# Patient Record
Sex: MALE | Race: WHITE | Employment: OTHER | URBAN - METROPOLITAN AREA
[De-identification: names, ages, dates, MRNs, and addresses within clinical notes are randomized per-mention and may not be internally consistent; named-entity substitution may affect disease eponyms.]

---

## 2019-01-01 ENCOUNTER — APPOINTMENT (OUTPATIENT)
Dept: GENERAL RADIOLOGY | Age: 84
DRG: 266 | End: 2019-01-01
Attending: THORACIC SURGERY (CARDIOTHORACIC VASCULAR SURGERY)
Payer: MEDICARE

## 2019-01-01 ENCOUNTER — APPOINTMENT (OUTPATIENT)
Dept: GENERAL RADIOLOGY | Age: 84
DRG: 291 | End: 2019-01-01
Attending: THORACIC SURGERY (CARDIOTHORACIC VASCULAR SURGERY)
Payer: MEDICARE

## 2019-01-01 ENCOUNTER — APPOINTMENT (OUTPATIENT)
Dept: INTERVENTIONAL RADIOLOGY/VASCULAR | Age: 84
DRG: 266 | End: 2019-01-01
Attending: THORACIC SURGERY (CARDIOTHORACIC VASCULAR SURGERY)
Payer: MEDICARE

## 2019-01-01 ENCOUNTER — APPOINTMENT (OUTPATIENT)
Dept: CT IMAGING | Age: 84
DRG: 291 | End: 2019-01-01
Attending: THORACIC SURGERY (CARDIOTHORACIC VASCULAR SURGERY)
Payer: MEDICARE

## 2019-01-01 ENCOUNTER — APPOINTMENT (OUTPATIENT)
Dept: DIALYSIS | Age: 84
DRG: 266 | End: 2019-01-01
Attending: THORACIC SURGERY (CARDIOTHORACIC VASCULAR SURGERY)
Payer: MEDICARE

## 2019-01-01 ENCOUNTER — HOSPITAL ENCOUNTER (OUTPATIENT)
Age: 84
Discharge: HOME | End: 2019-05-31
Payer: MEDICARE

## 2019-01-01 ENCOUNTER — HOSPITAL ENCOUNTER (OUTPATIENT)
Age: 84
LOS: 1 days | Discharge: NURSING, REHAB OR LONG-TERM CARE | End: 2019-06-20
Attending: THORACIC SURGERY (CARDIOTHORACIC VASCULAR SURGERY) | Admitting: THORACIC SURGERY (CARDIOTHORACIC VASCULAR SURGERY)
Payer: MEDICARE

## 2019-01-01 ENCOUNTER — HOSPITAL ENCOUNTER (OUTPATIENT)
Dept: GENERAL RADIOLOGY | Age: 84
Discharge: HOME | End: 2019-07-14
Payer: COMMERCIAL

## 2019-01-01 ENCOUNTER — OFFICE VISIT (OUTPATIENT)
Dept: PODIATRY | Age: 84
End: 2019-01-01
Payer: MEDICARE

## 2019-01-01 ENCOUNTER — APPOINTMENT (OUTPATIENT)
Dept: GENERAL RADIOLOGY | Age: 84
End: 2019-01-01
Attending: THORACIC SURGERY (CARDIOTHORACIC VASCULAR SURGERY)
Payer: MEDICARE

## 2019-01-01 ENCOUNTER — APPOINTMENT (OUTPATIENT)
Dept: INTERVENTIONAL RADIOLOGY/VASCULAR | Age: 84
DRG: 291 | End: 2019-01-01
Attending: THORACIC SURGERY (CARDIOTHORACIC VASCULAR SURGERY)
Payer: MEDICARE

## 2019-01-01 ENCOUNTER — APPOINTMENT (OUTPATIENT)
Dept: ULTRASOUND IMAGING | Age: 84
DRG: 266 | End: 2019-01-01
Attending: THORACIC SURGERY (CARDIOTHORACIC VASCULAR SURGERY)
Payer: MEDICARE

## 2019-01-01 ENCOUNTER — APPOINTMENT (OUTPATIENT)
Dept: DIALYSIS | Age: 84
DRG: 291 | End: 2019-01-01
Attending: THORACIC SURGERY (CARDIOTHORACIC VASCULAR SURGERY)
Payer: MEDICARE

## 2019-01-01 ENCOUNTER — ANESTHESIA EVENT (OUTPATIENT)
Dept: OPERATING ROOM | Age: 84
End: 2019-01-01
Payer: MEDICARE

## 2019-01-01 ENCOUNTER — APPOINTMENT (OUTPATIENT)
Dept: CARDIAC CATH/INVASIVE PROCEDURES | Age: 84
DRG: 266 | End: 2019-01-01
Attending: THORACIC SURGERY (CARDIOTHORACIC VASCULAR SURGERY)
Payer: MEDICARE

## 2019-01-01 ENCOUNTER — ANESTHESIA (OUTPATIENT)
Dept: CARDIAC CATH/INVASIVE PROCEDURES | Age: 84
DRG: 266 | End: 2019-01-01
Payer: MEDICARE

## 2019-01-01 ENCOUNTER — OFFICE VISIT (OUTPATIENT)
Dept: VASCULAR SURGERY | Age: 84
End: 2019-01-01
Payer: MEDICARE

## 2019-01-01 ENCOUNTER — HOSPITAL ENCOUNTER (OUTPATIENT)
Dept: GENERAL RADIOLOGY | Age: 84
Discharge: HOME | End: 2019-05-31
Payer: COMMERCIAL

## 2019-01-01 ENCOUNTER — HOSPITAL ENCOUNTER (OUTPATIENT)
Dept: VASCULAR LAB | Age: 84
Discharge: HOME | End: 2019-07-09
Payer: MEDICARE

## 2019-01-01 ENCOUNTER — APPOINTMENT (OUTPATIENT)
Dept: INTERVENTIONAL RADIOLOGY/VASCULAR | Age: 84
DRG: 266 | End: 2019-01-01
Payer: MEDICARE

## 2019-01-01 ENCOUNTER — OFFICE VISIT (OUTPATIENT)
Dept: CARDIOTHORACIC SURGERY | Age: 84
End: 2019-01-01
Payer: MEDICARE

## 2019-01-01 ENCOUNTER — HOSPITAL ENCOUNTER (OUTPATIENT)
Age: 84
Discharge: HOME | End: 2019-07-14
Payer: COMMERCIAL

## 2019-01-01 ENCOUNTER — ANESTHESIA EVENT (OUTPATIENT)
Dept: CARDIAC CATH/INVASIVE PROCEDURES | Age: 84
DRG: 266 | End: 2019-01-01
Payer: MEDICARE

## 2019-01-01 ENCOUNTER — HOSPITAL ENCOUNTER (OUTPATIENT)
Age: 84
Discharge: HOME | End: 2019-05-29
Payer: COMMERCIAL

## 2019-01-01 ENCOUNTER — HOSPITAL ENCOUNTER (INPATIENT)
Age: 84
LOS: 48 days | Discharge: NURSING, REHAB OR LONG-TERM CARE | DRG: 266 | End: 2019-04-23
Attending: THORACIC SURGERY (CARDIOTHORACIC VASCULAR SURGERY) | Admitting: THORACIC SURGERY (CARDIOTHORACIC VASCULAR SURGERY)
Payer: MEDICARE

## 2019-01-01 ENCOUNTER — ANESTHESIA (OUTPATIENT)
Dept: OPERATING ROOM | Age: 84
End: 2019-01-01
Payer: MEDICARE

## 2019-01-01 ENCOUNTER — HOSPITAL ENCOUNTER (OUTPATIENT)
Age: 84
Discharge: HOME | End: 2019-07-09
Payer: MEDICARE

## 2019-01-01 ENCOUNTER — HOSPITAL ENCOUNTER (INPATIENT)
Age: 84
LOS: 13 days | Discharge: NURSING, REHAB OR LONG-TERM CARE | DRG: 291 | End: 2019-05-16
Attending: THORACIC SURGERY (CARDIOTHORACIC VASCULAR SURGERY) | Admitting: THORACIC SURGERY (CARDIOTHORACIC VASCULAR SURGERY)
Payer: MEDICARE

## 2019-01-01 VITALS
DIASTOLIC BLOOD PRESSURE: 72 MMHG | TEMPERATURE: 98 F | HEART RATE: 68 BPM | OXYGEN SATURATION: 98 % | SYSTOLIC BLOOD PRESSURE: 132 MMHG

## 2019-01-01 VITALS — BODY MASS INDEX: 23.32 KG/M2 | WEIGHT: 140 LBS | HEIGHT: 65 IN | RESPIRATION RATE: 18 BRPM

## 2019-01-01 VITALS
BODY MASS INDEX: 21.01 KG/M2 | DIASTOLIC BLOOD PRESSURE: 87 MMHG | RESPIRATION RATE: 15 BRPM | TEMPERATURE: 98.9 F | SYSTOLIC BLOOD PRESSURE: 159 MMHG | HEIGHT: 66 IN | OXYGEN SATURATION: 95 % | HEART RATE: 64 BPM | WEIGHT: 130.73 LBS

## 2019-01-01 VITALS
BODY MASS INDEX: 20.97 KG/M2 | RESPIRATION RATE: 23 BRPM | TEMPERATURE: 97.7 F | WEIGHT: 125.88 LBS | HEART RATE: 68 BPM | SYSTOLIC BLOOD PRESSURE: 140 MMHG | HEIGHT: 65 IN | OXYGEN SATURATION: 98 % | DIASTOLIC BLOOD PRESSURE: 61 MMHG

## 2019-01-01 VITALS — DIASTOLIC BLOOD PRESSURE: 61 MMHG | SYSTOLIC BLOOD PRESSURE: 141 MMHG | OXYGEN SATURATION: 100 %

## 2019-01-01 VITALS
DIASTOLIC BLOOD PRESSURE: 40 MMHG | HEIGHT: 66 IN | HEART RATE: 93 BPM | WEIGHT: 131.9 LBS | TEMPERATURE: 98.2 F | BODY MASS INDEX: 21.2 KG/M2 | SYSTOLIC BLOOD PRESSURE: 139 MMHG | RESPIRATION RATE: 18 BRPM | OXYGEN SATURATION: 96 %

## 2019-01-01 VITALS
DIASTOLIC BLOOD PRESSURE: 60 MMHG | BODY MASS INDEX: 21.66 KG/M2 | TEMPERATURE: 97.9 F | HEART RATE: 50 BPM | HEIGHT: 65 IN | WEIGHT: 130 LBS | SYSTOLIC BLOOD PRESSURE: 119 MMHG | OXYGEN SATURATION: 94 %

## 2019-01-01 VITALS — TEMPERATURE: 96.2 F | OXYGEN SATURATION: 99 % | SYSTOLIC BLOOD PRESSURE: 120 MMHG | DIASTOLIC BLOOD PRESSURE: 61 MMHG

## 2019-01-01 DIAGNOSIS — T81.718A PSEUDOANEURYSM OF FEMORAL ARTERY FOLLOWING PROCEDURE (HCC): ICD-10-CM

## 2019-01-01 DIAGNOSIS — R79.82 CRP ELEVATED: ICD-10-CM

## 2019-01-01 DIAGNOSIS — M86.9 OSTEOMYELITIS OF OTHER SITE, UNSPECIFIED TYPE (HCC): Primary | ICD-10-CM

## 2019-01-01 DIAGNOSIS — J90 PLEURAL EFFUSION: Primary | ICD-10-CM

## 2019-01-01 DIAGNOSIS — Z95.2 S/P TAVR (TRANSCATHETER AORTIC VALVE REPLACEMENT): Primary | ICD-10-CM

## 2019-01-01 DIAGNOSIS — I73.9 PVD (PERIPHERAL VASCULAR DISEASE) (HCC): ICD-10-CM

## 2019-01-01 DIAGNOSIS — M79.672 BILATERAL FOOT PAIN: ICD-10-CM

## 2019-01-01 DIAGNOSIS — N18.4 STAGE 4 CHRONIC KIDNEY DISEASE (HCC): ICD-10-CM

## 2019-01-01 DIAGNOSIS — R26.2 TROUBLE WALKING: ICD-10-CM

## 2019-01-01 DIAGNOSIS — I27.20 PULMONARY HYPERTENSION (HCC): Primary | ICD-10-CM

## 2019-01-01 DIAGNOSIS — R60.0 BILATERAL LOWER EXTREMITY EDEMA: Primary | ICD-10-CM

## 2019-01-01 DIAGNOSIS — I73.89 OTHER SPECIFIED PERIPHERAL VASCULAR DISEASES (HCC): ICD-10-CM

## 2019-01-01 DIAGNOSIS — J90 PLEURAL EFFUSION: ICD-10-CM

## 2019-01-01 DIAGNOSIS — M25.511 RIGHT SHOULDER PAIN, UNSPECIFIED CHRONICITY: Primary | ICD-10-CM

## 2019-01-01 DIAGNOSIS — B35.1 DERMATOPHYTOSIS OF NAIL: Primary | ICD-10-CM

## 2019-01-01 DIAGNOSIS — Z95.2 S/P TAVR (TRANSCATHETER AORTIC VALVE REPLACEMENT): ICD-10-CM

## 2019-01-01 DIAGNOSIS — I50.23 ACUTE ON CHRONIC SYSTOLIC CONGESTIVE HEART FAILURE (HCC): ICD-10-CM

## 2019-01-01 DIAGNOSIS — I70.90 ARTERIOSCLEROSIS: Primary | ICD-10-CM

## 2019-01-01 DIAGNOSIS — I72.9 PSEUDOANEURYSM (HCC): Primary | ICD-10-CM

## 2019-01-01 DIAGNOSIS — I72.4 PSEUDOANEURYSM OF FEMORAL ARTERY FOLLOWING PROCEDURE (HCC): ICD-10-CM

## 2019-01-01 DIAGNOSIS — I50.23 ACUTE ON CHRONIC SYSTOLIC CONGESTIVE HEART FAILURE (HCC): Primary | ICD-10-CM

## 2019-01-01 DIAGNOSIS — M79.89 SWELLING OF BOTH LOWER EXTREMITIES: Primary | ICD-10-CM

## 2019-01-01 DIAGNOSIS — M79.671 BILATERAL FOOT PAIN: ICD-10-CM

## 2019-01-01 DIAGNOSIS — M25.511 RIGHT SHOULDER PAIN, UNSPECIFIED CHRONICITY: ICD-10-CM

## 2019-01-01 DIAGNOSIS — T88.8XXA OTHER SPECIFIED COMPLICATIONS OF SURGICAL AND MEDICAL CARE, NOT ELSEWHERE CLASSIFIED, INITIAL ENCOUNTER: ICD-10-CM

## 2019-01-01 DIAGNOSIS — M86.9 OSTEOMYELITIS OF OTHER SITE, UNSPECIFIED TYPE (HCC): ICD-10-CM

## 2019-01-01 LAB
% CKMB: 13.1 % (ref 0–3.5)
-: NORMAL
ABO/RH: NORMAL
ABSOLUTE EOS #: 0 K/UL (ref 0–0.4)
ABSOLUTE EOS #: 0 K/UL (ref 0–0.4)
ABSOLUTE EOS #: 0 K/UL (ref 0–0.44)
ABSOLUTE EOS #: 0.1 K/UL (ref 0–0.4)
ABSOLUTE EOS #: 0.11 K/UL (ref 0–0.44)
ABSOLUTE EOS #: 0.13 K/UL (ref 0–0.44)
ABSOLUTE EOS #: 0.14 K/UL (ref 0–0.44)
ABSOLUTE EOS #: 0.16 K/UL (ref 0–0.44)
ABSOLUTE EOS #: 0.17 K/UL (ref 0–0.4)
ABSOLUTE EOS #: 0.17 K/UL (ref 0–0.44)
ABSOLUTE EOS #: 0.2 K/UL (ref 0–0.4)
ABSOLUTE EOS #: 0.2 K/UL (ref 0–0.44)
ABSOLUTE EOS #: 0.24 K/UL (ref 0–0.44)
ABSOLUTE EOS #: 0.26 K/UL (ref 0–0.44)
ABSOLUTE EOS #: 0.31 K/UL (ref 0–0.44)
ABSOLUTE EOS #: 0.36 K/UL (ref 0–0.44)
ABSOLUTE EOS #: 0.38 K/UL (ref 0–0.44)
ABSOLUTE EOS #: 0.47 K/UL (ref 0–0.44)
ABSOLUTE EOS #: 0.48 K/UL (ref 0–0.44)
ABSOLUTE EOS #: 0.5 K/UL (ref 0–0.4)
ABSOLUTE EOS #: 0.55 K/UL (ref 0–0.44)
ABSOLUTE EOS #: 0.58 K/UL (ref 0–0.44)
ABSOLUTE EOS #: 0.61 K/UL (ref 0–0.44)
ABSOLUTE EOS #: 0.68 K/UL (ref 0–0.44)
ABSOLUTE EOS #: 0.71 K/UL (ref 0–0.44)
ABSOLUTE IMMATURE GRANULOCYTE: 0 K/UL (ref 0–0.3)
ABSOLUTE IMMATURE GRANULOCYTE: 0.04 K/UL (ref 0–0.3)
ABSOLUTE IMMATURE GRANULOCYTE: 0.06 K/UL (ref 0–0.3)
ABSOLUTE IMMATURE GRANULOCYTE: 0.07 K/UL (ref 0–0.3)
ABSOLUTE IMMATURE GRANULOCYTE: 0.07 K/UL (ref 0–0.3)
ABSOLUTE IMMATURE GRANULOCYTE: 0.08 K/UL (ref 0–0.3)
ABSOLUTE IMMATURE GRANULOCYTE: 0.08 K/UL (ref 0–0.3)
ABSOLUTE IMMATURE GRANULOCYTE: 0.09 K/UL (ref 0–0.3)
ABSOLUTE IMMATURE GRANULOCYTE: 0.09 K/UL (ref 0–0.3)
ABSOLUTE IMMATURE GRANULOCYTE: 0.1 K/UL (ref 0–0.3)
ABSOLUTE IMMATURE GRANULOCYTE: 0.12 K/UL (ref 0–0.3)
ABSOLUTE IMMATURE GRANULOCYTE: 0.13 K/UL (ref 0–0.3)
ABSOLUTE IMMATURE GRANULOCYTE: 0.15 K/UL (ref 0–0.3)
ABSOLUTE IMMATURE GRANULOCYTE: 0.16 K/UL (ref 0–0.3)
ABSOLUTE IMMATURE GRANULOCYTE: 0.24 K/UL (ref 0–0.3)
ABSOLUTE IMMATURE GRANULOCYTE: 0.25 K/UL (ref 0–0.3)
ABSOLUTE LYMPH #: 0.45 K/UL (ref 1.1–3.7)
ABSOLUTE LYMPH #: 0.46 K/UL (ref 1.1–3.7)
ABSOLUTE LYMPH #: 0.5 K/UL (ref 1.1–3.7)
ABSOLUTE LYMPH #: 0.5 K/UL (ref 1–4.8)
ABSOLUTE LYMPH #: 0.5 K/UL (ref 1–4.8)
ABSOLUTE LYMPH #: 0.52 K/UL (ref 1.1–3.7)
ABSOLUTE LYMPH #: 0.52 K/UL (ref 1–4.8)
ABSOLUTE LYMPH #: 0.56 K/UL (ref 1.1–3.7)
ABSOLUTE LYMPH #: 0.58 K/UL (ref 1.1–3.7)
ABSOLUTE LYMPH #: 0.59 K/UL (ref 1.1–3.7)
ABSOLUTE LYMPH #: 0.62 K/UL (ref 1.1–3.7)
ABSOLUTE LYMPH #: 0.65 K/UL (ref 1.1–3.7)
ABSOLUTE LYMPH #: 0.68 K/UL (ref 1–4.8)
ABSOLUTE LYMPH #: 0.7 K/UL (ref 1.1–3.7)
ABSOLUTE LYMPH #: 0.72 K/UL (ref 1.1–3.7)
ABSOLUTE LYMPH #: 0.72 K/UL (ref 1.1–3.7)
ABSOLUTE LYMPH #: 0.73 K/UL (ref 1.1–3.7)
ABSOLUTE LYMPH #: 0.74 K/UL (ref 1.1–3.7)
ABSOLUTE LYMPH #: 0.8 K/UL (ref 1.1–3.7)
ABSOLUTE LYMPH #: 0.82 K/UL (ref 1.1–3.7)
ABSOLUTE LYMPH #: 1.03 K/UL (ref 1–4.8)
ABSOLUTE LYMPH #: 1.07 K/UL (ref 1.1–3.7)
ABSOLUTE LYMPH #: 1.16 K/UL (ref 1–4.8)
ABSOLUTE MONO #: 0.1 K/UL (ref 0.1–1.2)
ABSOLUTE MONO #: 0.31 K/UL (ref 0.1–0.8)
ABSOLUTE MONO #: 0.53 K/UL (ref 0.1–1.2)
ABSOLUTE MONO #: 0.54 K/UL (ref 0.1–0.8)
ABSOLUTE MONO #: 0.58 K/UL (ref 0.1–0.8)
ABSOLUTE MONO #: 0.58 K/UL (ref 0.1–1.2)
ABSOLUTE MONO #: 0.59 K/UL (ref 0.1–1.2)
ABSOLUTE MONO #: 0.6 K/UL (ref 0.1–0.8)
ABSOLUTE MONO #: 0.63 K/UL (ref 0.1–1.2)
ABSOLUTE MONO #: 0.69 K/UL (ref 0.1–0.8)
ABSOLUTE MONO #: 0.69 K/UL (ref 0.1–1.2)
ABSOLUTE MONO #: 0.73 K/UL (ref 0.1–1.2)
ABSOLUTE MONO #: 0.74 K/UL (ref 0.1–1.2)
ABSOLUTE MONO #: 0.75 K/UL (ref 0.1–1.2)
ABSOLUTE MONO #: 0.76 K/UL (ref 0.1–1.2)
ABSOLUTE MONO #: 0.77 K/UL (ref 0.1–1.2)
ABSOLUTE MONO #: 0.79 K/UL (ref 0.1–1.2)
ABSOLUTE MONO #: 0.8 K/UL (ref 0.1–1.2)
ABSOLUTE MONO #: 0.86 K/UL (ref 0.1–0.8)
ABSOLUTE MONO #: 0.89 K/UL (ref 0.1–1.2)
ABSOLUTE MONO #: 0.92 K/UL (ref 0.1–1.2)
ABSOLUTE MONO #: 0.94 K/UL (ref 0.1–1.2)
ABSOLUTE MONO #: 0.95 K/UL (ref 0.1–1.2)
ABSOLUTE MONO #: 0.97 K/UL (ref 0.1–1.2)
ABSOLUTE MONO #: 0.98 K/UL (ref 0.1–1.2)
ABSOLUTE RETIC #: 0.06 M/UL (ref 0.03–0.08)
ACTIVATED CLOTTING TIME: 168 SEC (ref 79–149)
ACTIVATED CLOTTING TIME: 225 SEC (ref 79–149)
ALBUMIN (CALCULATED): 2.5 G/DL (ref 3.2–5.2)
ALBUMIN PERCENT: 53 % (ref 45–65)
ALBUMIN SERPL-MCNC: 2.3 G/DL (ref 3.5–5.2)
ALBUMIN SERPL-MCNC: 2.9 G/DL (ref 3.5–5.2)
ALBUMIN SERPL-MCNC: 3.1 G/DL (ref 3.5–5.2)
ALBUMIN SERPL-MCNC: 3.1 G/DL (ref 3.5–5.2)
ALBUMIN SERPL-MCNC: 3.3 G/DL (ref 3.5–5.2)
ALBUMIN SERPL-MCNC: 3.4 G/DL (ref 3.5–5.2)
ALBUMIN SERPL-MCNC: 3.6 G/DL (ref 3.5–5.2)
ALBUMIN/GLOBULIN RATIO: 1.4 (ref 1–2.5)
ALBUMIN/GLOBULIN RATIO: 1.4 (ref 1–2.5)
ALBUMIN/GLOBULIN RATIO: 1.5 (ref 1–2.5)
ALBUMIN/GLOBULIN RATIO: 1.5 (ref 1–2.5)
ALBUMIN/GLOBULIN RATIO: 1.9 (ref 1–2.5)
ALLEN TEST: POSITIVE
ALP BLD-CCNC: 124 U/L (ref 40–129)
ALP BLD-CCNC: 74 U/L (ref 40–129)
ALP BLD-CCNC: 85 U/L (ref 40–129)
ALP BLD-CCNC: 87 U/L (ref 40–129)
ALP BLD-CCNC: 94 U/L (ref 40–129)
ALPHA 1 PERCENT: 7 % (ref 3–6)
ALPHA 2 PERCENT: 18 % (ref 6–13)
ALPHA-1-GLOBULIN: 0.3 G/DL (ref 0.1–0.4)
ALPHA-2-GLOBULIN: 0.8 G/DL (ref 0.5–0.9)
ALT SERPL-CCNC: 12 U/L (ref 5–41)
ALT SERPL-CCNC: 17 U/L (ref 5–41)
ALT SERPL-CCNC: 18 U/L (ref 5–41)
ALT SERPL-CCNC: 19 U/L (ref 5–41)
ALT SERPL-CCNC: 30 U/L (ref 5–41)
AMORPHOUS: NORMAL
ANION GAP SERPL CALCULATED.3IONS-SCNC: 10 MMOL/L (ref 9–17)
ANION GAP SERPL CALCULATED.3IONS-SCNC: 11 MMOL/L (ref 9–17)
ANION GAP SERPL CALCULATED.3IONS-SCNC: 12 MMOL/L (ref 9–17)
ANION GAP SERPL CALCULATED.3IONS-SCNC: 13 MMOL/L (ref 9–17)
ANION GAP SERPL CALCULATED.3IONS-SCNC: 14 MMOL/L (ref 9–17)
ANION GAP SERPL CALCULATED.3IONS-SCNC: 15 MMOL/L (ref 9–17)
ANION GAP SERPL CALCULATED.3IONS-SCNC: 16 MMOL/L (ref 9–17)
ANION GAP SERPL CALCULATED.3IONS-SCNC: 17 MMOL/L (ref 9–17)
ANION GAP SERPL CALCULATED.3IONS-SCNC: 17 MMOL/L (ref 9–17)
ANION GAP SERPL CALCULATED.3IONS-SCNC: 18 MMOL/L (ref 9–17)
ANTIBODY SCREEN: NEGATIVE
APPEARANCE FLUID: NORMAL
ARM BAND NUMBER: NORMAL
AST SERPL-CCNC: 18 U/L
AST SERPL-CCNC: 20 U/L
AST SERPL-CCNC: 22 U/L
AST SERPL-CCNC: 23 U/L
AST SERPL-CCNC: 25 U/L
BACTERIA: NORMAL
BASO FLUID: NORMAL %
BASOPHILS # BLD: 0 % (ref 0–2)
BASOPHILS # BLD: 1 % (ref 0–2)
BASOPHILS ABSOLUTE: 0 K/UL (ref 0–0.2)
BASOPHILS ABSOLUTE: 0.03 K/UL (ref 0–0.2)
BASOPHILS ABSOLUTE: 0.04 K/UL (ref 0–0.2)
BASOPHILS ABSOLUTE: 0.04 K/UL (ref 0–0.2)
BASOPHILS ABSOLUTE: 0.05 K/UL (ref 0–0.2)
BASOPHILS ABSOLUTE: 0.05 K/UL (ref 0–0.2)
BASOPHILS ABSOLUTE: 0.1 K/UL (ref 0–0.2)
BASOPHILS ABSOLUTE: <0.03 K/UL (ref 0–0.2)
BASOPHILS ABSOLUTE: <0.03 K/UL (ref 0–0.2)
BETA GLOBULIN: 0.7 G/DL (ref 0.5–1.1)
BETA PERCENT: 14 % (ref 11–19)
BILIRUB SERPL-MCNC: 0.29 MG/DL (ref 0.3–1.2)
BILIRUB SERPL-MCNC: 0.29 MG/DL (ref 0.3–1.2)
BILIRUB SERPL-MCNC: 0.44 MG/DL (ref 0.3–1.2)
BILIRUB SERPL-MCNC: 0.45 MG/DL (ref 0.3–1.2)
BILIRUB SERPL-MCNC: 0.46 MG/DL (ref 0.3–1.2)
BILIRUB SERPL-MCNC: 0.55 MG/DL (ref 0.3–1.2)
BILIRUBIN DIRECT: 0.13 MG/DL
BILIRUBIN DIRECT: 0.14 MG/DL
BILIRUBIN DIRECT: 0.15 MG/DL
BILIRUBIN URINE: NEGATIVE
BILIRUBIN, INDIRECT: 0.31 MG/DL (ref 0–1)
BILIRUBIN, INDIRECT: 0.31 MG/DL (ref 0–1)
BILIRUBIN, INDIRECT: 0.4 MG/DL (ref 0–1)
BLD PROD TYP BPU: NORMAL
BLOOD BANK SPECIMEN: NORMAL
BNP INTERPRETATION: ABNORMAL
BUN BLDV-MCNC: 100 MG/DL (ref 8–23)
BUN BLDV-MCNC: 101 MG/DL (ref 8–23)
BUN BLDV-MCNC: 102 MG/DL (ref 8–23)
BUN BLDV-MCNC: 103 MG/DL (ref 8–23)
BUN BLDV-MCNC: 103 MG/DL (ref 8–23)
BUN BLDV-MCNC: 104 MG/DL (ref 8–23)
BUN BLDV-MCNC: 104 MG/DL (ref 8–23)
BUN BLDV-MCNC: 106 MG/DL (ref 8–23)
BUN BLDV-MCNC: 107 MG/DL (ref 8–23)
BUN BLDV-MCNC: 110 MG/DL (ref 8–23)
BUN BLDV-MCNC: 111 MG/DL (ref 8–23)
BUN BLDV-MCNC: 138 MG/DL (ref 8–23)
BUN BLDV-MCNC: 24 MG/DL (ref 8–23)
BUN BLDV-MCNC: 27 MG/DL (ref 8–23)
BUN BLDV-MCNC: 28 MG/DL (ref 8–23)
BUN BLDV-MCNC: 31 MG/DL (ref 8–23)
BUN BLDV-MCNC: 33 MG/DL (ref 8–23)
BUN BLDV-MCNC: 35 MG/DL (ref 8–23)
BUN BLDV-MCNC: 38 MG/DL (ref 8–23)
BUN BLDV-MCNC: 42 MG/DL (ref 8–23)
BUN BLDV-MCNC: 42 MG/DL (ref 8–23)
BUN BLDV-MCNC: 43 MG/DL (ref 8–23)
BUN BLDV-MCNC: 43 MG/DL (ref 8–23)
BUN BLDV-MCNC: 44 MG/DL (ref 8–23)
BUN BLDV-MCNC: 46 MG/DL (ref 8–23)
BUN BLDV-MCNC: 47 MG/DL (ref 8–23)
BUN BLDV-MCNC: 47 MG/DL (ref 8–23)
BUN BLDV-MCNC: 49 MG/DL (ref 8–23)
BUN BLDV-MCNC: 52 MG/DL (ref 8–23)
BUN BLDV-MCNC: 53 MG/DL (ref 8–23)
BUN BLDV-MCNC: 54 MG/DL (ref 8–23)
BUN BLDV-MCNC: 54 MG/DL (ref 8–23)
BUN BLDV-MCNC: 55 MG/DL (ref 8–23)
BUN BLDV-MCNC: 58 MG/DL (ref 8–23)
BUN BLDV-MCNC: 58 MG/DL (ref 8–23)
BUN BLDV-MCNC: 61 MG/DL (ref 8–23)
BUN BLDV-MCNC: 62 MG/DL (ref 8–23)
BUN BLDV-MCNC: 66 MG/DL (ref 8–23)
BUN BLDV-MCNC: 72 MG/DL (ref 8–23)
BUN BLDV-MCNC: 74 MG/DL (ref 8–23)
BUN BLDV-MCNC: 75 MG/DL (ref 8–23)
BUN BLDV-MCNC: 76 MG/DL (ref 8–23)
BUN BLDV-MCNC: 81 MG/DL (ref 8–23)
BUN BLDV-MCNC: 83 MG/DL (ref 8–23)
BUN BLDV-MCNC: 89 MG/DL (ref 8–23)
BUN BLDV-MCNC: 91 MG/DL (ref 8–23)
BUN BLDV-MCNC: 93 MG/DL (ref 8–23)
BUN BLDV-MCNC: 93 MG/DL (ref 8–23)
BUN BLDV-MCNC: 95 MG/DL (ref 8–23)
BUN BLDV-MCNC: 97 MG/DL (ref 8–23)
BUN BLDV-MCNC: 99 MG/DL (ref 8–23)
BUN BLDV-MCNC: 99 MG/DL (ref 8–23)
BUN/CREAT BLD: ABNORMAL (ref 9–20)
C-REACTIVE PROTEIN: 10.2 MG/L (ref 0–5)
C-REACTIVE PROTEIN: 18.6 MG/L (ref 0–5)
C-REACTIVE PROTEIN: 18.6 MG/L (ref 0–5)
C-REACTIVE PROTEIN: 29.1 MG/L (ref 0–5)
C-REACTIVE PROTEIN: 6.5 MG/L (ref 0–5)
C-REACTIVE PROTEIN: 60.9 MG/L (ref 0–5)
C-REACTIVE PROTEIN: 9.3 MG/L (ref 0–5)
CALCIUM IONIZED: 1.11 MMOL/L (ref 1.13–1.33)
CALCIUM IONIZED: 1.13 MMOL/L (ref 1.13–1.33)
CALCIUM IONIZED: 1.15 MMOL/L (ref 1.13–1.33)
CALCIUM SERPL-MCNC: 7.1 MG/DL (ref 8.6–10.4)
CALCIUM SERPL-MCNC: 7.4 MG/DL (ref 8.6–10.4)
CALCIUM SERPL-MCNC: 7.5 MG/DL (ref 8.6–10.4)
CALCIUM SERPL-MCNC: 7.6 MG/DL (ref 8.6–10.4)
CALCIUM SERPL-MCNC: 7.7 MG/DL (ref 8.6–10.4)
CALCIUM SERPL-MCNC: 7.8 MG/DL (ref 8.6–10.4)
CALCIUM SERPL-MCNC: 7.9 MG/DL (ref 8.6–10.4)
CALCIUM SERPL-MCNC: 8 MG/DL (ref 8.6–10.4)
CALCIUM SERPL-MCNC: 8.1 MG/DL (ref 8.6–10.4)
CALCIUM SERPL-MCNC: 8.1 MG/DL (ref 8.6–10.4)
CALCIUM SERPL-MCNC: 8.2 MG/DL (ref 8.6–10.4)
CALCIUM SERPL-MCNC: 8.3 MG/DL (ref 8.6–10.4)
CALCIUM SERPL-MCNC: 8.4 MG/DL (ref 8.6–10.4)
CALCIUM SERPL-MCNC: 8.5 MG/DL (ref 8.6–10.4)
CALCIUM SERPL-MCNC: 8.6 MG/DL (ref 8.6–10.4)
CALCIUM SERPL-MCNC: 8.7 MG/DL (ref 8.6–10.4)
CALCIUM SERPL-MCNC: 8.8 MG/DL (ref 8.6–10.4)
CALCIUM SERPL-MCNC: 8.8 MG/DL (ref 8.6–10.4)
CALCIUM SERPL-MCNC: 8.9 MG/DL (ref 8.6–10.4)
CALCIUM SERPL-MCNC: 9 MG/DL (ref 8.6–10.4)
CALCIUM SERPL-MCNC: 9.1 MG/DL (ref 8.6–10.4)
CASE NUMBER:: NORMAL
CASTS UA: NORMAL /LPF (ref 0–2)
CASTS UA: NORMAL /LPF (ref 0–8)
CASTS UA: NORMAL /LPF (ref 0–8)
CHLORIDE BLD-SCNC: 100 MMOL/L (ref 98–107)
CHLORIDE BLD-SCNC: 101 MMOL/L (ref 98–107)
CHLORIDE BLD-SCNC: 101 MMOL/L (ref 98–107)
CHLORIDE BLD-SCNC: 102 MMOL/L (ref 98–107)
CHLORIDE BLD-SCNC: 103 MMOL/L (ref 98–107)
CHLORIDE BLD-SCNC: 104 MMOL/L (ref 98–107)
CHLORIDE BLD-SCNC: 105 MMOL/L (ref 98–107)
CHLORIDE BLD-SCNC: 106 MMOL/L (ref 98–107)
CHLORIDE BLD-SCNC: 92 MMOL/L (ref 98–107)
CHLORIDE BLD-SCNC: 93 MMOL/L (ref 98–107)
CHLORIDE BLD-SCNC: 94 MMOL/L (ref 98–107)
CHLORIDE BLD-SCNC: 94 MMOL/L (ref 98–107)
CHLORIDE BLD-SCNC: 95 MMOL/L (ref 98–107)
CHLORIDE BLD-SCNC: 96 MMOL/L (ref 98–107)
CHLORIDE BLD-SCNC: 97 MMOL/L (ref 98–107)
CHLORIDE BLD-SCNC: 98 MMOL/L (ref 98–107)
CHLORIDE BLD-SCNC: 99 MMOL/L (ref 98–107)
CK MB: 7.7 NG/ML
CKMB INTERPRETATION: ABNORMAL
CO2: 17 MMOL/L (ref 20–31)
CO2: 18 MMOL/L (ref 20–31)
CO2: 18 MMOL/L (ref 20–31)
CO2: 19 MMOL/L (ref 20–31)
CO2: 20 MMOL/L (ref 20–31)
CO2: 20 MMOL/L (ref 20–31)
CO2: 21 MMOL/L (ref 20–31)
CO2: 21 MMOL/L (ref 20–31)
CO2: 22 MMOL/L (ref 20–31)
CO2: 23 MMOL/L (ref 20–31)
CO2: 24 MMOL/L (ref 20–31)
CO2: 25 MMOL/L (ref 20–31)
CO2: 26 MMOL/L (ref 20–31)
CO2: 27 MMOL/L (ref 20–31)
CO2: 28 MMOL/L (ref 20–31)
CO2: 28 MMOL/L (ref 20–31)
CO2: 29 MMOL/L (ref 20–31)
COLLAGEN ADENOSINE-5'-DIPHOSPHATE (ADP) TIME: 89 SEC (ref 67–112)
COLLAGEN EPINEPHRINE TIME: 134 SEC (ref 85–172)
COLOR FLUID: NORMAL
COLOR: YELLOW
COMMENT UA: ABNORMAL
CREAT SERPL-MCNC: 1.24 MG/DL (ref 0.7–1.2)
CREAT SERPL-MCNC: 1.89 MG/DL (ref 0.7–1.2)
CREAT SERPL-MCNC: 2 MG/DL (ref 0.7–1.2)
CREAT SERPL-MCNC: 2.03 MG/DL (ref 0.7–1.2)
CREAT SERPL-MCNC: 2.07 MG/DL (ref 0.7–1.2)
CREAT SERPL-MCNC: 2.12 MG/DL (ref 0.7–1.2)
CREAT SERPL-MCNC: 2.15 MG/DL (ref 0.7–1.2)
CREAT SERPL-MCNC: 2.3 MG/DL (ref 0.7–1.2)
CREAT SERPL-MCNC: 2.41 MG/DL (ref 0.7–1.2)
CREAT SERPL-MCNC: 2.42 MG/DL (ref 0.7–1.2)
CREAT SERPL-MCNC: 2.48 MG/DL (ref 0.7–1.2)
CREAT SERPL-MCNC: 2.57 MG/DL (ref 0.7–1.2)
CREAT SERPL-MCNC: 2.61 MG/DL (ref 0.7–1.2)
CREAT SERPL-MCNC: 2.71 MG/DL (ref 0.7–1.2)
CREAT SERPL-MCNC: 2.74 MG/DL (ref 0.7–1.2)
CREAT SERPL-MCNC: 2.75 MG/DL (ref 0.7–1.2)
CREAT SERPL-MCNC: 2.79 MG/DL (ref 0.7–1.2)
CREAT SERPL-MCNC: 2.8 MG/DL (ref 0.7–1.2)
CREAT SERPL-MCNC: 2.85 MG/DL (ref 0.7–1.2)
CREAT SERPL-MCNC: 2.87 MG/DL (ref 0.7–1.2)
CREAT SERPL-MCNC: 2.89 MG/DL (ref 0.7–1.2)
CREAT SERPL-MCNC: 2.92 MG/DL (ref 0.7–1.2)
CREAT SERPL-MCNC: 2.95 MG/DL (ref 0.7–1.2)
CREAT SERPL-MCNC: 2.99 MG/DL (ref 0.7–1.2)
CREAT SERPL-MCNC: 3 MG/DL (ref 0.7–1.2)
CREAT SERPL-MCNC: 3.03 MG/DL (ref 0.7–1.2)
CREAT SERPL-MCNC: 3.07 MG/DL (ref 0.7–1.2)
CREAT SERPL-MCNC: 3.12 MG/DL (ref 0.7–1.2)
CREAT SERPL-MCNC: 3.12 MG/DL (ref 0.7–1.2)
CREAT SERPL-MCNC: 3.2 MG/DL (ref 0.7–1.2)
CREAT SERPL-MCNC: 3.21 MG/DL (ref 0.7–1.2)
CREAT SERPL-MCNC: 3.21 MG/DL (ref 0.7–1.2)
CREAT SERPL-MCNC: 3.22 MG/DL (ref 0.7–1.2)
CREAT SERPL-MCNC: 3.27 MG/DL (ref 0.7–1.2)
CREAT SERPL-MCNC: 3.29 MG/DL (ref 0.7–1.2)
CREAT SERPL-MCNC: 3.3 MG/DL (ref 0.7–1.2)
CREAT SERPL-MCNC: 3.38 MG/DL (ref 0.7–1.2)
CREAT SERPL-MCNC: 3.49 MG/DL (ref 0.7–1.2)
CREAT SERPL-MCNC: 3.52 MG/DL (ref 0.7–1.2)
CREAT SERPL-MCNC: 3.56 MG/DL (ref 0.7–1.2)
CREAT SERPL-MCNC: 3.58 MG/DL (ref 0.7–1.2)
CREAT SERPL-MCNC: 3.64 MG/DL (ref 0.7–1.2)
CREAT SERPL-MCNC: 3.73 MG/DL (ref 0.7–1.2)
CREAT SERPL-MCNC: 3.77 MG/DL (ref 0.7–1.2)
CREAT SERPL-MCNC: 3.77 MG/DL (ref 0.7–1.2)
CREAT SERPL-MCNC: 3.78 MG/DL (ref 0.7–1.2)
CREAT SERPL-MCNC: 3.82 MG/DL (ref 0.7–1.2)
CREAT SERPL-MCNC: 3.84 MG/DL (ref 0.7–1.2)
CREAT SERPL-MCNC: 3.89 MG/DL (ref 0.7–1.2)
CREAT SERPL-MCNC: 3.95 MG/DL (ref 0.7–1.2)
CREAT SERPL-MCNC: 3.97 MG/DL (ref 0.7–1.2)
CREAT SERPL-MCNC: 4.24 MG/DL (ref 0.7–1.2)
CREAT SERPL-MCNC: 4.53 MG/DL (ref 0.7–1.2)
CREAT SERPL-MCNC: 4.6 MG/DL (ref 0.7–1.2)
CREAT SERPL-MCNC: 5.11 MG/DL (ref 0.7–1.2)
CREAT SERPL-MCNC: 5.42 MG/DL (ref 0.7–1.2)
CREATININE URINE: 210.1 MG/DL (ref 39–259)
CREATININE URINE: 42.4 MG/DL (ref 39–259)
CREATININE URINE: 56.2 MG/DL (ref 39–259)
CROSSMATCH RESULT: NORMAL
CRYSTALS, UA: NORMAL /HPF
CULTURE: ABNORMAL
CULTURE: NO GROWTH
CULTURE: NORMAL
DIFFERENTIAL TYPE: ABNORMAL
DIRECT EXAM: ABNORMAL
DIRECT EXAM: NORMAL
DISPENSE STATUS BLOOD BANK: NORMAL
EKG ATRIAL RATE: 59 BPM
EKG ATRIAL RATE: 60 BPM
EKG ATRIAL RATE: 79 BPM
EKG P AXIS: -11 DEGREES
EKG P AXIS: 117 DEGREES
EKG P-R INTERVAL: 198 MS
EKG P-R INTERVAL: 202 MS
EKG P-R INTERVAL: 206 MS
EKG Q-T INTERVAL: 488 MS
EKG Q-T INTERVAL: 502 MS
EKG Q-T INTERVAL: 542 MS
EKG Q-T INTERVAL: 582 MS
EKG Q-T INTERVAL: 608 MS
EKG QRS DURATION: 176 MS
EKG QRS DURATION: 182 MS
EKG QRS DURATION: 190 MS
EKG QRS DURATION: 204 MS
EKG QRS DURATION: 208 MS
EKG QTC CALCULATION (BAZETT): 502 MS
EKG QTC CALCULATION (BAZETT): 542 MS
EKG QTC CALCULATION (BAZETT): 559 MS
EKG QTC CALCULATION (BAZETT): 595 MS
EKG QTC CALCULATION (BAZETT): 601 MS
EKG R AXIS: -27 DEGREES
EKG R AXIS: -29 DEGREES
EKG R AXIS: -34 DEGREES
EKG R AXIS: -49 DEGREES
EKG R AXIS: -57 DEGREES
EKG T AXIS: 124 DEGREES
EKG T AXIS: 126 DEGREES
EKG T AXIS: 135 DEGREES
EKG T AXIS: 136 DEGREES
EKG T AXIS: 94 DEGREES
EKG VENTRICULAR RATE: 59 BPM
EKG VENTRICULAR RATE: 60 BPM
EKG VENTRICULAR RATE: 60 BPM
EKG VENTRICULAR RATE: 63 BPM
EKG VENTRICULAR RATE: 79 BPM
EOSINOPHIL FLUID: NORMAL %
EOSINOPHILS RELATIVE PERCENT: 0 % (ref 1–4)
EOSINOPHILS RELATIVE PERCENT: 1 % (ref 1–4)
EOSINOPHILS RELATIVE PERCENT: 2 % (ref 1–4)
EOSINOPHILS RELATIVE PERCENT: 3 % (ref 1–4)
EOSINOPHILS RELATIVE PERCENT: 4 % (ref 1–4)
EOSINOPHILS RELATIVE PERCENT: 5 % (ref 1–4)
EOSINOPHILS RELATIVE PERCENT: 6 % (ref 1–4)
EOSINOPHILS RELATIVE PERCENT: 6 % (ref 1–4)
EPITHELIAL CELLS UA: NORMAL /HPF (ref 0–5)
EXPIRATION DATE: NORMAL
FERRITIN: 884 UG/L (ref 30–400)
FIBRINOGEN: 359 MG/DL (ref 140–420)
FIO2: ABNORMAL
FLUID DIFF COMMENT: NORMAL
FOLATE: 15 NG/ML
FREE KAPPA/LAMBDA RATIO: 1.09 (ref 0.26–1.65)
GAMMA GLOBULIN %: 8 % (ref 9–20)
GAMMA GLOBULIN: 0.4 G/DL (ref 0.5–1.5)
GFR AFRICAN AMERICAN: 12 ML/MIN
GFR AFRICAN AMERICAN: 13 ML/MIN
GFR AFRICAN AMERICAN: 15 ML/MIN
GFR AFRICAN AMERICAN: 15 ML/MIN
GFR AFRICAN AMERICAN: 16 ML/MIN
GFR AFRICAN AMERICAN: 17 ML/MIN
GFR AFRICAN AMERICAN: 17 ML/MIN
GFR AFRICAN AMERICAN: 18 ML/MIN
GFR AFRICAN AMERICAN: 19 ML/MIN
GFR AFRICAN AMERICAN: 19 ML/MIN
GFR AFRICAN AMERICAN: 20 ML/MIN
GFR AFRICAN AMERICAN: 21 ML/MIN
GFR AFRICAN AMERICAN: 21 ML/MIN
GFR AFRICAN AMERICAN: 22 ML/MIN
GFR AFRICAN AMERICAN: 23 ML/MIN
GFR AFRICAN AMERICAN: 24 ML/MIN
GFR AFRICAN AMERICAN: 25 ML/MIN
GFR AFRICAN AMERICAN: 26 ML/MIN
GFR AFRICAN AMERICAN: 27 ML/MIN
GFR AFRICAN AMERICAN: 27 ML/MIN
GFR AFRICAN AMERICAN: 28 ML/MIN
GFR AFRICAN AMERICAN: 29 ML/MIN
GFR AFRICAN AMERICAN: 30 ML/MIN
GFR AFRICAN AMERICAN: 31 ML/MIN
GFR AFRICAN AMERICAN: 31 ML/MIN
GFR AFRICAN AMERICAN: 33 ML/MIN
GFR AFRICAN AMERICAN: 35 ML/MIN
GFR AFRICAN AMERICAN: 36 ML/MIN
GFR AFRICAN AMERICAN: 37 ML/MIN
GFR AFRICAN AMERICAN: 38 ML/MIN
GFR AFRICAN AMERICAN: 38 ML/MIN
GFR AFRICAN AMERICAN: 41 ML/MIN
GFR AFRICAN AMERICAN: >60 ML/MIN
GFR NON-AFRICAN AMERICAN: 10 ML/MIN
GFR NON-AFRICAN AMERICAN: 11 ML/MIN
GFR NON-AFRICAN AMERICAN: 12 ML/MIN
GFR NON-AFRICAN AMERICAN: 12 ML/MIN
GFR NON-AFRICAN AMERICAN: 13 ML/MIN
GFR NON-AFRICAN AMERICAN: 14 ML/MIN
GFR NON-AFRICAN AMERICAN: 14 ML/MIN
GFR NON-AFRICAN AMERICAN: 15 ML/MIN
GFR NON-AFRICAN AMERICAN: 16 ML/MIN
GFR NON-AFRICAN AMERICAN: 17 ML/MIN
GFR NON-AFRICAN AMERICAN: 17 ML/MIN
GFR NON-AFRICAN AMERICAN: 18 ML/MIN
GFR NON-AFRICAN AMERICAN: 19 ML/MIN
GFR NON-AFRICAN AMERICAN: 20 ML/MIN
GFR NON-AFRICAN AMERICAN: 21 ML/MIN
GFR NON-AFRICAN AMERICAN: 22 ML/MIN
GFR NON-AFRICAN AMERICAN: 23 ML/MIN
GFR NON-AFRICAN AMERICAN: 24 ML/MIN
GFR NON-AFRICAN AMERICAN: 25 ML/MIN
GFR NON-AFRICAN AMERICAN: 27 ML/MIN
GFR NON-AFRICAN AMERICAN: 29 ML/MIN
GFR NON-AFRICAN AMERICAN: 29 ML/MIN
GFR NON-AFRICAN AMERICAN: 30 ML/MIN
GFR NON-AFRICAN AMERICAN: 31 ML/MIN
GFR NON-AFRICAN AMERICAN: 32 ML/MIN
GFR NON-AFRICAN AMERICAN: 34 ML/MIN
GFR NON-AFRICAN AMERICAN: 55 ML/MIN
GFR SERPL CREATININE-BSD FRML MDRD: ABNORMAL ML/MIN/{1.73_M2}
GLOBULIN: ABNORMAL G/DL (ref 1.5–3.8)
GLUCOSE BLD-MCNC: 100 MG/DL (ref 70–99)
GLUCOSE BLD-MCNC: 100 MG/DL (ref 70–99)
GLUCOSE BLD-MCNC: 101 MG/DL (ref 70–99)
GLUCOSE BLD-MCNC: 103 MG/DL (ref 70–99)
GLUCOSE BLD-MCNC: 103 MG/DL (ref 70–99)
GLUCOSE BLD-MCNC: 104 MG/DL (ref 70–99)
GLUCOSE BLD-MCNC: 104 MG/DL (ref 75–110)
GLUCOSE BLD-MCNC: 105 MG/DL (ref 70–99)
GLUCOSE BLD-MCNC: 105 MG/DL (ref 70–99)
GLUCOSE BLD-MCNC: 106 MG/DL (ref 70–99)
GLUCOSE BLD-MCNC: 106 MG/DL (ref 70–99)
GLUCOSE BLD-MCNC: 107 MG/DL (ref 70–99)
GLUCOSE BLD-MCNC: 107 MG/DL (ref 70–99)
GLUCOSE BLD-MCNC: 108 MG/DL (ref 70–99)
GLUCOSE BLD-MCNC: 108 MG/DL (ref 70–99)
GLUCOSE BLD-MCNC: 112 MG/DL (ref 70–99)
GLUCOSE BLD-MCNC: 113 MG/DL (ref 74–100)
GLUCOSE BLD-MCNC: 116 MG/DL (ref 70–99)
GLUCOSE BLD-MCNC: 119 MG/DL (ref 70–99)
GLUCOSE BLD-MCNC: 120 MG/DL (ref 70–99)
GLUCOSE BLD-MCNC: 122 MG/DL (ref 70–99)
GLUCOSE BLD-MCNC: 124 MG/DL (ref 70–99)
GLUCOSE BLD-MCNC: 125 MG/DL (ref 70–99)
GLUCOSE BLD-MCNC: 133 MG/DL (ref 70–99)
GLUCOSE BLD-MCNC: 136 MG/DL (ref 70–99)
GLUCOSE BLD-MCNC: 138 MG/DL (ref 70–99)
GLUCOSE BLD-MCNC: 141 MG/DL (ref 70–99)
GLUCOSE BLD-MCNC: 161 MG/DL (ref 70–99)
GLUCOSE BLD-MCNC: 73 MG/DL (ref 75–110)
GLUCOSE BLD-MCNC: 77 MG/DL (ref 70–99)
GLUCOSE BLD-MCNC: 82 MG/DL (ref 70–99)
GLUCOSE BLD-MCNC: 85 MG/DL (ref 70–99)
GLUCOSE BLD-MCNC: 89 MG/DL (ref 70–99)
GLUCOSE BLD-MCNC: 90 MG/DL (ref 70–99)
GLUCOSE BLD-MCNC: 91 MG/DL (ref 70–99)
GLUCOSE BLD-MCNC: 91 MG/DL (ref 70–99)
GLUCOSE BLD-MCNC: 92 MG/DL (ref 70–99)
GLUCOSE BLD-MCNC: 92 MG/DL (ref 70–99)
GLUCOSE BLD-MCNC: 93 MG/DL (ref 70–99)
GLUCOSE BLD-MCNC: 93 MG/DL (ref 70–99)
GLUCOSE BLD-MCNC: 94 MG/DL (ref 70–99)
GLUCOSE BLD-MCNC: 95 MG/DL (ref 70–99)
GLUCOSE BLD-MCNC: 96 MG/DL (ref 70–99)
GLUCOSE BLD-MCNC: 97 MG/DL (ref 70–99)
GLUCOSE BLD-MCNC: 98 MG/DL (ref 70–99)
GLUCOSE BLD-MCNC: 99 MG/DL (ref 70–99)
GLUCOSE URINE: NEGATIVE
GLUCOSE, FLUID: 149 MG/DL
GLUCOSE, FLUID: 98 MG/DL
HBV SURFACE AB TITR SER: <3.5 MIU/ML
HCT VFR BLD CALC: 19.6 % (ref 40.7–50.3)
HCT VFR BLD CALC: 22.6 % (ref 40.7–50.3)
HCT VFR BLD CALC: 23.5 % (ref 40.7–50.3)
HCT VFR BLD CALC: 23.8 % (ref 40.7–50.3)
HCT VFR BLD CALC: 23.8 % (ref 40.7–50.3)
HCT VFR BLD CALC: 24.2 % (ref 40.7–50.3)
HCT VFR BLD CALC: 24.8 % (ref 40.7–50.3)
HCT VFR BLD CALC: 24.8 % (ref 40.7–50.3)
HCT VFR BLD CALC: 24.9 % (ref 40.7–50.3)
HCT VFR BLD CALC: 25.1 % (ref 40.7–50.3)
HCT VFR BLD CALC: 25.3 % (ref 40.7–50.3)
HCT VFR BLD CALC: 25.4 % (ref 40.7–50.3)
HCT VFR BLD CALC: 25.9 % (ref 40.7–50.3)
HCT VFR BLD CALC: 25.9 % (ref 40.7–50.3)
HCT VFR BLD CALC: 26.2 % (ref 40.7–50.3)
HCT VFR BLD CALC: 26.3 % (ref 40.7–50.3)
HCT VFR BLD CALC: 26.7 % (ref 40.7–50.3)
HCT VFR BLD CALC: 26.7 % (ref 40.7–50.3)
HCT VFR BLD CALC: 26.9 % (ref 40.7–50.3)
HCT VFR BLD CALC: 27.1 % (ref 40.7–50.3)
HCT VFR BLD CALC: 27.6 % (ref 40.7–50.3)
HCT VFR BLD CALC: 27.7 % (ref 40.7–50.3)
HCT VFR BLD CALC: 27.9 % (ref 40.7–50.3)
HCT VFR BLD CALC: 27.9 % (ref 40.7–50.3)
HCT VFR BLD CALC: 28.1 % (ref 40.7–50.3)
HCT VFR BLD CALC: 28.2 % (ref 40.7–50.3)
HCT VFR BLD CALC: 28.3 % (ref 40.7–50.3)
HCT VFR BLD CALC: 28.3 % (ref 40.7–50.3)
HCT VFR BLD CALC: 28.4 % (ref 40.7–50.3)
HCT VFR BLD CALC: 29.6 % (ref 40.7–50.3)
HCT VFR BLD CALC: 29.9 % (ref 40.7–50.3)
HCT VFR BLD CALC: 30.3 % (ref 40.7–50.3)
HCT VFR BLD CALC: 30.6 % (ref 40.7–50.3)
HCT VFR BLD CALC: 30.7 % (ref 40.7–50.3)
HCT VFR BLD CALC: 31.6 % (ref 40.7–50.3)
HCT VFR BLD CALC: 31.8 % (ref 40.7–50.3)
HCT VFR BLD CALC: 37.6 % (ref 40.7–50.3)
HEMOGLOBIN: 11.6 G/DL (ref 13–17)
HEMOGLOBIN: 6.5 G/DL (ref 13–17)
HEMOGLOBIN: 7 G/DL (ref 13–17)
HEMOGLOBIN: 7.2 G/DL (ref 13–17)
HEMOGLOBIN: 7.2 G/DL (ref 13–17)
HEMOGLOBIN: 7.3 G/DL (ref 13–17)
HEMOGLOBIN: 7.3 G/DL (ref 13–17)
HEMOGLOBIN: 7.4 G/DL (ref 13–17)
HEMOGLOBIN: 7.5 G/DL (ref 13–17)
HEMOGLOBIN: 7.6 G/DL (ref 13–17)
HEMOGLOBIN: 7.6 G/DL (ref 13–17)
HEMOGLOBIN: 7.7 G/DL (ref 13–17)
HEMOGLOBIN: 7.7 G/DL (ref 13–17)
HEMOGLOBIN: 7.8 G/DL (ref 13–17)
HEMOGLOBIN: 7.9 G/DL (ref 13–17)
HEMOGLOBIN: 8 G/DL (ref 13–17)
HEMOGLOBIN: 8 G/DL (ref 13–17)
HEMOGLOBIN: 8.1 G/DL (ref 13–17)
HEMOGLOBIN: 8.3 G/DL (ref 13–17)
HEMOGLOBIN: 8.4 G/DL (ref 13–17)
HEMOGLOBIN: 8.5 G/DL (ref 13–17)
HEMOGLOBIN: 8.6 G/DL (ref 13–17)
HEMOGLOBIN: 8.7 G/DL (ref 13–17)
HEMOGLOBIN: 8.8 G/DL (ref 13–17)
HEMOGLOBIN: 8.8 G/DL (ref 13–17)
HEMOGLOBIN: 8.9 G/DL (ref 13–17)
HEMOGLOBIN: 9.1 G/DL (ref 13–17)
HEMOGLOBIN: 9.2 G/DL (ref 13–17)
HEMOGLOBIN: 9.3 G/DL (ref 13–17)
HEMOGLOBIN: 9.4 G/DL (ref 13–17)
HEMOGLOBIN: 9.4 G/DL (ref 13–17)
HEMOGLOBIN: 9.9 G/DL (ref 13–17)
HEPARIN INDUCED PLATELET ANTIBODY: 0.18 O.D. (ref 0–0.4)
HEPATITIS B CORE TOTAL ANTIBODY: NONREACTIVE
HEPATITIS B SURFACE ANTIGEN: NONREACTIVE
HEPATITIS C ANTIBODY: NONREACTIVE
IMMATURE GRANULOCYTES: 0 %
IMMATURE GRANULOCYTES: 1 %
IMMATURE GRANULOCYTES: 2 %
IMMATURE GRANULOCYTES: 2 %
IMMATURE RETIC FRACT: 8.5 % (ref 2.7–18.3)
INR BLD: 1.1
INR BLD: 1.2
INR BLD: 1.3
INTERVENTION: NORMAL
INTERVENTION: NORMAL
IRON SATURATION: 18 % (ref 20–55)
IRON: 31 UG/DL (ref 59–158)
KAPPA FREE LIGHT CHAINS QNT: 2.69 MG/DL (ref 0.37–1.94)
KETONES, URINE: NEGATIVE
LACTATE DEHYDROGENASE, FLUID: 194 U/L
LACTATE DEHYDROGENASE, FLUID: 95 U/L
LAMBDA FREE LIGHT CHAINS QNT: 2.46 MG/DL (ref 0.57–2.63)
LEUKOCYTE ESTERASE, URINE: ABNORMAL
LEUKOCYTE ESTERASE, URINE: NEGATIVE
LEUKOCYTE ESTERASE, URINE: NEGATIVE
LV EF: 25 %
LV EF: 41 %
LV EF: 42 %
LV EF: 43 %
LV EF: 45 %
LVEF MODALITY: NORMAL
LYMPHOCYTES # BLD: 12 % (ref 24–44)
LYMPHOCYTES # BLD: 3 % (ref 24–43)
LYMPHOCYTES # BLD: 4 % (ref 24–43)
LYMPHOCYTES # BLD: 4 % (ref 24–43)
LYMPHOCYTES # BLD: 5 % (ref 24–43)
LYMPHOCYTES # BLD: 5 % (ref 24–44)
LYMPHOCYTES # BLD: 6 % (ref 24–43)
LYMPHOCYTES # BLD: 6 % (ref 24–44)
LYMPHOCYTES # BLD: 7 % (ref 24–43)
LYMPHOCYTES # BLD: 8 % (ref 24–43)
LYMPHOCYTES # BLD: 8 % (ref 24–43)
LYMPHOCYTES # BLD: 9 % (ref 24–43)
LYMPHOCYTES, BODY FLUID: 22 %
Lab: ABNORMAL
Lab: NORMAL
MAGNESIUM: 1.5 MG/DL (ref 1.6–2.6)
MAGNESIUM: 1.8 MG/DL (ref 1.6–2.6)
MAGNESIUM: 2.1 MG/DL (ref 1.6–2.6)
MAGNESIUM: 2.2 MG/DL (ref 1.6–2.6)
MAGNESIUM: 2.3 MG/DL (ref 1.6–2.6)
MAGNESIUM: 2.4 MG/DL (ref 1.6–2.6)
MAGNESIUM: 2.5 MG/DL (ref 1.6–2.6)
MAGNESIUM: 2.5 MG/DL (ref 1.6–2.6)
MAGNESIUM: 2.6 MG/DL (ref 1.6–2.6)
MCH RBC QN AUTO: 28 PG (ref 25.2–33.5)
MCH RBC QN AUTO: 28.1 PG (ref 25.2–33.5)
MCH RBC QN AUTO: 28.2 PG (ref 25.2–33.5)
MCH RBC QN AUTO: 28.2 PG (ref 25.2–33.5)
MCH RBC QN AUTO: 28.4 PG (ref 25.2–33.5)
MCH RBC QN AUTO: 28.5 PG (ref 25.2–33.5)
MCH RBC QN AUTO: 28.6 PG (ref 25.2–33.5)
MCH RBC QN AUTO: 28.7 PG (ref 25.2–33.5)
MCH RBC QN AUTO: 28.8 PG (ref 25.2–33.5)
MCH RBC QN AUTO: 28.8 PG (ref 25.2–33.5)
MCH RBC QN AUTO: 28.9 PG (ref 25.2–33.5)
MCH RBC QN AUTO: 29.1 PG (ref 25.2–33.5)
MCH RBC QN AUTO: 29.1 PG (ref 25.2–33.5)
MCH RBC QN AUTO: 29.2 PG (ref 25.2–33.5)
MCH RBC QN AUTO: 29.2 PG (ref 25.2–33.5)
MCH RBC QN AUTO: 29.3 PG (ref 25.2–33.5)
MCH RBC QN AUTO: 29.3 PG (ref 25.2–33.5)
MCH RBC QN AUTO: 29.6 PG (ref 25.2–33.5)
MCH RBC QN AUTO: 29.7 PG (ref 25.2–33.5)
MCH RBC QN AUTO: 31.4 PG (ref 25.2–33.5)
MCHC RBC AUTO-ENTMCNC: 28.5 G/DL (ref 28.4–34.8)
MCHC RBC AUTO-ENTMCNC: 29.3 G/DL (ref 28.4–34.8)
MCHC RBC AUTO-ENTMCNC: 29.6 G/DL (ref 28.4–34.8)
MCHC RBC AUTO-ENTMCNC: 29.7 G/DL (ref 28.4–34.8)
MCHC RBC AUTO-ENTMCNC: 29.9 G/DL (ref 28.4–34.8)
MCHC RBC AUTO-ENTMCNC: 30.1 G/DL (ref 28.4–34.8)
MCHC RBC AUTO-ENTMCNC: 30.3 G/DL (ref 28.4–34.8)
MCHC RBC AUTO-ENTMCNC: 30.4 G/DL (ref 28.4–34.8)
MCHC RBC AUTO-ENTMCNC: 30.5 G/DL (ref 28.4–34.8)
MCHC RBC AUTO-ENTMCNC: 30.6 G/DL (ref 28.4–34.8)
MCHC RBC AUTO-ENTMCNC: 30.6 G/DL (ref 28.4–34.8)
MCHC RBC AUTO-ENTMCNC: 30.7 G/DL (ref 28.4–34.8)
MCHC RBC AUTO-ENTMCNC: 30.9 G/DL (ref 28.4–34.8)
MCHC RBC AUTO-ENTMCNC: 31 G/DL (ref 28.4–34.8)
MCHC RBC AUTO-ENTMCNC: 31.1 G/DL (ref 28.4–34.8)
MCHC RBC AUTO-ENTMCNC: 31.1 G/DL (ref 28.4–34.8)
MCHC RBC AUTO-ENTMCNC: 31.4 G/DL (ref 28.4–34.8)
MCHC RBC AUTO-ENTMCNC: 31.5 G/DL (ref 28.4–34.8)
MCHC RBC AUTO-ENTMCNC: 31.6 G/DL (ref 28.4–34.8)
MCHC RBC AUTO-ENTMCNC: 31.7 G/DL (ref 28.4–34.8)
MCHC RBC AUTO-ENTMCNC: 31.9 G/DL (ref 28.4–34.8)
MCHC RBC AUTO-ENTMCNC: 32.7 G/DL (ref 28.4–34.8)
MCHC RBC AUTO-ENTMCNC: 33.2 G/DL (ref 28.4–34.8)
MCV RBC AUTO: 101.6 FL (ref 82.6–102.9)
MCV RBC AUTO: 87.9 FL (ref 82.6–102.9)
MCV RBC AUTO: 89.1 FL (ref 82.6–102.9)
MCV RBC AUTO: 90.4 FL (ref 82.6–102.9)
MCV RBC AUTO: 90.4 FL (ref 82.6–102.9)
MCV RBC AUTO: 90.5 FL (ref 82.6–102.9)
MCV RBC AUTO: 91.5 FL (ref 82.6–102.9)
MCV RBC AUTO: 91.7 FL (ref 82.6–102.9)
MCV RBC AUTO: 91.9 FL (ref 82.6–102.9)
MCV RBC AUTO: 92 FL (ref 82.6–102.9)
MCV RBC AUTO: 92.5 FL (ref 82.6–102.9)
MCV RBC AUTO: 92.7 FL (ref 82.6–102.9)
MCV RBC AUTO: 93.3 FL (ref 82.6–102.9)
MCV RBC AUTO: 93.6 FL (ref 82.6–102.9)
MCV RBC AUTO: 93.6 FL (ref 82.6–102.9)
MCV RBC AUTO: 93.7 FL (ref 82.6–102.9)
MCV RBC AUTO: 93.7 FL (ref 82.6–102.9)
MCV RBC AUTO: 93.9 FL (ref 82.6–102.9)
MCV RBC AUTO: 94 FL (ref 82.6–102.9)
MCV RBC AUTO: 94.1 FL (ref 82.6–102.9)
MCV RBC AUTO: 94.3 FL (ref 82.6–102.9)
MCV RBC AUTO: 94.6 FL (ref 82.6–102.9)
MCV RBC AUTO: 94.7 FL (ref 82.6–102.9)
MCV RBC AUTO: 94.8 FL (ref 82.6–102.9)
MCV RBC AUTO: 94.9 FL (ref 82.6–102.9)
MCV RBC AUTO: 95 FL (ref 82.6–102.9)
MCV RBC AUTO: 95.2 FL (ref 82.6–102.9)
MCV RBC AUTO: 95.4 FL (ref 82.6–102.9)
MCV RBC AUTO: 95.5 FL (ref 82.6–102.9)
MCV RBC AUTO: 95.9 FL (ref 82.6–102.9)
MCV RBC AUTO: 95.9 FL (ref 82.6–102.9)
MCV RBC AUTO: 96.2 FL (ref 82.6–102.9)
MCV RBC AUTO: 97.2 FL (ref 82.6–102.9)
MCV RBC AUTO: 98 FL (ref 82.6–102.9)
MCV RBC AUTO: 98.4 FL (ref 82.6–102.9)
MCV RBC AUTO: 99.4 FL (ref 82.6–102.9)
MODE: ABNORMAL
MONOCYTE, FLUID: NORMAL %
MONOCYTES # BLD: 1 % (ref 3–12)
MONOCYTES # BLD: 3 % (ref 1–7)
MONOCYTES # BLD: 4 % (ref 1–7)
MONOCYTES # BLD: 5 % (ref 1–7)
MONOCYTES # BLD: 5 % (ref 3–12)
MONOCYTES # BLD: 6 % (ref 1–7)
MONOCYTES # BLD: 6 % (ref 1–7)
MONOCYTES # BLD: 6 % (ref 3–12)
MONOCYTES # BLD: 7 % (ref 1–7)
MONOCYTES # BLD: 7 % (ref 3–12)
MONOCYTES # BLD: 8 % (ref 3–12)
MONOCYTES # BLD: 9 % (ref 3–12)
MORPHOLOGY: ABNORMAL
MUCUS: NORMAL
NEGATIVE BASE EXCESS, ART: ABNORMAL (ref 0–2)
NEUTROPHIL, FLUID: 22 %
NITRITE, URINE: NEGATIVE
NRBC AUTOMATED: 0 PER 100 WBC
NRBC AUTOMATED: 0.2 PER 100 WBC
NRBC AUTOMATED: 0.3 PER 100 WBC
O2 DEVICE/FLOW/%: ABNORMAL
OTHER CELLS FLUID: NORMAL %
OTHER OBSERVATIONS UA: NORMAL
PARTIAL THROMBOPLASTIN TIME: 20.7 SEC (ref 20.5–30.5)
PARTIAL THROMBOPLASTIN TIME: 26.1 SEC (ref 20.5–30.5)
PARTIAL THROMBOPLASTIN TIME: 26.6 SEC (ref 20.5–30.5)
PARTIAL THROMBOPLASTIN TIME: 63.4 SEC (ref 20.5–30.5)
PATHOLOGIST REVIEW: NORMAL
PATHOLOGIST: ABNORMAL
PATIENT TEMP: ABNORMAL
PDW BLD-RTO: 14.8 % (ref 11.8–14.4)
PDW BLD-RTO: 14.8 % (ref 11.8–14.4)
PDW BLD-RTO: 14.9 % (ref 11.8–14.4)
PDW BLD-RTO: 15 % (ref 11.8–14.4)
PDW BLD-RTO: 15.1 % (ref 11.8–14.4)
PDW BLD-RTO: 15.8 % (ref 11.8–14.4)
PDW BLD-RTO: 15.8 % (ref 11.8–14.4)
PDW BLD-RTO: 15.9 % (ref 11.8–14.4)
PDW BLD-RTO: 16 % (ref 11.8–14.4)
PDW BLD-RTO: 16.1 % (ref 11.8–14.4)
PDW BLD-RTO: 16.3 % (ref 11.8–14.4)
PDW BLD-RTO: 16.3 % (ref 11.8–14.4)
PDW BLD-RTO: 16.4 % (ref 11.8–14.4)
PDW BLD-RTO: 16.6 % (ref 11.8–14.4)
PDW BLD-RTO: 16.7 % (ref 11.8–14.4)
PDW BLD-RTO: 17.6 % (ref 11.8–14.4)
PDW BLD-RTO: 18.1 % (ref 11.8–14.4)
PDW BLD-RTO: 18.2 % (ref 11.8–14.4)
PDW BLD-RTO: 18.5 % (ref 11.8–14.4)
PDW BLD-RTO: 18.5 % (ref 11.8–14.4)
PDW BLD-RTO: 18.6 % (ref 11.8–14.4)
PDW BLD-RTO: 18.7 % (ref 11.8–14.4)
PDW BLD-RTO: 18.8 % (ref 11.8–14.4)
PDW BLD-RTO: 19.2 % (ref 11.8–14.4)
PDW BLD-RTO: 19.2 % (ref 11.8–14.4)
PDW BLD-RTO: 19.4 % (ref 11.8–14.4)
PDW BLD-RTO: 19.7 % (ref 11.8–14.4)
PH FLUID: 7.5
PH UA: 5 (ref 5–8)
PH UA: 6 (ref 5–8)
PH UA: 7.5 (ref 5–8)
PHOSPHORUS: 4.3 MG/DL (ref 2.5–4.5)
PLATELET # BLD: 101 K/UL (ref 138–453)
PLATELET # BLD: 115 K/UL (ref 138–453)
PLATELET # BLD: 120 K/UL (ref 138–453)
PLATELET # BLD: 124 K/UL (ref 138–453)
PLATELET # BLD: 145 K/UL (ref 138–453)
PLATELET # BLD: 145 K/UL (ref 138–453)
PLATELET # BLD: 148 K/UL (ref 138–453)
PLATELET # BLD: 150 K/UL (ref 138–453)
PLATELET # BLD: 159 K/UL (ref 138–453)
PLATELET # BLD: 164 K/UL (ref 138–453)
PLATELET # BLD: 166 K/UL (ref 138–453)
PLATELET # BLD: 167 K/UL (ref 138–453)
PLATELET # BLD: 171 K/UL (ref 138–453)
PLATELET # BLD: 177 K/UL (ref 138–453)
PLATELET # BLD: 179 K/UL (ref 138–453)
PLATELET # BLD: 180 K/UL (ref 138–453)
PLATELET # BLD: 188 K/UL (ref 138–453)
PLATELET # BLD: 199 K/UL (ref 138–453)
PLATELET # BLD: 224 K/UL (ref 138–453)
PLATELET # BLD: 246 K/UL (ref 138–453)
PLATELET # BLD: 265 K/UL (ref 138–453)
PLATELET # BLD: 280 K/UL (ref 138–453)
PLATELET # BLD: 292 K/UL (ref 138–453)
PLATELET # BLD: 417 K/UL (ref 138–453)
PLATELET # BLD: 57 K/UL (ref 138–453)
PLATELET # BLD: 73 K/UL (ref 138–453)
PLATELET # BLD: 82 K/UL (ref 138–453)
PLATELET # BLD: 90 K/UL (ref 138–453)
PLATELET # BLD: 92 K/UL (ref 138–453)
PLATELET # BLD: 98 K/UL (ref 138–453)
PLATELET # BLD: ABNORMAL K/UL (ref 138–453)
PLATELET ESTIMATE: ABNORMAL
PLATELET FUNCTION INTERP: NORMAL
PLATELET, FLUORESCENCE: 104 K/UL (ref 138–453)
PLATELET, FLUORESCENCE: 105 K/UL (ref 138–453)
PLATELET, FLUORESCENCE: 137 K/UL (ref 138–453)
PLATELET, FLUORESCENCE: 139 K/UL (ref 138–453)
PLATELET, FLUORESCENCE: 143 K/UL (ref 138–453)
PLATELET, FLUORESCENCE: 41 K/UL (ref 138–453)
PLATELET, IMMATURE FRACTION: 5.5 % (ref 1.1–10.3)
PLATELET, IMMATURE FRACTION: 7.3 % (ref 1.1–10.3)
PLATELET, IMMATURE FRACTION: 7.3 % (ref 1.1–10.3)
PLATELET, IMMATURE FRACTION: 7.5 % (ref 1.1–10.3)
PLATELET, IMMATURE FRACTION: 8.7 % (ref 1.1–10.3)
PLATELET, IMMATURE FRACTION: 9.7 % (ref 1.1–10.3)
PMV BLD AUTO: 10.8 FL (ref 8.1–13.5)
PMV BLD AUTO: 11 FL (ref 8.1–13.5)
PMV BLD AUTO: 11.1 FL (ref 8.1–13.5)
PMV BLD AUTO: 11.2 FL (ref 8.1–13.5)
PMV BLD AUTO: 11.2 FL (ref 8.1–13.5)
PMV BLD AUTO: 11.3 FL (ref 8.1–13.5)
PMV BLD AUTO: 11.4 FL (ref 8.1–13.5)
PMV BLD AUTO: 11.5 FL (ref 8.1–13.5)
PMV BLD AUTO: 11.5 FL (ref 8.1–13.5)
PMV BLD AUTO: 11.6 FL (ref 8.1–13.5)
PMV BLD AUTO: 11.8 FL (ref 8.1–13.5)
PMV BLD AUTO: 11.9 FL (ref 8.1–13.5)
PMV BLD AUTO: 11.9 FL (ref 8.1–13.5)
PMV BLD AUTO: 12 FL (ref 8.1–13.5)
PMV BLD AUTO: 12 FL (ref 8.1–13.5)
PMV BLD AUTO: 12.1 FL (ref 8.1–13.5)
PMV BLD AUTO: 12.2 FL (ref 8.1–13.5)
PMV BLD AUTO: 12.3 FL (ref 8.1–13.5)
PMV BLD AUTO: 12.4 FL (ref 8.1–13.5)
PMV BLD AUTO: 12.5 FL (ref 8.1–13.5)
PMV BLD AUTO: 12.8 FL (ref 8.1–13.5)
PMV BLD AUTO: ABNORMAL FL (ref 8.1–13.5)
POC HCO3: 22.9 MMOL/L (ref 21–28)
POC O2 SATURATION: 92 % (ref 94–98)
POC PCO2 TEMP: ABNORMAL MM HG
POC PCO2: 32 MM HG (ref 35–48)
POC PH TEMP: ABNORMAL
POC PH: 7.46 (ref 7.35–7.45)
POC PO2 TEMP: ABNORMAL MM HG
POC PO2: 58.6 MM HG (ref 83–108)
POC POTASSIUM: 3.5 MMOL/L (ref 3.5–4.5)
POC POTASSIUM: 4.6 MMOL/L (ref 3.5–4.5)
POSITIVE BASE EXCESS, ART: 0 (ref 0–3)
POTASSIUM SERPL-SCNC: 3.2 MMOL/L (ref 3.7–5.3)
POTASSIUM SERPL-SCNC: 3.2 MMOL/L (ref 3.7–5.3)
POTASSIUM SERPL-SCNC: 3.4 MMOL/L (ref 3.7–5.3)
POTASSIUM SERPL-SCNC: 3.4 MMOL/L (ref 3.7–5.3)
POTASSIUM SERPL-SCNC: 3.5 MMOL/L (ref 3.7–5.3)
POTASSIUM SERPL-SCNC: 3.6 MMOL/L (ref 3.7–5.3)
POTASSIUM SERPL-SCNC: 3.7 MMOL/L (ref 3.7–5.3)
POTASSIUM SERPL-SCNC: 3.8 MMOL/L (ref 3.7–5.3)
POTASSIUM SERPL-SCNC: 3.9 MMOL/L (ref 3.7–5.3)
POTASSIUM SERPL-SCNC: 4 MMOL/L (ref 3.7–5.3)
POTASSIUM SERPL-SCNC: 4.1 MMOL/L (ref 3.7–5.3)
POTASSIUM SERPL-SCNC: 4.2 MMOL/L (ref 3.7–5.3)
POTASSIUM SERPL-SCNC: 4.3 MMOL/L (ref 3.7–5.3)
POTASSIUM SERPL-SCNC: 4.4 MMOL/L (ref 3.7–5.3)
POTASSIUM SERPL-SCNC: 4.4 MMOL/L (ref 3.7–5.3)
POTASSIUM SERPL-SCNC: 4.6 MMOL/L (ref 3.7–5.3)
POTASSIUM SERPL-SCNC: 4.7 MMOL/L (ref 3.7–5.3)
POTASSIUM SERPL-SCNC: 4.7 MMOL/L (ref 3.7–5.3)
POTASSIUM SERPL-SCNC: 4.9 MMOL/L (ref 3.7–5.3)
POTASSIUM SERPL-SCNC: 5 MMOL/L (ref 3.7–5.3)
POTASSIUM SERPL-SCNC: 5.4 MMOL/L (ref 3.7–5.3)
PREALBUMIN: 16.7 MG/DL (ref 20–40)
PREALBUMIN: 25.5 MG/DL (ref 20–40)
PREALBUMIN: 9.4 MG/DL (ref 20–40)
PRO-BNP: ABNORMAL PG/ML
PROTEIN ELECTROPHORESIS, SERUM: ABNORMAL
PROTEIN UA: ABNORMAL
PROTHROMBIN TIME: 11.2 SEC (ref 9–12)
PROTHROMBIN TIME: 11.3 SEC (ref 9–12)
PROTHROMBIN TIME: 11.4 SEC (ref 9–12)
PROTHROMBIN TIME: 11.5 SEC (ref 9–12)
PROTHROMBIN TIME: 12 SEC (ref 9–12)
PROTHROMBIN TIME: 12.1 SEC (ref 9–12)
PROTHROMBIN TIME: 13.4 SEC (ref 9–12)
RBC # BLD: 2.23 M/UL (ref 4.21–5.77)
RBC # BLD: 2.39 M/UL (ref 4.21–5.77)
RBC # BLD: 2.51 M/UL (ref 4.21–5.77)
RBC # BLD: 2.53 M/UL (ref 4.21–5.77)
RBC # BLD: 2.54 M/UL (ref 4.21–5.77)
RBC # BLD: 2.57 M/UL (ref 4.21–5.77)
RBC # BLD: 2.6 M/UL (ref 4.21–5.77)
RBC # BLD: 2.63 M/UL (ref 4.21–5.77)
RBC # BLD: 2.64 M/UL (ref 4.21–5.77)
RBC # BLD: 2.65 M/UL (ref 4.21–5.77)
RBC # BLD: 2.66 M/UL (ref 4.21–5.77)
RBC # BLD: 2.67 M/UL (ref 4.21–5.77)
RBC # BLD: 2.68 M/UL (ref 4.21–5.77)
RBC # BLD: 2.72 M/UL (ref 4.21–5.77)
RBC # BLD: 2.73 M/UL (ref 4.21–5.77)
RBC # BLD: 2.79 M/UL (ref 4.21–5.77)
RBC # BLD: 2.79 M/UL (ref 4.21–5.77)
RBC # BLD: 2.8 M/UL (ref 4.21–5.77)
RBC # BLD: 2.82 M/UL (ref 4.21–5.77)
RBC # BLD: 2.85 M/UL (ref 4.21–5.77)
RBC # BLD: 2.9 M/UL (ref 4.21–5.77)
RBC # BLD: 2.91 M/UL (ref 4.21–5.77)
RBC # BLD: 2.94 M/UL (ref 4.21–5.77)
RBC # BLD: 2.95 M/UL (ref 4.21–5.77)
RBC # BLD: 3.01 M/UL (ref 4.21–5.77)
RBC # BLD: 3.02 M/UL (ref 4.21–5.77)
RBC # BLD: 3.02 M/UL (ref 4.21–5.77)
RBC # BLD: 3.03 M/UL (ref 4.21–5.77)
RBC # BLD: 3.05 M/UL (ref 4.21–5.77)
RBC # BLD: 3.11 M/UL (ref 4.21–5.77)
RBC # BLD: 3.18 M/UL (ref 4.21–5.77)
RBC # BLD: 3.19 M/UL (ref 4.21–5.77)
RBC # BLD: 3.27 M/UL (ref 4.21–5.77)
RBC # BLD: 3.29 M/UL (ref 4.21–5.77)
RBC # BLD: 3.46 M/UL (ref 4.21–5.77)
RBC # BLD: 3.7 M/UL (ref 4.21–5.77)
RBC # BLD: ABNORMAL 10*6/UL
RBC FLUID: NORMAL /MM3
RBC UA: NORMAL /HPF (ref 0–2)
RBC UA: NORMAL /HPF (ref 0–4)
RBC UA: NORMAL /HPF (ref 0–4)
REASON FOR REJECTION: NORMAL
REASON FOR REJECTION: NORMAL
RENAL EPITHELIAL, UA: NORMAL /HPF
RETIC %: 2 % (ref 0.5–1.9)
RETIC HEMOGLOBIN: 33.4 PG (ref 28.2–35.7)
SAMPLE SITE: ABNORMAL
SEG NEUTROPHILS: 79 % (ref 36–65)
SEG NEUTROPHILS: 80 % (ref 36–66)
SEG NEUTROPHILS: 81 % (ref 36–65)
SEG NEUTROPHILS: 81 % (ref 36–65)
SEG NEUTROPHILS: 82 % (ref 36–65)
SEG NEUTROPHILS: 83 % (ref 36–65)
SEG NEUTROPHILS: 83 % (ref 36–65)
SEG NEUTROPHILS: 84 % (ref 36–66)
SEG NEUTROPHILS: 85 % (ref 36–65)
SEG NEUTROPHILS: 86 % (ref 36–65)
SEG NEUTROPHILS: 86 % (ref 36–65)
SEG NEUTROPHILS: 86 % (ref 36–66)
SEG NEUTROPHILS: 87 % (ref 36–65)
SEG NEUTROPHILS: 87 % (ref 36–65)
SEG NEUTROPHILS: 88 % (ref 36–65)
SEG NEUTROPHILS: 88 % (ref 36–66)
SEG NEUTROPHILS: 91 % (ref 36–65)
SEG NEUTROPHILS: 91 % (ref 36–65)
SEG NEUTROPHILS: 91 % (ref 36–66)
SEG NEUTROPHILS: 92 % (ref 36–66)
SEGMENTED NEUTROPHILS ABSOLUTE COUNT: 10.14 K/UL (ref 1.5–8.1)
SEGMENTED NEUTROPHILS ABSOLUTE COUNT: 10.3 K/UL (ref 1.5–8.1)
SEGMENTED NEUTROPHILS ABSOLUTE COUNT: 11.8 K/UL (ref 1.5–8.1)
SEGMENTED NEUTROPHILS ABSOLUTE COUNT: 12.38 K/UL (ref 1.8–7.7)
SEGMENTED NEUTROPHILS ABSOLUTE COUNT: 13.65 K/UL (ref 1.5–8.1)
SEGMENTED NEUTROPHILS ABSOLUTE COUNT: 13.82 K/UL (ref 1.5–8.1)
SEGMENTED NEUTROPHILS ABSOLUTE COUNT: 14.26 K/UL (ref 1.5–8.1)
SEGMENTED NEUTROPHILS ABSOLUTE COUNT: 15.14 K/UL (ref 1.8–7.7)
SEGMENTED NEUTROPHILS ABSOLUTE COUNT: 7.04 K/UL (ref 1.5–8.1)
SEGMENTED NEUTROPHILS ABSOLUTE COUNT: 7.76 K/UL (ref 1.8–7.7)
SEGMENTED NEUTROPHILS ABSOLUTE COUNT: 7.77 K/UL (ref 1.5–8.1)
SEGMENTED NEUTROPHILS ABSOLUTE COUNT: 8.29 K/UL (ref 1.5–8.1)
SEGMENTED NEUTROPHILS ABSOLUTE COUNT: 8.4 K/UL (ref 1.8–7.7)
SEGMENTED NEUTROPHILS ABSOLUTE COUNT: 8.5 K/UL (ref 1.5–8.1)
SEGMENTED NEUTROPHILS ABSOLUTE COUNT: 8.51 K/UL (ref 1.8–7.7)
SEGMENTED NEUTROPHILS ABSOLUTE COUNT: 8.64 K/UL (ref 1.5–8.1)
SEGMENTED NEUTROPHILS ABSOLUTE COUNT: 8.72 K/UL (ref 1.5–8.1)
SEGMENTED NEUTROPHILS ABSOLUTE COUNT: 9.1 K/UL (ref 1.5–8.1)
SEGMENTED NEUTROPHILS ABSOLUTE COUNT: 9.23 K/UL (ref 1.5–8.1)
SEGMENTED NEUTROPHILS ABSOLUTE COUNT: 9.23 K/UL (ref 1.5–8.1)
SEGMENTED NEUTROPHILS ABSOLUTE COUNT: 9.32 K/UL (ref 1.5–8.1)
SEGMENTED NEUTROPHILS ABSOLUTE COUNT: 9.41 K/UL (ref 1.5–8.1)
SEGMENTED NEUTROPHILS ABSOLUTE COUNT: 9.47 K/UL (ref 1.8–7.7)
SEGMENTED NEUTROPHILS ABSOLUTE COUNT: 9.86 K/UL (ref 1.5–8.1)
SEGMENTED NEUTROPHILS ABSOLUTE COUNT: 9.99 K/UL (ref 1.5–8.1)
SODIUM BLD-SCNC: 130 MMOL/L (ref 135–144)
SODIUM BLD-SCNC: 130 MMOL/L (ref 135–144)
SODIUM BLD-SCNC: 131 MMOL/L (ref 135–144)
SODIUM BLD-SCNC: 131 MMOL/L (ref 135–144)
SODIUM BLD-SCNC: 132 MMOL/L (ref 135–144)
SODIUM BLD-SCNC: 132 MMOL/L (ref 135–144)
SODIUM BLD-SCNC: 133 MMOL/L (ref 135–144)
SODIUM BLD-SCNC: 134 MMOL/L (ref 135–144)
SODIUM BLD-SCNC: 135 MMOL/L (ref 135–144)
SODIUM BLD-SCNC: 136 MMOL/L (ref 135–144)
SODIUM BLD-SCNC: 137 MMOL/L (ref 135–144)
SODIUM BLD-SCNC: 138 MMOL/L (ref 135–144)
SODIUM BLD-SCNC: 139 MMOL/L (ref 135–144)
SODIUM BLD-SCNC: 140 MMOL/L (ref 135–144)
SODIUM BLD-SCNC: 141 MMOL/L (ref 135–144)
SODIUM BLD-SCNC: 141 MMOL/L (ref 135–144)
SODIUM BLD-SCNC: 142 MMOL/L (ref 135–144)
SPECIFIC GRAVITY UA: 1.01 (ref 1–1.03)
SPECIFIC GRAVITY UA: 1.02 (ref 1–1.03)
SPECIFIC GRAVITY UA: 1.02 (ref 1–1.03)
SPECIMEN DESCRIPTION: ABNORMAL
SPECIMEN DESCRIPTION: NORMAL
SPECIMEN TYPE: NORMAL
SURGICAL PATHOLOGY REPORT: NORMAL
T3 FREE: 0.87 PG/ML (ref 2.02–4.43)
T3 FREE: 1.35 PG/ML (ref 2.02–4.43)
TCO2 (CALC), ART: 24 MMOL/L (ref 22–29)
THYROXINE, FREE: 1.14 NG/DL (ref 0.93–1.7)
THYROXINE, FREE: 1.37 NG/DL (ref 0.93–1.7)
TOTAL CK: 59 U/L (ref 39–308)
TOTAL IRON BINDING CAPACITY: 169 UG/DL (ref 250–450)
TOTAL PROT. SUM,%: 100 % (ref 98–102)
TOTAL PROT. SUM: 4.7 G/DL (ref 6.3–8.2)
TOTAL PROTEIN, BODY FLUID: 1.8 G/DL
TOTAL PROTEIN, BODY FLUID: 2.7 G/DL
TOTAL PROTEIN, URINE: 195 MG/DL
TOTAL PROTEIN, URINE: 217 MG/DL
TOTAL PROTEIN, URINE: 34 MG/DL
TOTAL PROTEIN, URINE: 57 MG/DL
TOTAL PROTEIN: 4.7 G/DL (ref 6.4–8.3)
TOTAL PROTEIN: 4.7 G/DL (ref 6.4–8.3)
TOTAL PROTEIN: 5 G/DL (ref 6.4–8.3)
TOTAL PROTEIN: 5.2 G/DL (ref 6.4–8.3)
TOTAL PROTEIN: 5.6 G/DL (ref 6.4–8.3)
TOTAL PROTEIN: 5.7 G/DL (ref 6.4–8.3)
TRANSFUSION STATUS: NORMAL
TRICHOMONAS: NORMAL
TROPONIN INTERP: ABNORMAL
TROPONIN INTERP: ABNORMAL
TROPONIN T: ABNORMAL NG/ML
TROPONIN T: ABNORMAL NG/ML
TROPONIN, HIGH SENSITIVITY: 2936 NG/L (ref 0–22)
TROPONIN, HIGH SENSITIVITY: 3045 NG/L (ref 0–22)
TSH SERPL DL<=0.05 MIU/L-ACNC: 3.04 MIU/L (ref 0.3–5)
TSH SERPL DL<=0.05 MIU/L-ACNC: 6.79 MIU/L (ref 0.3–5)
TURBIDITY: CLEAR
UNIT DIVISION: 0
UNIT NUMBER: NORMAL
UNSATURATED IRON BINDING CAPACITY: 138 UG/DL (ref 112–347)
URINE HGB: ABNORMAL
URINE HGB: NEGATIVE
URINE HGB: NEGATIVE
URINE TOTAL PROTEIN CREATININE RATIO: 0.8 (ref 0–0.2)
URINE TOTAL PROTEIN CREATININE RATIO: 3.86 (ref 0–0.2)
UROBILINOGEN, URINE: NORMAL
VITAMIN B-12: 659 PG/ML (ref 232–1245)
WBC # BLD: 10 K/UL (ref 3.5–11.3)
WBC # BLD: 10 K/UL (ref 3.5–11.3)
WBC # BLD: 10.2 K/UL (ref 3.5–11.3)
WBC # BLD: 10.3 K/UL (ref 3.5–11.3)
WBC # BLD: 10.4 K/UL (ref 3.5–11.3)
WBC # BLD: 10.5 K/UL (ref 3.5–11.3)
WBC # BLD: 10.6 K/UL (ref 3.5–11.3)
WBC # BLD: 10.7 K/UL (ref 3.5–11.3)
WBC # BLD: 10.8 K/UL (ref 3.5–11.3)
WBC # BLD: 11.4 K/UL (ref 3.5–11.3)
WBC # BLD: 11.5 K/UL (ref 3.5–11.3)
WBC # BLD: 11.5 K/UL (ref 3.5–11.3)
WBC # BLD: 11.6 K/UL (ref 3.5–11.3)
WBC # BLD: 11.7 K/UL (ref 3.5–11.3)
WBC # BLD: 11.8 K/UL (ref 3.5–11.3)
WBC # BLD: 11.8 K/UL (ref 3.5–11.3)
WBC # BLD: 12.6 K/UL (ref 3.5–11.3)
WBC # BLD: 12.7 K/UL (ref 3.5–11.3)
WBC # BLD: 13.6 K/UL (ref 3.5–11.3)
WBC # BLD: 13.7 K/UL (ref 3.5–11.3)
WBC # BLD: 13.7 K/UL (ref 3.5–11.3)
WBC # BLD: 14.1 K/UL (ref 3.5–11.3)
WBC # BLD: 14.5 K/UL (ref 3.5–11.3)
WBC # BLD: 15 K/UL (ref 3.5–11.3)
WBC # BLD: 15.8 K/UL (ref 3.5–11.3)
WBC # BLD: 16.2 K/UL (ref 3.5–11.3)
WBC # BLD: 17.2 K/UL (ref 3.5–11.3)
WBC # BLD: 19.2 K/UL (ref 3.5–11.3)
WBC # BLD: 21 K/UL (ref 3.5–11.3)
WBC # BLD: 7.4 K/UL (ref 3.5–11.3)
WBC # BLD: 7.7 K/UL (ref 3.5–11.3)
WBC # BLD: 8.6 K/UL (ref 3.5–11.3)
WBC # BLD: 9.6 K/UL (ref 3.5–11.3)
WBC # BLD: 9.6 K/UL (ref 3.5–11.3)
WBC # BLD: 9.7 K/UL (ref 3.5–11.3)
WBC # BLD: 9.9 K/UL (ref 3.5–11.3)
WBC # BLD: ABNORMAL 10*3/UL
WBC FLUID: 465 /MM3
WBC UA: NORMAL /HPF (ref 0–5)
YEAST: NORMAL
ZZ NTE CLEAN UP: ORDERED TEST: NORMAL
ZZ NTE CLEAN UP: ORDERED TEST: NORMAL
ZZ NTE WITH NAME CLEAN UP: SPECIMEN SOURCE: NORMAL
ZZ NTE WITH NAME CLEAN UP: SPECIMEN SOURCE: NORMAL

## 2019-01-01 PROCEDURE — 6370000000 HC RX 637 (ALT 250 FOR IP): Performed by: INTERNAL MEDICINE

## 2019-01-01 PROCEDURE — 6370000000 HC RX 637 (ALT 250 FOR IP): Performed by: THORACIC SURGERY (CARDIOTHORACIC VASCULAR SURGERY)

## 2019-01-01 PROCEDURE — 2580000003 HC RX 258: Performed by: NURSE PRACTITIONER

## 2019-01-01 PROCEDURE — 86920 COMPATIBILITY TEST SPIN: CPT

## 2019-01-01 PROCEDURE — 6370000000 HC RX 637 (ALT 250 FOR IP): Performed by: PHYSICIAN ASSISTANT

## 2019-01-01 PROCEDURE — 85055 RETICULATED PLATELET ASSAY: CPT

## 2019-01-01 PROCEDURE — 94760 N-INVAS EAR/PLS OXIMETRY 1: CPT

## 2019-01-01 PROCEDURE — 85025 COMPLETE CBC W/AUTO DIFF WBC: CPT

## 2019-01-01 PROCEDURE — 82728 ASSAY OF FERRITIN: CPT

## 2019-01-01 PROCEDURE — 71045 X-RAY EXAM CHEST 1 VIEW: CPT

## 2019-01-01 PROCEDURE — 94669 MECHANICAL CHEST WALL OSCILL: CPT

## 2019-01-01 PROCEDURE — 82330 ASSAY OF CALCIUM: CPT

## 2019-01-01 PROCEDURE — 36002 PSEUDOANEURYSM INJECTION TRT: CPT | Performed by: SURGERY

## 2019-01-01 PROCEDURE — 99024 POSTOP FOLLOW-UP VISIT: CPT | Performed by: NURSE PRACTITIONER

## 2019-01-01 PROCEDURE — 2580000003 HC RX 258: Performed by: INTERNAL MEDICINE

## 2019-01-01 PROCEDURE — 2580000003 HC RX 258: Performed by: NURSE ANESTHETIST, CERTIFIED REGISTERED

## 2019-01-01 PROCEDURE — P9046 ALBUMIN (HUMAN), 25%, 20 ML: HCPCS | Performed by: THORACIC SURGERY (CARDIOTHORACIC VASCULAR SURGERY)

## 2019-01-01 PROCEDURE — 6360000002 HC RX W HCPCS: Performed by: NURSE ANESTHETIST, CERTIFIED REGISTERED

## 2019-01-01 PROCEDURE — 2060000000 HC ICU INTERMEDIATE R&B

## 2019-01-01 PROCEDURE — C1729 CATH, DRAINAGE: HCPCS

## 2019-01-01 PROCEDURE — 99222 1ST HOSP IP/OBS MODERATE 55: CPT | Performed by: INTERNAL MEDICINE

## 2019-01-01 PROCEDURE — 6370000000 HC RX 637 (ALT 250 FOR IP): Performed by: STUDENT IN AN ORGANIZED HEALTH CARE EDUCATION/TRAINING PROGRAM

## 2019-01-01 PROCEDURE — 6370000000 HC RX 637 (ALT 250 FOR IP): Performed by: NURSE PRACTITIONER

## 2019-01-01 PROCEDURE — 94640 AIRWAY INHALATION TREATMENT: CPT

## 2019-01-01 PROCEDURE — 2720000010 HC SURG SUPPLY STERILE

## 2019-01-01 PROCEDURE — 76937 US GUIDE VASCULAR ACCESS: CPT

## 2019-01-01 PROCEDURE — 85730 THROMBOPLASTIN TIME PARTIAL: CPT

## 2019-01-01 PROCEDURE — 71046 X-RAY EXAM CHEST 2 VIEWS: CPT

## 2019-01-01 PROCEDURE — 2700000000 HC OXYGEN THERAPY PER DAY

## 2019-01-01 PROCEDURE — 99233 SBSQ HOSP IP/OBS HIGH 50: CPT | Performed by: INTERNAL MEDICINE

## 2019-01-01 PROCEDURE — 3600000002 HC SURGERY LEVEL 2 BASE: Performed by: THORACIC SURGERY (CARDIOTHORACIC VASCULAR SURGERY)

## 2019-01-01 PROCEDURE — 99232 SBSQ HOSP IP/OBS MODERATE 35: CPT | Performed by: INTERNAL MEDICINE

## 2019-01-01 PROCEDURE — 99024 POSTOP FOLLOW-UP VISIT: CPT | Performed by: PHYSICIAN ASSISTANT

## 2019-01-01 PROCEDURE — 2500000003 HC RX 250 WO HCPCS: Performed by: INTERNAL MEDICINE

## 2019-01-01 PROCEDURE — 80053 COMPREHEN METABOLIC PANEL: CPT

## 2019-01-01 PROCEDURE — 80048 BASIC METABOLIC PNL TOTAL CA: CPT

## 2019-01-01 PROCEDURE — 88305 TISSUE EXAM BY PATHOLOGIST: CPT

## 2019-01-01 PROCEDURE — 87205 SMEAR GRAM STAIN: CPT

## 2019-01-01 PROCEDURE — 36415 COLL VENOUS BLD VENIPUNCTURE: CPT

## 2019-01-01 PROCEDURE — C1894 INTRO/SHEATH, NON-LASER: HCPCS

## 2019-01-01 PROCEDURE — 99231 SBSQ HOSP IP/OBS SF/LOW 25: CPT | Performed by: INTERNAL MEDICINE

## 2019-01-01 PROCEDURE — 97535 SELF CARE MNGMENT TRAINING: CPT

## 2019-01-01 PROCEDURE — P9016 RBC LEUKOCYTES REDUCED: HCPCS

## 2019-01-01 PROCEDURE — 6360000002 HC RX W HCPCS: Performed by: NURSE PRACTITIONER

## 2019-01-01 PROCEDURE — 51798 US URINE CAPACITY MEASURE: CPT

## 2019-01-01 PROCEDURE — 89051 BODY FLUID CELL COUNT: CPT

## 2019-01-01 PROCEDURE — 85027 COMPLETE CBC AUTOMATED: CPT

## 2019-01-01 PROCEDURE — 6360000002 HC RX W HCPCS: Performed by: INTERNAL MEDICINE

## 2019-01-01 PROCEDURE — 76705 ECHO EXAM OF ABDOMEN: CPT

## 2019-01-01 PROCEDURE — 6360000002 HC RX W HCPCS: Performed by: THORACIC SURGERY (CARDIOTHORACIC VASCULAR SURGERY)

## 2019-01-01 PROCEDURE — 93005 ELECTROCARDIOGRAM TRACING: CPT

## 2019-01-01 PROCEDURE — 97116 GAIT TRAINING THERAPY: CPT

## 2019-01-01 PROCEDURE — 84134 ASSAY OF PREALBUMIN: CPT

## 2019-01-01 PROCEDURE — 86140 C-REACTIVE PROTEIN: CPT

## 2019-01-01 PROCEDURE — 86704 HEP B CORE ANTIBODY TOTAL: CPT

## 2019-01-01 PROCEDURE — 88112 CYTOPATH CELL ENHANCE TECH: CPT

## 2019-01-01 PROCEDURE — C1769 GUIDE WIRE: HCPCS

## 2019-01-01 PROCEDURE — 83986 ASSAY PH BODY FLUID NOS: CPT

## 2019-01-01 PROCEDURE — 87070 CULTURE OTHR SPECIMN AEROBIC: CPT

## 2019-01-01 PROCEDURE — 02RF38Z REPLACEMENT OF AORTIC VALVE WITH ZOOPLASTIC TISSUE, PERCUTANEOUS APPROACH: ICD-10-PCS | Performed by: THORACIC SURGERY (CARDIOTHORACIC VASCULAR SURGERY)

## 2019-01-01 PROCEDURE — B2111ZZ FLUOROSCOPY OF MULTIPLE CORONARY ARTERIES USING LOW OSMOLAR CONTRAST: ICD-10-PCS | Performed by: INTERNAL MEDICINE

## 2019-01-01 PROCEDURE — 02H633Z INSERTION OF INFUSION DEVICE INTO RIGHT ATRIUM, PERCUTANEOUS APPROACH: ICD-10-PCS | Performed by: RADIOLOGY

## 2019-01-01 PROCEDURE — G8427 DOCREV CUR MEDS BY ELIG CLIN: HCPCS | Performed by: PODIATRIST

## 2019-01-01 PROCEDURE — 0W9B3ZZ DRAINAGE OF LEFT PLEURAL CAVITY, PERCUTANEOUS APPROACH: ICD-10-PCS | Performed by: RADIOLOGY

## 2019-01-01 PROCEDURE — 97166 OT EVAL MOD COMPLEX 45 MIN: CPT

## 2019-01-01 PROCEDURE — 2500000003 HC RX 250 WO HCPCS

## 2019-01-01 PROCEDURE — 90935 HEMODIALYSIS ONE EVALUATION: CPT

## 2019-01-01 PROCEDURE — 94761 N-INVAS EAR/PLS OXIMETRY MLT: CPT

## 2019-01-01 PROCEDURE — 75989 ABSCESS DRAINAGE UNDER X-RAY: CPT | Performed by: RADIOLOGY

## 2019-01-01 PROCEDURE — 82607 VITAMIN B-12: CPT

## 2019-01-01 PROCEDURE — 2709999900 HC NON-CHARGEABLE SUPPLY

## 2019-01-01 PROCEDURE — 97162 PT EVAL MOD COMPLEX 30 MIN: CPT

## 2019-01-01 PROCEDURE — 81001 URINALYSIS AUTO W/SCOPE: CPT

## 2019-01-01 PROCEDURE — 6370000000 HC RX 637 (ALT 250 FOR IP): Performed by: OTOLARYNGOLOGY

## 2019-01-01 PROCEDURE — 97110 THERAPEUTIC EXERCISES: CPT

## 2019-01-01 PROCEDURE — G8598 ASA/ANTIPLAT THER USED: HCPCS | Performed by: SURGERY

## 2019-01-01 PROCEDURE — 2709999900 HC NON-CHARGEABLE SUPPLY: Performed by: THORACIC SURGERY (CARDIOTHORACIC VASCULAR SURGERY)

## 2019-01-01 PROCEDURE — 76770 US EXAM ABDO BACK WALL COMP: CPT

## 2019-01-01 PROCEDURE — 93308 TTE F-UP OR LMTD: CPT

## 2019-01-01 PROCEDURE — 0JPT0PZ REMOVAL OF CARDIAC RHYTHM RELATED DEVICE FROM TRUNK SUBCUTANEOUS TISSUE AND FASCIA, OPEN APPROACH: ICD-10-PCS | Performed by: RADIOLOGY

## 2019-01-01 PROCEDURE — 2580000003 HC RX 258: Performed by: STUDENT IN AN ORGANIZED HEALTH CARE EDUCATION/TRAINING PROGRAM

## 2019-01-01 PROCEDURE — 6360000002 HC RX W HCPCS: Performed by: PHYSICIAN ASSISTANT

## 2019-01-01 PROCEDURE — P9047 ALBUMIN (HUMAN), 25%, 50ML: HCPCS | Performed by: INTERNAL MEDICINE

## 2019-01-01 PROCEDURE — X2C0361 EXTIRPATION OF MATTER FROM CORONARY ARTERY, ONE ARTERY USING ORBITAL ATHERECTOMY TECHNOLOGY, PERCUTANEOUS APPROACH, NEW TECHNOLOGY GROUP 1: ICD-10-PCS | Performed by: INTERNAL MEDICINE

## 2019-01-01 PROCEDURE — 99231 SBSQ HOSP IP/OBS SF/LOW 25: CPT | Performed by: PHYSICIAN ASSISTANT

## 2019-01-01 PROCEDURE — 94660 CPAP INITIATION&MGMT: CPT

## 2019-01-01 PROCEDURE — 1036F TOBACCO NON-USER: CPT | Performed by: NURSE PRACTITIONER

## 2019-01-01 PROCEDURE — 85018 HEMOGLOBIN: CPT

## 2019-01-01 PROCEDURE — 02H633Z INSERTION OF INFUSION DEVICE INTO RIGHT ATRIUM, PERCUTANEOUS APPROACH: ICD-10-PCS | Performed by: SURGERY

## 2019-01-01 PROCEDURE — 94668 MNPJ CHEST WALL SBSQ: CPT

## 2019-01-01 PROCEDURE — 99232 SBSQ HOSP IP/OBS MODERATE 35: CPT | Performed by: NURSE PRACTITIONER

## 2019-01-01 PROCEDURE — 82947 ASSAY GLUCOSE BLOOD QUANT: CPT

## 2019-01-01 PROCEDURE — 99238 HOSP IP/OBS DSCHRG MGMT 30/<: CPT | Performed by: NURSE PRACTITIONER

## 2019-01-01 PROCEDURE — 33225 L VENTRIC PACING LEAD ADD-ON: CPT | Performed by: INTERNAL MEDICINE

## 2019-01-01 PROCEDURE — 73020 X-RAY EXAM OF SHOULDER: CPT

## 2019-01-01 PROCEDURE — C1900 LEAD, CORONARY VENOUS: HCPCS

## 2019-01-01 PROCEDURE — 1111F DSCHRG MED/CURRENT MED MERGE: CPT | Performed by: PODIATRIST

## 2019-01-01 PROCEDURE — 6360000002 HC RX W HCPCS

## 2019-01-01 PROCEDURE — 84100 ASSAY OF PHOSPHORUS: CPT

## 2019-01-01 PROCEDURE — 99231 SBSQ HOSP IP/OBS SF/LOW 25: CPT | Performed by: NURSE PRACTITIONER

## 2019-01-01 PROCEDURE — G8598 ASA/ANTIPLAT THER USED: HCPCS | Performed by: NURSE PRACTITIONER

## 2019-01-01 PROCEDURE — 83735 ASSAY OF MAGNESIUM: CPT

## 2019-01-01 PROCEDURE — 99221 1ST HOSP IP/OBS SF/LOW 40: CPT | Performed by: NURSE PRACTITIONER

## 2019-01-01 PROCEDURE — 6370000000 HC RX 637 (ALT 250 FOR IP)

## 2019-01-01 PROCEDURE — 51702 INSERT TEMP BLADDER CATH: CPT

## 2019-01-01 PROCEDURE — 36430 TRANSFUSION BLD/BLD COMPNT: CPT

## 2019-01-01 PROCEDURE — 99221 1ST HOSP IP/OBS SF/LOW 40: CPT | Performed by: THORACIC SURGERY (CARDIOTHORACIC VASCULAR SURGERY)

## 2019-01-01 PROCEDURE — 99232 SBSQ HOSP IP/OBS MODERATE 35: CPT | Performed by: SURGERY

## 2019-01-01 PROCEDURE — 94762 N-INVAS EAR/PLS OXIMTRY CONT: CPT

## 2019-01-01 PROCEDURE — 82945 GLUCOSE OTHER FLUID: CPT

## 2019-01-01 PROCEDURE — 86901 BLOOD TYPING SEROLOGIC RH(D): CPT

## 2019-01-01 PROCEDURE — P9047 ALBUMIN (HUMAN), 25%, 50ML: HCPCS

## 2019-01-01 PROCEDURE — 6370000000 HC RX 637 (ALT 250 FOR IP): Performed by: SURGERY

## 2019-01-01 PROCEDURE — 94667 MNPJ CHEST WALL 1ST: CPT

## 2019-01-01 PROCEDURE — P9047 ALBUMIN (HUMAN), 25%, 50ML: HCPCS | Performed by: THORACIC SURGERY (CARDIOTHORACIC VASCULAR SURGERY)

## 2019-01-01 PROCEDURE — 73080 X-RAY EXAM OF ELBOW: CPT

## 2019-01-01 PROCEDURE — 84132 ASSAY OF SERUM POTASSIUM: CPT

## 2019-01-01 PROCEDURE — 85045 AUTOMATED RETICULOCYTE COUNT: CPT

## 2019-01-01 PROCEDURE — 82553 CREATINE MB FRACTION: CPT

## 2019-01-01 PROCEDURE — 77001 FLUOROGUIDE FOR VEIN DEVICE: CPT | Performed by: SURGERY

## 2019-01-01 PROCEDURE — 6370000000 HC RX 637 (ALT 250 FOR IP): Performed by: HOSPITALIST

## 2019-01-01 PROCEDURE — APPNB15 APP NON BILLABLE TIME 0-15 MINS: Performed by: PHYSICIAN ASSISTANT

## 2019-01-01 PROCEDURE — 85610 PROTHROMBIN TIME: CPT

## 2019-01-01 PROCEDURE — 99223 1ST HOSP IP/OBS HIGH 75: CPT | Performed by: SURGERY

## 2019-01-01 PROCEDURE — 36600 WITHDRAWAL OF ARTERIAL BLOOD: CPT

## 2019-01-01 PROCEDURE — 83883 ASSAY NEPHELOMETRY NOT SPEC: CPT

## 2019-01-01 PROCEDURE — 2580000003 HC RX 258: Performed by: THORACIC SURGERY (CARDIOTHORACIC VASCULAR SURGERY)

## 2019-01-01 PROCEDURE — 71048 X-RAY EXAM CHEST 4+ VIEWS: CPT

## 2019-01-01 PROCEDURE — G8420 CALC BMI NORM PARAMETERS: HCPCS | Performed by: PODIATRIST

## 2019-01-01 PROCEDURE — 83880 ASSAY OF NATRIURETIC PEPTIDE: CPT

## 2019-01-01 PROCEDURE — 1123F ACP DISCUSS/DSCN MKR DOCD: CPT | Performed by: NURSE PRACTITIONER

## 2019-01-01 PROCEDURE — 87040 BLOOD CULTURE FOR BACTERIA: CPT

## 2019-01-01 PROCEDURE — 77001 FLUOROGUIDE FOR VEIN DEVICE: CPT | Performed by: RADIOLOGY

## 2019-01-01 PROCEDURE — C1874 STENT, COATED/COV W/DEL SYS: HCPCS

## 2019-01-01 PROCEDURE — 11721 DEBRIDE NAIL 6 OR MORE: CPT | Performed by: PODIATRIST

## 2019-01-01 PROCEDURE — 2780000010 HC IMPLANT OTHER

## 2019-01-01 PROCEDURE — P9041 ALBUMIN (HUMAN),5%, 50ML: HCPCS | Performed by: THORACIC SURGERY (CARDIOTHORACIC VASCULAR SURGERY)

## 2019-01-01 PROCEDURE — 93325 DOPPLER ECHO COLOR FLOW MAPG: CPT

## 2019-01-01 PROCEDURE — 3700000001 HC ADD 15 MINUTES (ANESTHESIA)

## 2019-01-01 PROCEDURE — 6360000002 HC RX W HCPCS: Performed by: STUDENT IN AN ORGANIZED HEALTH CARE EDUCATION/TRAINING PROGRAM

## 2019-01-01 PROCEDURE — 99211 OFF/OP EST MAY X REQ PHY/QHP: CPT

## 2019-01-01 PROCEDURE — 93970 EXTREMITY STUDY: CPT

## 2019-01-01 PROCEDURE — 4040F PNEUMOC VAC/ADMIN/RCVD: CPT | Performed by: PODIATRIST

## 2019-01-01 PROCEDURE — 86900 BLOOD TYPING SEROLOGIC ABO: CPT

## 2019-01-01 PROCEDURE — 82746 ASSAY OF FOLIC ACID SERUM: CPT

## 2019-01-01 PROCEDURE — P9040 RBC LEUKOREDUCED IRRADIATED: HCPCS

## 2019-01-01 PROCEDURE — 83540 ASSAY OF IRON: CPT

## 2019-01-01 PROCEDURE — P9041 ALBUMIN (HUMAN),5%, 50ML: HCPCS

## 2019-01-01 PROCEDURE — 90937 HEMODIALYSIS REPEATED EVAL: CPT

## 2019-01-01 PROCEDURE — 85576 BLOOD PLATELET AGGREGATION: CPT

## 2019-01-01 PROCEDURE — 86850 RBC ANTIBODY SCREEN: CPT

## 2019-01-01 PROCEDURE — 1111F DSCHRG MED/CURRENT MED MERGE: CPT | Performed by: NURSE PRACTITIONER

## 2019-01-01 PROCEDURE — C2621 PMKR, OTHER THAN SING/DUAL: HCPCS

## 2019-01-01 PROCEDURE — 2580000003 HC RX 258

## 2019-01-01 PROCEDURE — 84484 ASSAY OF TROPONIN QUANT: CPT

## 2019-01-01 PROCEDURE — C1750 CATH, HEMODIALYSIS,LONG-TERM: HCPCS

## 2019-01-01 PROCEDURE — 99212 OFFICE O/P EST SF 10 MIN: CPT | Performed by: NURSE PRACTITIONER

## 2019-01-01 PROCEDURE — 33361 REPLACE AORTIC VALVE PERQ: CPT

## 2019-01-01 PROCEDURE — 85347 COAGULATION TIME ACTIVATED: CPT

## 2019-01-01 PROCEDURE — 02HV33Z INSERTION OF INFUSION DEVICE INTO SUPERIOR VENA CAVA, PERCUTANEOUS APPROACH: ICD-10-PCS | Performed by: SURGERY

## 2019-01-01 PROCEDURE — 32555 ASPIRATE PLEURA W/ IMAGING: CPT | Performed by: RADIOLOGY

## 2019-01-01 PROCEDURE — 80076 HEPATIC FUNCTION PANEL: CPT

## 2019-01-01 PROCEDURE — P9046 ALBUMIN (HUMAN), 25%, 20 ML: HCPCS | Performed by: NURSE PRACTITIONER

## 2019-01-01 PROCEDURE — 84439 ASSAY OF FREE THYROXINE: CPT

## 2019-01-01 PROCEDURE — G8427 DOCREV CUR MEDS BY ELIG CLIN: HCPCS | Performed by: NURSE PRACTITIONER

## 2019-01-01 PROCEDURE — C1725 CATH, TRANSLUMIN NON-LASER: HCPCS

## 2019-01-01 PROCEDURE — C1752 CATH,HEMODIALYSIS,SHORT-TERM: HCPCS

## 2019-01-01 PROCEDURE — C1781 MESH (IMPLANTABLE): HCPCS

## 2019-01-01 PROCEDURE — 5A1D70Z PERFORMANCE OF URINARY FILTRATION, INTERMITTENT, LESS THAN 6 HOURS PER DAY: ICD-10-PCS | Performed by: INTERNAL MEDICINE

## 2019-01-01 PROCEDURE — G8427 DOCREV CUR MEDS BY ELIG CLIN: HCPCS | Performed by: SURGERY

## 2019-01-01 PROCEDURE — 84481 FREE ASSAY (FT-3): CPT

## 2019-01-01 PROCEDURE — 99221 1ST HOSP IP/OBS SF/LOW 40: CPT | Performed by: PHYSICIAN ASSISTANT

## 2019-01-01 PROCEDURE — 027134Z DILATION OF CORONARY ARTERY, TWO ARTERIES WITH DRUG-ELUTING INTRALUMINAL DEVICE, PERCUTANEOUS APPROACH: ICD-10-PCS | Performed by: INTERNAL MEDICINE

## 2019-01-01 PROCEDURE — 33229 REMV&REPLC PM GEN MULT LEADS: CPT | Performed by: INTERNAL MEDICINE

## 2019-01-01 PROCEDURE — 76937 US GUIDE VASCULAR ACCESS: CPT | Performed by: SURGERY

## 2019-01-01 PROCEDURE — 80051 ELECTROLYTE PANEL: CPT

## 2019-01-01 PROCEDURE — 32550 INSERT PLEURAL CATH: CPT | Performed by: RADIOLOGY

## 2019-01-01 PROCEDURE — APPSS15 APP SPLIT SHARED TIME 0-15 MINUTES: Performed by: NURSE PRACTITIONER

## 2019-01-01 PROCEDURE — 0W993ZZ DRAINAGE OF RIGHT PLEURAL CAVITY, PERCUTANEOUS APPROACH: ICD-10-PCS | Performed by: RADIOLOGY

## 2019-01-01 PROCEDURE — 32555 ASPIRATE PLEURA W/ IMAGING: CPT

## 2019-01-01 PROCEDURE — 84157 ASSAY OF PROTEIN OTHER: CPT

## 2019-01-01 PROCEDURE — 93306 TTE W/DOPPLER COMPLETE: CPT

## 2019-01-01 PROCEDURE — 85014 HEMATOCRIT: CPT

## 2019-01-01 PROCEDURE — 86317 IMMUNOASSAY INFECTIOUS AGENT: CPT

## 2019-01-01 PROCEDURE — P9041 ALBUMIN (HUMAN),5%, 50ML: HCPCS | Performed by: PHYSICIAN ASSISTANT

## 2019-01-01 PROCEDURE — 93320 DOPPLER ECHO COMPLETE: CPT

## 2019-01-01 PROCEDURE — 86022 PLATELET ANTIBODIES: CPT

## 2019-01-01 PROCEDURE — C9602 PERC D-E COR STENT ATHER S: HCPCS | Performed by: INTERNAL MEDICINE

## 2019-01-01 PROCEDURE — C1751 CATH, INF, PER/CENT/MIDLINE: HCPCS

## 2019-01-01 PROCEDURE — 4040F PNEUMOC VAC/ADMIN/RCVD: CPT | Performed by: NURSE PRACTITIONER

## 2019-01-01 PROCEDURE — 3700000001 HC ADD 15 MINUTES (ANESTHESIA): Performed by: THORACIC SURGERY (CARDIOTHORACIC VASCULAR SURGERY)

## 2019-01-01 PROCEDURE — 0W9930Z DRAINAGE OF RIGHT PLEURAL CAVITY WITH DRAINAGE DEVICE, PERCUTANEOUS APPROACH: ICD-10-PCS | Performed by: RADIOLOGY

## 2019-01-01 PROCEDURE — 82550 ASSAY OF CK (CPK): CPT

## 2019-01-01 PROCEDURE — 84155 ASSAY OF PROTEIN SERUM: CPT

## 2019-01-01 PROCEDURE — 0JH609Z INSERTION OF CARDIAC RESYNCHRONIZATION DEFIBRILLATOR PULSE GENERATOR INTO CHEST SUBCUTANEOUS TISSUE AND FASCIA, OPEN APPROACH: ICD-10-PCS | Performed by: RADIOLOGY

## 2019-01-01 PROCEDURE — 99223 1ST HOSP IP/OBS HIGH 75: CPT | Performed by: INTERNAL MEDICINE

## 2019-01-01 PROCEDURE — 97164 PT RE-EVAL EST PLAN CARE: CPT

## 2019-01-01 PROCEDURE — 71250 CT THORAX DX C-: CPT

## 2019-01-01 PROCEDURE — 84156 ASSAY OF PROTEIN URINE: CPT

## 2019-01-01 PROCEDURE — 3E053GC INTRODUCTION OF OTHER THERAPEUTIC SUBSTANCE INTO PERIPHERAL ARTERY, PERCUTANEOUS APPROACH: ICD-10-PCS | Performed by: SURGERY

## 2019-01-01 PROCEDURE — 2500000003 HC RX 250 WO HCPCS: Performed by: THORACIC SURGERY (CARDIOTHORACIC VASCULAR SURGERY)

## 2019-01-01 PROCEDURE — P9046 ALBUMIN (HUMAN), 25%, 20 ML: HCPCS

## 2019-01-01 PROCEDURE — 6360000004 HC RX CONTRAST MEDICATION

## 2019-01-01 PROCEDURE — APPNB45 APP NON BILLABLE 31-45 MINUTES: Performed by: PHYSICIAN ASSISTANT

## 2019-01-01 PROCEDURE — C1887 CATHETER, GUIDING: HCPCS

## 2019-01-01 PROCEDURE — 74018 RADEX ABDOMEN 1 VIEW: CPT

## 2019-01-01 PROCEDURE — 99999 PR OFFICE/OUTPT VISIT,PROCEDURE ONLY: CPT | Performed by: THORACIC SURGERY (CARDIOTHORACIC VASCULAR SURGERY)

## 2019-01-01 PROCEDURE — 1036F TOBACCO NON-USER: CPT | Performed by: PODIATRIST

## 2019-01-01 PROCEDURE — G8598 ASA/ANTIPLAT THER USED: HCPCS | Performed by: PODIATRIST

## 2019-01-01 PROCEDURE — 2580000003 HC RX 258: Performed by: PHYSICIAN ASSISTANT

## 2019-01-01 PROCEDURE — 36558 INSERT TUNNELED CV CATH: CPT | Performed by: SURGERY

## 2019-01-01 PROCEDURE — 84443 ASSAY THYROID STIM HORMONE: CPT

## 2019-01-01 PROCEDURE — 86803 HEPATITIS C AB TEST: CPT

## 2019-01-01 PROCEDURE — 94010 BREATHING CAPACITY TEST: CPT

## 2019-01-01 PROCEDURE — 97168 OT RE-EVAL EST PLAN CARE: CPT

## 2019-01-01 PROCEDURE — C1760 CLOSURE DEV, VASC: HCPCS

## 2019-01-01 PROCEDURE — 76942 ECHO GUIDE FOR BIOPSY: CPT | Performed by: SURGERY

## 2019-01-01 PROCEDURE — 3600000012 HC SURGERY LEVEL 2 ADDTL 15MIN: Performed by: THORACIC SURGERY (CARDIOTHORACIC VASCULAR SURGERY)

## 2019-01-01 PROCEDURE — C1724 CATH, TRANS ATHEREC,ROTATION: HCPCS

## 2019-01-01 PROCEDURE — 82040 ASSAY OF SERUM ALBUMIN: CPT

## 2019-01-01 PROCEDURE — P9046 ALBUMIN (HUMAN), 25%, 20 ML: HCPCS | Performed by: INTERNAL MEDICINE

## 2019-01-01 PROCEDURE — 0W9B30Z DRAINAGE OF LEFT PLEURAL CAVITY WITH DRAINAGE DEVICE, PERCUTANEOUS APPROACH: ICD-10-PCS | Performed by: RADIOLOGY

## 2019-01-01 PROCEDURE — 87075 CULTR BACTERIA EXCEPT BLOOD: CPT

## 2019-01-01 PROCEDURE — 85384 FIBRINOGEN ACTIVITY: CPT

## 2019-01-01 PROCEDURE — 6360000002 HC RX W HCPCS: Performed by: RADIOLOGY

## 2019-01-01 PROCEDURE — 99999 PR OFFICE/OUTPT VISIT,PROCEDURE ONLY: CPT | Performed by: PHYSICIAN ASSISTANT

## 2019-01-01 PROCEDURE — 97530 THERAPEUTIC ACTIVITIES: CPT

## 2019-01-01 PROCEDURE — 2000000000 HC ICU R&B

## 2019-01-01 PROCEDURE — 4A023N7 MEASUREMENT OF CARDIAC SAMPLING AND PRESSURE, LEFT HEART, PERCUTANEOUS APPROACH: ICD-10-PCS | Performed by: INTERNAL MEDICINE

## 2019-01-01 PROCEDURE — G8420 CALC BMI NORM PARAMETERS: HCPCS | Performed by: NURSE PRACTITIONER

## 2019-01-01 PROCEDURE — 0JH63XZ INSERTION OF TUNNELED VASCULAR ACCESS DEVICE INTO CHEST SUBCUTANEOUS TISSUE AND FASCIA, PERCUTANEOUS APPROACH: ICD-10-PCS | Performed by: SURGERY

## 2019-01-01 PROCEDURE — 87340 HEPATITIS B SURFACE AG IA: CPT

## 2019-01-01 PROCEDURE — 33361 REPLACE AORTIC VALVE PERQ: CPT | Performed by: THORACIC SURGERY (CARDIOTHORACIC VASCULAR SURGERY)

## 2019-01-01 PROCEDURE — 3700000000 HC ANESTHESIA ATTENDED CARE

## 2019-01-01 PROCEDURE — 99203 OFFICE O/P NEW LOW 30 MIN: CPT | Performed by: PODIATRIST

## 2019-01-01 PROCEDURE — 83550 IRON BINDING TEST: CPT

## 2019-01-01 PROCEDURE — 84165 PROTEIN E-PHORESIS SERUM: CPT

## 2019-01-01 PROCEDURE — 2780000006 HC MISC HEART VALVE

## 2019-01-01 PROCEDURE — 82570 ASSAY OF URINE CREATININE: CPT

## 2019-01-01 PROCEDURE — G8420 CALC BMI NORM PARAMETERS: HCPCS | Performed by: SURGERY

## 2019-01-01 PROCEDURE — 1123F ACP DISCUSS/DSCN MKR DOCD: CPT | Performed by: PODIATRIST

## 2019-01-01 PROCEDURE — 02HL3KZ INSERTION OF DEFIBRILLATOR LEAD INTO LEFT VENTRICLE, PERCUTANEOUS APPROACH: ICD-10-PCS | Performed by: RADIOLOGY

## 2019-01-01 PROCEDURE — 93926 LOWER EXTREMITY STUDY: CPT

## 2019-01-01 PROCEDURE — 2709999900 US THORACENTESIS

## 2019-01-01 PROCEDURE — 99213 OFFICE O/P EST LOW 20 MIN: CPT | Performed by: SURGERY

## 2019-01-01 PROCEDURE — 82247 BILIRUBIN TOTAL: CPT

## 2019-01-01 PROCEDURE — 1036F TOBACCO NON-USER: CPT | Performed by: SURGERY

## 2019-01-01 PROCEDURE — 83615 LACTATE (LD) (LDH) ENZYME: CPT

## 2019-01-01 PROCEDURE — 3700000000 HC ANESTHESIA ATTENDED CARE: Performed by: THORACIC SURGERY (CARDIOTHORACIC VASCULAR SURGERY)

## 2019-01-01 PROCEDURE — 1123F ACP DISCUSS/DSCN MKR DOCD: CPT | Performed by: SURGERY

## 2019-01-01 PROCEDURE — 4040F PNEUMOC VAC/ADMIN/RCVD: CPT | Performed by: SURGERY

## 2019-01-01 PROCEDURE — 93880 EXTRACRANIAL BILAT STUDY: CPT

## 2019-01-01 PROCEDURE — 82803 BLOOD GASES ANY COMBINATION: CPT

## 2019-01-01 PROCEDURE — P9047 ALBUMIN (HUMAN), 25%, 50ML: HCPCS | Performed by: PHYSICIAN ASSISTANT

## 2019-01-01 PROCEDURE — G0257 UNSCHED DIALYSIS ESRD PT HOS: HCPCS

## 2019-01-01 PROCEDURE — 1111F DSCHRG MED/CURRENT MED MERGE: CPT | Performed by: SURGERY

## 2019-01-01 PROCEDURE — 36556 INSERT NON-TUNNEL CV CATH: CPT | Performed by: RADIOLOGY

## 2019-01-01 PROCEDURE — 93923 UPR/LXTR ART STDY 3+ LVLS: CPT

## 2019-01-01 RX ORDER — 0.9 % SODIUM CHLORIDE 0.9 %
500 INTRAVENOUS SOLUTION INTRAVENOUS ONCE
Status: COMPLETED | OUTPATIENT
Start: 2019-01-01 | End: 2019-01-01

## 2019-01-01 RX ORDER — ACETAMINOPHEN 325 MG/1
650 TABLET ORAL EVERY 4 HOURS PRN
Status: DISCONTINUED | OUTPATIENT
Start: 2019-01-01 | End: 2019-01-01 | Stop reason: SDUPTHER

## 2019-01-01 RX ORDER — CARVEDILOL 3.12 MG/1
3.12 TABLET ORAL 2 TIMES DAILY WITH MEALS
Status: DISCONTINUED | OUTPATIENT
Start: 2019-01-01 | End: 2019-01-01

## 2019-01-01 RX ORDER — 0.9 % SODIUM CHLORIDE 0.9 %
150 INTRAVENOUS SOLUTION INTRAVENOUS PRN
Status: DISCONTINUED | OUTPATIENT
Start: 2019-01-01 | End: 2019-01-01 | Stop reason: HOSPADM

## 2019-01-01 RX ORDER — HYDRALAZINE HYDROCHLORIDE 20 MG/ML
20 INJECTION INTRAMUSCULAR; INTRAVENOUS EVERY 6 HOURS
Status: DISCONTINUED | OUTPATIENT
Start: 2019-01-01 | End: 2019-01-01

## 2019-01-01 RX ORDER — ECHINACEA PURPUREA EXTRACT 125 MG
1 TABLET ORAL PRN
Status: DISCONTINUED | OUTPATIENT
Start: 2019-01-01 | End: 2019-01-01 | Stop reason: HOSPADM

## 2019-01-01 RX ORDER — HYDRALAZINE HYDROCHLORIDE 25 MG/1
25 TABLET, FILM COATED ORAL 3 TIMES DAILY PRN
Qty: 90 TABLET | Refills: 3 | DISCHARGE
Start: 2019-01-01

## 2019-01-01 RX ORDER — VANCOMYCIN HYDROCHLORIDE 1 G/200ML
1000 INJECTION, SOLUTION INTRAVENOUS ONCE
Status: DISCONTINUED | OUTPATIENT
Start: 2019-01-01 | End: 2019-01-01

## 2019-01-01 RX ORDER — ALBUMIN, HUMAN INJ 5% 5 %
SOLUTION INTRAVENOUS
Status: COMPLETED
Start: 2019-01-01 | End: 2019-01-01

## 2019-01-01 RX ORDER — SODIUM CHLORIDE 0.9 % (FLUSH) 0.9 %
10 SYRINGE (ML) INJECTION EVERY 12 HOURS SCHEDULED
Status: DISCONTINUED | OUTPATIENT
Start: 2019-01-01 | End: 2019-01-01 | Stop reason: HOSPADM

## 2019-01-01 RX ORDER — SPIRONOLACTONE 25 MG/1
25 TABLET ORAL DAILY
Status: DISCONTINUED | OUTPATIENT
Start: 2019-01-01 | End: 2019-01-01 | Stop reason: HOSPADM

## 2019-01-01 RX ORDER — DOPAMINE HYDROCHLORIDE 160 MG/100ML
INJECTION, SOLUTION INTRAVENOUS
Status: DISPENSED
Start: 2019-01-01 | End: 2019-01-01

## 2019-01-01 RX ORDER — QUETIAPINE FUMARATE 25 MG/1
25 TABLET, FILM COATED ORAL NIGHTLY
Status: DISCONTINUED | OUTPATIENT
Start: 2019-01-01 | End: 2019-01-01 | Stop reason: HOSPADM

## 2019-01-01 RX ORDER — HYDRALAZINE HYDROCHLORIDE 25 MG/1
25 TABLET, FILM COATED ORAL 3 TIMES DAILY PRN
Status: DISCONTINUED | OUTPATIENT
Start: 2019-01-01 | End: 2019-01-01 | Stop reason: HOSPADM

## 2019-01-01 RX ORDER — SENNA PLUS 8.6 MG/1
1 TABLET ORAL DAILY PRN
Status: DISCONTINUED | OUTPATIENT
Start: 2019-01-01 | End: 2019-01-01 | Stop reason: HOSPADM

## 2019-01-01 RX ORDER — DEXTROSE AND SODIUM CHLORIDE 5; .45 G/100ML; G/100ML
INJECTION, SOLUTION INTRAVENOUS CONTINUOUS
Status: DISCONTINUED | OUTPATIENT
Start: 2019-01-01 | End: 2019-01-01

## 2019-01-01 RX ORDER — ALBUMIN (HUMAN) 12.5 G/50ML
25 SOLUTION INTRAVENOUS ONCE
Status: COMPLETED | OUTPATIENT
Start: 2019-01-01 | End: 2019-01-01

## 2019-01-01 RX ORDER — ONDANSETRON 2 MG/ML
4 INJECTION INTRAMUSCULAR; INTRAVENOUS EVERY 6 HOURS PRN
Status: DISCONTINUED | OUTPATIENT
Start: 2019-01-01 | End: 2019-01-01 | Stop reason: HOSPADM

## 2019-01-01 RX ORDER — TORSEMIDE 20 MG/1
40 TABLET ORAL DAILY
Qty: 30 TABLET | Refills: 3 | DISCHARGE
Start: 2019-01-01

## 2019-01-01 RX ORDER — PHENYLEPHRINE HYDROCHLORIDE 10 MG/ML
INJECTION INTRAVENOUS PRN
Status: DISCONTINUED | OUTPATIENT
Start: 2019-01-01 | End: 2019-01-01 | Stop reason: SDUPTHER

## 2019-01-01 RX ORDER — FENTANYL CITRATE 50 UG/ML
INJECTION, SOLUTION INTRAMUSCULAR; INTRAVENOUS PRN
Status: DISCONTINUED | OUTPATIENT
Start: 2019-01-01 | End: 2019-01-01 | Stop reason: SDUPTHER

## 2019-01-01 RX ORDER — OXYMETAZOLINE HYDROCHLORIDE 0.05 G/100ML
2 SPRAY NASAL 2 TIMES DAILY
Status: DISPENSED | OUTPATIENT
Start: 2019-01-01 | End: 2019-01-01

## 2019-01-01 RX ORDER — HYDRALAZINE HYDROCHLORIDE 20 MG/ML
10 INJECTION INTRAMUSCULAR; INTRAVENOUS EVERY 4 HOURS PRN
Status: DISCONTINUED | OUTPATIENT
Start: 2019-01-01 | End: 2019-01-01 | Stop reason: HOSPADM

## 2019-01-01 RX ORDER — SODIUM CHLORIDE 0.9 % (FLUSH) 0.9 %
10 SYRINGE (ML) INJECTION PRN
Status: DISCONTINUED | OUTPATIENT
Start: 2019-01-01 | End: 2019-01-01 | Stop reason: SDUPTHER

## 2019-01-01 RX ORDER — HEPARIN SODIUM 1000 [USP'U]/ML
1900 INJECTION, SOLUTION INTRAVENOUS; SUBCUTANEOUS PRN
Status: DISCONTINUED | OUTPATIENT
Start: 2019-01-01 | End: 2019-01-01 | Stop reason: HOSPADM

## 2019-01-01 RX ORDER — HEPARIN SODIUM (PORCINE) LOCK FLUSH IV SOLN 100 UNIT/ML 100 UNIT/ML
SOLUTION INTRAVENOUS
Status: COMPLETED | OUTPATIENT
Start: 2019-01-01 | End: 2019-01-01

## 2019-01-01 RX ORDER — ALBUMIN, HUMAN INJ 5% 5 %
25 SOLUTION INTRAVENOUS ONCE
Status: DISCONTINUED | OUTPATIENT
Start: 2019-01-01 | End: 2019-01-01

## 2019-01-01 RX ORDER — PREDNISONE 20 MG/1
40 TABLET ORAL DAILY
Status: DISCONTINUED | OUTPATIENT
Start: 2019-01-01 | End: 2019-01-01

## 2019-01-01 RX ORDER — CLOPIDOGREL BISULFATE 75 MG/1
75 TABLET ORAL DAILY
Qty: 30 TABLET | Refills: 3 | DISCHARGE
Start: 2019-01-01

## 2019-01-01 RX ORDER — QUETIAPINE FUMARATE 25 MG/1
25 TABLET, FILM COATED ORAL NIGHTLY
Qty: 60 TABLET | Refills: 3 | Status: ON HOLD | DISCHARGE
Start: 2019-01-01 | End: 2019-01-01 | Stop reason: HOSPADM

## 2019-01-01 RX ORDER — HYDRALAZINE HYDROCHLORIDE 50 MG/1
50 TABLET, FILM COATED ORAL EVERY 8 HOURS SCHEDULED
Status: DISCONTINUED | OUTPATIENT
Start: 2019-01-01 | End: 2019-01-01

## 2019-01-01 RX ORDER — PREDNISONE 50 MG/1
50 TABLET ORAL DAILY
Status: DISCONTINUED | OUTPATIENT
Start: 2019-01-01 | End: 2019-01-01 | Stop reason: HOSPADM

## 2019-01-01 RX ORDER — 0.9 % SODIUM CHLORIDE 0.9 %
250 INTRAVENOUS SOLUTION INTRAVENOUS ONCE
Status: DISCONTINUED | OUTPATIENT
Start: 2019-01-01 | End: 2019-01-01 | Stop reason: HOSPADM

## 2019-01-01 RX ORDER — SPIRONOLACTONE 25 MG/1
25 TABLET ORAL DAILY
Status: DISCONTINUED | OUTPATIENT
Start: 2019-01-01 | End: 2019-01-01

## 2019-01-01 RX ORDER — METOPROLOL TARTRATE 50 MG/1
50 TABLET, FILM COATED ORAL 2 TIMES DAILY
Status: DISCONTINUED | OUTPATIENT
Start: 2019-01-01 | End: 2019-01-01

## 2019-01-01 RX ORDER — SODIUM CHLORIDE 9 MG/ML
INJECTION, SOLUTION INTRAVENOUS CONTINUOUS
Status: DISCONTINUED | OUTPATIENT
Start: 2019-01-01 | End: 2019-01-01

## 2019-01-01 RX ORDER — ASPIRIN 81 MG/1
81 TABLET ORAL DAILY
Qty: 30 TABLET | Refills: 3 | DISCHARGE
Start: 2019-01-01

## 2019-01-01 RX ORDER — CLOPIDOGREL BISULFATE 75 MG/1
75 TABLET ORAL DAILY
Status: DISCONTINUED | OUTPATIENT
Start: 2019-01-01 | End: 2019-01-01 | Stop reason: HOSPADM

## 2019-01-01 RX ORDER — MIDODRINE HYDROCHLORIDE 5 MG/1
5 TABLET ORAL PRN
Qty: 90 TABLET | Refills: 3 | DISCHARGE
Start: 2019-01-01

## 2019-01-01 RX ORDER — CARVEDILOL 3.12 MG/1
3.12 TABLET ORAL 2 TIMES DAILY WITH MEALS
COMMUNITY

## 2019-01-01 RX ORDER — LEVOTHYROXINE SODIUM 0.12 MG/1
125 TABLET ORAL DAILY
Status: DISCONTINUED | OUTPATIENT
Start: 2019-01-01 | End: 2019-01-01 | Stop reason: HOSPADM

## 2019-01-01 RX ORDER — POLYETHYLENE GLYCOL 3350 17 G/17G
17 POWDER, FOR SOLUTION ORAL ONCE
Status: COMPLETED | OUTPATIENT
Start: 2019-01-01 | End: 2019-01-01

## 2019-01-01 RX ORDER — POTASSIUM CHLORIDE 20MEQ/15ML
LIQUID (ML) ORAL
Status: DISPENSED
Start: 2019-01-01 | End: 2019-01-01

## 2019-01-01 RX ORDER — MIDODRINE HYDROCHLORIDE 5 MG/1
5 TABLET ORAL
Status: ACTIVE | OUTPATIENT
Start: 2019-01-01 | End: 2019-01-01

## 2019-01-01 RX ORDER — ACETAMINOPHEN 325 MG/1
650 TABLET ORAL EVERY 4 HOURS PRN
Status: DISCONTINUED | OUTPATIENT
Start: 2019-01-01 | End: 2019-01-01 | Stop reason: HOSPADM

## 2019-01-01 RX ORDER — BUMETANIDE 1 MG/1
1 TABLET ORAL 2 TIMES DAILY
Status: DISCONTINUED | OUTPATIENT
Start: 2019-01-01 | End: 2019-01-01

## 2019-01-01 RX ORDER — FUROSEMIDE 40 MG/1
40 TABLET ORAL 2 TIMES DAILY
Status: DISCONTINUED | OUTPATIENT
Start: 2019-01-01 | End: 2019-01-01

## 2019-01-01 RX ORDER — MIDODRINE HYDROCHLORIDE 5 MG/1
TABLET ORAL
Status: DISPENSED
Start: 2019-01-01 | End: 2019-01-01

## 2019-01-01 RX ORDER — MIDODRINE HYDROCHLORIDE 5 MG/1
5 TABLET ORAL PRN
Status: DISCONTINUED | OUTPATIENT
Start: 2019-01-01 | End: 2019-01-01 | Stop reason: HOSPADM

## 2019-01-01 RX ORDER — SODIUM CHLORIDE 0.9 % (FLUSH) 0.9 %
10 SYRINGE (ML) INJECTION PRN
Status: DISCONTINUED | OUTPATIENT
Start: 2019-01-01 | End: 2019-01-01 | Stop reason: HOSPADM

## 2019-01-01 RX ORDER — SODIUM CHLORIDE/ALOE VERA
GEL (GRAM) NASAL 2 TIMES DAILY
Status: DISPENSED | OUTPATIENT
Start: 2019-01-01 | End: 2019-01-01

## 2019-01-01 RX ORDER — DOCUSATE SODIUM 100 MG/1
100 CAPSULE, LIQUID FILLED ORAL DAILY
Status: DISCONTINUED | OUTPATIENT
Start: 2019-01-01 | End: 2019-01-01 | Stop reason: HOSPADM

## 2019-01-01 RX ORDER — 0.9 % SODIUM CHLORIDE 0.9 %
250 INTRAVENOUS SOLUTION INTRAVENOUS PRN
Status: DISCONTINUED | OUTPATIENT
Start: 2019-01-01 | End: 2019-01-01 | Stop reason: HOSPADM

## 2019-01-01 RX ORDER — LISINOPRIL 20 MG/1
40 TABLET ORAL DAILY
Status: DISCONTINUED | OUTPATIENT
Start: 2019-01-01 | End: 2019-01-01

## 2019-01-01 RX ORDER — 0.9 % SODIUM CHLORIDE 0.9 %
250 INTRAVENOUS SOLUTION INTRAVENOUS ONCE
Status: COMPLETED | OUTPATIENT
Start: 2019-01-01 | End: 2019-01-01

## 2019-01-01 RX ORDER — 0.9 % SODIUM CHLORIDE 0.9 %
VIAL (ML) INJECTION
Status: COMPLETED
Start: 2019-01-01 | End: 2019-01-01

## 2019-01-01 RX ORDER — HYDRALAZINE HYDROCHLORIDE 25 MG/1
25 TABLET, FILM COATED ORAL EVERY 8 HOURS SCHEDULED
Status: DISCONTINUED | OUTPATIENT
Start: 2019-01-01 | End: 2019-01-01

## 2019-01-01 RX ORDER — ALBUMIN, HUMAN INJ 5% 5 %
25 SOLUTION INTRAVENOUS ONCE
Status: COMPLETED | OUTPATIENT
Start: 2019-01-01 | End: 2019-01-01

## 2019-01-01 RX ORDER — FUROSEMIDE 10 MG/ML
INJECTION INTRAMUSCULAR; INTRAVENOUS
Status: DISPENSED
Start: 2019-01-01 | End: 2019-01-01

## 2019-01-01 RX ORDER — HYDROCODONE BITARTRATE AND ACETAMINOPHEN 5; 325 MG/1; MG/1
1 TABLET ORAL EVERY 8 HOURS PRN
Status: DISCONTINUED | OUTPATIENT
Start: 2019-01-01 | End: 2019-01-01 | Stop reason: HOSPADM

## 2019-01-01 RX ORDER — SIMETHICONE 80 MG
80 TABLET,CHEWABLE ORAL ONCE
Status: COMPLETED | OUTPATIENT
Start: 2019-01-01 | End: 2019-01-01

## 2019-01-01 RX ORDER — SODIUM CHLORIDE, SODIUM LACTATE, POTASSIUM CHLORIDE, CALCIUM CHLORIDE 600; 310; 30; 20 MG/100ML; MG/100ML; MG/100ML; MG/100ML
INJECTION, SOLUTION INTRAVENOUS CONTINUOUS PRN
Status: DISCONTINUED | OUTPATIENT
Start: 2019-01-01 | End: 2019-01-01 | Stop reason: SDUPTHER

## 2019-01-01 RX ORDER — ALBUMIN (HUMAN) 12.5 G/50ML
SOLUTION INTRAVENOUS
Status: COMPLETED
Start: 2019-01-01 | End: 2019-01-01

## 2019-01-01 RX ORDER — SODIUM CHLORIDE, SODIUM LACTATE, POTASSIUM CHLORIDE, CALCIUM CHLORIDE 600; 310; 30; 20 MG/100ML; MG/100ML; MG/100ML; MG/100ML
INJECTION, SOLUTION INTRAVENOUS CONTINUOUS
Status: DISCONTINUED | OUTPATIENT
Start: 2019-01-01 | End: 2019-01-01

## 2019-01-01 RX ORDER — HYDRALAZINE HYDROCHLORIDE 20 MG/ML
10 INJECTION INTRAMUSCULAR; INTRAVENOUS EVERY 6 HOURS PRN
Status: DISCONTINUED | OUTPATIENT
Start: 2019-01-01 | End: 2019-01-01 | Stop reason: HOSPADM

## 2019-01-01 RX ORDER — HYDRALAZINE HYDROCHLORIDE 20 MG/ML
10 INJECTION INTRAMUSCULAR; INTRAVENOUS ONCE
Status: DISCONTINUED | OUTPATIENT
Start: 2019-01-01 | End: 2019-01-01

## 2019-01-01 RX ORDER — ASPIRIN 81 MG/1
81 TABLET, CHEWABLE ORAL DAILY
Status: DISCONTINUED | OUTPATIENT
Start: 2019-01-01 | End: 2019-01-01 | Stop reason: HOSPADM

## 2019-01-01 RX ORDER — LIDOCAINE HYDROCHLORIDE 10 MG/ML
1 INJECTION, SOLUTION EPIDURAL; INFILTRATION; INTRACAUDAL; PERINEURAL ONCE
Status: DISCONTINUED | OUTPATIENT
Start: 2019-01-01 | End: 2019-01-01 | Stop reason: HOSPADM

## 2019-01-01 RX ORDER — DOPAMINE HYDROCHLORIDE 160 MG/100ML
2 INJECTION, SOLUTION INTRAVENOUS CONTINUOUS
Status: DISCONTINUED | OUTPATIENT
Start: 2019-01-01 | End: 2019-01-01

## 2019-01-01 RX ORDER — DOPAMINE HYDROCHLORIDE 160 MG/100ML
5 INJECTION, SOLUTION INTRAVENOUS CONTINUOUS
Status: DISCONTINUED | OUTPATIENT
Start: 2019-01-01 | End: 2019-01-01

## 2019-01-01 RX ORDER — MIDODRINE HYDROCHLORIDE 5 MG/1
TABLET ORAL
Status: COMPLETED
Start: 2019-01-01 | End: 2019-01-01

## 2019-01-01 RX ORDER — BUMETANIDE 1 MG/1
2 TABLET ORAL 2 TIMES DAILY
Status: DISCONTINUED | OUTPATIENT
Start: 2019-01-01 | End: 2019-01-01

## 2019-01-01 RX ORDER — ISOSORBIDE MONONITRATE 60 MG/1
60 TABLET, EXTENDED RELEASE ORAL DAILY
Status: ON HOLD | COMMUNITY
End: 2019-01-01 | Stop reason: HOSPADM

## 2019-01-01 RX ORDER — PROPOFOL 10 MG/ML
INJECTION, EMULSION INTRAVENOUS PRN
Status: DISCONTINUED | OUTPATIENT
Start: 2019-01-01 | End: 2019-01-01 | Stop reason: SDUPTHER

## 2019-01-01 RX ORDER — ACETYLCYSTEINE 200 MG/ML
600 SOLUTION ORAL; RESPIRATORY (INHALATION) 2 TIMES DAILY
Status: COMPLETED | OUTPATIENT
Start: 2019-01-01 | End: 2019-01-01

## 2019-01-01 RX ORDER — TORSEMIDE 20 MG/1
40 TABLET ORAL DAILY
Status: DISCONTINUED | OUTPATIENT
Start: 2019-01-01 | End: 2019-01-01 | Stop reason: HOSPADM

## 2019-01-01 RX ORDER — AMIODARONE HYDROCHLORIDE 200 MG/1
200 TABLET ORAL DAILY
Status: DISCONTINUED | OUTPATIENT
Start: 2019-01-01 | End: 2019-01-01 | Stop reason: HOSPADM

## 2019-01-01 RX ORDER — BISACODYL 10 MG
10 SUPPOSITORY, RECTAL RECTAL DAILY PRN
Status: DISCONTINUED | OUTPATIENT
Start: 2019-01-01 | End: 2019-01-01 | Stop reason: HOSPADM

## 2019-01-01 RX ORDER — POLYETHYLENE GLYCOL 3350 17 G/17G
17 POWDER, FOR SOLUTION ORAL DAILY
Status: DISCONTINUED | OUTPATIENT
Start: 2019-01-01 | End: 2019-01-01

## 2019-01-01 RX ORDER — BUMETANIDE 1 MG/1
3 TABLET ORAL 2 TIMES DAILY
Status: DISCONTINUED | OUTPATIENT
Start: 2019-01-01 | End: 2019-01-01

## 2019-01-01 RX ORDER — SODIUM CHLORIDE 0.9 % (FLUSH) 0.9 %
10 SYRINGE (ML) INJECTION EVERY 12 HOURS SCHEDULED
Status: DISCONTINUED | OUTPATIENT
Start: 2019-01-01 | End: 2019-01-01

## 2019-01-01 RX ORDER — HEPARIN SODIUM 5000 [USP'U]/ML
5000 INJECTION, SOLUTION INTRAVENOUS; SUBCUTANEOUS 2 TIMES DAILY
Status: DISCONTINUED | OUTPATIENT
Start: 2019-01-01 | End: 2019-01-01

## 2019-01-01 RX ORDER — LIDOCAINE HYDROCHLORIDE 10 MG/ML
INJECTION, SOLUTION INFILTRATION; PERINEURAL
Status: COMPLETED
Start: 2019-01-01 | End: 2019-01-01

## 2019-01-01 RX ORDER — MIDODRINE HYDROCHLORIDE 5 MG/1
5 TABLET ORAL PRN
Status: ACTIVE | OUTPATIENT
Start: 2019-01-01 | End: 2019-01-01

## 2019-01-01 RX ORDER — ALBUMIN (HUMAN) 12.5 G/50ML
SOLUTION INTRAVENOUS
Status: DISPENSED
Start: 2019-01-01 | End: 2019-01-01

## 2019-01-01 RX ORDER — METOPROLOL TARTRATE 50 MG/1
50 TABLET, FILM COATED ORAL 2 TIMES DAILY
Status: ON HOLD | COMMUNITY
End: 2019-01-01 | Stop reason: HOSPADM

## 2019-01-01 RX ORDER — MIDODRINE HYDROCHLORIDE 5 MG/1
5 TABLET ORAL ONCE
Status: DISCONTINUED | OUTPATIENT
Start: 2019-01-01 | End: 2019-01-01 | Stop reason: HOSPADM

## 2019-01-01 RX ORDER — CARVEDILOL 6.25 MG/1
6.25 TABLET ORAL 2 TIMES DAILY WITH MEALS
Status: DISCONTINUED | OUTPATIENT
Start: 2019-01-01 | End: 2019-01-01

## 2019-01-01 RX ORDER — MIDAZOLAM HYDROCHLORIDE 1 MG/ML
INJECTION INTRAMUSCULAR; INTRAVENOUS PRN
Status: DISCONTINUED | OUTPATIENT
Start: 2019-01-01 | End: 2019-01-01 | Stop reason: SDUPTHER

## 2019-01-01 RX ORDER — ASPIRIN 300 MG/1
600 SUPPOSITORY RECTAL EVERY 6 HOURS PRN
Status: DISCONTINUED | OUTPATIENT
Start: 2019-01-01 | End: 2019-01-01 | Stop reason: HOSPADM

## 2019-01-01 RX ORDER — MEGESTROL ACETATE 40 MG/ML
200 SUSPENSION ORAL DAILY
Status: DISCONTINUED | OUTPATIENT
Start: 2019-01-01 | End: 2019-01-01 | Stop reason: HOSPADM

## 2019-01-01 RX ORDER — ECHINACEA PURPUREA EXTRACT 125 MG
2 TABLET ORAL EVERY 4 HOURS
Status: DISCONTINUED | OUTPATIENT
Start: 2019-01-01 | End: 2019-01-01

## 2019-01-01 RX ORDER — LISINOPRIL 40 MG/1
40 TABLET ORAL DAILY
Status: ON HOLD | COMMUNITY
End: 2019-01-01 | Stop reason: HOSPADM

## 2019-01-01 RX ORDER — ACETAMINOPHEN 325 MG/1
650 TABLET ORAL EVERY 4 HOURS PRN
Status: DISCONTINUED | OUTPATIENT
Start: 2019-01-01 | End: 2019-01-01

## 2019-01-01 RX ORDER — ALBUMIN (HUMAN) 12.5 G/50ML
25 SOLUTION INTRAVENOUS ONCE
Status: DISCONTINUED | OUTPATIENT
Start: 2019-01-01 | End: 2019-01-01 | Stop reason: HOSPADM

## 2019-01-01 RX ORDER — HEPARIN SODIUM 1000 [USP'U]/ML
1000 INJECTION, SOLUTION INTRAVENOUS; SUBCUTANEOUS PRN
Status: DISCONTINUED | OUTPATIENT
Start: 2019-01-01 | End: 2019-01-01

## 2019-01-01 RX ORDER — HEPARIN SODIUM 1000 [USP'U]/ML
1900 INJECTION, SOLUTION INTRAVENOUS; SUBCUTANEOUS ONCE
Status: DISCONTINUED | OUTPATIENT
Start: 2019-01-01 | End: 2019-01-01 | Stop reason: HOSPADM

## 2019-01-01 RX ORDER — MIDODRINE HYDROCHLORIDE 5 MG/1
5 TABLET ORAL PRN
Qty: 90 TABLET | Refills: 3 | Status: ON HOLD | DISCHARGE
Start: 2019-01-01 | End: 2019-01-01 | Stop reason: HOSPADM

## 2019-01-01 RX ORDER — ASPIRIN 81 MG/1
81 TABLET ORAL DAILY
Status: DISCONTINUED | OUTPATIENT
Start: 2019-01-01 | End: 2019-01-01 | Stop reason: HOSPADM

## 2019-01-01 RX ORDER — VANCOMYCIN HYDROCHLORIDE 1 G/200ML
1000 INJECTION, SOLUTION INTRAVENOUS EVERY 12 HOURS
Status: DISPENSED | OUTPATIENT
Start: 2019-01-01 | End: 2019-01-01

## 2019-01-01 RX ORDER — HEPARIN SODIUM 1000 [USP'U]/ML
1900 INJECTION, SOLUTION INTRAVENOUS; SUBCUTANEOUS PRN
DISCHARGE
Start: 2019-01-01 | End: 2019-01-01 | Stop reason: SDUPTHER

## 2019-01-01 RX ORDER — SENNA PLUS 8.6 MG/1
1 TABLET ORAL DAILY PRN
DISCHARGE
Start: 2019-01-01 | End: 2019-01-01

## 2019-01-01 RX ORDER — GUAIFENESIN 600 MG/1
600 TABLET, EXTENDED RELEASE ORAL 2 TIMES DAILY
Status: DISCONTINUED | OUTPATIENT
Start: 2019-01-01 | End: 2019-01-01 | Stop reason: HOSPADM

## 2019-01-01 RX ORDER — FUROSEMIDE 10 MG/ML
10 INJECTION INTRAMUSCULAR; INTRAVENOUS ONCE
Status: COMPLETED | OUTPATIENT
Start: 2019-01-01 | End: 2019-01-01

## 2019-01-01 RX ORDER — ASPIRIN 81 MG/1
81 TABLET ORAL DAILY
Status: DISCONTINUED | OUTPATIENT
Start: 2019-01-01 | End: 2019-01-01

## 2019-01-01 RX ORDER — METOPROLOL SUCCINATE 25 MG/1
25 TABLET, EXTENDED RELEASE ORAL DAILY
Qty: 30 TABLET | Refills: 3 | Status: ON HOLD | DISCHARGE
Start: 2019-01-01 | End: 2019-01-01 | Stop reason: HOSPADM

## 2019-01-01 RX ORDER — HYDRALAZINE HYDROCHLORIDE 20 MG/ML
10 INJECTION INTRAMUSCULAR; INTRAVENOUS EVERY 6 HOURS
Status: DISCONTINUED | OUTPATIENT
Start: 2019-01-01 | End: 2019-01-01

## 2019-01-01 RX ORDER — ALBUTEROL SULFATE 2.5 MG/3ML
2.5 SOLUTION RESPIRATORY (INHALATION) 4 TIMES DAILY
Status: DISCONTINUED | OUTPATIENT
Start: 2019-01-01 | End: 2019-01-01

## 2019-01-01 RX ORDER — FUROSEMIDE 10 MG/ML
80 INJECTION INTRAMUSCULAR; INTRAVENOUS 2 TIMES DAILY
Status: DISCONTINUED | OUTPATIENT
Start: 2019-01-01 | End: 2019-01-01

## 2019-01-01 RX ORDER — CHLORHEXIDINE GLUCONATE 4 G/100ML
SOLUTION TOPICAL ONCE
Status: COMPLETED | OUTPATIENT
Start: 2019-01-01 | End: 2019-01-01

## 2019-01-01 RX ORDER — SIMETHICONE 80 MG
80 TABLET,CHEWABLE ORAL EVERY 6 HOURS
Status: DISCONTINUED | OUTPATIENT
Start: 2019-01-01 | End: 2019-01-01 | Stop reason: HOSPADM

## 2019-01-01 RX ORDER — DOPAMINE HYDROCHLORIDE 160 MG/100ML
INJECTION, SOLUTION INTRAVENOUS
Status: COMPLETED
Start: 2019-01-01 | End: 2019-01-01

## 2019-01-01 RX ORDER — BUMETANIDE 1 MG/1
1 TABLET ORAL ONCE
Status: COMPLETED | OUTPATIENT
Start: 2019-01-01 | End: 2019-01-01

## 2019-01-01 RX ORDER — LEVOTHYROXINE SODIUM 0.1 MG/1
100 TABLET ORAL DAILY
Status: DISCONTINUED | OUTPATIENT
Start: 2019-01-01 | End: 2019-01-01

## 2019-01-01 RX ORDER — SODIUM CHLORIDE 0.9 % (FLUSH) 0.9 %
10 SYRINGE (ML) INJECTION PRN
Status: DISCONTINUED | OUTPATIENT
Start: 2019-01-01 | End: 2019-01-01

## 2019-01-01 RX ORDER — PSEUDOEPHEDRINE HCL 30 MG
100 TABLET ORAL DAILY
DISCHARGE
Start: 2019-01-01

## 2019-01-01 RX ORDER — SODIUM CHLORIDE 0.9 % (FLUSH) 0.9 %
10 SYRINGE (ML) INJECTION EVERY 12 HOURS SCHEDULED
Status: DISCONTINUED | OUTPATIENT
Start: 2019-01-01 | End: 2019-01-01 | Stop reason: SDUPTHER

## 2019-01-01 RX ORDER — ALPRAZOLAM 0.25 MG/1
0.25 TABLET ORAL ONCE
Status: DISCONTINUED | OUTPATIENT
Start: 2019-01-01 | End: 2019-01-01 | Stop reason: HOSPADM

## 2019-01-01 RX ORDER — ALBUMIN (HUMAN) 12.5 G/50ML
50 SOLUTION INTRAVENOUS ONCE
Status: DISCONTINUED | OUTPATIENT
Start: 2019-01-01 | End: 2019-01-01 | Stop reason: ALTCHOICE

## 2019-01-01 RX ORDER — MEGESTROL ACETATE 40 MG/ML
200 SUSPENSION ORAL DAILY
Status: DISCONTINUED | OUTPATIENT
Start: 2019-01-01 | End: 2019-01-01

## 2019-01-01 RX ORDER — ISOSORBIDE MONONITRATE 60 MG/1
60 TABLET, EXTENDED RELEASE ORAL DAILY
Status: DISCONTINUED | OUTPATIENT
Start: 2019-01-01 | End: 2019-01-01

## 2019-01-01 RX ORDER — AMIODARONE HYDROCHLORIDE 200 MG/1
200 TABLET ORAL DAILY
Status: ON HOLD | COMMUNITY
End: 2019-01-01 | Stop reason: HOSPADM

## 2019-01-01 RX ORDER — ALBUMIN, HUMAN INJ 5% 5 %
SOLUTION INTRAVENOUS
Status: DISPENSED
Start: 2019-01-01 | End: 2019-01-01

## 2019-01-01 RX ORDER — MEGESTROL ACETATE 40 MG/ML
200 SUSPENSION ORAL DAILY
Qty: 240 ML | Refills: 3 | Status: ON HOLD | DISCHARGE
Start: 2019-01-01 | End: 2019-01-01 | Stop reason: HOSPADM

## 2019-01-01 RX ORDER — PROTAMINE SULFATE 10 MG/ML
INJECTION, SOLUTION INTRAVENOUS PRN
Status: DISCONTINUED | OUTPATIENT
Start: 2019-01-01 | End: 2019-01-01 | Stop reason: SDUPTHER

## 2019-01-01 RX ORDER — QUETIAPINE FUMARATE 25 MG/1
25 TABLET, FILM COATED ORAL 2 TIMES DAILY
Status: DISCONTINUED | OUTPATIENT
Start: 2019-01-01 | End: 2019-01-01

## 2019-01-01 RX ORDER — TAMSULOSIN HYDROCHLORIDE 0.4 MG/1
0.4 CAPSULE ORAL DAILY
Status: DISCONTINUED | OUTPATIENT
Start: 2019-01-01 | End: 2019-01-01

## 2019-01-01 RX ORDER — BUMETANIDE 1 MG/1
4 TABLET ORAL 2 TIMES DAILY
Status: DISCONTINUED | OUTPATIENT
Start: 2019-01-01 | End: 2019-01-01

## 2019-01-01 RX ORDER — PROPOFOL 10 MG/ML
INJECTION, EMULSION INTRAVENOUS CONTINUOUS PRN
Status: DISCONTINUED | OUTPATIENT
Start: 2019-01-01 | End: 2019-01-01 | Stop reason: SDUPTHER

## 2019-01-01 RX ORDER — METOPROLOL SUCCINATE 25 MG/1
25 TABLET, EXTENDED RELEASE ORAL DAILY
Status: DISCONTINUED | OUTPATIENT
Start: 2019-01-01 | End: 2019-01-01 | Stop reason: HOSPADM

## 2019-01-01 RX ORDER — FUROSEMIDE 80 MG
80 TABLET ORAL EVERY OTHER DAY
COMMUNITY

## 2019-01-01 RX ORDER — POTASSIUM CHLORIDE 20 MEQ/1
20 TABLET, EXTENDED RELEASE ORAL ONCE
Status: COMPLETED | OUTPATIENT
Start: 2019-01-01 | End: 2019-01-01

## 2019-01-01 RX ORDER — ISOSORBIDE MONONITRATE 30 MG/1
30 TABLET, EXTENDED RELEASE ORAL DAILY
Status: DISCONTINUED | OUTPATIENT
Start: 2019-01-01 | End: 2019-01-01 | Stop reason: HOSPADM

## 2019-01-01 RX ORDER — SIMETHICONE 80 MG
80 TABLET,CHEWABLE ORAL EVERY 6 HOURS PRN
Status: DISCONTINUED | OUTPATIENT
Start: 2019-01-01 | End: 2019-01-01

## 2019-01-01 RX ORDER — TAMSULOSIN HYDROCHLORIDE 0.4 MG/1
0.4 CAPSULE ORAL EVERY EVENING
Status: DISCONTINUED | OUTPATIENT
Start: 2019-01-01 | End: 2019-01-01

## 2019-01-01 RX ORDER — LEVOTHYROXINE SODIUM 0.12 MG/1
125 TABLET ORAL DAILY
Qty: 30 TABLET | Refills: 3 | DISCHARGE
Start: 2019-01-01

## 2019-01-01 RX ORDER — LIDOCAINE HYDROCHLORIDE AND EPINEPHRINE 10; 10 MG/ML; UG/ML
20 INJECTION, SOLUTION INFILTRATION; PERINEURAL ONCE
Status: DISCONTINUED | OUTPATIENT
Start: 2019-01-01 | End: 2019-01-01 | Stop reason: HOSPADM

## 2019-01-01 RX ORDER — CARVEDILOL 25 MG/1
25 TABLET ORAL 2 TIMES DAILY WITH MEALS
Status: DISCONTINUED | OUTPATIENT
Start: 2019-01-01 | End: 2019-01-01

## 2019-01-01 RX ORDER — BENZONATATE 100 MG/1
100 CAPSULE ORAL 3 TIMES DAILY
Status: DISCONTINUED | OUTPATIENT
Start: 2019-01-01 | End: 2019-01-01 | Stop reason: HOSPADM

## 2019-01-01 RX ORDER — HEPARIN SODIUM 1000 [USP'U]/ML
INJECTION, SOLUTION INTRAVENOUS; SUBCUTANEOUS PRN
Status: DISCONTINUED | OUTPATIENT
Start: 2019-01-01 | End: 2019-01-01 | Stop reason: SDUPTHER

## 2019-01-01 RX ORDER — ATORVASTATIN CALCIUM 20 MG/1
20 TABLET, FILM COATED ORAL DAILY
Status: ON HOLD | COMMUNITY
End: 2019-01-01 | Stop reason: HOSPADM

## 2019-01-01 RX ORDER — FENTANYL CITRATE 50 UG/ML
50 INJECTION, SOLUTION INTRAMUSCULAR; INTRAVENOUS ONCE
Status: COMPLETED | OUTPATIENT
Start: 2019-01-01 | End: 2019-01-01

## 2019-01-01 RX ORDER — GUAIFENESIN 600 MG/1
600 TABLET, EXTENDED RELEASE ORAL 2 TIMES DAILY PRN
DISCHARGE
Start: 2019-01-01

## 2019-01-01 RX ORDER — HYDRALAZINE HYDROCHLORIDE 50 MG/1
100 TABLET, FILM COATED ORAL EVERY 8 HOURS SCHEDULED
Status: DISCONTINUED | OUTPATIENT
Start: 2019-01-01 | End: 2019-01-01

## 2019-01-01 RX ORDER — DOPAMINE HYDROCHLORIDE 160 MG/100ML
2.5 INJECTION, SOLUTION INTRAVENOUS CONTINUOUS
Status: DISCONTINUED | OUTPATIENT
Start: 2019-01-01 | End: 2019-01-01

## 2019-01-01 RX ORDER — ATORVASTATIN CALCIUM 20 MG/1
20 TABLET, FILM COATED ORAL DAILY
Status: DISCONTINUED | OUTPATIENT
Start: 2019-01-01 | End: 2019-01-01 | Stop reason: HOSPADM

## 2019-01-01 RX ORDER — POTASSIUM CHLORIDE 20MEQ/15ML
20 LIQUID (ML) ORAL ONCE
Status: COMPLETED | OUTPATIENT
Start: 2019-01-01 | End: 2019-01-01

## 2019-01-01 RX ORDER — ALBUMIN (HUMAN) 12.5 G/50ML
25 SOLUTION INTRAVENOUS ONCE
Status: DISCONTINUED | OUTPATIENT
Start: 2019-01-01 | End: 2019-01-01 | Stop reason: SDUPTHER

## 2019-01-01 RX ORDER — LIDOCAINE HYDROCHLORIDE AND EPINEPHRINE 10; 10 MG/ML; UG/ML
INJECTION, SOLUTION INFILTRATION; PERINEURAL PRN
Status: DISCONTINUED | OUTPATIENT
Start: 2019-01-01 | End: 2019-01-01 | Stop reason: ALTCHOICE

## 2019-01-01 RX ORDER — ALBUMIN (HUMAN) 12.5 G/50ML
25 SOLUTION INTRAVENOUS PRN
Status: DISCONTINUED | OUTPATIENT
Start: 2019-01-01 | End: 2019-01-01 | Stop reason: HOSPADM

## 2019-01-01 RX ORDER — CARVEDILOL 6.25 MG/1
6.25 TABLET ORAL 2 TIMES DAILY WITH MEALS
Status: DISCONTINUED | OUTPATIENT
Start: 2019-01-01 | End: 2019-01-01 | Stop reason: HOSPADM

## 2019-01-01 RX ORDER — FAMOTIDINE 20 MG/1
20 TABLET, FILM COATED ORAL DAILY
Status: DISCONTINUED | OUTPATIENT
Start: 2019-01-01 | End: 2019-01-01

## 2019-01-01 RX ORDER — SODIUM BICARBONATE 650 MG/1
650 TABLET ORAL 2 TIMES DAILY
Status: DISCONTINUED | OUTPATIENT
Start: 2019-01-01 | End: 2019-01-01

## 2019-01-01 RX ORDER — DOCUSATE SODIUM 100 MG/1
100 CAPSULE, LIQUID FILLED ORAL 2 TIMES DAILY
Status: DISCONTINUED | OUTPATIENT
Start: 2019-01-01 | End: 2019-01-01 | Stop reason: HOSPADM

## 2019-01-01 RX ORDER — NITROGLYCERIN 20 MG/100ML
5 INJECTION INTRAVENOUS CONTINUOUS
Status: DISCONTINUED | OUTPATIENT
Start: 2019-01-01 | End: 2019-01-01

## 2019-01-01 RX ORDER — BENZONATATE 100 MG/1
100 CAPSULE ORAL 2 TIMES DAILY PRN
Qty: 21 CAPSULE | Refills: 0 | DISCHARGE
Start: 2019-01-01 | End: 2019-01-01

## 2019-01-01 RX ORDER — FUROSEMIDE 10 MG/ML
20 INJECTION INTRAMUSCULAR; INTRAVENOUS 2 TIMES DAILY
Status: DISCONTINUED | OUTPATIENT
Start: 2019-01-01 | End: 2019-01-01

## 2019-01-01 RX ORDER — METOPROLOL SUCCINATE 50 MG/1
50 TABLET, EXTENDED RELEASE ORAL DAILY
Status: DISCONTINUED | OUTPATIENT
Start: 2019-01-01 | End: 2019-01-01

## 2019-01-01 RX ORDER — CHLOROTHIAZIDE SODIUM 500 MG/1
500 INJECTION INTRAVENOUS ONCE
Status: COMPLETED | OUTPATIENT
Start: 2019-01-01 | End: 2019-01-01

## 2019-01-01 RX ORDER — HEPARIN SODIUM 1000 [USP'U]/ML
1900 INJECTION, SOLUTION INTRAVENOUS; SUBCUTANEOUS PRN
DISCHARGE
Start: 2019-01-01

## 2019-01-01 RX ORDER — ECHINACEA PURPUREA EXTRACT 125 MG
1 TABLET ORAL PRN
Qty: 1 BOTTLE | Refills: 3 | DISCHARGE
Start: 2019-01-01

## 2019-01-01 RX ORDER — FENTANYL CITRATE 50 UG/ML
INJECTION, SOLUTION INTRAMUSCULAR; INTRAVENOUS
Status: COMPLETED | OUTPATIENT
Start: 2019-01-01 | End: 2019-01-01

## 2019-01-01 RX ORDER — METOLAZONE 5 MG/1
10 TABLET ORAL DAILY
Status: DISCONTINUED | OUTPATIENT
Start: 2019-01-01 | End: 2019-01-01

## 2019-01-01 RX ORDER — PREDNISONE 50 MG/1
50 TABLET ORAL DAILY
Qty: 10 TABLET | Refills: 0 | DISCHARGE
Start: 2019-01-01 | End: 2019-01-01

## 2019-01-01 RX ORDER — ASPIRIN 81 MG/1
81 TABLET, CHEWABLE ORAL DAILY
Status: DISCONTINUED | OUTPATIENT
Start: 2019-01-01 | End: 2019-01-01

## 2019-01-01 RX ORDER — ISOSORBIDE MONONITRATE 30 MG/1
30 TABLET, EXTENDED RELEASE ORAL DAILY
Status: DISCONTINUED | OUTPATIENT
Start: 2019-01-01 | End: 2019-01-01

## 2019-01-01 RX ORDER — FUROSEMIDE 10 MG/ML
40 INJECTION INTRAMUSCULAR; INTRAVENOUS 2 TIMES DAILY
Status: DISCONTINUED | OUTPATIENT
Start: 2019-01-01 | End: 2019-01-01

## 2019-01-01 RX ORDER — BENZONATATE 100 MG/1
100 CAPSULE ORAL 3 TIMES DAILY PRN
Status: DISCONTINUED | OUTPATIENT
Start: 2019-01-01 | End: 2019-01-01

## 2019-01-01 RX ORDER — CARVEDILOL 12.5 MG/1
12.5 TABLET ORAL 2 TIMES DAILY WITH MEALS
Status: DISCONTINUED | OUTPATIENT
Start: 2019-01-01 | End: 2019-01-01

## 2019-01-01 RX ORDER — HEPARIN SODIUM 1000 [USP'U]/ML
1500 INJECTION, SOLUTION INTRAVENOUS; SUBCUTANEOUS PRN
Status: DISCONTINUED | OUTPATIENT
Start: 2019-01-01 | End: 2019-01-01

## 2019-01-01 RX ORDER — METOPROLOL SUCCINATE 25 MG/1
25 TABLET, EXTENDED RELEASE ORAL DAILY
Status: DISCONTINUED | OUTPATIENT
Start: 2019-01-01 | End: 2019-01-01

## 2019-01-01 RX ORDER — HEPARIN SODIUM 1000 [USP'U]/ML
1400 INJECTION, SOLUTION INTRAVENOUS; SUBCUTANEOUS PRN
Status: DISCONTINUED | OUTPATIENT
Start: 2019-01-01 | End: 2019-01-01

## 2019-01-01 RX ORDER — ALPRAZOLAM 0.25 MG/1
0.12 TABLET ORAL ONCE
Status: COMPLETED | OUTPATIENT
Start: 2019-01-01 | End: 2019-01-01

## 2019-01-01 RX ORDER — SODIUM CHLORIDE 9 MG/ML
INJECTION, SOLUTION INTRAVENOUS CONTINUOUS PRN
Status: DISCONTINUED | OUTPATIENT
Start: 2019-01-01 | End: 2019-01-01 | Stop reason: SDUPTHER

## 2019-01-01 RX ORDER — FLUOXETINE 10 MG/1
10 CAPSULE ORAL DAILY
Status: DISCONTINUED | OUTPATIENT
Start: 2019-01-01 | End: 2019-01-01 | Stop reason: HOSPADM

## 2019-01-01 RX ORDER — SPIRONOLACTONE 25 MG/1
25 TABLET ORAL DAILY
Qty: 30 TABLET | Refills: 3 | Status: ON HOLD | DISCHARGE
Start: 2019-01-01 | End: 2019-01-01 | Stop reason: HOSPADM

## 2019-01-01 RX ORDER — SODIUM CHLORIDE 9 MG/ML
INJECTION, SOLUTION INTRAVENOUS CONTINUOUS
Status: DISCONTINUED | OUTPATIENT
Start: 2019-01-01 | End: 2019-01-01 | Stop reason: HOSPADM

## 2019-01-01 RX ORDER — HYDROCODONE BITARTRATE AND ACETAMINOPHEN 5; 325 MG/1; MG/1
1 TABLET ORAL EVERY 8 HOURS PRN
Qty: 28 TABLET | Refills: 0 | Status: SHIPPED | OUTPATIENT
Start: 2019-01-01 | End: 2019-01-01

## 2019-01-01 RX ORDER — HEPARIN SODIUM 5000 [USP'U]/ML
5000 INJECTION, SOLUTION INTRAVENOUS; SUBCUTANEOUS EVERY 8 HOURS SCHEDULED
Status: DISCONTINUED | OUTPATIENT
Start: 2019-01-01 | End: 2019-01-01

## 2019-01-01 RX ORDER — OXYMETAZOLINE HYDROCHLORIDE 0.05 G/100ML
2 SPRAY NASAL 2 TIMES DAILY PRN
Status: DISPENSED | OUTPATIENT
Start: 2019-01-01 | End: 2019-01-01

## 2019-01-01 RX ORDER — LEVOTHYROXINE SODIUM 0.1 MG/1
100 TABLET ORAL DAILY
Status: ON HOLD | COMMUNITY
End: 2019-01-01 | Stop reason: HOSPADM

## 2019-01-01 RX ORDER — FUROSEMIDE 20 MG/1
20 TABLET ORAL DAILY
Status: DISCONTINUED | OUTPATIENT
Start: 2019-01-01 | End: 2019-01-01

## 2019-01-01 RX ORDER — DEXTROSE MONOHYDRATE 50 MG/ML
INJECTION, SOLUTION INTRAVENOUS CONTINUOUS
Status: DISCONTINUED | OUTPATIENT
Start: 2019-01-01 | End: 2019-01-01 | Stop reason: HOSPADM

## 2019-01-01 RX ADMIN — DOCUSATE SODIUM 100 MG: 100 CAPSULE, LIQUID FILLED ORAL at 08:31

## 2019-01-01 RX ADMIN — ISOSORBIDE MONONITRATE 30 MG: 30 TABLET ORAL at 08:17

## 2019-01-01 RX ADMIN — SALINE NASAL SPRAY 2 SPRAY: 1.5 SOLUTION NASAL at 09:13

## 2019-01-01 RX ADMIN — BUMETANIDE 4 MG: 1 TABLET ORAL at 08:57

## 2019-01-01 RX ADMIN — ATORVASTATIN CALCIUM 20 MG: 20 TABLET, FILM COATED ORAL at 11:55

## 2019-01-01 RX ADMIN — HYDRALAZINE HYDROCHLORIDE 50 MG: 50 TABLET, FILM COATED ORAL at 05:44

## 2019-01-01 RX ADMIN — LEVOTHYROXINE SODIUM 100 MCG: 100 TABLET ORAL at 07:08

## 2019-01-01 RX ADMIN — PREDNISONE 50 MG: 50 TABLET ORAL at 15:33

## 2019-01-01 RX ADMIN — SIMETHICONE 80 MG: 80 TABLET, CHEWABLE ORAL at 16:46

## 2019-01-01 RX ADMIN — METOPROLOL SUCCINATE 25 MG: 25 TABLET, FILM COATED, EXTENDED RELEASE ORAL at 08:57

## 2019-01-01 RX ADMIN — Medication 10 ML: at 08:34

## 2019-01-01 RX ADMIN — AMIODARONE HYDROCHLORIDE 200 MG: 200 TABLET ORAL at 08:48

## 2019-01-01 RX ADMIN — ALBUMIN (HUMAN) 25 G: 0.25 INJECTION, SOLUTION INTRAVENOUS at 15:39

## 2019-01-01 RX ADMIN — HYDRALAZINE HYDROCHLORIDE 25 MG: 25 TABLET, FILM COATED ORAL at 05:43

## 2019-01-01 RX ADMIN — POLYETHYLENE GLYCOL 3350 17 G: 17 POWDER, FOR SOLUTION ORAL at 17:45

## 2019-01-01 RX ADMIN — CARVEDILOL 12.5 MG: 12.5 TABLET, FILM COATED ORAL at 18:45

## 2019-01-01 RX ADMIN — SIMETHICONE 80 MG: 80 TABLET, CHEWABLE ORAL at 07:42

## 2019-01-01 RX ADMIN — HEPARIN SODIUM 1900 UNITS: 1000 INJECTION, SOLUTION INTRAVENOUS; SUBCUTANEOUS at 15:10

## 2019-01-01 RX ADMIN — SPIRONOLACTONE 25 MG: 25 TABLET ORAL at 09:38

## 2019-01-01 RX ADMIN — CLOPIDOGREL 75 MG: 75 TABLET, FILM COATED ORAL at 08:55

## 2019-01-01 RX ADMIN — ISOSORBIDE MONONITRATE 30 MG: 30 TABLET ORAL at 10:19

## 2019-01-01 RX ADMIN — AMIODARONE HYDROCHLORIDE 200 MG: 200 TABLET ORAL at 11:55

## 2019-01-01 RX ADMIN — HYDRALAZINE HYDROCHLORIDE 100 MG: 50 TABLET, FILM COATED ORAL at 07:27

## 2019-01-01 RX ADMIN — SIMETHICONE 80 MG: 80 TABLET, CHEWABLE ORAL at 14:59

## 2019-01-01 RX ADMIN — SPIRONOLACTONE 25 MG: 25 TABLET ORAL at 13:29

## 2019-01-01 RX ADMIN — CARVEDILOL 6.25 MG: 6.25 TABLET, FILM COATED ORAL at 08:38

## 2019-01-01 RX ADMIN — BUMETANIDE 3 MG: 1 TABLET ORAL at 21:22

## 2019-01-01 RX ADMIN — LEVOTHYROXINE SODIUM 125 MCG: 125 TABLET ORAL at 08:42

## 2019-01-01 RX ADMIN — Medication 10 ML: at 22:17

## 2019-01-01 RX ADMIN — CARVEDILOL 12.5 MG: 12.5 TABLET, FILM COATED ORAL at 17:49

## 2019-01-01 RX ADMIN — ASPIRIN 81 MG: 81 TABLET ORAL at 08:05

## 2019-01-01 RX ADMIN — FLUOXETINE 10 MG: 10 CAPSULE ORAL at 09:26

## 2019-01-01 RX ADMIN — ATORVASTATIN CALCIUM 20 MG: 20 TABLET, FILM COATED ORAL at 08:05

## 2019-01-01 RX ADMIN — AMIODARONE HYDROCHLORIDE 200 MG: 200 TABLET ORAL at 08:18

## 2019-01-01 RX ADMIN — BUMETANIDE 3 MG: 1 TABLET ORAL at 21:57

## 2019-01-01 RX ADMIN — ASPIRIN 81 MG: 81 TABLET ORAL at 08:54

## 2019-01-01 RX ADMIN — ISOSORBIDE MONONITRATE 30 MG: 30 TABLET ORAL at 07:21

## 2019-01-01 RX ADMIN — HEPARIN SODIUM 1500 UNITS: 1000 INJECTION INTRAVENOUS; SUBCUTANEOUS at 11:20

## 2019-01-01 RX ADMIN — ALBUMIN (HUMAN) 25 G: 0.25 INJECTION, SOLUTION INTRAVENOUS at 11:05

## 2019-01-01 RX ADMIN — QUETIAPINE FUMARATE 25 MG: 25 TABLET ORAL at 21:47

## 2019-01-01 RX ADMIN — AMIODARONE HYDROCHLORIDE 200 MG: 200 TABLET ORAL at 09:03

## 2019-01-01 RX ADMIN — Medication 10 ML: at 21:28

## 2019-01-01 RX ADMIN — METOPROLOL SUCCINATE 50 MG: 50 TABLET, EXTENDED RELEASE ORAL at 11:54

## 2019-01-01 RX ADMIN — AMIODARONE HYDROCHLORIDE 200 MG: 200 TABLET ORAL at 07:21

## 2019-01-01 RX ADMIN — VANCOMYCIN HYDROCHLORIDE 500 MG: 500 INJECTION, POWDER, LYOPHILIZED, FOR SOLUTION INTRAVENOUS at 13:25

## 2019-01-01 RX ADMIN — FLUOXETINE 10 MG: 10 CAPSULE ORAL at 07:57

## 2019-01-01 RX ADMIN — ASPIRIN 81 MG: 81 TABLET, CHEWABLE ORAL at 15:56

## 2019-01-01 RX ADMIN — HYDRALAZINE HYDROCHLORIDE 100 MG: 50 TABLET, FILM COATED ORAL at 21:47

## 2019-01-01 RX ADMIN — ALBUMIN (HUMAN) 25 G: 0.25 INJECTION, SOLUTION INTRAVENOUS at 18:07

## 2019-01-01 RX ADMIN — AMIODARONE HYDROCHLORIDE 200 MG: 200 TABLET ORAL at 09:30

## 2019-01-01 RX ADMIN — Medication 10 ML: at 20:24

## 2019-01-01 RX ADMIN — BENZONATATE 100 MG: 100 CAPSULE ORAL at 09:53

## 2019-01-01 RX ADMIN — SIMETHICONE 80 MG: 80 TABLET, CHEWABLE ORAL at 08:56

## 2019-01-01 RX ADMIN — DOCUSATE SODIUM 100 MG: 100 CAPSULE, LIQUID FILLED ORAL at 21:04

## 2019-01-01 RX ADMIN — FENTANYL CITRATE 25 MCG: 50 INJECTION INTRAMUSCULAR; INTRAVENOUS at 14:50

## 2019-01-01 RX ADMIN — LEVOTHYROXINE SODIUM 125 MCG: 125 TABLET ORAL at 15:55

## 2019-01-01 RX ADMIN — Medication 10 ML: at 21:32

## 2019-01-01 RX ADMIN — AMIODARONE HYDROCHLORIDE 200 MG: 200 TABLET ORAL at 08:34

## 2019-01-01 RX ADMIN — GUAIFENESIN 600 MG: 600 TABLET, EXTENDED RELEASE ORAL at 22:16

## 2019-01-01 RX ADMIN — APIXABAN 2.5 MG: 2.5 TABLET, FILM COATED ORAL at 20:30

## 2019-01-01 RX ADMIN — CLOPIDOGREL 75 MG: 75 TABLET, FILM COATED ORAL at 07:53

## 2019-01-01 RX ADMIN — HYDRALAZINE HYDROCHLORIDE 100 MG: 50 TABLET, FILM COATED ORAL at 16:00

## 2019-01-01 RX ADMIN — MEGESTROL ACETATE 200 MG: 40 SUSPENSION ORAL at 07:42

## 2019-01-01 RX ADMIN — FUROSEMIDE 20 MG: 10 INJECTION, SOLUTION INTRAMUSCULAR; INTRAVENOUS at 09:04

## 2019-01-01 RX ADMIN — Medication 10 ML: at 09:13

## 2019-01-01 RX ADMIN — AMIODARONE HYDROCHLORIDE 200 MG: 200 TABLET ORAL at 09:04

## 2019-01-01 RX ADMIN — CLOPIDOGREL 75 MG: 75 TABLET, FILM COATED ORAL at 08:36

## 2019-01-01 RX ADMIN — BUMETANIDE 4 MG: 1 TABLET ORAL at 20:13

## 2019-01-01 RX ADMIN — SPIRONOLACTONE 25 MG: 25 TABLET ORAL at 07:57

## 2019-01-01 RX ADMIN — CLOPIDOGREL 75 MG: 75 TABLET, FILM COATED ORAL at 08:33

## 2019-01-01 RX ADMIN — CARVEDILOL 3.12 MG: 6.25 TABLET, FILM COATED ORAL at 16:52

## 2019-01-01 RX ADMIN — ATORVASTATIN CALCIUM 20 MG: 20 TABLET, FILM COATED ORAL at 08:58

## 2019-01-01 RX ADMIN — QUETIAPINE FUMARATE 25 MG: 25 TABLET ORAL at 20:12

## 2019-01-01 RX ADMIN — FLUOXETINE 10 MG: 10 CAPSULE ORAL at 10:27

## 2019-01-01 RX ADMIN — FLUOXETINE 10 MG: 10 CAPSULE ORAL at 08:36

## 2019-01-01 RX ADMIN — DOCUSATE SODIUM 100 MG: 100 CAPSULE, LIQUID FILLED ORAL at 21:47

## 2019-01-01 RX ADMIN — ISOSORBIDE MONONITRATE 30 MG: 30 TABLET ORAL at 08:58

## 2019-01-01 RX ADMIN — HEPARIN SODIUM 1400 UNITS: 1000 INJECTION INTRAVENOUS; SUBCUTANEOUS at 18:02

## 2019-01-01 RX ADMIN — LEVOTHYROXINE SODIUM 125 MCG: 125 TABLET ORAL at 06:31

## 2019-01-01 RX ADMIN — ASPIRIN 81 MG: 81 TABLET, CHEWABLE ORAL at 07:53

## 2019-01-01 RX ADMIN — ATORVASTATIN CALCIUM 20 MG: 20 TABLET, FILM COATED ORAL at 13:24

## 2019-01-01 RX ADMIN — CLOPIDOGREL 75 MG: 75 TABLET, FILM COATED ORAL at 09:23

## 2019-01-01 RX ADMIN — CARVEDILOL 6.25 MG: 6.25 TABLET, FILM COATED ORAL at 17:50

## 2019-01-01 RX ADMIN — Medication 600 MG: at 13:51

## 2019-01-01 RX ADMIN — ATORVASTATIN CALCIUM 20 MG: 20 TABLET, FILM COATED ORAL at 08:34

## 2019-01-01 RX ADMIN — ASPIRIN 81 MG: 81 TABLET ORAL at 07:21

## 2019-01-01 RX ADMIN — FAMOTIDINE 20 MG: 20 TABLET, FILM COATED ORAL at 09:40

## 2019-01-01 RX ADMIN — CLOPIDOGREL 75 MG: 75 TABLET, FILM COATED ORAL at 08:27

## 2019-01-01 RX ADMIN — HEPARIN SODIUM 1500 UNITS: 1000 INJECTION INTRAVENOUS; SUBCUTANEOUS at 11:30

## 2019-01-01 RX ADMIN — SIMETHICONE 80 MG: 80 TABLET, CHEWABLE ORAL at 17:23

## 2019-01-01 RX ADMIN — Medication 10 ML: at 08:33

## 2019-01-01 RX ADMIN — BUMETANIDE 4 MG: 1 TABLET ORAL at 09:05

## 2019-01-01 RX ADMIN — HEPARIN SODIUM 5000 UNITS: 5000 INJECTION INTRAVENOUS; SUBCUTANEOUS at 09:16

## 2019-01-01 RX ADMIN — QUETIAPINE FUMARATE 25 MG: 25 TABLET ORAL at 22:16

## 2019-01-01 RX ADMIN — ISOSORBIDE MONONITRATE 30 MG: 30 TABLET ORAL at 09:58

## 2019-01-01 RX ADMIN — APIXABAN 2.5 MG: 2.5 TABLET, FILM COATED ORAL at 10:26

## 2019-01-01 RX ADMIN — HYDRALAZINE HYDROCHLORIDE 100 MG: 50 TABLET, FILM COATED ORAL at 22:05

## 2019-01-01 RX ADMIN — Medication 10 ML: at 20:31

## 2019-01-01 RX ADMIN — TORSEMIDE 40 MG: 20 TABLET ORAL at 10:42

## 2019-01-01 RX ADMIN — SODIUM CHLORIDE: 9 INJECTION, SOLUTION INTRAVENOUS at 14:06

## 2019-01-01 RX ADMIN — ATORVASTATIN CALCIUM 20 MG: 20 TABLET, FILM COATED ORAL at 07:21

## 2019-01-01 RX ADMIN — DOCUSATE SODIUM 100 MG: 100 CAPSULE, LIQUID FILLED ORAL at 07:45

## 2019-01-01 RX ADMIN — CLOPIDOGREL 75 MG: 75 TABLET, FILM COATED ORAL at 08:38

## 2019-01-01 RX ADMIN — DOCUSATE SODIUM 100 MG: 100 CAPSULE, LIQUID FILLED ORAL at 09:39

## 2019-01-01 RX ADMIN — CARVEDILOL 6.25 MG: 6.25 TABLET, FILM COATED ORAL at 08:36

## 2019-01-01 RX ADMIN — BUMETANIDE 4 MG: 1 TABLET ORAL at 07:57

## 2019-01-01 RX ADMIN — CARVEDILOL 6.25 MG: 6.25 TABLET, FILM COATED ORAL at 08:42

## 2019-01-01 RX ADMIN — Medication 10 ML: at 10:36

## 2019-01-01 RX ADMIN — Medication 600 MG: at 19:55

## 2019-01-01 RX ADMIN — TORSEMIDE 40 MG: 20 TABLET ORAL at 09:41

## 2019-01-01 RX ADMIN — Medication 10 ML: at 22:14

## 2019-01-01 RX ADMIN — METOPROLOL SUCCINATE 50 MG: 50 TABLET, EXTENDED RELEASE ORAL at 08:30

## 2019-01-01 RX ADMIN — Medication 8 MCG/MIN: at 14:05

## 2019-01-01 RX ADMIN — BUMETANIDE 3 MG: 1 TABLET ORAL at 21:13

## 2019-01-01 RX ADMIN — DOCUSATE SODIUM 100 MG: 100 CAPSULE, LIQUID FILLED ORAL at 09:00

## 2019-01-01 RX ADMIN — HEPARIN SODIUM 5000 UNITS: 5000 INJECTION, SOLUTION INTRAVENOUS; SUBCUTANEOUS at 20:38

## 2019-01-01 RX ADMIN — LEVOTHYROXINE SODIUM 125 MCG: 125 TABLET ORAL at 06:17

## 2019-01-01 RX ADMIN — AMIODARONE HYDROCHLORIDE 200 MG: 200 TABLET ORAL at 10:26

## 2019-01-01 RX ADMIN — FLUOXETINE 10 MG: 10 CAPSULE ORAL at 08:31

## 2019-01-01 RX ADMIN — HYDRALAZINE HYDROCHLORIDE 50 MG: 50 TABLET, FILM COATED ORAL at 13:23

## 2019-01-01 RX ADMIN — ISOSORBIDE MONONITRATE 30 MG: 30 TABLET ORAL at 11:32

## 2019-01-01 RX ADMIN — APIXABAN 2.5 MG: 2.5 TABLET, FILM COATED ORAL at 07:45

## 2019-01-01 RX ADMIN — ISOSORBIDE MONONITRATE 30 MG: 30 TABLET ORAL at 15:56

## 2019-01-01 RX ADMIN — SPIRONOLACTONE 25 MG: 25 TABLET ORAL at 11:54

## 2019-01-01 RX ADMIN — Medication 10 ML: at 08:00

## 2019-01-01 RX ADMIN — SIMETHICONE 80 MG: 80 TABLET, CHEWABLE ORAL at 08:32

## 2019-01-01 RX ADMIN — ISOSORBIDE MONONITRATE 60 MG: 60 TABLET ORAL at 09:04

## 2019-01-01 RX ADMIN — LEVOTHYROXINE SODIUM 125 MCG: 125 TABLET ORAL at 08:30

## 2019-01-01 RX ADMIN — DOPAMINE HYDROCHLORIDE 5 MCG/KG/MIN: 160 INJECTION, SOLUTION INTRAVENOUS at 19:05

## 2019-01-01 RX ADMIN — METOPROLOL SUCCINATE 50 MG: 50 TABLET, EXTENDED RELEASE ORAL at 09:58

## 2019-01-01 RX ADMIN — SIMETHICONE 80 MG: 80 TABLET, CHEWABLE ORAL at 14:39

## 2019-01-01 RX ADMIN — Medication 10 ML: at 20:14

## 2019-01-01 RX ADMIN — CEFEPIME 2 G: 2 INJECTION, POWDER, FOR SOLUTION INTRAVENOUS at 14:58

## 2019-01-01 RX ADMIN — FAMOTIDINE 20 MG: 20 TABLET, FILM COATED ORAL at 09:07

## 2019-01-01 RX ADMIN — SPIRONOLACTONE 25 MG: 25 TABLET ORAL at 18:17

## 2019-01-01 RX ADMIN — CLOPIDOGREL 75 MG: 75 TABLET, FILM COATED ORAL at 08:58

## 2019-01-01 RX ADMIN — ATORVASTATIN CALCIUM 20 MG: 20 TABLET, FILM COATED ORAL at 08:48

## 2019-01-01 RX ADMIN — Medication 10 ML: at 21:13

## 2019-01-01 RX ADMIN — FLUOXETINE 10 MG: 10 CAPSULE ORAL at 09:41

## 2019-01-01 RX ADMIN — LEVOTHYROXINE SODIUM 125 MCG: 125 TABLET ORAL at 06:00

## 2019-01-01 RX ADMIN — QUETIAPINE FUMARATE 25 MG: 25 TABLET ORAL at 21:22

## 2019-01-01 RX ADMIN — ALBUMIN (HUMAN) 25 G: 12.5 INJECTION, SOLUTION INTRAVENOUS at 18:00

## 2019-01-01 RX ADMIN — HEPARIN SODIUM 1500 UNITS: 1000 INJECTION INTRAVENOUS; SUBCUTANEOUS at 18:39

## 2019-01-01 RX ADMIN — ASPIRIN 81 MG: 81 TABLET, COATED ORAL at 09:10

## 2019-01-01 RX ADMIN — BUMETANIDE 4 MG: 1 TABLET ORAL at 08:27

## 2019-01-01 RX ADMIN — ASPIRIN 81 MG: 81 TABLET ORAL at 11:58

## 2019-01-01 RX ADMIN — ASPIRIN 81 MG: 81 TABLET, CHEWABLE ORAL at 08:24

## 2019-01-01 RX ADMIN — ATORVASTATIN CALCIUM 20 MG: 20 TABLET, FILM COATED ORAL at 08:32

## 2019-01-01 RX ADMIN — CLOPIDOGREL 75 MG: 75 TABLET, FILM COATED ORAL at 12:40

## 2019-01-01 RX ADMIN — DOCUSATE SODIUM 100 MG: 100 CAPSULE, LIQUID FILLED ORAL at 08:57

## 2019-01-01 RX ADMIN — CLOPIDOGREL 75 MG: 75 TABLET, FILM COATED ORAL at 17:23

## 2019-01-01 RX ADMIN — HYDRALAZINE HYDROCHLORIDE 25 MG: 25 TABLET, FILM COATED ORAL at 15:24

## 2019-01-01 RX ADMIN — VANCOMYCIN HYDROCHLORIDE 500 MG: 500 INJECTION, POWDER, LYOPHILIZED, FOR SOLUTION INTRAVENOUS at 10:50

## 2019-01-01 RX ADMIN — ATORVASTATIN CALCIUM 20 MG: 20 TABLET, FILM COATED ORAL at 10:51

## 2019-01-01 RX ADMIN — SODIUM BICARBONATE 650 MG: 650 TABLET ORAL at 08:03

## 2019-01-01 RX ADMIN — ATORVASTATIN CALCIUM 20 MG: 20 TABLET, FILM COATED ORAL at 07:42

## 2019-01-01 RX ADMIN — VANCOMYCIN HYDROCHLORIDE 750 MG: 10 INJECTION, POWDER, LYOPHILIZED, FOR SOLUTION INTRAVENOUS at 11:33

## 2019-01-01 RX ADMIN — METOPROLOL SUCCINATE 50 MG: 50 TABLET, EXTENDED RELEASE ORAL at 15:31

## 2019-01-01 RX ADMIN — POTASSIUM CHLORIDE 20 MEQ: 20 TABLET, EXTENDED RELEASE ORAL at 09:33

## 2019-01-01 RX ADMIN — APIXABAN 2.5 MG: 2.5 TABLET, FILM COATED ORAL at 13:45

## 2019-01-01 RX ADMIN — ASPIRIN 81 MG: 81 TABLET, COATED ORAL at 09:48

## 2019-01-01 RX ADMIN — SODIUM BICARBONATE 650 MG: 650 TABLET ORAL at 21:30

## 2019-01-01 RX ADMIN — GUAIFENESIN 600 MG: 600 TABLET, EXTENDED RELEASE ORAL at 22:13

## 2019-01-01 RX ADMIN — BENZONATATE 100 MG: 100 CAPSULE ORAL at 21:21

## 2019-01-01 RX ADMIN — SIMETHICONE 80 MG: 80 TABLET, CHEWABLE ORAL at 15:34

## 2019-01-01 RX ADMIN — DOPAMINE HYDROCHLORIDE 2 MCG/KG/MIN: 160 INJECTION, SOLUTION INTRAVENOUS at 16:35

## 2019-01-01 RX ADMIN — ALBUMIN (HUMAN) 25 G: 0.25 INJECTION, SOLUTION INTRAVENOUS at 20:00

## 2019-01-01 RX ADMIN — CLOPIDOGREL 75 MG: 75 TABLET, FILM COATED ORAL at 15:56

## 2019-01-01 RX ADMIN — MEGESTROL ACETATE 200 MG: 40 SUSPENSION ORAL at 12:40

## 2019-01-01 RX ADMIN — SODIUM CHLORIDE 250 ML: 9 INJECTION, SOLUTION INTRAVENOUS at 18:06

## 2019-01-01 RX ADMIN — ALBUMIN (HUMAN) 25 G: 0.25 INJECTION, SOLUTION INTRAVENOUS at 08:42

## 2019-01-01 RX ADMIN — ALBUMIN (HUMAN) 25 G: 0.25 INJECTION, SOLUTION INTRAVENOUS at 12:47

## 2019-01-01 RX ADMIN — QUETIAPINE FUMARATE 25 MG: 25 TABLET ORAL at 21:13

## 2019-01-01 RX ADMIN — BUMETANIDE 4 MG: 1 TABLET ORAL at 09:58

## 2019-01-01 RX ADMIN — Medication 10 ML: at 21:20

## 2019-01-01 RX ADMIN — CLOPIDOGREL 75 MG: 75 TABLET, FILM COATED ORAL at 08:25

## 2019-01-01 RX ADMIN — ATORVASTATIN CALCIUM 20 MG: 20 TABLET, FILM COATED ORAL at 09:30

## 2019-01-01 RX ADMIN — FAMOTIDINE 20 MG: 20 TABLET, FILM COATED ORAL at 09:11

## 2019-01-01 RX ADMIN — FAMOTIDINE 20 MG: 20 TABLET, FILM COATED ORAL at 08:58

## 2019-01-01 RX ADMIN — LEVOTHYROXINE SODIUM 125 MCG: 125 TABLET ORAL at 08:48

## 2019-01-01 RX ADMIN — DOCUSATE SODIUM 100 MG: 100 CAPSULE, LIQUID FILLED ORAL at 08:47

## 2019-01-01 RX ADMIN — AMIODARONE HYDROCHLORIDE 200 MG: 200 TABLET ORAL at 08:33

## 2019-01-01 RX ADMIN — CLOPIDOGREL 75 MG: 75 TABLET, FILM COATED ORAL at 09:54

## 2019-01-01 RX ADMIN — TORSEMIDE 40 MG: 20 TABLET ORAL at 09:54

## 2019-01-01 RX ADMIN — DOCUSATE SODIUM 100 MG: 100 CAPSULE, LIQUID FILLED ORAL at 09:11

## 2019-01-01 RX ADMIN — Medication 10 ML: at 21:58

## 2019-01-01 RX ADMIN — CARVEDILOL 25 MG: 25 TABLET, FILM COATED ORAL at 18:06

## 2019-01-01 RX ADMIN — HEPARIN SODIUM 1400 UNITS: 1000 INJECTION INTRAVENOUS; SUBCUTANEOUS at 10:57

## 2019-01-01 RX ADMIN — SPIRONOLACTONE 25 MG: 25 TABLET ORAL at 08:41

## 2019-01-01 RX ADMIN — GUAIFENESIN 600 MG: 600 TABLET, EXTENDED RELEASE ORAL at 09:50

## 2019-01-01 RX ADMIN — ALBUMIN (HUMAN) 25 G: 0.25 INJECTION, SOLUTION INTRAVENOUS at 19:48

## 2019-01-01 RX ADMIN — DEXTROSE MONOHYDRATE: 50 INJECTION, SOLUTION INTRAVENOUS at 18:58

## 2019-01-01 RX ADMIN — TORSEMIDE 40 MG: 20 TABLET ORAL at 09:50

## 2019-01-01 RX ADMIN — HYDRALAZINE HYDROCHLORIDE 25 MG: 25 TABLET, FILM COATED ORAL at 09:55

## 2019-01-01 RX ADMIN — SIMETHICONE 80 MG: 80 TABLET, CHEWABLE ORAL at 16:00

## 2019-01-01 RX ADMIN — AMIODARONE HYDROCHLORIDE 200 MG: 200 TABLET ORAL at 09:26

## 2019-01-01 RX ADMIN — MIDAZOLAM HYDROCHLORIDE 0.05 MG: 1 INJECTION, SOLUTION INTRAMUSCULAR; INTRAVENOUS at 08:25

## 2019-01-01 RX ADMIN — ATORVASTATIN CALCIUM 20 MG: 20 TABLET, FILM COATED ORAL at 15:05

## 2019-01-01 RX ADMIN — DOCUSATE SODIUM 100 MG: 100 CAPSULE, LIQUID FILLED ORAL at 20:08

## 2019-01-01 RX ADMIN — HYDRALAZINE HYDROCHLORIDE 50 MG: 50 TABLET, FILM COATED ORAL at 21:27

## 2019-01-01 RX ADMIN — HEPARIN SODIUM 1900 UNITS: 1000 INJECTION INTRAVENOUS; SUBCUTANEOUS at 16:04

## 2019-01-01 RX ADMIN — AMIODARONE HYDROCHLORIDE 200 MG: 200 TABLET ORAL at 08:38

## 2019-01-01 RX ADMIN — CLOPIDOGREL 75 MG: 75 TABLET, FILM COATED ORAL at 09:07

## 2019-01-01 RX ADMIN — HYDRALAZINE HYDROCHLORIDE 100 MG: 50 TABLET, FILM COATED ORAL at 21:22

## 2019-01-01 RX ADMIN — PROPOFOL 50 MCG/KG/MIN: 10 INJECTION, EMULSION INTRAVENOUS at 11:36

## 2019-01-01 RX ADMIN — BENZONATATE 100 MG: 100 CAPSULE ORAL at 10:41

## 2019-01-01 RX ADMIN — Medication 2 SPRAY: at 09:23

## 2019-01-01 RX ADMIN — METOPROLOL SUCCINATE 50 MG: 50 TABLET, EXTENDED RELEASE ORAL at 11:48

## 2019-01-01 RX ADMIN — ISOSORBIDE MONONITRATE 30 MG: 30 TABLET ORAL at 09:29

## 2019-01-01 RX ADMIN — ISOSORBIDE MONONITRATE 60 MG: 60 TABLET ORAL at 09:15

## 2019-01-01 RX ADMIN — Medication: at 07:21

## 2019-01-01 RX ADMIN — ASPIRIN 81 MG: 81 TABLET ORAL at 09:26

## 2019-01-01 RX ADMIN — ALBUMIN (HUMAN) 25 G: 12.5 INJECTION, SOLUTION INTRAVENOUS at 10:36

## 2019-01-01 RX ADMIN — SENNOSIDES 8.6 MG: 8.6 TABLET, FILM COATED ORAL at 21:31

## 2019-01-01 RX ADMIN — ASPIRIN 81 MG: 81 TABLET, CHEWABLE ORAL at 09:41

## 2019-01-01 RX ADMIN — CLOPIDOGREL 75 MG: 75 TABLET, FILM COATED ORAL at 09:58

## 2019-01-01 RX ADMIN — ISOSORBIDE MONONITRATE 30 MG: 30 TABLET ORAL at 08:27

## 2019-01-01 RX ADMIN — APIXABAN 2.5 MG: 2.5 TABLET, FILM COATED ORAL at 11:18

## 2019-01-01 RX ADMIN — FLUOXETINE 10 MG: 10 CAPSULE ORAL at 21:26

## 2019-01-01 RX ADMIN — ALPRAZOLAM 0.12 MG: 0.25 TABLET ORAL at 02:03

## 2019-01-01 RX ADMIN — SODIUM CHLORIDE 0.5 MCG/KG/HR: 9 INJECTION, SOLUTION INTRAVENOUS at 08:15

## 2019-01-01 RX ADMIN — HYDRALAZINE HYDROCHLORIDE 100 MG: 50 TABLET, FILM COATED ORAL at 22:25

## 2019-01-01 RX ADMIN — CLOPIDOGREL 75 MG: 75 TABLET, FILM COATED ORAL at 07:57

## 2019-01-01 RX ADMIN — FUROSEMIDE 20 MG: 10 INJECTION, SOLUTION INTRAMUSCULAR; INTRAVENOUS at 18:45

## 2019-01-01 RX ADMIN — HYDRALAZINE HYDROCHLORIDE 50 MG: 50 TABLET, FILM COATED ORAL at 23:15

## 2019-01-01 RX ADMIN — QUETIAPINE FUMARATE 25 MG: 25 TABLET ORAL at 21:28

## 2019-01-01 RX ADMIN — CARVEDILOL 3.12 MG: 6.25 TABLET, FILM COATED ORAL at 16:28

## 2019-01-01 RX ADMIN — CLOPIDOGREL 75 MG: 75 TABLET, FILM COATED ORAL at 13:29

## 2019-01-01 RX ADMIN — CEFAZOLIN 2 G: 10 INJECTION, POWDER, FOR SOLUTION INTRAVENOUS; PARENTERAL at 12:00

## 2019-01-01 RX ADMIN — LEVOTHYROXINE SODIUM 125 MCG: 125 TABLET ORAL at 08:38

## 2019-01-01 RX ADMIN — SPIRONOLACTONE 25 MG: 25 TABLET ORAL at 09:30

## 2019-01-01 RX ADMIN — BUMETANIDE 1 MG: 1 TABLET ORAL at 20:41

## 2019-01-01 RX ADMIN — QUETIAPINE FUMARATE 25 MG: 25 TABLET ORAL at 20:00

## 2019-01-01 RX ADMIN — FENTANYL CITRATE 50 MCG: 50 INJECTION, SOLUTION INTRAMUSCULAR; INTRAVENOUS at 09:12

## 2019-01-01 RX ADMIN — QUETIAPINE FUMARATE 25 MG: 25 TABLET ORAL at 20:24

## 2019-01-01 RX ADMIN — DOCUSATE SODIUM 100 MG: 100 CAPSULE, LIQUID FILLED ORAL at 08:41

## 2019-01-01 RX ADMIN — FUROSEMIDE 20 MG: 10 INJECTION, SOLUTION INTRAMUSCULAR; INTRAVENOUS at 18:07

## 2019-01-01 RX ADMIN — HYDRALAZINE HYDROCHLORIDE 50 MG: 50 TABLET, FILM COATED ORAL at 21:03

## 2019-01-01 RX ADMIN — ISOSORBIDE MONONITRATE 30 MG: 30 TABLET ORAL at 09:26

## 2019-01-01 RX ADMIN — DOCUSATE SODIUM 100 MG: 100 CAPSULE, LIQUID FILLED ORAL at 10:50

## 2019-01-01 RX ADMIN — QUETIAPINE FUMARATE 25 MG: 25 TABLET ORAL at 21:04

## 2019-01-01 RX ADMIN — CHLOROTHIAZIDE SODIUM 500 MG: 500 INJECTION, POWDER, LYOPHILIZED, FOR SOLUTION INTRAVENOUS at 11:17

## 2019-01-01 RX ADMIN — Medication 10 ML: at 20:42

## 2019-01-01 RX ADMIN — IRON SUCROSE 100 MG: 20 INJECTION, SOLUTION INTRAVENOUS at 11:51

## 2019-01-01 RX ADMIN — BUMETANIDE 1 MG: 1 TABLET ORAL at 21:28

## 2019-01-01 RX ADMIN — AMIODARONE HYDROCHLORIDE 200 MG: 200 TABLET ORAL at 12:40

## 2019-01-01 RX ADMIN — SODIUM BICARBONATE 650 MG: 650 TABLET ORAL at 20:45

## 2019-01-01 RX ADMIN — DOCUSATE SODIUM 100 MG: 100 CAPSULE, LIQUID FILLED ORAL at 20:13

## 2019-01-01 RX ADMIN — ASPIRIN 81 MG: 81 TABLET, COATED ORAL at 15:07

## 2019-01-01 RX ADMIN — PHENYLEPHRINE HYDROCHLORIDE 100 MCG: 10 INJECTION INTRAVENOUS at 11:47

## 2019-01-01 RX ADMIN — HYDRALAZINE HYDROCHLORIDE 50 MG: 50 TABLET, FILM COATED ORAL at 14:03

## 2019-01-01 RX ADMIN — BENZONATATE 100 MG: 100 CAPSULE ORAL at 09:20

## 2019-01-01 RX ADMIN — IRON SUCROSE 200 MG: 20 INJECTION, SOLUTION INTRAVENOUS at 17:30

## 2019-01-01 RX ADMIN — Medication 10 ML: at 09:57

## 2019-01-01 RX ADMIN — ALBUTEROL SULFATE 2.5 MG: 2.5 SOLUTION RESPIRATORY (INHALATION) at 06:39

## 2019-01-01 RX ADMIN — LEVOTHYROXINE SODIUM 125 MCG: 125 TABLET ORAL at 09:55

## 2019-01-01 RX ADMIN — HYDRALAZINE HYDROCHLORIDE 100 MG: 50 TABLET, FILM COATED ORAL at 14:59

## 2019-01-01 RX ADMIN — QUETIAPINE FUMARATE 25 MG: 25 TABLET ORAL at 21:38

## 2019-01-01 RX ADMIN — METOPROLOL SUCCINATE 50 MG: 50 TABLET, EXTENDED RELEASE ORAL at 16:00

## 2019-01-01 RX ADMIN — SODIUM BICARBONATE 650 MG: 650 TABLET ORAL at 21:13

## 2019-01-01 RX ADMIN — ATORVASTATIN CALCIUM 20 MG: 20 TABLET, FILM COATED ORAL at 09:00

## 2019-01-01 RX ADMIN — CARVEDILOL 12.5 MG: 12.5 TABLET, FILM COATED ORAL at 08:48

## 2019-01-01 RX ADMIN — BUMETANIDE 2 MG: 1 TABLET ORAL at 09:38

## 2019-01-01 RX ADMIN — ISOSORBIDE MONONITRATE 60 MG: 60 TABLET ORAL at 07:55

## 2019-01-01 RX ADMIN — MIDODRINE HYDROCHLORIDE 5 MG: 5 TABLET ORAL at 11:39

## 2019-01-01 RX ADMIN — Medication 10 ML: at 09:02

## 2019-01-01 RX ADMIN — Medication 10 ML: at 23:09

## 2019-01-01 RX ADMIN — FAMOTIDINE 20 MG: 20 TABLET, FILM COATED ORAL at 07:21

## 2019-01-01 RX ADMIN — QUETIAPINE FUMARATE 25 MG: 25 TABLET ORAL at 21:50

## 2019-01-01 RX ADMIN — DOPAMINE HYDROCHLORIDE 2 MCG/KG/MIN: 160 INJECTION, SOLUTION INTRAVENOUS at 14:06

## 2019-01-01 RX ADMIN — ALBUMIN (HUMAN) 25 G: 0.25 INJECTION, SOLUTION INTRAVENOUS at 09:49

## 2019-01-01 RX ADMIN — DOCUSATE SODIUM 100 MG: 100 CAPSULE, LIQUID FILLED ORAL at 09:25

## 2019-01-01 RX ADMIN — Medication 10 ML: at 20:08

## 2019-01-01 RX ADMIN — HEPARIN SODIUM 1900 UNITS: 1000 INJECTION, SOLUTION INTRAVENOUS; SUBCUTANEOUS at 11:30

## 2019-01-01 RX ADMIN — CLOPIDOGREL 75 MG: 75 TABLET, FILM COATED ORAL at 09:20

## 2019-01-01 RX ADMIN — SIMETHICONE 80 MG: 80 TABLET, CHEWABLE ORAL at 08:59

## 2019-01-01 RX ADMIN — ISOSORBIDE MONONITRATE 30 MG: 30 TABLET ORAL at 08:31

## 2019-01-01 RX ADMIN — ATORVASTATIN CALCIUM 20 MG: 20 TABLET, FILM COATED ORAL at 09:31

## 2019-01-01 RX ADMIN — SENNOSIDES 8.6 MG: 8.6 TABLET, FILM COATED ORAL at 21:39

## 2019-01-01 RX ADMIN — Medication 10 ML: at 21:30

## 2019-01-01 RX ADMIN — ASPIRIN 81 MG: 81 TABLET, COATED ORAL at 11:55

## 2019-01-01 RX ADMIN — CARVEDILOL 12.5 MG: 12.5 TABLET, FILM COATED ORAL at 11:58

## 2019-01-01 RX ADMIN — CARVEDILOL 6.25 MG: 6.25 TABLET, FILM COATED ORAL at 16:44

## 2019-01-01 RX ADMIN — Medication 10 ML: at 21:59

## 2019-01-01 RX ADMIN — HYDRALAZINE HYDROCHLORIDE 100 MG: 50 TABLET, FILM COATED ORAL at 15:33

## 2019-01-01 RX ADMIN — CARVEDILOL 12.5 MG: 12.5 TABLET, FILM COATED ORAL at 10:51

## 2019-01-01 RX ADMIN — LEVOTHYROXINE SODIUM 125 MCG: 125 TABLET ORAL at 11:17

## 2019-01-01 RX ADMIN — ASPIRIN 81 MG: 81 TABLET ORAL at 10:50

## 2019-01-01 RX ADMIN — CARVEDILOL 12.5 MG: 12.5 TABLET, FILM COATED ORAL at 18:07

## 2019-01-01 RX ADMIN — ISOSORBIDE MONONITRATE 30 MG: 30 TABLET ORAL at 09:23

## 2019-01-01 RX ADMIN — FLUOXETINE 10 MG: 10 CAPSULE ORAL at 13:30

## 2019-01-01 RX ADMIN — BUMETANIDE 4 MG: 1 TABLET ORAL at 20:18

## 2019-01-01 RX ADMIN — CLOPIDOGREL 75 MG: 75 TABLET, FILM COATED ORAL at 08:34

## 2019-01-01 RX ADMIN — METOPROLOL SUCCINATE 50 MG: 50 TABLET, EXTENDED RELEASE ORAL at 08:36

## 2019-01-01 RX ADMIN — HEPARIN SODIUM 10000 UNITS: 1000 INJECTION INTRAVENOUS; SUBCUTANEOUS at 09:03

## 2019-01-01 RX ADMIN — CLOPIDOGREL 75 MG: 75 TABLET, FILM COATED ORAL at 20:23

## 2019-01-01 RX ADMIN — CARVEDILOL 6.25 MG: 6.25 TABLET, FILM COATED ORAL at 18:00

## 2019-01-01 RX ADMIN — ALBUMIN (HUMAN) 25 G: 0.25 INJECTION, SOLUTION INTRAVENOUS at 09:27

## 2019-01-01 RX ADMIN — MUPIROCIN: 20 OINTMENT TOPICAL at 10:25

## 2019-01-01 RX ADMIN — POTASSIUM BICARBONATE 40 MEQ: 782 TABLET, EFFERVESCENT ORAL at 08:56

## 2019-01-01 RX ADMIN — HEPARIN SODIUM 5000 UNITS: 5000 INJECTION INTRAVENOUS; SUBCUTANEOUS at 20:13

## 2019-01-01 RX ADMIN — QUETIAPINE FUMARATE 25 MG: 25 TABLET ORAL at 22:05

## 2019-01-01 RX ADMIN — BUMETANIDE 4 MG: 1 TABLET ORAL at 20:47

## 2019-01-01 RX ADMIN — ALBUMIN, HUMAN INJ 5% 500 ML: 5 SOLUTION at 06:24

## 2019-01-01 RX ADMIN — LEVOTHYROXINE SODIUM 125 MCG: 125 TABLET ORAL at 08:34

## 2019-01-01 RX ADMIN — CARVEDILOL 6.25 MG: 6.25 TABLET, FILM COATED ORAL at 16:57

## 2019-01-01 RX ADMIN — MIDODRINE HYDROCHLORIDE 5 MG: 5 TABLET ORAL at 09:48

## 2019-01-01 RX ADMIN — SIMETHICONE 80 MG: 80 TABLET, CHEWABLE ORAL at 19:59

## 2019-01-01 RX ADMIN — Medication: at 16:28

## 2019-01-01 RX ADMIN — FLUOXETINE 10 MG: 10 CAPSULE ORAL at 08:49

## 2019-01-01 RX ADMIN — FENTANYL CITRATE 25 MCG: 50 INJECTION, SOLUTION INTRAMUSCULAR; INTRAVENOUS at 11:34

## 2019-01-01 RX ADMIN — FUROSEMIDE 20 MG: 10 INJECTION, SOLUTION INTRAMUSCULAR; INTRAVENOUS at 17:50

## 2019-01-01 RX ADMIN — METOPROLOL SUCCINATE 50 MG: 50 TABLET, EXTENDED RELEASE ORAL at 09:36

## 2019-01-01 RX ADMIN — ALBUMIN (HUMAN) 25 G: 12.5 SOLUTION INTRAVENOUS at 14:15

## 2019-01-01 RX ADMIN — MIDAZOLAM HYDROCHLORIDE 0.05 MG: 1 INJECTION, SOLUTION INTRAMUSCULAR; INTRAVENOUS at 08:39

## 2019-01-01 RX ADMIN — Medication 10 ML: at 08:47

## 2019-01-01 RX ADMIN — DOPAMINE HYDROCHLORIDE 2 MCG/KG/MIN: 160 INJECTION, SOLUTION INTRAVENOUS at 18:30

## 2019-01-01 RX ADMIN — FLUOXETINE 10 MG: 10 CAPSULE ORAL at 08:30

## 2019-01-01 RX ADMIN — SIMETHICONE 80 MG: 80 TABLET, CHEWABLE ORAL at 08:48

## 2019-01-01 RX ADMIN — HYDRALAZINE HYDROCHLORIDE 100 MG: 50 TABLET, FILM COATED ORAL at 09:30

## 2019-01-01 RX ADMIN — ALBUMIN (HUMAN) 25 G: 0.25 INJECTION, SOLUTION INTRAVENOUS at 18:00

## 2019-01-01 RX ADMIN — ATORVASTATIN CALCIUM 20 MG: 20 TABLET, FILM COATED ORAL at 07:45

## 2019-01-01 RX ADMIN — HEPARIN SODIUM 1500 UNITS: 1000 INJECTION INTRAVENOUS; SUBCUTANEOUS at 18:40

## 2019-01-01 RX ADMIN — HYDRALAZINE HYDROCHLORIDE 25 MG: 25 TABLET, FILM COATED ORAL at 09:20

## 2019-01-01 RX ADMIN — LEVOTHYROXINE SODIUM 125 MCG: 125 TABLET ORAL at 08:21

## 2019-01-01 RX ADMIN — LEVOTHYROXINE SODIUM 125 MCG: 125 TABLET ORAL at 06:03

## 2019-01-01 RX ADMIN — HEPARIN SODIUM 1500 UNITS: 1000 INJECTION INTRAVENOUS; SUBCUTANEOUS at 17:07

## 2019-01-01 RX ADMIN — ATORVASTATIN CALCIUM 20 MG: 20 TABLET, FILM COATED ORAL at 08:36

## 2019-01-01 RX ADMIN — ATORVASTATIN CALCIUM 20 MG: 20 TABLET, FILM COATED ORAL at 07:57

## 2019-01-01 RX ADMIN — QUETIAPINE FUMARATE 25 MG: 25 TABLET ORAL at 21:57

## 2019-01-01 RX ADMIN — Medication 10 ML: at 21:40

## 2019-01-01 RX ADMIN — SPIRONOLACTONE 25 MG: 25 TABLET ORAL at 08:57

## 2019-01-01 RX ADMIN — ALBUMIN (HUMAN) 25 G: 0.25 INJECTION, SOLUTION INTRAVENOUS at 08:54

## 2019-01-01 RX ADMIN — SPIRONOLACTONE 25 MG: 25 TABLET ORAL at 08:36

## 2019-01-01 RX ADMIN — HYDRALAZINE HYDROCHLORIDE 50 MG: 50 TABLET, FILM COATED ORAL at 22:13

## 2019-01-01 RX ADMIN — LEVOTHYROXINE SODIUM 125 MCG: 125 TABLET ORAL at 09:06

## 2019-01-01 RX ADMIN — CLOPIDOGREL 75 MG: 75 TABLET, FILM COATED ORAL at 08:41

## 2019-01-01 RX ADMIN — SPIRONOLACTONE 25 MG: 25 TABLET ORAL at 12:40

## 2019-01-01 RX ADMIN — SIMETHICONE 80 MG: 80 TABLET, CHEWABLE ORAL at 17:14

## 2019-01-01 RX ADMIN — ACETAMINOPHEN 650 MG: 325 TABLET ORAL at 20:44

## 2019-01-01 RX ADMIN — LEVOTHYROXINE SODIUM 100 MCG: 100 TABLET ORAL at 08:04

## 2019-01-01 RX ADMIN — ISOSORBIDE MONONITRATE 30 MG: 30 TABLET ORAL at 11:54

## 2019-01-01 RX ADMIN — GUAIFENESIN 600 MG: 600 TABLET, EXTENDED RELEASE ORAL at 22:21

## 2019-01-01 RX ADMIN — HEPARIN SODIUM 1400 UNITS: 1000 INJECTION INTRAVENOUS; SUBCUTANEOUS at 14:03

## 2019-01-01 RX ADMIN — QUETIAPINE FUMARATE 25 MG: 25 TABLET ORAL at 21:31

## 2019-01-01 RX ADMIN — ASPIRIN 81 MG: 81 TABLET, COATED ORAL at 13:30

## 2019-01-01 RX ADMIN — LEVOTHYROXINE SODIUM 125 MCG: 125 TABLET ORAL at 05:44

## 2019-01-01 RX ADMIN — SODIUM CHLORIDE: 9 INJECTION, SOLUTION INTRAVENOUS at 07:47

## 2019-01-01 RX ADMIN — CLOPIDOGREL 75 MG: 75 TABLET, FILM COATED ORAL at 08:31

## 2019-01-01 RX ADMIN — ALBUTEROL SULFATE 2.5 MG: 2.5 SOLUTION RESPIRATORY (INHALATION) at 07:31

## 2019-01-01 RX ADMIN — ATORVASTATIN CALCIUM 20 MG: 20 TABLET, FILM COATED ORAL at 13:30

## 2019-01-01 RX ADMIN — HYDRALAZINE HYDROCHLORIDE 100 MG: 50 TABLET, FILM COATED ORAL at 15:06

## 2019-01-01 RX ADMIN — CLOPIDOGREL 75 MG: 75 TABLET, FILM COATED ORAL at 08:29

## 2019-01-01 RX ADMIN — Medication 10 ML: at 22:00

## 2019-01-01 RX ADMIN — DOCUSATE SODIUM 100 MG: 100 CAPSULE, LIQUID FILLED ORAL at 09:04

## 2019-01-01 RX ADMIN — SIMETHICONE 80 MG: 80 TABLET, CHEWABLE ORAL at 08:18

## 2019-01-01 RX ADMIN — AMIODARONE HYDROCHLORIDE 200 MG: 200 TABLET ORAL at 10:51

## 2019-01-01 RX ADMIN — CARVEDILOL 25 MG: 25 TABLET, FILM COATED ORAL at 08:48

## 2019-01-01 RX ADMIN — HYDRALAZINE HYDROCHLORIDE 25 MG: 25 TABLET, FILM COATED ORAL at 15:56

## 2019-01-01 RX ADMIN — HEPARIN SODIUM 1400 UNITS: 1000 INJECTION INTRAVENOUS; SUBCUTANEOUS at 14:28

## 2019-01-01 RX ADMIN — BENZONATATE 100 MG: 100 CAPSULE ORAL at 21:33

## 2019-01-01 RX ADMIN — ASPIRIN 81 MG: 81 TABLET, COATED ORAL at 08:33

## 2019-01-01 RX ADMIN — CEFEPIME 2 G: 2 INJECTION, POWDER, FOR SOLUTION INTRAVENOUS at 15:48

## 2019-01-01 RX ADMIN — HYDRALAZINE HYDROCHLORIDE 25 MG: 25 TABLET, FILM COATED ORAL at 14:26

## 2019-01-01 RX ADMIN — ATORVASTATIN CALCIUM 20 MG: 20 TABLET, FILM COATED ORAL at 08:31

## 2019-01-01 RX ADMIN — SODIUM BICARBONATE 650 MG: 650 TABLET ORAL at 20:07

## 2019-01-01 RX ADMIN — ALBUTEROL SULFATE 2.5 MG: 2.5 SOLUTION RESPIRATORY (INHALATION) at 08:11

## 2019-01-01 RX ADMIN — PHENYLEPHRINE HYDROCHLORIDE 100 MCG: 10 INJECTION INTRAVENOUS at 11:44

## 2019-01-01 RX ADMIN — ASPIRIN 81 MG: 81 TABLET, COATED ORAL at 20:22

## 2019-01-01 RX ADMIN — ALBUMIN (HUMAN) 25 G: 0.25 INJECTION, SOLUTION INTRAVENOUS at 14:02

## 2019-01-01 RX ADMIN — PROTAMINE SULFATE 20 MG: 10 INJECTION, SOLUTION INTRAVENOUS at 09:20

## 2019-01-01 RX ADMIN — HYDRALAZINE HYDROCHLORIDE 100 MG: 50 TABLET, FILM COATED ORAL at 07:30

## 2019-01-01 RX ADMIN — DOCUSATE SODIUM 100 MG: 100 CAPSULE, LIQUID FILLED ORAL at 07:21

## 2019-01-01 RX ADMIN — LEVOTHYROXINE SODIUM 100 MCG: 100 TABLET ORAL at 07:55

## 2019-01-01 RX ADMIN — Medication 10 ML: at 09:39

## 2019-01-01 RX ADMIN — FUROSEMIDE 80 MG: 10 INJECTION, SOLUTION INTRAMUSCULAR; INTRAVENOUS at 08:07

## 2019-01-01 RX ADMIN — BUMETANIDE 2 MG: 1 TABLET ORAL at 08:40

## 2019-01-01 RX ADMIN — SIMETHICONE 80 MG: 80 TABLET, CHEWABLE ORAL at 21:57

## 2019-01-01 RX ADMIN — LEVOTHYROXINE SODIUM 125 MCG: 125 TABLET ORAL at 10:26

## 2019-01-01 RX ADMIN — DOPAMINE HYDROCHLORIDE 5 MCG/KG/MIN: 160 INJECTION, SOLUTION INTRAVENOUS at 21:54

## 2019-01-01 RX ADMIN — QUETIAPINE FUMARATE 25 MG: 25 TABLET ORAL at 20:41

## 2019-01-01 RX ADMIN — ALBUTEROL SULFATE 2.5 MG: 2.5 SOLUTION RESPIRATORY (INHALATION) at 07:04

## 2019-01-01 RX ADMIN — MEGESTROL ACETATE 200 MG: 40 SUSPENSION ORAL at 13:29

## 2019-01-01 RX ADMIN — ASPIRIN 81 MG: 81 TABLET, CHEWABLE ORAL at 09:20

## 2019-01-01 RX ADMIN — Medication 10 ML: at 09:11

## 2019-01-01 RX ADMIN — SODIUM CHLORIDE: 9 INJECTION, SOLUTION INTRAVENOUS at 12:06

## 2019-01-01 RX ADMIN — AMIODARONE HYDROCHLORIDE 200 MG: 200 TABLET ORAL at 08:58

## 2019-01-01 RX ADMIN — IRON SUCROSE 100 MG: 20 INJECTION, SOLUTION INTRAVENOUS at 15:31

## 2019-01-01 RX ADMIN — METOPROLOL SUCCINATE 50 MG: 50 TABLET, EXTENDED RELEASE ORAL at 08:41

## 2019-01-01 RX ADMIN — BENZONATATE 100 MG: 100 CAPSULE ORAL at 15:12

## 2019-01-01 RX ADMIN — ISOSORBIDE MONONITRATE 30 MG: 30 TABLET ORAL at 14:27

## 2019-01-01 RX ADMIN — HYDROCODONE BITARTRATE AND ACETAMINOPHEN 1 TABLET: 5; 325 TABLET ORAL at 08:36

## 2019-01-01 RX ADMIN — Medication 10 ML: at 21:04

## 2019-01-01 RX ADMIN — BENZONATATE 100 MG: 100 CAPSULE ORAL at 22:14

## 2019-01-01 RX ADMIN — CARVEDILOL 12.5 MG: 12.5 TABLET, FILM COATED ORAL at 08:05

## 2019-01-01 RX ADMIN — ASPIRIN 81 MG: 81 TABLET, COATED ORAL at 12:40

## 2019-01-01 RX ADMIN — APIXABAN 2.5 MG: 2.5 TABLET, FILM COATED ORAL at 08:41

## 2019-01-01 RX ADMIN — TORSEMIDE 40 MG: 20 TABLET ORAL at 09:55

## 2019-01-01 RX ADMIN — ATORVASTATIN CALCIUM 20 MG: 20 TABLET, FILM COATED ORAL at 08:38

## 2019-01-01 RX ADMIN — BUMETANIDE 2 MG: 1 TABLET ORAL at 11:54

## 2019-01-01 RX ADMIN — GUAIFENESIN 600 MG: 600 TABLET, EXTENDED RELEASE ORAL at 09:56

## 2019-01-01 RX ADMIN — Medication 10 ML: at 10:09

## 2019-01-01 RX ADMIN — QUETIAPINE FUMARATE 25 MG: 25 TABLET ORAL at 21:17

## 2019-01-01 RX ADMIN — ALBUTEROL SULFATE 2.5 MG: 2.5 SOLUTION RESPIRATORY (INHALATION) at 19:02

## 2019-01-01 RX ADMIN — ATORVASTATIN CALCIUM 20 MG: 20 TABLET, FILM COATED ORAL at 11:18

## 2019-01-01 RX ADMIN — SODIUM CHLORIDE: 900 INJECTION, SOLUTION INTRAVENOUS at 11:29

## 2019-01-01 RX ADMIN — AMIODARONE HYDROCHLORIDE 200 MG: 200 TABLET ORAL at 08:30

## 2019-01-01 RX ADMIN — CARVEDILOL 3.12 MG: 6.25 TABLET, FILM COATED ORAL at 20:30

## 2019-01-01 RX ADMIN — Medication 10 ML: at 10:42

## 2019-01-01 RX ADMIN — Medication 10 ML: at 08:25

## 2019-01-01 RX ADMIN — CARVEDILOL 6.25 MG: 6.25 TABLET, FILM COATED ORAL at 18:37

## 2019-01-01 RX ADMIN — Medication 10 ML: at 08:52

## 2019-01-01 RX ADMIN — PREDNISONE 50 MG: 50 TABLET ORAL at 09:41

## 2019-01-01 RX ADMIN — METOPROLOL SUCCINATE 50 MG: 50 TABLET, EXTENDED RELEASE ORAL at 09:05

## 2019-01-01 RX ADMIN — QUETIAPINE FUMARATE 25 MG: 25 TABLET ORAL at 21:27

## 2019-01-01 RX ADMIN — DEXTROSE MONOHYDRATE 500 ML: 50 INJECTION, SOLUTION INTRAVENOUS at 16:43

## 2019-01-01 RX ADMIN — IRON SUCROSE 100 MG: 20 INJECTION, SOLUTION INTRAVENOUS at 12:25

## 2019-01-01 RX ADMIN — Medication 10 ML: at 21:23

## 2019-01-01 RX ADMIN — FUROSEMIDE 80 MG: 10 INJECTION, SOLUTION INTRAMUSCULAR; INTRAVENOUS at 10:06

## 2019-01-01 RX ADMIN — METOPROLOL SUCCINATE 50 MG: 50 TABLET, EXTENDED RELEASE ORAL at 15:07

## 2019-01-01 RX ADMIN — CARVEDILOL 3.12 MG: 6.25 TABLET, FILM COATED ORAL at 07:44

## 2019-01-01 RX ADMIN — MIDAZOLAM HYDROCHLORIDE 0.05 MG: 1 INJECTION, SOLUTION INTRAMUSCULAR; INTRAVENOUS at 08:15

## 2019-01-01 RX ADMIN — DOCUSATE SODIUM 100 MG: 100 CAPSULE, LIQUID FILLED ORAL at 10:26

## 2019-01-01 RX ADMIN — FUROSEMIDE 80 MG: 10 INJECTION, SOLUTION INTRAMUSCULAR; INTRAVENOUS at 16:58

## 2019-01-01 RX ADMIN — FLUOXETINE 10 MG: 10 CAPSULE ORAL at 09:07

## 2019-01-01 RX ADMIN — BUMETANIDE 4 MG: 1 TABLET ORAL at 08:31

## 2019-01-01 RX ADMIN — ALBUTEROL SULFATE 2.5 MG: 2.5 SOLUTION RESPIRATORY (INHALATION) at 10:52

## 2019-01-01 RX ADMIN — HEPARIN SODIUM 1900 UNITS: 1000 INJECTION, SOLUTION INTRAVENOUS; SUBCUTANEOUS at 11:19

## 2019-01-01 RX ADMIN — FLUOXETINE 10 MG: 10 CAPSULE ORAL at 11:48

## 2019-01-01 RX ADMIN — HEPARIN SODIUM 1500 UNITS: 1000 INJECTION INTRAVENOUS; SUBCUTANEOUS at 18:02

## 2019-01-01 RX ADMIN — CHLORHEXIDINE GLUCONATE: 4 SOLUTION TOPICAL at 10:26

## 2019-01-01 RX ADMIN — Medication 10 ML: at 21:44

## 2019-01-01 RX ADMIN — ALBUMIN (HUMAN) 25 G: 12.5 INJECTION, SOLUTION INTRAVENOUS at 19:54

## 2019-01-01 RX ADMIN — ALBUMIN (HUMAN) 25 G: 12.5 INJECTION, SOLUTION INTRAVENOUS at 15:22

## 2019-01-01 RX ADMIN — BUMETANIDE 4 MG: 1 TABLET ORAL at 21:04

## 2019-01-01 RX ADMIN — BUMETANIDE 4 MG: 1 TABLET ORAL at 21:46

## 2019-01-01 RX ADMIN — LEVOTHYROXINE SODIUM 125 MCG: 125 TABLET ORAL at 06:13

## 2019-01-01 RX ADMIN — HEPARIN SODIUM 1900 UNITS: 1000 INJECTION INTRAVENOUS; SUBCUTANEOUS at 16:05

## 2019-01-01 RX ADMIN — HEPARIN SODIUM 1400 UNITS: 1000 INJECTION INTRAVENOUS; SUBCUTANEOUS at 17:07

## 2019-01-01 RX ADMIN — BENZONATATE 100 MG: 100 CAPSULE ORAL at 13:05

## 2019-01-01 RX ADMIN — CARVEDILOL 6.25 MG: 6.25 TABLET, FILM COATED ORAL at 15:56

## 2019-01-01 RX ADMIN — SPIRONOLACTONE 25 MG: 25 TABLET ORAL at 08:48

## 2019-01-01 RX ADMIN — ASPIRIN 81 MG: 81 TABLET ORAL at 09:38

## 2019-01-01 RX ADMIN — HYDRALAZINE HYDROCHLORIDE 50 MG: 50 TABLET, FILM COATED ORAL at 18:16

## 2019-01-01 RX ADMIN — HYDRALAZINE HYDROCHLORIDE 50 MG: 50 TABLET, FILM COATED ORAL at 16:07

## 2019-01-01 RX ADMIN — Medication 10 ML: at 21:29

## 2019-01-01 RX ADMIN — DOCUSATE SODIUM 100 MG: 100 CAPSULE, LIQUID FILLED ORAL at 11:57

## 2019-01-01 RX ADMIN — LEVOTHYROXINE SODIUM 125 MCG: 125 TABLET ORAL at 05:05

## 2019-01-01 RX ADMIN — SPIRONOLACTONE 25 MG: 25 TABLET ORAL at 08:17

## 2019-01-01 RX ADMIN — CLOPIDOGREL 75 MG: 75 TABLET, FILM COATED ORAL at 09:39

## 2019-01-01 RX ADMIN — BUMETANIDE 4 MG: 1 TABLET ORAL at 08:30

## 2019-01-01 RX ADMIN — ASPIRIN 81 MG: 81 TABLET, COATED ORAL at 17:23

## 2019-01-01 RX ADMIN — Medication 10 ML: at 08:49

## 2019-01-01 RX ADMIN — Medication 10 ML: at 10:26

## 2019-01-01 RX ADMIN — CLOPIDOGREL 75 MG: 75 TABLET, FILM COATED ORAL at 08:18

## 2019-01-01 RX ADMIN — SPIRONOLACTONE 25 MG: 25 TABLET ORAL at 11:50

## 2019-01-01 RX ADMIN — LEVOTHYROXINE SODIUM 125 MCG: 125 TABLET ORAL at 18:25

## 2019-01-01 RX ADMIN — ASPIRIN 81 MG: 81 TABLET, COATED ORAL at 07:42

## 2019-01-01 RX ADMIN — ASPIRIN 81 MG: 81 TABLET, CHEWABLE ORAL at 08:41

## 2019-01-01 RX ADMIN — CLOPIDOGREL 75 MG: 75 TABLET, FILM COATED ORAL at 09:11

## 2019-01-01 RX ADMIN — Medication 10 ML: at 12:40

## 2019-01-01 RX ADMIN — BUMETANIDE 3 MG: 1 TABLET ORAL at 08:36

## 2019-01-01 RX ADMIN — CLOPIDOGREL 75 MG: 75 TABLET, FILM COATED ORAL at 09:28

## 2019-01-01 RX ADMIN — CLOPIDOGREL 75 MG: 75 TABLET, FILM COATED ORAL at 15:06

## 2019-01-01 RX ADMIN — GUAIFENESIN 600 MG: 600 TABLET, EXTENDED RELEASE ORAL at 08:47

## 2019-01-01 RX ADMIN — DOCUSATE SODIUM 100 MG: 100 CAPSULE, LIQUID FILLED ORAL at 09:54

## 2019-01-01 RX ADMIN — HYDRALAZINE HYDROCHLORIDE 50 MG: 50 TABLET, FILM COATED ORAL at 06:31

## 2019-01-01 RX ADMIN — DEXTROSE AND SODIUM CHLORIDE: 5; 450 INJECTION, SOLUTION INTRAVENOUS at 20:30

## 2019-01-01 RX ADMIN — SPIRONOLACTONE 25 MG: 25 TABLET ORAL at 08:27

## 2019-01-01 RX ADMIN — ONDANSETRON 4 MG: 2 INJECTION INTRAMUSCULAR; INTRAVENOUS at 08:18

## 2019-01-01 RX ADMIN — BUMETANIDE 3 MG: 1 TABLET ORAL at 09:28

## 2019-01-01 RX ADMIN — GUAIFENESIN 600 MG: 600 TABLET, EXTENDED RELEASE ORAL at 15:12

## 2019-01-01 RX ADMIN — GUAIFENESIN 600 MG: 600 TABLET, EXTENDED RELEASE ORAL at 20:41

## 2019-01-01 RX ADMIN — BUMETANIDE 2 MG: 1 TABLET ORAL at 20:41

## 2019-01-01 RX ADMIN — ASPIRIN 81 MG: 81 TABLET ORAL at 08:34

## 2019-01-01 RX ADMIN — ASPIRIN 81 MG: 81 TABLET, COATED ORAL at 07:57

## 2019-01-01 RX ADMIN — SIMETHICONE 80 MG: 80 TABLET, CHEWABLE ORAL at 22:04

## 2019-01-01 RX ADMIN — DOCUSATE SODIUM 100 MG: 100 CAPSULE, LIQUID FILLED ORAL at 08:29

## 2019-01-01 RX ADMIN — HYDRALAZINE HYDROCHLORIDE 100 MG: 50 TABLET, FILM COATED ORAL at 05:04

## 2019-01-01 RX ADMIN — BUMETANIDE 1 MG: 1 TABLET ORAL at 20:00

## 2019-01-01 RX ADMIN — SIMETHICONE 80 MG: 80 TABLET, CHEWABLE ORAL at 20:41

## 2019-01-01 RX ADMIN — APIXABAN 2.5 MG: 2.5 TABLET, FILM COATED ORAL at 09:11

## 2019-01-01 RX ADMIN — HEPARIN SODIUM 1900 UNITS: 1000 INJECTION, SOLUTION INTRAVENOUS; SUBCUTANEOUS at 12:00

## 2019-01-01 RX ADMIN — ASPIRIN 81 MG: 81 TABLET ORAL at 09:23

## 2019-01-01 RX ADMIN — CARVEDILOL 6.25 MG: 6.25 TABLET, FILM COATED ORAL at 18:15

## 2019-01-01 RX ADMIN — ISOSORBIDE MONONITRATE 30 MG: 30 TABLET ORAL at 09:05

## 2019-01-01 RX ADMIN — METOPROLOL SUCCINATE 50 MG: 50 TABLET, EXTENDED RELEASE ORAL at 10:19

## 2019-01-01 RX ADMIN — HYDRALAZINE HYDROCHLORIDE 50 MG: 50 TABLET, FILM COATED ORAL at 20:44

## 2019-01-01 RX ADMIN — ISOSORBIDE MONONITRATE 30 MG: 30 TABLET ORAL at 10:26

## 2019-01-01 RX ADMIN — SIMETHICONE 80 MG: 80 TABLET, CHEWABLE ORAL at 09:30

## 2019-01-01 RX ADMIN — APIXABAN 2.5 MG: 2.5 TABLET, FILM COATED ORAL at 08:54

## 2019-01-01 RX ADMIN — POTASSIUM CHLORIDE 20 MEQ: 40 SOLUTION ORAL at 14:58

## 2019-01-01 RX ADMIN — CARVEDILOL 6.25 MG: 6.25 TABLET, FILM COATED ORAL at 09:26

## 2019-01-01 RX ADMIN — LEVOTHYROXINE SODIUM 125 MCG: 125 TABLET ORAL at 07:31

## 2019-01-01 RX ADMIN — AMIODARONE HYDROCHLORIDE 200 MG: 200 TABLET ORAL at 09:11

## 2019-01-01 RX ADMIN — ALBUTEROL SULFATE 2.5 MG: 2.5 SOLUTION RESPIRATORY (INHALATION) at 16:19

## 2019-01-01 RX ADMIN — FENTANYL CITRATE 25 MCG: 50 INJECTION INTRAMUSCULAR; INTRAVENOUS at 09:16

## 2019-01-01 RX ADMIN — FLUOXETINE 10 MG: 10 CAPSULE ORAL at 08:32

## 2019-01-01 RX ADMIN — AMIODARONE HYDROCHLORIDE 200 MG: 200 TABLET ORAL at 09:23

## 2019-01-01 RX ADMIN — Medication 2 SPRAY: at 20:45

## 2019-01-01 RX ADMIN — HYDRALAZINE HYDROCHLORIDE 100 MG: 50 TABLET, FILM COATED ORAL at 06:52

## 2019-01-01 RX ADMIN — SPIRONOLACTONE 25 MG: 25 TABLET ORAL at 08:58

## 2019-01-01 RX ADMIN — ACETAMINOPHEN 650 MG: 325 TABLET ORAL at 03:13

## 2019-01-01 RX ADMIN — LEVOTHYROXINE SODIUM 125 MCG: 125 TABLET ORAL at 09:30

## 2019-01-01 RX ADMIN — Medication 600 MG: at 08:05

## 2019-01-01 RX ADMIN — AMIODARONE HYDROCHLORIDE 200 MG: 200 TABLET ORAL at 07:42

## 2019-01-01 RX ADMIN — ASPIRIN 81 MG: 81 TABLET ORAL at 08:58

## 2019-01-01 RX ADMIN — ATORVASTATIN CALCIUM 20 MG: 20 TABLET, FILM COATED ORAL at 09:08

## 2019-01-01 RX ADMIN — HEPARIN SODIUM 1900 UNITS: 1000 INJECTION, SOLUTION INTRAVENOUS; SUBCUTANEOUS at 17:05

## 2019-01-01 RX ADMIN — HYDRALAZINE HYDROCHLORIDE 50 MG: 50 TABLET, FILM COATED ORAL at 06:08

## 2019-01-01 RX ADMIN — LEVOTHYROXINE SODIUM 125 MCG: 125 TABLET ORAL at 06:30

## 2019-01-01 RX ADMIN — LEVOTHYROXINE SODIUM 125 MCG: 125 TABLET ORAL at 06:01

## 2019-01-01 RX ADMIN — HYDRALAZINE HYDROCHLORIDE 100 MG: 50 TABLET, FILM COATED ORAL at 21:04

## 2019-01-01 RX ADMIN — CLOPIDOGREL 75 MG: 75 TABLET, FILM COATED ORAL at 08:04

## 2019-01-01 RX ADMIN — ISOSORBIDE MONONITRATE 30 MG: 30 TABLET ORAL at 09:00

## 2019-01-01 RX ADMIN — HYDRALAZINE HYDROCHLORIDE 100 MG: 50 TABLET, FILM COATED ORAL at 08:54

## 2019-01-01 RX ADMIN — BUMETANIDE 2 MG: 1 TABLET ORAL at 21:30

## 2019-01-01 RX ADMIN — TAMSULOSIN HYDROCHLORIDE 0.4 MG: 0.4 CAPSULE ORAL at 09:50

## 2019-01-01 RX ADMIN — BUMETANIDE 1 MG: 1 TABLET ORAL at 11:17

## 2019-01-01 RX ADMIN — AMIODARONE HYDROCHLORIDE 200 MG: 200 TABLET ORAL at 07:55

## 2019-01-01 RX ADMIN — Medication 10 ML: at 21:51

## 2019-01-01 RX ADMIN — Medication 10 ML: at 09:31

## 2019-01-01 RX ADMIN — ALBUTEROL SULFATE 2.5 MG: 2.5 SOLUTION RESPIRATORY (INHALATION) at 15:17

## 2019-01-01 RX ADMIN — APIXABAN 2.5 MG: 2.5 TABLET, FILM COATED ORAL at 20:45

## 2019-01-01 RX ADMIN — MIDODRINE HYDROCHLORIDE 5 MG: 5 TABLET ORAL at 11:33

## 2019-01-01 RX ADMIN — ALBUMIN (HUMAN) 25 G: 0.25 INJECTION, SOLUTION INTRAVENOUS at 09:23

## 2019-01-01 RX ADMIN — HYDRALAZINE HYDROCHLORIDE 100 MG: 50 TABLET, FILM COATED ORAL at 16:04

## 2019-01-01 RX ADMIN — ALBUMIN (HUMAN) 12.5 G: 0.25 INJECTION, SOLUTION INTRAVENOUS at 17:42

## 2019-01-01 RX ADMIN — SPIRONOLACTONE 25 MG: 25 TABLET ORAL at 07:41

## 2019-01-01 RX ADMIN — FLUOXETINE 10 MG: 10 CAPSULE ORAL at 08:06

## 2019-01-01 RX ADMIN — CARVEDILOL 6.25 MG: 6.25 TABLET, FILM COATED ORAL at 08:33

## 2019-01-01 RX ADMIN — ALBUMIN (HUMAN) 25 G: 0.25 INJECTION, SOLUTION INTRAVENOUS at 19:52

## 2019-01-01 RX ADMIN — HYDRALAZINE HYDROCHLORIDE 100 MG: 50 TABLET, FILM COATED ORAL at 08:29

## 2019-01-01 RX ADMIN — FUROSEMIDE 20 MG: 10 INJECTION, SOLUTION INTRAMUSCULAR; INTRAVENOUS at 08:05

## 2019-01-01 RX ADMIN — ATORVASTATIN CALCIUM 20 MG: 20 TABLET, FILM COATED ORAL at 09:25

## 2019-01-01 RX ADMIN — GUAIFENESIN 600 MG: 600 TABLET, EXTENDED RELEASE ORAL at 21:32

## 2019-01-01 RX ADMIN — BUMETANIDE 4 MG: 1 TABLET ORAL at 09:29

## 2019-01-01 RX ADMIN — HYDRALAZINE HYDROCHLORIDE 50 MG: 50 TABLET, FILM COATED ORAL at 21:30

## 2019-01-01 RX ADMIN — THROMBIN, TOPICAL (BOVINE) 5000 UNITS: KIT at 17:09

## 2019-01-01 RX ADMIN — Medication 10 ML: at 08:42

## 2019-01-01 RX ADMIN — FLUOXETINE 10 MG: 10 CAPSULE ORAL at 09:30

## 2019-01-01 RX ADMIN — ASPIRIN 81 MG: 81 TABLET, CHEWABLE ORAL at 10:42

## 2019-01-01 RX ADMIN — TORSEMIDE 40 MG: 20 TABLET ORAL at 07:53

## 2019-01-01 RX ADMIN — SPIRONOLACTONE 25 MG: 25 TABLET ORAL at 08:31

## 2019-01-01 RX ADMIN — FLUOXETINE 10 MG: 10 CAPSULE ORAL at 09:31

## 2019-01-01 RX ADMIN — GUAIFENESIN 600 MG: 600 TABLET, EXTENDED RELEASE ORAL at 09:41

## 2019-01-01 RX ADMIN — ALBUTEROL SULFATE 2.5 MG: 2.5 SOLUTION RESPIRATORY (INHALATION) at 08:43

## 2019-01-01 RX ADMIN — CARVEDILOL 12.5 MG: 12.5 TABLET, FILM COATED ORAL at 08:24

## 2019-01-01 RX ADMIN — APIXABAN 2.5 MG: 2.5 TABLET, FILM COATED ORAL at 09:38

## 2019-01-01 RX ADMIN — CLOPIDOGREL 75 MG: 75 TABLET, FILM COATED ORAL at 09:05

## 2019-01-01 RX ADMIN — TORSEMIDE 40 MG: 20 TABLET ORAL at 15:13

## 2019-01-01 RX ADMIN — ISOSORBIDE MONONITRATE 30 MG: 30 TABLET ORAL at 07:55

## 2019-01-01 RX ADMIN — BENZONATATE 100 MG: 100 CAPSULE ORAL at 15:33

## 2019-01-01 RX ADMIN — Medication 10 ML: at 22:12

## 2019-01-01 RX ADMIN — ACETAMINOPHEN 650 MG: 325 TABLET ORAL at 18:43

## 2019-01-01 RX ADMIN — POLYETHYLENE GLYCOL 3350 17 G: 17 POWDER, FOR SOLUTION ORAL at 12:42

## 2019-01-01 RX ADMIN — CARVEDILOL 6.25 MG: 6.25 TABLET, FILM COATED ORAL at 08:41

## 2019-01-01 RX ADMIN — MIDODRINE HYDROCHLORIDE 5 MG: 5 TABLET ORAL at 11:44

## 2019-01-01 RX ADMIN — SENNOSIDES 8.6 MG: 8.6 TABLET, FILM COATED ORAL at 22:18

## 2019-01-01 RX ADMIN — ASPIRIN 81 MG: 81 TABLET, COATED ORAL at 08:58

## 2019-01-01 RX ADMIN — SIMETHICONE 80 MG: 80 TABLET, CHEWABLE ORAL at 08:31

## 2019-01-01 RX ADMIN — METOLAZONE 10 MG: 5 TABLET ORAL at 21:16

## 2019-01-01 RX ADMIN — ALBUMIN (HUMAN) 25 G: 0.25 INJECTION, SOLUTION INTRAVENOUS at 09:46

## 2019-01-01 RX ADMIN — BUMETANIDE 4 MG: 1 TABLET ORAL at 22:11

## 2019-01-01 RX ADMIN — SODIUM CHLORIDE 250 ML: 9 INJECTION, SOLUTION INTRAVENOUS at 09:57

## 2019-01-01 RX ADMIN — ALBUTEROL SULFATE 2.5 MG: 2.5 SOLUTION RESPIRATORY (INHALATION) at 15:00

## 2019-01-01 RX ADMIN — FAMOTIDINE 20 MG: 20 TABLET, FILM COATED ORAL at 08:33

## 2019-01-01 RX ADMIN — QUETIAPINE FUMARATE 25 MG: 25 TABLET ORAL at 21:16

## 2019-01-01 RX ADMIN — ALBUMIN (HUMAN) 25 G: 0.25 INJECTION, SOLUTION INTRAVENOUS at 11:45

## 2019-01-01 RX ADMIN — ASPIRIN 81 MG: 81 TABLET, CHEWABLE ORAL at 08:36

## 2019-01-01 RX ADMIN — HEPARIN SODIUM 1400 UNITS: 1000 INJECTION INTRAVENOUS; SUBCUTANEOUS at 11:20

## 2019-01-01 RX ADMIN — CARVEDILOL 6.25 MG: 6.25 TABLET, FILM COATED ORAL at 17:43

## 2019-01-01 RX ADMIN — DOPAMINE HYDROCHLORIDE 2 MCG/KG/MIN: 160 INJECTION, SOLUTION INTRAVENOUS at 21:20

## 2019-01-01 RX ADMIN — PROPOFOL 30 MG: 10 INJECTION, EMULSION INTRAVENOUS at 11:34

## 2019-01-01 RX ADMIN — SIMETHICONE 80 MG: 80 TABLET, CHEWABLE ORAL at 03:00

## 2019-01-01 RX ADMIN — FLUOXETINE 10 MG: 10 CAPSULE ORAL at 12:22

## 2019-01-01 RX ADMIN — ASPIRIN 81 MG: 81 TABLET, CHEWABLE ORAL at 09:53

## 2019-01-01 RX ADMIN — ASPIRIN 81 MG: 81 TABLET, COATED ORAL at 08:55

## 2019-01-01 RX ADMIN — ALBUTEROL SULFATE 2.5 MG: 2.5 SOLUTION RESPIRATORY (INHALATION) at 16:38

## 2019-01-01 RX ADMIN — FUROSEMIDE 10 MG: 10 INJECTION, SOLUTION INTRAMUSCULAR; INTRAVENOUS at 08:53

## 2019-01-01 RX ADMIN — BENZONATATE 100 MG: 100 CAPSULE ORAL at 21:38

## 2019-01-01 RX ADMIN — LEVOTHYROXINE SODIUM 100 MCG: 100 TABLET ORAL at 08:33

## 2019-01-01 RX ADMIN — Medication 10 ML: at 20:02

## 2019-01-01 RX ADMIN — ISOSORBIDE MONONITRATE 30 MG: 30 TABLET ORAL at 09:38

## 2019-01-01 RX ADMIN — AMIODARONE HYDROCHLORIDE 200 MG: 200 TABLET ORAL at 08:03

## 2019-01-01 RX ADMIN — Medication 2 SPRAY: at 07:20

## 2019-01-01 RX ADMIN — HEPARIN SODIUM 5000 UNITS: 5000 INJECTION, SOLUTION INTRAVENOUS; SUBCUTANEOUS at 14:26

## 2019-01-01 RX ADMIN — SIMETHICONE 80 MG: 80 TABLET, CHEWABLE ORAL at 09:00

## 2019-01-01 RX ADMIN — Medication 10 ML: at 11:03

## 2019-01-01 RX ADMIN — FUROSEMIDE 80 MG: 10 INJECTION, SOLUTION INTRAMUSCULAR; INTRAVENOUS at 07:22

## 2019-01-01 RX ADMIN — ALBUMIN (HUMAN) 25 G: 0.25 INJECTION, SOLUTION INTRAVENOUS at 12:18

## 2019-01-01 RX ADMIN — ALBUMIN (HUMAN) 25 G: 0.25 INJECTION, SOLUTION INTRAVENOUS at 10:34

## 2019-01-01 RX ADMIN — LEVOTHYROXINE SODIUM 125 MCG: 125 TABLET ORAL at 06:59

## 2019-01-01 RX ADMIN — ALBUTEROL SULFATE 2.5 MG: 2.5 SOLUTION RESPIRATORY (INHALATION) at 16:03

## 2019-01-01 RX ADMIN — Medication 10 ML: at 21:00

## 2019-01-01 RX ADMIN — HYDRALAZINE HYDROCHLORIDE 100 MG: 50 TABLET, FILM COATED ORAL at 21:59

## 2019-01-01 RX ADMIN — SIMETHICONE 80 MG: 80 TABLET, CHEWABLE ORAL at 13:54

## 2019-01-01 RX ADMIN — ASPIRIN 81 MG: 81 TABLET, COATED ORAL at 09:58

## 2019-01-01 RX ADMIN — ISOSORBIDE MONONITRATE 30 MG: 30 TABLET ORAL at 08:41

## 2019-01-01 RX ADMIN — LEVOTHYROXINE SODIUM 125 MCG: 125 TABLET ORAL at 08:55

## 2019-01-01 RX ADMIN — Medication 10 ML: at 20:45

## 2019-01-01 RX ADMIN — CLOPIDOGREL 75 MG: 75 TABLET, FILM COATED ORAL at 11:54

## 2019-01-01 RX ADMIN — ALBUMIN (HUMAN) 25 G: 12.5 INJECTION, SOLUTION INTRAVENOUS at 15:30

## 2019-01-01 RX ADMIN — BENZONATATE 100 MG: 100 CAPSULE ORAL at 09:41

## 2019-01-01 RX ADMIN — Medication 10 ML: at 21:27

## 2019-01-01 RX ADMIN — HYDRALAZINE HYDROCHLORIDE 10 MG: 20 INJECTION INTRAMUSCULAR; INTRAVENOUS at 05:18

## 2019-01-01 RX ADMIN — SIMETHICONE 80 MG: 80 TABLET, CHEWABLE ORAL at 09:05

## 2019-01-01 RX ADMIN — ISOSORBIDE MONONITRATE 30 MG: 30 TABLET ORAL at 08:38

## 2019-01-01 RX ADMIN — IRON SUCROSE 100 MG: 20 INJECTION, SOLUTION INTRAVENOUS at 09:57

## 2019-01-01 RX ADMIN — SIMETHICONE 80 MG: 80 TABLET, CHEWABLE ORAL at 12:40

## 2019-01-01 RX ADMIN — FUROSEMIDE 20 MG: 10 INJECTION, SOLUTION INTRAMUSCULAR; INTRAVENOUS at 08:48

## 2019-01-01 RX ADMIN — BENZONATATE 100 MG: 100 CAPSULE ORAL at 14:49

## 2019-01-01 RX ADMIN — LEVOTHYROXINE SODIUM 125 MCG: 125 TABLET ORAL at 08:57

## 2019-01-01 RX ADMIN — HYDRALAZINE HYDROCHLORIDE 50 MG: 50 TABLET, FILM COATED ORAL at 20:47

## 2019-01-01 RX ADMIN — TORSEMIDE 40 MG: 20 TABLET ORAL at 15:55

## 2019-01-01 RX ADMIN — DOCUSATE SODIUM 100 MG: 100 CAPSULE, LIQUID FILLED ORAL at 09:26

## 2019-01-01 RX ADMIN — HEPARIN SODIUM 1500 UNITS: 1000 INJECTION INTRAVENOUS; SUBCUTANEOUS at 10:58

## 2019-01-01 RX ADMIN — BENZONATATE 100 MG: 100 CAPSULE ORAL at 09:55

## 2019-01-01 RX ADMIN — HEPARIN SODIUM 1900 UNITS: 1000 INJECTION, SOLUTION INTRAVENOUS; SUBCUTANEOUS at 15:12

## 2019-01-01 RX ADMIN — SPIRONOLACTONE 25 MG: 25 TABLET ORAL at 08:47

## 2019-01-01 RX ADMIN — SPIRONOLACTONE 25 MG: 25 TABLET ORAL at 10:26

## 2019-01-01 RX ADMIN — ALBUTEROL SULFATE 2.5 MG: 2.5 SOLUTION RESPIRATORY (INHALATION) at 12:05

## 2019-01-01 RX ADMIN — HEPARIN SODIUM 5000 UNITS: 5000 INJECTION, SOLUTION INTRAVENOUS; SUBCUTANEOUS at 08:25

## 2019-01-01 RX ADMIN — AMIODARONE HYDROCHLORIDE 200 MG: 200 TABLET ORAL at 08:47

## 2019-01-01 RX ADMIN — TORSEMIDE 40 MG: 20 TABLET ORAL at 08:37

## 2019-01-01 RX ADMIN — DOCUSATE SODIUM 100 MG: 100 CAPSULE, LIQUID FILLED ORAL at 21:17

## 2019-01-01 RX ADMIN — APIXABAN 2.5 MG: 2.5 TABLET, FILM COATED ORAL at 08:47

## 2019-01-01 RX ADMIN — FLUOXETINE 10 MG: 10 CAPSULE ORAL at 08:57

## 2019-01-01 RX ADMIN — ALBUMIN (HUMAN) 500 ML: 12.5 INJECTION, SOLUTION INTRAVENOUS at 06:24

## 2019-01-01 RX ADMIN — SIMETHICONE 80 MG: 80 TABLET, CHEWABLE ORAL at 16:08

## 2019-01-01 RX ADMIN — Medication 10 ML: at 08:41

## 2019-01-01 RX ADMIN — GUAIFENESIN 600 MG: 600 TABLET, EXTENDED RELEASE ORAL at 21:39

## 2019-01-01 RX ADMIN — TAMSULOSIN HYDROCHLORIDE 0.4 MG: 0.4 CAPSULE ORAL at 18:11

## 2019-01-01 RX ADMIN — CLOPIDOGREL 75 MG: 75 TABLET, FILM COATED ORAL at 09:42

## 2019-01-01 RX ADMIN — SPIRONOLACTONE 25 MG: 25 TABLET ORAL at 10:19

## 2019-01-01 RX ADMIN — BENZONATATE 100 MG: 100 CAPSULE ORAL at 13:22

## 2019-01-01 RX ADMIN — ISOSORBIDE MONONITRATE 30 MG: 30 TABLET ORAL at 08:04

## 2019-01-01 RX ADMIN — DOCUSATE SODIUM 100 MG: 100 CAPSULE, LIQUID FILLED ORAL at 08:59

## 2019-01-01 RX ADMIN — Medication 10 ML: at 10:53

## 2019-01-01 RX ADMIN — FLUOXETINE 10 MG: 10 CAPSULE ORAL at 08:27

## 2019-01-01 RX ADMIN — DOCUSATE SODIUM 100 MG: 100 CAPSULE, LIQUID FILLED ORAL at 20:47

## 2019-01-01 RX ADMIN — ALBUMIN (HUMAN) 25 G: 0.25 INJECTION, SOLUTION INTRAVENOUS at 23:08

## 2019-01-01 RX ADMIN — CLOPIDOGREL 75 MG: 75 TABLET, FILM COATED ORAL at 10:42

## 2019-01-01 RX ADMIN — SIMETHICONE 80 MG: 80 TABLET, CHEWABLE ORAL at 11:55

## 2019-01-01 RX ADMIN — Medication 10 ML: at 15:08

## 2019-01-01 RX ADMIN — FAMOTIDINE 20 MG: 20 TABLET, FILM COATED ORAL at 09:26

## 2019-01-01 RX ADMIN — HYDRALAZINE HYDROCHLORIDE 25 MG: 25 TABLET, FILM COATED ORAL at 06:24

## 2019-01-01 RX ADMIN — SPIRONOLACTONE 25 MG: 25 TABLET ORAL at 08:54

## 2019-01-01 RX ADMIN — DOCUSATE SODIUM 100 MG: 100 CAPSULE, LIQUID FILLED ORAL at 08:33

## 2019-01-01 RX ADMIN — ASPIRIN 81 MG: 81 TABLET, COATED ORAL at 09:30

## 2019-01-01 RX ADMIN — ATORVASTATIN CALCIUM 20 MG: 20 TABLET, FILM COATED ORAL at 21:24

## 2019-01-01 RX ADMIN — ALBUTEROL SULFATE 2.5 MG: 2.5 SOLUTION RESPIRATORY (INHALATION) at 16:26

## 2019-01-01 RX ADMIN — LEVOTHYROXINE SODIUM 125 MCG: 125 TABLET ORAL at 07:00

## 2019-01-01 RX ADMIN — Medication 600 MG: at 21:23

## 2019-01-01 RX ADMIN — ALBUMIN (HUMAN) 25 G: 0.25 INJECTION, SOLUTION INTRAVENOUS at 11:30

## 2019-01-01 RX ADMIN — DOPAMINE HYDROCHLORIDE 2 MCG/KG/MIN: 160 INJECTION, SOLUTION INTRAVENOUS at 09:44

## 2019-01-01 RX ADMIN — POTASSIUM BICARBONATE 40 MEQ: 782 TABLET, EFFERVESCENT ORAL at 09:21

## 2019-01-01 RX ADMIN — LEVOTHYROXINE SODIUM 125 MCG: 125 TABLET ORAL at 06:47

## 2019-01-01 RX ADMIN — CARVEDILOL 6.25 MG: 6.25 TABLET, FILM COATED ORAL at 09:11

## 2019-01-01 RX ADMIN — HYDRALAZINE HYDROCHLORIDE 100 MG: 50 TABLET, FILM COATED ORAL at 15:53

## 2019-01-01 RX ADMIN — LEVOTHYROXINE SODIUM 125 MCG: 125 TABLET ORAL at 07:08

## 2019-01-01 RX ADMIN — HEPARIN SODIUM 1500 UNITS: 1000 INJECTION INTRAVENOUS; SUBCUTANEOUS at 14:03

## 2019-01-01 RX ADMIN — HYDRALAZINE HYDROCHLORIDE 25 MG: 25 TABLET, FILM COATED ORAL at 09:41

## 2019-01-01 RX ADMIN — CARVEDILOL 3.12 MG: 6.25 TABLET, FILM COATED ORAL at 07:21

## 2019-01-01 RX ADMIN — QUETIAPINE FUMARATE 25 MG: 25 TABLET ORAL at 23:09

## 2019-01-01 RX ADMIN — AMIODARONE HYDROCHLORIDE 200 MG: 200 TABLET ORAL at 15:31

## 2019-01-01 RX ADMIN — Medication 10 ML: at 21:10

## 2019-01-01 RX ADMIN — METOLAZONE 10 MG: 5 TABLET ORAL at 09:29

## 2019-01-01 RX ADMIN — HYDRALAZINE HYDROCHLORIDE 50 MG: 50 TABLET, FILM COATED ORAL at 14:05

## 2019-01-01 RX ADMIN — HYDRALAZINE HYDROCHLORIDE 50 MG: 50 TABLET, FILM COATED ORAL at 16:36

## 2019-01-01 RX ADMIN — QUETIAPINE FUMARATE 25 MG: 25 TABLET ORAL at 19:53

## 2019-01-01 RX ADMIN — HYDRALAZINE HYDROCHLORIDE 100 MG: 50 TABLET, FILM COATED ORAL at 22:11

## 2019-01-01 RX ADMIN — ALBUTEROL SULFATE 2.5 MG: 2.5 SOLUTION RESPIRATORY (INHALATION) at 08:48

## 2019-01-01 RX ADMIN — BUMETANIDE 1 MG: 1 TABLET ORAL at 09:11

## 2019-01-01 RX ADMIN — DOCUSATE SODIUM 100 MG: 100 CAPSULE, LIQUID FILLED ORAL at 08:03

## 2019-01-01 RX ADMIN — CEFEPIME 2 G: 2 INJECTION, POWDER, FOR SOLUTION INTRAVENOUS at 16:29

## 2019-01-01 RX ADMIN — BUMETANIDE 4 MG: 1 TABLET ORAL at 21:57

## 2019-01-01 RX ADMIN — Medication 10 ML: at 09:00

## 2019-01-01 RX ADMIN — BUMETANIDE 2 MG: 1 TABLET ORAL at 10:27

## 2019-01-01 RX ADMIN — Medication 10 ML: at 09:35

## 2019-01-01 RX ADMIN — PREDNISONE 50 MG: 50 TABLET ORAL at 09:57

## 2019-01-01 RX ADMIN — ISOSORBIDE MONONITRATE 30 MG: 30 TABLET ORAL at 18:17

## 2019-01-01 RX ADMIN — MIDODRINE HYDROCHLORIDE 5 MG: 5 TABLET ORAL at 12:27

## 2019-01-01 RX ADMIN — HEPARIN SODIUM 1400 UNITS: 1000 INJECTION INTRAVENOUS; SUBCUTANEOUS at 11:30

## 2019-01-01 RX ADMIN — QUETIAPINE FUMARATE 25 MG: 25 TABLET ORAL at 22:14

## 2019-01-01 RX ADMIN — CLOPIDOGREL 75 MG: 75 TABLET, FILM COATED ORAL at 10:27

## 2019-01-01 RX ADMIN — SPIRONOLACTONE 25 MG: 25 TABLET ORAL at 09:33

## 2019-01-01 RX ADMIN — LEVOTHYROXINE SODIUM 125 MCG: 125 TABLET ORAL at 08:33

## 2019-01-01 RX ADMIN — FLUOXETINE 10 MG: 10 CAPSULE ORAL at 08:33

## 2019-01-01 RX ADMIN — MEGESTROL ACETATE 200 MG: 40 SUSPENSION ORAL at 08:57

## 2019-01-01 RX ADMIN — FLUOXETINE 10 MG: 10 CAPSULE ORAL at 08:19

## 2019-01-01 RX ADMIN — ASPIRIN 81 MG: 81 TABLET, COATED ORAL at 08:47

## 2019-01-01 RX ADMIN — HYDRALAZINE HYDROCHLORIDE 100 MG: 50 TABLET, FILM COATED ORAL at 22:46

## 2019-01-01 RX ADMIN — AMIODARONE HYDROCHLORIDE 200 MG: 200 TABLET ORAL at 08:36

## 2019-01-01 RX ADMIN — SODIUM BICARBONATE 650 MG: 650 TABLET ORAL at 07:21

## 2019-01-01 RX ADMIN — FAMOTIDINE 20 MG: 20 TABLET, FILM COATED ORAL at 08:38

## 2019-01-01 RX ADMIN — HYDRALAZINE HYDROCHLORIDE 25 MG: 25 TABLET, FILM COATED ORAL at 21:27

## 2019-01-01 RX ADMIN — POLYETHYLENE GLYCOL 3350 17 G: 17 POWDER, FOR SOLUTION ORAL at 08:40

## 2019-01-01 RX ADMIN — LEVOTHYROXINE SODIUM 125 MCG: 125 TABLET ORAL at 07:14

## 2019-01-01 RX ADMIN — GUAIFENESIN 600 MG: 600 TABLET, EXTENDED RELEASE ORAL at 21:21

## 2019-01-01 RX ADMIN — AMIODARONE HYDROCHLORIDE 200 MG: 200 TABLET ORAL at 08:32

## 2019-01-01 RX ADMIN — SIMETHICONE 80 MG: 80 TABLET, CHEWABLE ORAL at 21:13

## 2019-01-01 RX ADMIN — ALBUTEROL SULFATE 2.5 MG: 2.5 SOLUTION RESPIRATORY (INHALATION) at 20:26

## 2019-01-01 RX ADMIN — BUMETANIDE 4 MG: 1 TABLET ORAL at 08:32

## 2019-01-01 RX ADMIN — BENZONATATE 100 MG: 100 CAPSULE ORAL at 15:00

## 2019-01-01 RX ADMIN — FLUOXETINE 10 MG: 10 CAPSULE ORAL at 13:11

## 2019-01-01 RX ADMIN — ONDANSETRON 4 MG: 2 INJECTION INTRAMUSCULAR; INTRAVENOUS at 14:36

## 2019-01-01 RX ADMIN — SIMETHICONE 80 MG: 80 TABLET, CHEWABLE ORAL at 21:17

## 2019-01-01 RX ADMIN — HYDRALAZINE HYDROCHLORIDE 10 MG: 20 INJECTION INTRAMUSCULAR; INTRAVENOUS at 07:00

## 2019-01-01 RX ADMIN — Medication 10 ML: at 08:39

## 2019-01-01 RX ADMIN — BUMETANIDE 3 MG: 1 TABLET ORAL at 09:30

## 2019-01-01 RX ADMIN — Medication 10 ML: at 08:50

## 2019-01-01 RX ADMIN — ASPIRIN 81 MG: 81 TABLET, COATED ORAL at 08:18

## 2019-01-01 RX ADMIN — Medication 10 ML: at 21:48

## 2019-01-01 RX ADMIN — DOCUSATE SODIUM 100 MG: 100 CAPSULE, LIQUID FILLED ORAL at 08:48

## 2019-01-01 RX ADMIN — SODIUM BICARBONATE 650 MG: 650 TABLET ORAL at 07:44

## 2019-01-01 RX ADMIN — BENZONATATE 100 MG: 100 CAPSULE ORAL at 22:17

## 2019-01-01 RX ADMIN — METOPROLOL SUCCINATE 50 MG: 50 TABLET, EXTENDED RELEASE ORAL at 07:42

## 2019-01-01 RX ADMIN — ASPIRIN 81 MG: 81 TABLET ORAL at 14:07

## 2019-01-01 RX ADMIN — SPIRONOLACTONE 25 MG: 25 TABLET ORAL at 09:10

## 2019-01-01 RX ADMIN — SIMETHICONE 80 MG: 80 TABLET, CHEWABLE ORAL at 21:04

## 2019-01-01 RX ADMIN — QUETIAPINE FUMARATE 25 MG: 25 TABLET ORAL at 20:26

## 2019-01-01 RX ADMIN — SIMETHICONE 80 MG: 80 TABLET, CHEWABLE ORAL at 20:12

## 2019-01-01 RX ADMIN — APIXABAN 2.5 MG: 2.5 TABLET, FILM COATED ORAL at 11:22

## 2019-01-01 RX ADMIN — BENZONATATE 100 MG: 100 CAPSULE ORAL at 23:07

## 2019-01-01 RX ADMIN — GUAIFENESIN 600 MG: 600 TABLET, EXTENDED RELEASE ORAL at 09:55

## 2019-01-01 RX ADMIN — ASPIRIN 81 MG: 81 TABLET ORAL at 07:45

## 2019-01-01 RX ADMIN — Medication 10 ML: at 21:15

## 2019-01-01 RX ADMIN — FAMOTIDINE 20 MG: 20 TABLET, FILM COATED ORAL at 08:02

## 2019-01-01 RX ADMIN — BUMETANIDE 1 MG: 1 TABLET ORAL at 01:38

## 2019-01-01 RX ADMIN — ASPIRIN 81 MG: 81 TABLET ORAL at 08:33

## 2019-01-01 RX ADMIN — Medication 10 ML: at 09:42

## 2019-01-01 RX ADMIN — Medication 10 ML: at 08:05

## 2019-01-01 RX ADMIN — FUROSEMIDE 20 MG/HR: 10 INJECTION, SOLUTION INTRAMUSCULAR; INTRAVENOUS at 23:24

## 2019-01-01 RX ADMIN — FLUOXETINE 10 MG: 10 CAPSULE ORAL at 09:12

## 2019-01-01 RX ADMIN — ISOSORBIDE MONONITRATE 30 MG: 30 TABLET ORAL at 09:32

## 2019-01-01 RX ADMIN — HYDRALAZINE HYDROCHLORIDE 25 MG: 25 TABLET, FILM COATED ORAL at 20:41

## 2019-01-01 RX ADMIN — AMIODARONE HYDROCHLORIDE 200 MG: 200 TABLET ORAL at 07:57

## 2019-01-01 RX ADMIN — ATORVASTATIN CALCIUM 20 MG: 20 TABLET, FILM COATED ORAL at 08:02

## 2019-01-01 RX ADMIN — ATORVASTATIN CALCIUM 20 MG: 20 TABLET, FILM COATED ORAL at 09:04

## 2019-01-01 RX ADMIN — ASPIRIN 81 MG: 81 TABLET, CHEWABLE ORAL at 15:12

## 2019-01-01 RX ADMIN — Medication 10 ML: at 13:32

## 2019-01-01 RX ADMIN — HYDRALAZINE HYDROCHLORIDE 50 MG: 50 TABLET, FILM COATED ORAL at 05:34

## 2019-01-01 RX ADMIN — ISOSORBIDE MONONITRATE 30 MG: 30 TABLET ORAL at 08:37

## 2019-01-01 RX ADMIN — LEVOTHYROXINE SODIUM 125 MCG: 125 TABLET ORAL at 08:17

## 2019-01-01 RX ADMIN — FUROSEMIDE 40 MG: 10 INJECTION, SOLUTION INTRAMUSCULAR; INTRAVENOUS at 08:33

## 2019-01-01 RX ADMIN — HYDRALAZINE HYDROCHLORIDE 50 MG: 50 TABLET, FILM COATED ORAL at 16:16

## 2019-01-01 RX ADMIN — AMIODARONE HYDROCHLORIDE 200 MG: 200 TABLET ORAL at 08:28

## 2019-01-01 RX ADMIN — SPIRONOLACTONE 25 MG: 25 TABLET ORAL at 08:33

## 2019-01-01 RX ADMIN — METOPROLOL SUCCINATE 50 MG: 50 TABLET, EXTENDED RELEASE ORAL at 08:33

## 2019-01-01 RX ADMIN — HYDRALAZINE HYDROCHLORIDE 50 MG: 50 TABLET, FILM COATED ORAL at 07:42

## 2019-01-01 RX ADMIN — SODIUM CHLORIDE, POTASSIUM CHLORIDE, SODIUM LACTATE AND CALCIUM CHLORIDE: 600; 310; 30; 20 INJECTION, SOLUTION INTRAVENOUS at 10:24

## 2019-01-01 RX ADMIN — SPIRONOLACTONE 25 MG: 25 TABLET ORAL at 15:06

## 2019-01-01 RX ADMIN — BUMETANIDE 2 MG: 1 TABLET ORAL at 21:22

## 2019-01-01 RX ADMIN — ASPIRIN 81 MG: 81 TABLET, COATED ORAL at 08:41

## 2019-01-01 RX ADMIN — CARVEDILOL 6.25 MG: 6.25 TABLET, FILM COATED ORAL at 17:18

## 2019-01-01 RX ADMIN — AMIODARONE HYDROCHLORIDE 200 MG: 200 TABLET ORAL at 08:05

## 2019-01-01 RX ADMIN — SODIUM BICARBONATE 650 MG: 650 TABLET ORAL at 08:38

## 2019-01-01 RX ADMIN — Medication 10 ML: at 22:04

## 2019-01-01 RX ADMIN — FUROSEMIDE 80 MG: 10 INJECTION, SOLUTION INTRAMUSCULAR; INTRAVENOUS at 08:39

## 2019-01-01 RX ADMIN — CLOPIDOGREL 75 MG: 75 TABLET, FILM COATED ORAL at 08:48

## 2019-01-01 RX ADMIN — ISOSORBIDE MONONITRATE 30 MG: 30 TABLET ORAL at 12:07

## 2019-01-01 RX ADMIN — METOPROLOL SUCCINATE 50 MG: 50 TABLET, EXTENDED RELEASE ORAL at 09:30

## 2019-01-01 RX ADMIN — LEVOTHYROXINE SODIUM 125 MCG: 125 TABLET ORAL at 07:24

## 2019-01-01 RX ADMIN — AMIODARONE HYDROCHLORIDE 200 MG: 200 TABLET ORAL at 13:29

## 2019-01-01 RX ADMIN — ATORVASTATIN CALCIUM 20 MG: 20 TABLET, FILM COATED ORAL at 08:18

## 2019-01-01 RX ADMIN — ISOSORBIDE MONONITRATE 30 MG: 30 TABLET ORAL at 09:30

## 2019-01-01 RX ADMIN — SIMETHICONE 80 MG: 80 TABLET, CHEWABLE ORAL at 22:11

## 2019-01-01 RX ADMIN — ALBUTEROL SULFATE 2.5 MG: 2.5 SOLUTION RESPIRATORY (INHALATION) at 20:31

## 2019-01-01 RX ADMIN — HYDRALAZINE HYDROCHLORIDE 25 MG: 25 TABLET, FILM COATED ORAL at 15:23

## 2019-01-01 RX ADMIN — FLUOXETINE 10 MG: 10 CAPSULE ORAL at 08:54

## 2019-01-01 RX ADMIN — MEGESTROL ACETATE 200 MG: 40 SUSPENSION ORAL at 09:20

## 2019-01-01 RX ADMIN — FUROSEMIDE 80 MG: 10 INJECTION, SOLUTION INTRAMUSCULAR; INTRAVENOUS at 09:00

## 2019-01-01 RX ADMIN — LIDOCAINE HYDROCHLORIDE 2 ML: 10 INJECTION, SOLUTION INFILTRATION; PERINEURAL at 16:00

## 2019-01-01 RX ADMIN — Medication 10 ML: at 20:00

## 2019-01-01 RX ADMIN — HYDRALAZINE HYDROCHLORIDE 25 MG: 25 TABLET, FILM COATED ORAL at 21:10

## 2019-01-01 RX ADMIN — ISOSORBIDE MONONITRATE 30 MG: 30 TABLET ORAL at 08:36

## 2019-01-01 RX ADMIN — BUMETANIDE 3 MG: 1 TABLET ORAL at 15:07

## 2019-01-01 RX ADMIN — FUROSEMIDE 20 MG: 10 INJECTION, SOLUTION INTRAMUSCULAR; INTRAVENOUS at 18:06

## 2019-01-01 RX ADMIN — HYDRALAZINE HYDROCHLORIDE 50 MG: 50 TABLET, FILM COATED ORAL at 06:35

## 2019-01-01 RX ADMIN — HYDRALAZINE HYDROCHLORIDE 50 MG: 50 TABLET, FILM COATED ORAL at 21:58

## 2019-01-01 RX ADMIN — QUETIAPINE FUMARATE 25 MG: 25 TABLET ORAL at 22:11

## 2019-01-01 RX ADMIN — METOPROLOL SUCCINATE 50 MG: 50 TABLET, EXTENDED RELEASE ORAL at 10:27

## 2019-01-01 RX ADMIN — ISOSORBIDE MONONITRATE 30 MG: 30 TABLET ORAL at 07:44

## 2019-01-01 RX ADMIN — METOPROLOL SUCCINATE 25 MG: 25 TABLET, FILM COATED, EXTENDED RELEASE ORAL at 12:40

## 2019-01-01 RX ADMIN — QUETIAPINE FUMARATE 25 MG: 25 TABLET ORAL at 20:37

## 2019-01-01 RX ADMIN — IRON SUCROSE 200 MG: 20 INJECTION, SOLUTION INTRAVENOUS at 20:07

## 2019-01-01 RX ADMIN — CARVEDILOL 12.5 MG: 12.5 TABLET, FILM COATED ORAL at 17:50

## 2019-01-01 RX ADMIN — ISOSORBIDE MONONITRATE 30 MG: 30 TABLET ORAL at 07:42

## 2019-01-01 RX ADMIN — Medication 10 ML: at 08:04

## 2019-01-01 RX ADMIN — FLUOXETINE 10 MG: 10 CAPSULE ORAL at 08:59

## 2019-01-01 RX ADMIN — LEVOTHYROXINE SODIUM 125 MCG: 125 TABLET ORAL at 08:36

## 2019-01-01 RX ADMIN — ATORVASTATIN CALCIUM 20 MG: 20 TABLET, FILM COATED ORAL at 10:01

## 2019-01-01 RX ADMIN — DOCUSATE SODIUM 100 MG: 100 CAPSULE, LIQUID FILLED ORAL at 08:54

## 2019-01-01 RX ADMIN — SIMETHICONE 80 MG: 80 TABLET, CHEWABLE ORAL at 21:47

## 2019-01-01 RX ADMIN — FUROSEMIDE 80 MG: 10 INJECTION, SOLUTION INTRAMUSCULAR; INTRAVENOUS at 09:27

## 2019-01-01 RX ADMIN — Medication 10 ML: at 08:28

## 2019-01-01 RX ADMIN — METOPROLOL SUCCINATE 50 MG: 50 TABLET, EXTENDED RELEASE ORAL at 08:27

## 2019-01-01 RX ADMIN — ASPIRIN 81 MG: 81 TABLET ORAL at 08:38

## 2019-01-01 RX ADMIN — SPIRONOLACTONE 25 MG: 25 TABLET ORAL at 08:30

## 2019-01-01 RX ADMIN — CLOPIDOGREL 75 MG: 75 TABLET, FILM COATED ORAL at 15:13

## 2019-01-01 RX ADMIN — ISOSORBIDE MONONITRATE 30 MG: 30 TABLET ORAL at 10:50

## 2019-01-01 RX ADMIN — FLUOXETINE 10 MG: 10 CAPSULE ORAL at 15:08

## 2019-01-01 RX ADMIN — ISOSORBIDE MONONITRATE 30 MG: 30 TABLET ORAL at 08:34

## 2019-01-01 RX ADMIN — SODIUM BICARBONATE 650 MG: 650 TABLET ORAL at 21:22

## 2019-01-01 RX ADMIN — SODIUM BICARBONATE 650 MG: 650 TABLET ORAL at 08:59

## 2019-01-01 RX ADMIN — ALBUMIN, HUMAN INJ 5% 25 G: 5 SOLUTION at 19:54

## 2019-01-01 RX ADMIN — ALBUMIN (HUMAN) 12.5 G: 0.25 INJECTION, SOLUTION INTRAVENOUS at 09:15

## 2019-01-01 RX ADMIN — LEVOTHYROXINE SODIUM 125 MCG: 125 TABLET ORAL at 07:02

## 2019-01-01 RX ADMIN — MIDODRINE HYDROCHLORIDE 5 MG: 5 TABLET ORAL at 09:58

## 2019-01-01 RX ADMIN — AMIODARONE HYDROCHLORIDE 200 MG: 200 TABLET ORAL at 08:39

## 2019-01-01 RX ADMIN — Medication 10 ML: at 08:21

## 2019-01-01 RX ADMIN — PREDNISONE 50 MG: 50 TABLET ORAL at 15:11

## 2019-01-01 RX ADMIN — Medication 10 ML: at 07:44

## 2019-01-01 RX ADMIN — ALBUMIN (HUMAN) 25 G: 0.25 INJECTION, SOLUTION INTRAVENOUS at 14:15

## 2019-01-01 RX ADMIN — HYDRALAZINE HYDROCHLORIDE 25 MG: 25 TABLET, FILM COATED ORAL at 06:21

## 2019-01-01 RX ADMIN — Medication 10 ML: at 20:23

## 2019-01-01 RX ADMIN — ASPIRIN 81 MG: 81 TABLET ORAL at 08:39

## 2019-01-01 RX ADMIN — HEPARIN SODIUM 1900 UNITS: 1000 INJECTION, SOLUTION INTRAVENOUS; SUBCUTANEOUS at 12:55

## 2019-01-01 RX ADMIN — ISOSORBIDE MONONITRATE 60 MG: 60 TABLET ORAL at 08:48

## 2019-01-01 RX ADMIN — LEVOTHYROXINE SODIUM 125 MCG: 125 TABLET ORAL at 07:47

## 2019-01-01 RX ADMIN — LEVOTHYROXINE SODIUM 125 MCG: 125 TABLET ORAL at 09:58

## 2019-01-01 RX ADMIN — ISOSORBIDE MONONITRATE 30 MG: 30 TABLET ORAL at 08:30

## 2019-01-01 RX ADMIN — ATORVASTATIN CALCIUM 20 MG: 20 TABLET, FILM COATED ORAL at 12:40

## 2019-01-01 RX ADMIN — Medication 10 ML: at 09:12

## 2019-01-01 RX ADMIN — Medication 2 SPRAY: at 08:39

## 2019-01-01 RX ADMIN — Medication 10 ML: at 20:50

## 2019-01-01 RX ADMIN — ISOSORBIDE MONONITRATE 30 MG: 30 TABLET ORAL at 15:31

## 2019-01-01 RX ADMIN — NITROGLYCERIN 5 MCG/MIN: 20 INJECTION INTRAVENOUS at 03:43

## 2019-01-01 RX ADMIN — SODIUM BICARBONATE 650 MG: 650 TABLET ORAL at 09:23

## 2019-01-01 RX ADMIN — LEVOTHYROXINE SODIUM 125 MCG: 125 TABLET ORAL at 12:40

## 2019-01-01 RX ADMIN — BUMETANIDE 4 MG: 1 TABLET ORAL at 20:08

## 2019-01-01 RX ADMIN — Medication 1 G: at 08:48

## 2019-01-01 RX ADMIN — SIMETHICONE 80 MG: 80 TABLET, CHEWABLE ORAL at 12:58

## 2019-01-01 RX ADMIN — SIMETHICONE 80 MG: 80 TABLET, CHEWABLE ORAL at 20:21

## 2019-01-01 RX ADMIN — SIMETHICONE 80 MG: 80 TABLET, CHEWABLE ORAL at 08:26

## 2019-01-01 RX ADMIN — DARBEPOETIN ALFA 100 MCG: 100 INJECTION, SOLUTION INTRAVENOUS; SUBCUTANEOUS at 14:12

## 2019-01-01 RX ADMIN — HEPARIN SODIUM 1400 UNITS: 1000 INJECTION INTRAVENOUS; SUBCUTANEOUS at 18:38

## 2019-01-01 RX ADMIN — ALBUTEROL SULFATE 2.5 MG: 2.5 SOLUTION RESPIRATORY (INHALATION) at 11:29

## 2019-01-01 RX ADMIN — ATORVASTATIN CALCIUM 20 MG: 20 TABLET, FILM COATED ORAL at 09:11

## 2019-01-01 RX ADMIN — SODIUM CHLORIDE, POTASSIUM CHLORIDE, SODIUM LACTATE AND CALCIUM CHLORIDE: 600; 310; 30; 20 INJECTION, SOLUTION INTRAVENOUS at 09:22

## 2019-01-01 RX ADMIN — HYDRALAZINE HYDROCHLORIDE 100 MG: 50 TABLET, FILM COATED ORAL at 06:01

## 2019-01-01 RX ADMIN — CARVEDILOL 6.25 MG: 6.25 TABLET, FILM COATED ORAL at 18:05

## 2019-01-01 RX ADMIN — HEPARIN SODIUM 1400 UNITS: 1000 INJECTION INTRAVENOUS; SUBCUTANEOUS at 18:40

## 2019-01-01 RX ADMIN — ASPIRIN 81 MG: 81 TABLET, COATED ORAL at 10:26

## 2019-01-01 RX ADMIN — LEVOTHYROXINE SODIUM 100 MCG: 100 TABLET ORAL at 11:03

## 2019-01-01 RX ADMIN — ALBUTEROL SULFATE 2.5 MG: 2.5 SOLUTION RESPIRATORY (INHALATION) at 10:18

## 2019-01-01 RX ADMIN — CARVEDILOL 12.5 MG: 12.5 TABLET, FILM COATED ORAL at 09:15

## 2019-01-01 RX ADMIN — CARVEDILOL 6.25 MG: 6.25 TABLET, FILM COATED ORAL at 08:03

## 2019-01-01 RX ADMIN — CLOPIDOGREL 75 MG: 75 TABLET, FILM COATED ORAL at 09:30

## 2019-01-01 RX ADMIN — HYDRALAZINE HYDROCHLORIDE 10 MG: 20 INJECTION INTRAMUSCULAR; INTRAVENOUS at 04:19

## 2019-01-01 RX ADMIN — LEVOTHYROXINE SODIUM 125 MCG: 125 TABLET ORAL at 07:42

## 2019-01-01 RX ADMIN — AMIODARONE HYDROCHLORIDE 200 MG: 200 TABLET ORAL at 09:15

## 2019-01-01 RX ADMIN — AMIODARONE HYDROCHLORIDE 200 MG: 200 TABLET ORAL at 08:59

## 2019-01-01 RX ADMIN — QUETIAPINE FUMARATE 25 MG: 25 TABLET ORAL at 21:26

## 2019-01-01 RX ADMIN — LEVOTHYROXINE SODIUM 125 MCG: 125 TABLET ORAL at 07:53

## 2019-01-01 RX ADMIN — LEVOTHYROXINE SODIUM 100 MCG: 100 TABLET ORAL at 08:48

## 2019-01-01 RX ADMIN — IRON SUCROSE 100 MG: 20 INJECTION, SOLUTION INTRAVENOUS at 09:28

## 2019-01-01 RX ADMIN — ALBUTEROL SULFATE 2.5 MG: 2.5 SOLUTION RESPIRATORY (INHALATION) at 20:35

## 2019-01-01 RX ADMIN — METOPROLOL SUCCINATE 50 MG: 50 TABLET, EXTENDED RELEASE ORAL at 12:07

## 2019-01-01 RX ADMIN — LEVOTHYROXINE SODIUM 100 MCG: 100 TABLET ORAL at 10:50

## 2019-01-01 RX ADMIN — ALBUTEROL SULFATE 2.5 MG: 2.5 SOLUTION RESPIRATORY (INHALATION) at 21:09

## 2019-01-01 RX ADMIN — CARVEDILOL 6.25 MG: 6.25 TABLET, FILM COATED ORAL at 09:03

## 2019-01-01 RX ADMIN — Medication 10 ML: at 20:40

## 2019-01-01 RX ADMIN — CARVEDILOL 3.12 MG: 6.25 TABLET, FILM COATED ORAL at 09:23

## 2019-01-01 RX ADMIN — CLOPIDOGREL 75 MG: 75 TABLET, FILM COATED ORAL at 08:54

## 2019-01-01 RX ADMIN — VANCOMYCIN HYDROCHLORIDE 1 G: 1 INJECTION, SOLUTION INTRAVENOUS at 08:36

## 2019-01-01 RX ADMIN — DOCUSATE SODIUM 100 MG: 100 CAPSULE, LIQUID FILLED ORAL at 09:15

## 2019-01-01 RX ADMIN — FUROSEMIDE 80 MG: 10 INJECTION, SOLUTION INTRAMUSCULAR; INTRAVENOUS at 16:59

## 2019-01-01 RX ADMIN — MEGESTROL ACETATE 200 MG: 40 SUSPENSION ORAL at 08:33

## 2019-01-01 RX ADMIN — ATORVASTATIN CALCIUM 20 MG: 20 TABLET, FILM COATED ORAL at 08:28

## 2019-01-01 RX ADMIN — ASPIRIN 81 MG: 81 TABLET ORAL at 11:04

## 2019-01-01 RX ADMIN — ATORVASTATIN CALCIUM 20 MG: 20 TABLET, FILM COATED ORAL at 08:54

## 2019-01-01 RX ADMIN — CLOPIDOGREL 75 MG: 75 TABLET, FILM COATED ORAL at 09:33

## 2019-01-01 RX ADMIN — PREDNISONE 50 MG: 50 TABLET ORAL at 09:20

## 2019-01-01 RX ADMIN — CARVEDILOL 6.25 MG: 6.25 TABLET, FILM COATED ORAL at 20:37

## 2019-01-01 RX ADMIN — PHENYLEPHRINE HYDROCHLORIDE 200 MCG: 10 INJECTION INTRAVENOUS at 12:02

## 2019-01-01 RX ADMIN — Medication 10 ML: at 20:26

## 2019-01-01 RX ADMIN — SODIUM CHLORIDE, PRESERVATIVE FREE 1.5 ML: 5 INJECTION INTRAVENOUS at 15:00

## 2019-01-01 RX ADMIN — LEVOTHYROXINE SODIUM 125 MCG: 125 TABLET ORAL at 07:21

## 2019-01-01 RX ADMIN — ATORVASTATIN CALCIUM 20 MG: 20 TABLET, FILM COATED ORAL at 10:27

## 2019-01-01 RX ADMIN — AMIODARONE HYDROCHLORIDE 200 MG: 200 TABLET ORAL at 08:54

## 2019-01-01 RX ADMIN — ISOSORBIDE MONONITRATE 30 MG: 30 TABLET ORAL at 09:11

## 2019-01-01 RX ADMIN — Medication 10 ML: at 08:57

## 2019-01-01 RX ADMIN — DARBEPOETIN ALFA 100 MCG: 100 INJECTION, SOLUTION INTRAVENOUS; SUBCUTANEOUS at 09:35

## 2019-01-01 RX ADMIN — HEPARIN SODIUM 5000 UNITS: 5000 INJECTION INTRAVENOUS; SUBCUTANEOUS at 09:04

## 2019-01-01 RX ADMIN — Medication 10 ML: at 11:56

## 2019-01-01 RX ADMIN — Medication 10 ML: at 09:26

## 2019-01-01 RX ADMIN — CLOPIDOGREL 75 MG: 75 TABLET, FILM COATED ORAL at 08:47

## 2019-01-01 RX ADMIN — HEPARIN SODIUM 1900 UNITS: 1000 INJECTION, SOLUTION INTRAVENOUS; SUBCUTANEOUS at 12:54

## 2019-01-01 RX ADMIN — METOPROLOL SUCCINATE 50 MG: 50 TABLET, EXTENDED RELEASE ORAL at 08:32

## 2019-01-01 RX ADMIN — METOLAZONE 10 MG: 5 TABLET ORAL at 08:27

## 2019-01-01 RX ADMIN — CLOPIDOGREL 75 MG: 75 TABLET, FILM COATED ORAL at 07:21

## 2019-01-01 RX ADMIN — HEPARIN SODIUM 5000 UNITS: 5000 INJECTION INTRAVENOUS; SUBCUTANEOUS at 22:07

## 2019-01-01 RX ADMIN — Medication 2 SPRAY: at 17:15

## 2019-01-01 RX ADMIN — QUETIAPINE FUMARATE 25 MG: 25 TABLET ORAL at 21:00

## 2019-01-01 RX ADMIN — ASPIRIN 81 MG: 81 TABLET, COATED ORAL at 08:28

## 2019-01-01 RX ADMIN — SODIUM CHLORIDE: 9 INJECTION, SOLUTION INTRAVENOUS at 21:31

## 2019-01-01 RX ADMIN — FLUOXETINE 10 MG: 10 CAPSULE ORAL at 09:01

## 2019-01-01 RX ADMIN — SIMETHICONE 80 MG: 80 TABLET, CHEWABLE ORAL at 20:08

## 2019-01-01 RX ADMIN — BENZONATATE 100 MG: 100 CAPSULE ORAL at 19:53

## 2019-01-01 RX ADMIN — ISOSORBIDE MONONITRATE 30 MG: 30 TABLET ORAL at 08:48

## 2019-01-01 RX ADMIN — BUMETANIDE 2 MG: 1 TABLET ORAL at 21:00

## 2019-01-01 RX ADMIN — ASPIRIN 81 MG: 81 TABLET, COATED ORAL at 08:32

## 2019-01-01 RX ADMIN — VANCOMYCIN HYDROCHLORIDE 500 MG: 500 INJECTION, POWDER, LYOPHILIZED, FOR SOLUTION INTRAVENOUS at 14:58

## 2019-01-01 RX ADMIN — FLUOXETINE 10 MG: 10 CAPSULE ORAL at 11:54

## 2019-01-01 RX ADMIN — ALBUTEROL SULFATE 2.5 MG: 2.5 SOLUTION RESPIRATORY (INHALATION) at 20:16

## 2019-01-01 RX ADMIN — ASPIRIN 81 MG: 81 TABLET ORAL at 08:04

## 2019-01-01 RX ADMIN — HYDRALAZINE HYDROCHLORIDE 50 MG: 50 TABLET, FILM COATED ORAL at 10:26

## 2019-01-01 RX ADMIN — METOPROLOL SUCCINATE 50 MG: 50 TABLET, EXTENDED RELEASE ORAL at 08:17

## 2019-01-01 RX ADMIN — CARVEDILOL 6.25 MG: 6.25 TABLET, FILM COATED ORAL at 08:58

## 2019-01-01 RX ADMIN — AMIODARONE HYDROCHLORIDE 200 MG: 200 TABLET ORAL at 09:09

## 2019-01-01 RX ADMIN — Medication 10 ML: at 08:40

## 2019-01-01 RX ADMIN — QUETIAPINE FUMARATE 25 MG: 25 TABLET ORAL at 20:47

## 2019-01-01 RX ADMIN — SIMETHICONE 80 MG: 80 TABLET, CHEWABLE ORAL at 13:33

## 2019-01-01 RX ADMIN — CLOPIDOGREL 75 MG: 75 TABLET, FILM COATED ORAL at 07:44

## 2019-01-01 RX ADMIN — CLOPIDOGREL 75 MG: 75 TABLET, FILM COATED ORAL at 08:57

## 2019-01-01 RX ADMIN — AMIODARONE HYDROCHLORIDE 200 MG: 200 TABLET ORAL at 10:19

## 2019-01-01 RX ADMIN — HYDRALAZINE HYDROCHLORIDE 50 MG: 50 TABLET, FILM COATED ORAL at 14:45

## 2019-01-01 RX ADMIN — METOPROLOL TARTRATE 50 MG: 50 TABLET, FILM COATED ORAL at 23:05

## 2019-01-01 RX ADMIN — LEVOTHYROXINE SODIUM 125 MCG: 125 TABLET ORAL at 08:31

## 2019-01-01 RX ADMIN — CLOPIDOGREL 75 MG: 75 TABLET, FILM COATED ORAL at 08:02

## 2019-01-01 RX ADMIN — FUROSEMIDE 40 MG: 40 TABLET ORAL at 18:11

## 2019-01-01 RX ADMIN — ALBUMIN (HUMAN) 25 G: 12.5 INJECTION, SOLUTION INTRAVENOUS at 20:27

## 2019-01-01 RX ADMIN — ALBUTEROL SULFATE 2.5 MG: 2.5 SOLUTION RESPIRATORY (INHALATION) at 16:21

## 2019-01-01 RX ADMIN — ASPIRIN 81 MG: 81 TABLET, CHEWABLE ORAL at 09:55

## 2019-01-01 RX ADMIN — IRON SUCROSE 100 MG: 20 INJECTION, SOLUTION INTRAVENOUS at 13:32

## 2019-01-01 RX ADMIN — IRON SUCROSE 100 MG: 20 INJECTION, SOLUTION INTRAVENOUS at 10:00

## 2019-01-01 RX ADMIN — QUETIAPINE FUMARATE 25 MG: 25 TABLET ORAL at 21:19

## 2019-01-01 RX ADMIN — LEVOTHYROXINE SODIUM 125 MCG: 125 TABLET ORAL at 10:41

## 2019-01-01 RX ADMIN — ASPIRIN 81 MG: 81 TABLET, COATED ORAL at 09:31

## 2019-01-01 RX ADMIN — DOCUSATE SODIUM 100 MG: 100 CAPSULE, LIQUID FILLED ORAL at 08:38

## 2019-01-01 RX ADMIN — SIMETHICONE 80 MG: 80 TABLET, CHEWABLE ORAL at 20:24

## 2019-01-01 RX ADMIN — METOPROLOL SUCCINATE 50 MG: 50 TABLET, EXTENDED RELEASE ORAL at 07:57

## 2019-01-01 RX ADMIN — SODIUM CHLORIDE: 9 INJECTION, SOLUTION INTRAMUSCULAR; INTRAVENOUS; SUBCUTANEOUS at 12:00

## 2019-01-01 RX ADMIN — IRON SUCROSE 200 MG: 20 INJECTION, SOLUTION INTRAVENOUS at 15:34

## 2019-01-01 RX ADMIN — FAMOTIDINE 20 MG: 20 TABLET, FILM COATED ORAL at 09:23

## 2019-01-01 RX ADMIN — DOCUSATE SODIUM 100 MG: 100 CAPSULE, LIQUID FILLED ORAL at 20:00

## 2019-01-01 RX ADMIN — Medication 10 ML: at 21:21

## 2019-01-01 RX ADMIN — FUROSEMIDE 20 MG: 10 INJECTION, SOLUTION INTRAMUSCULAR; INTRAVENOUS at 07:55

## 2019-01-01 RX ADMIN — SIMETHICONE 80 MG: 80 TABLET, CHEWABLE ORAL at 08:30

## 2019-01-01 RX ADMIN — SPIRONOLACTONE 25 MG: 25 TABLET ORAL at 08:32

## 2019-01-01 RX ADMIN — HEPARIN SODIUM 1500 UNITS: 1000 INJECTION INTRAVENOUS; SUBCUTANEOUS at 14:29

## 2019-01-01 RX ADMIN — SPIRONOLACTONE 25 MG: 25 TABLET ORAL at 18:26

## 2019-01-01 RX ADMIN — Medication 10 ML: at 15:15

## 2019-01-01 RX ADMIN — HYDRALAZINE HYDROCHLORIDE 25 MG: 25 TABLET, FILM COATED ORAL at 16:28

## 2019-01-01 RX ADMIN — SIMETHICONE 80 MG: 80 TABLET, CHEWABLE ORAL at 15:05

## 2019-01-01 RX ADMIN — HYDRALAZINE HYDROCHLORIDE 100 MG: 50 TABLET, FILM COATED ORAL at 21:18

## 2019-01-01 RX ADMIN — AMIODARONE HYDROCHLORIDE 200 MG: 200 TABLET ORAL at 15:06

## 2019-01-01 RX ADMIN — SIMETHICONE 80 MG: 80 TABLET, CHEWABLE ORAL at 07:57

## 2019-01-01 RX ADMIN — FLUOXETINE 10 MG: 10 CAPSULE ORAL at 17:23

## 2019-01-01 RX ADMIN — SIMETHICONE 80 MG: 80 TABLET, CHEWABLE ORAL at 16:04

## 2019-01-01 RX ADMIN — BENZONATATE 100 MG: 100 CAPSULE ORAL at 21:26

## 2019-01-01 RX ADMIN — AMIODARONE HYDROCHLORIDE 200 MG: 200 TABLET ORAL at 09:59

## 2019-01-01 RX ADMIN — GUAIFENESIN 600 MG: 600 TABLET, EXTENDED RELEASE ORAL at 21:25

## 2019-01-01 RX ADMIN — QUETIAPINE FUMARATE 25 MG: 25 TABLET ORAL at 21:32

## 2019-01-01 RX ADMIN — LEVOTHYROXINE SODIUM 100 MCG: 100 TABLET ORAL at 06:43

## 2019-01-01 RX ADMIN — MEGESTROL ACETATE 200 MG: 40 SUSPENSION ORAL at 17:24

## 2019-01-01 RX ADMIN — MAGNESIUM HYDROXIDE 30 ML: 400 SUSPENSION ORAL at 09:10

## 2019-01-01 RX ADMIN — LEVOTHYROXINE SODIUM 125 MCG: 125 TABLET ORAL at 09:10

## 2019-01-01 RX ADMIN — Medication 10 ML: at 20:18

## 2019-01-01 RX ADMIN — PREDNISONE 50 MG: 50 TABLET ORAL at 09:55

## 2019-01-01 RX ADMIN — Medication 10 ML: at 15:34

## 2019-01-01 RX ADMIN — METOPROLOL SUCCINATE 50 MG: 50 TABLET, EXTENDED RELEASE ORAL at 09:03

## 2019-01-01 RX ADMIN — SODIUM BICARBONATE 650 MG: 650 TABLET ORAL at 14:58

## 2019-01-01 RX ADMIN — LEVOTHYROXINE SODIUM 100 MCG: 100 TABLET ORAL at 06:29

## 2019-01-01 RX ADMIN — ATORVASTATIN CALCIUM 20 MG: 20 TABLET, FILM COATED ORAL at 08:39

## 2019-01-01 RX ADMIN — SODIUM CHLORIDE, POTASSIUM CHLORIDE, SODIUM LACTATE AND CALCIUM CHLORIDE: 600; 310; 30; 20 INJECTION, SOLUTION INTRAVENOUS at 08:27

## 2019-01-01 RX ADMIN — HYDRALAZINE HYDROCHLORIDE 50 MG: 50 TABLET, FILM COATED ORAL at 06:50

## 2019-01-01 RX ADMIN — CLOPIDOGREL 75 MG: 75 TABLET, FILM COATED ORAL at 07:42

## 2019-01-01 RX ADMIN — ISOSORBIDE MONONITRATE 30 MG: 30 TABLET ORAL at 15:07

## 2019-01-01 RX ADMIN — ATORVASTATIN CALCIUM 20 MG: 20 TABLET, FILM COATED ORAL at 20:13

## 2019-01-01 RX ADMIN — Medication 10 ML: at 21:34

## 2019-01-01 RX ADMIN — AMIODARONE HYDROCHLORIDE 200 MG: 200 TABLET ORAL at 09:40

## 2019-01-01 RX ADMIN — BUMETANIDE 3 MG: 1 TABLET ORAL at 20:27

## 2019-01-01 RX ADMIN — GUAIFENESIN 600 MG: 600 TABLET, EXTENDED RELEASE ORAL at 10:41

## 2019-01-01 RX ADMIN — LEVOTHYROXINE SODIUM 125 MCG: 125 TABLET ORAL at 06:50

## 2019-01-01 RX ADMIN — CLOPIDOGREL 75 MG: 75 TABLET, FILM COATED ORAL at 09:26

## 2019-01-01 RX ADMIN — THROMBIN, TOPICAL (BOVINE): KIT at 12:03

## 2019-01-01 RX ADMIN — SODIUM CHLORIDE, PRESERVATIVE FREE 1.4 ML: 5 INJECTION INTRAVENOUS at 15:00

## 2019-01-01 RX ADMIN — ASPIRIN 81 MG: 81 TABLET, COATED ORAL at 08:48

## 2019-01-01 RX ADMIN — SPIRONOLACTONE 25 MG: 25 TABLET ORAL at 08:24

## 2019-01-01 RX ADMIN — AMIODARONE HYDROCHLORIDE 200 MG: 200 TABLET ORAL at 11:04

## 2019-01-01 RX ADMIN — ATORVASTATIN CALCIUM 20 MG: 20 TABLET, FILM COATED ORAL at 09:26

## 2019-01-01 RX ADMIN — DARBEPOETIN ALFA 100 MCG: 100 INJECTION, SOLUTION INTRAVENOUS; SUBCUTANEOUS at 12:23

## 2019-01-01 RX ADMIN — FAMOTIDINE 20 MG: 20 TABLET, FILM COATED ORAL at 08:54

## 2019-01-01 RX ADMIN — Medication 10 ML: at 09:43

## 2019-01-01 RX ADMIN — DOCUSATE SODIUM 100 MG: 100 CAPSULE, LIQUID FILLED ORAL at 09:07

## 2019-01-01 RX ADMIN — BUMETANIDE 0.5 MG/HR: 0.25 INJECTION INTRAMUSCULAR; INTRAVENOUS at 03:24

## 2019-01-01 RX ADMIN — DARBEPOETIN ALFA 100 MCG: 100 INJECTION, SOLUTION INTRAVENOUS; SUBCUTANEOUS at 12:46

## 2019-01-01 RX ADMIN — METOPROLOL SUCCINATE 50 MG: 50 TABLET, EXTENDED RELEASE ORAL at 18:17

## 2019-01-01 RX ADMIN — BUMETANIDE 3 MG: 1 TABLET ORAL at 08:17

## 2019-01-01 RX ADMIN — BUMETANIDE 2 MG: 1 TABLET ORAL at 22:03

## 2019-01-01 RX ADMIN — POLYETHYLENE GLYCOL 3350 17 G: 17 POWDER, FOR SOLUTION ORAL at 10:28

## 2019-01-01 RX ADMIN — ATORVASTATIN CALCIUM 20 MG: 20 TABLET, FILM COATED ORAL at 09:40

## 2019-01-01 RX ADMIN — METOPROLOL SUCCINATE 25 MG: 25 TABLET, FILM COATED, EXTENDED RELEASE ORAL at 08:48

## 2019-01-01 RX ADMIN — DOCUSATE SODIUM 100 MG: 100 CAPSULE, LIQUID FILLED ORAL at 21:14

## 2019-01-01 RX ADMIN — HYDRALAZINE HYDROCHLORIDE 50 MG: 50 TABLET, FILM COATED ORAL at 15:08

## 2019-01-01 RX ADMIN — MEGESTROL ACETATE 200 MG: 40 SUSPENSION ORAL at 19:15

## 2019-01-01 RX ADMIN — HYDRALAZINE HYDROCHLORIDE 50 MG: 50 TABLET, FILM COATED ORAL at 06:04

## 2019-01-01 RX ADMIN — FUROSEMIDE 5 MG/HR: 10 INJECTION, SOLUTION INTRAMUSCULAR; INTRAVENOUS at 11:49

## 2019-01-01 RX ADMIN — CLOPIDOGREL 75 MG: 75 TABLET, FILM COATED ORAL at 11:03

## 2019-01-01 RX ADMIN — CARVEDILOL 12.5 MG: 12.5 TABLET, FILM COATED ORAL at 09:04

## 2019-01-01 RX ADMIN — SODIUM CHLORIDE 500 ML: 0.9 INJECTION, SOLUTION INTRAVENOUS at 20:50

## 2019-01-01 RX ADMIN — ATORVASTATIN CALCIUM 20 MG: 20 TABLET, FILM COATED ORAL at 09:32

## 2019-01-01 RX ADMIN — SPIRONOLACTONE 25 MG: 25 TABLET ORAL at 09:59

## 2019-01-01 RX ADMIN — FLUOXETINE 10 MG: 10 CAPSULE ORAL at 07:42

## 2019-01-01 RX ADMIN — BUMETANIDE 4 MG: 1 TABLET ORAL at 07:42

## 2019-01-01 RX ADMIN — ISOSORBIDE MONONITRATE 30 MG: 30 TABLET ORAL at 13:29

## 2019-01-01 RX ADMIN — FLUOXETINE 10 MG: 10 CAPSULE ORAL at 08:48

## 2019-01-01 RX ADMIN — FUROSEMIDE 80 MG: 10 INJECTION, SOLUTION INTRAMUSCULAR; INTRAVENOUS at 19:33

## 2019-01-01 RX ADMIN — BUMETANIDE 1 MG: 1 TABLET ORAL at 08:54

## 2019-01-01 RX ADMIN — QUETIAPINE FUMARATE 25 MG: 25 TABLET ORAL at 21:41

## 2019-01-01 RX ADMIN — APIXABAN 2.5 MG: 2.5 TABLET, FILM COATED ORAL at 08:39

## 2019-01-01 RX ADMIN — ISOSORBIDE MONONITRATE 30 MG: 30 TABLET ORAL at 09:03

## 2019-01-01 RX ADMIN — ATORVASTATIN CALCIUM 20 MG: 20 TABLET, FILM COATED ORAL at 11:04

## 2019-01-01 RX ADMIN — Medication 10 ML: at 22:05

## 2019-01-01 RX ADMIN — SIMETHICONE 80 MG: 80 TABLET, CHEWABLE ORAL at 09:18

## 2019-01-01 RX ADMIN — AMIODARONE HYDROCHLORIDE 200 MG: 200 TABLET ORAL at 07:45

## 2019-01-01 RX ADMIN — ASPIRIN 81 MG: 81 TABLET, COATED ORAL at 08:36

## 2019-01-01 RX ADMIN — THROMBIN, TOPICAL (RECOMBINANT) 5000 UNITS: KIT at 12:03

## 2019-01-01 RX ADMIN — FUROSEMIDE 40 MG: 10 INJECTION, SOLUTION INTRAMUSCULAR; INTRAVENOUS at 17:43

## 2019-01-01 RX ADMIN — SPIRONOLACTONE 25 MG: 25 TABLET ORAL at 09:43

## 2019-01-01 RX ADMIN — LEVOTHYROXINE SODIUM 125 MCG: 125 TABLET ORAL at 06:08

## 2019-01-01 RX ADMIN — CARVEDILOL 6.25 MG: 6.25 TABLET, FILM COATED ORAL at 07:55

## 2019-01-01 RX ADMIN — FUROSEMIDE 80 MG: 10 INJECTION, SOLUTION INTRAMUSCULAR; INTRAVENOUS at 18:17

## 2019-01-01 RX ADMIN — ATORVASTATIN CALCIUM 20 MG: 20 TABLET, FILM COATED ORAL at 08:47

## 2019-01-01 RX ADMIN — LEVOTHYROXINE SODIUM 125 MCG: 125 TABLET ORAL at 07:27

## 2019-01-01 RX ADMIN — AMIODARONE HYDROCHLORIDE 200 MG: 200 TABLET ORAL at 08:40

## 2019-01-01 RX ADMIN — FLUOXETINE 10 MG: 10 CAPSULE ORAL at 09:09

## 2019-01-01 RX ADMIN — FUROSEMIDE 20 MG: 10 INJECTION, SOLUTION INTRAMUSCULAR; INTRAVENOUS at 09:15

## 2019-01-01 RX ADMIN — ISOSORBIDE MONONITRATE 30 MG: 30 TABLET ORAL at 08:47

## 2019-01-01 RX ADMIN — GUAIFENESIN 600 MG: 600 TABLET, EXTENDED RELEASE ORAL at 23:09

## 2019-01-01 RX ADMIN — Medication 10 ML: at 19:53

## 2019-01-01 RX ADMIN — DOCUSATE SODIUM 100 MG: 100 CAPSULE, LIQUID FILLED ORAL at 11:04

## 2019-01-01 RX ADMIN — ASPIRIN 81 MG: 81 TABLET, COATED ORAL at 08:29

## 2019-01-01 RX ADMIN — ATORVASTATIN CALCIUM 20 MG: 20 TABLET, FILM COATED ORAL at 08:33

## 2019-01-01 RX ADMIN — ATORVASTATIN CALCIUM 20 MG: 20 TABLET, FILM COATED ORAL at 08:29

## 2019-01-01 RX ADMIN — SODIUM BICARBONATE 650 MG: 650 TABLET ORAL at 20:30

## 2019-01-01 RX ADMIN — ATORVASTATIN CALCIUM 20 MG: 20 TABLET, FILM COATED ORAL at 17:23

## 2019-01-01 RX ADMIN — ONDANSETRON 4 MG: 2 INJECTION INTRAMUSCULAR; INTRAVENOUS at 19:04

## 2019-01-01 RX ADMIN — LEVOTHYROXINE SODIUM 125 MCG: 125 TABLET ORAL at 06:51

## 2019-01-01 RX ADMIN — Medication 10 ML: at 08:54

## 2019-01-01 RX ADMIN — ASPIRIN 81 MG: 81 TABLET ORAL at 09:11

## 2019-01-01 ASSESSMENT — PAIN SCALES - GENERAL
PAINLEVEL_OUTOF10: 1
PAINLEVEL_OUTOF10: 0
PAINLEVEL_OUTOF10: 5
PAINLEVEL_OUTOF10: 5
PAINLEVEL_OUTOF10: 0
PAINLEVEL_OUTOF10: 2
PAINLEVEL_OUTOF10: 0
PAINLEVEL_OUTOF10: 4
PAINLEVEL_OUTOF10: 0
PAINLEVEL_OUTOF10: 10
PAINLEVEL_OUTOF10: 7
PAINLEVEL_OUTOF10: 0
PAINLEVEL_OUTOF10: 8
PAINLEVEL_OUTOF10: 0
PAINLEVEL_OUTOF10: 4
PAINLEVEL_OUTOF10: 0
PAINLEVEL_OUTOF10: 0
PAINLEVEL_OUTOF10: 3
PAINLEVEL_OUTOF10: 0
PAINLEVEL_OUTOF10: 4
PAINLEVEL_OUTOF10: 0
PAINLEVEL_OUTOF10: 3
PAINLEVEL_OUTOF10: 0
PAINLEVEL_OUTOF10: 3
PAINLEVEL_OUTOF10: 0
PAINLEVEL_OUTOF10: 10
PAINLEVEL_OUTOF10: 0
PAINLEVEL_OUTOF10: 3
PAINLEVEL_OUTOF10: 0
PAINLEVEL_OUTOF10: 2
PAINLEVEL_OUTOF10: 0

## 2019-01-01 ASSESSMENT — PULMONARY FUNCTION TESTS
PIF_VALUE: 0
PIF_VALUE: 0
PIF_VALUE: 1
PIF_VALUE: 0
PIF_VALUE: 1
PIF_VALUE: 0
PIF_VALUE: 1
PIF_VALUE: 0
PIF_VALUE: 0
PIF_VALUE: 1
PIF_VALUE: 0
PIF_VALUE: 0
PIF_VALUE: 1
PIF_VALUE: 0
PIF_VALUE: 1
PIF_VALUE: 0
PIF_VALUE: 0
PIF_VALUE: 1
PIF_VALUE: 1
PIF_VALUE: 0
PIF_VALUE: 1
PIF_VALUE: 0
PIF_VALUE: 0
PIF_VALUE: 1
PIF_VALUE: 0
PIF_VALUE: 1
PIF_VALUE: 0
PIF_VALUE: 1
PIF_VALUE: 1
PIF_VALUE: 0
PIF_VALUE: 1
PIF_VALUE: 1
PIF_VALUE: 0
PIF_VALUE: 1
PIF_VALUE: 0
PIF_VALUE: 0
PIF_VALUE: 1
PIF_VALUE: 1
PIF_VALUE: 0
PIF_VALUE: 1
PIF_VALUE: 0
PIF_VALUE: 1
PIF_VALUE: 0
PIF_VALUE: 1
PIF_VALUE: 0
PIF_VALUE: 1
PIF_VALUE: 0
PIF_VALUE: 1
PIF_VALUE: 0
PIF_VALUE: 1
PIF_VALUE: 1
PIF_VALUE: 0
PIF_VALUE: 1
PIF_VALUE: 0
PIF_VALUE: 1
PIF_VALUE: 0
PIF_VALUE: 1
PIF_VALUE: 0
PIF_VALUE: 1
PIF_VALUE: 0
PIF_VALUE: 1
PIF_VALUE: 1
PIF_VALUE: 0
PIF_VALUE: 0
PIF_VALUE: 1

## 2019-01-01 ASSESSMENT — ENCOUNTER SYMPTOMS
CHOKING: 0
CHEST TIGHTNESS: 0
PHOTOPHOBIA: 0
ALLERGIC/IMMUNOLOGIC NEGATIVE: 1
ABDOMINAL PAIN: 0
VOMITING: 0
CONSTIPATION: 0
EYE DISCHARGE: 0
COLOR CHANGE: 0
SHORTNESS OF BREATH: 0
COUGH: 0
CHEST TIGHTNESS: 0
EYE REDNESS: 0
COLOR CHANGE: 0
TROUBLE SWALLOWING: 0
ALLERGIC/IMMUNOLOGIC NEGATIVE: 1
EYE DISCHARGE: 0
ABDOMINAL DISTENTION: 0
EYE DISCHARGE: 0
ABDOMINAL PAIN: 0
CONSTIPATION: 0
EYE REDNESS: 0
CHEST TIGHTNESS: 0
GASTROINTESTINAL NEGATIVE: 1
COLOR CHANGE: 0
CHOKING: 0
EYE REDNESS: 0
SHORTNESS OF BREATH: 0
ABDOMINAL DISTENTION: 0
EYE PAIN: 0
COUGH: 0
TROUBLE SWALLOWING: 0
VOMITING: 0
STRIDOR: 0
COLOR CHANGE: 0
COUGH: 0
EYES NEGATIVE: 1
CHOKING: 0
ABDOMINAL DISTENTION: 0
ABDOMINAL DISTENTION: 0
ABDOMINAL PAIN: 0
EYE PAIN: 0
ABDOMINAL PAIN: 0
CHEST TIGHTNESS: 0
CONSTIPATION: 0
COLOR CHANGE: 1
CONSTIPATION: 0
SHORTNESS OF BREATH: 1
CHOKING: 0
ABDOMINAL PAIN: 0
EYES NEGATIVE: 1
SHORTNESS OF BREATH: 0
PHOTOPHOBIA: 0
CHOKING: 0
PHOTOPHOBIA: 0
RESPIRATORY NEGATIVE: 1
COLOR CHANGE: 0
NAUSEA: 0
ABDOMINAL DISTENTION: 0
SHORTNESS OF BREATH: 1
EYE DISCHARGE: 0
PHOTOPHOBIA: 0
ALLERGIC/IMMUNOLOGIC NEGATIVE: 1
CHOKING: 0
RESPIRATORY NEGATIVE: 1
COLOR CHANGE: 0
COLOR CHANGE: 0
ALLERGIC/IMMUNOLOGIC NEGATIVE: 1
EYE REDNESS: 0
COUGH: 0
APNEA: 0
EYE DISCHARGE: 0
VOMITING: 0
COUGH: 0
PHOTOPHOBIA: 0
GASTROINTESTINAL NEGATIVE: 1
EYE REDNESS: 0
NAUSEA: 0
APNEA: 0
SHORTNESS OF BREATH: 0
ABDOMINAL DISTENTION: 0
COUGH: 0
ABDOMINAL PAIN: 0
RESPIRATORY NEGATIVE: 1
NAUSEA: 0
APNEA: 0
SHORTNESS OF BREATH: 1
EYE DISCHARGE: 0
SHORTNESS OF BREATH: 0
ALLERGIC/IMMUNOLOGIC NEGATIVE: 1
SHORTNESS OF BREATH: 0
CHEST TIGHTNESS: 0
CHEST TIGHTNESS: 0
TROUBLE SWALLOWING: 0
EYE DISCHARGE: 0
GASTROINTESTINAL NEGATIVE: 1
CHEST TIGHTNESS: 0
EYE REDNESS: 0
STRIDOR: 0
BACK PAIN: 0
VOMITING: 0
CHEST TIGHTNESS: 0
NAUSEA: 0
EYE REDNESS: 0
TROUBLE SWALLOWING: 0
EYE DISCHARGE: 0
ABDOMINAL DISTENTION: 0
EYE DISCHARGE: 0
COUGH: 0
SHORTNESS OF BREATH: 0
EYES NEGATIVE: 1
SHORTNESS OF BREATH: 0

## 2019-01-01 ASSESSMENT — PAIN - FUNCTIONAL ASSESSMENT
PAIN_FUNCTIONAL_ASSESSMENT: 0-10
PAIN_FUNCTIONAL_ASSESSMENT: FACES
PAIN_FUNCTIONAL_ASSESSMENT: PREVENTS OR INTERFERES SOME ACTIVE ACTIVITIES AND ADLS
PAIN_FUNCTIONAL_ASSESSMENT: 0-10
PAIN_FUNCTIONAL_ASSESSMENT: 0-10

## 2019-01-01 ASSESSMENT — PAIN DESCRIPTION - PROGRESSION
CLINICAL_PROGRESSION: NOT CHANGED
CLINICAL_PROGRESSION: GRADUALLY IMPROVING
CLINICAL_PROGRESSION: GRADUALLY IMPROVING
CLINICAL_PROGRESSION: NOT CHANGED
CLINICAL_PROGRESSION: GRADUALLY IMPROVING
CLINICAL_PROGRESSION: NOT CHANGED
CLINICAL_PROGRESSION: NOT CHANGED
CLINICAL_PROGRESSION: GRADUALLY IMPROVING
CLINICAL_PROGRESSION: NOT CHANGED

## 2019-01-01 ASSESSMENT — PAIN DESCRIPTION - ORIENTATION
ORIENTATION: LEFT;UPPER
ORIENTATION: LEFT
ORIENTATION: LEFT;UPPER
ORIENTATION: LEFT;OUTER
ORIENTATION: LEFT;ANTERIOR
ORIENTATION: LEFT

## 2019-01-01 ASSESSMENT — PAIN DESCRIPTION - DESCRIPTORS
DESCRIPTORS: DISCOMFORT
DESCRIPTORS: DULL
DESCRIPTORS: DISCOMFORT
DESCRIPTORS: SORE
DESCRIPTORS: SHARP

## 2019-01-01 ASSESSMENT — PAIN DESCRIPTION - PAIN TYPE
TYPE: ACUTE PAIN
TYPE: SURGICAL PAIN
TYPE: SURGICAL PAIN
TYPE: ACUTE PAIN
TYPE: CHRONIC PAIN
TYPE: SURGICAL PAIN
TYPE: ACUTE PAIN
TYPE: SURGICAL PAIN
TYPE: ACUTE PAIN

## 2019-01-01 ASSESSMENT — PAIN DESCRIPTION - FREQUENCY
FREQUENCY: CONTINUOUS
FREQUENCY: INTERMITTENT
FREQUENCY: INTERMITTENT

## 2019-01-01 ASSESSMENT — PAIN DESCRIPTION - LOCATION
LOCATION: CHEST
LOCATION: INCISION
LOCATION: CHEST
LOCATION: RIB CAGE
LOCATION: ABDOMEN
LOCATION: BACK
LOCATION: CHEST
LOCATION: ABDOMEN
LOCATION: RIB CAGE

## 2019-01-01 ASSESSMENT — PAIN SCALES - WONG BAKER
WONGBAKER_NUMERICALRESPONSE: 0

## 2019-01-01 ASSESSMENT — PAIN DESCRIPTION - ONSET
ONSET: ON-GOING

## 2019-03-06 PROBLEM — I27.20 PULMONARY HYPERTENSION (HCC): Status: ACTIVE | Noted: 2019-01-01

## 2019-03-07 NOTE — CONSULTS
Inpatient consult to Pulmonology  Consult performed by: Stella Calabrese MD  Consult ordered by: Howie Vila MD            Patient - Lennox Lemma   MRN -  [id number]   Acct # - [de-identified]   - 12/10/1928        Date of evaluation -  3/7/2019    REASON FOR THE CONSULTATION:  Elevated pulmonary artery pressures   HISTORY OF PRESENT ILLNESS:    Lennox Lemma is a 80y.o. 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 He was re admitted in February with worsening le edema and sob - cta done showed small filling defect in rml pulmonary artery but no images available and also large pleural effusion and basal atelectasis . He had thoracentesis and 1.8 l of transudate pleural fluid removed . Echo showed moderate as and rvsp in 40s with lv function of 50 % . His base line cr on 2/15/19 was 1.3 and has steadily increased . At present he had 1.7 l thoracentesis . He feels better but still has orthopnea and sob with ex and feels dizzy when stands up   He denies any cough hemoptysis or sputum          Immunization     There is no immunization history on file for this patient. Pneumococcal Vaccine     [] Up to date    [x] Indicated   [] Refused  [] Contraindicated       Influenza Vaccine   [] Up to date    [x] Indicated   [] Refused  [] Contraindicated              PAST MEDICAL HISTORY:       Diagnosis Date    Aortic stenosis 02/15/2019    Atrial fibrillation ([address]     CAD (coronary artery disease)     Chest pain 02/15/2019    CHF (congestive heart failure) (HCC)     Chronic anemia     Chronic kidney disease     Hypertension     Pleural effusion on left 02/15/2019    Pleural effusion, right 02/15/2019    Pulmonary emboli ([address] 02/15/2019    Pulmonary hypertension ([address] 2019    Thyroid disease     hypothyroidism         Family History:   History reviewed. No pertinent family history.     SURGICAL HISTORY: Past Surgical History:   Procedure Laterality Date    JOINT REPLACEMENT      right ankle    PACEMAKER PLACEMENT  01/20/2019    THORACENTESIS             SOCIAL AND OCCUPATIONAL HEALTH:  The patient is a Past smoker of @year@. Pack year equal ____. There  is not history of TB or TB exposure. There is not asbestos or silica dust exposure. The patient reports does not have coal, foundry, quarry or [employer] exposure. Travel history reveals No.  There is not  history of recreational or IV drug use. There is not hot tube exposure. The patient does not bird    Occupational history :retireed banker      TOBACCO:   reports that he quit smoking about 30 years ago. He has never used smokeless tobacco.  ETOH:   reports that he drank alcohol. ALLERGIES:    Allergies   Allergen Reactions    Quinolones      Pt is allergic to fluoroquinolones       Home Meds:   Prior to Admission medications    Medication Sig Start Date End Date Taking?  Authorizing Provider   amiodarone (CORDARONE) 200 MG tablet Take 200 mg by mouth daily   Yes Historical Provider, MD   atorvastatin (LIPITOR) 20 MG tablet Take 20 mg by mouth daily   Yes Historical Provider, MD   isosorbide mononitrate (IMDUR) 60 MG extended release tablet Take 60 mg by mouth daily   Yes Historical Provider, MD   levothyroxine (SYNTHROID) 100 MCG tablet Take 100 mcg by mouth Daily   Yes Historical Provider, MD   lisinopril (PRINIVIL;ZESTRIL) 40 MG tablet Take 40 mg by mouth daily   Yes Historical Provider, MD   apixaban (ELIQUIS) 2.5 MG TABS tablet Take 2.5 mg by mouth daily   Yes Historical Provider, MD   metoprolol tartrate (LOPRESSOR) 50 MG tablet Take 50 mg by mouth 2 times daily   Yes Historical Provider, MD     CURRENT MEDS :  Scheduled Meds:   carvedilol  25 mg Oral BID WC    aspirin  81 mg Oral Daily    furosemide  20 mg Intravenous BID    amiodarone  200 mg Oral Daily    atorvastatin  20 mg Oral Daily    isosorbide mononitrate  60 mg Oral Daily  levothyroxine  100 mcg Oral Daily       Continuous Infusions:   dextrose 5 % and 0.45 % NaCl 10 mL/hr at 03/07/19 1343           REVIEW OF SYSTEMS:  CONSTITUTIONAL:  negative for  fevers, chills, sweats,+ fatigue.   EYES:  negative for  double vision, blurred vision, dry eyes, eye discharge and redness  HEENT:  negative for  hearing loss, tinnitus, ear drainage, earaches and nasal congestion  RESPIRATORY:  See hpi  CARDIOVASCULAR:  negative for  chest pain, palpitations, +orthopnea, PND  GASTROINTESTINAL:  negative for nausea, vomiting, change in bowel habits, diarrhea, constipation, abdominal pain, pruritus, abdominal mass and abdominal distention  GENITOURINARY:  negative for frequency, dysuria, nocturia, urinary incontinence and hesitancy  INTEGUMENT/BREAST:  negative for rash, skin lesion(s), dryness, skin color change, changes in lesion, pruritus and changes in hair  HEMATOLOGIC/LYMPHATIC:  negative for easy bruising, bleeding, lymphadenopathy, petechiae and swelling/edema  ALLERGIC/IMMUNOLOGIC:  negative for recurrent infections, urticaria and drug reactions  ENDOCRINE:  negative for heat intolerance, cold intolerance, tremor, weight changes and change in bowel habits  MUSCULOSKELETAL:  negative for  myalgias, arthralgias, pain, joint swelling, stiff joints and decreased range of motion  NEUROLOGICAL:  negative for headaches, dizziness, seizures, memory problems, speech problems, visual disturbance and coordination problems  BEHAVIOR/PSYCH:  negative for poor appetite, increased appetite, decreased sleep, increased sleep, decreased energy level, increased energy level and poor concentration   skin - no rash no dermatitis     Vitals:  BP (!) 179/83   Pulse 62   Temp 98.3 °F (36.8 °C)   Resp 16   Ht 5' 6\" (1.676 m)   Wt 156 lb 4.8 oz (70.9 kg)   SpO2 95%   BMI 25.23 kg/m²     PHYSICAL EXAM:  General Appearance:    Alert, cooperative, no distress, appears stated age   Head:    Normocephalic, without obvious abnormality, atraumatic      Eyes:    PERRL, conjunctiva/corneas clear, EOM's intact   Ears:    Normal TM's and external ear canals, both ears   Nose:   Nares normal, septum midline, mucosa normal, no drainage        or sinus tenderness   Throat:   Lips, mucosa, and tongue normal; teeth and gums normal   Neck:   Supple, symmetrical, trachea midline, no adenopathy;     thyroid:  no enlargement/tenderness/nodules;  jvp elevated and hjr    Back:     Symmetric, no curvature, ROM normal, no CVA tenderness   Lungs:       Dull bases and dec bs . No wheeze no rales    Chest Wall:    No tenderness or deformity      Heart:    Regular rate and rhythm, S1 and S2 normal, 3/6 as  murmur,no rub        or gallop no rvh                           Abdomen:                                                 Pulses:                              Skin:                  Lymph nodes:                    Neurologic:                  Soft, non-tender, bowel sounds active all four quadrants,     no masses, no organomegaly         2+ and symmetric all extremities     Skin color, texture, turgor normal, no rashes or lesions       Cervical, supraclavicular not enlarged or matted or tender      CNII-XII intact, normal strength 5/5 . Sensation grossly normal  and reflexes normal 2+  throughout     Clubbing No  Lower ext edema Yes1+   [] , 2 +  [] , 3+   []  Upper ext edema No       Musculoskeletal - no joint swelling or tenderness or synovitis          CBC:   Recent Labs     03/06/19  2246   WBC 12.7*   HGB 9.2*        BMP:    Recent Labs     03/06/19  2246      K 4.7      CO2 23   *   CREATININE 2.00*   GLUCOSE 124*     Hepatic: No results for input(s): AST, ALT, ALB, BILITOT, ALKPHOS in the last 72 hours. Amylase: No results found for: AMYLASE  Lipase: No results found for: LIPASE  Troponin: No results for input(s): TROPONINI in the last 72 hours. BNP: No results for input(s): BNP in the last 72 hours.   Lipids: No results for input(s): CHOL, HDL in the last 72 hours. Invalid input(s): LDLCALCU  ABGs: No results found for: PHART, PO2ART, GPT7RXT  INR:   Recent Labs     03/06/19  2246   INR 1.1     Thyroid:   Lab Results   Component Value Date    TSH 6.79 03/06/2019     Urinalysis:   Recent Labs     03/06/19 2058   BACTERIA NOT REPORTED   COLORU YELLOW   PHUR 6.0   PROTEINU 3+*   RBCUA 0 TO 2   SPECGRAV 1.016   BILIRUBINUR NEGATIVE   NITRU NEGATIVE   WBCUA None   LEUKOCYTESUR NEGATIVE   GLUCOSEU NEGATIVE         Cultures:-  None     CXR  Atelectasis- right lower lobe and left lower lobe , Pleural effusion bilaterally moderate           IMPRESSION:    Patient Active Problem List   Diagnosis Code    Pulmonary hypertension (HCC) I27.20        Acute on chronic chf valvular and ischemic    Moderate AS   B/l pleural effusion - post rt thoracentesis - 1.7 l in [place] - 2/15/19 transudate   Post rt thoracentesis today   B/l basal compressive atelectasis   Pulmonary htn group 2   HUYEN baseline cr 1.3   Azotemia   multivessel cad            No dvt   ? PE rml seen on cta 2/15/19 at [address]:      D/w Dr Kindra Curran agree pt is in acute chf and has ischemic heart diease . D/w Dr Kavin Charles-  Dr Mabel Paredes with cath pt on Monday to vascularize . With eventual TAVR   His pulmonary htn is group 2 and vasodilators are not indicated  - will optimize chf agree with diuresis . Follow pleural fluid results   D/w Dr Kavin Charles         I hope this updates you on my evaluation and clinical thinking. Thank you for allowing me to participate in his care.      Sincerely,      Electronically signed by Jennifer Hubbard MD on 3/7/2019 at 2:09 PM

## 2019-03-07 NOTE — H&P
[facility]  History andPhysical    Patient's Name/Date of Birth: Billy Kimbrough / 12/10/1928 (64 y.o.)    Date: March 7, 2019     Chief Complaint: Acute congestive heart failure, shortness of breath    HPI: Billy Kimbrough is a 80 y.o.  male who is known to have multivessel coronary artery disease who recently had multiple admissions at outside hospitals in [place] for acute decompensated congestive heart failure. He is status post placement of a dual-chamber pacemaker for complete heart block. Patient's symptomatology started after he was found to be in complete heart block which required required of permanent pacemaker dual chamber. He subsequently had multiple bouts of acute shortness of breath and decompensated heart failure he underwent multiple evaluations and was being treated at of an outside hospital in [place] and then over the phone with no improvement. He is admitted here for full reevaluation. The patient is currently short of breath with exertion only no orthopnea no dyspnea no chest pain. .    ROS:  Review of Systems   · Constitutional: there has been no unanticipated weight loss. There's been No change in energy level, No change in activity level. · Eyes: No visual changes or diplopia. No scleral icterus. · ENT: No Headaches  · Cardiovascular: exertional dyspnea  · Respiratory: No cough, exertional dyspnea  · Gastrointestinal: No abdominal pain. No change in bowel or bladder habits. · Genitourinary: No dysuria, trouble voiding, or hematuria. · Musculoskeletal:  No gait disturbance, No weakness or joint complaints. · Integumentary: No rash or pruritis. · Neurological: No headache, diplopia, change in muscle strength, numbness or tingling. No change in gait, balance, coordination, mood, affect, memory, mentation, behavior. · Psychiatric: No anxiety, or depression. · Endocrine: No temperature intolerance. No excessive thirst, fluid intake, or urination.  No tremor. · Hematologic/Lymphatic: No abnormal bruising or bleeding, blood clots or swollen lymph nodes. · Allergic/Immunologic: No nasal congestion or hives. Past Medical History:   Diagnosis Date    Aortic stenosis 02/15/2019    Atrial fibrillation (HCC)     CAD (coronary artery disease)     Chest pain 02/15/2019    CHF (congestive heart failure) (HCC)     Chronic anemia     Chronic kidney disease     Hypertension     Pleural effusion on left 02/15/2019    Pleural effusion, right 02/15/2019    Pulmonary emboli ([address] 02/15/2019    Pulmonary hypertension ([facility] 2019    Thyroid disease     hypothyroidism     Past Surgical History:   Procedure Laterality Date    JOINT REPLACEMENT      right ankle    PACEMAKER PLACEMENT  2019    THORACENTESIS          Allergies   Allergen Reactions    Quinolones      Pt is allergic to fluoroquinolones     History reviewed. No pertinent family history. Social History     Socioeconomic History    Marital status:       Spouse name: Not on file    Number of children: Not on file    Years of education: Not on file    Highest education level: Not on file   Occupational History     Employer: RETIRED   Social Needs    Financial resource strain: Not on file    Food insecurity:     Worry: Not on file     Inability: Not on file    Transportation needs:     Medical: Not on file     Non-medical: Not on file   Tobacco Use    Smoking status: Former Smoker     Last attempt to quit:      Years since quittin.1    Smokeless tobacco: Never Used   Substance and Sexual Activity    Alcohol use: Not Currently    Drug use: Never    Sexual activity: Not on file   Lifestyle    Physical activity:     Days per week: Not on file     Minutes per session: Not on file    Stress: Not on file   Relationships    Social connections:     Talks on phone: Not on file     Gets together: Not on file     Attends Denominational service: Not on file     Active member of club or organization: Not on file     Attends meetings of clubs or organizations: Not on file     Relationship status: Not on file    Intimate partner violence:     Fear of current or ex partner: Not on file     Emotionally abused: Not on file     Physically abused: Not on file     Forced sexual activity: Not on file   Other Topics Concern    Not on file   Social History Narrative    Not on file       Current Facility-Administered Medications   Medication Dose Route Frequency Provider Last Rate Last Dose    carvedilol (COREG) tablet 25 mg  25 mg Oral BID WC Christal Carroll MD   25 mg at 03/07/19 0848    aspirin EC tablet 81 mg  81 mg Oral Daily Christal Carroll MD        furosemide (LASIX) injection 20 mg  20 mg Intravenous BID Cyndee Vegas MD        dextrose 5 % and 0.45 % sodium chloride infusion   Intravenous Continuous Cyndee Vegas MD 10 mL/hr at 03/07/19 1343      amiodarone (CORDARONE) tablet 200 mg  200 mg Oral Daily Christina March MD   200 mg at 03/07/19 0848    atorvastatin (LIPITOR) tablet 20 mg  20 mg Oral Daily Christina March MD        isosorbide mononitrate (IMDUR) extended release tablet 60 mg  60 mg Oral Daily Christina March MD   60 mg at 03/07/19 0848    levothyroxine (SYNTHROID) tablet 100 mcg  100 mcg Oral Daily Christina March MD   100 mcg at 03/07/19 7589       Physical Exam:  Vitals:    03/07/19 1236   BP:    Pulse:    Resp:    Temp: 98.3 °F (36.8 °C)   SpO2:      Weight: Weight: 156 lb 4.8 oz (70.9 kg)    Weight: 156 lb 4.8 oz (70.9 kg)    I/O last 3 completed shifts:  In: -   Out: 400 [Urine:400]    Physical Exam  Constitutional and General Appearance: alert, cooperative, no distress and appears stated age  HEENT: PERRL, no cervical lymphadenopathy. No masses palpable. Normal oral mucosa  Respiratory:  · Normal excursion and expansion without use of accessory muscles  · Resp Auscultation: Good respiratory effort. No for increased work of breathing.  On auscultation: clear to auscultation bilaterally  Cardiovascular:  · Heart tones are crisp and normal. regular S1 and S2. Systolic murmur appreciated  · Jugular venous pulsation Normal  · The carotid upstroke is normal in amplitude and contour without delay or bruit  · Peripheral pulses are symmetrical and full   Abdomen:   · No masses or tenderness  · Bowel sounds present  Extremities:  ·  No Cyanosis or Clubbing  ·  Lower extremity edema: No  ·  Skin: Warm and dry  Neurological:  · Alert and oriented. · Moves all extremities well  · No abnormalities of mood, affect, memory, mentation, or behavior are noted      Data:    CBC:   Recent Labs     03/06/19 2246   WBC 12.7*   HGB 9.2*   HCT 30.3*   MCV 95.0        BMP:   Recent Labs     03/06/19 2246      K 4.7      CO2 23   *   CREATININE 2.00*   MG 2.6     Accucheck Glucoses:   Recent Labs     03/06/19  2109   POCGLU 113*     Cardiac Enzymes: No results for input(s): CKTOTAL, CKMB, CKMBINDEX, TROPONINI in the last 72 hours.   PTT/PT/INR:  Recent Labs     03/06/19 2246   PROTIME 11.5   INR 1.1     Recent Labs     03/06/19 2246   APTT 26.1     Liver Profile:  No results found for: AST, ALT, ALB, BILIDIR, BILITOT, ALKPHOS, GGT, 5NUCNo results found for: CHOL, HDL, TRIG  TSH:   Lab Results   Component Value Date    TSH 6.79 03/06/2019     UA:  Lab Results   Component Value Date    COLORU YELLOW 03/06/2019    PHUR 6.0 03/06/2019    WBCUA None 03/06/2019    RBCUA 0 TO 2 03/06/2019    MUCUS NOT REPORTED 03/06/2019    TRICHOMONAS NOT REPORTED 03/06/2019    YEAST NOT REPORTED 03/06/2019    BACTERIA NOT REPORTED 03/06/2019    SPECGRAV 1.016 03/06/2019    LEUKOCYTESUR NEGATIVE 03/06/2019    UROBILINOGEN Normal 03/06/2019    BILIRUBINUR NEGATIVE 03/06/2019    GLUCOSEU NEGATIVE 03/06/2019    AMORPHOUS NOT REPORTED 03/06/2019       Imaging Studies: See outside studies  Cardiac Cath:  Echo:  CXR:  CT:    Assessment & Plan:  Patient Active Problem List   Diagnosis    Pulmonary hypertension (HCC)   IMPRESSION:    1. Dual chamber pacer for complete heart block, placed in January 2019 in [place]  2. Known MV CAD per cardiac cath in February in [place]  3. Suspected Pulm HTN per [place] records  4. Paroxysmal Afib and small RT PE, on Eliquis 2.5 mg QD  5. HUYEN (baseline according to nephew is 1 - 1.2)  6. Preserved LVEF per TTE from [place]  7. Primary HTN  8. Dyslipidemia          Plan: Patient is to be admitted under control for cardiac evaluation including repeat echo review of his cardiac catheterization chest x-ray laboratory work consultation with pulmonary for his pulmonary hypertension Dr. Rhonda Ayala renal for his renal failure Dr. Kaylen Tavarez and Dr. ramirez or for his cardiac evaluation.   Please see orders for details

## 2019-03-07 NOTE — CARE COORDINATION
Case Management Initial Discharge Plan  Dick Blackburn,             Met with:patient to discuss discharge plans. Information verified: address, contacts, phone number, , insurance Yes  PCP: No primary care provider on file. Date of last visit: has pcp in [address] [place] [address] Provider: medicare     Discharge Planning    Living Arrangements:  Alone   Support Systems:  Family Members, Friends/Neighbors    Home has one stories  No  stairs to climb to get into front door, no stairs to climb to reach second floor  Location of bedroom/bathroom in home ,main    Patient able to perform ADL's:Independent    Current Services (outpatient & in home) na  DME equipment: na  DME provider: [employer]ptali    Pharmacy: unsure    Potential Assistance Purchasing Medications:  No  Does patient want to participate in local refill/ meds to beds program?       Potential Assistance Needed:       Patient agreeable to home care: Yes if needed   Freedom of choice provided:  yes    Prior SNF/Rehab Placement and Facility: na  Agreeable to SNF/Rehab: No  Eckert of choice provided: n/a   Evaluation: no    Expected Discharge date:  19  Patient expects to be discharged to:  home  Follow Up Appointment: Best Day/ Time:      Transportation provider: family   Transportation arrangements needed for discharge: No    Readmission Risk              Risk of Unplanned Readmission:        10             Does patient have a readmission risk score greater than 14?: No  If yes, follow-up appointment must be made within 7 days of discharge. Discharge Plan: Pt lives in [place], came here with nephew Dr. Liss Alan for medical care.   Depending on plan of care here, he may stay in [facility] for a while, follow for discharge needs          Electronically signed by Gary Pastrana RN on 3/7/19 at 11:14 AM

## 2019-03-07 NOTE — PROGRESS NOTES
Physical Therapy  DATE: 3/7/2019    NAME: Abhay Avalos  MRN: [id number]   : 12/10/1928    Patient not seen this date for Physical Therapy due to:  [] Blood transfusion in progress  [] Hemodialysis  []  Patient Declined  [] Spine Precautions   [] Strict Bedrest  [] Surgery/ Procedure  [] Testing      [x] Other- Dopplers ordered to assess DVT progression. PT will check back following testing. [] PT being discontinued at this time. Patient independent. No further needs. [] PT being discontinued at this time as the patient has been transferred to palliative care. No further needs.     Kristina Cooper, PT

## 2019-03-07 NOTE — PROGRESS NOTES
Pt arrives to room for [facility] US tech to room   PA to room  Site prepped and draped  gail access kit 07/2020  Access obtained and draining tea colored fluid  1700ml removed  Access removed and site covered with 2x2 and tegaderm,  Tolerated well  Dr Diego Correa to bedside   To radiology for xray

## 2019-03-07 NOTE — CARE COORDINATION
BPCI-A Medical Bundle Patient. Working DRG: CHF (26)   Admitting Location: [facility]    90-day post-acute tracking and outreach with qualifying DRG from date of discharge.

## 2019-03-07 NOTE — PROGRESS NOTES
Physical Therapy  DATE: 3/7/2019    NAME: Lu Fallon  MRN: [id number]   : 12/10/1928    Patient not seen this date for Physical Therapy due to:  [] Blood transfusion in progress  [] Hemodialysis  []  Patient Declined  [] Spine Precautions   [] Strict Bedrest  [] Surgery/ Procedure  [] Testing      [x] Other- off the unit in IR and then vascular. PT will check back 3/8/19. [] PT being discontinued at this time. Patient independent. No further needs. [] PT being discontinued at this time as the patient has been transferred to palliative care. No further needs.     Merrick Faria, PT

## 2019-03-07 NOTE — PROGRESS NOTES
[facility]  Occupational Therapy Not Seen Note    DATE: 3/7/2019  Name: Elaina Wilder  : 12/10/1928  MRN: [id number]    Patient not available for Occupational Therapy due to:    [x] Testing: to IR and vascular lab    [] Hemodialysis    [] Blood Transfusion in Progress    []Refusal by Patient:    [] Surgery/Procedure:    [] Strict Bedrest    [] Sedation    [] Spine Precautions     [] Pt being transferred to palliative care at this time. Spoke with pt/family and OT services to be defered. [] Pt independent with functional mobility and functional tasks.  Pt with no OT acute care needs at this time, will defer OT eval.    [] Other    Next Scheduled Treatment: Re-check 3/8/2019    Signature: ADE Knapp/GERALD

## 2019-03-07 NOTE — PROGRESS NOTES
[facility]  Occupational Therapy Not Seen Note    DATE: 3/7/2019  Name: Evonne Bachelor  : 12/10/1928  MRN: [id number]    Patient not available for Occupational Therapy due to:    [x] Testing: Dopplers ordered to assess DVT progression. OT will check back following testing    [] Hemodialysis    [] Blood Transfusion in Progress    []Refusal by Patient:    [] Surgery/Procedure:    [] Strict Bedrest    [] Sedation    [] Spine Precautions     [] Pt being transferred to palliative care at this time. Spoke with pt/family and OT services to be defered. [] Pt independent with functional mobility and functional tasks.  Pt with no OT acute care needs at this time, will defer OT eval.    [] Other    Next Scheduled Treatment: Re-check PM or 3/8/2019    Signature: ADE Marina/GERALD

## 2019-03-07 NOTE — PROGRESS NOTES
Pt direct admit to room 1015 accompanied by family member. Pt urinated, UA sent, hooked up to monitor, admission process initiated, awaiting registration to bring ID bands.

## 2019-03-07 NOTE — CONSULTS
Aortic stenosis 02/15/2019    Atrial fibrillation (HCC)     CAD (coronary artery disease)     Chest pain 02/15/2019    [facility]est[facility] ([facility])     Chronic anemia     Chronic kidney disease     Hypertension     Pleural effusion on left 02/15/2019    Pleural effusion, right 02/15/2019    Pulmonary emboli ([facility] [address] 02/15/2019    Pulmonary hypertension ([facility] [address] 2019    Thyroid disease     hypothyroidism       Past Surgical History:        Procedure Laterality Date    JOINT REPLACEMENT      right ankle    PACEMAKER PLACEMENT  2019    THORACENTESIS         Current Medications:      carvedilol (COREG) tablet 25 mg BID WC   aspirin EC tablet 81 mg Daily   furosemide (LASIX) tablet 20 mg Daily   furosemide (LASIX) injection 20 mg BID   dextrose 5 % and 0.45 % sodium chloride infusion Continuous   amiodarone (CORDARONE) tablet 200 mg Daily   atorvastatin (LIPITOR) tablet 20 mg Daily   isosorbide mononitrate (IMDUR) extended release tablet 60 mg Daily   levothyroxine (SYNTHROID) tablet 100 mcg Daily       Allergies:  Quinolones    Social History:   Social History     Socioeconomic History    Marital status:       Spouse name: Not on file    Number of children: Not on file    Years of education: Not on file    Highest education level: Not on file   Occupational History     Employer: RETIRED   Social Needs    Financial resource strain: Not on file    Food insecurity:     Worry: Not on file     Inability: Not on file    Transportation needs:     Medical: Not on file     Non-medical: Not on file   Tobacco Use    Smoking status: Former Smoker     Last attempt to quit:      Years since quittin.1    Smokeless tobacco: Never Used   Substance and Sexual Activity    Alcohol use: Not Currently    Drug use: Never    Sexual activity: Not on file   Lifestyle    Physical activity:     Days per week: Not on file     Minutes per session: Not on file    Stress: Not on file Relationships    Social connections:     Talks on phone: Not on file     Gets together: Not on file     Attends Caodaism service: Not on file     Active member of club or organization: Not on file     Attends meetings of clubs or organizations: Not on file     Relationship status: Not on file    Intimate partner violence:     Fear of current or ex partner: Not on file     Emotionally abused: Not on file     Physically abused: Not on file     Forced sexual activity: Not on file   Other Topics Concern    Not on file   Social History Narrative    Not on file       Family History:   History reviewed. No pertinent family history. Review of Systems:    Constitutional: No fever or chills  Patient is not aware of any weight gain  Increasing shortness of breath  Increase in leg swelling  No nausea vomiting or diarrhea        Objective:  CURRENT TEMPERATURE:  Temp: 98.3 °F (36.8 °C)  MAXIMUM TEMPERATURE OVER 24HRS:  Temp (24hrs), Av °F (36.7 °C), Min:97.2 °F (36.2 °C), Max:98.3 °F (36.8 °C)    CURRENT RESPIRATORY RATE:  Resp: 16  CURRENT PULSE:  Pulse: 62  CURRENT BLOOD PRESSURE:  BP: (!) 179/83  24HR BLOOD PRESSURE RANGE:  Systolic (37BVX), HVF:194 , Min:152 , KENAN:181   ; Diastolic (01MWF), KGP:58, Min:76, Max:83    24HR INTAKE/OUTPUT:      Intake/Output Summary (Last 24 hours) at 3/7/2019 1308  Last data filed at 3/7/2019 0851  Gross per 24 hour   Intake 200 ml   Output 750 ml   Net -550 ml     Patient Vitals for the past 96 hrs (Last 3 readings):   Weight   19 2059 156 lb 4.8 oz (70.9 kg)     Physical Exam:  General appearance: Awake and alert ×3  Skin: Warm to touch and no erythema  Eyes: Conjunctiva was pink  ENT: No thrush    Neck: JVD was elevated, no carotid bruit  Pulmonary: no wheezing or rhonchi. Decreased air entry at the bases bilaterally.   Cardiovascular: S1 and S2 audible no S3  Abdomen: Soft and nontender bowel sounds was positive  Extremities: Patient has 1-2+ edema involving lower extremities    Labs:   CBC:  Recent Labs     03/06/19  2246   WBC 12.7*   RBC 3.19*   HGB 9.2*   HCT 30.3*   MCV 95.0   MCH 28.8   MCHC 30.4   RDW 15.0*      MPV 11.5      BMP: Recent Labs     03/06/19  2246      K 4.7      CO2 23   *   CREATININE 2.00*   GLUCOSE 124*   CALCIUM 8.8        Phosphorus:  No results for input(s): PHOS in the last 72 hours. Magnesium:   Recent Labs     03/06/19  2246   MG 2.6     Assessment:  1. Elevated serum creatinine acute versus chronic. No prior history available at this time  Based on current serum creatinine patient has his stage IV chronic kidney disease  2. Bilateral pleural effusion and pulmonary infiltrates suggestive of decompensated CHF. Patient may need thoracentesis  3. Long-standing hypertension  4. Status post pacemaker placement. Plan:  1. Start Lasix 20 mg twice a day  2. BMP today  3. Get [facility] from [facility]  4. Labs as ordered  5. We will follow with you  Thank you for the consultation. Please do not hesitate to call with questions.     Electronically signed by Rodney Acharya MD on 3/7/2019 at 1:08 PM

## 2019-03-08 NOTE — CARE COORDINATION
[address] Medical Bundle Patient. Working DRG: CHF ([id number]    3/8/2019    Patient: Elaina Wilder Patient : 12/10/1928   MRN: [id number]  Reason for Admission: Pulmonary hypertension ([facility] [address] [I27.20]   RARS: Readmission Risk Score: 14    Patient/family seen: Yes    Informed patient/family of BPCI-A Medical Bundle Program with potential outreach by either Care Transitions Team or navealth Team based on hospital admission and location. BPCI-A Notification Letter given: Yes    Current discharge plan: Patient reports plan to discharge to nephew's home (Dr. Vane Evans MD) on discharge for a short time before returning to [place]. Provided active listening presence and encouragement to patient during visit. Answered all questions as they arose regarding the BPCI-A Medical Bundle Initiative during visit. DME current per patient. Readmission Risk  Patient Active Problem List   Diagnosis    Pulmonary hypertension [facility])       Inpatient Summary  Care Transitions Summary    Care Transitions Inpatient Review  Medication Review  Are you able to afford your medications?:  Yes  How often do you have difficulty taking your medications?:  I always take them as prescribed. Housing Review  Who do you live with?:  Alone  Social Support  Durable Medical Equipment  Patient DME:  Zahraa cane, Walker, [address] [facility] Phillip chair, Other  Other Patient DME:  Rafael allen  Functional Review  Ability to seek help/take action for Emergent/Urgent situations i.e. fire, crime, inclement weather or health crisis. :  Independent  Ability handle personal hygiene needs (bathing/dressing/grooming): Independent  Ability to manage medications: Independent  Ability to prepare food:  Independent  Ability to maintain home (clean home, laundry): Independent  Ability to drive and/or has transportation:  Independent  Ability to do shopping:  Independent  Ability to manage finances:   Independent  Is patient able to live

## 2019-03-08 NOTE — PROGRESS NOTES
[facility]  Pre Op Progress Note    CC: Acute CHF, SOB      Subjective:  Mr. Diana Both seen and examined Plan of care reviewed and questions answered. Physical Exam  Vital Signs: /62   Pulse 68   Temp 97.3 °F (36.3 °C) (Oral)   Resp 16   Ht 5' 6\" (1.676 m)   Wt 156 lb 15.5 oz (71.2 kg)   SpO2 97%   BMI 25.34 kg/m²  O2 Flow Rate (L/min): 1 L/min       General: alert and oriented to person, place and time. Up in chair, No apparent distress. Heart:Normal S1 and S2.  Regular rhythm. No murmurs, gallops, or rubs. Lungs: clear to auscultation bilaterally  Abdomen: soft, non tender, non distended, BSx4  Extremities: negative      Scheduled Meds:    docusate sodium  100 mg Oral Daily    carvedilol  12.5 mg Oral BID WC    heparin (porcine)  5,000 Units Subcutaneous BID    aspirin  81 mg Oral Daily    furosemide  20 mg Intravenous BID    amiodarone  200 mg Oral Daily    atorvastatin  20 mg Oral Daily    isosorbide mononitrate  60 mg Oral Daily    levothyroxine  100 mcg Oral Daily     Continuous Infusions:    dextrose 5 % and 0.45 % NaCl 10 mL/hr at 03/07/19 1343       Data:  CBC:   Recent Labs     03/06/19 2246   WBC 12.7*   HGB 9.2*   HCT 30.3*   MCV 95.0        BMP:   Recent Labs     03/06/19 2246 03/08/19  0835    139   K 4.7 4.2    104   CO2 23 21   PHOS  --  4.3   * 100*   CREATININE 2.00* 2.30*     PT/INR:   Recent Labs     03/06/19 2246   PROTIME 11.5   INR 1.1     APTT:   Recent Labs     03/06/19 2246   APTT 26.1         Assessment & Plan:   Patient Active Problem List   Diagnosis    Pulmonary hypertension (HCC)     *Diastolic Heart Failure  1. Awaiting carotid study, left thoracentesis for pleural effusion, plan to resume eliquis after IR thoracentesis complete  2.  Plan for complex PCI next week for LM and MV CAD      The above recommendations including medications and orders were discussed and agreed upon with Dr. Kimberlyn Sandoval, the attending on service for the cardiothoracic surgery group today.      Ko Bro, APRN, CNP

## 2019-03-08 NOTE — PROGRESS NOTES
Grab bars in shower, Shower chair, Grab bars around toilet  Home Equipment: Cane, Rolling walker(Pt reports pt was not using prior to admission, not using O2)  ADL Assistance: Independent  Homemaking Assistance: Independent  Homemaking Responsibilities: Yes  Meal Prep Responsibility: Primary  Laundry Responsibility: Primary  Cleaning Responsibility: Primary  Shopping Responsibility: Primary  Ambulation Assistance: Independent  Transfer Assistance: Independent  Active : Yes  Mode of Transportation: SUV, Car  Occupation: Retired, Volunteer work  Additional Comments: Pt reports pt is planning on going home to [place], may stay with family here in [facility] pending outcome of POC    Objective          Joint Mobility  Spine: WFL  ROM RLE: WFL  ROM LLE: WFL  ROM RUE: WFL  ROM LUE: WFL  Strength RLE  Strength RLE: WFL  Strength LLE  Strength LLE: WFL  Strength RUE  Strength RUE: WFL  Strength LUE  Strength LUE: WFL  Tone RLE  RLE Tone: Normotonic  Tone LLE  LLE Tone: Normotonic  Motor Control  Gross Motor?: WFL  Sensation  Overall Sensation Status: WFL(Pt denies numbness/ tingling )  Bed mobility  Sit to Supine: Minimal assistance  Scooting: Stand by assistance  Comment: Pt sitting in bedside chair upon arrival, left on stretcher with transport following ambulation  Transfers  Sit to Stand: Contact guard assistance  Stand to sit: Contact guard assistance  Bed to Chair: Contact guard assistance  Ambulation  Ambulation?: Yes  More Ambulation?: Yes  Ambulation 1  Surface: level tile  Device: No Device  Assistance: Minimal assistance  Quality of Gait: decreased renato, flexed posture, lateral sway, decreased trunk rotation  Distance: 20ft  Ambulation 2  Surface - 2: level tile  Device 2: Single point cane  Assistance 2: Contact guard assistance;Minimal assistance(CGA throughout, Min A for one LOB when turning)  Quality of Gait 2: decreased renato, mildly unsteady, flexed posture  Distance: 60ft     Balance  Posture: Fair  Sitting - Static: Good  Sitting - Dynamic: Good  Standing - Static: Fair;+  Standing - Dynamic: Fair  Comments: standing balance assessed without AD        Assessment   Body structures, Functions, Activity limitations: Decreased functional mobility ; Decreased strength;Decreased endurance;Decreased balance  Assessment: Pt ambulated 60ft with SPC and CGA throughout with Min A for one LOB when turning. Pt would benefit from additional acute PT and should be safe to return home with family support.   Prognosis: Good  Decision Making: Medium Complexity  Patient Education: POC, importance of mobility, safety, d/c rec, purpose of eval  REQUIRES PT FOLLOW UP: Yes  Activity Tolerance  Activity Tolerance: Patient Tolerated treatment well         Plan   Plan  Times per week: 6-7x/wk  Current Treatment Recommendations: Strengthening, Balance Training, Functional Mobility Training, Endurance Training, Transfer Training, Patient/Caregiver Education & Training, Safety Education & Training, Home Exercise Program  Safety Devices  Type of devices: Nurse notified, Gait belt(Pt left on stretcher with transport)  Restraints  Initially in place: No      AM-PAC Score  AM-PAC Inpatient Mobility Raw Score : 18  AM-PAC Inpatient T-Scale Score : 43.63  Mobility Inpatient CMS 0-100% Score: 46.58  Mobility Inpatient CMS G-Code Modifier : CK          Goals  Short term goals  Time Frame for Short term goals: 14 visits  Short term goal 1: Perform bed mobility and functional transfers independently  Short term goal 2: Ambulate 300ft with least restrictive AD independently  Short term goal 3: Demo Good- dynamic standing balance to decrease risk of falls  Short term goal 4: Participate in 30 minutes of therapy to demo increased endurance       Therapy Time   Individual Concurrent Group Co-treatment   Time In 1018         Time Out 1033         Minutes 15         Timed Code Treatment Minutes: 8 Minutes       Dione Lara, PT

## 2019-03-08 NOTE — FLOWSHEET NOTE
Assessment: Patient was reclining in his chair when  visited. Family was not present at the time. Patient remained positive and seemed to have confidence in the medical staff. Patient said he came all the way from [place]. Patient was raised and a member of [employer] [place], [place]. Intervention:  maintained listening presence, offered support and prayed with patient. Patient received sacrament of anointing of the sick. Outcome: Patient expressed appreciation for the visit and prayer said with him.     03/08/19 1444   Encounter Summary   Services provided to: Patient   Support System Family members   Place of [facility], [address] [place] [address] Visiting   (03/08/2019)   Complexity of Encounter Moderate   Length of Encounter 30 minutes   Spiritual Assessment Completed Yes   Routine   Type Follow up   Spiritual/Mormonism   Type Spiritual support   Assessment Calm; Approachable; Hopeful   Intervention Active listening;Prayer; Anointing   Outcome Expressed gratitude   Sacraments   Sacrament of Sick-Anointing Anointed

## 2019-03-08 NOTE — CARE COORDINATION
nHPredict in media. Per message received with nHPredict tool from [facility]:    Please find attached the nH Predict Outcome Report for Claude Sahara DOB 12/10/1928  The nH Predict Outcome Report recommends home with Home Health. The patient lives alone and has decreased ADL status, endurance, functional mobility and decreased balance. The patient could benefit from further therapy after discharge. Outreach to Case Management for unit. Informed of nHPredict availability in media and recommendations from [facility] team. CM for unit verbalized understanding.

## 2019-03-08 NOTE — PROGRESS NOTES
[facility]   Progress Note                   Date:   3/8/2019  Patient name: Sharita Quinn  Date of admission:  3/6/2019  8:48 PM  MRN:   [id number]  YOB: 1928  PCP: No primary care provider on file. Reason for Admission: exertional dyspnea    Subjective:   Patient underwent RT thoracentesis yesterday. Has LT thoracentesis scheduled for today. Breathing has improved. He denies any chest pain but has some abdominal fullness with +BS. BP is also better controlled. Will get pacemaker interrogated today. He denies any palpitations, lightheadedness or dizziness. Intake/Output Summary (Last 24 hours) at 3/8/2019 0738  Last data filed at 3/8/2019 0304  Gross per 24 hour   Intake 1450 ml   Output 1300 ml   Net 150 ml       Medications:   Scheduled Meds:   docusate sodium  100 mg Oral Daily    carvedilol  25 mg Oral BID WC    aspirin  81 mg Oral Daily    furosemide  20 mg Intravenous BID    amiodarone  200 mg Oral Daily    atorvastatin  20 mg Oral Daily    isosorbide mononitrate  60 mg Oral Daily    levothyroxine  100 mcg Oral Daily     Continuous Infusions:   dextrose 5 % and 0.45 % NaCl 10 mL/hr at 03/07/19 1343     CBC:   Recent Labs     03/06/19  2246   WBC 12.7*   HGB 9.2*        BMP:    Recent Labs     03/06/19  2246      K 4.7      CO2 23   *   CREATININE 2.00*   GLUCOSE 124*     Hepatic: No results for input(s): AST, ALT, ALB, BILITOT, ALKPHOS in the last 72 hours. Troponin: No results for input(s): TROPONINI in the last 72 hours. BNP: No results for input(s): BNP in the last 72 hours. Lipids: No results for input(s): CHOL, HDL in the last 72 hours.     Invalid input(s): LDLCALCU  INR:   Recent Labs     03/06/19  2246   INR 1.1       Objective:   Vitals: /62   Pulse 68   Temp 97.3 °F (36.3 °C) (Oral)   Resp 16   Ht 5' 6\" (1.676 m)   Wt 156 lb 15.5 oz (71.2 kg)   SpO2 97%   BMI 25.34 kg/m²   Constitutional and General Appearance: alert, cooperative, no distress and appears stated age  Respiratory:  · Clear to auscultation bilaterally, with equal air entry  Cardiovascular:  · S1, s2, rrr, no pain on palpation of anterior chest wall. Abdomen:   · No masses or tenderness, soft abdomen  · Bowel sounds present  Extremities:  · le edema present, peripheral pulse bl le present 2 +  Integumentum:   Intact with no rashes noted      EKG:   V-pacing with occasional PVCs. CTA Chest in [place] (2/15/19):  Small PE within branch of RT middle lobe artery. Large RT pleural effusion. Moderate LT pleural effusion.     ECHO in [place] (2/17/19:   LVH with LVEF 50%. LT atrial enlargement. Moderate AS with Mild AI. Mitral annular calcification with mild MR. Mild to Moderate Pulm HTN.     Cardiac Angiography in [place] (1/20/19): Multivessel disease including LM. ECHO (3/6/19):   Normal left ventricle size with mildly reduced systolic function; Estimated left ventricular ejection fraction 40-45%  Anterolateral wall hypokinesis. Mild left ventricular hypertrophy. Left atrium is moderately dilated. Grade II diastolic dysfunction. Heavily calcified aortic valve with reduced systolic excursion and evidence of moderate stenosis. (Peak nataliia 3.62 m/s and mean gradient 29 mmHg)  Moderate posterior pericardial effusion without any echo signs of tamponade. Normal right ventricular size and function. Pacemaker lead seen in right ventricle. Moderate tricuspid regurgitation. Estimated right ventricular systolic pressure is 60 mmHg suggesting moderate  pulmonary HTN. Assessment / Acute Cardiac Problems:   1. Acute congestive heart failure (likely secondary to MV CAD and AS)  2. B/L Pleural effusions with RT thoracentesis per IR with removal of 1.7 L of murray fluid on 3/7/19  3. HUYEN (baseline according to nephew is 1 - 1.2)  4. Moderate pericardial effusion without tamponade physiology likely secondary to CHF  5.  Dual chamber pacer for

## 2019-03-08 NOTE — CARE COORDINATION
Met with patient to discuss transitional planning, patient independent prior to admission, lives in [place].   Plans to stay here after discharge with nephew, who is a physician

## 2019-03-08 NOTE — PROGRESS NOTES
Pt arrives for Lt thora  [facility] tech to room  Dr Alexei Danielson to room  gail kit exp 09/2020  Site prepped and draped  Access obtained and draining murray fluid  1100 removed  Access removed and site covered with 2x2 and tegaderm  Tolerated well  To radiology for xray

## 2019-03-08 NOTE — PROGRESS NOTES
Occupational Therapy   Occupational Therapy Initial Assessment  Date: 3/8/2019   Patient Name: Bhupinder Burnett  MRN: [id number]     : 12/10/1928     Copied from H&P:    HPI: Bhupinder Burnett is a 80 y.o.  male who is known to have multivessel coronary artery disease who recently had multiple admissions at outside hospitals in [place] for acute decompensated congestive heart failure. He is status post placement of a dual-chamber pacemaker for complete heart block. Patient's symptomatology started after he was found to be in complete heart block which required required of permanent pacemaker dual chamber. He subsequently had multiple bouts of acute shortness of breath and decompensated heart failure he underwent multiple evaluations and was being treated at of an outside hospital in [place] and then over the phone with no improvement. He is admitted here for full reevaluation. The patient is currently short of breath with exertion only no orthopnea no dyspnea no chest pain    Date of Service: 3/8/2019    Discharge Recommendations:  Home with assist PRN  OT Equipment Recommendations  Equipment Needed: Yes  Mobility Devices: ADL Assistive Devices  ADL Assistive Devices: Shower Chair with back  Other: Pt may benefit from use of shower chair to assist with bathing d/t SOB with activity     Patient Diagnosis(es): There were no encounter diagnoses. has a past medical history of Aortic stenosis, Atrial fibrillation ([address] CAD (coronary artery disease), Chest pain, CHF (congestive heart failure) ([address] Chronic anemia, Chronic kidney disease, Hypertension, Pleural effusion on left, Pleural effusion, right, Pulmonary emboli ([address] Pulmonary hypertension ([address] and Thyroid disease. has a past surgical history that includes pacemaker placement (2019); thoracentesis; and joint replacement.       Restrictions  Restrictions/Precautions  Restrictions/Precautions: General Precautions  Required Braces or Orthoses?: No  Implants present? : Pacemaker(Pt reports pacemaker placed mid-January )  Position Activity Restriction  Other position/activity restrictions: Bathroom privilages     Subjective   General  Patient assessed for rehabilitation services?: Yes  Family / Caregiver Present: No  Pain Assessment  Patient Currently in Pain: Denies  Pain Assessment: 0-10  Pain Level: 0     Social/Functional History  Social/Functional History  Lives With: Alone  Type of Home: House  Home Layout: One level  Home Access: Level entry(Pt reports small threshold to step over)  Bathroom Shower/Tub: Walk-in shower  Bathroom Toilet: Handicap height  Bathroom Equipment: Grab bars in shower, Shower chair, Grab bars around toilet  Home Equipment: Cane, Rolling walker(Pt reports pt was not using prior to admission, not using O2)  ADL Assistance: Independent  Homemaking Assistance: Independent  Homemaking Responsibilities: Yes  Meal Prep Responsibility: Primary  Laundry Responsibility: Primary  Cleaning Responsibility: Primary  Shopping Responsibility: Primary  Ambulation Assistance: Independent  Transfer Assistance: Independent  Active : Yes  Mode of Transportation: SUV, Car  Occupation: Retired, Volunteer work  Additional Comments: Pt reports pt is planning on going home to [place], may stay with family here in [place] pending outcome of POC    Objective   Vision: Within Functional Limits  Hearing: Within functional limits    Orientation  Overall Orientation Status: Within Functional Limits  Observation/Palpation  Posture: Good  Balance  Sitting Balance: Independent  Standing Balance: Contact guard assistance(use of SPC)  Standing Balance  Sit to stand: Contact guard assistance  Stand to sit: Contact guard assistance  Functional Mobility  Functional - Mobility Device: Cane  Activity: Other  Assist Level: Contact guard assistance  Functional Mobility Comments: Pt demo 1x small LOB requiring min A to correct.  Pt noted to demo slight SOB with increased activity      Tone RUE  RUE Tone: Normotonic  Tone LUE  LUE Tone: Normotonic  Coordination  Movements Are Fluid And Coordinated: Yes     Bed mobility  Sit to Supine: Minimal assistance  Scooting: Stand by assistance  Comment: Pt seated in bedside chair on arrival, left on stretcher with transporter following functional mobility      Transfers  Stand Step Transfers: Contact guard assistance  Sit to stand: Contact guard assistance  Stand to sit: Contact guard assistance  Transfer Comments: CGA for functional mobility, good safety awareness, min A for 1x small LOB during turning     Cognition  Overall Cognitive Status: WFL     Perception  Overall Perceptual Status: WFL     Sensation  Overall Sensation Status: WFL(Pt denies numbness/ tingling )    LUE AROM (degrees)  LUE AROM : WFL  RUE AROM (degrees)  RUE AROM : WFL  LUE Strength  Gross LUE Strength: WFL  RUE Strength  Gross RUE Strength: WFL    Assessment   Performance deficits / Impairments: Decreased ADL status; Decreased endurance;Decreased functional mobility ; Decreased balance  Assessment: Pt would benefit from continued acute care OT to address minimal deficits in ADL/ functional activities, endurance, balance and functional transfers/ mobility following admission.  Pt SOB with increased activity and required min A following 1x small LOB during turns   Prognosis: Good  Decision Making: Medium Complexity  Patient Education: OT POC, discharge planning, safety   REQUIRES OT FOLLOW UP: Yes  Activity Tolerance  Activity Tolerance: Patient Tolerated treatment well  Activity Tolerance: Limited d/t arrival of transporter for testing  Safety Devices  Safety Devices in place: Yes(with transport on exit, RN present )  Type of devices: Left in bed;Gait belt  Restraints  Initially in place: No         Plan   Plan  Times per week: 4-5x/wk     AM-PAC Score  AM-PAC Inpatient Daily Activity Raw Score: 19  AM-PAC Inpatient ADL T-Scale Score : 40.22  ADL Inpatient CMS 0-100% Score: 42.8  ADL Inpatient CMS G-Code Modifier : CK    Goals  Short term goals  Time Frame for Short term goals: by discharge, pt will  Short term goal 1: demo I in UE ADL activities   Short term goal 2: demo MI in LE ADL activities with AD as needed  Short term goal 3: demo understanding and I use of EC/WS, fall prevention and proper pursed lipped breathing tech during functional activities   Short term goal 4: demo increased activity tolerance of 30+ min to assist with ADL/ functional activities  Short term goal 5: demo I in functional transfers/ mobility with use of SPC to assist with ADL/ functional activities   Patient Goals   Patient goals : to go home    Therapy Time   Individual Concurrent Group Co-treatment   Time In 1018         Time Out 1034         Minutes 16          See above for LOF. RN reports patient is medically stable for therapy treatment this date. Chart reviewed prior to treatment and patient is agreeable for therapy. All lines intact and patient positioned comfortably at end of treatment. All patient needs addressed prior to ending therapy session.          Co-evaluation with PT  ADE Chirinos/GERALD

## 2019-03-08 NOTE — PROGRESS NOTES
Daily    isosorbide mononitrate  60 mg Oral Daily    levothyroxine  100 mcg Oral Daily     Continuous Infusions:    dextrose 5 % and 0.45 % NaCl 10 mL/hr at 19 1343     PRN Meds:  magnesium hydroxide    Input/Output:       I/O last 3 completed shifts: In: 5738 [P.O.:920; I.V.:530]  Out: 1300 [Urine:1300]. Patient Vitals for the past 96 hrs (Last 3 readings):   Weight   19 0304 156 lb 15.5 oz (71.2 kg)   19 205 156 lb 4.8 oz (70.9 kg)       Vital Signs:   Temperature:  Temp: 97.3 °F (36.3 °C)  TMax:   Temp (24hrs), Av.9 °F (36.6 °C), Min:97.3 °F (36.3 °C), Max:98.2 °F (36.8 °C)    Respirations:  Resp: 16  Pulse:   Pulse: 63  BP:    BP: 121/74  BP Range: Systolic (11BHI), PTA:299 , Min:95 , QGD:976       Diastolic (52IDA), MGR:56, Min:36, Max:74      Physical Examination:     General:  Alert and oriented ×3. HEENT: Atraumatic   Neck:   No JVD. Chest:   Air entry is better as compared to yesterday. Cardiac:  S1 and S2 audible moist.  Abdomen: Soft, non-tender, no masses or organomegaly, BS audible. :   No suprapubic or flank tenderness. Neuro:  AAO x 3, No FND. SKIN:  No rashes, good skin turgor. Extremities:  Trace to 1+ edema. Labs:       Recent Labs     19   WBC 12.7*   RBC 3.19*   HGB 9.2*   HCT 30.3*   MCV 95.0   MCH 28.8   MCHC 30.4   RDW 15.0*      MPV 11.5      BMP:   Recent Labs     19  0835    139   K 4.7 4.2    104   CO2 23 21   * 100*   CREATININE 2.00* 2.30*   GLUCOSE 124* 133*   CALCIUM 8.8 8.3*      Phosphorus:     Recent Labs     19  0835   PHOS 4.3     Magnesium:    Recent Labs     19  2246 19  0835   MG 2.6 2.5     Albumin:  No results for input(s): LABALBU in the last 72 hours.   BNP:    No results found for: BNP  GONSALO:    No results found for: GONSALO  SPEP:  Lab Results   Component Value Date    PROT 4.7 2019    ALBCAL PENDING 2019    ALBPCT PENDING 2019    LABALPH PENDING 03/07/2019    LABALPH PENDING 03/07/2019    A1PCT PENDING 03/07/2019    A2PCT PENDING 03/07/2019    LABBETA PENDING 03/07/2019    BETAPCT PENDING 03/07/2019    GAMGLOB PENDING 03/07/2019    GGPCT PENDING 03/07/2019    PATH PENDING 03/07/2019       Urinalysis/Chemistries:      Lab Results   Component Value Date    NITRU NEGATIVE 03/06/2019    COLORU YELLOW 03/06/2019    PHUR 6.0 03/06/2019    WBCUA None 03/06/2019    RBCUA 0 TO 2 03/06/2019    MUCUS NOT REPORTED 03/06/2019    TRICHOMONAS NOT REPORTED 03/06/2019    YEAST NOT REPORTED 03/06/2019    BACTERIA NOT REPORTED 03/06/2019    SPECGRAV 1.016 03/06/2019    LEUKOCYTESUR NEGATIVE 03/06/2019    UROBILINOGEN Normal 03/06/2019    BILIRUBINUR NEGATIVE 03/06/2019    GLUCOSEU NEGATIVE 03/06/2019    KETUA NEGATIVE 03/06/2019    AMORPHOUS NOT REPORTED 03/06/2019     Urine Sodium:   No results found for: RUDDY  Urine Potassium:  No results found for: KUR  Urine Chloride:  No results found for: CLUR  Urine Osmolarity: No results found for: OSMOU  Urine Protein:   No components found for: TOTALPROTEIN, URINE   Urine Creatinine:     Lab Results   Component Value Date    LABCREA 56.2 03/06/2019     Urine Eosinophils:  No components found for: UEOS  Results for China Duffy (MRN [id number]) as of 3/8/2019 12:28   Ref. Range 3/6/2019 20:58   Creatinine, Ur Latest Ref Range: 39.0 - 259.0 mg/dL 56.2   Total Protein, Urine Latest Units: mg/dL 217     Radiology:     CXR:     Assessment:     1. Acute kidney injury most likely due to decompensated CHF. Renal failure is still involving. Creatinine is 2.3 today. As I discussed with Martell Rojas, patient creatinine is somewhere around 1.6 mg/dL. He had at least 1 episode of acute renal failure while he was in [place] and his creatinine was up to 2.9 mg/dL. Renal ultrasound does not show any cortical thickening or obstruction  2. Coronary artery disease and need for cardiac catheterization  3. Aortic insufficiency  4.   Heavy proteinuria close to 3.8 g based on protein to creatinine ratio etiology not clear due to primary renal disease versus due to decompensated CHF next   5. Pulmonary hypertension  6. History of BPH  7. Decompensated CHF  8. Long-standing hypertension    Plan:   1. Continue Lasix  2. Discontinue IV fluids   3. Will consider for poor albumin if diuresis is not effective  4. Check postvoid residual volume    Nutrition   Please ensure that patient is on a renal diet/TF. Avoid nephrotoxic drugs/contrast exposure. We will continue to follow along with you.      Aneudy Chavira MD

## 2019-03-09 NOTE — PROGRESS NOTES
Physical Therapy  DATE: 3/9/2019    NAME: Rodger Morales  MRN: [id number]   : 12/10/1928    Patient not seen this date for Physical Therapy due to:  [] Blood transfusion in progress  [] Hemodialysis  []  Patient Declined  [] Spine Precautions   [] Strict Bedrest  [] Surgery/ Procedure  [x] Testing- pt off unit @ this time @ vascular lab. Will attempt to treat pt in the PM as able. No PT services received. [] Other        [] PT being discontinued at this time. Patient independent. No further needs. [] PT being discontinued at this time as the patient has been transferred to palliative care. No further needs.     [facility]

## 2019-03-09 NOTE — PROGRESS NOTES
Renal Progress Note    Patient :  Argyle Brunner; 80 y.o. MRN# [id number]  Location:  [id number]/0490-75  Attending:  Suzi Briggs MD  Admit Date:  3/6/2019   Hospital Day: 3      Subjective:     \"Consulted on 3/7/19 for elevated creatnine and CHF. He is a 80 y.o. male  who is a resident of [place]. According to patient he was not doing well from couple of months. He was brought into [place] from [place] by his nephew. Because he was not been able to take care of himself. Patient was admitted with decompensated congestive cardiac failure. He has a ejection fraction of 45%. An echo also shows moderate pericardial effusion without any signs of tamponade. Patient also has a history of congestive cardiac failure and most recent chest x-ray shows bilateral significant pleural effusions. Patient has a history of long-standing hypertension and he is taking medication for extended period of time. He is not diabetic and recently had a dual-chamber pacemaker  Patient was admitted to hospital with the symptoms suggestive of acute decompensated CHF. Also found to have a creatinine of 2 mg/dL. Patient states that he used to see a kidney doctor but I am not sure that she was seeing urologist or nephrologist.\"       Patient was seen and examined at bedside. Patient underwent left sided thoracentesis yesterday (removed 1100 mL) and mentions SOB of breath has improved. His urine output was 1200mL in the last 24 hours. Continues on Lasix 20 mg IVP BID. Serum creatinine was initially 2.0 and peaked at 2.3 and is trending downward at 2.15 today. CKD Baseline is approximately 1.6 mg/dL. Labs reviewed Hbg 9.2. Wt reviewed and patient is down 0.2 kg at 70.7 kg. Elevated Kappa Free chains at 2.69, UA noted 3+ protein, proteinuria noted at 3.86 with total protein creatinine urine studies. Xray of chest this am noted pulmonary infiltrates and effusions right side worse than left, but stable compared to previous imaging.  Renal ultrasound reviewed. Normal size right kidney at 12.5 x 4.7 x 4.9 cm and left kidney is 11.2 x 4.6 x 4.0 cm. No hydronephrosis, small lower pole left cortical renal cyst. Protein electrophoresis (serum) pending. Outpatient Medications:     Medications Prior to Admission: amiodarone (CORDARONE) 200 MG tablet, Take 200 mg by mouth daily  atorvastatin (LIPITOR) 20 MG tablet, Take 20 mg by mouth daily  isosorbide mononitrate (IMDUR) 60 MG extended release tablet, Take 60 mg by mouth daily  levothyroxine (SYNTHROID) 100 MCG tablet, Take 100 mcg by mouth Daily  lisinopril (PRINIVIL;ZESTRIL) 40 MG tablet, Take 40 mg by mouth daily  apixaban (ELIQUIS) 2.5 MG TABS tablet, Take 2.5 mg by mouth daily  metoprolol tartrate (LOPRESSOR) 50 MG tablet, Take 50 mg by mouth 2 times daily    Current Medications:     Scheduled Meds:    docusate sodium  100 mg Oral Daily    carvedilol  12.5 mg Oral BID WC    heparin (porcine)  5,000 Units Subcutaneous BID    aspirin  81 mg Oral Daily    furosemide  20 mg Intravenous BID    amiodarone  200 mg Oral Daily    atorvastatin  20 mg Oral Daily    isosorbide mononitrate  60 mg Oral Daily    levothyroxine  100 mcg Oral Daily     Continuous Infusions:     PRN Meds:      Input/Output:       I/O last 3 completed shifts: In: 1400 [P.O.:1400]  Out: 1200 [Urine:1200]. Patient Vitals for the past 96 hrs (Last 3 readings):   Weight   19 0600 155 lb 13.8 oz (70.7 kg)   19 0304 156 lb 15.5 oz (71.2 kg)   19 2059 156 lb 4.8 oz (70.9 kg)       Vital Signs:   Temperature:  Temp: 98.1 °F (36.7 °C)  TMax:   Temp (24hrs), Av.2 °F (36.8 °C), Min:97.7 °F (36.5 °C), Max:98.8 °F (37.1 °C)    Respirations:  Resp: 18  Pulse:   Pulse: 70  BP:    BP: (!) 131/52  BP Range: Systolic (30KWG), FNF:585 , Min:110 , GYJ:700       Diastolic (65GLY), ZYS:30, Min:52, Max:74      Physical Examination:     General:  Alert and oriented ×3. HEENT: Atraumatic   Neck:   No JVD.   Chest:   [employer] entry is better as compared to yesterday. Cardiac:  S1 and S2 audible moist.  Abdomen: Soft, non-tender, no masses or organomegaly, BS audible. :   No suprapubic or flank tenderness. Neuro:  AAO x 3, No FND. SKIN:  No rashes, good skin turgor. Extremities:  Trace to 1+ edema. Labs:       Recent Labs     03/06/19 2246   WBC 12.7*   RBC 3.19*   HGB 9.2*   HCT 30.3*   MCV 95.0   MCH 28.8   MCHC 30.4   RDW 15.0*      MPV 11.5      BMP:   Recent Labs     03/06/19  2246 03/08/19  0835 03/09/19  0441    139 140   K 4.7 4.2 4.6    104 104   CO2 23 21 25   * 100* 97*   CREATININE 2.00* 2.30* 2.15*   GLUCOSE 124* 133* 97   CALCIUM 8.8 8.3* 8.2*      Phosphorus:     Recent Labs     03/08/19  0835   PHOS 4.3     Magnesium:    Recent Labs     03/06/19 2246 03/08/19  0835   MG 2.6 2.5     SPEP  Lab Results   Component Value Date    PROT 4.7 03/07/2019    ALBCAL 2.5 03/07/2019    ALBPCT 53 03/07/2019    LABALPH 0.3 03/07/2019    LABALPH 0.8 03/07/2019    A1PCT 7 03/07/2019    A2PCT 18 03/07/2019    LABBETA 0.7 03/07/2019    BETAPCT 14 03/07/2019    GAMGLOB 0.4 03/07/2019    GGPCT 8 03/07/2019    PATH PENDING 03/07/2019       Urinalysis/Chemistries:      Lab Results   Component Value Date    NITRU NEGATIVE 03/06/2019    COLORU YELLOW 03/06/2019    PHUR 6.0 03/06/2019    WBCUA None 03/06/2019    RBCUA 0 TO 2 03/06/2019    MUCUS NOT REPORTED 03/06/2019    TRICHOMONAS NOT REPORTED 03/06/2019    YEAST NOT REPORTED 03/06/2019    BACTERIA NOT REPORTED 03/06/2019    SPECGRAV 1.016 03/06/2019    LEUKOCYTESUR NEGATIVE 03/06/2019    UROBILINOGEN Normal 03/06/2019    BILIRUBINUR NEGATIVE 03/06/2019    GLUCOSEU NEGATIVE 03/06/2019    KETUA NEGATIVE 03/06/2019    AMORPHOUS NOT REPORTED 03/06/2019     Urine Creatinine:     Lab Results   Component Value Date    LABCREA 56.2 03/06/2019     Results for Mary Lou Ramírez (MRN [id number]) as of 3/8/2019 12:28   Ref.  Range 3/6/2019 20:58   Creatinine, Ur Latest Ref Range: 39.0 - 259.0 mg/dL 56.2   Total Protein, Urine Latest Units: mg/dL 217     Radiology:     CXR: Reviewed as available. Assessment:     1. Acute kidney injury most likely due to decompensated CHF. Renal failure is gradually improving. Creatinine is 2.15 today. Renal ultrasound does not show any cortical thickening or obstruction. 2.  Coronary artery disease and need for cardiac catheterization. MV CAD. 3.  Aortic insufficiency - Mod AS  4. Heavy proteinuria close to 3.8 g based on protein to creatinine ratio etiology not clear due to primary renal disease versus due to decompensated CHF   5. Pulmonary hypertension  6. History of BPH  7. Decompensated CHF - hx of recent cath 2/19 in [place] with noted to MV CAD and AS. Dual PPM placed in 1/2019 for complete heart block in [place]. PPM interrogation pending per cardiology. 8.  Long-standing hypertension - Controlled. 9. B/L pleural effusions with bilateral thoracentesis per IR - last 3/8/19 left sided removed 1100 mL.  10.Hx PAF and small RT PE, was on 2.5 mg Eliquis at home - Heparin sub-q at this time. Possible heparin gtt s/p PCI per cardiology. 11. ICM with systolic CHF  12. Elevated kappa free light chain. Awaiting immunofixation. Plan:   1. Continue Lasix 20 mg IV push BID. 2.  Strict I&Os and daily weights. 3.  Needs PCI likely next week, would like to see creatinine closer to baseline 1.9-2.0 prior. Will monitor and review labs tomorrow morning. 4. Awaiting immunofixation labs. Nutrition   Please ensure that patient is on a renal diet/TF. Avoid nephrotoxic drugs/contrast exposure. We will continue to follow along with you. Nahomy Maurice, FRANCIS-CNP      Attending Physician Statement  I have discussed the care of Lennox Lemma, including pertinent history and exam findings with the resident/fellow. I have reviewed the key elements of all parts of the encounter with the resident/fellow.  I have seen and examined the patient

## 2019-03-09 NOTE — PROGRESS NOTES
Occupational Therapy Not Seen Note    DATE: 3/9/2019  Name: Anuja Guevara  : 12/10/1928  MRN: [id number]    Patient not available for Occupational Therapy due to:    ? Testing:    ? Hemodialysis    ? Blood Transfusion Pending or in Progress    ? Patient Declined:    ? Surgery/Procedure:    ? Strict Bedrest    ? Sedation    ? Spine Precautions     ? Pt being transferred to palliative care at this time. Spoke with pt/family and OT services to be defered. ? Pt independent with functional mobility and functional tasks. Pt with no OT acute care needs at this time, will defer OT eval.    ? Other: attempt this am but pt off unit @ this time @ vascular lab.          Next Scheduled Treatment:3/11/19    Signature: Sherrill SERRANO

## 2019-03-09 NOTE — PROGRESS NOTES
were discussed and agreed upon with Dr. Valdemar Price, the attending on service for the cardiothoracic surgery group today.      Titus Sky, PGY3

## 2019-03-09 NOTE — PLAN OF CARE
Bed lock and in lowest position, side rails up x 2, room free from clutter, call light within reach, alert and oriented and remains free from falls and physical injury. Patient denies when ask. Vital signs monitored and remain stable. Patient able to turn per self and no new signs of skin breakdown.

## 2019-03-09 NOTE — PROGRESS NOTES
[facility]   Progress Note                   Date:   3/9/2019  Patient name: Lennox Lemma  Date of admission:  3/6/2019  8:48 PM  MRN:   [id number]  YOB: 1928  PCP: No primary care provider on file. Reason for Admission: exertional dyspnea    Subjective:   Patient underwent LT Thoracentesis yesterday. Shortness of breath is better. Has slept good overnight. Has been having PVCs on tele monitor. On room air. Intake/Output Summary (Last 24 hours) at 3/9/2019 0734  Last data filed at 3/9/2019 0436  Gross per 24 hour   Intake 1100 ml   Output 1200 ml   Net -100 ml       Medications:   Scheduled Meds:   docusate sodium  100 mg Oral Daily    carvedilol  12.5 mg Oral BID WC    heparin (porcine)  5,000 Units Subcutaneous BID    aspirin  81 mg Oral Daily    furosemide  20 mg Intravenous BID    amiodarone  200 mg Oral Daily    atorvastatin  20 mg Oral Daily    isosorbide mononitrate  60 mg Oral Daily    levothyroxine  100 mcg Oral Daily     Continuous Infusions:    CBC:   Recent Labs     03/06/19 2246   WBC 12.7*   HGB 9.2*        BMP:    Recent Labs     03/06/19  2246 03/08/19  0835 03/09/19  0441    139 140   K 4.7 4.2 4.6    104 104   CO2 23 21 25   * 100* 97*   CREATININE 2.00* 2.30* 2.15*   GLUCOSE 124* 133* 97     Hepatic: No results for input(s): AST, ALT, ALB, BILITOT, ALKPHOS in the last 72 hours. Troponin: No results for input(s): TROPONINI in the last 72 hours. BNP: No results for input(s): BNP in the last 72 hours. Lipids: No results for input(s): CHOL, HDL in the last 72 hours.     Invalid input(s): LDLCALCU  INR:   Recent Labs     03/06/19 2246   INR 1.1       Objective:   Vitals: BP (!) 141/65   Pulse 70   Temp 97.7 °F (36.5 °C) (Oral)   Resp 16   Ht 5' 6\" (1.676 m)   Wt 156 lb 15.5 oz (71.2 kg)   SpO2 95%   BMI 25.34 kg/m²   Constitutional and General Appearance: alert, cooperative, no distress and appears stated age  Respiratory:  · Clear to auscultation bilaterally, with equal air entry  Cardiovascular:  · S1, s2, rrr, no pain on palpation of anterior chest wall. Abdomen:   · No masses or tenderness, soft abdomen  · Bowel sounds present  Extremities:  · le edema present, peripheral pulse bl le present 2 +  Integumentum:   Intact with no rashes noted      EKG:   V-pacing with occasional PVCs. CTA Chest in [place] (2/15/19):  Small PE within branch of RT middle lobe artery. Large RT pleural effusion. Moderate LT pleural effusion.     ECHO in [place] (2/17/19:   LVH with LVEF 50%. LT atrial enlargement. Moderate AS with Mild AI. Mitral annular calcification with mild MR. Mild to Moderate Pulm HTN.     Cardiac Angiography in [place] (1/20/19): Multivessel disease including LM. ECHO (3/6/19):   Normal left ventricle size with mildly reduced systolic function; Estimated left ventricular ejection fraction 40-45%  Anterolateral wall hypokinesis. Mild left ventricular hypertrophy. Left atrium is moderately dilated. Grade II diastolic dysfunction. Heavily calcified aortic valve with reduced systolic excursion and evidence of moderate stenosis. (Peak nataliia 3.62 m/s and mean gradient 29 mmHg)  Moderate posterior pericardial effusion without any echo signs of tamponade. Normal right ventricular size and function. Pacemaker lead seen in right ventricle. Moderate tricuspid regurgitation. Estimated right ventricular systolic pressure is 60 mmHg suggesting moderate  pulmonary HTN. Assessment / Acute Cardiac Problems:   1. Acute congestive heart failure (likely secondary to MV CAD and AS)  2. B/L Pleural effusions with RT/LT thoracentesis per IR   3. HUYEN (baseline according to nephew is 1 - 1.2) Improving  4. Moderate pericardial effusion without tamponade physiology likely secondary to CHF  5. Dual chamber pacer for complete heart block, placed in January 2019 in [place]  6.  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 Paroxysmal Afib and small RT PE, on Eliquis 2.5 mg QD at home. 9. Primary HTN  10. Dyslipidemia  11. Moderate Aortic stenosis( P Galen:3.6, MG 29)  12. Ischemic cardiomyopathy with systolic CHF( 82%)  13. Hypocalcemia    Plan of Treatment:     1. Nephro following for HUYEN, diuresis per them. Lasix 20 IV BID. S cr is improving. 2. Will need Complex PCI next week for LM and MV CAD. Will proceed when ok with Nephro. 3. BP is better controlled on Coreg and Imdur. Not on ACE 1 because of HUYEN on CKD  4. Mildly elevated TSH with normal Free T4. Continue with current Levothyroxine dose  5. Need to be started on heparin gtt for the H/O PE & P Afib. Discussed with Dr Frandy Hernandez and thinks S/Q heparin is sufficient till the PCI is done. 6. Will order ionic calcium. Replace calcium if low. Keep K>4, Calcium and magnesium in normal range  7. Waiting for the PPM interrogation. Will discuss with rounding attending  for final recommendations. Hildegarde Schilder, MD  Cardiology Fellow    Attending Physician Statement  I have discussed the care of Carlos Jensen, including pertinent history and exam findings,  with the cardiology fellow/resident. I have seen and examined the patient and the key elements of all parts of the encounter have been performed by me.   I agree with the assessment, plan and orders as documented by the fellow.  Sergio Osullivan [facility] [address] [facility]  [facility],  [address]   [phone]

## 2019-03-09 NOTE — FLOWSHEET NOTE
Dr. Everett Maynard notified that while the patient was undergoing his carotid scan he became short of breath and less responsive (according to tech). I went to vascular lab to monitor the patient. His O2 sat was in the low 90's. The patient states that \"for years when I turn my head too far to the left I get short of breath. \" The patient remained asymptomatic for the remainder of the test. When arriving back to his room the patient became nauseated and vomited as small amount of emesis. The patient believes this is due to him eating breakfast then laying flat for testing. Vitals WNL. Patient now resting comfortably in the chair. Reports feeling much better. The patient also had concerns with taking ASA today, ok to hold per Dr. Everett Maynard. Dr. Everett Maynard would like to be called with the carotid scan results. Order for a BNP tomorrow morning received.

## 2019-03-10 NOTE — PROGRESS NOTES
Nutrition Assessment    Type and Reason for Visit: Initial, Consult(Supplement Recommendations)    Nutrition Recommendations: Continue current diet. Encourage/monitor intakes as tolerated. Will continue to offer ONS - as able, provide Ensure with meals. Nutrition Assessment: Consulted for supplement recommendations. Pt reports his appetite is okay. States it seems like he is recieving to much on his meal trays as he can only eat about 50% of what is sent. Reports he is not hungry after eating though. Pt states he thinks he is doing okay right now and does not want to try ONS yet. Malnutrition Assessment:  · Malnutrition Status: At risk for malnutrition  · Context: (Acute on Chronic)  · Findings of the 6 clinical characteristics of malnutrition (Minimum of 2 out of 6 clinical characteristics is required to make the diagnosis of moderate or severe Protein Calorie Malnutrition based on AND/ASPEN Guidelines):  1. Energy Intake-(Consuming at least 50% of meal trays), Greater than or equal to 5 days    2. Weight Loss-No significant weight loss  3. Fat Loss-Mild subcutaneous fat loss, Orbital  4. Muscle Loss-No significant muscle mass loss  5. Fluid Accumulation-Mild fluid accumulation, Extremities    Nutrition Risk Level: Moderate    Nutrient Needs:  · Estimated Daily Total Kcal: 1593-0157 kcal/day  · Estimated Daily Protein (g): 70-90 gm pro/day    Nutrition Diagnosis:   · Problem: Increased nutrient needs  · Etiology: related to (current medical condition)     Signs and symptoms:  as evidenced by (intakes of about 50% of meals, offering ONS with meals)    Objective Information:  · Nutrition-Focused Physical Findings: +1 pitting BLE edema. Dry skin.   · Wound Type: None  · Current Nutrition Therapies:  · Oral Diet Orders: Cardiac, 2gm Sodium   · Oral Diet intake: 51-75%(about 50% of his meals per pt)  · Oral Nutrition Supplement (ONS) Orders: None  · Anthropometric Measures:  · Ht: 5' 6\" (167.6 cm) · Current Body Wt: 154 lb 12.2 oz (70.2 kg)  · Admission Body Wt: 156 lb (70.8 kg)  · Ideal Body Wt: 141 lb 15.6 oz (64.4 kg), % Ideal Body 109%  · BMI Classification: BMI 25.0 - 29.9 Overweight    Nutrition Interventions:   Continue current diet - Provide Ensure ONS with meals as needed. Continued Inpatient Monitoring, Education Not Indicated    Nutrition Evaluation:   · Evaluation: Goals set   · Goals: Oral intakes to meet at least 50% of estimated nutrition needs.     · Monitoring: Meal Intake, Diet Tolerance, Skin Integrity, Weight, Pertinent Labs, Monitor Hemodynamic Status    Electronically signed by Sidra Shone, RD, LD on 3/10/19 at 4:06 PM    Contact Number: [phone]

## 2019-03-10 NOTE — PROGRESS NOTES
[facility]  Pre Op Progress Note    CC: ACUTE CHF, SOB      Subjective:  Mr. Federica Daniels seen and examined Plan of care reviewed and questions answered. Breathing is stable    Physical Exam  Vital Signs: BP (!) 133/45   Pulse 63   Temp 98 °F (36.7 °C) (Oral)   Resp 18   Ht 5' 6\" (1.676 m)   Wt 154 lb 12.2 oz (70.2 kg)   SpO2 90%   BMI 24.98 kg/m²  O2 Flow Rate (L/min): 1 L/min       General: alert and oriented to person, place and time. Up in chair, No apparent distress. Heart:Normal S1 and S2.  Regular rhythm. No murmurs, gallops, or rubs. Lungs: clear to auscultation bilaterally  Abdomen: soft, non tender, non distended, BSx4  Extremities: 1+ edema      Scheduled Meds:    docusate sodium  100 mg Oral Daily    carvedilol  12.5 mg Oral BID WC    heparin (porcine)  5,000 Units Subcutaneous BID    aspirin  81 mg Oral Daily    furosemide  20 mg Intravenous BID    amiodarone  200 mg Oral Daily    atorvastatin  20 mg Oral Daily    isosorbide mononitrate  60 mg Oral Daily    levothyroxine  100 mcg Oral Daily     Continuous Infusions:     Data:  CBC: No results for input(s): WBC, HGB, HCT, MCV, PLT in the last 72 hours. BMP:   Recent Labs     03/08/19  0835 03/09/19  0441 03/10/19  0424    140 138   K 4.2 4.6 4.2    104 105   CO2 21 25 23   PHOS 4.3  --   --    * 97* 93*   CREATININE 2.30* 2.15* 2.12*     PT/INR: No results for input(s): PROTIME, INR in the last 72 hours. APTT: No results for input(s): APTT in the last 72 hours. Assessment & Plan:   Patient Active Problem List   Diagnosis    Pulmonary hypertension (HCC)     *Diastolic Heart Failure  1. carotid study bilateral less than 50% stenosis, Cont to hold eliquis  2. Check BNP in am - elevated, will discuss with cardio and nephro  3. Replace electrolytes as needed  4. HUYEN - nephrology following - stabilizing  2.  Plan for complex PCI next week for LM and MV CAD         The above recommendations including medications and orders were discussed and agreed upon with Dr. Ritesh Hernandez, the attending on service for the cardiothoracic surgery group today.      FRANCIS Bradley, CNP

## 2019-03-10 NOTE — PROGRESS NOTES
Renal Progress Note    Patient :  Dick Blackburn; 80 y.o. MRN# [id number]  Location:  8930/5330-01  Attending:  Landon Harman MD  Admit Date:  3/6/2019   Hospital Day: 4      Subjective:     Patient was seen and examined at bedside. No acute events overnight. Vital signs stable. UROP only 300 mL x 1 occurrence over last 24 hours. -250 mL net I&O since admit. Weight 70.2 kg today down 0.7 kg since admission. Carotid dopplers yesterday noted both right and left R ICA have mild <50% stenosis with patency. Patient underwent left sided thoracentesis Friday (removed 1100 mL). Protein electrophoresis (serum) noted total protein is decreased with a normal electrophoretic pattern. Elevated Kappa Free chains at 2.69 with normal ratio. UA noted 3+ protein with proteinuria noted at 3.86 with total protein creatinine urine studies. Protein electrophoresis (serum) noted total protein is decreased with a normal electrophoretic pattern. Labs improving and complex PCI need sometime next week. Need creatinine to baseline 1.9-2.0 mg/dL. Improving daily.     Outpatient Medications:     Medications Prior to Admission: amiodarone (CORDARONE) 200 MG tablet, Take 200 mg by mouth daily  atorvastatin (LIPITOR) 20 MG tablet, Take 20 mg by mouth daily  isosorbide mononitrate (IMDUR) 60 MG extended release tablet, Take 60 mg by mouth daily  levothyroxine (SYNTHROID) 100 MCG tablet, Take 100 mcg by mouth Daily  lisinopril (PRINIVIL;ZESTRIL) 40 MG tablet, Take 40 mg by mouth daily  apixaban (ELIQUIS) 2.5 MG TABS tablet, Take 2.5 mg by mouth daily  metoprolol tartrate (LOPRESSOR) 50 MG tablet, Take 50 mg by mouth 2 times daily    Current Medications:     Scheduled Meds:    docusate sodium  100 mg Oral Daily    carvedilol  12.5 mg Oral BID WC    heparin (porcine)  5,000 Units Subcutaneous BID    aspirin  81 mg Oral Daily    furosemide  20 mg Intravenous BID    amiodarone  200 mg Oral Daily    atorvastatin  20 mg Oral Daily    isosorbide mononitrate  60 mg Oral Daily    levothyroxine  100 mcg Oral Daily     Input/Output:       I/O last 3 completed shifts: In: 100 [P.O.:100]  Out: 300 [Urine:300]. Patient Vitals for the past 96 hrs (Last 3 readings):   Weight   03/10/19 0600 154 lb 12.2 oz (70.2 kg)   19 0600 155 lb 13.8 oz (70.7 kg)   19 0304 156 lb 15.5 oz (71.2 kg)       Vital Signs:   Temperature:  Temp: 98 °F (36.7 °C)  TMax:   Temp (24hrs), Av.2 °F (36.8 °C), Min:97.6 °F (36.4 °C), Max:98.8 °F (37.1 °C)    Respirations:  Resp: 18  Pulse:   Pulse: 63  BP:    BP: (!) 133/45  BP Range: Systolic (18MJN), TLT:853 , Min:104 , FDS:746       Diastolic (99GJQ), AJF:52, Min:43, Max:52      Physical Examination:     General:  Alert and oriented ×3. HEENT: Atraumatic   Neck:   No JVD. Chest:   Air entry is better as compared to yesterday. Cardiac:  S1 and S2 audible moist.  Abdomen: Soft, non-tender, no masses or organomegaly, BS audible. :   No suprapubic or flank tenderness. Neuro:  AAO x 3, No FND. SKIN:  No rashes, good skin turgor. Extremities:  Trace to 1+ edema. Labs:       BMP:   Recent Labs     19  0835 19  0441 03/10/19  0424    140 138   K 4.2 4.6 4.2    104 105   CO2 21 25 23   * 97* 93*   CREATININE 2.30* 2.15* 2.12*   GLUCOSE 133* 97 103*   CALCIUM 8.3* 8.2* 8.0*      Phosphorus:     Recent Labs     19  0835   PHOS 4.3     Magnesium:    Recent Labs     19  0835 19  0931 03/10/19  0424   MG 2.5 2.5 2.4     SPEP  Lab Results   Component Value Date    PROT 4.7 2019    ALBCAL 2.5 2019    ALBPCT 53 2019    LABALPH 0.3 2019    LABALPH 0.8 2019    A1PCT 7 2019    A2PCT 18 2019    LABBETA 0.7 2019    BETAPCT 14 2019    GAMGLOB 0.4 2019    GGPCT 8 2019    PATH ELECTRONICALLY SIGNED.  Buzz Loredo M.D. 2019       Urinalysis/Chemistries:      Lab Results   Component Value Date    NITRU NEGATIVE 03/06/2019    COLORU YELLOW 03/06/2019    PHUR 6.0 03/06/2019    WBCUA None 03/06/2019    RBCUA 0 TO 2 03/06/2019    MUCUS NOT REPORTED 03/06/2019    TRICHOMONAS NOT REPORTED 03/06/2019    YEAST NOT REPORTED 03/06/2019    BACTERIA NOT REPORTED 03/06/2019    SPECGRAV 1.016 03/06/2019    LEUKOCYTESUR NEGATIVE 03/06/2019    UROBILINOGEN Normal 03/06/2019    BILIRUBINUR NEGATIVE 03/06/2019    GLUCOSEU NEGATIVE 03/06/2019    KETUA NEGATIVE 03/06/2019    AMORPHOUS NOT REPORTED 03/06/2019     Urine Creatinine:     Lab Results   Component Value Date    LABCREA 56.2 03/06/2019     Results for Aileen Pod (MRN [id number]) as of 3/8/2019 12:28   Ref. Range 3/6/2019 20:58   Creatinine, Ur Latest Ref Range: 39.0 - 259.0 mg/dL 56.2   Total Protein, Urine Latest Units: mg/dL 217     Radiology:     CXR: Reviewed as available. Assessment:     1. Acute kidney injury most likely due to decompensated CHF. Renal failure is gradually improving. Creatinine is 2.12 today slight improvement from 2.15 yesterday. Baseline 1.9-2.0 Renal ultrasound normal. Continues on IV lasix 20 mg BID. 2.  Coronary artery disease and need for cardiac catheterization. MV CAD - Complex PCI next week sometime pending our approval. Creatinine is close to baseline. 3.  Aortic insufficiency - Mod AS  4. Nephrotic range proteinuria close to 3.8 g based on protein to creatinine ratio etiology not clear due to primary renal disease versus due to decompensated CHF   5. Pulmonary hypertension  6. History of BPH  7. Decompensated CHF - hx of recent cath 2/19 in [place] with noted to MV CAD and AS. Dual PPM placed in 1/2019 for complete heart block in [place]. PPM interrogation pending per cardiology. 8.  Long-standing hypertension - Controlled. 9. B/L pleural effusions with bilateral thoracentesis per IR - last 3/8/19 left sided removed 1100 mL.  10.Hx PAF and small RT PE, was on 2.5 mg Eliquis at home - Heparin sub-q at this time.  Possible heparin gtt s/p PCI per cardiology. 11. ICM with systolic CHF  12. Elevated kappa free light chain with normal ratio -  Immunofixation showed protein is decreased with a normal electrophoretic pattern. .  Plan:   1. Continue Lasix 20 mg IV push BID. 2.  Strict I&Os and daily weights. 3.  Needs PCI likely next week, would like to see creatinine closer to baseline 1.9-2.0 prior. 2.12 today. Continues to improve. Will monitor and review daily. 4.  Will continue to monitor. 5. Labs in am    Nutrition   Please ensure that patient is on a renal diet/TF. Avoid nephrotoxic drugs/contrast exposure. We will continue to follow along with you. Johnathan Granados, FRANCIS-CNP  [facility]    Attending Physician Statement  I have discussed the care of Anuja Guevara, including pertinent history and exam findings with the resident/fellow. I have reviewed the key elements of all parts of the encounter with the resident/fellow. I have seen and examined the patient with the resident/fellow. I agree with the assessment and plan and status of the problem list as documented. Addiitionally I recommend with the patient's nephrotic syndrome, including a low Albumin of 2.3, the patient would benefit from a native kidney biopsy. The etiology of the nephrotic syndrome is unclear. I spoke with the patient's nephew, Dr. Ritesh Hernandez, and the patient himself, at length about the risks and benefits of the native kidney biopsy. They're agreeable to proceed with the biopsy. I have ordered the biopsy by interventional radiology to be performed tomorrow, 3/11/19.     Giovanni Bronson MD  Nephrology Attending Physician  [facility]

## 2019-03-10 NOTE — PROGRESS NOTES
[facility]   Progress Note                   Date:   3/10/2019  Patient name: Enedelia Gonzalez  Date of admission:  3/6/2019  8:48 PM  MRN:   [id number]  YOB: 1928  PCP: No primary care provider on file. Reason for Admission: exertional dyspnea    Subjective:   Pt is on room air  He gets out of breath walking minimum distance to restroom  PVCs noted on tele monitor    . Intake/Output Summary (Last 24 hours) at 3/10/2019 0858  Last data filed at 3/10/2019 0636  Gross per 24 hour   Intake 100 ml   Output 300 ml   Net -200 ml       Medications:   Scheduled Meds:   docusate sodium  100 mg Oral Daily    carvedilol  12.5 mg Oral BID WC    heparin (porcine)  5,000 Units Subcutaneous BID    aspirin  81 mg Oral Daily    furosemide  20 mg Intravenous BID    amiodarone  200 mg Oral Daily    atorvastatin  20 mg Oral Daily    isosorbide mononitrate  60 mg Oral Daily    levothyroxine  100 mcg Oral Daily     Continuous Infusions:    CBC:   No results for input(s): WBC, HGB, PLT in the last 72 hours. BMP:    Recent Labs     03/08/19  0835 03/09/19  0441 03/10/19  0424    140 138   K 4.2 4.6 4.2    104 105   CO2 21 25 23   * 97* 93*   CREATININE 2.30* 2.15* 2.12*   GLUCOSE 133* 97 103*     Hepatic: No results for input(s): AST, ALT, ALB, BILITOT, ALKPHOS in the last 72 hours. Troponin: No results for input(s): TROPONINI in the last 72 hours. BNP: No results for input(s): BNP in the last 72 hours. Lipids: No results for input(s): CHOL, HDL in the last 72 hours. Invalid input(s): LDLCALCU  INR:   No results for input(s): INR in the last 72 hours.     Objective:   Vitals: BP (!) 133/45   Pulse 63   Temp 98 °F (36.7 °C) (Oral)   Resp 18   Ht 5' 6\" (1.676 m)   Wt 154 lb 12.2 oz (70.2 kg)   SpO2 90%   BMI 24.98 kg/m²   Constitutional and General Appearance: alert, cooperative, no distress and appears stated age  Respiratory:  · Basal rales heard  Cardiovascular:  S1, s2, systolic murmur is heard  Abdomen:   · No masses or tenderness, soft abdomen  · Bowel sounds present  Extremities:  · le edema present, peripheral pulse bl le present 2 +  Integumentum:   Intact with no rashes noted      EKG:   V-pacing with occasional PVCs. CTA Chest in [place] (2/15/19):  Small PE within branch of RT middle lobe artery. Large RT pleural effusion. Moderate LT pleural effusion.     ECHO in [place] (2/17/19:   LVH with LVEF 50%. LT atrial enlargement. Moderate AS with Mild AI. Mitral annular calcification with mild MR. Mild to Moderate Pulm HTN.     Cardiac Angiography in [place] (1/20/19): Multivessel disease including LM. ECHO (3/6/19):   Normal left ventricle size with mildly reduced systolic function; Estimated left ventricular ejection fraction 40-45%  Anterolateral wall hypokinesis. Mild left ventricular hypertrophy. Left atrium is moderately dilated. Grade II diastolic dysfunction. Heavily calcified aortic valve with reduced systolic excursion and evidence of moderate stenosis. (Peak nataliia 3.62 m/s and mean gradient 29 mmHg)  Moderate posterior pericardial effusion without any echo signs of tamponade. Normal right ventricular size and function. Pacemaker lead seen in right ventricle. Moderate tricuspid regurgitation. Estimated right ventricular systolic pressure is 60 mmHg suggesting moderate  pulmonary HTN. Assessment / Acute Cardiac Problems:   1. Acute congestive heart failure (likely secondary to MV CAD and AS)  2. B/L Pleural effusions with RT/LT thoracentesis per IR   3. Nephrotic proteinuria with HUYEN (baseline according to nephew is 1 - 1.2) Improving  4. Moderate pericardial effusion without tamponade physiology likely secondary to CHF  5. Dual chamber pacer for complete heart block, placed in January 2019 in [place]  6. Known MV CAD per cardiac cath in February in [address]. 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  Paroxysmal Afib and small RT PE, on Eliquis 2.5 mg QD at home. 9. Primary HTN  10. Dyslipidemia  11. Moderate Aortic stenosis( P Galen:3.6, MG 29)  12. Ischemic cardiomyopathy with systolic CHF( 41%)  13. Hypocalcemia    Plan of Treatment:     1. Nephro following for HUYEN, diuresis per them. Lasix 20 IV BID. S cr is improving with S cr of 2.12  2. Will need Complex PCI next week for LM and MV CAD. Will proceed when ok with Nephro. 3. BP is better controlled on Coreg and Imdur. Not on ACE 1 because of HUYEN on CKD  4. Mildly elevated TSH with normal Free T4. Continue with current Levothyroxine dose  5. Need to be started on heparin gtt for the H/O PE & P Afib. Discussed with Dr Betty Sandifer and thinks S/Q heparin is sufficient till the PCI is done. 6. Will order ionic calcium. Replace calcium if low. Keep K>4, Calcium and magnesium in normal range    Will discuss with rounding attending  for final recommendations. Anoop Taylor MD  Cardiology Fellow    Attending Physician Statement  I have discussed the care of Levar Colby, including pertinent history and exam findings,  with the cardiology fellow/resident. I have seen and examined the patient and the key elements of all parts of the encounter have been performed by me. I agree with the assessment, plan and orders as documented by the resident with additional recommendations as below:     Stable clinically, edema improving, on room air, urine output fair, cr slightly improved but plateau at 2.02. Cont diuresis per nephrology recs. Will plan for PCI-LAD with aortic valve study when cr close to baseline and cleared from nephrology perspective.  Discussed with Carlos Sorenson [id number]  [address], [address]  [phone]

## 2019-03-10 NOTE — PROGRESS NOTES
Allergen Reactions    Quinolones      Pt is allergic to fluoroquinolones       Home Meds:   Prior to Admission medications    Medication Sig Start Date End Date Taking? Authorizing Provider   amiodarone (CORDARONE) 200 MG tablet Take 200 mg by mouth daily   Yes Historical Provider, MD   atorvastatin (LIPITOR) 20 MG tablet Take 20 mg by mouth daily   Yes Historical Provider, MD   isosorbide mononitrate (IMDUR) 60 MG extended release tablet Take 60 mg by mouth daily   Yes Historical Provider, MD   levothyroxine (SYNTHROID) 100 MCG tablet Take 100 mcg by mouth Daily   Yes Historical Provider, MD   lisinopril (PRINIVIL;ZESTRIL) 40 MG tablet Take 40 mg by mouth daily   Yes Historical Provider, MD   apixaban (ELIQUIS) 2.5 MG TABS tablet Take 2.5 mg by mouth daily   Yes Historical Provider, MD   metoprolol tartrate (LOPRESSOR) 50 MG tablet Take 50 mg by mouth 2 times daily   Yes Historical Provider, MD         Intake/Output Summary (Last 24 hours) at 3/10/2019 1753  Last data filed at 3/10/2019 1747  Gross per 24 hour   Intake 500 ml   Output 700 ml   Net -200 ml         Diet   DIET CARDIAC; Low Sodium (2 GM)    Vitals:   BP (!) 110/54   Pulse 68   Temp 98.6 °F (37 °C) (Oral)   Resp 16   Ht 5' 6\" (1.676 m)   Wt 154 lb 12.2 oz (70.2 kg)   SpO2 94%   BMI 24.98 kg/m²  on         I/O (24 Hours)    Patient Vitals for the past 8 hrs:   BP Temp Temp src Pulse Resp SpO2   03/10/19 1747 -- -- -- 68 16 94 %   03/10/19 1528 (!) 110/54 98.6 °F (37 °C) Oral 62 17 --   03/10/19 1125 (!) 87/43 98.2 °F (36.8 °C) Oral 61 17 --       Intake/Output Summary (Last 24 hours) at 3/10/2019 1753  Last data filed at 3/10/2019 1747  Gross per 24 hour   Intake 500 ml   Output 700 ml   Net -200 ml     I/O last 3 completed shifts:   In: 100 [P.O.:100]  Out: 700 [Urine:700]   Date 03/10/19 0000 - 03/10/19 2359   Shift 9701-2823 9990-5815 1220-6324 24 Hour Total   INTAKE   P.O.(mL/kg/hr) 100(0.2)  400 500   Shift Total(mL/kg) 100(1.4)  400(5.7) 500(7.1)   OUTPUT   Urine(mL/kg/hr) 300(0.5) 400(0.7)  700   Shift Total(mL/kg) 300(4.3) 400(5.7)  700(10)   Weight (kg) 70.2 70.2 70.2 70.2     Patient Vitals for the past 96 hrs (Last 3 readings):   Weight   03/10/19 0600 154 lb 12.2 oz (70.2 kg)   03/09/19 0600 155 lb 13.8 oz (70.7 kg)   03/08/19 0304 156 lb 15.5 oz (71.2 kg)          PHYSICAL EXAMINATION:  General Appearance:    Alert, cooperative, no distress, appears stated age   Head:    Normocephalic, without obvious abnormality, atraumatic                  :    Neck:   Supple, symmetrical, trachea midline, no adenopathy;     thyroid:  no enlargement/tenderness/nodules; no carotid    bruit + JVP    Back:     Symmetric, no curvature, ROM normal, no CVA tenderness   Lungs:       Dull bases and dec bs bases no wheeze    Chest Wall:    No tenderness or deformity      Heart:    Regular rate and rhythm, S1 and S2 normal, 3/6 AS                           Abdomen:                                                 Pulses:                                            Lymph nodes:                    Neurologic:                  Soft, non-tender, bowel sounds active all four quadrants,     no masses, no organomegaly         2+ and symmetric all extremities            Cervical, supraclavicular not enlarged or matted or tender      CNII-XII intact, normal strength 5/5 .   Sensation grossly normal  and reflexes normal 2+  throughout     Clubbing No  Lower ext edema Yes1+   [] , 2 +  [] , 3+   []  Upper ext edema No       Musculoskeletal - no joint swelling or tenderness or synovitis            Medications:    Scheduled Meds:   [START ON 3/11/2019] isosorbide mononitrate  30 mg Oral Daily    docusate sodium  100 mg Oral Daily    carvedilol  12.5 mg Oral BID WC    heparin (porcine)  5,000 Units Subcutaneous BID    aspirin  81 mg Oral Daily    furosemide  20 mg Intravenous BID    amiodarone  200 mg Oral Daily    atorvastatin  20 mg Oral Daily    levothyroxine  100 mcg Oral Daily       Continuous Infusions:      PRN Meds:      Labs:  CBC:   No results for input(s): WBC, HGB, HCT, MCV, PLT in the last 72 hours. BMP:   Recent Labs     03/08/19  0835 03/09/19  0441 03/10/19  0424    140 138   K 4.2 4.6 4.2    104 105   CO2 21 25 23   PHOS 4.3  --   --    * 97* 93*   CREATININE 2.30* 2.15* 2.12*     LIVER PROFILE: No results for input(s): AST, ALT, LIPASE, BILIDIR, BILITOT, ALKPHOS in the last 72 hours. Invalid input(s): AMYLASE,  ALB  PT/INR:   No results for input(s): PROTIME, INR in the last 72 hours. APTT:   No results for input(s): APTT in the last 72 hours. UA:  No results for input(s): NITRITE, COLORU, PHUR, LABCAST, WBCUA, RBCUA, MUCUS, TRICHOMONAS, YEAST, BACTERIA, CLARITYU, SPECGRAV, LEUKOCYTESUR, UROBILINOGEN, BILIRUBINUR, BLOODU, GLUCOSEU, AMORPHOUS in the last 72 hours. Invalid input(s): KETONESU  No results for input(s): PHART, [id number], PO2ART in the last 72 hours. ABG   No results found for: PH, PCO2, PO2, HCO3, O2SAT  Lab Results   Component Value Date    MODE NOT REPORTED 03/06/2019           Films:  CXR  bilateral effusion with atelectasis   Assessment:   Acute CHF ischemic and due to AS   B/l pleural effusion due to CHF post rt and left thoracentesis   Small RML PE ? Afib   Group 2 pulmonary hypertension   HUYEN   B/l compressive atelectasis   Multivessel CAD   Plan:  .   Continue diuresis  Awaiting cardiac cath, once renal function improves  Will continue to follow      Electronically signed by Octavia Gracia MD on 3/10/2019 at 5:53 PM

## 2019-03-10 NOTE — PROGRESS NOTES
Pulmonary Progress Note    CC:  chf   Subjective:  Events at vascular lab  Noted   Presently doing well in chair   No distress   On ra       Review of Systems -  General ROS: negative for - chills, fatigue, fever or weight loss  ENT ROS: negative for - headaches, oral lesions or sore throat  Cardiovascular ROS: no chest pain ,+ orthopnea  pnd   Gastrointestinal ROS: no abdominal pain, change in bowel habits, or black or bloody stools  Skin - no rash   Neuro - no blurry vision , no loc . No focal weakness   msk - no jt tenderness or swelling    Vascular - no claudication , rest completed and negative   Lymphatic - complete and negative   Hematology - oncology - complete and negative   Allergy immunology - complete and negative    no burning or hematuria     Immunization     There is no immunization history on file for this patient. Pneumococcal Vaccine     [] Up to date    [x] Indicated   [] Refused  [] Contraindicated       Influenza Vaccine   [] Up to date    [x] Indicated   [] Refused  [] Contraindicated     PAST MEDICAL HISTORY:       Diagnosis Date    Aortic stenosis 02/15/2019    Atrial fibrillation ([address]     CAD (coronary artery disease)     Chest pain 02/15/2019    CHF (congestive heart failure) (HCC)     Chronic anemia     Chronic kidney disease     Hypertension     Pleural effusion on left 02/15/2019    Pleural effusion, right 02/15/2019    Pulmonary emboli ([address] 02/15/2019    Pulmonary hypertension ([address] 03/06/2019    Thyroid disease     hypothyroidism         Family History:   History reviewed. No pertinent family history. SURGICAL HISTORY:   Past Surgical History:   Procedure Laterality Date    JOINT REPLACEMENT      right ankle    PACEMAKER PLACEMENT  01/20/2019    THORACENTESIS                TOBACCO:   reports that he quit smoking about 30 years ago. He has never used smokeless tobacco.  ETOH:   reports that he drank alcohol.           ALLERGIES:    Allergies   Allergen Shift Total(mL/kg) 250(3.5)   250(3.5)   Weight (kg) 70.7 70.7 70.7 70.7     Patient Vitals for the past 96 hrs (Last 3 readings):   Weight   03/09/19 0600 155 lb 13.8 oz (70.7 kg)   03/08/19 0304 156 lb 15.5 oz (71.2 kg)   03/06/19 2059 156 lb 4.8 oz (70.9 kg)          PHYSICAL EXAMINATION:  General Appearance:    Alert, cooperative, no distress, appears stated age   Head:    Normocephalic, without obvious abnormality, atraumatic                  :    Neck:   Supple, symmetrical, trachea midline, no adenopathy;     thyroid:  no enlargement/tenderness/nodules; no carotid    bruit + JVP    Back:     Symmetric, no curvature, ROM normal, no CVA tenderness   Lungs:       Dull bases and dec bs bases no wheeze    Chest Wall:    No tenderness or deformity      Heart:    Regular rate and rhythm, S1 and S2 normal, 3/6 AS                           Abdomen:                                                 Pulses:                                            Lymph nodes:                    Neurologic:                  Soft, non-tender, bowel sounds active all four quadrants,     no masses, no organomegaly         2+ and symmetric all extremities            Cervical, supraclavicular not enlarged or matted or tender      CNII-XII intact, normal strength 5/5 .   Sensation grossly normal  and reflexes normal 2+  throughout     Clubbing No  Lower ext edema Yes1+   [] , 2 +  [] , 3+   []  Upper ext edema No       Musculoskeletal - no joint swelling or tenderness or synovitis            Medications:    Scheduled Meds:   docusate sodium  100 mg Oral Daily    carvedilol  12.5 mg Oral BID WC    heparin (porcine)  5,000 Units Subcutaneous BID    aspirin  81 mg Oral Daily    furosemide  20 mg Intravenous BID    amiodarone  200 mg Oral Daily    atorvastatin  20 mg Oral Daily    isosorbide mononitrate  60 mg Oral Daily    levothyroxine  100 mcg Oral Daily       Continuous Infusions:      PRN Meds:      Labs:  CBC:   Recent Labs 03/06/19 2246   WBC 12.7*   HGB 9.2*   HCT 30.3*   MCV 95.0        BMP:   Recent Labs     03/06/19  2246 03/08/19  0835 03/09/19  0441    139 140   K 4.7 4.2 4.6    104 104   CO2 23 21 25   PHOS  --  4.3  --    * 100* 97*   CREATININE 2.00* 2.30* 2.15*     LIVER PROFILE: No results for input(s): AST, ALT, LIPASE, BILIDIR, BILITOT, ALKPHOS in the last 72 hours. Invalid input(s): AMYLASE,  ALB  PT/INR:   Recent Labs     03/06/19 2246   PROTIME 11.5   INR 1.1     APTT:   Recent Labs     03/06/19 2246   APTT 26.1     UA:  Recent Labs     03/06/19 2058   COLORU YELLOW   PHUR 6.0   WBCUA None   RBCUA 0 TO 2   MUCUS NOT REPORTED   TRICHOMONAS NOT REPORTED   YEAST NOT REPORTED   BACTERIA NOT REPORTED   SPECGRAV 1.016   LEUKOCYTESUR NEGATIVE   UROBILINOGEN Normal   BILIRUBINUR NEGATIVE   GLUCOSEU NEGATIVE   AMORPHOUS NOT REPORTED     No results for input(s): PHART, JXF2XOE, PO2ART in the last 72 hours. ABG   No results found for: PH, PCO2, PO2, HCO3, O2SAT  Lab Results   Component Value Date    MODE NOT REPORTED 03/06/2019           Films:  CXR  B/l pleural effusion r> left and pulmonary infiltrates atelectasis and pulmonary edema . Assessment:   Acute CHF ischemic and due to AS   B/l pleural effusion due to CHF post rt and left thoracentesis   Small RML PE ?   Afib   Group 2 pulmonary hypertension   HUYEN   B/l compressive atelectasis   Multivessel CAD   Plan:  Continue diuresis   Plan for PCI when ok with nephrology baseline cr is 1.3   May even need TAVR   D/w Dr Pietro Hall       Electronically signed by Tatum Licona MD on 3/9/2019 at 8:56 PM

## 2019-03-11 NOTE — PROGRESS NOTES
Patient refusing to ambulate with Sheri from PT. Sts it makes him really short of breath and then \"I can't lay down after that. \"  Teaching and encouragement given. Patient respectfully declines.

## 2019-03-11 NOTE — PROGRESS NOTES
Attempted to call Dr. Liss Alan regarding above, message left for Brody Mccullough regarding same. Waiting on call back. Patient updated.

## 2019-03-11 NOTE — PLAN OF CARE
Bed locked and in lowest position, side rails up x 2, room free from clutter, call light within reach, alert and oriented and remains free from falls and physical injury. Patient denies pain when ask. Vital signs monitored and remain stable. Patient able to turn per self and no new signs of skin breakdown. Will continue to monitor.

## 2019-03-11 NOTE — PROGRESS NOTES
Occupational Therapy Not Seen Note    DATE: 3/11/2019  Name: Chelle Alan  : 12/10/1928  MRN: [id number]    Patient not available for Occupational Therapy due to:    ? Testing:    ? Hemodialysis    ? Blood Transfusion Pending or in Progress    ? Patient Declined: Pt declined therapy this day d/t feeling too fatigued. ? Surgery/Procedure:    ? Strict Bedrest    ? Sedation    ? Spine Precautions     ? Pt being transferred to palliative care at this time. Spoke with pt/family and OT services to be defered. ? Pt independent with functional mobility and functional tasks.  Pt with no OT acute care needs at this time, will defer OT eval.    ? Other    Next Scheduled Treatment:3/12/19    Signature:Cristina RUFF/Natali MORRISSEY

## 2019-03-11 NOTE — FLOWSHEET NOTE
stopped to speak to pt re: there being no Health Care documents in his chart. · Pt told  that everything was in order, and that \"you should speak to my nephew. He has everything in order. ·  spent an extended amount of time looking for evidence of pt's health care documents in notes & in the chart outside his room. · All that was found was general/ financial POA document listing Chito Ulloadle (pt's niece) and Cara Barnes (pt's nephew) as pt's POA & alternate POA. However, this document is not for health care decisions. ·  used # for Caraaugustine Barnes listed on pt's face sheet ([phone]) & left message w/ Cara Barnes as to what had been learned, requesting that Dr. Silvina Vo, if possible bring pt's health care paperwork into the hospital so that it might be scanned & placed in pt's records. Chaplains will remain available to offer spiritual and emotional support, or assistance in this matter as needed. Rev. Mar Campbell [facility]     03/11/19 1430   Encounter Summary   Services provided to: Patient   Support System Family members   Continue Visiting   (3/11)   Complexity of Encounter Moderate   Length of Encounter 30 minutes   Routine   Type Follow up   Advance Directives (For Healthcare)   Pre-existing DNR Comfort Care/DNR Arrest/DNI Order No   Healthcare Directive No, patient does not have an advance directive for healthcare treatment  (Documents in chart are general/ financial POA)   Type of Healthcare Directive Other (Comment)  (Not seen)   Copy in Chart No, copy requested from family  ( called & left message w/ pt's nephew)   Chart Copy Status : Reviewed   Healthcare Agent Appointed   (Pt claims \"talk to my nephew. \")   Healthcare Agent's Name [address] Agent's Phone Number   Cara Barnes & Chito Andersen are on genrl POA)

## 2019-03-11 NOTE — FLOWSHEET NOTE
DATE: 3/11/2019    NAME: Maximino Salazar  MRN: [id number]   : 12/10/1928    Patient not seen this date for Physical Therapy due to:  [] Blood transfusion in progress  [] Hemodialysis  [x]  Patient Declined; pt politely declining activity; RN notified. Will check back later.   [] Spine Precautions   [] Strict Bedrest  [] Surgery/ Procedure  [] Testing      [] Other        [] PT being discontinued at this time. Patient independent. No further needs. [] PT being discontinued at this time as the patient has been transferred to palliative care. No further needs.     Carolann Salines, PTA

## 2019-03-11 NOTE — PROGRESS NOTES
IR called, sts they will not do procedure until patient has been off of all anticoagulants for 5 days, last dose of asa 3/8, they will reschedule for 3/13.

## 2019-03-11 NOTE — OP NOTE
[facility]    CARDIAC CATHETERIZATION / PTCA-STENT/ROTATIONAL ATHERECTOMY    Date:   3/11/2019  Patient name: Shanel Ro  Date of admission:  3/6/2019  8:48 PM  MRN:   [id number]  YOB: 1928    Operators:  Primary:     CV Fellow:    Pre Procedure Conscious Sedation Data:    ASA Class:    [] I [x] II [] III [] IV    Mallampati Class:  [] I [x] II [] III [] IV     Indication:  The patient was determined very high risk for CABG and due to renal status, and after discussing with CV surgery, agreed best plan to imprve symptoms, and CHF and CO and renal status to treat ischemia and then treat his aortic stenosis with TAVR. [] STEMI      [] + Stress test  [] ACS      [x] + EKG Changes  [x] Non Q MI       [x] Significant Risk Factors  [x] Recurrent Angina             [] Diabetes Mellitus    [] New LBBB      [x] Uncontrolled HTN. [x] CHF / Low EF changes     [] Abnormal CTA / Ca Score    Procedure:  Access:  [x] Femoral  [] Radial  artery       [x] Right  [] Left    Procedure: After informed consent was obtained with explanation of the risks and benefits, patient was brought to the cath lab. The access area was prepped and draped in sterile fashion. 1% lidocaine was used for local block. The artery was cannulated with 7  Fr sheath with brisk arterial blood return. The side port was frequently flushed and aspirated with normal saline. Findings:  Left main: 80-90% very calcified stenosis. This required rotational atherectomy and PTCA -JEAN-CLAUDE 4/0/15 mm stent, redcuing stenosis to 10% with TIMNI III flow. LAD: Proximal to mid area calcified 80% stenosis. This required Rotational atherectomy / JEAN-CLAUDE in mid and proximal area, reducing stenosis to 0% with AKHIL III flow  LCX: Diffuse disease )(known from cath in [place])  RCA: Minimal 20-30% as reviewed from [place]:  1. Successful PTCA-JEAN-CLAUDE LAD  2. Successful PTCRA-JEAN-CLAUDE LM  3.  Total contrast used 120 ml    Recommendation:  1. Post stent protocol - Finish AngioMax drip  2. Sheath removal manually  3. Monitor renal function and if continue stable, will consider TAVR in few days        History and Risk Factors    [x] Hypertension     [x] Family history of CAD  [x] Hyperlipidemia     [] Cerebrovascular Disease   [] Prior MI       [] Peripheral Vascular disease   [] Prior PCI              [x] Diabetes Mellitus    [] Left Main PCI. [] Currently on Dialysis. [] Prior CABG. [] Currently smoker. [] Cardiac Arrest outside of healthcare facility. [] Yes    [x] No        Witnessed     [] Yes   [] No     Arrest after arrival of EMS  [] Yes   [] No     [] Cardiac Arrest at other Facility. [] Yes   [x] No    Pre-Procedure Information. Heart Failure       [x] Yes    [] No        Class  [] I      [] II  [x] III    [] IV. New Diagnosis    [] Yes  [] No    HF Type      [] Systolic   [] Diastolic          [] Unknown. Diagnostic Test:   EKG       [] Normal   [x] Abnormal    New antiarrhythmia medications:    [] Yes   [] No   New onset atrial fibrillation / Flutter     [] Yes   [] No   ECG Abnormalities:      [] V. Fib   [] Zaida V. Tach           [] NS V. T   [] New LBBB           [] T. Inv  []  ST dev > 0.5 mm         [] PVC's freq  [] PVC's infrequent    Stress Test Performed:   [] Yes    [x] No     Type:     [] Stress Echo   [] Exercise Stress Test (no imaging)      [] Stress Nuclear  [] Stress Imaging     Results   [] Negative   [] Positive        [] Indeterminate  [] Unavailable     If Positive/ Risk / Extent of Ischemia:       [] Low  [] Intermediate         [] High  [] Unavailable      Cardiac CTA Performed:  [] Yes    [x] No      Results   [] CAD   [] Non obstructive CAD      [] No CAD   [] Uncertain      [] Unknown   [] Structural Disease.            Electronically signed by Devyn Hopkins MD on 3/11/2019 at [address] [place] [address] [facility]  [phone]

## 2019-03-11 NOTE — PROGRESS NOTES
and appears stated age  Respiratory:  · Basal rales heard  Cardiovascular:  S1, s2, systolic murmur is heard  Abdomen:   · No masses or tenderness, soft abdomen  · Bowel sounds present  Extremities:  · le edema present, peripheral pulse bl le present 2 +  Integumentum:   Intact with no rashes noted      EKG:   V-pacing with occasional PVCs. CTA Chest in [place] (2/15/19):  Small PE within branch of RT middle lobe artery. Large RT pleural effusion. Moderate LT pleural effusion.     ECHO in [place] (2/17/19:   LVH with LVEF 50%. LT atrial enlargement. Moderate AS with Mild AI. Mitral annular calcification with mild MR. Mild to Moderate Pulm HTN.     Cardiac Angiography in [place] (1/20/19): Multivessel disease including LM. ECHO (3/6/19):   Normal left ventricle size with mildly reduced systolic function; Estimated left ventricular ejection fraction 40-45%  Anterolateral wall hypokinesis. Mild left ventricular hypertrophy. Left atrium is moderately dilated. Grade II diastolic dysfunction. Heavily calcified aortic valve with reduced systolic excursion and evidence of moderate stenosis. (Peak nataliia 3.62 m/s and mean gradient 29 mmHg)  Moderate posterior pericardial effusion without any echo signs of tamponade. Normal right ventricular size and function. Pacemaker lead seen in right ventricle. Moderate tricuspid regurgitation. Estimated right ventricular systolic pressure is 60 mmHg suggesting moderate  pulmonary HTN. Assessment / Acute Cardiac Problems:   1. Acute congestive heart failure (likely secondary to MV CAD and AS)  2. B/L Pleural effusions with RT & LT thoracentesis per IR   3. Nephrotic proteinuria with HUYEN (baseline according to nephew is 1 - 1.2)  4. Moderate pericardial effusion without tamponade physiology likely secondary to CHF  5. Dual chamber pacer for complete heart block, placed in January 2019 in [place]  6. Known MV CAD per cardiac cath in February in [address].  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 Paroxysmal Afib and small RT PE, on Eliquis 2.5 mg QD at home. 9. Primary HTN  10. Dyslipidemia  11. Moderate Aortic stenosis (P Galen:3.6, MG 29)  12. Mild Ischemic cardiomyopathy with systolic CHF (29%)  13. Hypocalcemia    Plan of Treatment:   1. Nephro following for HUYEN, diuresis per them. Lasix 20 IV BID. Cr has plateaued. Concern for Nephrotic syndrome. TO undergo Renal biopsy. 2. Will need Complex PCI for LM and MV CAD. Will proceed when ok with Nephro. 3. Continue Aspirin, Atorvastatin, Coreg, Imdur  4. Mildly elevated TSH with normal Free T4. Continue with current Levothyroxine dose  5. Need to be started on heparin gtt for the H/O PE & P Afib. Discussed with Dr Everett Maynard and thinks S/Q heparin is sufficient till the PCI is done. Will discuss with rounding attending Dr. Walter Degroot for final recommendations. Keyla Rodrigez MD  Cardiology Fellow    Attending note,   The patient was seen and examined, agree woth above, denies chest pain and SOB is better compared to before, nephrology on board for HUYEN. Will discuss with nephrology if biopsy will be needed before high risk PCI. Will discuss with Dr. Everett Maynard. Discussed with patient.

## 2019-03-11 NOTE — CARE COORDINATION
Met with patient to discuss transitional planning. Plan for rotational atherectomy today.   Plan for home with nephew when ready

## 2019-03-11 NOTE — PROGRESS NOTES
NEPHROLOGY PROGRESS NOTE      SUBJECTIVE     All events noted. Patient was scheduled to renal biopsy today but IR wants aspirin to be withheld for 5 days prior. Cartilage input noted. They recommend him to be high risk to stop any anticoagulation and want to proceed with the rotational atherectomy. I discussed and explained to patient in regards to this. He verbalizes understanding and is willing to proceed. Decent diuresis. No shortness of breath. Not needing any oxygen supplemental.  On diuretics. Blood pressure stable. Renal function stable. OBJECTIVE     Vitals:    03/11/19 0655 03/11/19 0830 03/11/19 0849 03/11/19 1111   BP: 136/63      Pulse: 65 69 69 75   Resp: 18 20     Temp: 97.9 °F (36.6 °C)      TempSrc: Oral      SpO2: (!) 89%  94%    Weight:       Height:         24HR INTAKE/OUTPUT:      Intake/Output Summary (Last 24 hours) at 3/11/2019 1206  Last data filed at 3/11/2019 1111  Gross per 24 hour   Intake 400 ml   Output 1725 ml   Net -1325 ml       General appearance:Awake, alert, in no acute distress  HEENT: PERRLA  Respiratory::vesicular breath sounds, reduced air entry  Cardiovascular:S1 S2 normal,no gallop or organic murmur. Abdomen:Non tender/non distended. Bowel sounds present  Extremities: No Cyanosis or Clubbing, present Lower extremity edema  Neurological:Alert and oriented. No abnormalities of mood, affect, memory, mentation, or behavior are noted      MEDICATIONS     Scheduled Meds:    acetylcysteine  600 mg Oral BID    isosorbide mononitrate  30 mg Oral Daily    docusate sodium  100 mg Oral Daily    carvedilol  12.5 mg Oral BID     aspirin  81 mg Oral Daily    furosemide  20 mg Intravenous BID    amiodarone  200 mg Oral Daily    atorvastatin  20 mg Oral Daily    levothyroxine  100 mcg Oral Daily     Continuous Infusions:    sodium chloride 50 mL/hr at 03/11/19 1206     PRN Meds:    Home Meds:                Medications Prior to Admission: amiodarone (CORDARONE) 200 MG tablet, Take 200 mg by mouth daily  atorvastatin (LIPITOR) 20 MG tablet, Take 20 mg by mouth daily  isosorbide mononitrate (IMDUR) 60 MG extended release tablet, Take 60 mg by mouth daily  levothyroxine (SYNTHROID) 100 MCG tablet, Take 100 mcg by mouth Daily  lisinopril (PRINIVIL;ZESTRIL) 40 MG tablet, Take 40 mg by mouth daily  apixaban (ELIQUIS) 2.5 MG TABS tablet, Take 2.5 mg by mouth daily  metoprolol tartrate (LOPRESSOR) 50 MG tablet, Take 50 mg by mouth 2 times daily    INVESTIGATIONS     Last 3 CMP:    Recent Labs     03/10/19  0424 03/11/19  0528 03/11/19  0701    140 142   K 4.2 4.7 4.3    105 106   CO2 23 24 24   BUN 93* 89* 91*   CREATININE 2.12* 2.07* 2.03*   CALCIUM 8.0* 8.0* 8.1*   LABALBU 2.3*  --   --        Last 3 CBC:  Recent Labs     03/11/19  0701   WBC 9.9   RBC 3.27*   HGB 9.3*   HCT 30.6*   MCV 93.6   MCH 28.4   MCHC 30.4   RDW 14.8*      MPV 11.4       ASSESSMENT     1. Acute kidney injury versus chronic kidney disease stage IIIB with baseline creatinine around 2 since February 2019. Prior to that it used to be around 1.4-1.5. Etiology cardiorenal syndrome. Ultrasound reveals right kidney 12.5 cm and left 11.2 cm  2. Nephrotic range proteinuria - Bx postponed in view of he being on Asprin and cardiology recommends not to stop anticoagulant. 3.  Admitted with shortness of breath secondary to decompensated systolic and diastolic congestive heart failure - improving  4. Cardiomyopathy ejection fraction 40-45%/left ventricular diastole dysfunction  5. Moderate aortic stenosis with mean gradient 29 mmHg/moderate tricuspid regurgitation/moderate pulmonary hypertension with RVSP 60  6. HTN  7. Advanced age    PLAN     4. Discussed with him the risk of ALFONZO/ precautionary measures / including dialysis in the worst of scenario. He verbalizes understanding and is willing to proceed. ALFONZO preventive measures instituted. 2.  Continue diuretics for the time being  3.   Check immunofixation along with recheck UPC  4.   Will follow    Please do not hesitate to call with questions    This note is created with the assistance of a speech-recognition program. While intending to generate a document that actually reflects the content of the visit, no guarantees can be provided that every mistake has been identified and corrected by editing    Henrietta Hayward MD, [facility] Jerad Watt), [address]   3/11/2019 12:06 PM  [facility]

## 2019-03-11 NOTE — PROGRESS NOTES
[facility]  Pre Op Progress Note    CC: ACUTE CHF, SOB      Subjective:  Mr. Tiff Gonzalez seen and examined    Physical Exam  Vital Signs: BP (!) 137/56   Pulse 67   Temp 98.2 °F (36.8 °C) (Oral)   Resp 16   Ht 5' 6\" (1.676 m)   Wt 155 lb 3.3 oz (70.4 kg)   SpO2 96%   BMI 25.05 kg/m²  O2 Flow Rate (L/min): 1 L/min       General: alert and oriented to person, place and time. Up in chair, No apparent distress. Heart:Normal S1 and S2.  Regular rhythm. No murmurs, gallops, or rubs. Lungs: clear to auscultation bilaterally  Abdomen: soft, non tender, non distended, BSx4  Extremities: 1+ edema      Scheduled Meds:    isosorbide mononitrate  30 mg Oral Daily    docusate sodium  100 mg Oral Daily    carvedilol  12.5 mg Oral BID WC    aspirin  81 mg Oral Daily    furosemide  20 mg Intravenous BID    amiodarone  200 mg Oral Daily    atorvastatin  20 mg Oral Daily    levothyroxine  100 mcg Oral Daily     Continuous Infusions:     Data:  CBC: No results for input(s): WBC, HGB, HCT, MCV, PLT in the last 72 hours. BMP:   Recent Labs     03/08/19  0835 03/09/19  0441 03/10/19  0424 03/11/19  0528    140 138 140   K 4.2 4.6 4.2 4.7    104 105 105   CO2 21 25 23 24   PHOS 4.3  --   --   --    * 97* 93* 89*   CREATININE 2.30* 2.15* 2.12* 2.07*     PT/INR: No results for input(s): PROTIME, INR in the last 72 hours. APTT: No results for input(s): APTT in the last 72 hours.       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

## 2019-03-12 NOTE — PROGRESS NOTES
Smoking Cessation - topics covered   []  Health Risks  []  Benefits of Quitting   []  Smoking Cessation  []  Patient has no history of tobacco use  [x]  Patient is former smoker. Patient quit in 1989. [x]  No need for tobacco cessation education. []  Booklet given  []  Patient verbalizes understanding. []  Patient denies need for tobacco cessation education. []  Unable to meet with patient today. Will follow up as able.   Marizol Guerrero RCP  9:27 AM

## 2019-03-12 NOTE — CONSULTS
Jenny Snider, Donna Padilla, Sylvia Mariee, & Tom  Urology Consultation      Patient:  Chelle Alan  MRN: [id number]  YOB: 1928    CHIEF COMPLAINT:  Urinary Retention    HISTORY OF PRESENT ILLNESS:   The patient is a 80 y.o. male who presents with coronary artery disease had PCI w/ Dr Silvina Vo yesterday. After the surgery he was on bedrest and unable to void well in supine postion. RN was initially unable to place a Johnston but eventually was successful using an 18 Fr coude catheter. He was having low urine ouput and with irrigation of Johnston to ensure proper drainage he reports bladder spasms. He currently reports pain in his urethra which is improving with Johnston removal.  Patient currently does not have a urinary catheter in place. Volume of urine obtained when catheter was placed: volume unknown and nothing recorded in chart. Report of 500-600cc PVR via bladder scan. And 500cc out recorded overnight. Prior to this event voiding symptoms consisted of hesitancy, decreased urinary stream, nocturia2-3 times per night and no urine incontinence. Prior treatments include: TURP many years ago by a Urologist in [place] and [address] [place] [address]. He has had a bowel movement and has not received narcotics. Patient's old records, notes and chart reviewed and summarized above.     Past Medical History:    Past Medical History:   Diagnosis Date    Aortic stenosis 02/15/2019    Atrial fibrillation (HCC)     CAD (coronary artery disease)     Chest pain 02/15/2019    CHF (congestive heart failure) (HCC)     Chronic anemia     Chronic kidney disease     Hypertension     Pleural effusion on left 02/15/2019    Pleural effusion, right 02/15/2019    Pulmonary emboli ([address] 02/15/2019    Pulmonary hypertension ([address] 03/06/2019    Thyroid disease     hypothyroidism       Past Surgical History:    Past Surgical History:   Procedure Laterality Date    JOINT REPLACEMENT      right ankle    PACEMAKER PLACEMENT 01/20/2019    THORACENTESIS         Medications:      Current Facility-Administered Medications:     tamsulosin (FLOMAX) capsule 0.4 mg, 0.4 mg, Oral, Daily, MIRYAM Billingsley, 0.4 mg at 03/12/19 0950    furosemide (LASIX) tablet 40 mg, 40 mg, Oral, BID, Ansley Deluna MD    albumin human 25 % solution 25 g, 25 g, Intravenous, Once, Marilu Figueroa MD    albumin human 25 % solution, , , ,     0.9 % sodium chloride infusion, , Intravenous, Continuous, Latasha Quiroz MD, Stopped at 03/11/19 2107    acetylcysteine (MUCOMYST) 20 % solution 600 mg, 600 mg, Oral, BID, Latasha Quiroz MD, 600 mg at 03/12/19 0805    sodium chloride flush 0.9 % injection 10 mL, 10 mL, Intravenous, 2 times per day, Latasha Quiroz MD, 10 mL at 03/12/19 0805    sodium chloride flush 0.9 % injection 10 mL, 10 mL, Intravenous, PRN, Joycelyn Halsted, MD    acetaminophen (TYLENOL) tablet 650 mg, 650 mg, Oral, Q4H PRN, Joycelyn Halsted, MD, 650 mg at 03/12/19 1843    magnesium hydroxide (MILK OF MAGNESIA) 400 MG/5ML suspension 30 mL, 30 mL, Oral, Daily PRN, Joycelyn Halsted, MD    clopidogrel (PLAVIX) tablet 75 mg, 75 mg, Oral, Daily, Latasha Harman MD, 75 mg at 03/12/19 0804    isosorbide mononitrate (IMDUR) extended release tablet 30 mg, 30 mg, Oral, Daily, Latasha Harman MD, 30 mg at 03/12/19 0804    docusate sodium (COLACE) capsule 100 mg, 100 mg, Oral, Daily, Latasha Quiroz MD, 100 mg at 03/10/19 0904    carvedilol (COREG) tablet 12.5 mg, 12.5 mg, Oral, BID WC, Latasha Quiroz MD, 12.5 mg at 03/12/19 0805    aspirin EC tablet 81 mg, 81 mg, Oral, Daily, Latasha Quiroz MD, 81 mg at 03/12/19 0805    amiodarone (CORDARONE) tablet 200 mg, 200 mg, Oral, Daily, Latasha Quiroz MD, 200 mg at 03/12/19 0805    atorvastatin (LIPITOR) tablet 20 mg, 20 mg, Oral, Daily, Latasha Quiroz MD, 20 mg at 03/12/19 0805    levothyroxine (SYNTHROID) tablet 100 mcg, 100 mcg, Oral, Daily, Latasha Quiroz MD, 100 mcg at 03/12/19 0804    Allergies:     Allergies   Allergen Reactions    Quinolones      Pt is allergic to fluoroquinolones       Social History:   Social History     Socioeconomic History    Marital status:      Spouse name: Not on file    Number of children: Not on file    Years of education: Not on file    Highest education level: Not on file   Occupational History     Employer: RETIRED   Social Needs    Financial resource strain: Not on file    Food insecurity:     Worry: Not on file     Inability: Not on file    Transportation needs:     Medical: Not on file     Non-medical: Not on file   Tobacco Use    Smoking status: Former Smoker     Last attempt to quit:      Years since quittin.2    Smokeless tobacco: Never Used   Substance and Sexual Activity    Alcohol use: Not Currently    Drug use: Never    Sexual activity: Not on file   Lifestyle    Physical activity:     Days per week: Not on file     Minutes per session: Not on file    Stress: Not on file   Relationships    Social connections:     Talks on phone: Not on file     Gets together: Not on file     Attends Yarsani service: Not on file     Active member of club or organization: Not on file     Attends meetings of clubs or organizations: Not on file     Relationship status: Not on file    Intimate partner violence:     Fear of current or ex partner: Not on file     Emotionally abused: Not on file     Physically abused: Not on file     Forced sexual activity: Not on file   Other Topics Concern    Not on file   Social History Narrative    Not on file       Family History:  History reviewed. No pertinent family history. REVIEW OF SYSTEMS:  A comprehensive 14 point review of systems was obtained. Constitutional: No fatigue  Eyes: No blurry vision  Ears, nose, mouth, throat, face: No ringing in the ears; no facial droop. Respiratory: No cough or cold. Cardiovascular: No palpitations  Gastrointestinal: No diarrhea or constipation.   Genitourinary: No burning with Results:  [unfilled]      -----------------------------------------------------------------  Imaging Results:  Xr Chest (single View Frontal)    Result Date: 3/7/2019  EXAMINATION: SINGLE XRAY VIEW OF THE CHEST 3/7/2019 2:44 pm COMPARISON: 03/07/2019 HISTORY: ORDERING SYSTEM PROVIDED HISTORY: post thora on the right TECHNOLOGIST PROVIDED HISTORY: inspiration post thora on the right FINDINGS: Significant decrease in right pleural effusion without evidence of pneumothorax status post thoracentesis. Pleural/parenchymal density left mid and lower chest due to pleural fluid and atelectasis/consolidation. Small residual right pleural effusion and atelectasis. The heart is enlarged with mild perihilar congestive changes. Dual-chamber left subclavian pacemaker remains in place. Calcific plaque of the thoracic aorta. No evidence of pneumothorax status post right thoracentesis. Xr Chest Pa Lateral W Both Oblique Projections    Result Date: 3/7/2019  EXAMINATION: CHEST XRAY SPECIAL VIEWS 3/7/2019 8:57 am COMPARISON: None. HISTORY: ORDERING SYSTEM PROVIDED HISTORY: pulm htn TECHNOLOGIST PROVIDED HISTORY: pulm htn FINDINGS: Four views of the chest were obtained. Cardiac device present. Leads are in appropriate position and appear contiguous. Mild cardiomegaly with bilateral pleural effusions, moderate on the right and small on the left. Bibasilar airspace disease likely atelectasis but superimposed pneumonia not excluded. No pneumothorax. Pulmonary edema is evident. Bilateral pleural effusions right larger than left with mild central pulmonary edema. Us Renal Complete    Result Date: 3/7/2019  EXAMINATION: RETROPERITONEAL ULTRASOUND OF THE KIDNEYS AND URINARY BLADDER 3/7/2019 COMPARISON: None HISTORY: ORDERING SYSTEM PROVIDED HISTORY: RENAL FAILURE, ACUTE (KIDNEY INJURY) FINDINGS: Kidneys: The right kidney measures 12.5 x 4.7 x 4.9 cm and the left kidney measures 11.2 x 4.6 x 4.0 cm.  Cortical thickness +------------------------------------+----------+---------------+----------+ ! Sapheno Femoral Junction            ! Yes       ! Yes            ! None      ! +------------------------------------+----------+---------------+----------+ ! PTV                                 ! Yes       ! Yes            ! None      ! +------------------------------------+----------+---------------+----------+ ! Peroneal                            !Yes       ! Yes            ! None      ! +------------------------------------+----------+---------------+----------+ ! Gastroc                             ! Yes       ! Yes            ! None      ! +------------------------------------+----------+---------------+----------+ ! GSV Thigh                           ! Yes       ! Yes            ! None      ! +------------------------------------+----------+---------------+----------+ ! GSV Knee                            ! Yes       ! Yes            ! None      ! +------------------------------------+----------+---------------+----------+ ! GSV Ankle                           ! Yes       ! Yes            ! None      ! +------------------------------------+----------+---------------+----------+ ! SSV                                 ! Yes       ! Yes            ! None      ! +------------------------------------+----------+---------------+----------+ Right Doppler Measurements +---------------------------+------+------+--------------------------------+ ! Location                   ! Signal!Reflux! Reflux (msec)                   ! +---------------------------+------+------+--------------------------------+ ! Common Femoral             !Phasic!      !                                ! +---------------------------+------+------+--------------------------------+ ! Prox Femoral               !Phasic!      !                                ! +---------------------------+------+------+--------------------------------+ ! Popliteal                  !Phasic!      ! ! +---------------------------+------+------+--------------------------------+ Left Lower Extremities DVT Study Measurements Left 2D Measurements +------------------------------------+----------+---------------+----------+ ! Location                            ! Visualized! Compressibility! Thrombosis! +------------------------------------+----------+---------------+----------+ ! Common Femoral                      !Yes       ! Yes            ! None      ! +------------------------------------+----------+---------------+----------+ ! Prox Femoral                        !Yes       ! Yes            ! None      ! +------------------------------------+----------+---------------+----------+ ! Mid Femoral                         !Yes       ! Yes            ! None      ! +------------------------------------+----------+---------------+----------+ ! Dist Femoral                        !Yes       ! Yes            ! None      ! +------------------------------------+----------+---------------+----------+ ! Popliteal                           !Yes       ! Yes            ! None      ! +------------------------------------+----------+---------------+----------+ ! Sapheno Femoral Junction            ! Yes       ! Yes            ! None      ! +------------------------------------+----------+---------------+----------+ ! PTV                                 ! Yes       ! Yes            ! None      ! +------------------------------------+----------+---------------+----------+ ! Peroneal                            !Yes       ! Yes            ! None      ! +------------------------------------+----------+---------------+----------+ ! Gastroc                             ! Yes       ! Yes            ! None      ! +------------------------------------+----------+---------------+----------+ ! GSV Thigh                           ! Yes       ! Yes            ! None      ! +------------------------------------+----------+---------------+----------+ ! GSV Knee !Yes       !Yes            ! None      ! +------------------------------------+----------+---------------+----------+ ! GSV Ankle                           ! Yes       ! Yes            ! None      ! +------------------------------------+----------+---------------+----------+ ! SSV                                 ! Yes       ! Yes            ! None      ! +------------------------------------+----------+---------------+----------+ Left Doppler Measurements +---------------------------+------+------+--------------------------------+ ! Location                   ! Signal!Reflux! Reflux (msec)                   ! +---------------------------+------+------+--------------------------------+ ! Common Femoral             !Phasic!      !                                ! +---------------------------+------+------+--------------------------------+ ! Prox Femoral               !Phasic!      !                                ! +---------------------------+------+------+--------------------------------+ ! Popliteal                  !Phasic!      !                                ! +---------------------------+------+------+--------------------------------+    Us Thoracentesis    Result Date: 3/7/2019  PROCEDURE: ULTRASOUNDGUIDED RIGHT THORACENTESIS 3/7/2019 HISTORY: ORDERING SYSTEM PROVIDED HISTORY: left pleural effusion TECHNOLOGIST PROVIDED HISTORY: left pleural effusion Bilateral pleural effusions, larger on the right, short of breath TECHNIQUE: This procedure was performed by Dr. Jase Pena. Informed consent was obtained after a detailed explanation of the procedure including risks, benefits, and alternatives. Universal protocol was performed. The right chest was prepped and draped in sterile fashion and local anesthesia was achieved with lidocaine. Ultrasound shows a large right pleural effusion. Moderate left pleural effusion.   Using realtime ultrasound guidance a 5 Malaysian centesis catheter/needle was advanced into the right pleural fluid. Ultrasound images show a safe tract and access into the fluid. Fluid was collected in a vacuum bottle. The catheter was removed and a sterile dressing applied. There are no immediate complications. The patient left the department in stable condition. FINDINGS: A total of 1700 mL of clear tea-colored fluid was removed. A sample was sent for the requested studies. Successful ultrasound guided diagnostic and therapeutic right thoracentesis. LARS 3/7/19:No hydronephrosis, small lower left pole renal cyst 1.7 cm    Assessment and Plan   Impression:    Urinary Retention for  Unknown amount  Lower urinary tract symptoms  Report of Difficult Ramirez, RN was able to place yesterday night. CHF and pleural effusion requiring diuretics. Oliguria and CKD vs HUYEN, Cr currently 1.9, baseline ? Left lower pole renal cyst 1.7cm    Plan:   Flomax  Avoid narcotics and anticholinergics  Ambulate, PT/OT  Bowel regimen to prevent constipation  Will remove ramirez. Stand to urinate. Check PVRs. Will consider CIC for PVRs more than 250mL. Thank you for the consultation. [facility] [address], MD  7:51 PM 3/12/2019       Attending Physician Statement  I have discussed the care of Demian Ann including pertinent history and exam findings with the resident. I have seen and examined the patient and the key elements of all parts of the encounter have been performed by me. I agree with the assessment, plan, and orders as documented by the resident. (GC Modifier). Agree with the above  Ramirez has been removed. Patient voiding yellow to rust colored urine. No significant hematuria. Stand to urinate. PT/ambulation  Bowel regimen to prevent constipation. If you have any questions, please do not hesitate to contact me.      Best,  Dr. Adolfo Kaufman MD  [facility]   [phone] (Cell)  [phone] (Office)

## 2019-03-12 NOTE — PROGRESS NOTES
Repaged urology with orders for consult. Dr. Diana Matthews is requesting they see patient immediately.

## 2019-03-12 NOTE — FLOWSHEET NOTE
DATE: 3/12/2019    NAME: Shanel Ro  MRN: [id number]   : 12/10/1928    Patient not seen this date for Physical Therapy due to:  [] Blood transfusion in progress  [] Hemodialysis  [x]  Patient Declined; pt in chair. Adamantly refusing all mobility. States, \"I keep getting bad news\". Pt emotional and became teary-eyed. Will check back later. RN notified. [] Spine Precautions   [] Strict Bedrest  [] Surgery/ Procedure  [] Testing      [] Other        [] PT being discontinued at this time. Patient independent. No further needs. [] PT being discontinued at this time as the patient has been transferred to palliative care. No further needs.     Chilo Gordon, PTA

## 2019-03-12 NOTE — PROGRESS NOTES
[facility]   Progress Note                   Date:   3/12/2019  Patient name: Angelito Guillen  Date of admission:  3/6/2019  8:48 PM  MRN:   [id number]  YOB: 1928  PCP: No primary care provider on file. Reason for Admission: exertional dyspnea    Subjective:   Pacer dependant. Still has dyspnea, which is similar to admission. Successful complex PCI yesterday with complications noted at groin site. Consideration for TAVR in a few days. Denies any chest pain or palpitations. Intake/Output Summary (Last 24 hours) at 3/12/2019 0837  Last data filed at 3/12/2019 0626  Gross per 24 hour   Intake 87 ml   Output 1100 ml   Net -1013 ml         Intake/Output Summary (Last 24 hours) at 3/12/2019 0821  Last data filed at 3/12/2019 0626  Gross per 24 hour   Intake 87 ml   Output 1100 ml   Net -1013 ml       Medications:   Scheduled Meds:   tamsulosin  0.4 mg Oral Daily    albumin human  25 g Intravenous Once    furosemide  10 mg Intravenous Once    acetylcysteine  600 mg Oral BID    sodium chloride flush  10 mL Intravenous 2 times per day    clopidogrel  75 mg Oral Daily    isosorbide mononitrate  30 mg Oral Daily    docusate sodium  100 mg Oral Daily    carvedilol  12.5 mg Oral BID WC    aspirin  81 mg Oral Daily    furosemide  20 mg Intravenous BID    amiodarone  200 mg Oral Daily    atorvastatin  20 mg Oral Daily    levothyroxine  100 mcg Oral Daily     Continuous Infusions:   sodium chloride Stopped (03/11/19 2107)     CBC:   Recent Labs     03/11/19  0701 03/12/19  0437   WBC 9.9 21.0*   HGB 9.3* 9.9*    417     BMP:    Recent Labs     03/11/19  0528 03/11/19  0701 03/12/19  0437    142 140   K 4.7 4.3 4.4    106 103   CO2 24 24 24   BUN 89* 91* 83*   CREATININE 2.07* 2.03* 1.89*   GLUCOSE 106* 100* 138*     Hepatic: No results for input(s): AST, ALT, ALB, BILITOT, ALKPHOS in the last 72 hours.   Troponin: No results for input(s): TROPONINI in the last 72 hours. BNP: No results for input(s): BNP in the last 72 hours. Lipids: No results for input(s): CHOL, HDL in the last 72 hours. Invalid input(s): LDLCALCU  INR:   Recent Labs     03/11/19  0701   INR 1.1       Objective:   Vitals: BP (!) 133/50   Pulse 70   Temp 98.6 °F (37 °C) (Oral)   Resp 18   Ht 5' 6\" (1.676 m)   Wt 154 lb 5.2 oz (70 kg)   SpO2 95%   BMI 24.91 kg/m²   Constitutional and General Appearance: alert, cooperative, no distress and appears stated age  Respiratory:  · Diminished breath sounds at B/L lung bases  Cardiovascular:  S1, s2, systolic murmur is heard  Abdomen:   · No masses or tenderness, soft abdomen  · Bowel sounds present  Extremities:  · le edema present, peripheral pulse bl le present 2 +  Integumentum:   Intact with no rashes noted      EKG:   V-pacing with occasional PVCs. CTA Chest in [place] (2/15/19):  Small PE within branch of RT middle lobe artery. Large RT pleural effusion. Moderate LT pleural effusion.     ECHO in [place] (2/17/19:   LVH with LVEF 50%. LT atrial enlargement. Moderate AS with Mild AI. Mitral annular calcification with mild MR. Mild to Moderate Pulm HTN.     Cardiac Angiography in [place] (1/20/19): Multivessel disease including LM. ECHO (3/6/19):   Normal left ventricle size with mildly reduced systolic function; Estimated left ventricular ejection fraction 40-45%  Anterolateral wall hypokinesis. Mild left ventricular hypertrophy. Left atrium is moderately dilated. Grade II diastolic dysfunction. Heavily calcified aortic valve with reduced systolic excursion and evidence of moderate stenosis. (Peak nataliia 3.62 m/s and mean gradient 29 mmHg)  Moderate posterior pericardial effusion without any echo signs of tamponade. Normal right ventricular size and function. Pacemaker lead seen in right ventricle. Moderate tricuspid regurgitation.   Estimated right ventricular systolic pressure is 60 mmHg suggesting moderate  pulmonary HTN.    Cardiac Cath (3/11/19): Left main: 80-90% very calcified stenosis. This required rotational atherectomy and PTCA -JEAN-CLAUDE 4/0/15 mm stent, redcuing stenosis to 10% with TIMNI III flow. LAD: Proximal to mid area calcified 80% stenosis. This required Rotational atherectomy / JEAN-CLAUDE in mid and proximal area, reducing stenosis to 0% with AKHIL III flow  LCX: Diffuse disease (known from cath in [place])  RCA: Minimal 20-30% as reviewed from [address] / Acute Cardiac Problems:   1. Acute congestive heart failure (likely secondary to MV CAD and AS)  2. 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 B/L Pleural effusions with RT & LT thoracentesis per IR   4. Nephrotic proteinuria with HUYEN (baseline according to nephew is 1 - 1.2)  5. Moderate pericardial effusion without tamponade physiology likely secondary to CHF  6. Dual chamber pacer for complete heart block, placed in January 2019 in [place]  7. Known MV CAD per cardiac cath in February in [facility]. 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 Paroxysmal Afib and small RT PE, on Eliquis 2.5 mg QD at home. 10. Primary HTN  11. Dyslipidemia  12. Moderate Aortic stenosis (P Galen:3.6, MG 29)  13. Mild Ischemic cardiomyopathy with systolic CHF (69%)  14. Hypocalcemia    Plan of Treatment:   1. Successfully underwent complex PCI of LT main and LAD on 3/11/19  2. If the patient remains stable, will be considered for TAVR in a few days  3. Nephro following for HUYEN, diuresis per them. Lasix 20 IV BID.  4. -1.2 L over the past 24 hrs, -2.18 L since admission  5. Continue Aspirin, Plavix, Atorvastatin, Coreg, Imdur  6. Mildly elevated TSH with normal Free T4. Continue with current Levothyroxine dose  7. Will need to address anticoagulation prior to discharge. Was on Eliquis for RT small PE diagnosed in [place]. Will discuss with rounding attending Dr. Neva Cushing for final recommendations.     Adam Cao MD, MD  Cardiology Fellow      Attending Physician Statement  I have discussed the care of Sal Chin, including pertinent history and exam findings,  with the cardiology fellow/resident. I have seen and examined the patient and the key elements of all parts of the encounter have been performed by me. I agree with the assessment, plan and orders as documented by the fellow. Patient is now post PCI-LAD, stable hemodynamically, has waxing and waning mentation this AM, will monitor, if worsen will consider CT head, WBC count is high, UA is negative for UTI, on PO lasix per nephrology recs, monitor cr, will plan TAVR after 72 hours.        Karol Siddiqui MD   [facility]  [facility], [address]   [phone]

## 2019-03-12 NOTE — PROGRESS NOTES
Paged urology resident on call relating to low urine output, sediment in urine and pain to lower abdomen. Waiting on call back.

## 2019-03-12 NOTE — PROGRESS NOTES
Spoke with Dr. Tyron Casillas urology attending. He is going to call Dr. Sly Harris and come up with a plan for catheter  / catheter pain. He is going to ask Dr. Sly Harris to call me ref BP (lasix and coreg).

## 2019-03-12 NOTE — PROGRESS NOTES
Ramirez flushes easily with return of replaced fluid. Pt c/o pain to inside, tip of penis. Spoke with urology resident Dr. Wero Corea, advised above. Confusion about consult (not currently ordered). Recommend keep ramirez. No new orders.

## 2019-03-12 NOTE — PLAN OF CARE
Problem: Gas Exchange - Impaired  Intervention: Provide oxygen therapy  Note:   PROVIDE ADEQUATE OXYGENATION WITH ACCEPTABLE SP02/ABG'S    xx? IDENTIFY APPROPRIATE OXYGEN THERAPY  xx? MONITOR SP02/ABG'S AS NEEDED   xx?    PATIENT EDUCATION AS NEEDED

## 2019-03-12 NOTE — PROGRESS NOTES
[address] [facility]  Pre Op Progress Note    CC: ACUTE CHF, SOB      Subjective:  Mr. Rashmi Loo seen and examined    Physical Exam  Vital Signs: BP (!) 133/50   Pulse 70   Temp 98.6 °F (37 °C) (Oral)   Resp 18   Ht 5' 6\" (1.676 m)   Wt 154 lb 5.2 oz (70 kg)   SpO2 95%   BMI 24.91 kg/m²  O2 Flow Rate (L/min): 2 L/min       General: alert and oriented to person, place and time. Up in chair, No apparent distress. Heart:Normal S1 and S2.  Regular rhythm. No murmurs, gallops, or rubs. Lungs: clear to auscultation bilaterally  Abdomen: soft, non tender, non distended, BSx4  Extremities: 1+ edema      Scheduled Meds:    tamsulosin  0.4 mg Oral Daily    albumin human  25 g Intravenous Once    furosemide  10 mg Intravenous Once    acetylcysteine  600 mg Oral BID    sodium chloride flush  10 mL Intravenous 2 times per day    clopidogrel  75 mg Oral Daily    isosorbide mononitrate  30 mg Oral Daily    docusate sodium  100 mg Oral Daily    carvedilol  12.5 mg Oral BID WC    aspirin  81 mg Oral Daily    furosemide  20 mg Intravenous BID    amiodarone  200 mg Oral Daily    atorvastatin  20 mg Oral Daily    levothyroxine  100 mcg Oral Daily     Continuous Infusions:    sodium chloride Stopped (03/11/19 2107)       Data:  CBC:   Recent Labs     03/11/19  0701 03/12/19  0437   WBC 9.9 21.0*   HGB 9.3* 9.9*   HCT 30.6* 31.8*   MCV 93.6 91.9    417     BMP:   Recent Labs     03/11/19  0528 03/11/19  0701 03/12/19  0437    142 140   K 4.7 4.3 4.4    106 103   CO2 24 24 24   BUN 89* 91* 83*   CREATININE 2.07* 2.03* 1.89*     PT/INR:   Recent Labs     03/11/19 0701   PROTIME 11.3   INR 1.1     APTT: No results for input(s): APTT in the last 72 hours.       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

## 2019-03-12 NOTE — PROGRESS NOTES
Occupational Therapy Not Seen Note    DATE: 3/12/2019  Name: Sharita Quinn  : 12/10/1928  MRN: [id number]    Patient not available for Occupational Therapy due to going to [address] [place] [address]            Next Scheduled Treatment: 3/13/19    Electronically signed by GHANSHYAM Calzada on 3/12/2019 at 1:33 PM

## 2019-03-12 NOTE — PLAN OF CARE
Bed locked and in lowest position, side rails up x 2, room free from clutter, call light within reach, alert and oriented and remains free from falls and physical injury. Patient denies pain when ask. Vital signs WNL. Patient able to turn per self and no new signs of skin breakdown. Will continue to monitor.

## 2019-03-12 NOTE — PROGRESS NOTES
NEPHROLOGY PROGRESS NOTE      SUBJECTIVE     Underwent coronary angiogram/PCI along with atherectomy yesterday. He is currently on  Dual antiplatelet therapy. Discussed with him that cardiology/CV TTS recommends not stopping them and it is not possible to proceed with the kidney biopsy considering he'll be at high risk of bleeding. He does not want to take that chance too. He says that he overall feels fine. No chest pain shortness of breath. Decent diuresis. Renal function stable. OBJECTIVE     Vitals:    03/12/19 0417 03/12/19 0500 03/12/19 0600 03/12/19 0700   BP: (!) 154/59 135/65 (!) 142/47 (!) 133/50   Pulse: 74 72 71 70   Resp:       Temp:   98.6 °F (37 °C)    TempSrc:   Oral    SpO2:   92% 95%   Weight:  154 lb 5.2 oz (70 kg)     Height:         24HR INTAKE/OUTPUT:      Intake/Output Summary (Last 24 hours) at 3/12/2019 0934  Last data filed at 3/12/2019 0830  Gross per 24 hour   Intake 487 ml   Output 1100 ml   Net -613 ml       General appearance:Awake, alert, in no acute distress  HEENT: PERRLA  Respiratory::vesicular breath sounds, reduced air entry  Cardiovascular:S1 S2 normal,no gallop or organic murmur. Abdomen:Non tender/non distended. Bowel sounds present  Extremities: No Cyanosis or Clubbing, present Lower extremity edema  Neurological:Alert and oriented. No abnormalities of mood, affect, memory, mentation, or behavior are noted      MEDICATIONS     Scheduled Meds:    tamsulosin  0.4 mg Oral Daily    furosemide  40 mg Oral BID    acetylcysteine  600 mg Oral BID    sodium chloride flush  10 mL Intravenous 2 times per day    clopidogrel  75 mg Oral Daily    isosorbide mononitrate  30 mg Oral Daily    docusate sodium  100 mg Oral Daily    carvedilol  12.5 mg Oral BID WC    aspirin  81 mg Oral Daily    amiodarone  200 mg Oral Daily    atorvastatin  20 mg Oral Daily    levothyroxine  100 mcg Oral Daily     Continuous Infusions:    sodium chloride Stopped (03/11/19 2107)     PRN with RVSP 60  7. HTN  8. Hx of Urinary retention  9. Advanced age    PLAN     4. Switch to oral diuretics by mouth Lasix 40 mg twice a day  2. Urology review for urinary retention. Johnston management as per the recommendations  3. Plans for TAVR and dc CVTS not to expose him to another contrast for 72 hours. Ok for CTA chest on Friday  4. Will eventually add ACE-I or ARB after TAVR  5. Recheck UPC  6.   WF ALFONZO next 24 hrs    Please do not hesitate to call with questions    This note is created with the assistance of a speech-recognition program. While intending to generate a document that actually reflects the content of the visit, no guarantees can be provided that every mistake has been identified and corrected by editing    Kike Henderson MD MD, [facility]), [address]   3/12/2019 9:34 AM  NEPHROLOGY [facility]

## 2019-03-12 NOTE — PROGRESS NOTES
Pulmonary Progress Note    CC:  Dyspnea  Subjective:  No resp. Distress at rest  Gets out of breath with effort  Has PND  Edema is better  Has lost some weight  Review of Systems -Unchanged  General ROS: negative for - chills, fatigue, fever or weight loss  ENT ROS: negative for - headaches, oral lesions or sore throat  Cardiovascular ROS: no chest pain , +orthopnea or pnd   Gastrointestinal ROS: no abdominal pain, change in bowel habits, or black or bloody stools  Skin - no rash   Neuro - no blurry vision , no loc . No focal weakness   msk - no jt tenderness or swelling    Vascular - no claudication , rest completed and negative   Lymphatic - complete and negative   Hematology - oncology - complete and negative   Allergy immunology - complete and negative    no burning or hematuria      Immunization     There is no immunization history on file for this patient. Pneumococcal Vaccine     [x] Up to date    [x] Indicated   [] Refused  [] Contraindicated       Influenza Vaccine   [x] Up to date    [x] Indicated   [] Refused  [] Contraindicated     PAST MEDICAL HISTORY:       Diagnosis Date    Aortic stenosis 02/15/2019    Atrial fibrillation ([address]     CAD (coronary artery disease)     Chest pain 02/15/2019    CHF (congestive heart failure) (HCC)     Chronic anemia     Chronic kidney disease     Hypertension     Pleural effusion on left 02/15/2019    Pleural effusion, right 02/15/2019    Pulmonary emboli ([address] 02/15/2019    Pulmonary hypertension ([address] 03/06/2019    Thyroid disease     hypothyroidism         Family History:   History reviewed. No pertinent family history. SURGICAL HISTORY:   Past Surgical History:   Procedure Laterality Date    JOINT REPLACEMENT      right ankle    PACEMAKER PLACEMENT  01/20/2019    THORACENTESIS                TOBACCO:   reports that he quit smoking about 30 years ago.  He has never used smokeless tobacco.  ETOH:   reports that he drank alcohol. ALLERGIES:    Allergies   Allergen Reactions    Quinolones      Pt is allergic to fluoroquinolones       Home Meds:   Prior to Admission medications    Medication Sig Start Date End Date Taking?  Authorizing Provider   amiodarone (CORDARONE) 200 MG tablet Take 200 mg by mouth daily   Yes Historical Provider, MD   atorvastatin (LIPITOR) 20 MG tablet Take 20 mg by mouth daily   Yes Historical Provider, MD   isosorbide mononitrate (IMDUR) 60 MG extended release tablet Take 60 mg by mouth daily   Yes Historical Provider, MD   levothyroxine (SYNTHROID) 100 MCG tablet Take 100 mcg by mouth Daily   Yes Historical Provider, MD   lisinopril (PRINIVIL;ZESTRIL) 40 MG tablet Take 40 mg by mouth daily   Yes Historical Provider, MD   apixaban (ELIQUIS) 2.5 MG TABS tablet Take 2.5 mg by mouth daily   Yes Historical Provider, MD   metoprolol tartrate (LOPRESSOR) 50 MG tablet Take 50 mg by mouth 2 times daily   Yes Historical Provider, MD         Intake/Output Summary (Last 24 hours) at 3/11/2019 2059  Last data filed at 3/11/2019 1352  Gross per 24 hour   Intake 87 ml   Output 1525 ml   Net -1438 ml         Diet   DIET CARDIAC; Low Sodium (2 GM)    Vitals:   BP (!) 93/45   Pulse 59   Temp 97.3 °F (36.3 °C) (Oral)   Resp 20   Ht 5' 6\" (1.676 m)   Wt 155 lb 3.3 oz (70.4 kg)   SpO2 96%   BMI 25.05 kg/m²  on         I/O (24 Hours)    Patient Vitals for the past 8 hrs:   BP Temp Temp src Pulse Resp SpO2   03/11/19 2000 (!) 93/45 97.3 °F (36.3 °C) Oral 59 20 96 %   03/11/19 1915 138/77 -- -- 67 -- --   03/11/19 1900 124/66 -- -- 62 -- 96 %   03/11/19 1845 (!) 148/67 -- -- 60 -- 94 %   03/11/19 1830 (!) 146/69 -- -- 63 -- 96 %   03/11/19 1826 -- -- -- 64 -- --   03/11/19 1815 (!) 145/63 -- -- 63 -- 96 %   03/11/19 1800 (!) 158/62 -- -- 65 -- 95 %   03/11/19 1745 (!) 143/85 -- -- 65 -- 95 %   03/11/19 1730 (!) 156/62 -- -- 63 -- 96 %   03/11/19 1715 (!) 152/72 -- -- 64 20 94 %   03/11/19 1501 -- -- -- 63 -- -- Multivessel CAD   Plan:  . Continue Rx for CHF    Cardiac cath results noted. Cardiac notes reviewed  No active pulmonary problems at this time.   Will follow with yoy  No need for thoracentesis      Electronically signed by Shameka Lizarraga MD on 3/11/2019 at 8:59 PM

## 2019-03-13 NOTE — PROGRESS NOTES
Jenny Hawley, Lori Wang, Jayesh Bunch, [address] [place] [address], [place], & Tom     Urology Progress Note    Subjective: Adam Dumont is a 80 y.o. male. His/Her current Diet is: DIET CARDIAC; Low Sodium (2 GM). No acute urologic events overnight. Continues to have low UOP 755cc. Void of 200 overnight w/ PVR of 200cc.   No chest pain, shortness of breath, nausea, vomiting, fevers, chills  is ambulating  is passing flatus  is having bowel movements    Patient Vitals for the past 24 hrs:   BP Temp Temp src Pulse Resp SpO2 Weight   03/13/19 0700 (!) 108/45 -- -- 68 -- 100 % --   03/13/19 0635 (!) 98/47 -- -- 67 -- 100 % --   03/13/19 0630 (!) 89/56 -- -- 67 -- 100 % --   03/13/19 0615 (!) 95/47 -- -- 66 -- 100 % --   03/13/19 0600 (!) 89/47 -- -- 66 -- 100 % 152 lb 8.9 oz (69.2 kg)   03/13/19 0445 98/68 -- -- 73 -- 98 % --   03/13/19 0415 (!) 99/47 -- -- 69 -- 96 % --   03/13/19 0405 (!) 69/40 -- -- 66 -- 92 % --   03/13/19 0355 (!) 70/28 -- -- 65 -- 94 % --   03/13/19 0330 (!) 94/38 -- -- 67 -- 94 % --   03/13/19 0005 107/83 97.7 °F (36.5 °C) Oral 72 18 99 % --   03/12/19 2045 (!) 98/59 -- -- 76 -- 99 % --   03/12/19 2030 (!) 110/55 -- -- 77 -- 93 % --   03/12/19 2015 (!) 124/94 -- -- 78 -- 95 % --   03/12/19 2000 (!) 82/58 -- -- 73 -- 99 % --   03/12/19 1945 (!) 86/47 -- -- 62 -- 99 % --   03/12/19 1930 (!) 84/40 -- -- 64 -- 98 % --   03/12/19 1924 (!) 89/44 97.3 °F (36.3 °C) Oral 61 16 98 % --   03/12/19 1913 (!) 81/40 -- -- 62 -- 98 % --   03/12/19 1911 -- -- -- 65 -- 98 % --   03/12/19 1900 (!) 90/45 -- -- 68 -- 99 % --   03/12/19 1836 (!) 105/52 -- -- -- -- -- --   03/12/19 1600 -- -- -- 68 -- -- --   03/12/19 1455 (!) 88/45 -- -- 66 18 95 % --   03/12/19 1448 (!) 124/59 -- -- 69 18 95 % --   03/12/19 1430 (!) 85/44 -- -- 70 18 95 % --   03/12/19 1313 -- -- -- 76 -- (!) 88 % --   03/12/19 1100 (!) 109/48 98.2 °F (36.8 °C) Oral 69 -- 96 % --       Intake/Output Summary (Last 24 hours) at 3/13/2019 7643  Last data filed at aspirin EC tablet 81 mg  81 mg Oral Daily Joycelyn Halsted, MD   81 mg at 03/12/19 0805    amiodarone (CORDARONE) tablet 200 mg  200 mg Oral Daily Latasha Quiroz MD   200 mg at 03/12/19 0805    atorvastatin (LIPITOR) tablet 20 mg  20 mg Oral Daily Joycelyn Halsted, MD   20 mg at 03/12/19 0805    levothyroxine (SYNTHROID) tablet 100 mcg  100 mcg Oral Daily Joycelyn Halsted, MD   100 mcg at 03/12/19 0804            Additional Lab/culture results:    Physical Exam:   NAD  AOx3  Peripheral pulses palpable  Regular rhythm  Respirations nonlabored, symmetric chest rise bilaterally  Soft, NT, ND, neg CVAT  : no Ramirez in place  No calf ttp bilaterally        Interval Imaging Findings:        Impression:    Patient Active Problem List   Diagnosis    Pulmonary [facility] ([facility]    Acute on chronic systolic congestive heart failure (HCC)    Pleural effusion due to CHF (congestive heart failure) ([facility]    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 CHF and pleural effusion requiring diuretics. CKD on HUYEN, Cr currently 2.8, baseline ? . Likely secondary to CHF as well as cardiac catheterization. Left lower pole renal cyst 1.7cm     Plan:   Flomax when okay per cardiology and primary team  Avoid narcotics and anticholinergics  Ambulate, PT/OT  Avoid Constipation as this can lead to retention. Bowel regimen  Prompt patient to double void every 4 hours, then check post void residual (bladder scan). For PVR greater than 250cc, please call urology  Primary service to manage low urine output, appears likely to be pre-renal in nature as ramirez was draining and now bladder scans have been around 200cc or less.    Please Call with questions    Arpit Lopez MD  Urology Resident, PGY2  7:39 AM 3/13/2019 Attending Physician Statement  I have discussed the care of Argyle Brunner including pertinent history and exam findings with the resident. I have seen and examined the patient and the key elements of all parts of the encounter have been performed by me. I agree with the assessment, plan, and orders as documented by the resident. (GC Modifier). Agree. See previous consultation report.     Dr. Elsy Carlos MD

## 2019-03-13 NOTE — PROGRESS NOTES
[facility]   Progress Note                   Date:   3/13/2019  Patient name: Bhupinder Burnett  Date of admission:  3/6/2019  8:48 PM  MRN:   [id number]  YOB: 1928  PCP: No primary care provider on file. Reason for Admission: exertional dyspnea    Subjective:   Patient sitting up in the chair eating breakfast today. Doesn't report dyspnea today. Going for LT thoracentesis today, after RT yesterday. Renal function has acutely worsened. Received some IV fluid therapy. Consideration for TAVR on Friday if renal function allows. Denies any chest pain or palpitations. Intake/Output Summary (Last 24 hours) at 3/13/2019 1222  Last data filed at 3/13/2019 1107  Gross per 24 hour   Intake 1516 ml   Output 755 ml   Net 761 ml         Intake/Output Summary (Last 24 hours) at 3/13/2019 1222  Last data filed at 3/13/2019 1107  Gross per 24 hour   Intake 1516 ml   Output 755 ml   Net 761 ml       Medications:   Scheduled Meds:   QUEtiapine  25 mg Oral BID    furosemide  40 mg Oral BID    tamsulosin  0.4 mg Oral QPM    sodium chloride flush  10 mL Intravenous 2 times per day    clopidogrel  75 mg Oral Daily    isosorbide mononitrate  30 mg Oral Daily    docusate sodium  100 mg Oral Daily    carvedilol  12.5 mg Oral BID WC    aspirin  81 mg Oral Daily    amiodarone  200 mg Oral Daily    atorvastatin  20 mg Oral Daily    levothyroxine  100 mcg Oral Daily     Continuous Infusions:   sodium chloride Stopped (03/11/19 2107)     CBC:   Recent Labs     03/11/19  0701 03/12/19  0437   WBC 9.9 21.0*   HGB 9.3* 9.9*    417     BMP:    Recent Labs     03/11/19  0701 03/12/19  0437 03/13/19  0336    140 138   K 4.3 4.4 4.2    103 104   CO2 24 24 19*   BUN 91* 83* 93*   CREATININE 2.03* 1.89* 2.80*   GLUCOSE 100* 138* 125*     Hepatic: No results for input(s): AST, ALT, ALB, BILITOT, ALKPHOS in the last 72 hours.   Troponin: No results for input(s): TROPONINI in the last 72 hours. BNP: No results for input(s): BNP in the last 72 hours. Lipids: No results for input(s): CHOL, HDL in the last 72 hours. Invalid input(s): LDLCALCU  INR:   Recent Labs     03/11/19  0701   INR 1.1       Objective:   Vitals: BP (!) 102/49   Pulse 77   Temp 97.7 °F (36.5 °C) (Oral)   Resp 15   Ht 5' 6\" (1.676 m)   Wt 152 lb 8.9 oz (69.2 kg)   SpO2 100%   BMI 24.62 kg/m²   Constitutional and General Appearance: alert, cooperative, no distress and appears stated age  Respiratory:  · Diminished breath sounds at B/L lung bases  Cardiovascular:  S1, s2, systolic murmur is heard  Abdomen:   · No masses or tenderness, soft abdomen  · Bowel sounds present  Extremities:  · le edema present, peripheral pulse bl le present 2 +  Integumentum:   Intact with no rashes noted      EKG:   V-pacing with occasional PVCs. CTA Chest in [place] (2/15/19):  Small PE within branch of RT middle lobe artery. Large RT pleural effusion. Moderate LT pleural effusion.     ECHO in [place] (2/17/19:   LVH with LVEF 50%. LT atrial enlargement. Moderate AS with Mild AI. Mitral annular calcification with mild MR. Mild to Moderate Pulm HTN.     Cardiac Angiography in [place] (1/20/19): Multivessel disease including LM. ECHO (3/6/19):   Normal left ventricle size with mildly reduced systolic function; Estimated left ventricular ejection fraction 40-45%  Anterolateral wall hypokinesis. Mild left ventricular hypertrophy. Left atrium is moderately dilated. Grade II diastolic dysfunction. Heavily calcified aortic valve with reduced systolic excursion and evidence of moderate stenosis. (Peak nataliia 3.62 m/s and mean gradient 29 mmHg)  Moderate posterior pericardial effusion without any echo signs of tamponade. Normal right ventricular size and function. Pacemaker lead seen in right ventricle. Moderate tricuspid regurgitation.   Estimated right ventricular systolic pressure is 60 mmHg suggesting moderate  pulmonary HTN.    Cardiac Cath (3/11/19): Left main: 80-90% very calcified stenosis. This required rotational atherectomy and PTCA -JEAN-CLAUDE 4/0/15 mm stent, redcuing stenosis to 10% with TIMNI III flow. LAD: Proximal to mid area calcified 80% stenosis. This required Rotational atherectomy / JEAN-CLAUDE in mid and proximal area, reducing stenosis to 0% with AKHIL III flow  LCX: Diffuse disease (known from cath in [place])  RCA: Minimal 20-30% as reviewed from [address] / Acute Cardiac Problems:   1. Acute congestive heart failure (likely secondary to MV CAD and AS)  2. 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 B/L Pleural effusions with RT & LT thoracentesis per IR   4. Nephrotic proteinuria with HUYEN (baseline according to nephew is 1 - 1.2)  5. Moderate pericardial effusion without tamponade physiology likely secondary to CHF  6. Dual chamber pacer for complete heart block, placed in January 2019 in [place]  7. Known MV CAD per cardiac cath in February in [facility]. 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 Paroxysmal Afib and small RT PE, on Eliquis 2.5 mg QD at home. 10. Primary HTN  11. Dyslipidemia  12. Moderate Aortic stenosis (P Galen:3.6, MG 29)  13. Mild Ischemic cardiomyopathy with systolic CHF (04%)  14. Hypocalcemia    Plan of Treatment:   1. Successfully underwent complex PCI of LT main and LAD on 3/11/19  2. Cr has acutely increased this morning, concern for ALFONZO. Nephro following. 3. Patient is also hypotensive, but has received 4 thoracentesis this admission. Would recommend holding off any further thoracentesis as they will re-accumulate (further decreasing intravascular volume)  4. Depending on renal function, will be considered for TAVR on Friday. 5. Continue Aspirin, Plavix, Atorvastatin  6. Holding Coreg and Imdur due to hypotension  7. Will need to address anticoagulation prior to discharge. Was on Eliquis for RT small PE diagnosed in [place].     Will discuss with rounding attending  Mireya Lawler for final recommendations.     Adan Schwarz MD, MD  Cardiology Fellow

## 2019-03-13 NOTE — PROGRESS NOTES
RN updated dr Mae Wagner of pt VSS after receiving albumen, and Johnston removal. No new orders received at this time

## 2019-03-13 NOTE — PROGRESS NOTES
Spoke with Dayo Mirza advised above. Sts he will see patient in about 45 minutes. Krista charge nurse updated.

## 2019-03-13 NOTE — FLOWSHEET NOTE
DATE: 3/13/2019    NAME: Barron Mason  MRN: [id number]   : 12/10/1928    Patient not seen this date for Physical Therapy due to:  [] Blood transfusion in progress  [] Hemodialysis  []  Patient Declined  [] Spine Precautions   [] Strict Bedrest  [x] Surgery/ Procedure: Thoracentesis  [] Testing      [] Other        [] PT being discontinued at this time. Patient independent. No further needs. [] PT being discontinued at this time as the patient has been transferred to palliative care. No further needs.     Linda Becerra, PTA

## 2019-03-13 NOTE — PROGRESS NOTES
Nutrition Assessment    Type and Reason for Visit: Reassess    Nutrition Recommendations: Continue cardiac, 2 gm Na diet. Will monitor need of oral supplements. Nutrition Assessment: Pt was currently being transported to thoracentesis during time of visit. Per EMR pt has a good appetite and is consuming % of his meals. Will continue to monitor po intake. Malnutrition Assessment:  · Malnutrition Status: At risk for malnutrition    Nutrition Risk Level: Moderate    Nutrient Needs:  · Estimated Daily Total Kcal: 0766-8398 kcal/day  · Estimated Daily Protein (g): 70-90 gm pro/day    Nutrition Diagnosis:   · Problem: Increased nutrient needs  · Etiology: related to Increased demand for energy/nutrients     Signs and symptoms:  as evidenced by (medical condition, advanced age)    Objective Information:  · Nutrition-Focused Physical Findings: +1 pitting BLE edema. Dry skin. · Wound Type: None  · Current Nutrition Therapies:  · Oral Diet Orders: Cardiac, 2gm Sodium   · Oral Diet intake: %  · Oral Nutrition Supplement (ONS) Orders: None  · Anthropometric Measures:  · Ht: 5' 6\" (167.6 cm)   · Current Body Wt: 152 lb (68.9 kg)  · Admission Body Wt: 156 lb (70.8 kg)     · Ideal Body Wt: 141 lb 15.6 oz (64.4 kg), % Ideal Body 111% (adm/ideal)  · BMI Classification: BMI 25.0 - 29.9 Overweight    Nutrition Interventions:   Continue current diet  Continued Inpatient Monitoring, Education Not Indicated    Nutrition Evaluation:   · Evaluation: Goal achieved   · Goals: Oral intakes to meet at least 50% of estimated nutrition needs.     · Monitoring: Nutrition Progression, Meal Intake, Diet Tolerance, I&O, Weight, Pertinent Labs, Monitor Bowel Function      Electronically signed by Shahid Brumfield RD, LD on 3/13/19 at 10:44 AM    Contact Number: [phone]

## 2019-03-13 NOTE — PROGRESS NOTES
Medications Prior to Admission: amiodarone (CORDARONE) 200 MG tablet, Take 200 mg by mouth daily  atorvastatin (LIPITOR) 20 MG tablet, Take 20 mg by mouth daily  isosorbide mononitrate (IMDUR) 60 MG extended release tablet, Take 60 mg by mouth daily  levothyroxine (SYNTHROID) 100 MCG tablet, Take 100 mcg by mouth Daily  lisinopril (PRINIVIL;ZESTRIL) 40 MG tablet, Take 40 mg by mouth daily  apixaban (ELIQUIS) 2.5 MG TABS tablet, Take 2.5 mg by mouth daily  metoprolol tartrate (LOPRESSOR) 50 MG tablet, Take 50 mg by mouth 2 times daily    INVESTIGATIONS     Last 3 CMP:    Recent Labs     03/11/19  0701 03/12/19 0437 03/13/19  0336    140 138   K 4.3 4.4 4.2    103 104   CO2 24 24 19*   BUN 91* 83* 93*   CREATININE 2.03* 1.89* 2.80*   CALCIUM 8.1* 8.5* 8.2*       Last 3 CBC:  Recent Labs     03/11/19  0701 03/12/19 0437   WBC 9.9 21.0*   RBC 3.27* 3.46*   HGB 9.3* 9.9*   HCT 30.6* 31.8*   MCV 93.6 91.9   MCH 28.4 28.6   MCHC 30.4 31.1   RDW 14.8* 14.8*    417   MPV 11.4 11.0       ASSESSMENT     1. Acute kidney injury versus chronic kidney disease stage IIIB with baseline creatinine around 2 since February 2019. Prior to that it used to be around 1.4-1.5. Etiology cardiorenal syndrome initially and now contrast /hypotension aggravating it - Evolving  Ultrasound reveals right kidney 12.5 cm and left 11.2 cm  2. Nephrotic range proteinuria - Bx not feasible in view of he being on Asprin and cardiology recommends not to stop anticoagulant. Explained to patient and he does not want to do that. 3.  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 Cardiomyopathy ejection fraction 40-45%/left ventricular diastole dysfunction  5. S/p PCI and atherectomy  3/11  6. Moderate aortic stenosis with mean gradient 29 mmHg/moderate tricuspid regurgitation/moderate pulmonary hypertension with RVSP 60  7. HTN  8. Hx of Urinary retention  9. Advanced age    PLAN     4. His renal function has declined and also hypotensive. Gentle fluid challenge to improve BP and pressors to be initiated if still hypotensive after that to keep MAP >65  2. DW CVTS - high risk of further worsening of renal function inview of hypotension and recent contrast exposure. I would recommend to hold off on another contrast exposure unless deemed emergent.   3. DW patient and explained he might need dialysis if renal function continues to worsen    Please do not hesitate to call with questions    This note is created with the assistance of a speech-recognition program. While intending to generate a document that actually reflects the content of the visit, no guarantees can be provided that every mistake has been identified and corrected by editing    Gelacio Esposito MD MD, MRCP Jatinder Franks), [address]   3/13/2019 1:41 PM  [facility]

## 2019-03-13 NOTE — PROGRESS NOTES
Rounded with Dr. Hilda Wheat. Sts concern for sleeping during day, awake at night. Added seroquel QHS.

## 2019-03-13 NOTE — PROGRESS NOTES
Pulmonary Progress Note    CC:  Dyspnea  Subjective:  No resp. Distress at rest  But is withdrawn  No fever  Conties to have heart failure    Sleeping  nOT SLEEPING WELL AT  NIGHT  Gets out of breath with effort  Has PND  Edema is better  Has lost some weight    Review of Systems -Unchanged  General ROS: negative for - chills, fatigue, fever or weight loss  ENT ROS: negative for - headaches, oral lesions or sore throat  Cardiovascular ROS: no chest pain , +orthopnea or pnd   Gastrointestinal ROS: no abdominal pain, change in bowel habits, or black or bloody stools  Skin - no rash   Neuro - no blurry vision , no loc . No focal weakness   msk - no jt tenderness or swelling    Vascular - no claudication , rest completed and negative   Lymphatic - complete and negative   Hematology - oncology - complete and negative   Allergy immunology - complete and negative    no burning or hematuria      Immunization     There is no immunization history on file for this patient. Pneumococcal Vaccine     [x] Up to date    [x] Indicated   [] Refused  [] Contraindicated       Influenza Vaccine   [x] Up to date    [x] Indicated   [] Refused  [] Contraindicated     PAST MEDICAL HISTORY:       Diagnosis Date    Aortic stenosis 02/15/2019    Atrial fibrillation ([address]     CAD (coronary artery disease)     Chest pain 02/15/2019    CHF (congestive heart failure) (HCC)     Chronic anemia     Chronic kidney disease     Hypertension     Pleural effusion on left 02/15/2019    Pleural effusion, right 02/15/2019    Pulmonary emboli ([address] 02/15/2019    Pulmonary hypertension ([address] 03/06/2019    Thyroid disease     hypothyroidism         Family History:   History reviewed. No pertinent family history.     SURGICAL HISTORY:   Past Surgical History:   Procedure Laterality Date    JOINT REPLACEMENT      right ankle    PACEMAKER PLACEMENT  01/20/2019    THORACENTESIS                TOBACCO:   reports that he quit smoking about 30 years ago. He has never used smokeless tobacco.  ETOH:   reports that he drank alcohol. ALLERGIES:    Allergies   Allergen Reactions    Quinolones      Pt is allergic to fluoroquinolones       Home Meds:   Prior to Admission medications    Medication Sig Start Date End Date Taking?  Authorizing Provider   amiodarone (CORDARONE) 200 MG tablet Take 200 mg by mouth daily   Yes Historical Provider, MD   atorvastatin (LIPITOR) 20 MG tablet Take 20 mg by mouth daily   Yes Historical Provider, MD   isosorbide mononitrate (IMDUR) 60 MG extended release tablet Take 60 mg by mouth daily   Yes Historical Provider, MD   levothyroxine (SYNTHROID) 100 MCG tablet Take 100 mcg by mouth Daily   Yes Historical Provider, MD   lisinopril (PRINIVIL;ZESTRIL) 40 MG tablet Take 40 mg by mouth daily   Yes Historical Provider, MD   apixaban (ELIQUIS) 2.5 MG TABS tablet Take 2.5 mg by mouth daily   Yes Historical Provider, MD   metoprolol tartrate (LOPRESSOR) 50 MG tablet Take 50 mg by mouth 2 times daily   Yes Historical Provider, MD         Intake/Output Summary (Last 24 hours) at 3/13/2019 1206  Last data filed at 3/13/2019 1107  Gross per 24 hour   Intake 1516 ml   Output 755 ml   Net 761 ml         Diet   DIET CARDIAC; Low Sodium (2 GM)    Vitals:   BP (!) 102/49   Pulse 77   Temp 97.7 °F (36.5 °C) (Oral)   Resp 15   Ht 5' 6\" (1.676 m)   Wt 152 lb 8.9 oz (69.2 kg)   SpO2 100%   BMI 24.62 kg/m²  on         I/O (24 Hours)    Patient Vitals for the past 8 hrs:   BP Temp Temp src Pulse Resp SpO2 Weight   03/13/19 1107 -- -- -- 77 -- 100 % --   03/13/19 1101 (!) 102/49 -- -- 74 -- 93 % --   03/13/19 1000 (!) 87/55 -- -- -- -- -- --   03/13/19 0955 (!) 82/45 -- -- -- -- -- --   03/13/19 0950 (!) 93/56 -- -- -- -- -- --   03/13/19 0930 100/64 -- -- -- -- -- --   03/13/19 0830 (!) 93/49 -- -- 72 -- 100 % --   03/13/19 0800 (!) 107/48 -- -- 67 -- 99 % --   03/13/19 0730 (!) 88/42 -- -- 68 -- 100 % --   03/13/19 0725 (!) 83/41 97.7 °F (36.5 °C) Oral 69 15 100 % --   03/13/19 0700 (!) 108/45 -- -- 68 -- 100 % --   03/13/19 0635 (!) 98/47 -- -- 67 -- 100 % --   03/13/19 0630 (!) 89/56 -- -- 67 -- 100 % --   03/13/19 0615 (!) 95/47 -- -- 66 -- 100 % --   03/13/19 0600 (!) 89/47 -- -- 66 -- 100 % 152 lb 8.9 oz (69.2 kg)   03/13/19 0445 98/68 -- -- 73 -- 98 % --   03/13/19 0415 (!) 99/47 -- -- 69 -- 96 % --       Intake/Output Summary (Last 24 hours) at 3/13/2019 1206  Last data filed at 3/13/2019 1107  Gross per 24 hour   Intake 1516 ml   Output 755 ml   Net 761 ml     I/O last 3 completed shifts: In: 1000 [P.O.:700; I.V.:300]  Out: 755 [Urine:755]   Date 03/13/19 0000 - 03/13/19 2359   Shift 4797-2910 0531-7460 3032-2054 24 Hour Total   INTAKE   P.O.(mL/kg/hr) 300(0.5) 240  540   I. V.(mL/kg) 300(4.3) 676(9.8)  976(14.1)   Shift Total(mL/kg) 600(8.7) 805(47.6)  5607(96.1)   OUTPUT   Urine(mL/kg/hr) 200(0.4)   200   Shift Total(mL/kg) 200(2.9)   200(2.9)   Weight (kg) 69.2 69.2 69.2 69.2     Patient Vitals for the past 96 hrs (Last 3 readings):   Weight   03/13/19 0600 152 lb 8.9 oz (69.2 kg)   03/12/19 0500 154 lb 5.2 oz (70 kg)   03/11/19 0500 155 lb 3.3 oz (70.4 kg)          PHYSICAL EXAMINATION:  General Appearance:    Alert, cooperative, no distress, appears stated age   Head:    Normocephalic, without obvious abnormality, atraumatic                  :    Neck:   Supple, symmetrical, trachea midline, no adenopathy;     thyroid:  no enlargement/tenderness/nodules; no carotid    bruit + JVP    Back:     Symmetric, no curvature, ROM normal, no CVA tenderness   Lungs:       Dull bases and dec bs bases no wheeze    Chest Wall:    No tenderness or deformity      Heart:    Regular rate and rhythm, S1 and S2 normal, 3/6 AS                           Abdomen:                                                 Pulses:                                            Lymph nodes:                    Neurologic:                  Soft, non-tender, bowel sounds active all four quadrants,     no masses, no organomegaly         2+ and symmetric all extremities            Cervical, supraclavicular not enlarged or matted or tender      CNII-XII intact, normal strength 5/5 . Sensation grossly normal  and reflexes normal 2+  throughout     Clubbing No  Lower ext edema Yes1+   [x] , 2 +  [] , 3+   []  Upper ext edema No       Musculoskeletal - no joint swelling or tenderness or synovitis            Medications:    Scheduled Meds:   furosemide  40 mg Oral BID    tamsulosin  0.4 mg Oral QPM    sodium chloride flush  10 mL Intravenous 2 times per day    clopidogrel  75 mg Oral Daily    isosorbide mononitrate  30 mg Oral Daily    docusate sodium  100 mg Oral Daily    carvedilol  12.5 mg Oral BID WC    aspirin  81 mg Oral Daily    amiodarone  200 mg Oral Daily    atorvastatin  20 mg Oral Daily    levothyroxine  100 mcg Oral Daily       Continuous Infusions:   sodium chloride Stopped (03/11/19 2107)       PRN Meds:      Labs:  CBC:   Recent Labs     03/11/19  0701 03/12/19  0437   WBC 9.9 21.0*   HGB 9.3* 9.9*   HCT 30.6* 31.8*   MCV 93.6 91.9    417     BMP:   Recent Labs     03/11/19  0701 03/12/19  0437 03/13/19  0336    140 138   K 4.3 4.4 4.2    103 104   CO2 24 24 19*   BUN 91* 83* 93*   CREATININE 2.03* 1.89* 2.80*     LIVER PROFILE: No results for input(s): AST, ALT, LIPASE, BILIDIR, BILITOT, ALKPHOS in the last 72 hours. Invalid input(s): AMYLASE,  ALB  PT/INR:   Recent Labs     03/11/19  0701   PROTIME 11.3   INR 1.1     APTT:   No results for input(s): APTT in the last 72 hours.   UA:  Recent Labs     03/12/19  0020   COLORU YELLOW   PHUR 5.0   WBCUA None   RBCUA 0 TO 2   MUCUS NOT REPORTED   TRICHOMONAS NOT REPORTED   YEAST NOT REPORTED   BACTERIA NOT REPORTED   SPECGRAV 1.024   LEUKOCYTESUR NEGATIVE   UROBILINOGEN Normal   BILIRUBINUR NEGATIVE   GLUCOSEU NEGATIVE   AMORPHOUS NOT REPORTED     No results for input(s): PHART, DFD6ZFM, PO2ART in the last 72 hours. ABG   No results found for: PH, PCO2, PO2, HCO3, O2SAT  Lab Results   Component Value Date    MODE NOT REPORTED 03/06/2019       Results of cardiac cath noted    Films:  CXR  bilateral effusion with atelectasis   Assessment:   Acute CHF ischemic and due to AS   B/l pleural effusion due to CHF post rt and left thoracentesis   Small RML PE ? Afib   Group 2 pulmonary hypertension   HUYEN   B/l compressive atelectasis   Multivessel CAD   Plan:  . Continue Rx for CHF  wILL START SERAQUIL 50 MG hs  Cardiac cath results noted. Cardiac notes reviewed  No active pulmonary problems at this time.   Will follow with carmelita  No need for thoracentesis      Electronically signed by Archie Peace MD on 3/13/2019 at 12:06 PM

## 2019-03-13 NOTE — PROGRESS NOTES
[id number] [facility]  Pre Op Progress Note    CC: ACUTE CHF, SOB      Subjective:  Mr. Audrey Goddard seen and examined    Physical Exam  Vital Signs: BP (!) 83/41   Pulse 69   Temp 97.7 °F (36.5 °C) (Oral)   Resp 15   Ht 5' 6\" (1.676 m)   Wt 152 lb 8.9 oz (69.2 kg)   SpO2 100%   BMI 24.62 kg/m²  O2 Flow Rate (L/min): 2 L/min       General: alert and oriented to person, place and time. Up in chair, No apparent distress. Heart:Normal S1 and S2.  Regular rhythm. No murmurs, gallops, or rubs. Lungs: clear to auscultation bilaterally  Abdomen: soft, non tender, non distended, BSx4  Extremities: 1+ edema      Scheduled Meds:    furosemide  40 mg Oral BID    tamsulosin  0.4 mg Oral QPM    sodium chloride flush  10 mL Intravenous 2 times per day    clopidogrel  75 mg Oral Daily    isosorbide mononitrate  30 mg Oral Daily    docusate sodium  100 mg Oral Daily    carvedilol  12.5 mg Oral BID WC    aspirin  81 mg Oral Daily    amiodarone  200 mg Oral Daily    atorvastatin  20 mg Oral Daily    levothyroxine  100 mcg Oral Daily     Continuous Infusions:    sodium chloride Stopped (03/11/19 2107)       Data:  CBC:   Recent Labs     03/11/19  0701 03/12/19  0437   WBC 9.9 21.0*   HGB 9.3* 9.9*   HCT 30.6* 31.8*   MCV 93.6 91.9    417     BMP:   Recent Labs     03/11/19  0701 03/12/19  0437 03/13/19  0336    140 138   K 4.3 4.4 4.2    103 104   CO2 24 24 19*   BUN 91* 83* 93*   CREATININE 2.03* 1.89* 2.80*     PT/INR:   Recent Labs     03/11/19  0701   PROTIME 11.3   INR 1.1     APTT: No results for input(s): APTT in the last 72 hours.       Assessment & Plan:   Patient Active Problem List   Diagnosis    [facility]    Acute on chronic systolic congestive heart failure (HCC)    Pleural effusion due to CHF (congestive heart failure) ([facility]    Pulmonary 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 heart disease (HCC)    Atelectasis, bilateral    HUYEN (acute kidney injury) ([facility] [facility]  Azotemia     *Diastolic Heart Failure    PCI - JEAN-CLAUDE to LM and LAD  Possible TAVR this week due to low bps  Hypotensive ON, fluid and albumin  Jump in creat 2.8 from 1.89  Hold flomax and lasix  Renal to Mx his fluids  R thoracentesis for pleural effusion    Electronically signed by MIRYAM León on 3/13/2019 at 8:03 AM

## 2019-03-13 NOTE — PROCEDURES
[address]                  [place] [address]                               PULMONARY FUNCTION    PATIENT NAME: Butch Sky                   :        12/10/1928  MED REC NO:   [id number]                             ROOM:       1015  ACCOUNT NO:   [de-identified]                           ADMIT DATE: 2019  PROVIDER:     Lebron Barrett    DATE OF PROCEDURE:  2019    Spirometry shows no obstructive ventilatory defect by GOLD criteria. The FEV1 is decreased at 0.83 L which is 36% predicted. Lung volumes  were not done in full. Flow-volume loop though shows an obstructive  ventilatory pattern. IMPRESSION:  The patient likely has COPD which would be stage III with  an FEV1 of 0.83 L. The patient was unable to perform the diffusion  capacity and hence the test could not be completed.         Raymond Mcdaniel    D: 2019 16:49:18       T: 2019 20:55:19     [email or web address]  Job#: [id number]     Doc#: [id number]    CC:

## 2019-03-13 NOTE — PROGRESS NOTES
IR has called for patient. Called IR spoke with nurse, advised of albumin and hypotension after thoracentesis yesterday. Sts she \"bp has been low\". Sts she will call me back.

## 2019-03-13 NOTE — PROGRESS NOTES
Perfect serve to Renal Dr. Benny Conteh to advise Dr. Jr Sood is requesting he manage IVF and BP. Waiting on call back.

## 2019-03-13 NOTE — PROGRESS NOTES
Rm rounded with Urology team, discussed I/O, double voids, PVR, labs and SPB 70's over night. RN discussed with uro team dr Pipo Keller orders to hold Flomax and diuretics for now. Urology team is in agreement.

## 2019-03-13 NOTE — PROGRESS NOTES
Rounded with Dr. Paula Orellana, renal.  Orders for another bolus 250ML, if map < 65, after bolus, call for levophed gtt orders.

## 2019-03-13 NOTE — PROGRESS NOTES
Dr. Fredi Bergman updated on rounds this am.  He was advised renal sts no more IV dye right now r/t kidney function. Sts he will be in touch.

## 2019-03-13 NOTE — PROGRESS NOTES
Occupational Therapy  Facility/Department: [facility]  Daily Treatment Note  NAME: Kyle Rodriguez  : 12/10/1928  MRN: [id number]    Date of Service: 3/13/2019    Discharge Recommendations:  Home with assist PRN       Assessment   Performance deficits / Impairments: Decreased ADL status; Decreased endurance;Decreased functional mobility ; Decreased balance  Prognosis: Good  Patient Education: OT POC, transfer safety, importance of proper fit of annabella hose, EC/WS - pt verbalized understanding. REQUIRES OT FOLLOW UP: Yes  Activity Tolerance  Activity Tolerance: Patient Tolerated treatment well  Safety Devices  Safety Devices in place: Yes  Type of devices: Call light within reach; Left in chair;Nurse notified         Patient Diagnosis(es): There were no encounter diagnoses. has a past medical history of Aortic stenosis, Atrial fibrillation ([address] CAD (coronary artery disease), Chest pain, CHF (congestive heart failure) ([address] Chronic anemia, Chronic kidney disease, Hypertension, Pleural effusion on left, Pleural effusion, right, Pulmonary emboli ([address] Pulmonary hypertension ([address] and Thyroid disease. has a past surgical history that includes pacemaker placement (2019); thoracentesis; and joint replacement.     Restrictions  Restrictions/Precautions  Restrictions/Precautions: General Precautions  Required Braces or Orthoses?: No  Implants present? : Pacemaker  Position Activity Restriction  Other position/activity restrictions: Up w/assist.  Subjective   General  Patient assessed for rehabilitation services?: Yes  Family / Caregiver Present: No  Vital Signs  Patient Currently in Pain: Denies   Orientation  Orientation  Overall Orientation Status: Within Functional Limits  Objective    ADL  Grooming: Setup;Stand by assistance(Oral care standing at sink.)  UE Bathing: Setup;Stand by assistance(Wash face/hands standing at sink; max A for back.)  Balance  Sitting Balance: Independent  Standing Balance: Contact guard assistance  Standing Balance  Time: 8 minutes  Activity: Static standing at chair, ADL care standing at sink. Sit to stand: Contact guard assistance  Stand to sit: Contact guard assistance  Functional Mobility  Functional - Mobility Device: Other  Activity: To/from bathroom  Assist Level: Contact guard assistance  Functional Mobility Comments: Pt used IV pole fo assist along with hand held assist from therapist. No LOB noted. Transfers  Sit to stand: Contact guard assistance  Stand to sit: Contact guard assistance  Cognition  Overall Cognitive Status: [facility]     Plan   Plan  Times per week: 4-5x/wk     Goals  Short term goals  Time Frame for Short term goals: by discharge, pt will  Short term goal 1: demo I in UE ADL activities   Short term goal 2: demo MI in LE ADL activities with AD as needed  Short term goal 3: demo understanding and I use of EC/WS, fall prevention and proper pursed lipped breathing tech during functional activities   Short term goal 4: demo increased activity tolerance of 30+ min to assist with ADL/ functional activities  Short term goal 5: demo I in functional transfers/ mobility with use of SPC to assist with ADL/ functional activities   Patient Goals   Patient goals : to go home       Therapy Time   Individual Concurrent Group Co-treatment   Time In 0845         Time Out 0916         Minutes 31            Pt seated in chair upon therapist arrival. Pleasant and agreeable to therapy. RN stated BP running a little low but OK to participate in therapy. BP at start of session 93/49, no c/o dizziness or unsteadiness during session, BP after seated back in chair 95/71. See above for LOF for all tasks. Pt retired to seated in chair at end of session with call light within reach.     ZACH Cruz

## 2019-03-13 NOTE — PROGRESS NOTES
[facility])    The following questions refer to your heart failure and how it may affect your life. Please read and complete the following questions. There is no right or wrong answers. Please mynor the answer that best applies to you. 1. Heart failure affects different people in different ways. Some feel shortness of breath while others feel fatigue. Please indicate how much you are limited by heart failure (shortness of breath or fatigue) in your ability to do the following activities over the past 2 weeks. Extremely        Quite a bit   Moderately           Slightly     Not at all  Other or did not     Limited         Limited      Limited              Limited      Limited   Perform activity  Activity______________________________________________________________________________________    a. Shower/bathe  []           []         []    [x]        []                  []     b. Walk 1 block    []          [x]        []   []        []                  []  on level ground    c. Hurrying or    [x]          []        []   []        []                  []  jogging (as if to  catch a bus)    1               2                           3     4            5                         6  ____________________________________________________________________________________________    2. Over the past 2 weeks, how many times did you have swelling in your feet, ankles or legs when you woke up in the morning? Every Morning   3 or more times             1-2 times per Less than once    Never over the        Per week/not QD                   Week                     per week    past 2 weeks              [x]                  []     []          []              []         1                    2       3              4                5  ____________________________________________________________________________________________    3.  Over the past 2 weeks, on average, how many times has fatigue limited your ability to do what you wanted? All of the    Several times     At least             3 or more times          1-2 times        Less than    Never over     Time             per day           once per day      per week but not         per week       once/week The past 2          everyday        Weeks           []                 []                      [x]                       []                              []                   []                    []           1                  2                        3                         4                                 5                     6                       7  ________________________________________________________________________________________________       4. Over the past 2 weeks, on average, how many times has shortness of breath limited your ability to do what you    wanted? All of the    Several times     At least             3 or more times          1-2 times        Less than    Never over     Time             per day           once per day      per week but not         per week       once/week The past 2          everyday        Weeks           []                 [x]                      []                       []                             []                   []                    []           1                  2                        3                         4                                5                     6                       7  ________________________________________________________________________________________________    5. Over the past 2 weeks, on average, how many times have you been forced to sleep sitting up in a chair or with at least 3 pillows to prop you up because of shortness of breath?      Every Morning       3 or more times                1-2 times per Less than once    Never over the                            per week/not QD      week                     per week    past 2 weeks                 []                 []          [] reasons  Activity______________________________________________________________________________________    a. Hobbies or           []            [x]                  []                []              []                   []   recreational   activities    b.  Working or   doing household       []               [x]                 []                 []         []                   []   chores    c.  Visiting family  or friends out of        []               []                 [x]                 []              []                  []       your home                                                       1                       2                          3                        4                     5                           6  Date:  3/13/2019  Time:  10:22 AM    [facility]    Office: [phone] Fax: [phone]    Re:  Haris No   Patient : 12/10/1928    Vital Signs: BP (!) 87/55   Pulse 72   Temp 97.7 °F (36.5 °C) (Oral)   Resp 15   Ht 5' 6\" (1.676 m)   Wt 152 lb 8.9 oz (69.2 kg)   SpO2 100%   BMI 24.62 kg/m²     Recent Labs     19  0701 19  0437 19  0336   HGB 9.3* 9.9*  --    HCT 30.6* 31.8*  --    WBC 9.9 21.0*  --     140 138   K 4.3 4.4 4.2    103 104   CO2 24 24 19*   BUN 91* 83* 93*   CREATININE 2.03* 1.89* 2.80*   GLUCOSE 100* 138* 125*   INR 1.1  --   --         Respiratory:    Assessment  Charting Type: Reassessment    Breath Sounds  Right Upper Lobe: Clear  Right Middle Lobe: Diminished  Right Lower Lobe: Diminished, Crackles  Left Upper Lobe: Clear  Left Lower Lobe: Diminished, Crackles              Peripheral Vascular  Peripheral Vascular (WDL): Exceptions to WDL  Edema: Generalized, Right upper extremity, Left upper extremity, Right lower extremity, Left lower extremity  Edema Generalized: +1  RUE Edema: +1  LUE Edema: +1  RLE Edema: +2  LLE Edema: +2  Perineal Edema: +2      Complaints: SOB and fatigue    Diagnostic Testing:    [x]  R&L Heart Cardiac Catheterization Date Completed:  Multiple-recent PCI   [x]  Echo     Date Completed: 3/7/2019   [x]  MSCT     Date Completed: 3/13/2019    [x]  PFT     Date Completed: 3/12/2019   [x]  Carotid Ultrasound   Date Completed: 3/9/2019    Functional/Physical Evaluation   [x]  [id number]  Date Completed: 3/13/2019     [x]  MMSE   Date Completed: 3/13/2019     [x]  DIALLO ADL's  Date Completed: 3/13/2019   [x]  5 Meter Walk Test Date Completed: 3/13/2019   [x]   Test  Date Completed: 3/13/2019     Comment : RN visit complete today per Dr. Maricel Patricia. Pt in room 1015. Pt complains of SOB as well as fatigue and lower extremity swelling. The majority of the tavr required testing is complete and remainder of CTs to be completed today. We discussed the procedure and follow up appointments that are required. Pt is scheduled for Friday in the cath lab. Dr. Bala Norris made aware. Structural heart team will discuss patient case and then proceed with best plan for patient. All questions answered-will follow.     Electronically signed by Keon Deluna RN on 3/13/2019 at 10:22 AM

## 2019-03-14 NOTE — PROGRESS NOTES
Recent Labs     03/12/19  0020   COLORU YELLOW   PHUR 5.0   WBCUA None   RBCUA 0 TO 2   MUCUS NOT REPORTED   TRICHOMONAS NOT REPORTED   YEAST NOT REPORTED   BACTERIA NOT REPORTED   SPECGRAV 1.024   LEUKOCYTESUR NEGATIVE   UROBILINOGEN Normal   BILIRUBINUR NEGATIVE       Current Facility-Administered Medications   Medication Dose Route Frequency Provider Last Rate Last Dose    QUEtiapine (SEROQUEL) tablet 25 mg  25 mg Oral Nightly Valente Duenas MD        norepinephrine (LEVOPHED) 16 mg in dextrose 5% 250 mL infusion  2 mcg/min Intravenous Continuous Adriel Mindi Sumner MD 1.9 mL/hr at 03/14/19 0552 2 mcg/min at 03/14/19 0552    furosemide (LASIX) tablet 40 mg  40 mg Oral BID Geetha Haque MD   Stopped at 03/14/19 0800    0.9 % sodium chloride infusion   Intravenous Continuous Landon Harman MD 80 mL/hr at 03/13/19 2049      sodium chloride flush 0.9 % injection 10 mL  10 mL Intravenous 2 times per day Vera Bradley MD   10 mL at 03/13/19 1103    sodium chloride flush 0.9 % injection 10 mL  10 mL Intravenous PRN Vera Bradley MD        acetaminophen (TYLENOL) tablet 650 mg  650 mg Oral Q4H PRN Latasha Quiroz MD   650 mg at 03/12/19 1843    magnesium hydroxide (MILK OF MAGNESIA) 400 MG/5ML suspension 30 mL  30 mL Oral Daily PRN Vera Bradley MD        clopidogrel (PLAVIX) tablet 75 mg  75 mg Oral Daily Latasha Quiroz MD   75 mg at 03/13/19 1103    isosorbide mononitrate (IMDUR) extended release tablet 30 mg  30 mg Oral Daily Adam Cao MD   30 mg at 03/12/19 0804    docusate sodium (COLACE) capsule 100 mg  100 mg Oral Daily Latasha Quiroz MD   100 mg at 03/13/19 1104    carvedilol (COREG) tablet 12.5 mg  12.5 mg Oral BID  Adam Cao MD   12.5 mg at 03/12/19 0805    aspirin EC tablet 81 mg  81 mg Oral Daily Latasha Quiroz MD   81 mg at 03/13/19 1104    amiodarone (CORDARONE) tablet 200 mg  200 mg Oral Daily Latasha Quiroz MD   200 mg at 03/13/19 1104    atorvastatin (LIPITOR) tablet 20 mg  20 mg

## 2019-03-14 NOTE — PROGRESS NOTES
Physical Therapy  Facility/Department: [facility] CAR 1  Daily Treatment Note  NAME: Caren Foster  : 12/10/1928  MRN: [id number]    Date of Service: 3/14/2019    Discharge Recommendations:  Home with assist PRN   PT Equipment Recommendations  Equipment Needed: Yes  Mobility Devices: Susen Fulling: Rolling    Patient Diagnosis(es): There were no encounter diagnoses. has a past medical history of Aortic stenosis, Atrial fibrillation ([address] CAD (coronary artery disease), Chest pain, CHF (congestive heart failure) ([address] Chronic anemia, Chronic kidney disease, Hypertension, Pleural effusion on left, Pleural effusion, right, Pulmonary emboli ([address] Pulmonary hypertension ([address] and Thyroid disease. has a past surgical history that includes pacemaker placement (2019); thoracentesis; and joint replacement. Restrictions  Restrictions/Precautions  Restrictions/Precautions: General Precautions  Required Braces or Orthoses?: No  Implants present? : Pacemaker  Position Activity Restriction  Other position/activity restrictions: Up w/assist.  Subjective   General  Chart Reviewed: Yes  Response To Previous Treatment: Patient with no complaints from previous session. Family / Caregiver Present: No  Subjective  Subjective: Alert in chair; agreeable to therapy. Offers no c/o pain and states SOB is improving. General Comment  Comments: Pt returned to chair with BLEs elevated. Call light in reach.    Pain Screening  Patient Currently in Pain: Denies  Vital Signs  Patient Currently in Pain: Denies       Orientation  Orientation  Overall Orientation Status: Within Normal Limits  Cognition      Objective      Transfers  Sit to Stand: Minimal Assistance  Stand to sit: Contact guard assistance  Ambulation  Ambulation?: Yes  Ambulation 1  Surface: level tile  Device: Single point cane  Other Apparatus: Wheelchair follow  Assistance: Minimal assistance  Quality of Gait: decreased pace, cues for posture, decreased trunk rotation  Distance: 100ft  Comments: aubibly SOB and pt encouraged to take rest breaks. Ambulated on RA; SPO2 99%, HR 73.     Balance  Posture: Fair  Sitting - Static: Good  Sitting - Dynamic: Good  Standing - Static: Fair  Standing - Dynamic: -       Exercises:  Seated LE exercise program: Long Arc Quads, hip abduction/adduction, heel/toe raises, and marches. Reps: 20x each, rest breaks as needed due to SOB  Upper extremity exercises: Bicep curl, punches, tricep curl, shoulder abduction/adduction. Reps: 15x each with rest breaks     Assessment   Body structures, Functions, Activity limitations: Decreased functional mobility ; Decreased balance;Decreased endurance;Decreased strength  Assessment: Pt exhibits decreased endurance and balance deficits. Unsteady ambulating with SPC and would benefit from use of RW to decrease his fall risk. Prognosis: Good  Patient Education: HEP, breathing technique  REQUIRES PT FOLLOW UP: Yes  Activity Tolerance  Activity Tolerance: Patient limited by endurance     Goals  Short term goals  Time Frame for Short term goals: 14 visits  Short term goal 1: Perform bed mobility and functional transfers independently  Short term goal 2: Ambulate 300ft with least restrictive AD independently  Short term goal 3: Demo Good- dynamic standing balance to decrease risk of falls  Short term goal 4: Participate in 30 minutes of therapy to demo increased endurance    Plan    Plan  Times per week: 6-7x/wk  Current Treatment Recommendations: Strengthening, Balance Training, Functional Mobility Training, Endurance Training, Transfer Training, Patient/Caregiver Education & Training, Safety Education & Training, Home Exercise Program  Safety Devices  Type of devices:  All fall risk precautions in place, Gait belt, Patient at risk for falls, Left in chair, Call light within reach, Nurse notified  Restraints  Initially in place: No     Therapy Time   Individual Concurrent Group Co-treatment   Time In 0915 Time Out 1989         Minutes [address] [place], [place]

## 2019-03-14 NOTE — PROGRESS NOTES
results for input(s): CHOL, HDL in the last 72 hours. Invalid input(s): LDLCALCU  INR:   No results for input(s): INR in the last 72 hours. Objective:   Vitals: BP (!) 118/50   Pulse 60   Temp 97.7 °F (36.5 °C) (Oral)   Resp 14   Ht 5' 6\" (1.676 m)   Wt 152 lb 12.5 oz (69.3 kg)   SpO2 98%   BMI 24.66 kg/m²   Constitutional and General Appearance: alert, cooperative, no distress and appears stated age  Respiratory:  · Diminished breath sounds at B/L lung bases  Cardiovascular:  S1, s2, systolic murmur is heard  Abdomen:   · No masses or tenderness, soft abdomen  · Bowel sounds present  Extremities:  · le edema present, peripheral pulse bl le present 2 +  Integumentum:   Intact with no rashes noted      EKG:   V-pacing with occasional PVCs. CTA Chest in [place] (2/15/19):  Small PE within branch of RT middle lobe artery. Large RT pleural effusion. Moderate LT pleural effusion.     ECHO in [place] (2/17/19:   LVH with LVEF 50%. LT atrial enlargement. Moderate AS with Mild AI. Mitral annular calcification with mild MR. Mild to Moderate Pulm HTN.     Cardiac Angiography in [place] (1/20/19): Multivessel disease including LM. ECHO (3/6/19):   Normal left ventricle size with mildly reduced systolic function; Estimated left ventricular ejection fraction 40-45%  Anterolateral wall hypokinesis. Mild left ventricular hypertrophy. Left atrium is moderately dilated. Grade II diastolic dysfunction. Heavily calcified aortic valve with reduced systolic excursion and evidence of moderate stenosis. (Peak nataliia 3.62 m/s and mean gradient 29 mmHg)  Moderate posterior pericardial effusion without any echo signs of tamponade. Normal right ventricular size and function. Pacemaker lead seen in right ventricle. Moderate tricuspid regurgitation. Estimated right ventricular systolic pressure is 60 mmHg suggesting moderate  pulmonary HTN. Cardiac Cath (3/11/19): Left main: 80-90% very calcified stenosis. pericardial effusion present on admission  6. Continue Aspirin, Plavix, Atorvastatin  7. Holding Coreg and Imdur due to hypotension  8. Will need to address anticoagulation prior to discharge. Was on Eliquis for RT small PE diagnosed in [place]. Will discuss with rounding attending Dr. Zoe Sanchez for final recommendations. Zahra Jacobs MD, MD  Cardiology Fellow    Attending note,   The patient was seen and examined, agree with above, will follow on nephrology recommendations, on  Levophed, will wean if possible, continue other medications.

## 2019-03-14 NOTE — PROGRESS NOTES
NEPHROLOGY PROGRESS NOTE      SUBJECTIVE     Inview of hypotension started on pressors yesterday with MAP goal of 65. Now oliguric and renal function continues to decline predominantly from contrast.Had a dw him yesterday and today again about HD. He is unsure. Scheduled for TAVR AM  Appetite decent. No dysarrthymia. OBJECTIVE     Vitals:    03/14/19 0530 03/14/19 0600 03/14/19 0630 03/14/19 0700   BP: (!) 123/52 134/60 (!) 115/52 (!) 118/50   Pulse: 62 64 66 60   Resp:       Temp:    97.7 °F (36.5 °C)   TempSrc:    Oral   SpO2: 98% 99% 98% 98%   Weight:  152 lb 12.5 oz (69.3 kg)     Height:         24HR INTAKE/OUTPUT:      Intake/Output Summary (Last 24 hours) at 3/14/2019 0953  Last data filed at 3/14/2019 0750  Gross per 24 hour   Intake 3142 ml   Output 346 ml   Net 2796 ml       General appearance:Awake, alert, in no acute distress  HEENT: PERRLA  Respiratory::vesicular breath sounds, reduced air entry  Cardiovascular:S1 S2 normal,no gallop or organic murmur. Abdomen:Non tender/non distended. Bowel sounds present  Extremities: No Cyanosis or Clubbing, present Lower extremity edema  Neurological:Alert and oriented. No abnormalities of mood, affect, memory, mentation, or behavior are noted      MEDICATIONS     Scheduled Meds:    QUEtiapine  25 mg Oral Nightly    sodium chloride flush  10 mL Intravenous 2 times per day    clopidogrel  75 mg Oral Daily    isosorbide mononitrate  30 mg Oral Daily    docusate sodium  100 mg Oral Daily    carvedilol  12.5 mg Oral BID WC    aspirin  81 mg Oral Daily    amiodarone  200 mg Oral Daily    atorvastatin  20 mg Oral Daily    levothyroxine  100 mcg Oral Daily     Continuous Infusions:    furosemide (LASIX) 5mg/ml infusion      norepinephrine 2 mcg/min (03/14/19 0552)     PRN Meds:    Home Meds:                Medications Prior to Admission: amiodarone (CORDARONE) 200 MG tablet, Take 200 mg by mouth daily  atorvastatin (LIPITOR) 20 MG tablet, Take 20 mg by mouth daily  isosorbide mononitrate (IMDUR) 60 MG extended release tablet, Take 60 mg by mouth daily  levothyroxine (SYNTHROID) 100 MCG tablet, Take 100 mcg by mouth Daily  lisinopril (PRINIVIL;ZESTRIL) 40 MG tablet, Take 40 mg by mouth daily  apixaban (ELIQUIS) 2.5 MG TABS tablet, Take 2.5 mg by mouth daily  metoprolol tartrate (LOPRESSOR) 50 MG tablet, Take 50 mg by mouth 2 times daily    INVESTIGATIONS     Last 3 CMP:    Recent Labs     03/12/19  0437 03/13/19  0336 03/14/19  0419    138 137   K 4.4 4.2 4.3    104 103   CO2 24 19* 19*   BUN 83* 93* 95*   CREATININE 1.89* 2.80* 3.38*   CALCIUM 8.5* 8.2* 8.0*       Last 3 CBC:  Recent Labs     03/12/19 0437   WBC 21.0*   RBC 3.46*   HGB 9.9*   HCT 31.8*   MCV 91.9   MCH 28.6   MCHC 31.1   RDW 14.8*      MPV 11.0       ASSESSMENT     1. Acute kidney injury versus chronic kidney disease stage IIIB with baseline creatinine around 2 since February 2019. Prior to that it used to be around 1.4-1.5. Etiology cardiorenal syndrome initially and now contrast /hypotension aggravating it - Evolving  Ultrasound reveals right kidney 12.5 cm and left 11.2 cm  2. Nephrotic range proteinuria - Bx not feasible in view of he being on Asprin and cardiology recommends not to stop anticoagulant. Explained to patient and he does not want to do that. 3.  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 Cardiomyopathy ejection fraction 40-45%/left ventricular diastole dysfunction  5. S/p PCI and atherectomy  3/11  6. Moderate aortic stenosis with mean gradient 29 mmHg/moderate tricuspid regurgitation/moderate pulmonary hypertension with RVSP 60  7. HTN  8. Hx of Urinary retention  9. Advanced age    PLAN     4. Continue pressors to keep MAP >65  2. Start Lasix drip as instructed  3. DW HD. He is unsure and wants to talk to cardio/CVTS surgeons. 4. DW CVTS and updated  5.  Recheck lytes at 1800 hrs    Please do not hesitate to call with questions    This note is created with the assistance of a speech-recognition program. While intending to generate a document that actually reflects the content of the visit, no guarantees can be provided that every mistake has been identified and corrected by editing    Anny Aguirre MD MD, [facility] Jaimie Bower, Nelia Vergara   3/14/2019 9:53 AM  NEPHROLOGY [address] [place] [address]

## 2019-03-14 NOTE — PROGRESS NOTES
Occupational Therapy  Facility/Department: [facility] CAR 1  Daily Treatment Note  NAME: Jacy Cline  : 12/10/1928  MRN: [id number]    Date of Service: 3/14/2019    Discharge Recommendations:  Home with assist PRN     Assessment   Performance deficits / Impairments: Decreased ADL status; Decreased endurance;Decreased functional mobility ; Decreased balance  Prognosis: Good  Patient Education: OT POC, transfer safety, orientation reivew- good return  REQUIRES OT FOLLOW UP: Yes  Activity Tolerance  Activity Tolerance: Patient Tolerated treatment well;Patient limited by fatigue  Safety Devices  Type of devices: All fall risk precautions in place;Call light within reach; Left in chair;Nurse notified         Patient Diagnosis(es): There were no encounter diagnoses. has a past medical history of Aortic stenosis, Atrial fibrillation ([address] CAD (coronary artery disease), Chest pain, CHF (congestive heart failure) ([address] Chronic anemia, Chronic kidney disease, Hypertension, Pleural effusion on left, Pleural effusion, right, Pulmonary emboli ([address] Pulmonary hypertension ([address] and Thyroid disease. has a past surgical history that includes pacemaker placement (2019); thoracentesis; and joint replacement. Restrictions  Restrictions/Precautions  Restrictions/Precautions: General Precautions  Required Braces or Orthoses?: No  Implants present? : Pacemaker  Position Activity Restriction  Other position/activity restrictions: Up w/assist.  Subjective   General  Patient assessed for rehabilitation services?: Yes  Family / Caregiver Present: No  Vital Signs  Patient Currently in Pain: Denies   Orientation  Orientation  Overall Orientation Status: Within Functional Limits  Objective    ADL  Grooming: Stand by assistance(Pt stood at sink to wash hands w/RW)  Toileting: Contact guard assistance; Increased time to complete(w/RW)  Additional Comments: Pt declined ADLs d/t already completing them this day.    Balance  Sitting Balance: Independent(seated in recliner)  Standing Balance: Minimal assistance  Standing Balance  Time: ~2min  Activity: pt demo functional mob recliner<>bathroom for toileting. Sit to stand: Contact guard assistance  Stand to sit: Contact guard assistance  Functional Mobility  Functional - Mobility Device: Rolling Walker  Activity: To/from bathroom  Assist Level: Contact guard assistance  Toilet Transfers  Toilet - Technique: Ambulating  Equipment Used: Standard toilet  Toilet Transfer: Contact guard assistance  Bed mobility  Comment: pt in recliner upon arrival.  Transfers  Sit to stand: Contact guard assistance  Stand to sit: Contact guard assistance         Plan   Plan  Times per week: 4-5x/wk     Goals  Short term goals  Time Frame for Short term goals: by discharge, pt will  Short term goal 1: demo I in UE ADL activities   Short term goal 2: demo MI in LE ADL activities with AD as needed  Short term goal 3: demo understanding and I use of EC/WS, fall prevention and proper pursed lipped breathing tech during functional activities   Short term goal 4: demo increased activity tolerance of 30+ min to assist with ADL/ functional activities  Short term goal 5: demo I in functional transfers/ mobility with use of SPC to assist with ADL/ functional activities   Patient Goals   Patient goals : to go home       Therapy Time   Individual Concurrent Group Co-treatment   Time In 1116         Time Out 1144         Minutes 28             Pt in recliner upon arrival. Pt agreeable to therapy w/encouragement. See above for LOF. Pt retired to recliner at end of session.     Howard RUFF/GHANSHYAM BONILLA/GERALD

## 2019-03-14 NOTE — PROGRESS NOTES
I talked with Gloria Christiansen in regards to ramirez catheter insertion. Advised to hold off unless patient stopped voiding or was retaining to much urine.

## 2019-03-14 NOTE — PROGRESS NOTES
[facility]  Pre Op Progress Note    CC: ACUTE CHF, SOB      Subjective:  Mr. Kait Suárez seen and examined    Physical Exam  Vital Signs: BP (!) 118/50   Pulse 60   Temp 97.7 °F (36.5 °C) (Oral)   Resp 14   Ht 5' 6\" (1.676 m)   Wt 152 lb 12.5 oz (69.3 kg)   SpO2 98%   BMI 24.66 kg/m²  O2 Flow Rate (L/min): 2 L/min       General: alert and oriented to person, place and time. Up in chair, No apparent distress. Heart:Normal S1 and S2.  Regular rhythm. No murmurs, gallops, or rubs. Lungs: clear to auscultation bilaterally  Abdomen: soft, non tender, non distended, BSx4  Extremities: 1+ edema      Scheduled Meds:    QUEtiapine  25 mg Oral Nightly    furosemide  40 mg Oral BID    sodium chloride flush  10 mL Intravenous 2 times per day    clopidogrel  75 mg Oral Daily    isosorbide mononitrate  30 mg Oral Daily    docusate sodium  100 mg Oral Daily    carvedilol  12.5 mg Oral BID WC    aspirin  81 mg Oral Daily    amiodarone  200 mg Oral Daily    atorvastatin  20 mg Oral Daily    levothyroxine  100 mcg Oral Daily     Continuous Infusions:    norepinephrine 2 mcg/min (03/14/19 0552)    sodium chloride 80 mL/hr at 03/13/19 2049       Data:  CBC:   Recent Labs     03/12/19 0437   WBC 21.0*   HGB 9.9*   HCT 31.8*   MCV 91.9        BMP:   Recent Labs     03/12/19  0437 03/13/19  0336 03/14/19  0419    138 137   K 4.4 4.2 4.3    104 103   CO2 24 19* 19*   BUN 83* 93* 95*   CREATININE 1.89* 2.80* 3.38*     PT/INR:   No results for input(s): PROTIME, INR in the last 72 hours. APTT: No results for input(s): APTT in the last 72 hours.       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

## 2019-03-14 NOTE — PROGRESS NOTES
RN updated Dr Leila Jean Baptiste regarding VS, IVF bolus administration and gtts. Discussed UO and minimal volume when bladder scanning.      Orders received for ECHO for 3/14 am

## 2019-03-14 NOTE — PLAN OF CARE
Patient will remain free from falls. Bed alarm while in bed and call light in reach. Continuous orientation to environment. Maintain diet with the exception of NPO as needed. Encourage deep breathing exercises along with encourage ambulation when can tolerate to restroom and with Pt.

## 2019-03-14 NOTE — PROGRESS NOTES
Pt bladder scanned at 0600, voided 50 ml at 2050 with a PVR 11 mL and has not voided since. Bladder scan volume 96 mL.

## 2019-03-14 NOTE — PROGRESS NOTES
Pulmonary Progress Note    CC:  Dyspnea  Subjective:  No resp. Distress at rest  But is withdrawn  No fever  Conties to have heart failure    Sleeping  nOT SLEEPING WELL AT  NIGHT  Gets out of breath with effort  Has PND  Edema is better  Has lost some weight    Review of Systems -Unchanged  General ROS: negative for - chills, fatigue, fever or weight loss  ENT ROS: negative for - headaches, oral lesions or sore throat  Cardiovascular ROS: no chest pain , +orthopnea or pnd   Gastrointestinal ROS: no abdominal pain, change in bowel habits, or black or bloody stools  Skin - no rash   Neuro - no blurry vision , no loc . No focal weakness   msk - no jt tenderness or swelling    Vascular - no claudication , rest completed and negative   Lymphatic - complete and negative   Hematology - oncology - complete and negative   Allergy immunology - complete and negative    no burning or hematuria      Immunization     There is no immunization history on file for this patient. Pneumococcal Vaccine     [x] Up to date    [x] Indicated   [] Refused  [] Contraindicated       Influenza Vaccine   [x] Up to date    [x] Indicated   [] Refused  [] Contraindicated     PAST MEDICAL HISTORY:       Diagnosis Date    Aortic stenosis 02/15/2019    Atrial fibrillation ([address]     CAD (coronary artery disease)     Chest pain 02/15/2019    CHF (congestive heart failure) (HCC)     Chronic anemia     Chronic kidney disease     Hypertension     Pleural effusion on left 02/15/2019    Pleural effusion, right 02/15/2019    Pulmonary emboli ([address] 02/15/2019    Pulmonary hypertension ([address] 03/06/2019    Thyroid disease     hypothyroidism         Family History:   History reviewed. No pertinent family history.     SURGICAL HISTORY:   Past Surgical History:   Procedure Laterality Date    JOINT REPLACEMENT      right ankle    PACEMAKER PLACEMENT  01/20/2019    THORACENTESIS                TOBACCO:   reports that he quit smoking about 30 years ago. He has never used smokeless tobacco.  ETOH:   reports that he drank alcohol. ALLERGIES:    Allergies   Allergen Reactions    Quinolones      Pt is allergic to fluoroquinolones       Home Meds:   Prior to Admission medications    Medication Sig Start Date End Date Taking?  Authorizing Provider   amiodarone (CORDARONE) 200 MG tablet Take 200 mg by mouth daily   Yes Historical Provider, MD   atorvastatin (LIPITOR) 20 MG tablet Take 20 mg by mouth daily   Yes Historical Provider, MD   isosorbide mononitrate (IMDUR) 60 MG extended release tablet Take 60 mg by mouth daily   Yes Historical Provider, MD   levothyroxine (SYNTHROID) 100 MCG tablet Take 100 mcg by mouth Daily   Yes Historical Provider, MD   lisinopril (PRINIVIL;ZESTRIL) 40 MG tablet Take 40 mg by mouth daily   Yes Historical Provider, MD   apixaban (ELIQUIS) 2.5 MG TABS tablet Take 2.5 mg by mouth daily   Yes Historical Provider, MD   metoprolol tartrate (LOPRESSOR) 50 MG tablet Take 50 mg by mouth 2 times daily   Yes Historical Provider, MD         Intake/Output Summary (Last 24 hours) at 3/13/2019 2155  Last data filed at 3/13/2019 2050  Gross per 24 hour   Intake 2056 ml   Output 671 ml   Net 1385 ml         Diet   DIET CARDIAC; Low Sodium (2 GM)    Vitals:   BP (!) 127/51   Pulse 63   Temp 98.3 °F (36.8 °C) (Oral)   Resp 14   Ht 5' 6\" (1.676 m)   Wt 152 lb 8.9 oz (69.2 kg)   SpO2 100%   BMI 24.62 kg/m²  on         I/O (24 Hours)    Patient Vitals for the past 8 hrs:   BP Temp Temp src Pulse Resp SpO2   03/13/19 2145 (!) 127/51 -- -- 63 -- 100 %   03/13/19 2130 (!) 123/54 -- -- 63 -- 98 %   03/13/19 2115 (!) 112/50 -- -- 63 -- 98 %   03/13/19 2100 (!) 111/50 -- -- 62 -- 98 %   03/13/19 2045 104/65 -- -- 73 -- --   03/13/19 2015 (!) 105/46 -- -- 62 -- 100 %   03/13/19 2010 (!) 107/47 -- -- 63 -- 100 %   03/13/19 2000 (!) 84/57 -- -- 62 -- 100 %   03/13/19 1945 (!) 97/46 -- -- 64 -- 100 %   03/13/19 1830 (!) 95/51 -- -- 66 -- 100 % 03/13/19 1815 (!) 96/48 -- -- 69 -- 100 %   03/13/19 1800 (!) 120/48 -- -- 70 -- 99 %   03/13/19 1759 -- -- -- 70 -- 100 %   03/13/19 1730 (!) 115/47 -- -- 69 -- 98 %   03/13/19 1715 109/74 -- -- 73 -- 100 %   03/13/19 1700 (!) 114/57 -- -- 65 -- 100 %   03/13/19 1645 (!) 110/53 -- -- 65 -- 100 %   03/13/19 1630 (!) 56/14 -- -- 66 -- 100 %   03/13/19 1615 (!) 83/39 -- -- 63 -- 100 %   03/13/19 1600 (!) 84/41 -- -- 63 -- 100 %   03/13/19 1545 (!) 85/41 -- -- 66 -- 100 %   03/13/19 1530 (!) 102/53 98.3 °F (36.8 °C) Oral 68 14 99 %   03/13/19 1420 (!) 98/52 -- -- 70 -- 99 %   03/13/19 1415 (!) 109/57 -- -- 72 -- 98 %   03/13/19 1410 126/67 -- -- 71 -- 100 %   03/13/19 1405 -- -- -- 71 -- --   03/13/19 1400 (!) 75/30 -- -- 68 -- 97 %       Intake/Output Summary (Last 24 hours) at 3/13/2019 2155  Last data filed at 3/13/2019 2050  Gross per 24 hour   Intake 2056 ml   Output 671 ml   Net 1385 ml     I/O last 3 completed shifts: In: 1816 [P.O.:540; I.V.:1276]  Out: 36 [Urine:730]   Date 03/13/19 0000 - 03/13/19 2359   Shift 6191-4969 0009-8322 4589-2512 24 Hour Total   INTAKE   P.O.(mL/kg/hr) 300(0.5) 240(0.4) 240 780   I. V.(mL/kg) 300(4.3) 890(93.2)  6309(49.9)   Shift Total(mL/kg) 600(8.7) 1260(29.2) 240(3.5) 3869(99.8)   OUTPUT   Urine(mL/kg/hr) 200(0.4) 100(0.2) 121 421   Shift Total(mL/kg) 200(2.9) 100(1.4) 121(1.7) 421(6.1)   Weight (kg) 69.2 69.2 69.2 69.2     Patient Vitals for the past 96 hrs (Last 3 readings):   Weight   03/13/19 0600 152 lb 8.9 oz (69.2 kg)   03/12/19 0500 154 lb 5.2 oz (70 kg)   03/11/19 0500 155 lb 3.3 oz (70.4 kg)          PHYSICAL EXAMINATION:  General Appearance:    Alert, cooperative, no distress, appears stated age   Head:    Normocephalic, without obvious abnormality, atraumatic                  :    Neck:   Supple, symmetrical, trachea midline, no adenopathy;     thyroid:  no enlargement/tenderness/nodules; no carotid    bruit + JVP    Back:     Symmetric, no curvature, ROM normal, no CVA tenderness   Lungs:       Dull bases and dec bs bases no wheeze    Chest Wall:    No tenderness or deformity      Heart:    Regular rate and rhythm, S1 and S2 normal, 3/6 AS                           Abdomen:                                                 Pulses:                                            Lymph nodes:                    Neurologic:                  Soft, non-tender, bowel sounds active all four quadrants,     no masses, no organomegaly         2+ and symmetric all extremities            Cervical, supraclavicular not enlarged or matted or tender      CNII-XII intact, normal strength 5/5 . Sensation grossly normal  and reflexes normal 2+  throughout     Clubbing No  Lower ext edema Yes1+   [x] , 2 +  [] , 3+   []  Upper ext edema No       Musculoskeletal - no joint swelling or tenderness or synovitis            Medications:    Scheduled Meds:   QUEtiapine  25 mg Oral Nightly    sodium chloride  500 mL Intravenous Once    furosemide  40 mg Oral BID    sodium chloride flush  10 mL Intravenous 2 times per day    clopidogrel  75 mg Oral Daily    isosorbide mononitrate  30 mg Oral Daily    docusate sodium  100 mg Oral Daily    carvedilol  12.5 mg Oral BID WC    aspirin  81 mg Oral Daily    amiodarone  200 mg Oral Daily    atorvastatin  20 mg Oral Daily    levothyroxine  100 mcg Oral Daily       Continuous Infusions:   norepinephrine 10 mcg/min (03/13/19 2050)    sodium chloride 80 mL/hr at 03/13/19 2049       PRN Meds:      Labs:  CBC:   Recent Labs     03/11/19  0701 03/12/19  0437   WBC 9.9 21.0*   HGB 9.3* 9.9*   HCT 30.6* 31.8*   MCV 93.6 91.9    417     BMP:   Recent Labs     03/11/19  0701 03/12/19  0437 03/13/19  0336    140 138   K 4.3 4.4 4.2    103 104   CO2 24 24 19*   BUN 91* 83* 93*   CREATININE 2.03* 1.89* 2.80*     LIVER PROFILE: No results for input(s): AST, ALT, LIPASE, BILIDIR, BILITOT, ALKPHOS in the last 72 hours. Invalid input(s):   AMYLASE, ALB  PT/INR:   Recent Labs     03/11/19  0701   PROTIME 11.3   INR 1.1     APTT:   No results for input(s): APTT in the last 72 hours. UA:  Recent Labs     03/12/19  0020   COLORU YELLOW   PHUR 5.0   WBCUA None   RBCUA 0 TO 2   MUCUS NOT REPORTED   TRICHOMONAS NOT REPORTED   YEAST NOT REPORTED   BACTERIA NOT REPORTED   SPECGRAV 1.024   LEUKOCYTESUR NEGATIVE   UROBILINOGEN Normal   BILIRUBINUR NEGATIVE   GLUCOSEU NEGATIVE   AMORPHOUS NOT REPORTED     No results for input(s): PHART, QEV3ZUV, PO2ART in the last 72 hours. ABG   No results found for: PH, PCO2, PO2, HCO3, O2SAT  Lab Results   Component Value Date    MODE NOT REPORTED 03/06/2019       Results of cardiac cath noted    Films:  CXR  bilateral effusion with atelectasis   Assessment:   Acute CHF ischemic and due to AS   B/l pleural effusion due to CHF post rt and left thoracentesis   Small RML PE ? Afib   Group 2 pulmonary hypertension   HUYEN   B/l compressive atelectasis   Multivessel CAD   Plan:  . Continue Rx for CHF  wILL START SERAQUIL 25 mg HS  Cardiac cath results noted. Cardiac notes reviewed  No active pulmonary problems at this time. Will follow with carmelita  No need for thoracentesis  QT is prolonged but discussed with cardiology.     Electronically signed by Loco Keith MD on 3/13/2019 at 9:55 PM

## 2019-03-15 NOTE — OP NOTE
[facility]  TAVR Procedure Note                 Procedure Date:   3/15/2019  Patient name:  Levar Colby  Date of admission:  3/6/2019  8:48 PM  MRN:   [id number]  YOB: 1928    Reason for Admission:  CHF, Non Q MI and severe aortic stenosis    ASA Class:    III    Mallampati Class:  III    PREOPERATIVE DIAGNOSIS:    1. Severe aortic stenosis. 2. Advance Age. 89  3. Frailty. moderate  4. Renal insufficiency  5. CHF class III-IV      POSTOPERATIVE DIAGNOSIS:    1. Same      PROCEDURE PERFORMED:       1. Transcatheter aortic valve replacement using a 29 mm CoreValve via  right Femoral approach. Serial # [id number]  2. Closure devices for both common femoral arteries  3. Selective angiography ? Femoral ?Iliac  4. Multiple aortic root injections        Operators:    Interventional Cardiologist:  ? Madelaine Dodd M.D.  ? ROBB Boswell    CV Surgeon:       ? FAYE Slater MD    Anesthesia:     ? MAC    History Obtained From:  Patient and medical records    HISTORY OF PRESENT ILLNESS:      The patient Levar Colby is a 80years old who had been thoroughly evaluated by the TAVR team. Known with severe symptomatic aortic stenosis and was deemed to be high risk by 2 surgeons. Patient was referred for transcatheter aortic valve replacement. Patient coronary was treated with recent rotational atherectomy and stents    Past Medical History:   has a past medical history of Aortic stenosis, Atrial fibrillation ([address] CAD (coronary artery disease), Chest pain, CHF (congestive heart failure) ([address] Chronic anemia, Chronic kidney disease, Hypertension, Pleural effusion on left, Pleural effusion, right, Pulmonary emboli ([address] Pulmonary hypertension ([address] and Thyroid disease. Past Surgical History:   has a past surgical history that includes pacemaker placement (01/20/2019); thoracentesis; joint replacement; and Coronary angioplasty with stent (03/11/2019).         DESCRIPTION OF PROCEDURE:    After a sterile prep and drape and administration of appropriate preoperative prophylactic antibiotics, the patient anterior chest, abdomen and legs were maticulously prepped and draped in a sterile manner. A time - out procedure was completed. Access:  ? Femoral       ? Right        Arterial cannulation in both femoral arteries and veins were obtained. Fluoroscopy was utilized. A pigtail catheter was then positioned over the noncoronary cusp of the aortic valve. The patient then was fully heparinized, the right common femoral artery access was used to advance 18 [place] sheath over the stiff wire. The AL1 catheter was advanced through the sheath to cross the aortic valve and measure the transvalvular gradient, which was about 30 mmHg. Having completed this, we then put the [facility]NE wire in the left ventricle, he gradient is under estimated due to severe LV dysfunction. Balloon valvular plasty was done:    ? No  If it was done, it was completed using standard  balloon. After that we brought the 29 mm CoreValve device up onto the field and with myself in position 1 and Dr. Aidan Bunch in position 2, we delopoyed the valve across the native annulus without difficulty. Post - deployment, we were quite pleased with both the depth and the absence of significant perivalvular leak buy subsequent imaging and  ? TTE      Arterial access was then examined, and closed successfully, with a closure device,  ? Proglide  ? Angio-Seal  ? Mynx  It was done with no complication. The protamine was then administered, and the wound were closed in layers with Vicryl suture. The patient tolerated the procedure well. IMPRESSION:    1. Successful Transcatheter Artic valve replacement    RECOMMENDATIONS:  1. Post TAVR protocol  2. Temporary pacer management  3. TTE in 48 hours. Discussed with patient family and nursing.     Electronically signed by Ryan Sheldon MD on 3/15/2019 at 9:33 AM.  [facility] cardiology Consultant

## 2019-03-15 NOTE — CONSULTS
[id number] [facility]  History and Physical    Patient's Name/Date of Birth: Elaina Wilder / 12/10/1928 (83 y.o.)    Date: March 15, 2019     Chief Complaint: No chief complaint on file. HPI: Elaina Wilder is a 80 y.o.  male with multiple cor morbid issues suffering from progressive SOB and fatique over the past few months requiring hospitalization. Recently patient was diagnosed with and treated for severe multivessel disease and also being found to has severe AS patient being referred for TAVR consideration.  Patient otherwise has been very active in the past.    Past Medical History:   Diagnosis Date    Aortic stenosis 02/15/2019    Atrial fibrillation (HCC)     CAD (coronary artery disease)     Chest pain 02/15/2019    CHF (congestive heart failure) (HCC)     Chronic anemia     Chronic kidney disease     Hypertension     Pleural effusion on left 02/15/2019    Pleural effusion, right 02/15/2019    Pulmonary emboli ([address] 02/15/2019    Pulmonary hypertension ([address] 03/06/2019    Thyroid disease     hypothyroidism     Past Surgical History:   Procedure Laterality Date    CORONARY ANGIOPLASTY WITH STENT PLACEMENT  03/11/2019    complex PCI of LT MAIN, LAD    JOINT REPLACEMENT      right ankle    PACEMAKER PLACEMENT  01/20/2019    THORACENTESIS       Current Facility-Administered Medications   Medication Dose Route Frequency Provider Last Rate Last Dose    furosemide (LASIX) 500 mg in dextrose 5 % 100 mL infusion  5 mg/hr Intravenous Continuous Rahil Rafael Goldberg, MD 3 mL/hr at 03/15/19 0636 15 mg/hr at 03/15/19 0636    QUEtiapine (SEROQUEL) tablet 25 mg  25 mg Oral Nightly Areli Weir MD        norepinephrine (LEVOPHED) 16 mg in dextrose 5% 250 mL infusion  2 mcg/min Intravenous Continuous Rahil Rafael Goldberg, MD 0.9 mL/hr at 03/14/19 1050 1 mcg/min at 03/14/19 1050    sodium chloride flush 0.9 % injection 10 mL  10 mL Intravenous 2 times per day Qi Nava MD   10 mL at 19 2204    sodium chloride flush 0.9 % injection 10 mL  10 mL Intravenous PRN Mily Wayne MD        acetaminophen (TYLENOL) tablet 650 mg  650 mg Oral Q4H PRN Latasha Quiroz MD   650 mg at 19 1843    magnesium hydroxide (MILK OF MAGNESIA) 400 MG/5ML suspension 30 mL  30 mL Oral Daily PRN Mily Wayne MD        clopidogrel (PLAVIX) tablet 75 mg  75 mg Oral Daily Latasha Quiroz MD   75 mg at 19 0834    isosorbide mononitrate (IMDUR) extended release tablet 30 mg  30 mg Oral Daily Shahana Tiwari MD   30 mg at 19 0804    docusate sodium (COLACE) capsule 100 mg  100 mg Oral Daily Latasha Quiroz MD   100 mg at 19 0833    carvedilol (COREG) tablet 12.5 mg  12.5 mg Oral BID WC Shahana Tiwari MD   12.5 mg at 19 0805    aspirin EC tablet 81 mg  81 mg Oral Daily Latasha Quiroz MD   81 mg at 19 0834    amiodarone (CORDARONE) tablet 200 mg  200 mg Oral Daily Latasha Quiroz MD   200 mg at 19 0834    atorvastatin (LIPITOR) tablet 20 mg  20 mg Oral Daily Latasha Quiroz MD   20 mg at 19 0834    levothyroxine (SYNTHROID) tablet 100 mcg  100 mcg Oral Daily Mily Wayne MD   100 mcg at 19 9751     Allergies   Allergen Reactions    Quinolones      Pt is allergic to fluoroquinolones     History reviewed. No pertinent family history. Social History     Socioeconomic History    Marital status:       Spouse name: Not on file    Number of children: Not on file    Years of education: Not on file    Highest education level: Not on file   Occupational History     Employer: RETIRED   Social Needs    Financial resource strain: Not on file    Food insecurity:     Worry: Not on file     Inability: Not on file    Transportation needs:     Medical: Not on file     Non-medical: Not on file   Tobacco Use    Smoking status: Former Smoker     Last attempt to quit:      Years since quittin.2    Smokeless tobacco: Never Used   Substance and Sexual Activity    Alcohol use: Not Currently    Drug use: Never    Sexual activity: Not on file   Lifestyle    Physical activity:     Days per week: Not on file     Minutes per session: Not on file    Stress: Not on file   Relationships    Social connections:     Talks on phone: Not on file     Gets together: Not on file     Attends Protestant service: Not on file     Active member of club or organization: Not on file     Attends meetings of clubs or organizations: Not on file     Relationship status: Not on file    Intimate partner violence:     Fear of current or ex partner: Not on file     Emotionally abused: Not on file     Physically abused: Not on file     Forced sexual activity: Not on file   Other Topics Concern    Not on file   Social History Narrative    Not on file     ROS:   CONSTITUTIONAL:  fatigue, weight loss  Respiratory: positive for dyspnea on exertion and shortness of breath  Cardiovascular: negative  Gastrointestinal: negative  Genitourinary:negative  Hematologic/lymphatic: negative  Musculoskeletal:negative  Neurological: negative  Endocrine: negative    Physical Exam:  Vitals:    03/15/19 0630   BP: (!) 118/42   Pulse: 60   Resp:    Temp:    SpO2: 99%     Weight: Weight: 152 lb 11.2 oz (69.3 kg)    Weight: 156 lb 4.8 oz (70.9 kg)    I/O last 3 completed shifts: In: 245 [P.O.:120; I.V.:125]  Out: 675 [Urine:675]    Labs:      CBC:   Recent Labs     03/15/19  0507   WBC 9.6   HGB 7.9*   HCT 25.9*   MCV 94.9        BMP:   Recent Labs     03/13/19  0336 03/14/19  0419 03/14/19  1756 03/15/19  0507    137 136 137   K 4.2 4.3 4.4 4.1    103 103 104   CO2 19* 19* 18* 18*   BUN 93* 95*  --  102*   CREATININE 2.80* 3.38*  --  3.97*   MG  --   --   --  2.3     Accucheck Glucoses: No results for input(s): POCGLU in the last 72 hours. Cardiac Enzymes: No results for input(s): CKTOTAL, CKMB, CKMBINDEX, TROPONINI in the last 72 hours.   PT/INR:   Recent Labs     03/15/19  0507   PROTIME 13.4*   INR

## 2019-03-15 NOTE — FLOWSHEET NOTE
DATE: 3/15/2019    NAME: Shanel Ro  MRN: [id number]   : 12/10/1928    Patient not seen this date for Physical Therapy due to:  [] Blood transfusion in progress  [] Hemodialysis  []  Patient Declined  [] Spine Precautions   [] Strict Bedrest  [x] Surgery/ Procedure; TAVR  [] Testing      [] Other        [] PT being discontinued at this time. Patient independent. No further needs. [] PT being discontinued at this time as the patient has been transferred to palliative care. No further needs.     [address], PTA

## 2019-03-15 NOTE — PROGRESS NOTES
Rn updated Dr Herrera Monday regarding U/O and bladder scan volumes with lasix gtt. Discussed VSS and most recent lab results. Plan for TAVR in am.     Orders received.

## 2019-03-15 NOTE — CARE COORDINATION
Patient is no longer included within the [facility] Medical Bundle due to a non-qualifying DRG (249) for the admitting location.

## 2019-03-15 NOTE — PROGRESS NOTES
Perfect serve sent to Dr. Margaret Abbott, cardiology, regarding pt wanting to get out of bed to urinate/defecate. Only been 5 hours since procedure. No orders for activity post procedure. Awaiting response.

## 2019-03-15 NOTE — PROGRESS NOTES
Urology     Would recommend continue to prompt patient to double void and then bladder scan after voids. Call urology for PVR >250cc. Start Flomax when okay with primary team.  Can follow up with Dr Sandhya Navas after discharge. Urology will sign off at this time. Please call with questions.     Rex Tay MD  3/14/19 8:28 PM

## 2019-03-15 NOTE — PROGRESS NOTES
NEPHROLOGY PROGRESS NOTE      SUBJECTIVE     Had TAVR today, off pressors. UO was fair, on lasix gtt, although not optimal, unable to void post TAVR, PVR > 300 ml. Renal function continues to decline predominantly from contrast and low , creat 3.97 and BUN > 100. HD on hold for now. May need to make a decision soon if Clearances and volume become a problem. He is unsure. Appetite decent. No dysarrthymia. OBJECTIVE     Vitals:    03/15/19 1200 03/15/19 1215 03/15/19 1230 03/15/19 1245   BP: (!) 131/45  110/65    Pulse: 62 65 60 61   Resp: 17 15 11 12   Temp: 98.4 °F (36.9 °C)      TempSrc: Axillary      SpO2: 100% 100% 100% 95%   Weight:       Height:         24HR INTAKE/OUTPUT:      Intake/Output Summary (Last 24 hours) at 3/15/2019 1349  Last data filed at 3/15/2019 0944  Gross per 24 hour   Intake 1745 ml   Output 475 ml   Net 1270 ml       General appearance:Awake, alert, in no acute distress  HEENT: PERRLA  Respiratory::vesicular breath sounds, reduced air entry  Cardiovascular:S1 S2 normal,no gallop or organic murmur. Abdomen:Non tender/non distended. Bowel sounds present  Extremities: No Cyanosis or Clubbing, present Lower extremity edema  Neurological:Alert and oriented. No abnormalities of mood, affect, memory, mentation, or behavior are noted      MEDICATIONS     Scheduled Meds:    vancomycin  1,000 mg Intravenous Q12H    sodium chloride flush  10 mL Intravenous 2 times per day    sodium chloride flush  10 mL Intravenous 2 times per day    carvedilol  6.25 mg Oral BID WC    QUEtiapine  25 mg Oral Nightly    sodium chloride flush  10 mL Intravenous 2 times per day    clopidogrel  75 mg Oral Daily    isosorbide mononitrate  30 mg Oral Daily    docusate sodium  100 mg Oral Daily    aspirin  81 mg Oral Daily    amiodarone  200 mg Oral Daily    atorvastatin  20 mg Oral Daily    levothyroxine  100 mcg Oral Daily     Continuous Infusions:    sodium chloride      sodium chloride Stopped (03/15/19 1324)    furosemide (LASIX) 5mg/ml infusion 20 mg/hr (03/15/19 1036)    norepinephrine 1 mcg/min (03/14/19 1050)     PRN Meds:    Home Meds:                Medications Prior to Admission: amiodarone (CORDARONE) 200 MG tablet, Take 200 mg by mouth daily  atorvastatin (LIPITOR) 20 MG tablet, Take 20 mg by mouth daily  isosorbide mononitrate (IMDUR) 60 MG extended release tablet, Take 60 mg by mouth daily  levothyroxine (SYNTHROID) 100 MCG tablet, Take 100 mcg by mouth Daily  lisinopril (PRINIVIL;ZESTRIL) 40 MG tablet, Take 40 mg by mouth daily  apixaban (ELIQUIS) 2.5 MG TABS tablet, Take 2.5 mg by mouth daily  metoprolol tartrate (LOPRESSOR) 50 MG tablet, Take 50 mg by mouth 2 times daily    INVESTIGATIONS     Last 3 CMP:    Recent Labs     03/13/19  0336 03/14/19  0419 03/14/19  1756 03/15/19  0507 03/15/19  0745    137 136 137  --    K 4.2 4.3 4.4 4.1  --     103 103 104  --    CO2 19* 19* 18* 18*  --    BUN 93* 95*  --  102*  --    CREATININE 2.80* 3.38*  --  3.97*  --    CALCIUM 8.2* 8.0*  --  8.0*  --    BILITOT  --   --   --   --  0.29*       Last 3 CBC:  Recent Labs     03/15/19  0507   WBC 9.6   RBC 2.73*   HGB 7.9*   HCT 25.9*   MCV 94.9   MCH 28.9   MCHC 30.5   RDW 15.1*      MPV 11.6       ASSESSMENT     1. Acute kidney injury, ATN from contrast low flow from cardiogenic shock and forward failure creat upto 3.97, BUN > 100, evolving, UO fair on lasix gtt. 2. 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 Nephrotic range proteinuria - Bx not feasible in view of he being on Asprin and cardiology recommends not to stop anticoagulant. Explained to patient and he does not want to do that. 3.  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   Cardiomyopathy ejection fraction 40-45%/left ventricular diastole dysfunction  5. S/p PCI and atherectomy  3/11  6. Moderate aortic stenosis with mean gradient 29 mmHg/moderate tricuspid regurgitation/moderate pulmonary hypertension with RVSP 60  7. HTN  8. Hx of Urinary retention  9. Advanced age    PLAN     4. Ok for IVF for 6 hrs post procedure  2. Ct. Lasix drip as instructed  3. Will need to decide about RRT if clerances and volume become a problem. 4. DW CVTS and updated  5. Hold evening dose of vanco  6.  Follow renal fxn  Please do not hesitate to call with questions    This note is created with the assistance of a speech-recognition program. While intending to generate a document that actually reflects the content of the visit, no guarantees can be provided that every mistake has been identified and corrected by editing    Justino Mitchell MD, FACP   3/15/2019 1:49 PM  [facility]

## 2019-03-15 NOTE — PROGRESS NOTES
Dr. Bernadette Grant, pulmonary disease, at bedside to evaluate, chart reviewed. Recommends starting high dose heparin d/t recent diagnosis of PE. Per Dr. Brandon Perez, on behalf of Dr. Becca Abebe, pulmonologist that had previously seen pt., thinks the pt does not have a PE and it was misdiagnosed at previous healthcare system in [facility]. Will do repeat lower extremity venous duplex, hold off on heparin at this point. Increase levothyroxine from 100 mcg to 125 mcg qd.

## 2019-03-15 NOTE — PROGRESS NOTES
0.25 mg Oral Once    QUEtiapine  25 mg Oral Nightly    sodium chloride flush  10 mL Intravenous 2 times per day    clopidogrel  75 mg Oral Daily    isosorbide mononitrate  30 mg Oral Daily    docusate sodium  100 mg Oral Daily    aspirin  81 mg Oral Daily    amiodarone  200 mg Oral Daily    atorvastatin  20 mg Oral Daily     Continuous Infusions:   sodium chloride      sodium chloride Stopped (03/15/19 1324)    furosemide (LASIX) 5mg/ml infusion 20 mg/hr (03/15/19 1036)    norepinephrine 1 mcg/min (03/14/19 1050)     PRN Meds:sodium chloride flush, sodium chloride flush, acetaminophen, ondansetron, sodium chloride flush, acetaminophen, magnesium hydroxide    Objective:      Physical Exam:  Vitals: BP (!) 132/51   Pulse 61   Temp 97.5 °F (36.4 °C) (Oral)   Resp 13   Ht 5' 6\" (1.676 m)   Wt 152 lb 11.2 oz (69.3 kg)   SpO2 98%   BMI 24.65 kg/m²   24 hour intake/output:    Intake/Output Summary (Last 24 hours) at 3/15/2019 1858  Last data filed at 3/15/2019 1846  Gross per 24 hour   Intake 2155 ml   Output 894 ml   Net 1261 ml     Last 3 weights: Wt Readings from Last 3 Encounters:   03/15/19 152 lb 11.2 oz (69.3 kg)         Physical Examination:   PHYSICAL EXAMINATION:  Vitals:    03/15/19 1745 03/15/19 1800 03/15/19 1815 03/15/19 1830   BP:  122/66  (!) 132/51   Pulse: 60 60 60 61   Resp: 14 10 9 13   Temp:  97.5 °F (36.4 °C)     TempSrc:  Oral     SpO2: 99% 97% 98% 98%   Weight:       Height:         Constitutional: This is a well developed, well nourished, 18.5-24.9 - Normal 80y.o. year old male who is alert, oriented, cooperative and in no apparent distress. Head:normocephalic and atraumatic. EENT:  ILEANA. No conjunctival injections. Septum was midline, mucosa was without erythema, exudates or cobblestoning. No thrush was noted. Mallampati  II (soft palate, uvula, fauces visible)  Neck: Supple without thyromegaly. No elevated JVP. Trachea was midline.   Respiratory: Chest was symmetrical without dullness to percussion. Breath sounds bilaterally were clear to auscultation. There were no wheezes, rhonchi or rales. There is no intercostal retraction or use of accessory muscles. No egophony noted. Cardiovascular: Regular without murmur, clicks, gallops or rubs. Abdomen: Slightly rounded and soft without organomegaly. No rebound tenderness, rigidity or guarding was appreciated. Lymphatic: No lymphadenopathy. Musculoskeletal: Normal curvature of the spine. No gross muscle weakness. Extremities:  Has bilateral lower extremity edema, no ulcerations, tenderness, varicosities or erythema. Muscle size, tone and strength are normal.  No involuntary movements are noted. Skin:  Warm and dry. Good color, turgor and pigmentation. No lesions or scars. No cyanosis or clubbing  Neurological/Psychiatric: The patient's general behavior, level of consciousness, thought content and emotional status is normal.          CBC:   Recent Labs     03/15/19  0507   WBC 9.6   HGB 7.9*        BMP:    Recent Labs     03/13/19  0336 03/14/19  0419 03/14/19  1756 03/15/19  0507    137 136 137   K 4.2 4.3 4.4 4.1    103 103 104   CO2 19* 19* 18* 18*   BUN 93* 95*  --  102*   CREATININE 2.80* 3.38*  --  3.97*   GLUCOSE 125* 122*  --  92     Calcium:  Recent Labs     03/15/19  0507   CALCIUM 8.0*     Ionized Calcium:No results for input(s): IONCA in the last 72 hours. Magnesium:  Recent Labs     03/15/19  0507   MG 2.3     Phosphorus:No results for input(s): PHOS in the last 72 hours. BNP:No results for input(s): BNP in the last 72 hours. Glucose:No results for input(s): POCGLU in the last 72 hours. HgbA1C: No results for input(s): LABA1C in the last 72 hours. INR:   Recent Labs     03/15/19  0507   INR 1.3     Hepatic:   Recent Labs     03/15/19  0745   BILITOT 0.29*     Amylase and Lipase:No results for input(s): LACTA, AMYLASE in the last 72 hours.   Lactic Acid: No results for input(s): LACTA in the last 72 hours. CARDIAC ENZYMES:  Recent Labs     03/15/19  0745   CKTOTAL 59   CKMB 7.7     BNP: No results for input(s): BNP in the last 72 hours. Lipids: No results for input(s): CHOL, TRIG, HDL, LDLCALC in the last 72 hours. Invalid input(s): LDL  ABGs: No results found for: PH, PCO2, PO2, HCO3, O2SAT  Thyroid:   Lab Results   Component Value Date    TSH 6.79 03/06/2019      Urinalysis: No results for input(s): BACTERIA, BLOODU, CLARITYU, COLORU, PHUR, PROTEINU, RBCUA, SPECGRAV, BILIRUBINUR, NITRU, WBCUA, LEUKOCYTESUR, GLUCOSEU in the last 72 hours. 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 *  Bibasilar atelectasis. Aortic stenosis. Hypothyroidism. History of cigarette smoking. Worsening renal function. Anemia with slight worsening    Plan:  Meds reviewed- changes made as appropriate. Will increase Synthroid as his TSH was higher  Increase activity as permitted and tolerated. Questions patient had were answered to his satisfaction. Continue supplemental oxygen  Patient was informed of the need for using oxygen. Patient was educated on the importance of compliance and the benefits of oxygen use. Complications of oxygen use, including dryness of the nostrils, epistaxis and also the fire hazard were explained to the patient. Patient willingly accepts to use  the oxygen as recommended. Obtain venous Dopplers  Cxr reviewed.   Increased right pleural effusion and right basal opacity and same on the left side  Diet reviewed  Thank you for having us involved in the care of your patient. Please call us if you have any questions or concerns.     Margie Acharya MD      3/15/2019, 6:58 PM    Pulmonary & Critical Care

## 2019-03-15 NOTE — PROGRESS NOTES
RN discussed  Lasix gtt, I/O, VSS, am labs and plan for TAVR today with MIRYAM Gonzales with CT surgery

## 2019-03-15 NOTE — CONSULTS
Date: March 15, 2019      Chief Complaint: AS/CAD/HUYEN/Second Surgeon TAVR consult        HPI: Sal Chin is a 80 y.o.  male with multiple cor morbid issues suffering from progressive SOB and fatique over the past few months requiring hospitalization. Recently patient was diagnosed with and treated for severe multivessel disease and also being found to has severe AS patient being referred for TAVR consideration.  Patient otherwise has been very active in the past.     Past Medical History        Past Medical History:   Diagnosis Date    Aortic stenosis 02/15/2019    Atrial fibrillation (HCC)      CAD (coronary artery disease)      Chest pain 02/15/2019    CHF (congestive heart failure) (HCC)      Chronic anemia      Chronic kidney disease      Hypertension      Pleural effusion on left 02/15/2019    Pleural effusion, right 02/15/2019    Pulmonary emboli ([facility] [address] 02/15/2019    Pulmonary hypertension ([facility] [address] 03/06/2019    Thyroid disease       hypothyroidism         Past Surgical History         Past Surgical History:   Procedure Laterality Date    CORONARY ANGIOPLASTY WITH STENT PLACEMENT   03/11/2019     complex PCI of LT MAIN, LAD    JOINT REPLACEMENT         right ankle    PACEMAKER PLACEMENT   01/20/2019    THORACENTESIS             Current Facility-Administered Medications             Current Facility-Administered Medications   Medication Dose Route Frequency Provider Last Rate Last Dose    furosemide (LASIX) 500 mg in dextrose 5 % 100 mL infusion  5 mg/hr Intravenous Continuous Adriel Bedolla MD 3 mL/hr at 03/15/19 0636 15 mg/hr at 03/15/19 0636    QUEtiapine (SEROQUEL) tablet 25 mg  25 mg Oral Nightly Melissa Novak MD        norepinephrine (LEVOPHED) 16 mg in dextrose 5% 250 mL infusion  2 mcg/min Intravenous Continuous Adriel Bedolla MD 0.9 mL/hr at 03/14/19 1050 1 mcg/min at 03/14/19 1050    sodium chloride flush 0.9 % injection 10 mL  10 mL Intravenous 2 times per day [facility] Diana Little MD   10 mL at 03/14/19 2204    sodium chloride flush 0.9 % injection 10 mL  10 mL Intravenous PRN Christie Walalce MD        acetaminophen (TYLENOL) tablet 650 mg  650 mg Oral Q4H PRN Latasha Quiroz MD   650 mg at 03/12/19 1843    magnesium hydroxide (MILK OF MAGNESIA) 400 MG/5ML suspension 30 mL  30 mL Oral Daily PRN Christie Wallace MD        clopidogrel (PLAVIX) tablet 75 mg  75 mg Oral Daily Latasha Quiroz MD   75 mg at 03/14/19 0834    isosorbide mononitrate (IMDUR) extended release tablet 30 mg  30 mg Oral Daily Addis Curry MD   30 mg at 03/12/19 0804    docusate sodium (COLACE) capsule 100 mg  100 mg Oral Daily Latasha uQiroz MD   100 mg at 03/14/19 0833    carvedilol (COREG) tablet 12.5 mg  12.5 mg Oral BID WC Addis Curry MD   12.5 mg at 03/12/19 0805    aspirin EC tablet 81 mg  81 mg Oral Daily Latasha Quiroz MD   81 mg at 03/14/19 0834    amiodarone (CORDARONE) tablet 200 mg  200 mg Oral Daily Latasha Quiorz MD   200 mg at 03/14/19 0834    atorvastatin (LIPITOR) tablet 20 mg  20 mg Oral Daily Latasha Quiroz MD   20 mg at 03/14/19 0834    levothyroxine (SYNTHROID) tablet 100 mcg  100 mcg Oral Daily Christie Wallace MD   100 mcg at 03/14/19 7904               Allergies   Allergen Reactions    Quinolones         Pt is allergic to fluoroquinolones      Family History   History reviewed. No pertinent family history. Social History               Socioeconomic History    Marital status:         Spouse name: Not on file    Number of children: Not on file    Years of education: Not on file    Highest education level: Not on file   Occupational History       Employer: RETIRED   Social Needs    Financial resource strain: Not on file    Food insecurity:       Worry: Not on file       Inability: Not on file    Transportation needs:       Medical: Not on file       Non-medical: Not on file   Tobacco Use    Smoking status: Former Smoker       Last attempt to quit: 1989       Years since quittin.2    Smokeless tobacco: Never Used   Substance and Sexual Activity    Alcohol use: Not Currently    Drug use: Never    Sexual activity: Not on file   Lifestyle    Physical activity:       Days per week: Not on file       Minutes per session: Not on file    Stress: Not on file   Relationships    Social connections:       Talks on phone: Not on file       Gets together: Not on file       Attends Rastafari service: Not on file       Active member of club or organization: Not on file       Attends meetings of clubs or organizations: Not on file       Relationship status: Not on file    Intimate partner violence:       Fear of current or ex partner: Not on file       Emotionally abused: Not on file       Physically abused: Not on file       Forced sexual activity: Not on file   Other Topics Concern    Not on file   Social History Narrative    Not on file         ROS:   CONSTITUTIONAL:  fatigue, weight loss  Respiratory: positive for dyspnea on exertion and shortness of breath  Cardiovascular: negative  Gastrointestinal: negative  Genitourinary:negative  Hematologic/lymphatic: negative  Musculoskeletal:negative  Neurological: negative  Endocrine: negative     Physical Exam:      Vitals:     03/15/19 0630   BP: (!) 118/42   Pulse: 60   Resp:     Temp:     SpO2: 99%      Weight: Weight: 152 lb 11.2 oz (69.3 kg)    Weight: 156 lb 4.8 oz (70.9 kg)                        I/O last 3 completed shifts: In: 245 [P.O.:120; I.V.:125]  Out: 675 [Urine:675]     Labs:       CBC:       Recent Labs     03/15/19  0507   WBC 9.6   HGB 7.9*   HCT 25.9*   MCV 94.9         BMP:          Recent Labs     19  0336 19  0419 19  1756 03/15/19  0507    137 136 137   K 4.2 4.3 4.4 4.1    103 103 104   CO2 19* 19* 18* 18*   BUN 93* 95*  --  102*   CREATININE 2.80* 3.38*  --  3.97*   MG  --   --   --  2.3      Accucheck Glucoses: No results for input(s): POCGLU in the last 72 hours.   Cardiac Enzymes: No results for input(s): CKTOTAL, CKMB, CKMBINDEX, TROPONINI in the last 72 hours. PT/INR:       Recent Labs     03/15/19  0507   PROTIME 13.4*   INR 1.3      APTT: No results for input(s): APTT in the last 72 hours.   Liver Profile:  No results found for: AST, ALT, ALB, BILIDIR, BILITOT, ALKPHOS, GGT, 5NUCNo results found for: CHOL, HDL, TRIG  TSH:         Lab Results   Component Value Date     TSH 6.79 03/06/2019      UA:         Lab Results   Component Value Date     COLORU YELLOW 03/12/2019     PHUR 5.0 03/12/2019     WBCUA None 03/12/2019     RBCUA 0 TO 2 03/12/2019     MUCUS NOT REPORTED 03/12/2019     TRICHOMONAS NOT REPORTED 03/12/2019     YEAST NOT REPORTED 03/12/2019     BACTERIA NOT REPORTED 03/12/2019     SPECGRAV 1.024 03/12/2019     LEUKOCYTESUR NEGATIVE 03/12/2019     UROBILINOGEN Normal 03/12/2019     BILIRUBINUR NEGATIVE 03/12/2019     GLUCOSEU NEGATIVE 03/12/2019     AMORPHOUS NOT REPORTED 03/12/2019                                                               Neck: Supple, no JVD, no carotid bruits  Heart: S1 and S2, no murmurs, no rubs  Lungs: no rales, wheezes, or rhonchi  Abdomen: positive bowel sounds, soft, non-tender, non-distended, no bruits, no masses  Extremities: warm and dry, no varicosities, no edema  Neurologic: alert and oriented x 3, moves all extremities, grasps strong bilaterally     Assessment:      Patient Active Problem List   Diagnosis    Pulmonary hypertension (HCC)    Acute on chronic systolic congestive heart failure (HCC)    Pleural effusion due to CHF (congestive heart failure) (HCC)    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 I agree that based on patient extreme age and frailty and other co-morbib issues TAVR would be a better option

## 2019-03-15 NOTE — PROGRESS NOTES
Pt. Admitted to room 21  from Cath Lab via bed accompanied by RENETTA Stewart and Cath Lab RN x2. Received in room by Car ICU RN x 2. Placed on cardiac monitor, VSS. Bilateral femoral artery access sites CDI, No hematoma, no bleeding, no bruising noted. Peripheral pulses per doppler. Pt. A&O x 4 with neuro assessment WDL. Will continue to monitor.

## 2019-03-16 NOTE — PROGRESS NOTES
PULMONARY PROGRESS NOTE      Patient:  Maximino Salazar  YOB: 1928    MRN: [id number]     Acct: [de-identified]     Admit date: 3/6/2019    REASON FOR CONSULT:- Pulmonary hypertension, bilateral pleural effusion with atelectasis and aortic stenosis with hypothyroidism    Pt seen and Chart reviewed. Patient is on room air. He is up in a chair. Denied any shortness of breath. Denied orthopnea. No chest pain or pressure. No fevers or chills or night sweats. Venous Dopplers with no DVT    Subjective:   Review of Systems -   General ROS: Completed and except as mentioned above were negative   Psychological ROS:  Completed and except as mentioned above were negative  Allergy and Immunology ROS:  Completed and except as mentioned above were negative  Hematological and Lymphatic ROS:  Completed and except as mentioned above were negative  Respiratory ROS:  Completed and except as mentioned above were negative  Cardiovascular ROS:  Completed and except as mentioned above were negative  Gastrointestinal ROS: Completed and except as mentioned above were negative  Genito-Urinary ROS:  Completed and except as mentioned above were negative  Musculoskeletal ROS:  Completed and except as mentioned above were negative  Neurological ROS:  Completed and except as mentioned above were negative  Dermatological ROS:  Completed and except as mentioned above were negative      Diet:  DIET RENAL; Low Sodium (2 GM);  Daily Fluid Restriction: 1800 ml      Medications:Current Inpatient    Scheduled Meds:   carvedilol  3.125 mg Oral BID WC    sodium bicarbonate  650 mg Oral BID    apixaban  2.5 mg Oral BID    sodium chloride flush  10 mL Intravenous 2 times per day    sodium chloride flush  10 mL Intravenous 2 times per day    levothyroxine  125 mcg Oral Daily    ALPRAZolam  0.25 mg Oral Once    QUEtiapine  25 mg Oral Nightly    sodium chloride flush  10 mL Intravenous 2 times per day    clopidogrel  75 mg Oral Daily    isosorbide mononitrate  30 mg Oral Daily    docusate sodium  100 mg Oral Daily    aspirin  81 mg Oral Daily    amiodarone  200 mg Oral Daily    atorvastatin  20 mg Oral Daily     Continuous Infusions:   sodium chloride      sodium chloride Stopped (03/15/19 1324)    furosemide (LASIX) 5mg/ml infusion 20 mg/hr (03/15/19 1036)    norepinephrine 1 mcg/min (03/14/19 1050)     PRN Meds:sodium chloride flush, sodium chloride flush, acetaminophen, ondansetron, sodium chloride flush, acetaminophen, magnesium hydroxide    Objective:      Physical Exam:  Vitals: BP (!) 98/33   Pulse 52   Temp 97.9 °F (36.6 °C) (Oral)   Resp 14   Ht 5' 6\" (1.676 m)   Wt 167 lb 8.8 oz (76 kg)   SpO2 97%   BMI 27.04 kg/m²   24 hour intake/output:    Intake/Output Summary (Last 24 hours) at 3/16/2019 1731  Last data filed at 3/16/2019 0900  Gross per 24 hour   Intake 1004 ml   Output 1150 ml   Net -146 ml     Last 3 weights: Wt Readings from Last 3 Encounters:   03/16/19 167 lb 8.8 oz (76 kg)         Physical Examination:   PHYSICAL EXAMINATION:  Vitals:    03/16/19 0708 03/16/19 0800 03/16/19 0846 03/16/19 1130   BP:    (!) 98/33   Pulse:  60  52   Resp:    14   Temp: 97.6 °F (36.4 °C)   97.9 °F (36.6 °C)   TempSrc: Oral   Oral   SpO2:   97%    Weight:       Height:         Constitutional: This is a well developed, well nourished, 18.5-24.9 - Normal 80y.o. year old male who is alert, oriented, cooperative and in no apparent distress. Head:normocephalic and atraumatic. EENT:  ILEANA. No conjunctival injections. Septum was midline, mucosa was without erythema, exudates or cobblestoning. No thrush was noted. Mallampati  II (soft palate, uvula, fauces visible)  Neck: Supple without thyromegaly. No elevated JVP. Trachea was midline. Respiratory: Chest was symmetrical without dullness to percussion.   Breath sounds bilaterally were clear to auscultation. There were no wheezes, rhonchi or rales. There is no intercostal retraction or use of accessory muscles. No egophony noted. Cardiovascular: Regular without murmur, clicks, gallops or rubs. Abdomen: Slightly rounded and soft without organomegaly. No rebound tenderness, rigidity or guarding was appreciated. Lymphatic: No lymphadenopathy. Musculoskeletal: Normal curvature of the spine. No gross muscle weakness. Extremities:  Has bilateral lower extremity edema, no ulcerations, tenderness, varicosities or erythema. Muscle size, tone and strength are normal.  No involuntary movements are noted. Skin:  Warm and dry. Good color, turgor and pigmentation. No lesions or scars. No cyanosis or clubbing  Neurological/Psychiatric: The patient's general behavior, level of consciousness, thought content and emotional status is normal.          CBC:   Recent Labs     03/15/19  0507   WBC 9.6   HGB 7.9*        BMP:    Recent Labs     03/14/19  0419 03/14/19  1756 03/15/19  0507 03/16/19  0431    136 137 135   K 4.3 4.4 4.1 3.8    103 104 104   CO2 19* 18* 18* 17*   BUN 95*  --  102* 99*   CREATININE 3.38*  --  3.97* 3.82*   GLUCOSE 122*  --  92 100*     Calcium:  Recent Labs     03/16/19  0431   CALCIUM 7.4*     Ionized Calcium:No results for input(s): IONCA in the last 72 hours. Magnesium:  Recent Labs     03/15/19  0507   MG 2.3     Phosphorus:No results for input(s): PHOS in the last 72 hours. BNP:No results for input(s): BNP in the last 72 hours. Glucose:  Recent Labs     03/16/19  1708   POCGLU 104     HgbA1C: No results for input(s): LABA1C in the last 72 hours. INR:   Recent Labs     03/15/19  0507   INR 1.3     Hepatic:   Recent Labs     03/15/19  0745   BILITOT 0.29*     Amylase and Lipase:No results for input(s): LACTA, AMYLASE in the last 72 hours. Lactic Acid: No results for input(s): LACTA in the last 72 hours.   CARDIAC ENZYMES:  Recent Labs     03/15/19  0745 CKTOTAL 59   CKMB 7.7     BNP: No results for input(s): BNP in the last 72 hours. Lipids: No results for input(s): CHOL, TRIG, HDL, LDLCALC in the last 72 hours. Invalid input(s): LDL  ABGs: No results found for: PH, PCO2, PO2, HCO3, O2SAT  Thyroid:   Lab Results   Component Value Date    TSH 6.79 03/06/2019      Urinalysis: No results for input(s): BACTERIA, BLOODU, CLARITYU, COLORU, PHUR, PROTEINU, RBCUA, SPECGRAV, BILIRUBINUR, NITRU, WBCUA, LEUKOCYTESUR, GLUCOSEU in the last 72 hours. 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 *  Bibasilar atelectasis. Aortic stenosis. Hypothyroidism. History of cigarette smoking. Worsening renal function. Anemia with slight worsening    Plan:  Meds reviewed- changes made as appropriate. Patient is on thyroid supplementation. Recommend Eliquis for DVT prophylaxis. Increase activity as permitted and tolerated. Questions patient had were answered to his satisfaction. Continue supplemental oxygen if needed  Patient was informed of the need for using oxygen. Patient was educated on the importance of compliance and the benefits of oxygen use. Complications of oxygen use, including dryness of the nostrils, epistaxis and also the fire hazard were explained to the patient. Patient willingly accepts to use  the oxygen as recommended. Reviewed venous Dopplers  Cxr reviewed. None today  Diet reviewed  Thank you for having us involved in the care of your patient.  Please call us if you have any questions or concerns.     Radha Gonsalez MD      3/16/2019, 5:31 PM    Pulmonary & Critical Care

## 2019-03-16 NOTE — PROGRESS NOTES
[facility]   Progress Note                   Date:   3/16/2019  Patient name: Sharita Quinn  Date of admission:  3/6/2019  8:48 PM  MRN:   [id number]  YOB: 1928  PCP: No primary care provider on file. Reason for Admission: exertional dyspnea    Subjective:   Patient seen and examined. S/P TAVR yesterday. Doing well. Sitting up in chair and eating breakfast. Denies any complaints. D/W Dr. Meri Sawant and RN.     Intake/Output Summary (Last 24 hours) at 3/16/2019 1034  Last data filed at 3/16/2019 0601  Gross per 24 hour   Intake 1004 ml   Output 1394 ml   Net -390 ml         Intake/Output Summary (Last 24 hours) at 3/16/2019 1034  Last data filed at 3/16/2019 0601  Gross per 24 hour   Intake 1004 ml   Output 1394 ml   Net -390 ml       Medications:   Scheduled Meds:   sodium chloride flush  10 mL Intravenous 2 times per day    sodium chloride flush  10 mL Intravenous 2 times per day    carvedilol  6.25 mg Oral BID WC    levothyroxine  125 mcg Oral Daily    ALPRAZolam  0.25 mg Oral Once    QUEtiapine  25 mg Oral Nightly    sodium chloride flush  10 mL Intravenous 2 times per day    clopidogrel  75 mg Oral Daily    isosorbide mononitrate  30 mg Oral Daily    docusate sodium  100 mg Oral Daily    aspirin  81 mg Oral Daily    amiodarone  200 mg Oral Daily    atorvastatin  20 mg Oral Daily     Continuous Infusions:   sodium chloride      sodium chloride Stopped (03/15/19 1324)    furosemide (LASIX) 5mg/ml infusion 20 mg/hr (03/15/19 1036)    norepinephrine 1 mcg/min (03/14/19 1050)     CBC:   Recent Labs     03/15/19  0507   WBC 9.6   HGB 7.9*        BMP:    Recent Labs     03/14/19  0419 03/14/19  1756 03/15/19  0507 03/16/19  0431    136 137 135   K 4.3 4.4 4.1 3.8    103 104 104   CO2 19* 18* 18* 17*   BUN 95*  --  102* 99*   CREATININE 3.38*  --  3.97* 3.82*   GLUCOSE 122*  --  92 100*     Hepatic:   Recent Labs     03/15/19  0745   BILITOT 0.29* Troponin: No results for input(s): TROPONINI in the last 72 hours. BNP: No results for input(s): BNP in the last 72 hours. Lipids: No results for input(s): CHOL, HDL in the last 72 hours. Invalid input(s): LDLCALCU  INR:   Recent Labs     03/15/19  0507   INR 1.3       Objective:   Vitals: /84   Pulse 61   Temp 97.6 °F (36.4 °C) (Oral)   Resp 11   Ht 5' 6\" (1.676 m)   Wt 167 lb 8.8 oz (76 kg)   SpO2 97%   BMI 27.04 kg/m²   Constitutional and General Appearance: alert, cooperative, no distress and appears stated age  Respiratory:  · Diminished breath sounds at B/L lung bases  Cardiovascular:  S1, s2, systolic murmur is heard  Abdomen:   · No masses or tenderness, soft abdomen  · Bowel sounds present  Extremities:  · le edema present, peripheral pulse bl le present 2 +  · Bilateral groins soft and without hematoma  Integumentum:   Intact with no rashes noted      EKG:   V-pacing with occasional PVCs. CTA Chest in [place] (2/15/19):  Small PE within branch of RT middle lobe artery. Large RT pleural effusion. Moderate LT pleural effusion.     ECHO in [place] (2/17/19:   LVH with LVEF 50%. LT atrial enlargement. Moderate AS with Mild AI. Mitral annular calcification with mild MR. Mild to Moderate Pulm HTN.     Cardiac Angiography in [place] (1/20/19): Multivessel disease including LM. ECHO (3/6/19):   Normal left ventricle size with mildly reduced systolic function; Estimated left ventricular ejection fraction 40-45%  Anterolateral wall hypokinesis. Mild left ventricular hypertrophy. Left atrium is moderately dilated. Grade II diastolic dysfunction. Heavily calcified aortic valve with reduced systolic excursion and evidence of moderate stenosis. (Peak nataliia 3.62 m/s and mean gradient 29 mmHg)  Moderate posterior pericardial effusion without any echo signs of tamponade. Normal right ventricular size and function. Pacemaker lead seen in right ventricle.   Moderate tricuspid regurgitation. Estimated right ventricular systolic pressure is 60 mmHg suggesting moderate  pulmonary HTN. Cardiac Cath (3/11/19): Left main: 80-90% very calcified stenosis. This required rotational atherectomy and PTCA -JEAN-CLAUDE 4/0/15 mm stent, redcuing stenosis to 10% with TIMNI III flow. LAD: Proximal to mid area calcified 80% stenosis. This required Rotational atherectomy / JEAN-CLAUDE in mid and proximal area, reducing stenosis to 0% with AKHIL III flow  LCX: Diffuse disease (known from cath in [place])  RCA: Minimal 20-30% as reviewed from [address] / Acute Cardiac Problems:   1. Acute congestive heart failure (likely secondary to MV CAD and AS)  2. 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 B/L Pleural effusions with RT & LT thoracentesis per IR (twice each side this admission)  4. Contrast induced nephropathy  5. Moderate pericardial effusion without tamponade physiology likely secondary to CHF on admission, now resolved with diuresis  6. Dual chamber pacer for complete heart block, placed in January 2019 in [place]  7. 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 Paroxysmal Afib and small RT PE diagnosed in [place], on Eliquis 2.5 mg QD at home. 9. Primary HTN  10. Dyslipidemia  11. Moderate Aortic stenosis (P Galen:3.6, MG 29)  12. Mild Ischemic cardiomyopathy with systolic CHF (78%)  13. Hypocalcemia    Plan of Treatment:   1. Successfully underwent complex PCI of LT main and LAD on 3/11/19  2. Cr plataued at 3.8 (3/16). Secondary to ALFONZO. 3. Nephro input appreciated  4. Has received 4 thoracentesis this admission, with continued re-accumulation of pleural effusions. Would recommend holding off any further thoracentesis (further decreasing intravascular volume)  5. S/P TAVR on 3/1519  6. Doing well post TAVR  7. Post-TAVR Echo on Monday, 3/18  8. Continue Aspirin, Plavix, Atorvastatin  9. Restarted on Coreg and Imdur as hypotension resolved  10.  Will need to address anticoagulation prior to

## 2019-03-16 NOTE — PROGRESS NOTES
Yamini Crawford rounding @ bedside, updated of patients condition overnight. Requested RN check with pulmonary to restart Eliquis.   Specifically states he does not want Heparin started on patient

## 2019-03-16 NOTE — PROGRESS NOTES
Physical Therapy    Facility/Department: [facility]  Initial Assessment    NAME: Tika Godwin  : 12/10/1928  MRN: [id number]    Date of Service: 3/16/2019    Discharge Recommendations:  Home with assist PRN   PT Equipment Recommendations  Equipment Needed: Yes  Walker: Rolling    Assessment   Body structures, Functions, Activity limitations: Decreased functional mobility ; Decreased balance;Decreased endurance;Decreased strength  Assessment: Pt exhibits decreased endurance and balance deficits. Unsteady ambulating with SPC and would benefit from use of RW to decrease his fall risk. Prognosis: Good  Patient Education: HEP, breathing technique  REQUIRES PT FOLLOW UP: Yes  Activity Tolerance  Activity Tolerance: Patient limited by endurance  Activity Tolerance: Slight fatigue needing 1 short standing rest break       Patient Diagnosis(es): There were no encounter diagnoses. has a past medical history of Aortic stenosis, Atrial fibrillation ([address] CAD (coronary artery disease), Chest pain, CHF (congestive heart failure) ([address] Chronic anemia, Chronic kidney disease, Hypertension, Pleural effusion on left, Pleural effusion, right, Pulmonary emboli ([address] Pulmonary hypertension ([address] and Thyroid disease. has a past surgical history that includes pacemaker placement (2019); thoracentesis; joint replacement; and Coronary angioplasty with stent (2019). Restrictions  Restrictions/Precautions  Restrictions/Precautions: General Precautions, Cardiac  Required Braces or Orthoses?: No  Implants present? : Pacemaker  Position Activity Restriction  Other position/activity restrictions: Up w/assist.  Vision/Hearing  Vision: Within Functional Limits  Hearing: Within functional limits     Subjective  General  Patient assessed for rehabilitation services?: Yes  Additional Pertinent Hx: TAVR 3/15/19  Response To Previous Treatment: Patient with no complaints from previous session.   Family / Caregiver Present: No  Follows Commands: Within Functional Limits  General Comment  Comments: Pt returned to chair with BLEs elevated. Call light in reach. Subjective  Subjective: 'Oh, not now,\" Pt pleasant and agreeable though, reports there has been so many people in/out of his room, he can't rest.   Pain Screening  Patient Currently in Pain: Denies  Pain Assessment  Response to Pain Intervention: None  Vital Signs  BP Location: Right Arm  Level of Consciousness: Alert  Patient Currently in Pain: Denies  Oxygen Therapy  O2 Device: None (Room air)       Orientation  Orientation  Overall Orientation Status: Within Normal Limits  Social/Functional History  Social/Functional History  Lives With: Alone(Pt is returning tohis family's home here for few days.)  Type of Home: House(Pt is from [place].)  Home Layout: One level  Home Access: Level entry(Pt reports small threshold to step over)  Bathroom Shower/Tub: Walk-in shower  Bathroom Toilet: Handicap height  Bathroom Equipment: Grab bars in shower, Shower chair, Grab bars around toilet  Home Equipment: Cane, Rolling walker(Pt reports pt was not using prior to admission, not using O2)  ADL Assistance: Independent  Homemaking Assistance: Independent  Homemaking Responsibilities: Yes  Meal Prep Responsibility: Primary  Laundry Responsibility: Primary  Cleaning Responsibility: Primary  Shopping Responsibility: Primary  Ambulation Assistance: Independent  Transfer Assistance: Independent  Active : Yes  Mode of Transportation: SUV, Car  Occupation: Retired, Volunteer work  Additional Comments: Pt reports pt is planning on going home to [facility], may stay with family here in [place] pending outcome of POC Pt [facility], he just have hsi cane here.    Cognition        Objective          AROM RLE (degrees)  RLE AROM: WFL  AROM LLE (degrees)  LLE AROM : WFL  Joint Mobility  Spine: WFL  ROM RLE: WFL  ROM LLE: WFL  ROM RUE: WFL  ROM LUE: WFL  Strength RLE  Strength RLE: WFL  Strength LLE  Strength LLE: WFL  Strength RUE  Strength RUE: WFL  Strength LUE  Strength LUE: WFL  Tone RLE  RLE Tone: Normotonic  Tone LLE  LLE Tone: Normotonic  Motor Control  Gross Motor?: WFL  Sensation  Overall Sensation Status: WFL(Pt denies numbness/ tingling )     Transfers  Sit to Stand: Minimal Assistance  Stand to sit: Contact guard assistance  Bed to Chair: Contact guard assistance;Minimal assistance  Ambulation  Ambulation?: Yes  More Ambulation?: Yes  Ambulation 1  Surface: level tile  Device: Rolling Walker  Assistance: Minimal assistance;Contact guard assistance  Quality of Gait: decreased pace, cues for posture, cues to stay inside MARK of RW. Distance: 120 ft  Comments: aubibly SOB and pt encouraged to take rest breaks. Ambulated on RA; SPO2 >90% at all times, HR 70's to 90's     Balance  Posture: Fair  Sitting - Static: Good  Sitting - Dynamic: Good  Standing - Static: Fair;+  Standing - Dynamic: Fair  Comments: standing balance assessed with RW  Other exercises  Other exercises?: Yes  Other exercises 1: Seated B LE 15 reps  Other exercises 2: Seated B UE 10 reps  Other exercises 3: Inspirometer, 10 reps     Plan   Plan  Times per week: 6-7x/wk  Current Treatment Recommendations: Strengthening, Balance Training, Functional Mobility Training, Endurance Training, Transfer Training, Patient/Caregiver Education & Training, Safety Education & Training, Home Exercise Program, Gait Training  Safety Devices  Type of devices:  All fall risk precautions in place, Gait belt, Patient at risk for falls, Left in chair, Call light within reach, Nurse notified  Restraints  Initially in place: No    G-Code       OutComes Score                                                  AM-PAC Score             Goals  Short term goals  Time Frame for Short term goals: 14 visits  Short term goal 1: Perform bed mobility and functional transfers independently  Short term goal 2: Ambulate 300ft with least restrictive AD independently  Short term goal 3: Demo Good- dynamic standing balance to decrease risk of falls  Short term goal 4: Participate in 30 minutes of therapy to demo increased endurance       Therapy Time   Individual Concurrent Group Co-treatment   Time In 1348         Time Out 1411         Minutes 23         Timed Code Treatment Minutes: [address], PT

## 2019-03-16 NOTE — PROGRESS NOTES
NEPHROLOGY PROGRESS NOTE      SUBJECTIVE     Had TAVR today, off pressors. UO fair, on lasix gtt, although, was unable to void post TAVR, now better, PVR 86 ml. Renal function improving creat continues to decline predominantly from contrast and low , creat 3.8 and BUN improved. HD on hold for now. Appetite decent. No dysarrthymia. Scrotal and penile edema persists. BP fluctuates. Acidosis persists  OBJECTIVE     Vitals:    03/16/19 0708 03/16/19 0800 03/16/19 0846 03/16/19 1130   BP:    (!) 98/33   Pulse:  60  52   Resp:    14   Temp: 97.6 °F (36.4 °C)   97.9 °F (36.6 °C)   TempSrc: Oral   Oral   SpO2:   97%    Weight:       Height:         24HR INTAKE/OUTPUT:      Intake/Output Summary (Last 24 hours) at 3/16/2019 1311  Last data filed at 3/16/2019 0900  Gross per 24 hour   Intake 1004 ml   Output 1694 ml   Net -690 ml       General appearance:Awake, alert, in no acute distress  HEENT: PERRLA  Respiratory::vesicular breath sounds, reduced air entry  Cardiovascular:S1 S2 normal,no gallop or organic murmur. Abdomen:Non tender/non distended. Bowel sounds present  Extremities: No Cyanosis or Clubbing, present Lower extremity edema  Neurological:Alert and oriented. No abnormalities of mood, affect, memory, mentation, or behavior are noted  Scrotal edema    MEDICATIONS     Scheduled Meds:    carvedilol  3.125 mg Oral BID WC    sodium bicarbonate  650 mg Oral BID    potassium chloride  20 mEq Oral Once    sodium chloride flush  10 mL Intravenous 2 times per day    sodium chloride flush  10 mL Intravenous 2 times per day    levothyroxine  125 mcg Oral Daily    ALPRAZolam  0.25 mg Oral Once    QUEtiapine  25 mg Oral Nightly    sodium chloride flush  10 mL Intravenous 2 times per day    clopidogrel  75 mg Oral Daily    isosorbide mononitrate  30 mg Oral Daily    docusate sodium  100 mg Oral Daily    aspirin  81 mg Oral Daily    amiodarone  200 mg Oral Daily    atorvastatin  20 mg Oral Daily systolic and diastolic congestive heart failure   4. Cardiomyopathy ejection fraction 40-45%/left ventricular diastole dysfunction  5. S/p PCI and atherectomy  3/11  6. Moderate aortic stenosis with mean gradient 29 mmHg/moderate tricuspid regurgitation/moderate pulmonary hypertension with RVSP 60  7. HTN  8. Hx of Urinary retention  9. Advanced age    PLAN     1.d/c IVF  2. Ct. Lasix drip as instructed  3. add sodium bicarbonate  4. DW CVTS and updated  5. Keep SBP > 110  6. Follow renal fxn  7.  KCL one time  Please do not hesitate to call with questions    This note is created with the assistance of a speech-recognition program. While intending to generate a document that actually reflects the content of the visit, no guarantees can be provided that every mistake has been identified and corrected by editing    Oumar Simons MD, FACP   3/16/2019 1:11 PM  [facility]

## 2019-03-16 NOTE — PROGRESS NOTES
Occupational Therapy   Occupational Therapy Re-Assessment  Date: 3/16/2019   Patient Name: Elaina Wilder  MRN: [id number]     : 12/10/1928     Re-evaluation following TAVR 3/15/2019    Date of Service: 3/16/2019    Discharge Recommendations:  Home with Home health OT, Home with assist PRN  OT Equipment Recommendations  Equipment Needed: Yes  Mobility Devices: Juancarlos Klinefelter; ADL Assistive Devices  Walker: Rolling(Pt may require use of RW on discharge pending progress-- pt's in [facility])  ADL Assistive Devices: Shower Chair with back  Other: Pt may benefit from use of shower chair to assist with bathing d/t SOB with activity     Assessment   Performance deficits / Impairments: Decreased ADL status; Decreased endurance;Decreased functional mobility ; Decreased balance  Assessment: Pt would benefit from continued acute care OT to address minimal deficits in ADL/ functional activities, endurance, balance and functional transfers/ mobility following admission. Pt SOB with increased activity and required use of RW to complete functional mobility   Treatment Diagnosis: TAVR  Prognosis: Good  Decision Making: Medium Complexity  Patient Education: OT POC, transfer safety, use of RW  REQUIRES OT FOLLOW UP: Yes  Activity Tolerance  Activity Tolerance: Patient Tolerated treatment well;Patient limited by fatigue  Activity Tolerance: SOB with increased activity, VCs for proper pursed lipped breathing   Safety Devices  Safety Devices in place: Yes  Type of devices: All fall risk precautions in place;Call light within reach; Left in chair;Nurse notified  Restraints  Initially in place: No        Patient Diagnosis(es): There were no encounter diagnoses.      has a past medical history of Aortic stenosis, Atrial fibrillation ([facility] CAD (coronary artery disease), Chest pain, CHF (congestive heart failure) ([facility] Chronic anemia, Chronic kidney disease, Hypertension, Pleural effusion on left, Pleural effusion, right, Pulmonary emboli [facility]), Pulmonary hypertension ([facility] and Thyroid disease. has a past surgical history that includes pacemaker placement (01/20/2019); thoracentesis; joint replacement; and Coronary angioplasty with stent (03/11/2019). Treatment Diagnosis: TAVR      Restrictions  Restrictions/Precautions  Restrictions/Precautions: General Precautions, Cardiac  Required Braces or Orthoses?: No  Implants present? : Pacemaker  Position Activity Restriction  Other position/activity restrictions: Up w/assist.    Subjective   General  Patient assessed for rehabilitation services?: Yes  Family / Caregiver Present: No  Pain Assessment  Pain Assessment: 0-10    Social/Functional History  Social/Functional History  Lives With: Alone(Pt is returning tohis family's home here for few days.)  Type of Home: House(Pt is from [place].)  Home Layout: One level  Home Access: Level entry(Pt reports small threshold to step over)  Bathroom Shower/Tub: Walk-in shower  Bathroom Toilet: Handicap height  Bathroom Equipment: Grab bars in shower, Shower chair, Grab bars around toilet  Home Equipment: Cane, Rolling walker(Pt reports pt was not using prior to admission, not using O2)  ADL Assistance: Independent  Homemaking Assistance: Independent  Homemaking Responsibilities: Yes  Meal Prep Responsibility: Primary  Laundry Responsibility: Primary  Cleaning Responsibility: Primary  Shopping Responsibility: Primary  Ambulation Assistance: Independent  Transfer Assistance: Independent  Active : Yes  Mode of Transportation: SUV, Car  Occupation: Retired, Volunteer work  Additional Comments: Pt reports pt is planning on going home to [employer], may stay with family here in [facility] pending outcome of POC Pt reports his RW is at [facility] [address] [facility], he just have hsi cane here.      Objective    Orientation  Overall Orientation Status: Within Functional Limits  Observation/Palpation  Posture: Good  Balance  Sitting Balance: Independent(seated in recliner)  Standing Balance: Contact guard assistance(use of RW, VCs for hand placement on RW )  Standing Balance  Sit to stand: Contact guard assistance  Stand to sit: Contact guard assistance  Functional Mobility  Functional - Mobility Device: Rolling Walker  Activity: Other  Assist Level: Contact guard assistance  Functional Mobility Comments: VCs for hand/ body placement on RW during session, SOB noted with increased activity. No LOB noted      ADL  Feeding: Independent  Grooming: Contact guard assistance;Setup  UE Bathing: Setup;Contact guard assistance  LE Bathing: Setup;Minimal assistance  UE Dressing: Setup;Contact guard assistance  LE Dressing: Minimal assistance;Setup  Toileting: Contact guard assistance  Additional Comments: Pt seated in chair on arrival. Pt assisted to complete sit >stand transfer and functional mobility into roe with use of RW. Pt required VCs for hand placement/ body placement on RW during session. Pt noted to demo SOB with increased activity. Pt assisted to return to chair, call light in reach and RN notified on therapist exit.       Tone RUE  RUE Tone: Normotonic  Tone LUE  LUE Tone: Normotonic  Coordination  Movements Are Fluid And Coordinated: Yes     Bed mobility  Comment: Pt up in chair on arrival, returned to chair on exit   Transfers  Stand Step Transfers: Contact guard assistance  Sit to stand: Contact guard assistance  Stand to sit: Contact guard assistance  Transfer Comments: VCs for hand/ body placement on RW during session    Cognition  Overall Cognitive Status: WFL  Cognition Comment: grossly intact, VCs for safety awareness with use of RW      Perception  Overall Perceptual Status: WFL    Sensation  Overall Sensation Status: WFL(Pt denies numbness/ tingling )    LUE AROM (degrees)  LUE AROM : WFL  RUE AROM (degrees)  RUE AROM : WFL  LUE Strength  Gross LUE Strength: WFL  RUE Strength  Gross RUE Strength: WFL     Plan   Plan  Times per week: 4-5x/wk     AM-PAC Score  AM-PAC Inpatient Daily Activity Raw Score:

## 2019-03-16 NOTE — PROGRESS NOTES
S. p TAVR, POD 1, resting in chair, adequate UOP, lasix gtt per nephro, Creat 3.8 bun 99, improving. ?AC per cardio for Afib underline the pacer.     Electronically signed by MIRYAM Fontaine on 3/16/2019 at 9:53 AM

## 2019-03-17 NOTE — PROGRESS NOTES
[facility]   Progress Note                   Date:   3/17/2019  Patient name: Maximino Salazar  Date of admission:  3/6/2019  8:48 PM  MRN:   [id number]  YOB: 1928  PCP: No primary care provider on file. Reason for Admission: exertional dyspnea    Subjective:   Patient seen and examined. Doing well clinically, but had a nose bleed this morning while up to the bathroom. Started on Eliquis yesterday due to DVT prophylaxis. Sitting up in chair and eating breakfast. Denies any complaints. D/W RN.     Intake/Output Summary (Last 24 hours) at 3/17/2019 0853  Last data filed at 3/17/2019 0649  Gross per 24 hour   Intake 174.5 ml   Output 1844 ml   Net -1669.5 ml         Intake/Output Summary (Last 24 hours) at 3/17/2019 0853  Last data filed at 3/17/2019 0649  Gross per 24 hour   Intake 174.5 ml   Output 1844 ml   Net -1669.5 ml       Medications:   Scheduled Meds:   famotidine  20 mg Oral Daily    sodium chloride  2 spray Each Nare Q4H    carvedilol  3.125 mg Oral BID WC    sodium bicarbonate  650 mg Oral BID    sodium chloride flush  10 mL Intravenous 2 times per day    sodium chloride flush  10 mL Intravenous 2 times per day    levothyroxine  125 mcg Oral Daily    ALPRAZolam  0.25 mg Oral Once    QUEtiapine  25 mg Oral Nightly    sodium chloride flush  10 mL Intravenous 2 times per day    clopidogrel  75 mg Oral Daily    isosorbide mononitrate  30 mg Oral Daily    docusate sodium  100 mg Oral Daily    aspirin  81 mg Oral Daily    amiodarone  200 mg Oral Daily    atorvastatin  20 mg Oral Daily     Continuous Infusions:   sodium chloride      sodium chloride Stopped (03/15/19 1324)    furosemide (LASIX) 5mg/ml infusion 20 mg/hr (03/16/19 2324)    norepinephrine 1 mcg/min (03/14/19 1050)     CBC:   Recent Labs     03/15/19  0507 03/17/19  0411   WBC 9.6 14.5*   HGB 7.9* 8.8*    265     BMP:    Recent Labs     03/15/19  0507 03/16/19  0431 03/17/19  0411    mmHg)  Moderate posterior pericardial effusion without any echo signs of tamponade. Normal right ventricular size and function. Pacemaker lead seen in right ventricle. Moderate tricuspid regurgitation. Estimated right ventricular systolic pressure is 60 mmHg suggesting moderate  pulmonary HTN. Cardiac Cath (3/11/19): Left main: 80-90% very calcified stenosis. This required rotational atherectomy and PTCA -JEAN-CLAUDE 4/0/15 mm stent, redcuing stenosis to 10% with TIMNI III flow. LAD: Proximal to mid area calcified 80% stenosis. This required Rotational atherectomy / JEAN-CLAUDE in mid and proximal area, reducing stenosis to 0% with AKHIL III flow  LCX: Diffuse disease (known from cath in [place])  RCA: Minimal 20-30% as reviewed from [address] / Acute Cardiac Problems:   1. Acute congestive heart failure (likely secondary to MV CAD and AS)  2. 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 B/L Pleural effusions with RT & LT thoracentesis per IR (twice each side this admission)  4. Contrast induced nephropathy  5. Moderate pericardial effusion without tamponade physiology likely secondary to CHF on admission, now resolved with diuresis  6. Dual chamber pacer for complete heart block, placed in January 2019 in [place]  7. 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 Paroxysmal Afib and small RT PE diagnosed in [place], on Eliquis 2.5 mg QD at home. 9. Nose bleed after 2 doses of Eliquis on 3/16 PM  10. Primary HTN  11. Dyslipidemia  12. Moderate Aortic stenosis (P Galen:3.6, MG 29)  13. Mild Ischemic cardiomyopathy with systolic CHF (23%)  14. Hypocalcemia    Plan of Treatment:   1. Successfully underwent complex PCI of LT main and LAD on 3/11/19  2. Cr plataued at 3.8 (3/16). Secondary to ALFONZO. 3. On Lasix drip per Nephro  4. Nephro input appreciated  5. Has received 4 thoracentesis this admission, with continued re-accumulation of pleural effusions.   Would recommend holding off any further thoracentesis (further decreasing intravascular volume)  6. S/P TAVR on 3/1519  7. Post-TAVR Echo on Monday, 3/18  8. Continue Aspirin, Plavix, Atorvastatin, Coreg and Imdur  9. Started on Eliquis 2.5 bid, but had nose bleed  10. Start on nasal saline q4 for now  11. Monitor closely  12. Will need to address anticoagulation prior to discharge. Was on Eliquis for RT small PE diagnosed in [place]. Thank you for allowing us to participate in [address] [place] [address]. Will follow with you. Electronically signed on 03/17/19 at 8:53 AM by:    Mylene Bowers MD   Fellow, [address]    Attending Physician Statement  I have discussed the care of the patient, including pertinent history and exam findings, with the resident. I have seen and examined the patient and the key elements of all parts of the encounter have been performed by me. I agree with the assessment, plan and orders as documented by the resident. Getting echo today.   Nose bleed stopped  Given the history of PE and A. Fib, on triple antithrombotic therapy with renal failure, higher risk for bleeding  Pulmonary to decide about anticoagulation  Needs DAPT due to recent stents    Tres Molina MD

## 2019-03-17 NOTE — PROGRESS NOTES
NEPHROLOGY PROGRESS NOTE      SUBJECTIVE     Had TAVR 3/15/19. UO good, in negative fluid balance, on lasix gtt, although, was unable to void post TAVR, now better, PVR 0-20 ml. Renal function plataeued creat 3.7-3.9, BUN . Predominantly from contrast and low flow. HD on hold for now. Appetite decent. No dysarrthymia. Scrotal and penile edema persists. BP fluctuates. Acidosis persists although better. In good spirits, denies any dyspnea, wt coming down, hemodynamics better after coreg dose decreased. Epistaxis today but Hb stable  OBJECTIVE     Vitals:    03/17/19 0326 03/17/19 0419 03/17/19 0704 03/17/19 0906   BP: 134/61  (!) 136/55    Pulse: 60  60    Resp: 14  16    Temp: 97.9 °F (36.6 °C)  97.7 °F (36.5 °C)    TempSrc: Oral  Oral    SpO2: 95%  94% 94%   Weight:  165 lb 5.5 oz (75 kg)     Height:         24HR INTAKE/OUTPUT:      Intake/Output Summary (Last 24 hours) at 3/17/2019 1230  Last data filed at 3/17/2019 0649  Gross per 24 hour   Intake 174.5 ml   Output 1544 ml   Net -1369.5 ml       General appearance:Awake, alert, in no acute distress  HEENT: PERRLA  Respiratory::vesicular breath sounds, reduced air entry  Cardiovascular:S1 S2 normal,no gallop or organic murmur. Abdomen:Non tender/non distended. Bowel sounds present  Extremities: No Cyanosis or Clubbing, present Lower extremity edema  Neurological:Alert and oriented. No abnormalities of mood, affect, memory, mentation, or behavior are noted  Scrotal edema    MEDICATIONS     Scheduled Meds:    famotidine  20 mg Oral Daily    sodium chloride  2 spray Each Nare Q4H    mupirocin   Nasal BID    [START ON 3/18/2019] furosemide  80 mg Intravenous BID    carvedilol  3.125 mg Oral BID WC    sodium bicarbonate  650 mg Oral BID    sodium chloride flush  10 mL Intravenous 2 times per day    sodium chloride flush  10 mL Intravenous 2 times per day    levothyroxine  125 mcg Oral Daily    ALPRAZolam  0.25 mg Oral Once    QUEtiapine  25 mg Oral Nightly    sodium chloride flush  10 mL Intravenous 2 times per day    clopidogrel  75 mg Oral Daily    isosorbide mononitrate  30 mg Oral Daily    docusate sodium  100 mg Oral Daily    aspirin  81 mg Oral Daily    amiodarone  200 mg Oral Daily    atorvastatin  20 mg Oral Daily     Continuous Infusions:    sodium chloride      sodium chloride Stopped (03/15/19 1324)    norepinephrine 1 mcg/min (03/14/19 1050)     PRN Meds:    Home Meds:                Medications Prior to Admission: amiodarone (CORDARONE) 200 MG tablet, Take 200 mg by mouth daily  atorvastatin (LIPITOR) 20 MG tablet, Take 20 mg by mouth daily  isosorbide mononitrate (IMDUR) 60 MG extended release tablet, Take 60 mg by mouth daily  levothyroxine (SYNTHROID) 100 MCG tablet, Take 100 mcg by mouth Daily  lisinopril (PRINIVIL;ZESTRIL) 40 MG tablet, Take 40 mg by mouth daily  apixaban (ELIQUIS) 2.5 MG TABS tablet, Take 2.5 mg by mouth daily  metoprolol tartrate (LOPRESSOR) 50 MG tablet, Take 50 mg by mouth 2 times daily    INVESTIGATIONS     Last 3 CMP:    Recent Labs     03/15/19  0507 03/15/19  0745 03/16/19  0431 03/17/19  0411     --  135 135   K 4.1  --  3.8 4.0     --  104 101   CO2 18*  --  17* 19*   *  --  99* 102*   CREATININE 3.97*  --  3.82* 3.89*   CALCIUM 8.0*  --  7.4* 8.0*   BILITOT  --  0.29*  --   --        Last 3 CBC:  Recent Labs     03/15/19  0507 03/17/19  0411   WBC 9.6 14.5*   RBC 2.73* 3.05*   HGB 7.9* 8.8*   HCT 25.9* 27.6*   MCV 94.9 90.5   MCH 28.9 28.9   MCHC 30.5 31.9   RDW 15.1* 14.9*    265   MPV 11.6 12.1       ASSESSMENT     1. Acute kidney injury, ATN from contrast low flow from cardiogenic shock and forward failure creat upto 3.8, BUN , evolving, UO fair on lasix gtt. 2. 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 Nephrotic range proteinuria - Bx not feasible in view of he being on Asprin and cardiology recommends not to stop anticoagulant. Explained to patient and he does not want to do that. 3.  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 Cardiomyopathy ejection fraction 40-45%/left ventricular diastole dysfunction  5. S/p PCI and atherectomy  3/11  6. Moderate aortic stenosis with mean gradient 29 mmHg/moderate tricuspid regurgitation/moderate pulmonary hypertension with RVSP 60  7. HTN  8. Hx of Urinary retention  9. Advanced age    PLAN     1.keep off IVF  2. D/c lasix gtt, lasix 80 IV bid  3. Ct. sodium bicarbonate  4. No need to do PVRs  5. Keep SBP > 110  6. Follow renal fxn  7. No need for RRT at present.   Please do not hesitate to call with questions    This note is created with the assistance of a speech-recognition program. While intending to generate a document that actually reflects the content of the visit, no guarantees can be provided that every mistake has been identified and corrected by editing    Trae Joseph MD, FACP   3/17/2019 12:30 PM  [facility]

## 2019-03-17 NOTE — PROGRESS NOTES
PULMONARY PROGRESS NOTE      Patient:  Katerin Arellano  YOB: 1928    MRN: [id number]     Acct: [de-identified]     Admit date: 3/6/2019    REASON FOR CONSULT:- Pulmonary hypertension, bilateral pleural effusion with atelectasis and aortic stenosis with hypothyroidism    Pt seen and Chart reviewed. Patient is on room air. He is up in a chair. Denied any shortness of breath. Denied orthopnea. No chest pain or pressure. No fevers or chills or night sweats. Venous Dopplers with no DVT    Subjective:   Review of Systems -   General ROS: Completed and except as mentioned above were negative   Psychological ROS:  Completed and except as mentioned above were negative  Allergy and Immunology ROS:  Completed and except as mentioned above were negative  Hematological and Lymphatic ROS:  Completed and except as mentioned above were negative  Respiratory ROS:  Completed and except as mentioned above were negative  Cardiovascular ROS:  Completed and except as mentioned above were negative  Gastrointestinal ROS: Completed and except as mentioned above were negative  Genito-Urinary ROS:  Completed and except as mentioned above were negative  Musculoskeletal ROS:  Completed and except as mentioned above were negative  Neurological ROS:  Completed and except as mentioned above were negative  Dermatological ROS:  Completed and except as mentioned above were negative      Diet:  DIET RENAL; Low Sodium (2 GM);  Daily Fluid Restriction: 1800 ml      Medications:Current Inpatient    Scheduled Meds:   famotidine  20 mg Oral Daily    [START ON 3/18/2019] furosemide  80 mg Intravenous BID    oxymetazoline  2 spray Each Nare BID    saline nasal gel   Nasal BID    carvedilol  3.125 mg Oral BID WC    sodium bicarbonate  650 mg Oral BID    sodium chloride flush  10 mL Intravenous 2 times per day    sodium chloride flush  10 mL Intravenous 2 times per day    levothyroxine  125 mcg Oral Daily    ALPRAZolam  0.25 mg Oral Once    QUEtiapine  25 mg Oral Nightly    sodium chloride flush  10 mL Intravenous 2 times per day    clopidogrel  75 mg Oral Daily    isosorbide mononitrate  30 mg Oral Daily    docusate sodium  100 mg Oral Daily    aspirin  81 mg Oral Daily    amiodarone  200 mg Oral Daily    atorvastatin  20 mg Oral Daily     Continuous Infusions:   sodium chloride      sodium chloride Stopped (03/15/19 1324)    norepinephrine 1 mcg/min (03/14/19 1050)     PRN Meds:oxymetazoline, sodium chloride flush, sodium chloride flush, acetaminophen, ondansetron, sodium chloride flush, acetaminophen, magnesium hydroxide    Objective:      Physical Exam:  Vitals: BP (!) 138/56   Pulse 61   Temp 98.2 °F (36.8 °C) (Oral)   Resp 16   Ht 5' 6\" (1.676 m)   Wt 165 lb 5.5 oz (75 kg)   SpO2 96%   BMI 26.69 kg/m²   24 hour intake/output:    Intake/Output Summary (Last 24 hours) at 3/17/2019 1655  Last data filed at 3/17/2019 1600  Gross per 24 hour   Intake 174.5 ml   Output 2244 ml   Net -2069.5 ml     Last 3 weights: Wt Readings from Last 3 Encounters:   03/17/19 165 lb 5.5 oz (75 kg)         Physical Examination:   PHYSICAL EXAMINATION:  Vitals:    03/17/19 0704 03/17/19 0906 03/17/19 1230 03/17/19 1616   BP: (!) 136/55  (!) 116/45 (!) 138/56   Pulse: 60  61 61   Resp: 16      Temp: 97.7 °F (36.5 °C)  98.4 °F (36.9 °C) 98.2 °F (36.8 °C)   TempSrc: Oral  Oral Oral   SpO2: 94% 94% 96% 96%   Weight:       Height:         Constitutional: This is a well developed, well nourished, 18.5-24.9 - Normal 80y.o. year old male who is alert, oriented, cooperative and in no apparent distress. Head:normocephalic and atraumatic. EENT:  ILEANA. No conjunctival injections. Septum was midline, mucosa was without erythema, exudates or cobblestoning. No thrush was noted. Mallampati  II (soft palate, uvula, fauces visible)  Neck: Supple without thyromegaly.  No elevated JVP. Trachea was midline. Respiratory: Chest was symmetrical without dullness to percussion. Breath sounds bilaterally were clear to auscultation. There were no wheezes, rhonchi or rales. There is no intercostal retraction or use of accessory muscles. No egophony noted. Cardiovascular: Regular without murmur, clicks, gallops or rubs. Abdomen: Slightly rounded and soft without organomegaly. No rebound tenderness, rigidity or guarding was appreciated. Lymphatic: No lymphadenopathy. Musculoskeletal: Normal curvature of the spine. No gross muscle weakness. Extremities:  Has bilateral lower extremity edema, no ulcerations, tenderness, varicosities or erythema. Muscle size, tone and strength are normal.  No involuntary movements are noted. Skin:  Warm and dry. Good color, turgor and pigmentation. No lesions or scars. No cyanosis or clubbing  Neurological/Psychiatric: The patient's general behavior, level of consciousness, thought content and emotional status is normal.          CBC:   Recent Labs     03/15/19  0507 03/17/19  0411   WBC 9.6 14.5*   HGB 7.9* 8.8*    265     BMP:    Recent Labs     03/15/19  0507 03/16/19  0431 03/17/19  0411    135 135   K 4.1 3.8 4.0    104 101   CO2 18* 17* 19*   * 99* 102*   CREATININE 3.97* 3.82* 3.89*   GLUCOSE 92 100* 105*     Calcium:  Recent Labs     03/17/19  0411   CALCIUM 8.0*     Ionized Calcium:No results for input(s): IONCA in the last 72 hours. Magnesium:  Recent Labs     03/17/19  0411   MG 2.2     Phosphorus:No results for input(s): PHOS in the last 72 hours. BNP:No results for input(s): BNP in the last 72 hours. Glucose:  Recent Labs     03/16/19  1708   POCGLU 104     HgbA1C: No results for input(s): LABA1C in the last 72 hours.   INR:   Recent Labs     03/15/19  0507   INR 1.3     Hepatic:   Recent Labs     03/15/19  0745   BILITOT 0.29*     Amylase and Lipase:No results for input(s): LACTA, AMYLASE in the last 72 hours. Lactic Acid: No results for input(s): LACTA in the last 72 hours. CARDIAC ENZYMES:  Recent Labs     03/15/19  0745   CKTOTAL 59   CKMB 7.7     BNP: No results for input(s): BNP in the last 72 hours. Lipids: No results for input(s): CHOL, TRIG, HDL, LDLCALC in the last 72 hours. Invalid input(s): LDL  ABGs: No results found for: PH, PCO2, PO2, HCO3, O2SAT  Thyroid:   Lab Results   Component Value Date    TSH 6.79 03/06/2019      Urinalysis: No results for input(s): BACTERIA, BLOODU, CLARITYU, COLORU, PHUR, PROTEINU, RBCUA, SPECGRAV, BILIRUBINUR, NITRU, WBCUA, LEUKOCYTESUR, GLUCOSEU in the last 72 hours. 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 *  Bibasilar atelectasis. Aortic stenosis. Hypothyroidism. History of cigarette smoking. Worsening renal function. Anemia with slight improvement. Leukocytosis, slightly worse    Plan:  Meds reviewed- changes made as appropriate. Patient is on thyroid supplementation. Recommend Eliquis for DVT prophylaxis, if okay with CV surgery. Increase activity as permitted and tolerated. Questions patient had were answered to his satisfaction. Continue supplemental oxygen if needed  Patient was informed of the need for using oxygen. Patient was educated on the importance of compliance and the benefits of oxygen use. Complications of oxygen use, including dryness of the nostrils, epistaxis and also the fire hazard were explained to the patient.   Patient willingly accepts to use  the oxygen as recommended. Reviewed venous Dopplers  Cxr reviewed. None today  Diet reviewed  Thank you for having us involved in the care of your patient. Please call us if you have any questions or concerns.     Bartolo Lao MD      3/17/2019, 4:55 PM    Pulmonary & Critical Care

## 2019-03-17 NOTE — PROGRESS NOTES
[facility]  Pre Op Progress Note    CC: ACUTE CHF, SOB      Subjective:  Mr. Audrey Goddard seen and examined    Physical Exam  Vital Signs: BP (!) 136/55   Pulse 60   Temp 97.7 °F (36.5 °C) (Oral)   Resp 16   Ht 5' 6\" (1.676 m)   Wt 165 lb 5.5 oz (75 kg)   SpO2 94%   BMI 26.69 kg/m²  O2 Flow Rate (L/min): 2 L/min       General: alert and oriented to person, place and time. Up in chair, No apparent distress. Heart:Normal S1 and S2.  Regular rhythm. No murmurs, gallops, or rubs.    Lungs: clear to auscultation bilaterally  Abdomen: soft, non tender, non distended, BSx4  Extremities: 1+ edema      Scheduled Meds:    famotidine  20 mg Oral Daily    sodium chloride  2 spray Each Nare Q4H    carvedilol  3.125 mg Oral BID WC    sodium bicarbonate  650 mg Oral BID    sodium chloride flush  10 mL Intravenous 2 times per day    sodium chloride flush  10 mL Intravenous 2 times per day    levothyroxine  125 mcg Oral Daily    ALPRAZolam  0.25 mg Oral Once    QUEtiapine  25 mg Oral Nightly    sodium chloride flush  10 mL Intravenous 2 times per day    clopidogrel  75 mg Oral Daily    isosorbide mononitrate  30 mg Oral Daily    docusate sodium  100 mg Oral Daily    aspirin  81 mg Oral Daily    amiodarone  200 mg Oral Daily    atorvastatin  20 mg Oral Daily     Continuous Infusions:    sodium chloride      sodium chloride Stopped (03/15/19 1324)    furosemide (LASIX) 5mg/ml infusion 20 mg/hr (03/16/19 2324)    norepinephrine 1 mcg/min (03/14/19 1050)       Data:  CBC:   Recent Labs     03/15/19  0507 03/17/19  0411   WBC 9.6 14.5*   HGB 7.9* 8.8*   HCT 25.9* 27.6*   MCV 94.9 90.5    265     BMP:   Recent Labs     03/15/19  0507 03/16/19  0431 03/17/19  0411    135 135   K 4.1 3.8 4.0    104 101   CO2 18* 17* 19*   * 99* 102*   CREATININE 3.97* 3.82* 3.89*     PT/INR:   Recent Labs     03/15/19  0507   PROTIME 13.4*   INR 1.3     APTT: No results for input(s): APTT in the last 72 hours. 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  Need for Eliquis  No DVTs per doppler  ASA, Plavix  D/c soon    Electronically signed by MIRYAM Sultana on 3/17/2019 at 9:33 AM

## 2019-03-17 NOTE — PROGRESS NOTES
rounding on patient, updated on patient status. New orders given to d/c lasix drip and start IV lasix 40mg  BID. Also advised that PVR's no longer needed.

## 2019-03-17 NOTE — PROGRESS NOTES
Physical Therapy  Facility/Department: [facility]  Daily Treatment Note  NAME: Maria Teresa Cabrera  : 12/10/1928  MRN: [id number]    Date of Service: 3/17/2019    Discharge Recommendations:  Home with assist PRN   PT Equipment Recommendations  Equipment Needed: Yes  Mobility Devices: Winda Johnson: Rolling    Patient Diagnosis(es): There were no encounter diagnoses. has a past medical history of Aortic stenosis, Atrial fibrillation ([address] CAD (coronary artery disease), Chest pain, CHF (congestive heart failure) ([address] Chronic anemia, Chronic kidney disease, Hypertension, Pleural effusion on left, Pleural effusion, right, Pulmonary emboli ([address] Pulmonary hypertension ([address] and Thyroid disease. has a past surgical history that includes pacemaker placement (2019); thoracentesis; joint replacement; and Coronary angioplasty with stent (2019). Restrictions  Restrictions/Precautions  Restrictions/Precautions: General Precautions, Cardiac  Required Braces or Orthoses?: No  Implants present? : Pacemaker  Position Activity Restriction  Other position/activity restrictions: Up w/assist.  Subjective   General  Chart Reviewed: Yes  Response To Previous Treatment: Patient with no complaints from previous session. Family / Caregiver Present: No  Subjective  Subjective: Agreeable to therapy.    Pain Screening  Patient Currently in Pain: Denies  Vital Signs  Patient Currently in Pain: Denies       Orientation  Orientation  Overall Orientation Status: Within Normal Limits  Cognition      Objective      Transfers  Sit to Stand: Minimal Assistance  Stand to sit: Contact guard assistance  Ambulation  Ambulation?: Yes  Ambulation 1  Surface: level tile  Device: Rolling Walker  Other Apparatus: Wheelchair follow  Assistance: Contact guard assistance;Stand by assistance  Quality of Gait: initially CGA but progressed to SBA, required VCs to keep RW within closer proximity, flexed posture which decreased with fatigue  Distance: 120ft  Comments: 1 standing rest break due to SOB. SPO2 fluctuated between 92-94% on RA. Balance  Posture: Fair  Sitting - Static: Good  Sitting - Dynamic: Good  Standing - Static: Good;-  Standing - Dynamic: Fair       Exercises:  Seated LE exercise program: Long Arc Quads, hip abduction/adduction, heel/toe raises, and marches. Reps: 20x each  Upper extremity exercises: Bicep curl, shoulder flexion/extension, punches, tricep curl, shoulder abduction/adduction. Reps: 15x each, frequent rest breaks needed due to SOB; SPO2 remained steady at 94%     Assessment   Body structures, Functions, Activity limitations: Decreased balance;Decreased endurance;Decreased functional mobility   Assessment: Pt required VCs for pursed lip breathing to increase safety with mobility. Pt cooperative and motivated to improve. Prognosis: Good  REQUIRES PT FOLLOW UP: Yes  Activity Tolerance  Activity Tolerance: Patient limited by endurance        Goals  Short term goals  Time Frame for Short term goals: 14 visits  Short term goal 1: Perform bed mobility and functional transfers independently  Short term goal 2: Ambulate 300ft with least restrictive AD independently  Short term goal 3: Demo Good- dynamic standing balance to decrease risk of falls  Short term goal 4: Participate in 30 minutes of therapy to demo increased endurance    Plan    Plan  Times per week: 6-7x/wk  Current Treatment Recommendations: Strengthening, Balance Training, Functional Mobility Training, Endurance Training, Transfer Training, Patient/Caregiver Education & Training, Safety Education & Training, Home Exercise Program, Gait Training  Safety Devices  Type of devices:  All fall risk precautions in place, Gait belt, Left in chair, Call light within reach, Nurse notified  Restraints  Initially in place: No     Therapy Time   Individual Concurrent Group Co-treatment   Time In [facility]         Time Out 0235         Minutes [address] [facility] Dominique, PTA

## 2019-03-18 NOTE — PROGRESS NOTES
[id number] [facility]  Pre Op Progress Note    CC: ACUTE CHF, SOB      Subjective:  Mr. Federica Daniels seen and examined    Physical Exam  Vital Signs: BP (!) 140/53   Pulse 75   Temp 97.3 °F (36.3 °C) (Oral)   Resp 14   Ht 5' 6\" (1.676 m)   Wt 168 lb 10.4 oz (76.5 kg)   SpO2 93%   BMI 27.22 kg/m²  O2 Flow Rate (L/min): 2 L/min       General: alert and oriented to person, place and time. Up in chair, No apparent distress. Heart:Normal S1 and S2.  Regular rhythm. No murmurs, gallops, or rubs.    Lungs: clear to auscultation bilaterally  Abdomen: soft, non tender, non distended, BSx4  Extremities: 1+ edema      Scheduled Meds:    potassium chloride        potassium bicarb-citric acid  40 mEq Oral Once    famotidine  20 mg Oral Daily    furosemide  80 mg Intravenous BID    oxymetazoline  2 spray Each Nare BID    saline nasal gel   Nasal BID    carvedilol  3.125 mg Oral BID WC    sodium bicarbonate  650 mg Oral BID    sodium chloride flush  10 mL Intravenous 2 times per day    sodium chloride flush  10 mL Intravenous 2 times per day    levothyroxine  125 mcg Oral Daily    ALPRAZolam  0.25 mg Oral Once    QUEtiapine  25 mg Oral Nightly    sodium chloride flush  10 mL Intravenous 2 times per day    clopidogrel  75 mg Oral Daily    isosorbide mononitrate  30 mg Oral Daily    docusate sodium  100 mg Oral Daily    aspirin  81 mg Oral Daily    amiodarone  200 mg Oral Daily    atorvastatin  20 mg Oral Daily     Continuous Infusions:    sodium chloride      sodium chloride Stopped (03/15/19 1324)    norepinephrine 1 mcg/min (03/14/19 1050)       Data:  CBC:   Recent Labs     03/17/19 0411   WBC 14.5*   HGB 8.8*   HCT 27.6*   MCV 90.5        BMP:   Recent Labs     03/16/19  0431 03/17/19  0411 03/18/19  0414    135 137   K 3.8 4.0 3.4*    101 102   CO2 17* 19* 20   BUN 99* 102* 99*   CREATININE 3.82* 3.89* 3.84*     PT/INR:   No results for input(s): PROTIME, INR in the last 72 hours. APTT: No results for input(s): APTT in the last 72 hours. 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  Need for Eliquis - cardiology to decide   No DVTs per doppler  ASA, Plavix  D/c soon    Electronically signed by MIRYAM Edwards on 3/18/2019 at 7:32 AM

## 2019-03-18 NOTE — PROGRESS NOTES
PULMONARY PROGRESS NOTE      Patient:  Dick Blackburn  YOB: 1928    MRN: [id number]     Acct: [de-identified]     Admit date: 3/6/2019    REASON FOR INITIAL CONSULT:-   Pulmonary hypertension  Bilateral pleural effusion and atelectasis. Pt seen and Chart reviewed. No acute events were reported. He remains on room air and maintaining saturation 93-96%. He is afebrile  He is ambulating inside the room. Urine output about 2 L in last 24 hours. BUN and creatinine remains elevated BUN is 99 and creatinine is 3.84 today. Subjective:   Denies chest pain. Denies cough wheezing, hemoptysis. Denies orthopnea and PND or pedal edema. No fever or chills and night sweats.       Review of Systems -   CONSTITUTIONAL:  negative for  fevers, chills, sweats, malaise, anorexia and weight loss  EYES:  negative for  double vision, blurred vision, dry eyes, eye discharge, visual disturbance, irritation, redness and icterus  HEENT:  negative for  earaches, nasal congestion, epistaxis, sore mouth, sore throat, hoarseness and voice change  RESPIRATORY:  negative for  dry cough, cough with sputum, dyspnea, wheezing, hemoptysis, chest pain, pleuritic pain and cyanosis  CARDIOVASCULAR:  positive for  dyspnea, edema  negative for  chest pain, palpitations, orthopnea, fatigue, early saiety, syncope  GASTROINTESTINAL:  negative for nausea, vomiting, diarrhea, constipation, abdominal pain, jaundice, dysphagia, hematemesis and hemtochezia  GENITOURINARY:  negative for frequency, dysuria, nocturia, urinary incontinence, hesitancy, decreased stream and hematuria  HEMATOLOGIC/LYMPHATIC:  negative for easy bruising, bleeding, lymphadenopathy and petechiae  ALLERGIC/IMMUNOLOGIC:  negative for recurrent infections, urticaria, hay fever, angioedema and anaphylaxis  ENDOCRINE:  negative for heat intolerance, cold intolerance, change in bowel habits and hair loss  MUSCULOSKELETAL:  negative for  myalgias, arthralgias, joint swelling and stiff joints  NEUROLOGICAL:  negative for headaches, dizziness, seizures, memory problems, speech problems, visual disturbance, dysphagia, numbness, syncope and tingling  BEHAVIOR/PSYCH:  negative        Physical Exam:  Vitals: /60   Pulse 60   Temp 98.3 °F (36.8 °C) (Oral)   Resp 16   Ht 5' 6\" (1.676 m)   Wt 168 lb 10.4 oz (76.5 kg)   SpO2 98%   BMI 27.22 kg/m²   24 hour intake/output:    Intake/Output Summary (Last 24 hours) at 3/18/2019 1735  Last data filed at 3/18/2019 1200  Gross per 24 hour   Intake 42 ml   Output 1875 ml   Net -1833 ml     Last 3 weights: Wt Readings from Last 3 Encounters:   03/18/19 168 lb 10.4 oz (76.5 kg)       General appearance: alert and cooperative with exam  Physical Examination:   General appearance - alert, well appearing, and in no distress, oriented to person, place, and time, normal appearing weight and acyanotic, in no respiratory distress  Mental status - alert, oriented to person, place, and time  Eyes - pupils equal and reactive, extraocular eye movements intact, sclera anicteric  Ears - right ear normal, left ear normal  Nose - normal and patent, no erythema, discharge or polyps  Mouth - mucous membranes moist, pharynx normal without lesions  Neck - supple, no significant adenopathy  Chest - no tachypnea, retractions or cyanosis, positive dullness on percussion and right lower lung field, decreased breath sound at both the bases, no crackles heard  Heart - normal rate, regular rhythm, normal S1, S2, no murmurs, rubs, clicks or gallops  Abdomen - soft, nontender, nondistended, no masses or organomegaly  Neurological - alert, oriented, normal speech, no focal findings or movement disorder noted}  Extremities - peripheral pulses normal, mild positive bilateral pedal edema, no clubbing or cyanosis  Skin - normal coloration and turgor, no rashes, no suspicious skin lesions noted     Diet:  DIET RENAL; Low Sodium (2 GM);  Daily Fluid Restriction: 1800 ml    Medications:Current Inpatient    Scheduled Meds:   potassium chloride        furosemide        famotidine  20 mg Oral Daily    furosemide  80 mg Intravenous BID    oxymetazoline  2 spray Each Nare BID    saline nasal gel   Nasal BID    carvedilol  3.125 mg Oral BID WC    sodium bicarbonate  650 mg Oral BID    sodium chloride flush  10 mL Intravenous 2 times per day    sodium chloride flush  10 mL Intravenous 2 times per day    levothyroxine  125 mcg Oral Daily    ALPRAZolam  0.25 mg Oral Once    QUEtiapine  25 mg Oral Nightly    sodium chloride flush  10 mL Intravenous 2 times per day    clopidogrel  75 mg Oral Daily    isosorbide mononitrate  30 mg Oral Daily    docusate sodium  100 mg Oral Daily    aspirin  81 mg Oral Daily    amiodarone  200 mg Oral Daily    atorvastatin  20 mg Oral Daily     Continuous Infusions:   sodium chloride      sodium chloride Stopped (03/15/19 1324)    norepinephrine 1 mcg/min (03/14/19 1050)     PRN Meds:oxymetazoline, sodium chloride flush, sodium chloride flush, acetaminophen, ondansetron, sodium chloride flush, acetaminophen, magnesium hydroxide    Objective:    CBC:   Recent Labs     03/17/19  0411   WBC 14.5*   HGB 8.8*        BMP:    Recent Labs     03/16/19  0431 03/17/19  0411 03/18/19  0414 03/18/19  1334    135 137  --    K 3.8 4.0 3.4* 4.1    101 102  --    CO2 17* 19* 20  --    BUN 99* 102* 99*  --    CREATININE 3.82* 3.89* 3.84*  --    GLUCOSE 100* 105* 98  --      Calcium:  Recent Labs     03/18/19  0414   CALCIUM 7.9*     Ionized Calcium:No results for input(s): IONCA in the last 72 hours. Magnesium:  Recent Labs     03/18/19  0414   MG 2.1     Phosphorus:No results for input(s): PHOS in the last 72 hours. BNP:No results for input(s): BNP in the last 72 hours. Glucose:  Recent Labs     03/16/19  1708   POCGLU 104     HgbA1C: No results for input(s): LABA1C in the last 72 hours.   INR: No results for input(s): INR in the last 72 hours. Hepatic: No results for input(s): ALKPHOS, ALT, AST, PROT, BILITOT, BILIDIR, LABALBU in the last 72 hours. Amylase and Lipase:No results for input(s): LACTA, AMYLASE in the last 72 hours. Lactic Acid: No results for input(s): LACTA in the last 72 hours. CARDIAC ENZYMES:No results for input(s): CKTOTAL, CKMB, CKMBINDEX, TROPONINI in the last 72 hours. BNP: No results for input(s): BNP in the last 72 hours. Lipids: No results for input(s): CHOL, TRIG, HDL, LDLCALC in the last 72 hours. Invalid input(s): LDL  ABGs: No results found for: PH, PCO2, PO2, HCO3, O2SAT  Thyroid:   Lab Results   Component Value Date    TSH 6.79 03/06/2019      Urinalysis: No results for input(s): BACTERIA, BLOODU, CLARITYU, COLORU, PHUR, PROTEINU, RBCUA, SPECGRAV, BILIRUBINUR, NITRU, WBCUA, LEUKOCYTESUR, GLUCOSEU in the last 72 hours. CULTURES:    CXR  03/15/19 and 03/13/19  Bilateral pleural effusion right greater than the left increased pleural effusion as compared to chest x-ray on 03/13/19. CT Scans    ECHO: 03/06/19  Left ventricle is normal in size with mildly reduced systolic function;  Calculated ejection fraction via Dean's Method is 41%  Mild left ventricular hypertrophy. Evidence of diastolic dysfunction. Left atrium is severely dilated. Increased left atrial volume. Normal functioning bioprosthetic AVR in aortic position. No evidence of stenosis. No valvular regurgitation is seen. Trivial fredrick valvular regurgitation is noted. Mitral annular calcification and leaflet thickening with no evidence of  stenosis. Moderate mitral regurgitation. Mild tricuspid regurgitation. Estimated right ventricular systolic pressure is 51 mmHg. No significant pericardial effusion is seen. Pleural effusion is noted. Venous scan of lower extremities  03/15/2019      Technically limited visualization. No evidence of superficial or deep    venous thrombosis in both lower extremities.      Pleural fluid studies 03/07/19  Transudative pleural effusion. Assessment and Plan:  Pulmonary hypertension secondary to left heart disease. Bilateral pleural effusion right greater than the left/transudative pleural effusion secondary to CHF and chronic kidney disease. Small right middle lobe pulmonary embolism per chart from imaging studies done in [place]. Bibasilar atelectasis. Acute on Chronic systolic heart failure. Multivessel coronary artery disease status post PCI. Aortic stenosis status post T AVR. Acute kidney injury on chronic kidney disease. Status post permanent pacemaker and pacemaker rhythm. Atrial fibrillation. Recent epistaxis on Eliquis    Plan and recommendation. He had thoracentesis done which was transudative pleural effusion. On IV Lasix. Monitor BUN and creatinine and renal function and intake and output. Supplemental O2 if needed. No intervention for pulmonary hypertension at this time as related to left heart disease. Follow-up with cardiology. No imaging studies available from [place] to review regarding the question of pulmonary embolism. If he need to be restarted on anticoagulation, his Eliquis dose will be smaller because of HUYEN /CTD and advanced age. On Plavix    Rika Prabhakar MD      3/18/2019, 5:35 PM    Pulmonary & Critical Care. Please note that this chart was generated using voice recognition Dragon dictation software. Although every effort was made to ensure the accuracy of this automated transcription, some errors in transcription may have occurred.

## 2019-03-18 NOTE — FLOWSHEET NOTE
DATE: 3/18/2019  NAME: Yareli Chávez  MRN: [id number]   : 12/10/1928      Discharge Recommendations   Home with assist PRN  Equipment Needed: Yes  Mobility Devices: Mart Evelyn: Rolling      Subjective: Pt in chair; states \"I dont feel up to doing anything\". Agreeable to chair exercises with encouragement  Pain: Denies    Exercises:  Seated LE exercise program: Long Arc Quads, hip abduction/adduction, heel/toe raises, and marches. Reps: 20x each  Upper extremity exercises: Bicep curl, shoulder flexion/extension, punches, tricep curl, shoulder abduction/adduction. Reps: 20x each, rest breaks as needed due to SOB    Goals  Short Term Goals  Short term goal 1: Perform bed mobility and functional transfers independently  Short term goal 2: Ambulate 300ft with least restrictive AD independently  Short term goal 3: Demo Good- dynamic standing balance to decrease risk of falls  Short term goal 4: Participate in 30 minutes of therapy to demo increased endurance       Plan: Progress functional mobility as medically appropriate.    Time In: 1109 am  Time Out: 1126 am  Time Coded Minutes (treatment minutes): 17  Rehab Potential: good  Treatments/week: 6-7x    Luis Fox, PTA

## 2019-03-18 NOTE — PROGRESS NOTES
Continuous Infusions:    sodium chloride      sodium chloride Stopped (03/15/19 1324)    norepinephrine 1 mcg/min (19 1050)     PRN Meds:  oxymetazoline, sodium chloride flush, sodium chloride flush, acetaminophen, ondansetron, sodium chloride flush, acetaminophen, magnesium hydroxide    Input/Output:       I/O last 3 completed shifts: In: 43 [I.V.:42]  Out:  [Urine:]. Patient Vitals for the past 96 hrs (Last 3 readings):   Weight   19 0034 168 lb 10.4 oz (76.5 kg)   19 0419 165 lb 5.5 oz (75 kg)   19 0502 167 lb 8.8 oz (76 kg)       Vital Signs:   Temperature:  Temp: 97.4 °F (36.3 °C)  TMax:   Temp (24hrs), Av °F (36.7 °C), Min:97.3 °F (36.3 °C), Max:98.4 °F (36.9 °C)    Respirations:  Resp: 16  Pulse:   Pulse: 64  BP:    BP: (!) 162/48  BP Range: Systolic (52BWH), AGM:982 , Min:116 , LWC:910       Diastolic (79ASM), DEJAN:62, Min:45, Max:56      Physical Examination:     General:  AAO x 3, speaking in full sentences, no accessory muscle use. HEENT: Atraumatic, normocephalic, no throat congestion, moist mucosa. Eyes:   Pupils equal, round and reactive to light, EOMI. Neck:   Supple  Chest:   Bilateral vesicular breath sounds, slight b/l crackles +ve, no wheezes. Cardiac:  S1 S2 RR, no murmurs, gallops or rubs, JVP not raised. Abdomen: Soft, non-tender, no masses or organomegaly, BS audible. :   No suprapubic or flank tenderness. Neuro:  AAO x 3, No FND. SKIN:  No rashes, good skin turgor. Extremities:  Trace edema.     Labs:       Recent Labs     19  0411   WBC 14.5*   RBC 3.05*   HGB 8.8*   HCT 27.6*   MCV 90.5   MCH 28.9   MCHC 31.9   RDW 14.9*      MPV 12.1      BMP:   Recent Labs     19  0431 19  0411 19    135 137   K 3.8 4.0 3.4*    101 102   CO2 17* 19* 20   BUN 99* 102* 99*   CREATININE 3.82* 3.89* 3.84*   GLUCOSE 100* 105* 98   CALCIUM 7.4* 8.0* 7.9*      Magnesium:    Recent Labs     19  041 MG 2.2     SPEP:  Lab Results   Component Value Date    PROT 4.7 03/07/2019    ALBCAL 2.5 03/07/2019    ALBPCT 53 03/07/2019    LABALPH 0.3 03/07/2019    LABALPH 0.8 03/07/2019    A1PCT 7 03/07/2019    A2PCT 18 03/07/2019    LABBETA 0.7 03/07/2019    BETAPCT 14 03/07/2019    GAMGLOB 0.4 03/07/2019    GGPCT 8 03/07/2019    PATH ELECTRONICALLY SIGNED. Gonzalo Rivera M.D. 03/07/2019     Urinalysis/Chemistries:      Lab Results   Component Value Date    NITRU NEGATIVE 03/12/2019    COLORU YELLOW 03/12/2019    PHUR 5.0 03/12/2019    WBCUA None 03/12/2019    RBCUA 0 TO 2 03/12/2019    MUCUS NOT REPORTED 03/12/2019    TRICHOMONAS NOT REPORTED 03/12/2019    YEAST NOT REPORTED 03/12/2019    BACTERIA NOT REPORTED 03/12/2019    SPECGRAV 1.024 03/12/2019    LEUKOCYTESUR NEGATIVE 03/12/2019    UROBILINOGEN Normal 03/12/2019    BILIRUBINUR NEGATIVE 03/12/2019    GLUCOSEU NEGATIVE 03/12/2019    KETUA NEGATIVE 03/12/2019    AMORPHOUS NOT REPORTED 03/12/2019      Urine Creatinine:     Lab Results   Component Value Date    LABCREA 210.1 03/13/2019     Radiology:     Reviewed. Assessment:        1. Acute kidney injury, ATN from contrast low flow from cardiogenic shock and forward failure creat upto 3.8, BUN , evolving, UO fair on lasix gtt. 2. 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 Nephrotic range proteinuria - Bx not feasible in view of he being on Asprin and cardiology recommends not to stop anticoagulant. Explained to patient and he does not want to do that. 3.  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 Cardiomyopathy ejection fraction 40-45%/left ventricular diastole dysfunction  5. S/p PCI and atherectomy  3/11  6.   Moderate aortic stenosis with mean gradient 29 mmHg/moderate

## 2019-03-18 NOTE — CONSULTS
Department   of   Otolaryngology    Assessment:   Epistaxis - not active   Coagulopathy secondary to Eliquis   Pulmonary hypertension   Atrial fibrillation    Plan:   There is no active bleeding at this time. No cautery or packing are indicated for this gentleman. Start saline gel bid for moisture   Afrin nasal spray prn only for epistaxis. CHIEF   COMPLAINT:      Nosebleed yesterday    CONSULTING PHYSICIAN:  Dr. Kendra Coffman:                            Kyle Rodriguez      is   a   80 y.o.   male   with   significant   past   medical   history   of Pulmonary hypertension secondary to left heart disease. Apparently he had a minor nosebleed yesterday that stopped with pressure. He denies any history of recurrent epistaxis and he has never been packed before. He feels well at this time. No SOB, no HEATHER, no nasal trauma, no f/c.     Past   Medical   History:       Diagnosis Date    Aortic stenosis 02/15/2019    Atrial fibrillation (HCC)     CAD (coronary artery disease)     Chest pain 02/15/2019    CHF (congestive heart failure) (HCC)     Chronic anemia     Chronic kidney disease     Hypertension     Pleural effusion on left 02/15/2019    Pleural effusion, right 02/15/2019    Pulmonary emboli (HCC) 02/15/2019    Pulmonary hypertension ([address] 03/06/2019    Thyroid disease     hypothyroidism          Surgical   History:       Procedure Laterality Date    CORONARY ANGIOPLASTY WITH STENT PLACEMENT  03/11/2019    complex PCI of LT MAIN, LAD    JOINT REPLACEMENT      right ankle    PACEMAKER PLACEMENT  01/20/2019    THORACENTESIS            Medications: Current Facility-Administered Medications: potassium chloride 20 MEQ/15ML (10%) oral solution, , ,   furosemide (LASIX) 10 MG/ML injection, , ,   famotidine (PEPCID) tablet 20 mg, 20 mg, Oral, Daily  furosemide (LASIX) injection 80 mg, 80 mg, Intravenous, BID  oxymetazoline (AFRIN) 0.05 % nasal spray 2 spray, 2 spray, Each Nare, BID  oxymetazoline (AFRIN) 0.05 % nasal spray 2 spray, 2 spray, Each Nare, BID PRN  saline nasal gel (AYR), , Nasal, BID  carvedilol (COREG) tablet 3.125 mg, 3.125 mg, Oral, BID WC  sodium bicarbonate tablet 650 mg, 650 mg, Oral, BID  0.9 % sodium chloride infusion, , Intravenous, Continuous  sodium chloride flush 0.9 % injection 10 mL, 10 mL, Intravenous, 2 times per day  sodium chloride flush 0.9 % injection 10 mL, 10 mL, Intravenous, PRN  0.9 % sodium chloride infusion, , Intravenous, Continuous  sodium chloride flush 0.9 % injection 10 mL, 10 mL, Intravenous, 2 times per day  sodium chloride flush 0.9 % injection 10 mL, 10 mL, Intravenous, PRN  acetaminophen (TYLENOL) tablet 650 mg, 650 mg, Oral, Q4H PRN  ondansetron (ZOFRAN) injection 4 mg, 4 mg, Intravenous, Q6H PRN  levothyroxine (SYNTHROID) tablet 125 mcg, 125 mcg, Oral, Daily  ALPRAZolam (XANAX) tablet 0.25 mg, 0.25 mg, Oral, Once  QUEtiapine (SEROQUEL) tablet 25 mg, 25 mg, Oral, Nightly  norepinephrine (LEVOPHED) 16 mg in dextrose 5% 250 mL infusion, 2 mcg/min, Intravenous, Continuous  sodium chloride flush 0.9 % injection 10 mL, 10 mL, Intravenous, 2 times per day  sodium chloride flush 0.9 % injection 10 mL, 10 mL, Intravenous, PRN  acetaminophen (TYLENOL) tablet 650 mg, 650 mg, Oral, Q4H PRN  magnesium hydroxide (MILK OF MAGNESIA) 400 MG/5ML suspension 30 mL, 30 mL, Oral, Daily PRN  clopidogrel (PLAVIX) tablet 75 mg, 75 mg, Oral, Daily  isosorbide mononitrate (IMDUR) extended release tablet 30 mg, 30 mg, Oral, Daily  docusate sodium (COLACE) capsule 100 mg, 100 mg, Oral, Daily  aspirin EC tablet 81 mg, 81 mg, Oral, Daily  amiodarone (CORDARONE) tablet 200 mg, 200 mg, Oral, Daily  atorvastatin (LIPITOR) tablet 20 mg, 20 mg, Oral, Daily     Allergies:      Quinolones    Social History:    Social History     Socioeconomic History    Marital status:       Spouse name: None    Number of children: None    Years of education: None    Highest education level: None   Occupational History     Employer: RETIRED   Social Needs    Financial resource strain: None    Food insecurity:     Worry: None     Inability: None    Transportation needs:     Medical: None     Non-medical: None   Tobacco Use    Smoking status: Former Smoker     Last attempt to quit:      Years since quittin.2    Smokeless tobacco: Never Used   Substance and Sexual Activity    Alcohol use: Not Currently    Drug use: Never    Sexual activity: None   Lifestyle    Physical activity:     Days per week: None     Minutes per session: None    Stress: None   Relationships    Social connections:     Talks on phone: None     Gets together: None     Attends Mandaeism service: None     Active member of club or organization: None     Attends meetings of clubs or organizations: None     Relationship status: None    Intimate partner violence:     Fear of current or ex partner: None     Emotionally abused: None     Physically abused: None     Forced sexual activity: None   Other Topics Concern    None   Social History Narrative    None       Family   History: History reviewed. No pertinent family history.     REVIEW   OF   SYSTEMS:      As   above   and:   CONSTITUTIONAL:      Negative  EYES:      Negative  HEENT:      Negative  RESPIRATORY:      Negative  CARDIOVASCULAR:      negative   GASTROINTESTINAL:      Negative  GENITOURINARY:      negative   INTEGUMENT/BREAST:      negative   HEMATOLOGIC/LYMPHATIC:      negative   ALLERGIC/IMMUNOLOGIC:      Negative  ENDOCRINE:      negative   MUSCULOSKELETAL:      negative   NEUROLOGICAL:      negative   BEHAVIOR/PSYCH:      Negative    DIAGNOSTIC STUDIES REVIEWED:  Labs   and   Radiology   reports/films   reviewed         EXAM:   VITALS:      /60   Pulse 60   Temp 98.3 °F (36.8 °C) (Oral)   Resp 16   Ht 5' 6\" (1.676 m)   Wt 168 lb 10.4 oz (76.5 kg)   SpO2 98%   BMI 27.22 kg/m²     CONSTITUTIONAL:      awake,   alert,

## 2019-03-18 NOTE — PROGRESS NOTES
and stiff joints  NEUROLOGICAL:  negative for headaches, dizziness, seizures, memory problems, speech problems, visual disturbance, dysphagia, numbness, syncope and tingling  BEHAVIOR/PSYCH:  negative        Physical Exam:  Vitals: BP (!) 162/48   Pulse 64   Temp 97.4 °F (36.3 °C) (Oral)   Resp 16   Ht 5' 6\" (1.676 m)   Wt 168 lb 10.4 oz (76.5 kg)   SpO2 94%   BMI 27.22 kg/m²   24 hour intake/output:    Intake/Output Summary (Last 24 hours) at 3/18/2019 1503  Last data filed at 3/18/2019 1200  Gross per 24 hour   Intake 42 ml   Output 2175 ml   Net -2133 ml     Last 3 weights: Wt Readings from Last 3 Encounters:   03/18/19 168 lb 10.4 oz (76.5 kg)       General appearance: alert and cooperative with exam  Physical Examination:   General appearance - alert, well appearing, and in no distress, oriented to person, place, and time, normal appearing weight and acyanotic, in no respiratory distress  Mental status - alert, oriented to person, place, and time  Eyes - pupils equal and reactive, extraocular eye movements intact, sclera anicteric  Ears - right ear normal, left ear normal  Nose - normal and patent, no erythema, discharge or polyps  Mouth - mucous membranes moist, pharynx normal without lesions  Neck - supple, no significant adenopathy  Chest - no tachypnea, retractions or cyanosis, positive dullness on percussion and right lower lung field, decreased breath sound at both the bases, no crackles heard  Heart - normal rate, regular rhythm, normal S1, S2, no murmurs, rubs, clicks or gallops  Abdomen - soft, nontender, nondistended, no masses or organomegaly  Neurological - alert, oriented, normal speech, no focal findings or movement disorder noted}  Extremities - peripheral pulses normal, mild positive bilateral pedal edema, no clubbing or cyanosis  Skin - normal coloration and turgor, no rashes, no suspicious skin lesions noted     Diet:  DIET RENAL; Low Sodium (2 GM);  Daily Fluid Restriction: 1800 ml    Medications:Current Inpatient    Scheduled Meds:   potassium chloride        famotidine  20 mg Oral Daily    furosemide  80 mg Intravenous BID    oxymetazoline  2 spray Each Nare BID    saline nasal gel   Nasal BID    carvedilol  3.125 mg Oral BID WC    sodium bicarbonate  650 mg Oral BID    sodium chloride flush  10 mL Intravenous 2 times per day    sodium chloride flush  10 mL Intravenous 2 times per day    levothyroxine  125 mcg Oral Daily    ALPRAZolam  0.25 mg Oral Once    QUEtiapine  25 mg Oral Nightly    sodium chloride flush  10 mL Intravenous 2 times per day    clopidogrel  75 mg Oral Daily    isosorbide mononitrate  30 mg Oral Daily    docusate sodium  100 mg Oral Daily    aspirin  81 mg Oral Daily    amiodarone  200 mg Oral Daily    atorvastatin  20 mg Oral Daily     Continuous Infusions:   sodium chloride      sodium chloride Stopped (03/15/19 1324)    norepinephrine 1 mcg/min (03/14/19 1050)     PRN Meds:oxymetazoline, sodium chloride flush, sodium chloride flush, acetaminophen, ondansetron, sodium chloride flush, acetaminophen, magnesium hydroxide    Objective:    CBC:   Recent Labs     03/17/19  0411   WBC 14.5*   HGB 8.8*        BMP:    Recent Labs     03/16/19  0431 03/17/19  0411 03/18/19  0414 03/18/19  1334    135 137  --    K 3.8 4.0 3.4* 4.1    101 102  --    CO2 17* 19* 20  --    BUN 99* 102* 99*  --    CREATININE 3.82* 3.89* 3.84*  --    GLUCOSE 100* 105* 98  --      Calcium:  Recent Labs     03/18/19  0414   CALCIUM 7.9*     Ionized Calcium:No results for input(s): IONCA in the last 72 hours. Magnesium:  Recent Labs     03/18/19  0414   MG 2.1     Phosphorus:No results for input(s): PHOS in the last 72 hours. BNP:No results for input(s): BNP in the last 72 hours. Glucose:  Recent Labs     03/16/19  1708   POCGLU 104     HgbA1C: No results for input(s): LABA1C in the last 72 hours.   INR: No results for input(s): INR in the last 72 03/07/19  Transudative pleural effusion. Assessment and Plan:  Pulmonary hypertension secondary to left heart disease. Bilateral pleural effusion right greater than the left/transudative pleural effusion secondary to CHF and chronic kidney disease. Small right middle lobe pulmonary embolism per chart from imaging studies done in [place]. Bibasilar atelectasis. Chronic systolic heart failure. Multivessel coronary artery disease status post PCI. Aortic stenosis status post T AVR. Acute kidney injury on chronic kidney disease. Status post permanent pacemaker and pacemaker rhythm. Atrial fibrillation. Recent epistaxis on Eliquis    Plan and recommendation. He had thoracentesis done which was transudative pleural effusion. On IV Lasix. Monitor BUN and creatinine and renal function and intake and output. Supplemental O2 if needed. No intervention for pulmonary hypertension at this time as related to left heart disease. Follow-up with cardiology. No imaging studies available from [place] to review regarding the question of pulmonary embolism. If he need to be restarted on anticoagulation, his Eliquis dose will be smaller because of HUYEN /CTD and advanced age. On Plavix    Sandy Morales MD      3/18/2019, 3:03 PM    Pulmonary & Critical Care. Please note that this chart was generated using voice recognition Dragon dictation software. Although every effort was made to ensure the accuracy of this automated transcription, some errors in transcription may have occurred.

## 2019-03-18 NOTE — PROGRESS NOTES
[facility]   Progress Note                   Date:   3/18/2019  Patient name: Dick Blackburn  Date of admission:  3/6/2019  8:48 PM  MRN:   [id number]  YOB: 1928  PCP: No primary care provider on file. Reason for Admission: exertional dyspnea    Subjective:   Underwent PCI to LAD with JEAN-CLAUDE on 3/11/19. Underwent TAVR on 3/15/19. Had ALFONZO, now on scheduled Lasix with UOP, Nephro following. Still has intermittent dyspnea but better than on admission. BP is elevated today, Coreg was reduced over the weekend. He has mild scrotal edema. Denies chest pain, palpitations or syncope. Remains in a paced rhythm.       Intake/Output Summary (Last 24 hours) at 3/18/2019 0713  Last data filed at 3/18/2019 0522  Gross per 24 hour   Intake 42 ml   Output 2050 ml   Net -2008 ml         Intake/Output Summary (Last 24 hours) at 3/18/2019 0713  Last data filed at 3/18/2019 0522  Gross per 24 hour   Intake 42 ml   Output 2050 ml   Net -2008 ml       Medications:   Scheduled Meds:   famotidine  20 mg Oral Daily    furosemide  80 mg Intravenous BID    oxymetazoline  2 spray Each Nare BID    saline nasal gel   Nasal BID    carvedilol  3.125 mg Oral BID WC    sodium bicarbonate  650 mg Oral BID    sodium chloride flush  10 mL Intravenous 2 times per day    sodium chloride flush  10 mL Intravenous 2 times per day    levothyroxine  125 mcg Oral Daily    ALPRAZolam  0.25 mg Oral Once    QUEtiapine  25 mg Oral Nightly    sodium chloride flush  10 mL Intravenous 2 times per day    clopidogrel  75 mg Oral Daily    isosorbide mononitrate  30 mg Oral Daily    docusate sodium  100 mg Oral Daily    aspirin  81 mg Oral Daily    amiodarone  200 mg Oral Daily    atorvastatin  20 mg Oral Daily     Continuous Infusions:   sodium chloride      sodium chloride Stopped (03/15/19 1324)    norepinephrine 1 mcg/min (03/14/19 1050)     CBC:   Recent Labs     03/17/19  0411   WBC 14.5*   HGB 8.8*   PLT 265     BMP:    Recent Labs     03/16/19  0431 03/17/19  0411 03/18/19  0414    135 137   K 3.8 4.0 3.4*    101 102   CO2 17* 19* 20   BUN 99* 102* 99*   CREATININE 3.82* 3.89* 3.84*   GLUCOSE 100* 105* 98     Hepatic:   Recent Labs     03/15/19  0745   BILITOT 0.29*     Troponin: No results for input(s): TROPONINI in the last 72 hours. BNP: No results for input(s): BNP in the last 72 hours. Lipids: No results for input(s): CHOL, HDL in the last 72 hours. Invalid input(s): LDLCALCU  INR:   No results for input(s): INR in the last 72 hours. Objective:   Vitals: BP (!) 140/53   Pulse 75   Temp 97.3 °F (36.3 °C) (Oral)   Resp 14   Ht 5' 6\" (1.676 m)   Wt 168 lb 10.4 oz (76.5 kg)   SpO2 93%   BMI 27.22 kg/m²   Constitutional and General Appearance: alert, cooperative, no distress and appears stated age  Respiratory:  · Diminished breath sounds at B/L lung bases  Cardiovascular:  S1, s2, systolic murmur is heard  Abdomen:   · No masses or tenderness, soft abdomen  · Bowel sounds present  Extremities:  · le edema present, peripheral pulse bl le present 2 +  · Bilateral groins soft and without hematoma  Integumentum:   Intact with no rashes noted      EKG:   V-pacing with occasional PVCs. CTA Chest in [place] (2/15/19):  Small PE within branch of RT middle lobe artery. Large RT pleural effusion. Moderate LT pleural effusion.     ECHO in [place] (2/17/19:   LVH with LVEF 50%. LT atrial enlargement. Moderate AS with Mild AI. Mitral annular calcification with mild MR. Mild to Moderate Pulm HTN.     Cardiac Angiography in [place] (1/20/19): Multivessel disease including LM. ECHO (3/6/19):   Normal left ventricle size with mildly reduced systolic function; Estimated left ventricular ejection fraction 40-45%  Anterolateral wall hypokinesis. Mild left ventricular hypertrophy. Left atrium is moderately dilated. Grade II diastolic dysfunction.   Heavily calcified aortic valve with reduced systolic excursion and evidence of moderate stenosis. (Peak nataliia 3.62 m/s and mean gradient 29 mmHg)  Moderate posterior pericardial effusion without any echo signs of tamponade. Normal right ventricular size and function. Pacemaker lead seen in right ventricle. Moderate tricuspid regurgitation. Estimated right ventricular systolic pressure is 60 mmHg suggesting moderate  pulmonary HTN. Cardiac Cath (3/11/19): Left main: 80-90% very calcified stenosis. This required rotational atherectomy and PTCA -JEAN-CLAUDE 4/0/15 mm stent, redcuing stenosis to 10% with AKHIL III flow. LAD: Proximal to mid area calcified 80% stenosis. This required Rotational atherectomy / JEAN-CLAUDE in mid and proximal area, reducing stenosis to 0% with AKHIL III flow  LCX: Diffuse disease (known from cath in [place])  RCA: Minimal 20-30% as reviewed from [address]    ECHO (3/17/19): Left ventricle is normal in size with mildly reduced systolic function;  Calculated ejection fraction via Dean's Method is 41%  Mild left ventricular hypertrophy. Evidence of diastolic dysfunction. Left atrium is severely dilated. Increased left atrial volume. Normal functioning bioprosthetic AVR in aortic position. No evidence of stenosis. No valvular regurgitation is seen. Trivial fredrick valvular regurgitation is noted. Mitral annular calcification and leaflet thickening with no evidence of  stenosis. Moderate mitral regurgitation. Mild tricuspid regurgitation. Estimated right ventricular systolic pressure is 51 mmHg. No significant pericardial effusion is seen. Pleural effusion is noted. Assessment / Acute Cardiac Problems:   1. Acute congestive heart failure (likely secondary to MV CAD and AS)  2. 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 TAVR on 3/15/19  4. 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 B/L Pleural effusions with RT & LT thoracentesis per IR (twice each side this admission)  6. Contrast induced nephropathy  7.  Moderate pericardial effusion without tamponade physiology likely secondary to CHF on admission, now resolved with diuresis  8. Dual chamber pacer for complete heart block, placed in January 2019 in [place]  9. Moderate Pulm HTN (likely Group 2)  10. Paroxysmal Afib and small RT PE diagnosed in [place], on Eliquis 2.5 mg QD at home. 11. Nose bleed after 2 doses of Eliquis on 3/16 PM  12. Primary HTN  13. Dyslipidemia  14. Hypocalcemia    Plan of Treatment:   1. Successfully underwent complex PCI of LT main and LAD with JEAN-CLAUDE on 3/11/19  2. Successfully underwent TAVR on 3/15/19, post TAVR ECHO shows normally functioning bioprosthetic AV  3. Being treated for ALFONZO, Nephro input appreciated  4. Has received 4 thoracentesis this admission, with continued re-accumulation of pleural effusions. Would recommend holding off any further thoracentesis (further decreasing intravascular volume)  5. Continue Aspirin, Plavix, Atorvastatin, Amiodarone, Coreg and Imdur  6. Was started on Eliquis over the weeked but stopped due to nose bleed  7. Monitor closely  8. Will need to address anticoagulation prior to discharge. Was on Eliquis for Afib and RT small PE diagnosed in [place]. Triple therapy would place him at higher risk of bleeding    Will discuss with rounding attending Dr. Sabrina Valerio for final recommendations. Shama Wagner MD   Fellow, [address]    I performed a history and physical examination of the patient and discussed management with the resident. I reviewed the residents note and agree with the documented findings and plan of care. Any areas of disagreement are noted on the chart. I was personally present for the key portions of any procedures. I have documented in the chart those procedures where I was not present during the key portions. I have personally evaluated this patient and have completed at least one if not all key elements of the E/M (history, physical exam, and MDM). Additional findings are as noted. Nephrology decreased coreg recently. BP is high today. BP goals per nephrology. Volume management per nephrology. Continue ASA/plavix. Pulmonary to comment on PE management. If no PE, than dose of eliquis would be 2.5mg po BID.     Marina Hashimoto MD

## 2019-03-18 NOTE — PROGRESS NOTES
Smoking Cessation - topics covered   []  Health Risks  []  Benefits of Quitting   []  Smoking Cessation  []  Patient has no history of tobacco use  [x]  Patient is former smoker. Patient quit in 1989. [x]  No need for tobacco cessation education. []  Booklet given  []  Patient verbalizes understanding. []  Patient denies need for tobacco cessation education. []  Unable to meet with patient today. Will follow up as able.   Godwin Yee  9:16 AM

## 2019-03-19 NOTE — PROGRESS NOTES
[facility]  Pre Op Progress Note    CC: ACUTE CHF, SOB      Subjective:  Mr. Ban Perez seen and examined    Physical Exam  Vital Signs: BP (!) 162/50   Pulse 59   Temp 97.8 °F (36.6 °C) (Oral)   Resp 16   Ht 5' 6\" (1.676 m)   Wt 166 lb 3.6 oz (75.4 kg)   SpO2 97%   BMI 26.83 kg/m²  O2 Flow Rate (L/min): 2 L/min       General: alert and oriented to person, place and time. Up in chair, No apparent distress. Heart:Normal S1 and S2.  Regular rhythm. No murmurs, gallops, or rubs. Lungs: clear to auscultation bilaterally  Abdomen: soft, non tender, non distended, BSx4  Extremities: 1+ edema      Scheduled Meds:    famotidine  20 mg Oral Daily    furosemide  80 mg Intravenous BID    oxymetazoline  2 spray Each Nare BID    carvedilol  3.125 mg Oral BID WC    sodium bicarbonate  650 mg Oral BID    sodium chloride flush  10 mL Intravenous 2 times per day    sodium chloride flush  10 mL Intravenous 2 times per day    levothyroxine  125 mcg Oral Daily    ALPRAZolam  0.25 mg Oral Once    QUEtiapine  25 mg Oral Nightly    sodium chloride flush  10 mL Intravenous 2 times per day    clopidogrel  75 mg Oral Daily    isosorbide mononitrate  30 mg Oral Daily    docusate sodium  100 mg Oral Daily    aspirin  81 mg Oral Daily    amiodarone  200 mg Oral Daily    atorvastatin  20 mg Oral Daily     Continuous Infusions:    sodium chloride      sodium chloride Stopped (03/15/19 1324)    norepinephrine 1 mcg/min (03/14/19 1050)       Data:  CBC:   Recent Labs     03/17/19  0411   WBC 14.5*   HGB 8.8*   HCT 27.6*   MCV 90.5        BMP:   Recent Labs     03/17/19  0411 03/18/19  0414 03/18/19  1334 03/19/19  0454    137  --  137   K 4.0 3.4* 4.1 3.4*    102  --  105   CO2 19* 20  --  22   * 99*  --  104*   CREATININE 3.89* 3.84*  --  3.77*     PT/INR:   No results for input(s): PROTIME, INR in the last 72 hours. APTT: No results for input(s):  APTT in the last 72 hours. Assessment & Plan:   Patient Active Problem List   Diagnosis    Pulmonary hypertension ([facility] [address]    Acute on chronic systolic congestive heart failure (HCC)    Pleural effusion due to CHF (congestive heart failure) ([address]    Pulmonary 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 heart disease (HCC)    Atelectasis, bilateral    HUYEN (acute kidney injury) ([address]    Azotemia     *Diastolic Heart Failure    PCI - JEAN-CLAUDE to LM and LAD  S/p TAVR  Renal function creat 3.7, nephro on board  Hypokalemia - replace  Epistaxis - resolved Eliquis on hold, ENT recs appreciate  Eliquis for Afib? Need for Eliquis - cardiology to decide   No DVTs per doppler, ?  PE  ASA, Plavix  D/c soon    Electronically signed by MIRYAM Jorge on 3/19/2019 at 7:39 AM

## 2019-03-19 NOTE — PROGRESS NOTES
Renal Progress Note    Patient :  Evonne Bachelor; 80 y.o. MRN# [id number]  Location:  [id number]/[id number]  Attending:  Mary Thomas MD  Admit Date:  3/6/2019   Hospital Day: 13      Subjective:     Patient was seen and examined. S/P Cardiac Cath and PCI to LAD JEAN-CLAUDE on 3/11/19 and TAVR on 3/15/19. Suffered ALFONZO and Cr increased to peak of 3.97mg/dl, was on Lasix gtt and now on intermittent Lasix. Getting Lasix 80mg BID. Made about 1.7 L of UOP last 24 hrs. Cr today 3.77 mg/dl. With BUN of 104. Otherwise alert and awake and answering all questions appropriately.      Outpatient Medications:     Medications Prior to Admission: amiodarone (CORDARONE) 200 MG tablet, Take 200 mg by mouth daily  atorvastatin (LIPITOR) 20 MG tablet, Take 20 mg by mouth daily  isosorbide mononitrate (IMDUR) 60 MG extended release tablet, Take 60 mg by mouth daily  levothyroxine (SYNTHROID) 100 MCG tablet, Take 100 mcg by mouth Daily  lisinopril (PRINIVIL;ZESTRIL) 40 MG tablet, Take 40 mg by mouth daily  apixaban (ELIQUIS) 2.5 MG TABS tablet, Take 2.5 mg by mouth daily  metoprolol tartrate (LOPRESSOR) 50 MG tablet, Take 50 mg by mouth 2 times daily    Current Medications:     Scheduled Meds:    famotidine  20 mg Oral Daily    furosemide  80 mg Intravenous BID    oxymetazoline  2 spray Each Nare BID    carvedilol  3.125 mg Oral BID WC    sodium bicarbonate  650 mg Oral BID    sodium chloride flush  10 mL Intravenous 2 times per day    sodium chloride flush  10 mL Intravenous 2 times per day    levothyroxine  125 mcg Oral Daily    ALPRAZolam  0.25 mg Oral Once    QUEtiapine  25 mg Oral Nightly    sodium chloride flush  10 mL Intravenous 2 times per day    clopidogrel  75 mg Oral Daily    isosorbide mononitrate  30 mg Oral Daily    docusate sodium  100 mg Oral Daily    aspirin  81 mg Oral Daily    amiodarone  200 mg Oral Daily    atorvastatin  20 mg Oral Daily     Continuous Infusions:    sodium chloride      sodium chloride Stopped (03/15/19 1324)    norepinephrine 1 mcg/min (19 1050)     PRN Meds:  oxymetazoline, sodium chloride flush, sodium chloride flush, acetaminophen, ondansetron, sodium chloride flush, acetaminophen, magnesium hydroxide    Input/Output:       I/O last 3 completed shifts:  In: -   Out: 1775 [Urine:1775]. Patient Vitals for the past 96 hrs (Last 3 readings):   Weight   19 0500 166 lb 3.6 oz (75.4 kg)   19 0034 168 lb 10.4 oz (76.5 kg)   19 0419 165 lb 5.5 oz (75 kg)       Vital Signs:   Temperature:  Temp: 97.3 °F (36.3 °C)  TMax:   Temp (24hrs), Av.8 °F (36.6 °C), Min:97.3 °F (36.3 °C), Max:98.3 °F (36.8 °C)    Respirations:  Resp: 18  Pulse:   Pulse: 59  BP:    BP: (!) 161/50  BP Range: Systolic (14RTA), PJE:667 , Min:124 , GYJ:843       Diastolic (79TWF), HQF:20, Min:50, Max:63      Physical Examination:     General:  AAO x 3, speaking in full sentences, no accessory muscle use. HEENT: Atraumatic, normocephalic, no throat congestion, moist mucosa. Eyes:   Pupils equal, round and reactive to light, EOMI. Neck:   Supple  Chest:   Bilateral vesicular breath sounds, slight b/l crackles +ve, no wheezes. Cardiac:  S1 S2 RR, no murmurs, gallops or rubs, JVP not raised. Abdomen: Soft, non-tender, no masses or organomegaly, BS audible. :   No suprapubic or flank tenderness. Neuro:  AAO x 3, No FND. SKIN:  No rashes, good skin turgor. Extremities:  Trace edema.     Labs:       Recent Labs     19  041   WBC 14.5*   RBC 3.05*   HGB 8.8*   HCT 27.6*   MCV 90.5   MCH 28.9   MCHC 31.9   RDW 14.9*      MPV 12.1      BMP:   Recent Labs     19  0411 19  0414 19  1334 19  0454    137  --  137   K 4.0 3.4* 4.1 3.4*    102  --  105   CO2 19* 20  --  22   * 99*  --  104*   CREATININE 3.89* 3.84*  --  3.77*   GLUCOSE 105* 98  --  99   CALCIUM 8.0* 7.9*  --  8.0*      Magnesium:    Recent Labs     19  0411 194   MG 2.2 2. 1     SPEP:  Lab Results   Component Value Date    PROT 4.7 03/07/2019    ALBCAL 2.5 03/07/2019    ALBPCT 53 03/07/2019    LABALPH 0.3 03/07/2019    LABALPH 0.8 03/07/2019    A1PCT 7 03/07/2019    A2PCT 18 03/07/2019    LABBETA 0.7 03/07/2019    BETAPCT 14 03/07/2019    GAMGLOB 0.4 03/07/2019    GGPCT 8 03/07/2019    PATH ELECTRONICALLY SIGNED. Chip Licea M.D. 03/07/2019     Urinalysis/Chemistries:      Lab Results   Component Value Date    NITRU NEGATIVE 03/12/2019    COLORU YELLOW 03/12/2019    PHUR 5.0 03/12/2019    WBCUA None 03/12/2019    RBCUA 0 TO 2 03/12/2019    MUCUS NOT REPORTED 03/12/2019    TRICHOMONAS NOT REPORTED 03/12/2019    YEAST NOT REPORTED 03/12/2019    BACTERIA NOT REPORTED 03/12/2019    SPECGRAV 1.024 03/12/2019    LEUKOCYTESUR NEGATIVE 03/12/2019    UROBILINOGEN Normal 03/12/2019    BILIRUBINUR NEGATIVE 03/12/2019    GLUCOSEU NEGATIVE 03/12/2019    KETUA NEGATIVE 03/12/2019    AMORPHOUS NOT REPORTED 03/12/2019      Urine Creatinine:     Lab Results   Component Value Date    LABCREA 210.1 03/13/2019     Radiology:     Reviewed. Assessment:        1. Acute kidney injury, ATN from contrast low flow from cardiogenic shock and forward failure creat upto 3.8, BUN , evolving, UO fair on lasix gtt. 2. 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 Nephrotic range proteinuria - Bx not feasible in view of he being on Asprin and cardiology recommends not to stop anticoagulant. Explained to patient and he does not want to do that. 3.  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 Cardiomyopathy ejection fraction 40-45%/left ventricular diastole dysfunction  5. S/p PCI and atherectomy  3/11  6.   Moderate aortic stenosis with mean gradient 29 mmHg/moderate tricuspid regurgitation/moderate pulmonary hypertension with RVSP 60  7. HTN  8. Hx of Urinary retention  9. Advanced age    Plan:   1. Cont IV Lasix 80mg BID for now. 2.  Monitor strict I's and O's and renal function. 3.  BMP in a.m. 4.  Continue sodium bicarbonate. 5.  BP under better control with increasing coreg. 6.  Will follow. Nutrition   Please ensure that patient is on a renal diet/TF. Avoid nephrotoxic drugs/contrast exposure. We will continue to follow along with you. Shaheed Ko MD  [facility]

## 2019-03-19 NOTE — PROGRESS NOTES
Occupational Therapy  Facility/Department: [facility] CAR 1  Daily Treatment Note  NAME: Nan Hedrick  : 12/10/1928  MRN: [id number]    Date of Service: 3/19/2019    Discharge Recommendations:  Home with Home health OT, Home with assist PRN       Assessment   Performance deficits / Impairments: Decreased ADL status; Decreased endurance;Decreased functional mobility ; Decreased balance  Assessment: Pt would benefit from continued acute care OT to address minimal deficits in ADL/ functional activities, endurance, balance and functional transfers/ mobility following admission. Treatment Diagnosis: TAVR  Prognosis: Good  Patient Education: OT POC, EC/WS tech, Fall Prev, Cardiac Prec, Fall Prev, Proper use of IS, Safety with func mob and alds. Pt verbalized and demon understanding. REQUIRES OT FOLLOW UP: Yes  Activity Tolerance  Activity Tolerance: Patient Tolerated treatment well;Patient limited by fatigue  Safety Devices  Safety Devices in place: Yes  Type of devices: All fall risk precautions in place;Call light within reach; Left in chair;Nurse notified         Patient Diagnosis(es): The encounter diagnosis was Pulmonary hypertension ([address] has a past medical history of Aortic stenosis, Atrial fibrillation ([address] CAD (coronary artery disease), Chest pain, CHF (congestive heart failure) ([address] Chronic anemia, Chronic kidney disease, Hypertension, Pleural effusion on left, Pleural effusion, right, Pulmonary emboli ([address] Pulmonary hypertension ([address] and Thyroid disease. has a past surgical history that includes pacemaker placement (2019); thoracentesis; joint replacement; and Coronary angioplasty with stent (2019).     Restrictions  Restrictions/Precautions  Restrictions/Precautions: Cardiac, General Precautions  Required Braces or Orthoses?: No  Implants present? : Pacemaker  Position Activity Restriction  Other position/activity restrictions: Up w/assist.  Subjective   General  Patient assessed for rehabilitation services?: Yes  Family / Caregiver Present: No  Pain Assessment  Response to Pain Intervention: None  Vital Signs  Patient Currently in Pain: Denies      Objective    ADL  Feeding: Independent  Grooming: Stand by assistance;Setup; Increased time to complete(Seated in chair at tray table to wash face and brush teeth)  UE Bathing: Minimal assistance;Setup; Increased time to complete(assist w/back)  LE Bathing: Minimal assistance;Setup(pt demon ability to wash BLE w/assist w/feet)  UE Dressing: Minimal assistance;Setup(assist w/gown)  LE Dressing: Maximum assistance;Setup(assist w/CARLOS hose and slippers)  Toileting: Stand by assistance(using urinal at chair side)      Pt in chair upon arrival. Setup at tray table for self care using sx soap (see above for LOF). Pt tolerated tx well, no increased SOB with activity. Remained up  in recliner, BLE elevated, call light and phone in reach. RN notified. Educ given (see below).       Balance  Sitting Balance: Independent(seated in recliner)  Standing Balance: Contact guard assistance(w/RW)  Standing Balance  Activity: Pt stood for approx 1-2 min for adjusting linen in chair  Sit to stand: Contact guard assistance  Stand to sit: Contact guard assistance  Comment: No LOB w/RW      Plan   Plan  Times per week: 4-5x/wk     Goals  Short term goals  Time Frame for Short term goals: by discharge, pt will  Short term goal 1: demo I in UE ADL activities   Short term goal 2: demo MI in LE ADL activities with AD as needed  Short term goal 3: demo understanding and I use of EC/WS, fall prevention and proper pursed lipped breathing tech during functional activities   Short term goal 4: demo increased activity tolerance of 30+ min to assist with ADL/ functional activities  Short term goal 5: demo I in functional transfers/ mobility with use of RW to assist with ADL/ functional activities   Short term goal 6: demo understanding and I use of safe transfer tech during functional

## 2019-03-19 NOTE — PROGRESS NOTES
Nutrition Assessment    Type and Reason for Visit: Reassess    Nutrition Recommendations: Continue renal, 2 gm Na diet and 1800 mL FR. Suggest Ensure enlive supplements 3x/d. Nutrition Assessment:  Pt s/p PCI to LAD JEAN-CLAUDE and TAVR. Pt stated that his appetite is pretty good and is consuming % of his meals. Pt stated that his appetite would be better if the food was better prepared and warm. Informed pt that supplements are available, pt agreed to try Ensure Enlive supplement. Pt stated UBW of 165 lbs. Will add supplements to compliment po intake and monitor. Malnutrition Assessment:  · Malnutrition Status: At risk for malnutrition    Nutrition Risk Level: Moderate    Nutrient Needs:  · Estimated Daily Total Kcal: 5167-7132 kcal/day  · Estimated Daily Protein (g): 70-90 gm pro/day    Nutrition Diagnosis:   · Problem: Increased nutrient needs  · Etiology: related to Increased demand for energy/nutrients     Signs and symptoms:  as evidenced by (medical condition, advanced age)    Objective Information:  · Nutrition-Focused Physical Findings: active bowel sounds  · Wound Type: None  · Current Nutrition Therapies:  · Oral Diet Orders: 2gm Sodium, Renal, Fluid Restriction   · Oral Diet intake: 51-75%, %  · Oral Nutrition Supplement (ONS) Orders: None  · Anthropometric Measures:  · Ht: 5' 6\" (167.6 cm)   · Current Body Wt: 166 lb (75.3 kg)  · Admission Body Wt: 156 lb (70.8 kg)  · Usual Body Wt: 165 lb (74.8 kg)(per pt)  · % Weight Change:  ,  No wt hx in EMR   · Ideal Body Wt: 141 lb 15.6 oz (64.4 kg), % Ideal Body 111% (adm/ideal)  · BMI Classification: BMI 25.0 - 29.9 Overweight    Nutrition Interventions:   Continue current diet, Start ONS  Continued Inpatient Monitoring, Education Not Indicated    Nutrition Evaluation:   · Evaluation: Goal achieved   · Goals: Oral intakes to meet at least 50% of estimated nutrition needs.     · Monitoring: Nutrition Progression, Meal Intake, Supplement Intake, Diet Tolerance, I&O, Weight, Pertinent Labs, Monitor Bowel Function      Electronically signed by Casandra Duran RD, EDIE on 3/19/19 at 12:13 PM    Contact Number: [phone]

## 2019-03-19 NOTE — OP NOTE
[facility]                                OPERATIVE REPORT    PATIENT NAME: Mary Lou Ramírez                   :        12/10/1928  MED REC NO:   [id number]                             ROOM:       0808  ACCOUNT NO:   [de-identified]                           ADMIT DATE: 2019  PROVIDER:     Tierney Anthony    DATE OF PROCEDURE:  03/15/2019    IDENTIFICATION:  The patient is a 49-year-old gentleman. PREOPERATIVE DIAGNOSIS:  Severe aortic stenosis. POSTOPERATIVE DIAGNOSIS:  Severe aortic stenosis. PROCEDURE PERFORMED:  TAVR. SURGEON:  Tierney López. Omega Acosta MD    CARDIOLOGIST:  Mick Pastor MD    ESTIMATED BLOOD LOSS:  50 mL. COMPLICATIONS:  None. IMPLANT:  CoreValve [id number]. FINDINGS:  No paravalvular leak and low leak gradient. The patient was  seen and evaluated by 2 cardiac surgeons, deemed too frail and extreme  of age to undergo cardiac surgery with multiple comorbidities. Planned  for patient to undergo TAVR procedure for moderate to high risk. Based  on findings, the patient agrees and plans for elective TAVR procedure. DESCRIPTION OF PROCEDURE:  The patient was brought to the operating room  and placed supine on the operating table, was given conscious sedation. We did a transcatheter approach with valve replacement using a 29 mm  CoreValve via right femoral approach, serial number [id number]. Closure  devices were used in both femoral arteries. Selective angiography was  performed, filling femoral iliacs. Multiple root injections were  minimized by Dr. Troy dEge. I was in position #2, Dr. Troy Edge was in  position #1. Once the device was brought into the field, it was  checked, loaded properly. Device was then inserted and brought around  the arch and through the valve complex. Once it was secured to the  valve, I personally deployed it in position #2.   Once the valve

## 2019-03-19 NOTE — PROGRESS NOTES
Pulmonary Progress Note    CC:  chf   Subjective: On room air,  Diuresing well. No cough or hemoptysis    Review of Systems -  General ROS+, fatigue,  ENT ROS: negative for - headaches, oral lesions or sore throat  Cardiovascular ROS: no chest pain , +orthopnea   Gastrointestinal ROS: no abdominal pain, change in bowel habits, or black or bloody stools  Skin - no rash   Neuro - no blurry vision , no loc . No focal weakness   msk - no jt tenderness or swelling    Vascular - no claudication , rest completed and negative   Lymphatic - complete and negative   Hematology - oncology - complete and negative   Allergy immunology - complete and negative    no burning or hematuria      Immunization     There is no immunization history on file for this patient. Pneumococcal Vaccine     [] Up to date    [x] Indicated   [] Refused  [] Contraindicated       Influenza Vaccine   [] Up to date    [x] Indicated   [] Refused  [] Contraindicated     PAST MEDICAL HISTORY:       Diagnosis Date    Aortic stenosis 02/15/2019    Atrial fibrillation ([address]     CAD (coronary artery disease)     Chest pain 02/15/2019    CHF (congestive heart failure) (HCC)     Chronic anemia     Chronic kidney disease     Hypertension     Pleural effusion on left 02/15/2019    Pleural effusion, right 02/15/2019    Pulmonary emboli ([address] 02/15/2019    Pulmonary hypertension ([address] 03/06/2019    Thyroid disease     hypothyroidism         Family History:   History reviewed. No pertinent family history. SURGICAL HISTORY:   Past Surgical History:   Procedure Laterality Date    CORONARY ANGIOPLASTY WITH STENT PLACEMENT  03/11/2019    complex PCI of LT MAIN, LAD    JOINT REPLACEMENT      right ankle    PACEMAKER PLACEMENT  01/20/2019    THORACENTESIS                TOBACCO:   reports that he quit smoking about 30 years ago. He has never used smokeless tobacco.  ETOH:   reports that he drank alcohol.           ALLERGIES:    Allergies 350(0.6)  900   Shift Total(mL/kg) 550(7.3) 350(4.6)  900(11.9)   Weight (kg) 75.4 75.4 75.4 75.4     Patient Vitals for the past 96 hrs (Last 3 readings):   Weight   03/19/19 0500 166 lb 3.6 oz (75.4 kg)   03/18/19 0034 168 lb 10.4 oz (76.5 kg)   03/17/19 0419 165 lb 5.5 oz (75 kg)          PHYSICAL EXAMINATION:  General Appearance:    Alert, cooperative, no distress, appears stated age   Head:    Normocephalic, without obvious abnormality, atraumatic                  :    Neck:   Supple, symmetrical, trachea midline, no adenopathy;     thyroid:  no enlargement/tenderness/nodules; no carotid    bruit + JVP    Back:     Symmetric, no curvature, ROM normal, no CVA tenderness   Lungs:       Dull bases and dec bs bases no wheeze    Chest Wall:    No tenderness or deformity      Heart:    Regular rate and rhythm, S1 and S2 normal, 3/6 AS                           Abdomen:                                                 Pulses:                                            Lymph nodes:                    Neurologic:                  Soft, non-tender, bowel sounds active all four quadrants,     no masses, no organomegaly         2+ and symmetric all extremities            Cervical, supraclavicular not enlarged or matted or tender      CNII-XII intact, normal strength 5/5 .   Sensation grossly normal  and reflexes normal 2+  throughout     Clubbing No  Lower ext edema Yes1+   [] , 2 +  [] , 3+   []  Upper ext edema No       Musculoskeletal - no joint swelling or tenderness or synovitis            Medications:    Scheduled Meds:   carvedilol  6.25 mg Oral BID     apixaban  2.5 mg Oral BID    famotidine  20 mg Oral Daily    furosemide  80 mg Intravenous BID    oxymetazoline  2 spray Each Nare BID    sodium bicarbonate  650 mg Oral BID    sodium chloride flush  10 mL Intravenous 2 times per day    sodium chloride flush  10 mL Intravenous 2 times per day    levothyroxine  125 mcg Oral Daily    ALPRAZolam  0.25 mg Oral Once    QUEtiapine  25 mg Oral Nightly    sodium chloride flush  10 mL Intravenous 2 times per day    clopidogrel  75 mg Oral Daily    isosorbide mononitrate  30 mg Oral Daily    docusate sodium  100 mg Oral Daily    aspirin  81 mg Oral Daily    amiodarone  200 mg Oral Daily    atorvastatin  20 mg Oral Daily       Continuous Infusions:   sodium chloride      sodium chloride Stopped (03/15/19 1324)    norepinephrine 1 mcg/min (03/14/19 1050)       PRN Meds:      Labs:  CBC:   Recent Labs     03/17/19  0411   WBC 14.5*   HGB 8.8*   HCT 27.6*   MCV 90.5        BMP:   Recent Labs     03/17/19  0411 03/18/19  0414 03/18/19  1334 03/19/19  0454    137  --  137   K 4.0 3.4* 4.1 3.4*    102  --  105   CO2 19* 20  --  22   * 99*  --  104*   CREATININE 3.89* 3.84*  --  3.77*     LIVER PROFILE: No results for input(s): AST, ALT, LIPASE, BILIDIR, BILITOT, ALKPHOS in the last 72 hours. Invalid input(s): AMYLASE,  ALB  PT/INR:   No results for input(s): PROTIME, INR in the last 72 hours. APTT:   No results for input(s): APTT in the last 72 hours. UA:  No results for input(s): NITRITE, COLORU, PHUR, LABCAST, WBCUA, RBCUA, MUCUS, TRICHOMONAS, YEAST, BACTERIA, CLARITYU, SPECGRAV, LEUKOCYTESUR, UROBILINOGEN, BILIRUBINUR, BLOODU, GLUCOSEU, AMORPHOUS in the last 72 hours. Invalid input(s): KETONESU  No results for input(s): PHART, FSD4QTP, PO2ART in the last 72 hours.     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 *  Multi-vessel coronary artery disease post PCI  Severe aortic stenosis, post TAVR  Atrial fibrillation  Small right middle lobe pulmonary embolism.   Per chart, from imaging studies done in [place]  Recent epistaxis    Plan:  Continue Lasix monitor renal function  Continue eliquis   Physical therapy, occupational therapy  Will continue to follow    Electronically signed by Monico Vail MD on 3/19/2019 at 5:17 PM

## 2019-03-19 NOTE — PROGRESS NOTES
[facility]   Progress Note                   Date:   3/19/2019  Patient name: Ladan Herrmann  Date of admission:  3/6/2019  8:48 PM  MRN:   [id number]  YOB: 1928  PCP: No primary care provider on file. Reason for Admission: exertional dyspnea    Subjective:   Underwent PCI to LAD with JEAN-CLAUDE on 3/11/19. Underwent TAVR on 3/15/19. Maintaining UOP although BUN / Cr remains elevated. Diuretics per Nephro. BP also remains elevated. Was seen by ENT for nasal bleed, no intervention needed. He is in a paced rhythm, as has been the case since admission. He still has mild dyspnea on exertion.     Intake/Output Summary (Last 24 hours) at 3/19/2019 0916  Last data filed at 3/19/2019 0600  Gross per 24 hour   Intake --   Output 1775 ml   Net -1775 ml         Intake/Output Summary (Last 24 hours) at 3/19/2019 0916  Last data filed at 3/19/2019 0600  Gross per 24 hour   Intake --   Output 1775 ml   Net -1775 ml       Medications:   Scheduled Meds:   potassium bicarb-citric acid  40 mEq Oral Once    famotidine  20 mg Oral Daily    furosemide  80 mg Intravenous BID    oxymetazoline  2 spray Each Nare BID    carvedilol  3.125 mg Oral BID WC    sodium bicarbonate  650 mg Oral BID    sodium chloride flush  10 mL Intravenous 2 times per day    sodium chloride flush  10 mL Intravenous 2 times per day    levothyroxine  125 mcg Oral Daily    ALPRAZolam  0.25 mg Oral Once    QUEtiapine  25 mg Oral Nightly    sodium chloride flush  10 mL Intravenous 2 times per day    clopidogrel  75 mg Oral Daily    isosorbide mononitrate  30 mg Oral Daily    docusate sodium  100 mg Oral Daily    aspirin  81 mg Oral Daily    amiodarone  200 mg Oral Daily    atorvastatin  20 mg Oral Daily     Continuous Infusions:   sodium chloride      sodium chloride Stopped (03/15/19 1324)    norepinephrine 1 mcg/min (03/14/19 1050)     CBC:   Recent Labs     03/17/19  0411   WBC 14.5*   HGB 8.8*    II diastolic dysfunction. Heavily calcified aortic valve with reduced systolic excursion and evidence of moderate stenosis. (Peak nataliia 3.62 m/s and mean gradient 29 mmHg)  Moderate posterior pericardial effusion without any echo signs of tamponade. Normal right ventricular size and function. Pacemaker lead seen in right ventricle. Moderate tricuspid regurgitation. Estimated right ventricular systolic pressure is 60 mmHg suggesting moderate  pulmonary HTN. Cardiac Cath (3/11/19): Left main: 80-90% very calcified stenosis. This required rotational atherectomy and PTCA -JEAN-CLAUDE 4/0/15 mm stent, redcuing stenosis to 10% with AKHIL III flow. LAD: Proximal to mid area calcified 80% stenosis. This required Rotational atherectomy / JEAN-CLAUDE in mid and proximal area, reducing stenosis to 0% with AKHIL III flow  LCX: Diffuse disease (known from cath in [place])  RCA: Minimal 20-30% as reviewed from [address]    ECHO (3/17/19): Left ventricle is normal in size with mildly reduced systolic function;  Calculated ejection fraction via Dean's Method is 41%  Mild left ventricular hypertrophy. Evidence of diastolic dysfunction. Left atrium is severely dilated. Increased left atrial volume. Normal functioning bioprosthetic AVR in aortic position. No evidence of stenosis. No valvular regurgitation is seen. Trivial fredrick valvular regurgitation is noted. Mitral annular calcification and leaflet thickening with no evidence of  stenosis. Moderate mitral regurgitation. Mild tricuspid regurgitation. Estimated right ventricular systolic pressure is 51 mmHg. No significant pericardial effusion is seen. Pleural effusion is noted. Assessment / Acute Cardiac Problems:   1. Acute congestive heart failure (likely secondary to MV CAD and AS)  2. 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 TAVR on 3/15/19  4. 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  B/L Pleural effusions with RT & LT thoracentesis per IR (twice each side this admission)  6. Contrast induced nephropathy  7. Moderate pericardial effusion without tamponade physiology likely secondary to CHF on admission, now resolved with diuresis  8. Dual chamber pacer for complete heart block, placed in January 2019 in [place]  9. Moderate Pulm HTN (likely Group 2)  10. Paroxysmal Afib and small RT PE diagnosed in [place], on Eliquis 2.5 mg QD at home. 11. Nose bleed after 2 doses of Eliquis on 3/16 PM  12. Primary HTN  13. Dyslipidemia  14. Hypocalcemia    Plan of Treatment:   1. Successfully underwent complex PCI of LT main and LAD with JEAN-CLAUDE on 3/11/19  2. Successfully underwent TAVR on 3/15/19, post TAVR ECHO shows normally functioning bioprosthetic AV  3. Being treated for ALFONZO, Nephro input appreciated. IV diurtics per them. 4. He is making urine, although BUN / Cr remain elevated. 5. Has received 4 thoracentesis this admission, with continued re-accumulation of pleural effusions. Would recommend holding off any further thoracentesis (further decreasing intravascular volume)  6. Continue Aspirin, Plavix, Atorvastatin, Amiodarone, Coreg and Imdur  7. Was started on Eliquis over the weeked but stopped due to nose bleed. Seen by ENT on 3/18/19, who said no cautery or packing indicated. Moisturize with saline gel. 8. Will need to address anticoagulation prior to discharge. Was on Eliquis for Afib and RT small PE diagnosed in [place]. Triple therapy would place him at higher risk of bleeding    Will discuss with rounding attending Dr. Zheng Kraft for final recommendations. Majo Lombardi MD   Fellow, Cardiovascular Diseases      Attending Physician Statement  I have discussed the care of Yareli Chávez, including pertinent history and exam findings,  with the cardiology fellow/resident. I have seen and examined the patient and the key elements of all parts of the encounter have been performed by me.   I agree with the assessment, plan and orders as documented by the resident with additional recommendations as below:       hemodynamically stable, in no distress, Cr improving, no acute events overnight, increase coreg for better BP control. patient has PAF and will  Need long term AC, recommend Eliquis 2.5 mg BID (Dose adjusted for age and Cr). Triple therapy x 4 weeks and then D/c plavix.  Discussed with Dr Ilsa Hernandez, [address]  [phone]  '

## 2019-03-20 NOTE — PROGRESS NOTES
Renal Progress Note    Patient :  Kyle Rodriguez; 80 y.o. MRN# [id number]  Location:  2226/3902-22  Attending:  Venu Oreilly MD  Admit Date:  3/6/2019   Hospital Day: 14      Subjective:     Patient was seen and examined. S/P Cardiac Cath and PCI to LAD JEAN-CLAUDE on 3/11/19 and TAVR on 3/15/19. Suffered ALOFNZO and Cr increased to peak of 3.97mg/dl, was on Lasix gtt and now on intermittent Lasix. Getting Lasix 80mg BID. Made about 1.2 L of UOP last 24 hrs. Cr today 3.58 mg/dl. With BUN of 111. Otherwise alert and awake and answering all questions appropriately.      Outpatient Medications:     Medications Prior to Admission: amiodarone (CORDARONE) 200 MG tablet, Take 200 mg by mouth daily  atorvastatin (LIPITOR) 20 MG tablet, Take 20 mg by mouth daily  isosorbide mononitrate (IMDUR) 60 MG extended release tablet, Take 60 mg by mouth daily  levothyroxine (SYNTHROID) 100 MCG tablet, Take 100 mcg by mouth Daily  lisinopril (PRINIVIL;ZESTRIL) 40 MG tablet, Take 40 mg by mouth daily  apixaban (ELIQUIS) 2.5 MG TABS tablet, Take 2.5 mg by mouth daily  metoprolol tartrate (LOPRESSOR) 50 MG tablet, Take 50 mg by mouth 2 times daily    Current Medications:     Scheduled Meds:    FLUoxetine  10 mg Oral Daily    carvedilol  6.25 mg Oral BID WC    famotidine  20 mg Oral Daily    furosemide  80 mg Intravenous BID    oxymetazoline  2 spray Each Nare BID    sodium bicarbonate  650 mg Oral BID    sodium chloride flush  10 mL Intravenous 2 times per day    sodium chloride flush  10 mL Intravenous 2 times per day    levothyroxine  125 mcg Oral Daily    ALPRAZolam  0.25 mg Oral Once    QUEtiapine  25 mg Oral Nightly    sodium chloride flush  10 mL Intravenous 2 times per day    clopidogrel  75 mg Oral Daily    isosorbide mononitrate  30 mg Oral Daily    docusate sodium  100 mg Oral Daily    aspirin  81 mg Oral Daily    amiodarone  200 mg Oral Daily    atorvastatin  20 mg Oral Daily     Continuous Infusions:    sodium chloride      sodium chloride Stopped (03/15/19 1324)    norepinephrine 1 mcg/min (19 1050)     PRN Meds:  sodium chloride flush, sodium chloride flush, acetaminophen, ondansetron, sodium chloride flush, acetaminophen, magnesium hydroxide    Input/Output:       I/O last 3 completed shifts: In: 980 [P.O.:980]  Out: 1200 [Urine:1200]. Patient Vitals for the past 96 hrs (Last 3 readings):   Weight   19 0500 164 lb 10.9 oz (74.7 kg)   19 0500 166 lb 3.6 oz (75.4 kg)   19 0034 168 lb 10.4 oz (76.5 kg)       Vital Signs:   Temperature:  Temp: 97.3 °F (36.3 °C)  TMax:   Temp (24hrs), Av.5 °F (36.4 °C), Min:97.3 °F (36.3 °C), Max:98.1 °F (36.7 °C)    Respirations:  Resp: 16  Pulse:   Pulse: 60  BP:    BP: (!) 145/50  BP Range: Systolic (46WTP), CCK:314 , Min:114 , NFP:253       Diastolic (99VRB), MPU:05, Min:42, Max:64      Physical Examination:     General:  AAO x 3, speaking in full sentences, no accessory muscle use. HEENT: Atraumatic, normocephalic, no throat congestion, moist mucosa. Eyes:   Pupils equal, round and reactive to light, EOMI. Neck:   Supple  Chest:   Bilateral vesicular breath sounds, slight b/l crackles +ve, no wheezes. Cardiac:  S1 S2 RR, no murmurs, gallops or rubs. Abdomen: Soft, non-tender, no masses or organomegaly, BS audible. :   No suprapubic or flank tenderness. Neuro:  AAO x 3, No FND. SKIN:  No rashes, good skin turgor. Extremities:  1-2+ edema. Labs:       No results for input(s): WBC, RBC, HGB, HCT, MCV, MCH, MCHC, RDW, PLT, MPV in the last 72 hours.    BMP:   Recent Labs     19  0414 19  1334 19  0454 19  0438     --  137 138   K 3.4* 4.1 3.4* 3.9     --  105 100   CO2 20  --  22 24   BUN 99*  --  104* 111*   CREATININE 3.84*  --  3.77* 3.58*   GLUCOSE 98  --  99 106*   CALCIUM 7.9*  --  8.0* 7.9*      Magnesium:    Recent Labs     19  0414   MG 2.1     SPEP:  Lab Results   Component Value Date PROT 5.2 03/20/2019    ALBCAL 2.5 03/07/2019    ALBPCT 53 03/07/2019    LABALPH 0.3 03/07/2019    LABALPH 0.8 03/07/2019    A1PCT 7 03/07/2019    A2PCT 18 03/07/2019    LABBETA 0.7 03/07/2019    BETAPCT 14 03/07/2019    GAMGLOB 0.4 03/07/2019    GGPCT 8 03/07/2019    PATH ELECTRONICALLY SIGNED. Chip Licea M.D. 03/07/2019     Urinalysis/Chemistries:      Lab Results   Component Value Date    NITRU NEGATIVE 03/12/2019    COLORU YELLOW 03/12/2019    PHUR 5.0 03/12/2019    WBCUA None 03/12/2019    RBCUA 0 TO 2 03/12/2019    MUCUS NOT REPORTED 03/12/2019    TRICHOMONAS NOT REPORTED 03/12/2019    YEAST NOT REPORTED 03/12/2019    BACTERIA NOT REPORTED 03/12/2019    SPECGRAV 1.024 03/12/2019    LEUKOCYTESUR NEGATIVE 03/12/2019    UROBILINOGEN Normal 03/12/2019    BILIRUBINUR NEGATIVE 03/12/2019    GLUCOSEU NEGATIVE 03/12/2019    KETUA NEGATIVE 03/12/2019    AMORPHOUS NOT REPORTED 03/12/2019      Urine Creatinine:     Lab Results   Component Value Date    LABCREA 210.1 03/13/2019     Radiology:     Reviewed. Assessment:        1. Acute kidney injury, ATN from contrast low flow from cardiogenic shock and forward failure creat upto 3.8, BUN , evolving. 2. 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 Nephrotic range proteinuria - Bx not feasible in view of he being on Asprin and cardiology recommends not to stop anticoagulant. Explained to patient and he does not want to do that. 3.  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 Cardiomyopathy ejection fraction 40-45%/left ventricular diastole dysfunction  5. S/p PCI and atherectomy  3/11  6. Moderate aortic stenosis with mean gradient 29 mmHg/moderate tricuspid regurgitation/moderate pulmonary hypertension with RVSP 60  7. HTN  8.  Hx of

## 2019-03-20 NOTE — PROGRESS NOTES
Dr Carleen Zaragoza in at bedside patient c/o wanting to go home he says he is very depressed. Updated Dr Carleen Zaragoza about over night progress. Orders given for PA and Lat chest xray. Patient up to bathroom remains on O2 at 1L/NC.

## 2019-03-20 NOTE — CARE COORDINATION
Plan for thoracentesis today, initiate HD tomorrow per nephro note.   Transitional plan is home with nephew

## 2019-03-20 NOTE — PROGRESS NOTES
Type and Reason for Visit: Calorie Count, Consult    Current diet and supplement order:  DIET RENAL; Low Sodium (2 GM); Daily Fluid Restriction: 1800 ml  Dietary Nutrition Supplements: Standard High Calorie Oral Supplement    Comparative Standards (Estimated Nutrition Needs):   · Estimated Daily Total Kcal: 0650-6685 kcals/d   · Estimated Daily Protein (g): 85-99 gms/d         Intervention & Recommendations:   1. Day 1 of calorie count to start today, RN informed. 2. Continue Low Na diet and 1800 mL FR  3. Continue Ensure enlive supplements 3x/d.      Electronically signed by Shahid Brumfield RD, LD on 3/20/19 at 3:13 PM    Contact Number: [phone]

## 2019-03-20 NOTE — PROGRESS NOTES
Physical Therapy  Facility/Department: [facility] 1  Daily Treatment Note  NAME: Adam Dumont  : 12/10/1928  MRN: [id number]    Date of Service: 3/20/2019    Discharge Recommendations:  Home with assist PRN        Patient Diagnosis(es): The encounter diagnosis was Pulmonary hypertension ([address] has a past medical history of Aortic stenosis, Atrial fibrillation ([address] CAD (coronary artery disease), Chest pain, CHF (congestive heart failure) ([address] Chronic anemia, Chronic kidney disease, Hypertension, Pleural effusion on left, Pleural effusion, right, Pulmonary emboli ([address] Pulmonary hypertension ([address] and Thyroid disease. has a past surgical history that includes pacemaker placement (2019); thoracentesis; joint replacement; and Coronary angioplasty with stent (2019). Restrictions  Restrictions/Precautions  Restrictions/Precautions: Cardiac, General Precautions  Required Braces or Orthoses?: No  Implants present? : Pacemaker  Position Activity Restriction  Other position/activity restrictions: Up w/assist.  Subjective   General  Chart Reviewed: Yes  Response To Previous Treatment: Patient with no complaints from previous session. Family / Caregiver Present: No  Subjective  Subjective: RN states it is ok to take pt to PT gym via w/c. Pt in agreeance and offers no c/o pain  General Comment  Comments: Returned to chair with BLEs elevated; call light in reach.   Pain Screening  Patient Currently in Pain: Denies  Vital Signs  Patient Currently in Pain: Denies       Orientation  Orientation  Overall Orientation Status: Within Normal Limits  Cognition      Objective      Transfers  Sit to Stand: Stand by assistance;Contact guard assistance(completed 3x during session)  Stand to sit: Stand by assistance  Ambulation  Ambulation?: Yes  Ambulation 1  Surface: level tile  Device: Rolling Walker  Assistance: Contact guard assistance  Quality of Gait: flexed posture despite VCs, decreased pace, shuffling steps  Distance: 50ft     Balance  Posture: Fair  Sitting - Static: Good  Sitting - Dynamic: Good  Standing - Static: Good;-  Standing - Dynamic: Fair;+       exercises:  Seated LE exercise program: Long Arc Quads, hip abduction/adduction, heel/toe raises, and marches. Reps: 10x each with 2# ankle weights  Seated UBE, 2 min fwd and 2 min retro with 1 rest break due to fatigue  4 inch step up 3x, BUE support, no LOB    Assessment   Body structures, Functions, Activity limitations: Decreased functional mobility ; Decreased endurance;Decreased balance  Assessment: Pt tolerated mobility well with minimal SOB. SPO2 decreased to 88% with UE exertion; recovered to mid 90s with pursed lip breathing  Prognosis: Good  REQUIRES PT FOLLOW UP: Yes  Activity Tolerance  Activity Tolerance: Patient limited by endurance     Goals  Short term goals  Time Frame for Short term goals: 14 visits  Short term goal 1: Perform bed mobility and functional transfers independently  Short term goal 2: Ambulate 300ft with least restrictive AD independently  Short term goal 3: Demo Good- dynamic standing balance to decrease risk of falls  Short term goal 4: Participate in 30 minutes of therapy to demo increased endurance    Plan    Plan  Times per week: 6-7x/wk  Current Treatment Recommendations: Strengthening, Balance Training, Functional Mobility Training, Endurance Training, Transfer Training, Patient/Caregiver Education & Training, Safety Education & Training, Home Exercise Program, Gait Training  Safety Devices  Type of devices:  All fall risk precautions in place, Gait belt, Left in chair, Call light within reach, Nurse notified  Restraints  Initially in place: No     Therapy Time   Individual Concurrent Group Co-treatment   Time In 0215         Time Out 0245         Minutes [address], PTA

## 2019-03-20 NOTE — PLAN OF CARE
Problem: Falls - Risk of:  Goal: Will remain free from falls  Description  Will remain free from falls  Outcome: Met This Shift  Goal: Absence of physical injury  Description  Absence of physical injury  Outcome: Met This Shift     Problem: Pain:  Goal: Pain level will decrease  Description  Pain level will decrease  Outcome: Met This Shift  Goal: Control of acute pain  Description  Control of acute pain  Outcome: Met This Shift     Problem: Musculor/Skeletal Functional Status  Goal: Absence of falls  Outcome: Met This Shift

## 2019-03-20 NOTE — PROGRESS NOTES
Report called to Tracy Gutierres, RN @ [facility]. Pertinent admit information given, updated on medication changes and current patient condition. Asked Tracy Gutierres to call [place] office with tomorrow's INR and advised that today's dose of coumadin was held due to INR of 2.7.   No fur

## 2019-03-20 NOTE — PROGRESS NOTES
[facility]   Progress Note                   Date:   3/20/2019  Patient name: Kyle Rodriguez  Date of admission:  3/6/2019  8:48 PM  MRN:   [id number]  YOB: 1928  PCP: No primary care provider on file. Reason for Admission: exertional dyspnea    Subjective:   Underwent PCI to LAD with JEAN-CLAUDE on 3/11/19. Underwent TAVR on 3/15/19. Cr is now trending back down. Is making urine, diuresis per Nephro. BP is improved with increase in Coreg. Now on Eliquis. He remains in a paced rhythm. Has mild dyspnea on exertion. Denies any chest pain or palpitations. Intake/Output Summary (Last 24 hours) at 3/20/2019 0829  Last data filed at 3/20/2019 0100  Gross per 24 hour   Intake 860 ml   Output 1050 ml   Net -190 ml         Intake/Output Summary (Last 24 hours) at 3/20/2019 0829  Last data filed at 3/20/2019 0100  Gross per 24 hour   Intake 860 ml   Output 1050 ml   Net -190 ml       Medications:   Scheduled Meds:   carvedilol  6.25 mg Oral BID WC    apixaban  2.5 mg Oral BID    famotidine  20 mg Oral Daily    furosemide  80 mg Intravenous BID    oxymetazoline  2 spray Each Nare BID    sodium bicarbonate  650 mg Oral BID    sodium chloride flush  10 mL Intravenous 2 times per day    sodium chloride flush  10 mL Intravenous 2 times per day    levothyroxine  125 mcg Oral Daily    ALPRAZolam  0.25 mg Oral Once    QUEtiapine  25 mg Oral Nightly    sodium chloride flush  10 mL Intravenous 2 times per day    clopidogrel  75 mg Oral Daily    isosorbide mononitrate  30 mg Oral Daily    docusate sodium  100 mg Oral Daily    aspirin  81 mg Oral Daily    amiodarone  200 mg Oral Daily    atorvastatin  20 mg Oral Daily     Continuous Infusions:   sodium chloride      sodium chloride Stopped (03/15/19 1324)    norepinephrine 1 mcg/min (03/14/19 1050)     CBC:   No results for input(s): WBC, HGB, PLT in the last 72 hours.   BMP:    Recent Labs     03/18/19  0414 03/18/19  0244 03/19/19  0454 03/20/19  0438     --  137 138   K 3.4* 4.1 3.4* 3.9     --  105 100   CO2 20  --  22 24   BUN 99*  --  104* 111*   CREATININE 3.84*  --  3.77* 3.58*   GLUCOSE 98  --  99 106*     Hepatic:   No results for input(s): AST, ALT, ALB, BILITOT, ALKPHOS in the last 72 hours. Troponin: No results for input(s): TROPONINI in the last 72 hours. BNP: No results for input(s): BNP in the last 72 hours. Lipids: No results for input(s): CHOL, HDL in the last 72 hours. Invalid input(s): LDLCALCU  INR:   No results for input(s): INR in the last 72 hours. Objective:   Vitals: BP (!) 158/64   Pulse 60   Temp 97.3 °F (36.3 °C) (Oral)   Resp 18   Ht 5' 6\" (1.676 m)   Wt 164 lb 10.9 oz (74.7 kg)   SpO2 97%   BMI 26.58 kg/m²   Constitutional and General Appearance: alert, cooperative, no distress and appears stated age  Respiratory:  · Diminished breath sounds at B/L lung bases  Cardiovascular:  S1, s2, paced rhythm  Abdomen:   · No masses or tenderness, soft abdomen  · Bowel sounds present  Extremities:  · le edema present, peripheral pulse bl le present 2 +  · Bilateral groins soft and without hematoma  Integumentum:   Intact with no rashes noted      EKG:   V-pacing with occasional PVCs. CTA Chest in [place] (2/15/19):  Small PE within branch of RT middle lobe artery. Large RT pleural effusion. Moderate LT pleural effusion.     ECHO in [place] (2/17/19:   LVH with LVEF 50%. LT atrial enlargement. Moderate AS with Mild AI. Mitral annular calcification with mild MR. Mild to Moderate Pulm HTN.     Cardiac Angiography in [place] (1/20/19): Multivessel disease including LM. ECHO (3/6/19):   Normal left ventricle size with mildly reduced systolic function; Estimated left ventricular ejection fraction 40-45%  Anterolateral wall hypokinesis. Mild left ventricular hypertrophy. Left atrium is moderately dilated. Grade II diastolic dysfunction.   Heavily calcified aortic valve with pericardial effusion without tamponade physiology likely secondary to CHF on admission, now resolved with diuresis  8. Dual chamber pacer for complete heart block, placed in January 2019 in [place]  9. Moderate Pulm HTN (likely Group 2)  10. Paroxysmal Afib and small RT PE diagnosed in [place], on Eliquis 2.5 mg QD at home. 11. Nose bleed after 2 doses of Eliquis on 3/16 PM  12. Primary HTN  13. Dyslipidemia  14. Hypocalcemia    Plan of Treatment:   1. Successfully underwent complex PCI of LT main and LAD with JEAN-CLAUDE on 3/11/19  2. Successfully underwent TAVR on 3/15/19, post TAVR ECHO shows normally functioning bioprosthetic AV  3. Being treated for ALFONZO, Nephro input appreciated. IV diuretics per them. 4. Cr is now trending down after plateau  5. Has received 4 thoracentesis this admission, with continued re-accumulation of pleural effusions. Would recommend holding off any further thoracentesis (further decreasing intravascular volume)  6. Continue Aspirin, Plavix, Atorvastatin, Amiodarone, Coreg and Imdur  7. BP improved with increase in Coreg  8. Now started on Eliquis  9. Recommend stopping Plavix 4 weeks after stent placement, and then continue with Aspirin and Eliquis    Will discuss with rounding attending Dr. Morro Diana for final recommendations. Adan Schwarz MD   Fellow, Cardiovascular Diseases    Attending Cardiologist Addendum: I have reviewed and performed the history, physical, subjective, objective, assessment, and plan with the resident/fellow and agree with the note. I performed the history and physical personally. I have made changes to the note above as needed. Possible thora today- will defer to CT surgery  Possible temporary dialysis cath- defer to CT surgery and Nephro    Thank you for allowing me to participate in the care of this patient, please do not hesitate to call if you have any questions. Mignon Khan DO, [facility] [place]  [email or web address] [employer]  [phone]

## 2019-03-20 NOTE — PROGRESS NOTES
Dr Tera Garzon called and ordered Dopamine at [facility] for renal perfusion. Ok'd per Nephrology.

## 2019-03-20 NOTE — PROGRESS NOTES
Patient escorted to IR for thorocentesis to right side per cart and transport. OK'd per Dr Marion Lawson if patient just goes with transport. No need for RN tr travel with patient. Patient is stepdown.

## 2019-03-20 NOTE — FLOWSHEET NOTE
DATE: 3/20/2019    NAME: Lu Fallon  MRN: [id number]   : 12/10/1928    Patient not seen this date for Physical Therapy due to:  [] Blood transfusion in progress  [] Hemodialysis  []  Patient Declined  [] Spine Precautions   [] Strict Bedrest  [] Surgery/ Procedure  [x] Testing; off unit for xray, will check back later      [] Other        [] PT being discontinued at this time. Patient independent. No further needs. [] PT being discontinued at this time as the patient has been transferred to palliative care. No further needs.     Calvin Parra, PTA

## 2019-03-20 NOTE — PROGRESS NOTES
Pulmonary Progress Note    CC:  chf   Subjective: On room air,  Diuresing well. No cough or hemoptysis  CXR this am reviewed, increasing effusion on Rt. For Thoracentesis today  Intake 980; output 1200  Nephrology planning for HD in am, will need HD catheter. Review of Systems -  General ROS+, fatigue,  ENT ROS: negative for - headaches, oral lesions or sore throat  Cardiovascular ROS: no chest pain , +orthopnea   Gastrointestinal ROS: no abdominal pain, change in bowel habits, or black or bloody stools  Skin - no rash   Neuro - no blurry vision , no loc . No focal weakness   msk - no jt tenderness or swelling    Vascular - no claudication , rest completed and negative   Lymphatic - complete and negative   Hematology - oncology - complete and negative   Allergy immunology - complete and negative    no burning or hematuria      Immunization     There is no immunization history on file for this patient. Pneumococcal Vaccine     [] Up to date    [x] Indicated   [] Refused  [] Contraindicated       Influenza Vaccine   [] Up to date    [x] Indicated   [] Refused  [] Contraindicated     PAST MEDICAL HISTORY:       Diagnosis Date    Aortic stenosis 02/15/2019    Atrial fibrillation ([address]     CAD (coronary artery disease)     Chest pain 02/15/2019    CHF (congestive heart failure) (HCC)     Chronic anemia     Chronic kidney disease     Hypertension     Pleural effusion on left 02/15/2019    Pleural effusion, right 02/15/2019    Pulmonary emboli ([address] 02/15/2019    Pulmonary hypertension ([address] 03/06/2019    Thyroid disease     hypothyroidism         Family History:   History reviewed. No pertinent family history.     SURGICAL HISTORY:   Past Surgical History:   Procedure Laterality Date    CORONARY ANGIOPLASTY WITH STENT PLACEMENT  03/11/2019    complex PCI of LT MAIN, LAD    JOINT REPLACEMENT      right ankle    PACEMAKER PLACEMENT  01/20/2019    THORACENTESIS                TOBACCO:   reports that he quit smoking about 30 years ago. He has never used smokeless tobacco.  ETOH:   reports that he drank alcohol. ALLERGIES:    Allergies   Allergen Reactions    Quinolones      Pt is allergic to fluoroquinolones       Home Meds:   Prior to Admission medications    Medication Sig Start Date End Date Taking? Authorizing Provider   amiodarone (CORDARONE) 200 MG tablet Take 200 mg by mouth daily   Yes Historical Provider, MD   atorvastatin (LIPITOR) 20 MG tablet Take 20 mg by mouth daily   Yes Historical Provider, MD   isosorbide mononitrate (IMDUR) 60 MG extended release tablet Take 60 mg by mouth daily   Yes Historical Provider, MD   levothyroxine (SYNTHROID) 100 MCG tablet Take 100 mcg by mouth Daily   Yes Historical Provider, MD   lisinopril (PRINIVIL;ZESTRIL) 40 MG tablet Take 40 mg by mouth daily   Yes Historical Provider, MD   apixaban (ELIQUIS) 2.5 MG TABS tablet Take 2.5 mg by mouth daily   Yes Historical Provider, MD   metoprolol tartrate (LOPRESSOR) 50 MG tablet Take 50 mg by mouth 2 times daily   Yes Historical Provider, MD         Intake/Output Summary (Last 24 hours) at 3/20/2019 1915  Last data filed at 3/20/2019 1724  Gross per 24 hour   Intake 480 ml   Output 3100 ml   Net -2620 ml         Diet   DIET RENAL; Low Sodium (2 GM);  Daily Fluid Restriction: 1800 ml  Dietary Nutrition Supplements: Standard High Calorie Oral Supplement    Vitals:   BP (!) 159/55   Pulse 67   Temp 97.5 °F (36.4 °C) (Oral)   Resp 16   Ht 5' 6\" (1.676 m)   Wt 164 lb 10.9 oz (74.7 kg)   SpO2 96%   BMI 26.58 kg/m²  on         I/O (24 Hours)    Patient Vitals for the past 8 hrs:   BP Temp Temp src Pulse Resp SpO2   03/20/19 1800 (!) 159/55 97.5 °F (36.4 °C) Oral 67 16 96 %   03/20/19 1600 -- -- -- 60 -- 98 %   03/20/19 1547 (!) 133/46 97.7 °F (36.5 °C) Oral 60 17 97 %   03/20/19 1131 (!) 145/50 97.3 °F (36.3 °C) Oral -- 16 --       Intake/Output Summary (Last 24 hours) at 3/20/2019 4373  Last data filed at 3/20/2019 1724  Gross per 24 hour   Intake 480 ml   Output 3100 ml   Net -2620 ml     I/O last 3 completed shifts: In: 5 [P.O.:720]  Out: 1000 [Urine:1000]   Date 03/20/19 0000 - 03/20/19 2359   Shift 8661-2577 3035-7695 9251-3365 24 Hour Total   INTAKE   Shift Total(mL/kg)       OUTPUT   Urine(mL/kg/hr) 200(0.3) 350(0.6) 700 1250   Other(mL/kg)   1650(22.1) 1650(22.1)   Shift Total(mL/kg) 200(2.7) 350(4.7) 2350(31.5) 0494(65.8)   Weight (kg) 74.7 74.7 74.7 74.7     Patient Vitals for the past 96 hrs (Last 3 readings):   Weight   03/20/19 0500 164 lb 10.9 oz (74.7 kg)   03/19/19 0500 166 lb 3.6 oz (75.4 kg)   03/18/19 0034 168 lb 10.4 oz (76.5 kg)          PHYSICAL EXAMINATION:  General Appearance:    Alert, cooperative, no distress, appears stated age, sleepy, arouses easily to voice. Head:    Normocephalic, without obvious abnormality, atraumatic                  :    Neck:   Supple, symmetrical, trachea midline, no adenopathy;     thyroid:  no enlargement/tenderness/nodules; no carotid    bruit + JVP    Back:     Symmetric, no curvature, ROM normal, no CVA tenderness   Lungs:       Dull bases and dec bs bases no wheeze    Chest Wall:    No tenderness or deformity      Heart:    Regular rate and rhythm, S1 and S2 normal, 3/6 AS                           Abdomen:                                                 Pulses:                                            Lymph nodes:                    Neurologic:                  Soft, non-tender, bowel sounds active all four quadrants,     no masses, no organomegaly         2+ and symmetric all extremities            Cervical, supraclavicular not enlarged or matted or tender      CNII-XII intact, normal strength 5/5 .   Sensation grossly normal  and reflexes normal 2+  throughout     Clubbing No  Lower ext edema Yes1+   [] , 2 +  [] , 3+   []  Upper ext edema No       Musculoskeletal - no joint swelling or tenderness or synovitis            Medications:    Scheduled Meds:   FLUoxetine  10 mg Oral Daily    furosemide        carvedilol  6.25 mg Oral BID WC    famotidine  20 mg Oral Daily    furosemide  80 mg Intravenous BID    oxymetazoline  2 spray Each Nare BID    sodium bicarbonate  650 mg Oral BID    sodium chloride flush  10 mL Intravenous 2 times per day    sodium chloride flush  10 mL Intravenous 2 times per day    levothyroxine  125 mcg Oral Daily    ALPRAZolam  0.25 mg Oral Once    QUEtiapine  25 mg Oral Nightly    sodium chloride flush  10 mL Intravenous 2 times per day    clopidogrel  75 mg Oral Daily    isosorbide mononitrate  30 mg Oral Daily    docusate sodium  100 mg Oral Daily    aspirin  81 mg Oral Daily    amiodarone  200 mg Oral Daily    atorvastatin  20 mg Oral Daily       Continuous Infusions:   DOPamine 2 mcg/kg/min (03/20/19 1830)    sodium chloride      sodium chloride Stopped (03/15/19 1324)    norepinephrine 1 mcg/min (03/14/19 1050)       PRN Meds:      Labs:  CBC:   No results for input(s): WBC, HGB, HCT, MCV, PLT in the last 72 hours. BMP:   Recent Labs     03/18/19  0414 03/18/19  1334 03/19/19  0454 03/20/19  0438     --  137 138   K 3.4* 4.1 3.4* 3.9     --  105 100   CO2 20  --  22 24   BUN 99*  --  104* 111*   CREATININE 3.84*  --  3.77* 3.58*     LIVER PROFILE:   Recent Labs     03/20/19  0438   AST 18   ALT 30   BILIDIR 0.13   BILITOT 0.44   ALKPHOS 124     PT/INR:   No results for input(s): PROTIME, INR in the last 72 hours. APTT:   No results for input(s): APTT in the last 72 hours. UA:  No results for input(s): NITRITE, COLORU, PHUR, LABCAST, WBCUA, RBCUA, MUCUS, TRICHOMONAS, YEAST, BACTERIA, CLARITYU, SPECGRAV, LEUKOCYTESUR, UROBILINOGEN, BILIRUBINUR, BLOODU, GLUCOSEU, AMORPHOUS in the last 72 hours. Invalid input(s): KETONESU  No results for input(s): PHART, ZAN3CTJ, PO2ART in the last 72 hours.     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 *  Multi-vessel coronary artery disease post PCI  Severe aortic stenosis, post TAVR  Atrial fibrillation  Small right middle lobe pulmonary embolism. Per chart, from imaging studies done in [place]  Recent epistaxis    Plan:  Continue Lasix monitor renal function  HD tomorrow per Nephrology  Thoracentesis today per CT surgery  Eliquis on hold for Thoracentesis, and HD catheter placement. Physical therapy, occupational therapy  Will continue to follow    Jacquelin Kawasaki M.D.  PGY-3 Internal Medicine  [facility].   3/20/2019 at 7:15 PM

## 2019-03-21 NOTE — PROGRESS NOTES
Type and Reason for Visit: Calorie Count(Day 1)    Current diet and supplement order:  DIET RENAL; Low Sodium (2 GM); Daily Fluid Restriction: 1800 ml  Dietary Nutrition Supplements: Standard High Calorie Oral Supplement    Comparative Standards (Estimated Nutrition Needs):   · Estimated Daily Total Kcal: 1.3-1.4 ~>1167-2826 kcals/d   · Estimated Daily Protein (g): 1.2-1.4 gm/kg ~>85-99 gms/d    Date Consumed PO Intake Kcal %   Kcal met PO Intake grams protein %  Protein met   Comments   3/20     No meal slips documented/recorded                                                  Intervention & Recommendations:   1. Continue Calorie Count  2. Continue renal, 2 gm Na diet and 1800 mL FR  3. Recommend renal supplements 3x/d.     Electronically signed by Maverick Jorgensen RD, LD on 3/21/19 at 2:41 PM    Contact Number: [phone]

## 2019-03-21 NOTE — PROGRESS NOTES
[facility]  Pre Op Progress Note    CC: Acute CHF, SOB      Subjective:  Mr. Shirley Saleh seen and examined Plan of care reviewed and questions answered. Physical Exam  Vital Signs: BP (!) 133/44   Pulse 63   Temp 97.7 °F (36.5 °C) (Axillary)   Resp 16   Ht 5' 6\" (1.676 m)   Wt 159 lb 2.8 oz (72.2 kg)   SpO2 96%   BMI 25.69 kg/m²  O2 Flow Rate (L/min): 1.5 L/min       General: alert and oriented to person, place and time, well-developed and well-nourished, in no acute distress. Heart:Normal S1 and S2.  Regular rhythm. No murmurs, gallops, or rubs. Lungs: clear to auscultation bilaterally  Abdomen: soft, non tender, non distended, BSx4  Extremities: 1+ edema      Scheduled Meds:    FLUoxetine  10 mg Oral Daily    carvedilol  6.25 mg Oral BID WC    famotidine  20 mg Oral Daily    furosemide  80 mg Intravenous BID    sodium bicarbonate  650 mg Oral BID    sodium chloride flush  10 mL Intravenous 2 times per day    sodium chloride flush  10 mL Intravenous 2 times per day    levothyroxine  125 mcg Oral Daily    ALPRAZolam  0.25 mg Oral Once    QUEtiapine  25 mg Oral Nightly    sodium chloride flush  10 mL Intravenous 2 times per day    clopidogrel  75 mg Oral Daily    isosorbide mononitrate  30 mg Oral Daily    docusate sodium  100 mg Oral Daily    aspirin  81 mg Oral Daily    amiodarone  200 mg Oral Daily    atorvastatin  20 mg Oral Daily     Continuous Infusions:    DOPamine 2 mcg/kg/min (03/20/19 1830)    sodium chloride      sodium chloride Stopped (03/15/19 1324)    norepinephrine 1 mcg/min (03/14/19 1050)       Data:  CBC: No results for input(s): WBC, HGB, HCT, MCV, PLT in the last 72 hours. BMP:   Recent Labs     03/19/19  0454 03/20/19  0438 03/21/19  0430    138 140   K 3.4* 3.9 3.6*    100 102   CO2 22 24 27   * 111* 110*   CREATININE 3.77* 3.58* 3.30*     PT/INR: No results for input(s): PROTIME, INR in the last 72 hours.   APTT: No results for input(s): APTT in the last 72 hours. 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 Meri Sawant, the attending on service for the cardiothoracic surgery group today.      Electronically signed by MIRYAM Fontaine on 3/21/2019 at 8:15 AM

## 2019-03-21 NOTE — PROGRESS NOTES
[facility]   Progress Note                   Date:   3/21/2019  Patient name: Dick Blackburn  Date of admission:  3/6/2019  8:48 PM  MRN:   [id number]  YOB: 1928  PCP: No primary care provider on file. Reason for Admission: exertional dyspnea    Subjective:   Underwent PCI to LAD with JEAN-CLAUDE on 3/11/19. Underwent TAVR on 3/15/19. Underwent RT thoracentesis again with removal of 1.65 L of fluid. Persistent re-accumulation of pleural effusion despite multiple thoracenteses. On Dopamine infusion, started last evening by CTS for renal perfusion. HD catheter possibly later today. Eliquis remains on hold. Is making urine, diuresis per Nephro. He remains in a paced rhythm. Has mild dyspnea on exertion. Denies any chest pain or palpitations.       Intake/Output Summary (Last 24 hours) at 3/21/2019 0755  Last data filed at 3/21/2019 0555  Gross per 24 hour   Intake 420 ml   Output 3975 ml   Net -3555 ml         Intake/Output Summary (Last 24 hours) at 3/21/2019 0755  Last data filed at 3/21/2019 0555  Gross per 24 hour   Intake 420 ml   Output 3975 ml   Net -3555 ml       Medications:   Scheduled Meds:   FLUoxetine  10 mg Oral Daily    carvedilol  6.25 mg Oral BID WC    famotidine  20 mg Oral Daily    furosemide  80 mg Intravenous BID    sodium bicarbonate  650 mg Oral BID    sodium chloride flush  10 mL Intravenous 2 times per day    sodium chloride flush  10 mL Intravenous 2 times per day    levothyroxine  125 mcg Oral Daily    ALPRAZolam  0.25 mg Oral Once    QUEtiapine  25 mg Oral Nightly    sodium chloride flush  10 mL Intravenous 2 times per day    clopidogrel  75 mg Oral Daily    isosorbide mononitrate  30 mg Oral Daily    docusate sodium  100 mg Oral Daily    aspirin  81 mg Oral Daily    amiodarone  200 mg Oral Daily    atorvastatin  20 mg Oral Daily     Continuous Infusions:   DOPamine 2 mcg/kg/min (03/20/19 1830)    sodium chloride      sodium chloride Stopped (03/15/19 1324)    norepinephrine 1 mcg/min (03/14/19 1050)     CBC:   No results for input(s): WBC, HGB, PLT in the last 72 hours. BMP:    Recent Labs     03/19/19  0454 03/20/19  0438 03/21/19  0430    138 140   K 3.4* 3.9 3.6*    100 102   CO2 22 24 27   * 111* 110*   CREATININE 3.77* 3.58* 3.30*   GLUCOSE 99 106* 97     Hepatic:   Recent Labs     03/20/19  0438   AST 18   ALT 30   BILITOT 0.44   ALKPHOS 124     Troponin: No results for input(s): TROPONINI in the last 72 hours. BNP: No results for input(s): BNP in the last 72 hours. Lipids: No results for input(s): CHOL, HDL in the last 72 hours. Invalid input(s): LDLCALCU  INR:   No results for input(s): INR in the last 72 hours. Objective:   Vitals: BP (!) 133/44   Pulse 63   Temp 97.7 °F (36.5 °C) (Axillary)   Resp 16   Ht 5' 6\" (1.676 m)   Wt 159 lb 2.8 oz (72.2 kg)   SpO2 96%   BMI 25.69 kg/m²   Constitutional and General Appearance: alert, cooperative, no distress and appears stated age  Respiratory:  · Diminished breath sounds at B/L lung bases  Cardiovascular:  S1, s2, paced rhythm  Abdomen:   · No masses or tenderness, soft abdomen  · Bowel sounds present  Extremities:  · le edema present, peripheral pulse bl le present 2 +  · Bilateral groins soft and without hematoma  Integumentum:   Intact with no rashes noted      EKG:   V-pacing with occasional PVCs. CTA Chest in [place] (2/15/19):  Small PE within branch of RT middle lobe artery. Large RT pleural effusion. Moderate LT pleural effusion.     ECHO in [place] (2/17/19:   LVH with LVEF 50%. LT atrial enlargement. Moderate AS with Mild AI. Mitral annular calcification with mild MR. Mild to Moderate Pulm HTN.     Cardiac Angiography in [place] (1/20/19): Multivessel disease including LM.     ECHO (3/6/19):   Normal left ventricle size with mildly reduced systolic function; Estimated left ventricular ejection fraction 40-45%  Anterolateral wall hypokinesis. Mild left ventricular hypertrophy. Left atrium is moderately dilated. Grade II diastolic dysfunction. Heavily calcified aortic valve with reduced systolic excursion and evidence of moderate stenosis. (Peak nataliia 3.62 m/s and mean gradient 29 mmHg)  Moderate posterior pericardial effusion without any echo signs of tamponade. Normal right ventricular size and function. Pacemaker lead seen in right ventricle. Moderate tricuspid regurgitation. Estimated right ventricular systolic pressure is 60 mmHg suggesting moderate  pulmonary HTN. Cardiac Cath (3/11/19): Left main: 80-90% very calcified stenosis. This required rotational atherectomy and PTCA -JEAN-CLAUDE 4/0/15 mm stent, redcuing stenosis to 10% with AKHIL III flow. LAD: Proximal to mid area calcified 80% stenosis. This required Rotational atherectomy / JEAN-CLAUDE in mid and proximal area, reducing stenosis to 0% with AKHIL III flow  LCX: Diffuse disease (known from cath in [place])  RCA: Minimal 20-30% as reviewed from [address]    ECHO (3/17/19): Left ventricle is normal in size with mildly reduced systolic function;  Calculated ejection fraction via Dean's Method is 41%  Mild left ventricular hypertrophy. Evidence of diastolic dysfunction. Left atrium is severely dilated. Increased left atrial volume. Normal functioning bioprosthetic AVR in aortic position. No evidence of stenosis. No valvular regurgitation is seen. Trivial fredrick valvular regurgitation is noted. Mitral annular calcification and leaflet thickening with no evidence of  stenosis. Moderate mitral regurgitation. Mild tricuspid regurgitation. Estimated right ventricular systolic pressure is 51 mmHg. No significant pericardial effusion is seen. Pleural effusion is noted. Assessment / Acute Cardiac Problems:   1. Acute congestive heart failure (likely secondary to MV CAD and AS)  2. 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 TAVR on 3/15/19  4.  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 B/L Pleural effusions with RT & LT thoracentesis per IR (multiple times)  6. Contrast induced nephropathy  7. Moderate pericardial effusion without tamponade physiology likely secondary to CHF on admission, now resolved with diuresis  8. Dual chamber pacer for complete heart block, placed in January 2019 in [place]  9. Moderate Pulm HTN (likely Group 2)  10. Paroxysmal Afib and small RT PE diagnosed in [place], on Eliquis 2.5 mg QD at home. 11. Nose bleed after 2 doses of Eliquis on 3/16 PM  12. Primary HTN  13. Dyslipidemia  14. Hypocalcemia    Plan of Treatment:   1. Successfully underwent complex PCI of LT main and LAD with JEAN-CLAUDE on 3/11/19  2. Successfully underwent TAVR on 3/15/19, post TAVR ECHO shows normally functioning bioprosthetic AV  3. Being treated for ALFONZO, Nephro input appreciated. Cr trending down and patient ismaking urine. Possibility of initiation of dialysis today. 4. Underwent RT thoracentesis again by IR yesterday. 5. Has received 4 thoracentesis this admission, with continued re-accumulation of pleural effusions. Would recommend holding off any further thoracentesis (further decreasing intravascular volume)  6. Continue Aspirin, Plavix, Atorvastatin, Amiodarone, Coreg and Imdur  7. Eliquis is on hold due to Thoracentesis and possible placement of Pop catheter. 8. Recommend stopping Plavix 4 weeks after stent placement, and then continue with Aspirin and Eliquis    Will discuss with rounding attending Dr. Anny Dunn for final recommendations. Edi Wu MD   Fellow, Cardiovascular Diseases      Attending Physician Statement  I have discussed the care of Anuja Guevara, including pertinent history and exam findings,  with the cardiology fellow/resident. I have seen and examined the patient and the key elements of all parts of the encounter have been performed by me. I agree with the assessment, plan and orders as documented by the fellow.      Geraldine meet Carlos Dorsey [facility]  [address] [place] [facility] [place]  [phone]

## 2019-03-21 NOTE — PROGRESS NOTES
Pulmonary Progress Note    CC:  chf   Subjective:  No acute events   On ra   3.5  l negative   No cardiac rub   No astrexis  Review of Systems -  General ROS+, fatigue,  ENT ROS: negative for - headaches, oral lesions or sore throat  Cardiovascular ROS: no chest pain , +orthopnea   Gastrointestinal ROS: no abdominal pain, change in bowel habits, or black or bloody stools  Skin - no rash   Neuro - no blurry vision , no loc . No focal weakness   msk - no jt tenderness or swelling    Vascular - no claudication , rest completed and negative   Lymphatic - complete and negative   Hematology - oncology - complete and negative   Allergy immunology - complete and negative    no burning or hematuria      Immunization     There is no immunization history on file for this patient. Pneumococcal Vaccine     [] Up to date    [x] Indicated   [] Refused  [] Contraindicated       Influenza Vaccine   [] Up to date    [x] Indicated   [] Refused  [] Contraindicated     PAST MEDICAL HISTORY:       Diagnosis Date    Aortic stenosis 02/15/2019    Atrial fibrillation ([address]     CAD (coronary artery disease)     Chest pain 02/15/2019    CHF (congestive heart failure) (HCC)     Chronic anemia     Chronic kidney disease     Hypertension     Pleural effusion on left 02/15/2019    Pleural effusion, right 02/15/2019    Pulmonary emboli ([address] 02/15/2019    Pulmonary hypertension ([address] 03/06/2019    Thyroid disease     hypothyroidism         Family History:   History reviewed. No pertinent family history. SURGICAL HISTORY:   Past Surgical History:   Procedure Laterality Date    CORONARY ANGIOPLASTY WITH STENT PLACEMENT  03/11/2019    complex PCI of LT MAIN, LAD    JOINT REPLACEMENT      right ankle    PACEMAKER PLACEMENT  01/20/2019    THORACENTESIS                TOBACCO:   reports that he quit smoking about 30 years ago. He has never used smokeless tobacco.  ETOH:   reports that he drank alcohol.           ALLERGIES: Allergies   Allergen Reactions    Quinolones      Pt is allergic to fluoroquinolones       Home Meds:   Prior to Admission medications    Medication Sig Start Date End Date Taking? Authorizing Provider   amiodarone (CORDARONE) 200 MG tablet Take 200 mg by mouth daily   Yes Historical Provider, MD   atorvastatin (LIPITOR) 20 MG tablet Take 20 mg by mouth daily   Yes Historical Provider, MD   isosorbide mononitrate (IMDUR) 60 MG extended release tablet Take 60 mg by mouth daily   Yes Historical Provider, MD   levothyroxine (SYNTHROID) 100 MCG tablet Take 100 mcg by mouth Daily   Yes Historical Provider, MD   lisinopril (PRINIVIL;ZESTRIL) 40 MG tablet Take 40 mg by mouth daily   Yes Historical Provider, MD   apixaban (ELIQUIS) 2.5 MG TABS tablet Take 2.5 mg by mouth daily   Yes Historical Provider, MD   metoprolol tartrate (LOPRESSOR) 50 MG tablet Take 50 mg by mouth 2 times daily   Yes Historical Provider, MD         Intake/Output Summary (Last 24 hours) at 3/21/2019 1359  Last data filed at 3/21/2019 0800  Gross per 24 hour   Intake 780 ml   Output 4175 ml   Net -3395 ml         Diet   DIET RENAL; Low Sodium (2 GM); Daily Fluid Restriction: 1800 ml  Dietary Nutrition Supplements: Standard High Calorie Oral Supplement    Vitals:   BP (!) 105/39   Pulse 60   Temp 100.2 °F (37.9 °C) (Oral)   Resp 16   Ht 5' 6\" (1.676 m)   Wt 159 lb 2.8 oz (72.2 kg)   SpO2 96%   BMI 25.69 kg/m²  on         I/O (24 Hours)    Patient Vitals for the past 8 hrs:   BP Temp Temp src Pulse Resp   03/21/19 1150 (!) 105/39 100.2 °F (37.9 °C) Oral 60 16       Intake/Output Summary (Last 24 hours) at 3/21/2019 1359  Last data filed at 3/21/2019 0800  Gross per 24 hour   Intake 780 ml   Output 4175 ml   Net -3395 ml     I/O last 3 completed shifts: In: 5 [P.O.:360; I.V.:60]  Out: 2656 [Urine:2325;  Other:1650]   Date 03/21/19 0000 - 03/21/19 2359   Shift 2686-7273 9381-2691 2830-6892 24 Hour Total   INTAKE   P.O.(mL/kg/hr) 360(0.6) 360 720   I.V.(mL/kg) 60(0.8)   60(0.8)   Shift Total(mL/kg) 420(5.8) 360(5)  780(10.8)   OUTPUT   Urine(mL/kg/hr) 600(1) 550  1150   Shift Total(mL/kg) 600(8.3) 550(7.6)  1150(15.9)   Weight (kg) 72.2 72.2 72.2 72.2     Patient Vitals for the past 96 hrs (Last 3 readings):   Weight   03/21/19 0400 159 lb 2.8 oz (72.2 kg)   03/20/19 0500 164 lb 10.9 oz (74.7 kg)   03/19/19 0500 166 lb 3.6 oz (75.4 kg)          PHYSICAL EXAMINATION:  General Appearance:    Alert, cooperative, no distress, appears stated age   Head:    Normocephalic, without obvious abnormality, atraumatic                  :    Neck:   Supple, symmetrical, trachea midline, no adenopathy;     thyroid:  no enlargement/tenderness/nodules; no carotid    bruit + JVP    Back:     Symmetric, no curvature, ROM normal, no CVA tenderness   Lungs:       Dull bases and dec bs bases no wheeze    Chest Wall:    No tenderness or deformity      Heart:    Regular rate and rhythm, S1 and S2 normal, 3/6 AS                           Abdomen:                                                 Pulses:                                            Lymph nodes:                    Neurologic:                  Soft, non-tender, bowel sounds active all four quadrants,     no masses, no organomegaly         2+ and symmetric all extremities            Cervical, supraclavicular not enlarged or matted or tender      CNII-XII intact, normal strength 5/5 .   Sensation grossly normal  and reflexes normal 2+  throughout     Clubbing No  Lower ext edema Yes1+   [] , 2 +  [] , 3+   []  Upper ext edema No       Musculoskeletal - no joint swelling or tenderness or synovitis            Medications:    Scheduled Meds:   FLUoxetine  10 mg Oral Daily    carvedilol  6.25 mg Oral BID WC    famotidine  20 mg Oral Daily    furosemide  80 mg Intravenous BID    sodium bicarbonate  650 mg Oral BID    sodium chloride flush  10 mL Intravenous 2 times per day    sodium chloride flush  10 mL Intravenous 2 times per day    levothyroxine  125 mcg Oral Daily    ALPRAZolam  0.25 mg Oral Once    QUEtiapine  25 mg Oral Nightly    sodium chloride flush  10 mL Intravenous 2 times per day    clopidogrel  75 mg Oral Daily    isosorbide mononitrate  30 mg Oral Daily    docusate sodium  100 mg Oral Daily    aspirin  81 mg Oral Daily    amiodarone  200 mg Oral Daily    atorvastatin  20 mg Oral Daily       Continuous Infusions:   DOPamine Stopped (03/21/19 0909)    sodium chloride      sodium chloride Stopped (03/15/19 1324)    norepinephrine 1 mcg/min (03/14/19 1050)       PRN Meds:      Labs:  CBC:   No results for input(s): WBC, HGB, HCT, MCV, PLT in the last 72 hours. BMP:   Recent Labs     03/19/19  0454 03/20/19  0438 03/21/19  0430    138 140   K 3.4* 3.9 3.6*    100 102   CO2 22 24 27   * 111* 110*   CREATININE 3.77* 3.58* 3.30*     LIVER PROFILE:   Recent Labs     03/20/19 0438   AST 18   ALT 30   BILIDIR 0.13   BILITOT 0.44   ALKPHOS 124     PT/INR:   No results for input(s): PROTIME, INR in the last 72 hours. APTT:   No results for input(s): APTT in the last 72 hours. UA:  No results for input(s): NITRITE, COLORU, PHUR, LABCAST, WBCUA, RBCUA, MUCUS, TRICHOMONAS, YEAST, BACTERIA, CLARITYU, SPECGRAV, LEUKOCYTESUR, UROBILINOGEN, BILIRUBINUR, BLOODU, GLUCOSEU, AMORPHOUS in the last 72 hours. Invalid input(s): KETONESU  No results for input(s): PHART, JGO5IQI, PO2ART in the last 72 hours.     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 *  Multi-vessel coronary artery disease post PCI  Severe aortic stenosis, post TAVR  Atrial fibrillation  Small right middle lobe pulmonary embolism.   Per chart, from imaging studies done in [place]  Recent epistaxis    Plan:  D/w Dr Johnny Flynn continue diuresis for now   On ra no distress post rt thoracentesis 1.6 l   Cont pt ot and is and deep breathing     Electronically signed by Lorna Estrada MD on 3/21/2019 at 1:59 PM

## 2019-03-21 NOTE — PROGRESS NOTES
Physical Therapy  Facility/Department: [facility]  Daily Treatment Note  NAME: Evonne Bachelor  : 12/10/1928  MRN: [id number]    Date of Service: 3/21/2019    Discharge Recommendations:  Home with assist PRN   PT Equipment Recommendations  Equipment Needed: Yes  Mobility Devices: [place]: Rolling    Patient Diagnosis(es): The encounter diagnosis was Pulmonary hypertension ([address] has a past medical history of Aortic stenosis, Atrial fibrillation ([address] CAD (coronary artery disease), Chest pain, CHF (congestive heart failure) ([address] Chronic anemia, Chronic kidney disease, Hypertension, Pleural effusion on left, Pleural effusion, right, Pulmonary emboli ([address] Pulmonary hypertension ([address] and Thyroid disease. has a past surgical history that includes pacemaker placement (2019); thoracentesis; joint replacement; and Coronary angioplasty with stent (2019). Restrictions  Restrictions/Precautions  Restrictions/Precautions: Cardiac, General Precautions  Required Braces or Orthoses?: No  Implants present? : Pacemaker  Position Activity Restriction  Other position/activity restrictions: Up w/assist.  Subjective   General  Chart Reviewed: Yes  Family / Caregiver Present: No  Subjective  Subjective: Resting in chair. Agreeable to therapy. States his L shoulder feels weak since he hasn't used it much since pacer insertion. General Comment  Comments: Pt left on toilet after session; pt states he will pull cord when finished. Thinks he is going to have BM; RN staff notified.   Pain Screening  Patient Currently in Pain: Denies  Vital Signs  Patient Currently in Pain: Denies       Orientation  Orientation  Overall Orientation Status: Within Normal Limits  Cognition      Objective      Transfers  Sit to Stand: Contact guard assistance(completed twice during session)  Stand to sit: Stand by assistance  Ambulation  Ambulation?: Yes  Ambulation 1  Surface: level tile  Device: Rolling Walker  Other Apparatus: Wheelchair follow  Assistance: Stand by assistance;Contact guard assistance  Quality of Gait: flexed posture, good return with cues for trunk extension, decreased pace and shortened step length  Distance: 200ft     Balance  Posture: Fair  Sitting - Static: Good  Sitting - Dynamic: Good  Standing - Static: Good;-  Standing - Dynamic: Fair;+       Exercises:  Seated UBE x 4 minutes, good tolerance  Seated LE exercise program: Long Arc Quads, hip abduction/adduction, heel/toe raises, and marches. Reps: 15x each with 2# ankle weights  Seated kickball with BLEs for strengthening and coordination x 2 minutes  UE: bicep curls, modified tricep ext, punches. 10x each with 2# weight  UE: scap retraction, horizontal shoulder abd/add x 10 reps each with ylw tband     Assessment   Body structures, Functions, Activity limitations: Decreased functional mobility ; Decreased balance;Decreased endurance  Assessment: Improved endurance. Able to tolerate UE strengthening with good tolerance and minimal rest breaks. Prognosis: Good  Patient Education: issued tband  REQUIRES PT FOLLOW UP: Yes  Activity Tolerance  Activity Tolerance: Patient Tolerated treatment well      Goals  Short term goals  Time Frame for Short term goals: 14 visits  Short term goal 1: Perform bed mobility and functional transfers independently  Short term goal 2: Ambulate 300ft with least restrictive AD independently  Short term goal 3: Demo Good- dynamic standing balance to decrease risk of falls  Short term goal 4: Participate in 30 minutes of therapy to demo increased endurance    Plan    Plan  Times per week: 6-7x/wk  Current Treatment Recommendations: Strengthening, Balance Training, Functional Mobility Training, Endurance Training, Transfer Training, Patient/Caregiver Education & Training, Safety Education & Training, Home Exercise Program, Gait Training  Safety Devices  Type of devices:  All fall risk precautions in place, Gait belt, Left in chair, Call light within reach, Nurse notified  Restraints  Initially in place: No     Therapy Time   Individual Concurrent Group Co-treatment   Time In 0933         Time Out 1024         Minutes 51         Timed Code Treatment Minutes: [address], [facility] [place]

## 2019-03-21 NOTE — PROGRESS NOTES
Occupational Therapy Not Seen Note    DATE: 3/21/2019  Name: Sharita Quinn  : 12/10/1928  MRN: [id number]    Patient not available for Occupational Therapy due to: Attempt this afternoon but pt refused to participate in therapy d/t wanting to rest. Max encouragement to participate but pt refused leisure act and demo.  Pt had completed ADLs this am w/ nursing    Next Scheduled Treatment: 3/22/19    Electronically signed by GHANSHYAM Calzada on 3/21/2019 at 2:19 PM

## 2019-03-21 NOTE — CARE COORDINATION
Met with patient to discuss transitional planning. Plan is home with nephew.   Remains on dopamine drip

## 2019-03-21 NOTE — PROGRESS NOTES
for the past 96 hrs (Last 3 readings):   Weight   19 0400 159 lb 2.8 oz (72.2 kg)   19 0500 164 lb 10.9 oz (74.7 kg)   19 0500 166 lb 3.6 oz (75.4 kg)       Vital Signs:   Temperature:  Temp: 97.7 °F (36.5 °C)  TMax:   Temp (24hrs), Av.6 °F (36.4 °C), Min:97.3 °F (36.3 °C), Max:97.7 °F (36.5 °C)    Respirations:  Resp: 16  Pulse:   Pulse: 63  BP:    BP: (!) 133/44  BP Range: Systolic (62VLJ), OYA:819 , Min:133 , HTU:996       Diastolic (14UNB), NL, Min:44, Max:55      Physical Examination:     General:  Sitting comfortably alert and awake. Eyes:   Pupils reactive to light  Neck:   Supple, Has an elevated JVD  Chest:   Bilateral air entry but decreased at the bases. Cardiac:  S1 and S2 audible no S3. Abdomen: SSoft and nontender bowel sounds positive  :   No suprapubic or flank tenderness. Neuro:  AAO x 3, No FND. SKIN:  No rashes, good skin turgor. Extremities:  1-2+ edema. Labs:     OOBMP:   Recent Labs     19  0454 19  0438 19  0430    138 140   K 3.4* 3.9 3.6*    100 102   CO2 22 24 27   * 111* 110*   CREATININE 3.77* 3.58* 3.30*   GLUCOSE 99 106* 97   CALCIUM 8.0* 7.9* 7.8*      OSPEP:  Lab Results   Component Value Date    PROT 5.2 2019    ALBCAL 2.5 2019    ALBPCT 53 2019    LABALPH 0.3 2019    LABALPH 0.8 2019    A1PCT 7 2019    A2PCT 18 2019    LABBETA 0.7 2019    BETAPCT 14 2019    GAMGLOB 0.4 2019    GGPCT 8 2019    PATH ELECTRONICALLY SIGNED.  Joy Rosario M.D. 2019     Urinalysis/Chemistries:      Lab Results   Component Value Date    NITRU NEGATIVE 2019    COLORU YELLOW 2019    PHUR 5.0 2019    WBCUA None 2019    RBCUA 0 TO 2 2019    MUCUS NOT REPORTED 2019    TRICHOMONAS NOT REPORTED 2019    YEAST NOT REPORTED 2019    BACTERIA NOT REPORTED 2019    SPECGRAV 1.024 2019    LEUKOCYTESUR NEGATIVE 03/12/2019    UROBILINOGEN Normal 03/12/2019    BILIRUBINUR NEGATIVE 03/12/2019    GLUCOSEU NEGATIVE 03/12/2019    KETUA NEGATIVE 03/12/2019    AMORPHOUS NOT REPORTED 03/12/2019      Urine Creatinine:     Lab Results   Component Value Date    LABCREA 210.1 03/13/2019     Radiology:     Reviewed. Assessment:        1. Acute kidney injury, ATN from contrast low flow from cardiogenic shock. His creatinine after reaching a peak of 3.97 now trending downwards and most recent creatinine is 3.3 mg/dL. Patient has a good urine output. There is no evidence of uremic symptoms  2. Chronic kidney disease stage IIIBWith a baseline reatinine of 1.8-2 mg/dL  Ultrasound reveals right kidney 12.5 cm and left 11.2 cm  2. Nephrotic range proteinuria Due to nephrosclerosis further complicated by congesve cardiac failure. Next  3. Bilateral pleural effusions status post pleural taps  4. Severe pulmonary hypertension  5. Hx of Urinary retention  6. Advanced age  S/P TAVR    Plan:   1. Continue diuresis  2. BMP in a.m. 3.  Check CBC and BNP in a.m.  4.  Will follow with you    Nutrition   Please ensure that patient is on a renal diet/TF. Avoid nephrotoxic drugs/contrast exposure. We will continue to follow along with you.      Joya Light MD  [facility]

## 2019-03-22 NOTE — PROGRESS NOTES
diuresis  8. Dual chamber pacer for complete heart block, placed in January 2019 in [place]  9. Moderate Pulm HTN (likely Group 2)  10. Paroxysmal Afib and small RT PE diagnosed in [place], on Eliquis 2.5 mg QD at home. 11. Nose bleed after 2 doses of Eliquis on 3/16 PM  12. Primary HTN  13. Dyslipidemia  14. Hypocalcemia    Plan of Treatment:   1. Successfully underwent complex PCI of LT main and LAD with JEAN-CLAUDE on 3/11/19  2. Successfully underwent TAVR on 3/15/19, post TAVR ECHO shows normally functioning bioprosthetic AV  3. Being treated for ALFONZO, Nephro input appreciated. Cr trending down and patient is making urine. Diuresis per Nephro. 4. Has received 5 thoracentesis this admission, with continued re-accumulation of pleural effusions. Would recommend holding off any further thoracentesis (further decreasing intravascular volume)  5. Continue Aspirin, Plavix, Atorvastatin, Amiodarone, Coreg and Imdur  6. Eliquis was placed on hold due to possibility of placement of Pop Catheter  7. Recommend stopping Plavix 4 weeks after stent placement, and then continue with Aspirin and Eliquis    Will discuss with rounding attending Dr. Sona Jiménez for final recommendations.     Edi Wu MD   Fellow, Cardiovascular Diseases

## 2019-03-22 NOTE — PROGRESS NOTES
[facility]  Pre Op Progress Note    CC: Acute CHF, SOB      Subjective:  Mr. Rubin Oliveros seen and examined Plan of care reviewed and questions answered. Physical Exam  Vital Signs: BP (!) 151/64 Comment: just got into bed from using the bathroom  Pulse 70   Temp 98.2 °F (36.8 °C) (Oral)   Resp 16   Ht 5' 6\" (1.676 m)   Wt 159 lb 2.8 oz (72.2 kg)   SpO2 95%   BMI 25.69 kg/m²  O2 Flow Rate (L/min): 1.5 L/min       General: alert and oriented to person, place and time, well-developed and well-nourished, in no acute distress. Heart:Normal S1 and S2.  Regular rhythm. No murmurs, gallops, or rubs. Lungs: clear to auscultation bilaterally  Abdomen: soft, non tender, non distended, BSx4  Extremities: 1+ edema      Scheduled Meds:    FLUoxetine  10 mg Oral Daily    carvedilol  6.25 mg Oral BID WC    famotidine  20 mg Oral Daily    furosemide  80 mg Intravenous BID    sodium bicarbonate  650 mg Oral BID    sodium chloride flush  10 mL Intravenous 2 times per day    sodium chloride flush  10 mL Intravenous 2 times per day    levothyroxine  125 mcg Oral Daily    ALPRAZolam  0.25 mg Oral Once    QUEtiapine  25 mg Oral Nightly    sodium chloride flush  10 mL Intravenous 2 times per day    clopidogrel  75 mg Oral Daily    isosorbide mononitrate  30 mg Oral Daily    docusate sodium  100 mg Oral Daily    aspirin  81 mg Oral Daily    amiodarone  200 mg Oral Daily    atorvastatin  20 mg Oral Daily     Continuous Infusions:    DOPamine Stopped (03/21/19 0909)    sodium chloride      sodium chloride Stopped (03/15/19 1324)    norepinephrine 1 mcg/min (03/14/19 1050)       Data:  CBC: No results for input(s): WBC, HGB, HCT, MCV, PLT in the last 72 hours.   BMP:   Recent Labs     03/20/19  0438 03/21/19  0430 03/22/19  0441    140 141   K 3.9 3.6* 3.9    102 100   CO2 24 27 29   * 110* 107*   CREATININE 3.58* 3.30* 3.00*     PT/INR: No results for input(s): PROTIME, INR in the last 72 hours. APTT: No results for input(s): APTT in the last 72 hours. 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 Beto Tse, the attending on service for the cardiothoracic surgery group today.      Electronically signed by MIRYAM Ervin on 3/22/2019 at 8:05 AM

## 2019-03-22 NOTE — PROGRESS NOTES
Nutrition Assessment    Type and Reason for Visit: Reassess, Calorie Count    Nutrition Recommendations: Continue renal, Low gm Na diet and 1800 mL FR. Recommend renal supplements 3x/d. Suggest discontinue calorie count. Nutrition Assessment: Day 2 of calorie count - No meal tray tickets were recorded/documented. Pt is now on HD. Pt stated that he had a good appetite and is consuming 50% of his meals and 100% of his supplements. Pt is consuming around 3516-8568 kcals/d w/ meals and supplements. Pt is meeting 122% of his daily est'd needs. Recommend d/c calorie count. Will change ensure supplements to Nepro supplements. Malnutrition Assessment:  · Malnutrition Status: At risk for malnutrition    Nutrition Risk Level:  Moderate    Nutrient Needs:  · Estimated Daily Total Kcal: 1.3-1.4 ~>0828-2742 kcals/d   · Estimated Daily Protein (g): 1.2-1.4 gm/kg ~>85-99 gms/d    Nutrition Diagnosis:   · Problem: Increased nutrient needs  · Etiology: related to Catabolic illness     Signs and symptoms:  as evidenced by (Hemodialysis)    Objective Information:  · Nutrition-Focused Physical Findings: active bowel sounds  · Wound Type: None  · Current Nutrition Therapies:  · Oral Diet Orders: 2gm Sodium, Renal, Fluid Restriction   · Oral Diet intake: 51-75%  · Oral Nutrition Supplement (ONS) Orders: Standard High Calorie Oral Supplement  · ONS intake: %  · Anthropometric Measures:  · Ht: 5' 6\" (167.6 cm)   · Current Body Wt: 159 lb (72.1 kg)  · Admission Body Wt: 156 lb (70.8 kg)  · Usual Body Wt: 165 lb (74.8 kg)(per pt)  · % Weight Change:  ,  Wt flux since admission   · Ideal Body Wt: 141 lb 15.6 oz (64.4 kg), % Ideal Body 111% (adm/ideal)  · BMI Classification: BMI 25.0 - 29.9 Overweight    Nutrition Interventions:   Continue current diet, Modify current ONS(d/c calorie count)  Continued Inpatient Monitoring, Education Not Indicated    Nutrition Evaluation:   · Evaluation: Goal achieved   · Goals: Oral intakes to meet at least 50% of estimated nutrition needs.     · Monitoring: Nutrition Progression, Meal Intake, Supplement Intake, Diet Tolerance, I&O, Weight, Pertinent Labs, Monitor Bowel Function      Electronically signed by Shahid Brumfield RD, LD on 3/22/19 at 12:10 PM    Contact Number: [phone]

## 2019-03-22 NOTE — PROGRESS NOTES
Physical Therapy  Facility/Department: [facility] CAR 1  Daily Treatment Note  NAME: Evonne Bachelor  : 12/10/1928  MRN: [id number]    Date of Service: 3/22/2019    Discharge Recommendations:  Home with assist PRN        Patient Diagnosis(es): The encounter diagnosis was Pulmonary hypertension ([address] has a past medical history of Aortic stenosis, Atrial fibrillation ([address] CAD (coronary artery disease), Chest pain, CHF (congestive heart failure) ([address] Chronic anemia, Chronic kidney disease, Hypertension, Pleural effusion on left, Pleural effusion, right, Pulmonary emboli ([address] Pulmonary hypertension ([address] and Thyroid disease. has a past surgical history that includes pacemaker placement (2019); thoracentesis; joint replacement; and Coronary angioplasty with stent (2019). Restrictions  Restrictions/Precautions  Restrictions/Precautions: Cardiac, General Precautions  Required Braces or Orthoses?: No  Implants present? : Pacemaker  Position Activity Restriction  Other position/activity restrictions: Up w/assist.  Subjective    Pt sitting up in his chair. Pt initially declined. Encouragement given by writer and RN. Pt finally agreed to therapy. Pt has no c/o pain. Orientation    Overall Orientation Status: Within Normal Limits    Objective     Transfers  Sit to Stand: Contact guard assistance(completed twice during session)  Stand to sit: Stand by assistance  Ambulation  Ambulation?: Yes  Ambulation 1  Surface: level tile  Device: Rolling Walker  Other Apparatus: Wheelchair follow  Assistance: Stand by assistance;Contact guard assistance  Quality of Gait: flexed posture, good return with cues for trunk extension, decreased pace and shortened step length.   Distance: 200',seated rest,200'  Balance  Posture: Fair  Sitting - Static: Good  Sitting - Dynamic: Good  Standing - Static: Good;-  Standing - Dynamic: Fair;+       Exercises:  Seated UBE x 3.5 minutes, good tolerance  Seated LE exercise program: Long Arc Quads, hip abduction with Red T-band/adduction with squeezing pillow, heel/toe raises, and marches. Reps: 20x each with 2# ankle weights. RTB for B Hamstrings 2 x 10 with 5 sec. Holds. UE: bicep curls, modified tricep ext, punches. 2 x 10 each with 2# weight on a SPC. 4 lbs with Biceps. Assessment     Body structures, Functions, Activity limitations: Decreased functional mobility ; Decreased balance;Decreased endurance  Assessment: Improved endurance. Able to tolerate UE/LE strengthening with good tolerance and minimal rest breaks. Prognosis: Good  Patient Education: issued tband  REQUIRES PT FOLLOW UP: Yes  Activity Tolerance  Activity Tolerance: Patient Tolerated treatment well     Goals  Short term goals  Time Frame for Short term goals: 14 visits  Short term goal 1: Perform bed mobility and functional transfers independently  Short term goal 2: Ambulate 300ft with least restrictive AD independently  Short term goal 3: Demo Good- dynamic standing balance to decrease risk of falls  Short term goal 4: Participate in 30 minutes of therapy to demo increased endurance    Plan    Plan  Times per week: 6-7x/wk  Current Treatment Recommendations: Strengthening, Balance Training, Functional Mobility Training, Endurance Training, Transfer Training, Patient/Caregiver Education & Training, Safety Education & Training, Home Exercise Program, Gait Training  Safety Devices  Type of devices:  All fall risk precautions in place, Gait belt, Left in chair, Call light within reach, Nurse notified  Restraints  Initially in place: No     Therapy Time   Individual Concurrent Group Co-treatment   Time In  0115         Time Out  0215         Minutes  [address], [place], [place]

## 2019-03-22 NOTE — PROGRESS NOTES
Renal Progress Note    Patient :  Enedelia Gonzalez; 80 y.o. MRN# [id number]  Location:  0135/2920-43  Attending:  Maik Pfeiffer MD  Admit Date:  3/6/2019   Hospital Day: 16      Subjective:     Patient was seen and examined. Patient was sitting comfortably. He was alert and awake x3. He had another pleural tap 3/20/19. 1.5 L of fluid removed. Creatinine is slowly improving and down to 3.0, azotemia improving  Low dose dopamine continues, patient is now hemodynamically stable. Patient has good appetite.   He denies any nausea Or vomiting, does get heart burn for which he is on PPIs    Outpatient Medications:     Medications Prior to Admission: amiodarone (CORDARONE) 200 MG tablet, Take 200 mg by mouth daily  atorvastatin (LIPITOR) 20 MG tablet, Take 20 mg by mouth daily  isosorbide mononitrate (IMDUR) 60 MG extended release tablet, Take 60 mg by mouth daily  levothyroxine (SYNTHROID) 100 MCG tablet, Take 100 mcg by mouth Daily  lisinopril (PRINIVIL;ZESTRIL) 40 MG tablet, Take 40 mg by mouth daily  apixaban (ELIQUIS) 2.5 MG TABS tablet, Take 2.5 mg by mouth daily  metoprolol tartrate (LOPRESSOR) 50 MG tablet, Take 50 mg by mouth 2 times daily    Current Medications:     Scheduled Meds:    chlorothiazide  500 mg Intravenous Once    FLUoxetine  10 mg Oral Daily    carvedilol  6.25 mg Oral BID WC    famotidine  20 mg Oral Daily    furosemide  80 mg Intravenous BID    sodium chloride flush  10 mL Intravenous 2 times per day    sodium chloride flush  10 mL Intravenous 2 times per day    levothyroxine  125 mcg Oral Daily    ALPRAZolam  0.25 mg Oral Once    QUEtiapine  25 mg Oral Nightly    sodium chloride flush  10 mL Intravenous 2 times per day    clopidogrel  75 mg Oral Daily    isosorbide mononitrate  30 mg Oral Daily    docusate sodium  100 mg Oral Daily    aspirin  81 mg Oral Daily    amiodarone  200 mg Oral Daily    atorvastatin  20 mg Oral Daily     Input/Output:       I/O last 3 completed shifts: In: 835 [P.O.:720; I.V.:45]  Out: 1700 [Urine:1700]. Patient Vitals for the past 96 hrs (Last 3 readings):   Weight   19 0400 159 lb 2.8 oz (72.2 kg)   19 0500 164 lb 10.9 oz (74.7 kg)   19 0500 166 lb 3.6 oz (75.4 kg)       Vital Signs:   Temperature:  Temp: 98.2 °F (36.8 °C)  TMax:   Temp (24hrs), Av.9 °F (37.2 °C), Min:98.2 °F (36.8 °C), Max:100.2 °F (37.9 °C)    Respirations:  Resp: 16  Pulse:   Pulse: 70  BP:    BP: (!) 151/64(just got into bed from using the bathroom)  BP Range: Systolic (06JKN), NTF:159 , Min:105 , GTB:285       Diastolic (37KFW), UEW:91, Min:39, Max:64      Physical Examination:     General:  Sitting comfortably alert and awake. Eyes:   Pupils reactive to light  Neck:   Supple, Has an elevated JVD  Chest:   Bilateral air entry but decreased at the bases, crackles bases and conducted sounds. Cardiac:  S1 and S2 audible no S3. Abdomen: SSoft and nontender bowel sounds positive  :   No suprapubic or flank tenderness. Neuro:  AAO x 3, No FND. SKIN:  No rashes, good skin turgor. Extremities:  1-+ edema. Labs:     OOBMP:   Recent Labs     19  0438 19  0430 19  0441    140 141   K 3.9 3.6* 3.9    102 100   CO2 24 27 29   * 110* 107*   CREATININE 3.58* 3.30* 3.00*   GLUCOSE 106* 97 98   CALCIUM 7.9* 7.8* 8.1*      OSPEP:  Lab Results   Component Value Date    PROT 5.2 2019    ALBCAL 2.5 2019    ALBPCT 53 2019    LABALPH 0.3 2019    LABALPH 0.8 2019    A1PCT 7 2019    A2PCT 18 2019    LABBETA 0.7 2019    BETAPCT 14 2019    GAMGLOB 0.4 2019    GGPCT 8 2019    PATH ELECTRONICALLY SIGNED.  Abigail Herbert M.D. 2019     Urinalysis/Chemistries:      Lab Results   Component Value Date    NITRU NEGATIVE 2019    COLORU YELLOW 2019    PHUR 5.0 2019    WBCUA None 2019    RBCUA 0 TO 2 2019    MUCUS NOT REPORTED 2019 TRICHOMONAS NOT REPORTED 03/12/2019    YEAST NOT REPORTED 03/12/2019    BACTERIA NOT REPORTED 03/12/2019    SPECGRAV 1.024 03/12/2019    LEUKOCYTESUR NEGATIVE 03/12/2019    UROBILINOGEN Normal 03/12/2019    BILIRUBINUR NEGATIVE 03/12/2019    GLUCOSEU NEGATIVE 03/12/2019    KETUA NEGATIVE 03/12/2019    AMORPHOUS NOT REPORTED 03/12/2019      Urine Creatinine:     Lab Results   Component Value Date    LABCREA 210.1 03/13/2019     Radiology:     Reviewed. Assessment:        1. Acute kidney injury, ATN from contrast low flow from cardiogenic shock. His creatinine after reaching a peak of 3.97 now trending downwards and most recent creatinine is 3.0 mg/dL. Patient has a good urine output. There is no evidence of uremic symptoms  2. Chronic kidney disease stage IIIBWith a baseline reatinine of 1.8-2 mg/dL  Ultrasound reveals right kidney 12.5 cm and left 11.2 cm  2. Nephrotic range proteinuria Due to nephrosclerosis further complicated by congesve cardiac failure. Next  3. Bilateral pleural effusions status post pleural taps  4. Severe pulmonary hypertension  5. Hx of Urinary retention  6. Advanced age  S/P TAVR  9. biventricularf failure from systolic and diasto;ic dysfxn    Plan:   1. Continue diuresis, additional diuril today  2. BMP in a.m. 3.  Check CBC and BNP in a.m.  4.  Will follow with you  5. D/c dopamine, hemodynamically stable, no further use of vasopressors, mortality slightly high with dopamine in CKD patients per recent literature  6. Start oral diuretics from tomorrow  7. dialy weights    Nutrition   Please ensure that patient is on a renal diet/TF. Avoid nephrotoxic drugs/contrast exposure. We will continue to follow along with you.

## 2019-03-22 NOTE — FLOWSHEET NOTE
DATE: 3/22/2019    NAME: Kyle Rodriguez  MRN: [id number]   : 12/10/1928    Patient not seen this date for Physical Therapy due to:  [] Blood transfusion in progress  [] Hemodialysis  [x]  Patient Declined; pt states he has an upset stomach and doesn't want to participate at this time. RN notified. Will check back this PM.  [] Spine Precautions   [] Strict Bedrest  [] Surgery/ Procedure  [] Testing      [] Other        [] PT being discontinued at this time. Patient independent. No further needs. [] PT being discontinued at this time as the patient has been transferred to palliative care. No further needs.     Yunior Martinez, PTA

## 2019-03-22 NOTE — PROGRESS NOTES
[facility]   Progress Note                   Date:   3/22/2019  Patient name: Billy Kimbrough  Date of admission:  3/6/2019  8:48 PM  MRN:   [id number]  YOB: 1928  PCP: No primary care provider on file. Reason for Admission: exertional dyspnea    Subjective:   Underwent PCI to LAD with JEAN-CLAUDE on 3/11/19. Underwent TAVR on 3/15/19. Feels better today, denies any dyspnea. Making urine. Eliquis remains on hold. In a paced rhythm. Denies chest pain, palpitations. Intake/Output Summary (Last 24 hours) at 3/22/2019 1237  Last data filed at 3/22/2019 3213  Gross per 24 hour   Intake 585 ml   Output 3050 ml   Net -2465 ml         Intake/Output Summary (Last 24 hours) at 3/22/2019 1237  Last data filed at 3/22/2019 6345  Gross per 24 hour   Intake 585 ml   Output 3050 ml   Net -2465 ml       Medications:   Scheduled Meds:   chlorothiazide  500 mg Intravenous Once    FLUoxetine  10 mg Oral Daily    carvedilol  6.25 mg Oral BID WC    famotidine  20 mg Oral Daily    furosemide  80 mg Intravenous BID    sodium chloride flush  10 mL Intravenous 2 times per day    sodium chloride flush  10 mL Intravenous 2 times per day    levothyroxine  125 mcg Oral Daily    ALPRAZolam  0.25 mg Oral Once    QUEtiapine  25 mg Oral Nightly    sodium chloride flush  10 mL Intravenous 2 times per day    clopidogrel  75 mg Oral Daily    isosorbide mononitrate  30 mg Oral Daily    docusate sodium  100 mg Oral Daily    aspirin  81 mg Oral Daily    amiodarone  200 mg Oral Daily    atorvastatin  20 mg Oral Daily     Continuous Infusions:   norepinephrine 1 mcg/min (03/14/19 1050)     CBC:   No results for input(s): WBC, HGB, PLT in the last 72 hours.   BMP:    Recent Labs     03/20/19  0438 03/21/19  0430 03/22/19  0441    140 141   K 3.9 3.6* 3.9    102 100   CO2 24 27 29   * 110* 107*   CREATININE 3.58* 3.30* 3.00*   GLUCOSE 106* 97 98     Hepatic:   Recent Labs 03/20/19  0438   AST 18   ALT 30   BILITOT 0.44   ALKPHOS 124     Troponin: No results for input(s): TROPONINI in the last 72 hours. BNP: No results for input(s): BNP in the last 72 hours. Lipids: No results for input(s): CHOL, HDL in the last 72 hours. Invalid input(s): LDLCALCU  INR:   No results for input(s): INR in the last 72 hours. Objective:   Vitals: BP (!) 101/35   Pulse 60   Temp 97.9 °F (36.6 °C) (Oral)   Resp 18   Ht 5' 6\" (1.676 m)   Wt 159 lb 2.8 oz (72.2 kg)   SpO2 93%   BMI 25.69 kg/m²   Constitutional and General Appearance: alert, cooperative, no distress and appears stated age  Respiratory:  · Diminished breath sounds at B/L lung bases  Cardiovascular:  S1, s2, paced rhythm  Abdomen:   · No masses or tenderness, soft abdomen  · Bowel sounds present  Extremities:  · le edema present, peripheral pulse bl le present 2 +  · Bilateral groins soft and without hematoma  Integumentum:   Intact with no rashes noted      EKG:   V-pacing with occasional PVCs. CTA Chest in [place] (2/15/19):  Small PE within branch of RT middle lobe artery. Large RT pleural effusion. Moderate LT pleural effusion.     ECHO in [place] (2/17/19:   LVH with LVEF 50%. LT atrial enlargement. Moderate AS with Mild AI. Mitral annular calcification with mild MR. Mild to Moderate Pulm HTN.     Cardiac Angiography in [place] (1/20/19): Multivessel disease including LM. ECHO (3/6/19):   Normal left ventricle size with mildly reduced systolic function; Estimated left ventricular ejection fraction 40-45%  Anterolateral wall hypokinesis. Mild left ventricular hypertrophy. Left atrium is moderately dilated. Grade II diastolic dysfunction. Heavily calcified aortic valve with reduced systolic excursion and evidence of moderate stenosis. (Peak nataliia 3.62 m/s and mean gradient 29 mmHg)  Moderate posterior pericardial effusion without any echo signs of tamponade.   Normal right ventricular size and function. Pacemaker lead seen in right ventricle. Moderate tricuspid regurgitation. Estimated right ventricular systolic pressure is 60 mmHg suggesting moderate  pulmonary HTN. Cardiac Cath (3/11/19): Left main: 80-90% very calcified stenosis. This required rotational atherectomy and PTCA -JEAN-CLAUDE 4/0/15 mm stent, redcuing stenosis to 10% with AKHIL III flow. LAD: Proximal to mid area calcified 80% stenosis. This required Rotational atherectomy / JEAN-CLAUDE in mid and proximal area, reducing stenosis to 0% with AKHIL III flow  LCX: Diffuse disease (known from cath in [place])  RCA: Minimal 20-30% as reviewed from [address]    ECHO (3/17/19): Left ventricle is normal in size with mildly reduced systolic function;  Calculated ejection fraction via Dean's Method is 41%  Mild left ventricular hypertrophy. Evidence of diastolic dysfunction. Left atrium is severely dilated. Increased left atrial volume. Normal functioning bioprosthetic AVR in aortic position. No evidence of stenosis. No valvular regurgitation is seen. Trivial fredrick valvular regurgitation is noted. Mitral annular calcification and leaflet thickening with no evidence of  stenosis. Moderate mitral regurgitation. Mild tricuspid regurgitation. Estimated right ventricular systolic pressure is 51 mmHg. No significant pericardial effusion is seen. Pleural effusion is noted. Assessment / Acute Cardiac Problems:   1. Acute congestive heart failure (likely secondary to MV CAD and AS)  2. 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 TAVR on 3/15/19  4. 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 B/L Pleural effusions with RT & LT thoracentesis per IR (multiple times)  6. Contrast induced nephropathy  7. Moderate pericardial effusion without tamponade physiology likely secondary to CHF on admission, now resolved with diuresis  8. Dual chamber pacer for complete heart block, placed in January 2019 in [place]  9.  Moderate Pulm HTN (likely Group 2)  10. Paroxysmal Afib and small RT PE diagnosed in [place], on Eliquis 2.5 mg QD at home. 11. Nose bleed after 2 doses of Eliquis on 3/16 PM  12. Primary HTN  13. Dyslipidemia  14. Hypocalcemia    Plan of Treatment:   1. Successfully underwent complex PCI of LT main and LAD with JEAN-CLAUDE on 3/11/19  2. Successfully underwent TAVR on 3/15/19, post TAVR ECHO shows normally functioning bioprosthetic AV  3. Being treated for ALFONZO, Nephro input appreciated. Cr trending down and patient is making urine. Diuresis per Nephro. 4. Has received 5 thoracentesis this admission, with continued re-accumulation of pleural effusions. Would recommend holding off any further thoracentesis (further decreasing intravascular volume)  5. Continue Aspirin, Plavix, Atorvastatin, Amiodarone, Coreg and Imdur  6. Eliquis was placed on hold due to possibility of placement of Pop Catheter  7. Recommend stopping Plavix 4 weeks after stent placement, and then continue with Aspirin and Eliquis    Will discuss with rounding attending Dr. Becky Patel for final recommendations. Eran Schafer MD   Fellow, Cardiovascular Diseases    Attending Physician Statement  I have discussed the care of Sharita Quinn, including pertinent history and exam findings,  with the cardiology fellow/resident. I have seen and examined the patient and the key elements of all parts of the encounter have been performed by me.   I agree with the assessment, plan and orders as documented by the fellow.   [facility]  [facility], [facility]  [phone]

## 2019-03-22 NOTE — PROGRESS NOTES
Pulmonary Progress Note    CC:  chf   Subjective:  No acute events   Negative 2 l today   10 l negative total   Slept well   No distress   Has orthopnea no chest pain   Review of Systems -  General ROS: fatigue  ENT ROS: negative for - headaches, oral lesions or sore throat  Cardiovascular ROS: no chest pain , +orthopnea   Gastrointestinal ROS: no abdominal pain, change in bowel habits, or black or bloody stools  Skin - no rash   Neuro - no blurry vision , no loc . No focal weakness   msk - no jt tenderness or swelling    Vascular - no claudication , rest completed and negative   Lymphatic - complete and negative   Hematology - oncology - complete and negative   Allergy immunology - complete and negative    no burning or hematuria      Immunization     There is no immunization history on file for this patient. Pneumococcal Vaccine     [] Up to date    [x] Indicated   [] Refused  [] Contraindicated       Influenza Vaccine   [] Up to date    [x] Indicated   [] Refused  [] Contraindicated     PAST MEDICAL HISTORY:       Diagnosis Date    Aortic stenosis 02/15/2019    Atrial fibrillation ([address]     CAD (coronary artery disease)     Chest pain 02/15/2019    CHF (congestive heart failure) (HCC)     Chronic anemia     Chronic kidney disease     Hypertension     Pleural effusion on left 02/15/2019    Pleural effusion, right 02/15/2019    Pulmonary emboli ([address] 02/15/2019    Pulmonary hypertension ([address] 03/06/2019    Thyroid disease     hypothyroidism         Family History:   History reviewed. No pertinent family history. SURGICAL HISTORY:   Past Surgical History:   Procedure Laterality Date    CORONARY ANGIOPLASTY WITH STENT PLACEMENT  03/11/2019    complex PCI of LT MAIN, LAD    JOINT REPLACEMENT      right ankle    PACEMAKER PLACEMENT  01/20/2019    THORACENTESIS                TOBACCO:   reports that he quit smoking about 30 years ago.  He has never used smokeless tobacco.  ETOH:   reports that INTAKE   P.O.(mL/kg/hr) 180(0.3)   180   Shift Total(mL/kg) 180(2.5)   180(2.5)   OUTPUT   Urine(mL/kg/hr) 1800(3.1) 400  2200   Shift Total(mL/kg) 1800(24.9) 400(5.5)  2200(30.5)   Weight (kg) 72.2 72.2 72.2 72.2     Patient Vitals for the past 96 hrs (Last 3 readings):   Weight   03/21/19 0400 159 lb 2.8 oz (72.2 kg)   03/20/19 0500 164 lb 10.9 oz (74.7 kg)   03/19/19 0500 166 lb 3.6 oz (75.4 kg)          PHYSICAL EXAMINATION:  Head and neck atraumatic, normocephalic    Lymph nodes-no cervical, supraclavicular lymphadenopathy    Neck-no JVP elevation    Lungs - dull bases and dec bs and no wheeze no rales     CVS- S1, S2 regular. No S3 no S4, no murmurs    Abdomen-nontender, nondistended. Bowel sounds are present. No organomegaly    Lower extremity-+ edema    Upper extremity-no edema    Neurological-grossly normal cranial nerves. No overt motor deficit        Medications:    Scheduled Meds:   chlorothiazide  500 mg Intravenous Once    FLUoxetine  10 mg Oral Daily    carvedilol  6.25 mg Oral BID WC    famotidine  20 mg Oral Daily    furosemide  80 mg Intravenous BID    sodium chloride flush  10 mL Intravenous 2 times per day    sodium chloride flush  10 mL Intravenous 2 times per day    levothyroxine  125 mcg Oral Daily    ALPRAZolam  0.25 mg Oral Once    QUEtiapine  25 mg Oral Nightly    sodium chloride flush  10 mL Intravenous 2 times per day    clopidogrel  75 mg Oral Daily    isosorbide mononitrate  30 mg Oral Daily    docusate sodium  100 mg Oral Daily    aspirin  81 mg Oral Daily    amiodarone  200 mg Oral Daily    atorvastatin  20 mg Oral Daily       Continuous Infusions:   norepinephrine 1 mcg/min (03/14/19 1050)       PRN Meds:      Labs:  CBC:   No results for input(s): WBC, HGB, HCT, MCV, PLT in the last 72 hours.   BMP:   Recent Labs     03/20/19  0438 03/21/19  0430 03/22/19  0441    140 141   K 3.9 3.6* 3.9    102 100   CO2 24 27 29   * 110* 107*   CREATININE 3.58* 3.30* 3.00*     LIVER PROFILE:   Recent Labs     03/20/19  0438   AST 18   ALT 30   BILIDIR 0.13   BILITOT 0.44   ALKPHOS 124     PT/INR:   No results for input(s): PROTIME, INR in the last 72 hours. APTT:   No results for input(s): APTT in the last 72 hours. UA:  No results for input(s): NITRITE, COLORU, PHUR, LABCAST, WBCUA, RBCUA, MUCUS, TRICHOMONAS, YEAST, BACTERIA, CLARITYU, SPECGRAV, LEUKOCYTESUR, UROBILINOGEN, BILIRUBINUR, BLOODU, GLUCOSEU, AMORPHOUS in the last 72 hours. Invalid input(s): KETONESU  No results for input(s): PHART, AQX8SXI, PO2ART in the last 72 hours. 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 *  Multi-vessel coronary artery disease post PCI  Severe aortic stenosis, post TAVR  Atrial fibrillation  Small right middle lobe pulmonary embolism.   Per chart, from imaging studies done in [place]  Recent epistaxis    Plan:  Continue diuresis   Follow renal fx   Pt ot   Is     Electronically signed by Attila Retana MD on 3/22/2019 at 11:19 AM

## 2019-03-23 NOTE — PLAN OF CARE
Problem: Falls - Risk of:  Goal: Will remain free from falls  Description  Will remain free from falls  Outcome: Ongoing  Goal: Absence of physical injury  Description  Absence of physical injury  Outcome: Ongoing  Note:   Bed lock and in lowest position, side rails up x 2, room free from clutter, call light within reach, alert and oriented x 3, bed alarm on and patient remains free from falls and physical injury. Problem: Pain:  Goal: Pain level will decrease  Description  Pain level will decrease  Outcome: Ongoing  Goal: Control of acute pain  Description  Control of acute pain  Outcome: Ongoing  Goal: Control of chronic pain  Description  Control of chronic pain  Outcome: Ongoing  Note:   Patient denies pain when ask. Problem: Risk for Impaired Skin Integrity  Goal: Tissue integrity - skin and mucous membranes  Description  Structural intactness and normal physiological function of skin and  mucous membranes. Outcome: Ongoing  Note:   Patient able to turn per self and no new signs of skin breakdown. Problem: Cardiac Output - Decreased:  Goal: Hemodynamic stability will improve  Description  Hemodynamic stability will improve  Outcome: Ongoing  Note:   Vital signs monitored and remain stable.

## 2019-03-23 NOTE — PROGRESS NOTES
used smokeless tobacco.  ETOH:   reports that he drank alcohol. ALLERGIES:    Allergies   Allergen Reactions    Quinolones      Pt is allergic to fluoroquinolones       Home Meds:   Prior to Admission medications    Medication Sig Start Date End Date Taking? Authorizing Provider   amiodarone (CORDARONE) 200 MG tablet Take 200 mg by mouth daily   Yes Historical Provider, MD   atorvastatin (LIPITOR) 20 MG tablet Take 20 mg by mouth daily   Yes Historical Provider, MD   isosorbide mononitrate (IMDUR) 60 MG extended release tablet Take 60 mg by mouth daily   Yes Historical Provider, MD   levothyroxine (SYNTHROID) 100 MCG tablet Take 100 mcg by mouth Daily   Yes Historical Provider, MD   lisinopril (PRINIVIL;ZESTRIL) 40 MG tablet Take 40 mg by mouth daily   Yes Historical Provider, MD   apixaban (ELIQUIS) 2.5 MG TABS tablet Take 2.5 mg by mouth daily   Yes Historical Provider, MD   metoprolol tartrate (LOPRESSOR) 50 MG tablet Take 50 mg by mouth 2 times daily   Yes Historical Provider, MD         Intake/Output Summary (Last 24 hours) at 3/23/2019 1200  Last data filed at 3/23/2019 0430  Gross per 24 hour   Intake 950 ml   Output 825 ml   Net 125 ml         Diet   DIET RENAL; Low Sodium (2 GM); Daily Fluid Restriction: 1800 ml  Dietary Nutrition Supplements: Renal Oral Supplement    Vitals:   BP (!) 116/53   Pulse 64   Temp 98.1 °F (36.7 °C) (Oral)   Resp 16   Ht 5' 6\" (1.676 m)   Wt 158 lb 1.1 oz (71.7 kg)   SpO2 97%   BMI 25.51 kg/m²  on         I/O (24 Hours)    Patient Vitals for the past 8 hrs:   BP Temp Temp src Pulse Resp SpO2 Weight   03/23/19 0926 (!) 116/53 -- -- 64 -- -- --   03/23/19 0500 -- -- -- -- -- -- 158 lb 1.1 oz (71.7 kg)   03/23/19 0439 (!) 126/51 98.1 °F (36.7 °C) Oral 76 16 97 % --       Intake/Output Summary (Last 24 hours) at 3/23/2019 1200  Last data filed at 3/23/2019 0430  Gross per 24 hour   Intake 950 ml   Output 825 ml   Net 125 ml     I/O last 3 completed shifts:   In: 1130 106*   CREATININE 3.30* 3.00* 3.07*     LIVER PROFILE:   No results for input(s): AST, ALT, LIPASE, BILIDIR, BILITOT, ALKPHOS in the last 72 hours. Invalid input(s): AMYLASE,  ALB  PT/INR:   No results for input(s): PROTIME, INR in the last 72 hours. APTT:   No results for input(s): APTT in the last 72 hours. UA:  No results for input(s): NITRITE, COLORU, PHUR, LABCAST, WBCUA, RBCUA, MUCUS, TRICHOMONAS, YEAST, BACTERIA, CLARITYU, SPECGRAV, LEUKOCYTESUR, UROBILINOGEN, BILIRUBINUR, BLOODU, GLUCOSEU, AMORPHOUS in the last 72 hours. Invalid input(s): KETONESU  No results for input(s): PHART, LYM4ZXI, PO2ART in the last 72 hours. 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 *  Multi-vessel coronary artery disease post PCI  Severe aortic stenosis, post TAVR  Atrial fibrillation  Small right middle lobe pulmonary embolism. Per chart, from imaging studies done in [place]  Recent epistaxis    Plan:  Continue Lasix 40 mg IV bid  Monitor renal function  Continue with Pt/Ot  Incentive spirometry ordered. Summer Veliz M.D.  PGY-3 Internal Medicine  [address]   3/23/2019 at 12:00 PM

## 2019-03-23 NOTE — PROGRESS NOTES
hours.  Troponin: No results for input(s): TROPONINI in the last 72 hours. BNP: No results for input(s): BNP in the last 72 hours. Lipids: No results for input(s): CHOL, HDL in the last 72 hours. Invalid input(s): LDLCALCU  INR:   No results for input(s): INR in the last 72 hours. Objective:   Vitals: BP (!) 116/53   Pulse 64   Temp 98.1 °F (36.7 °C) (Oral)   Resp 16   Ht 5' 6\" (1.676 m)   Wt 158 lb 1.1 oz (71.7 kg)   SpO2 97%   BMI 25.51 kg/m²   Constitutional and General Appearance: alert, cooperative, no distress and appears stated age  Respiratory:  · Diminished breath sounds at B/L lung bases  Cardiovascular:  S1, s2, paced rhythm  Abdomen:   · No masses or tenderness, soft abdomen  · Bowel sounds present  Extremities:  · le edema present, peripheral pulse bl le present 2 +  · Bilateral groins soft and without hematoma  Integumentum:   Intact with no rashes noted      EKG:   V-pacing with occasional PVCs. CTA Chest in [place] (2/15/19):  Small PE within branch of RT middle lobe artery. Large RT pleural effusion. Moderate LT pleural effusion.     ECHO in [place] (2/17/19:   LVH with LVEF 50%. LT atrial enlargement. Moderate AS with Mild AI. Mitral annular calcification with mild MR. Mild to Moderate Pulm HTN.     Cardiac Angiography in [place] (1/20/19): Multivessel disease including LM. ECHO (3/6/19):   Normal left ventricle size with mildly reduced systolic function; Estimated left ventricular ejection fraction 40-45%  Anterolateral wall hypokinesis. Mild left ventricular hypertrophy. Left atrium is moderately dilated. Grade II diastolic dysfunction. Heavily calcified aortic valve with reduced systolic excursion and evidence of moderate stenosis. (Peak nataliia 3.62 m/s and mean gradient 29 mmHg)  Moderate posterior pericardial effusion without any echo signs of tamponade. Normal right ventricular size and function. Pacemaker lead seen in right ventricle.   Moderate tricuspid 2.5 mg QD at home. 11. Nose bleed after 2 doses of Eliquis on 3/16 PM  12. Primary HTN  13. Dyslipidemia  14. Hypocalcemia    Plan of Treatment:   1 will recommend resuming eliquis since there is no plan for sun catheter per nephrology yet. On DAPT for PCI to left main. can drop aspirin after 30 days. 2 continue with coreg 6.25 mg BID. Not on ACE I because of HUYEN on CKD. 3 Diuresis per the nephrology. On lasix 80 IV BID    Will discuss with rounding attending . Niko Henry MD   Fellow, Cardiovascular Diseases    Attending Cardiologist Addendum: I have reviewed and performed the history, physical, subjective, objective, assessment, and plan with the resident/fellow and agree with the note. I performed the history and physical personally. I have made changes to the note above as needed. Please start back on eliquis since no procedures planned. Continue plavix, plan to Stop aspirin 30 days after PCI. Diuresis per nephro  ? Home when ok with nephro    Thank you for allowing me to participate in the care of this patient, please do not hesitate to call if you have any questions. Juan Hankins DO, [facility] [place], [address] [facility]  [email or web address]  [phone]

## 2019-03-23 NOTE — PROGRESS NOTES
Renal Progress Note    Patient :  Dick Blackburn; 80 y.o. MRN# [id number]  Location:  [facility]/9640-49  Attending:  Landon Harman MD  Admit Date:  3/6/2019   Hospital Day: 17      Subjective:     Patient continues to have good urine output. So far his 10 dL net negative. He is responding to IV diuretics. However there has been no significant change in his BUN and creatinine  Patient states that he is feeling better. No nausea vomiting or diarrhea  Appetite is improving    Outpatient Medications:     Medications Prior to Admission: amiodarone (CORDARONE) 200 MG tablet, Take 200 mg by mouth daily  atorvastatin (LIPITOR) 20 MG tablet, Take 20 mg by mouth daily  isosorbide mononitrate (IMDUR) 60 MG extended release tablet, Take 60 mg by mouth daily  levothyroxine (SYNTHROID) 100 MCG tablet, Take 100 mcg by mouth Daily  lisinopril (PRINIVIL;ZESTRIL) 40 MG tablet, Take 40 mg by mouth daily  apixaban (ELIQUIS) 2.5 MG TABS tablet, Take 2.5 mg by mouth daily  metoprolol tartrate (LOPRESSOR) 50 MG tablet, Take 50 mg by mouth 2 times daily    Current Medications:     Scheduled Meds:    furosemide  40 mg Intravenous BID    FLUoxetine  10 mg Oral Daily    carvedilol  6.25 mg Oral BID WC    famotidine  20 mg Oral Daily    sodium chloride flush  10 mL Intravenous 2 times per day    sodium chloride flush  10 mL Intravenous 2 times per day    levothyroxine  125 mcg Oral Daily    ALPRAZolam  0.25 mg Oral Once    QUEtiapine  25 mg Oral Nightly    sodium chloride flush  10 mL Intravenous 2 times per day    clopidogrel  75 mg Oral Daily    isosorbide mononitrate  30 mg Oral Daily    docusate sodium  100 mg Oral Daily    aspirin  81 mg Oral Daily    amiodarone  200 mg Oral Daily    atorvastatin  20 mg Oral Daily     Input/Output:       I/O last 3 completed shifts: In: 1130 [P.O.:1130]  Out: 3025 [Urine:3025].       Patient Vitals for the past 96 hrs (Last 3 readings):   Weight   03/23/19 0500 158 lb 1.1 oz (71.7 kg) 19 1315 157 lb 6.5 oz (71.4 kg)   19 0400 159 lb 2.8 oz (72.2 kg)       Vital Signs:   Temperature:  Temp: 97.7 °F (36.5 °C)  TMax:   Temp (24hrs), Av.8 °F (36.6 °C), Min:97.5 °F (36.4 °C), Max:98.1 °F (36.7 °C)    Respirations:  Resp: 17  Pulse:   Pulse: 64  BP:    BP: (!) 116/53  BP Range: Systolic (80JNV), HUYEN:306 , Min:111 , ZHF:772       Diastolic (89MHK), CXF:60, Min:36, Max:66      Physical Examination:     General:  Comfortable alert and awake ×3  Neck:   Neck was supple and patient has an elevated JVD  Chest:   Bilateral air entry and  Improved as compared to previous examinations. Cardiac:  S1 and S2 audible no S3. Abdomen: And nontender bowel sounds was positive  :   No suprapubic or flank tenderness. Neuro:  AAO x 3, No FND. SKIN:  No rashes, good skin turgor. Extremities:  Trace to 1+ edema    Labs:     OOBMP:   Recent Labs     19  0430 19  0441 19  0458    141 140   K 3.6* 3.9 3.7    100 99   CO2 27 29 28   * 107* 106*   CREATININE 3.30* 3.00* 3.07*   GLUCOSE 97 98 104*   CALCIUM 7.8* 8.1* 7.8*      OSPEP:  Lab Results   Component Value Date    PROT 5.2 2019    ALBCAL 2.5 2019    ALBPCT 53 2019    LABALPH 0.3 2019    LABALPH 0.8 2019    A1PCT 7 2019    A2PCT 18 2019    LABBETA 0.7 2019    BETAPCT 14 2019    GAMGLOB 0.4 2019    GGPCT 8 2019    PATH ELECTRONICALLY SIGNED.  Som Rosales M.D. 2019     Urinalysis/Chemistries:      Lab Results   Component Value Date    NITRU NEGATIVE 2019    COLORU YELLOW 2019    PHUR 5.0 2019    WBCUA None 2019    RBCUA 0 TO 2 2019    MUCUS NOT REPORTED 2019    TRICHOMONAS NOT REPORTED 2019    YEAST NOT REPORTED 2019    BACTERIA NOT REPORTED 2019    SPECGRAV 1.024 2019    LEUKOCYTESUR NEGATIVE 2019    UROBILINOGEN Normal 2019    BILIRUBINUR NEGATIVE 2019 GLUCOSEU NEGATIVE 03/12/2019    KETUA NEGATIVE 03/12/2019    AMORPHOUS NOT REPORTED 03/12/2019      Urine Creatinine:     Lab Results   Component Value Date    LABCREA 210.1 03/13/2019     Radiology:     Reviewed. Assessment:        1. Acute kidney injury, ATN from contrast low flow from cardiogenic shock. Creatinine plateaued around 3 mg/dL and BUN close to 100. There is no improvement in his blood chemistry in last 3 days    2. Chronic kidney disease stage IIIBWith a baseline reatinine of 1.8-2 mg/dL  Ultrasound reveals right kidney 12.5 cm and left 11.2 cm  2. Nephrotic range proteinuria Due to nephrosclerosis further complicated by congesve cardiac failure. 3. Bilateral pleural effusions status post pleural taps  4. Severe pulmonary hypertension  5. Hx of Urinary retention  6. Advanced age  S/P TAVR  9. biventricularf failure from systolic and diasto;ic dysfxn    Plan:   1. Decrease the dose of Lasix  From 80-40 mg  #2. Continue to follow BMP  3. Will repeat a spot urine for protein and creatinine    Nutrition   Please ensure that patient is on a renal diet/TF. Avoid nephrotoxic drugs/contrast exposure. We will continue to follow along with you.      Ely Frias MD

## 2019-03-23 NOTE — PROGRESS NOTES
Physical Therapy  Facility/Department: [facility]  Daily Treatment Note  NAME: Ladan Herrmann  : 12/10/1928  MRN: [id number]    Date of Service: 3/23/2019    Discharge Recommendations:  Home with assist PRN   PT Equipment Recommendations  Equipment Needed: Yes  Walker: Rolling  Other: for community ambulation    Patient Diagnosis(es): The encounter diagnosis was Pulmonary hypertension ([address] has a past medical history of Aortic stenosis, Atrial fibrillation ([address] CAD (coronary artery disease), Chest pain, CHF (congestive heart failure) ([address] Chronic anemia, Chronic kidney disease, Hypertension, Pleural effusion on left, Pleural effusion, right, Pulmonary emboli ([address] Pulmonary hypertension ([address] and Thyroid disease. has a past surgical history that includes pacemaker placement (2019); thoracentesis; joint replacement; and Coronary angioplasty with stent (2019). Restrictions  Restrictions/Precautions  Restrictions/Precautions: Cardiac, General Precautions  Required Braces or Orthoses?: No  Implants present? : Pacemaker  Position Activity Restriction  Other position/activity restrictions: Up w/assist.  Subjective   General  Chart Reviewed: No  Response To Previous Treatment: Patient with no complaints from previous session. Family / Caregiver Present: Yes(spouse.)  Subjective  Subjective: RN and Pt agreeable to PT. Pt sleeping upon arrival. Left Pt in on Toilet sit with RN in room. Pt to pull call light when done. Pain Screening  Patient Currently in Pain: Denies  Vital Signs  Patient Currently in Pain: Denies       Orientation  Orientation  Overall Orientation Status: Within Normal Limits  Cognition      Objective   Bed mobility  Sit to Supine: Stand by assistance  Scooting: Stand by assistance  Comment: HOB elavated.  Cues given for safety  Transfers  Sit to Stand: Contact guard assistance  Stand to sit: Stand by assistance  Bed to Chair: Contact guard assistance  Ambulation  Ambulation?: Yes  More Ambulation?: Yes  Ambulation 1  Surface: level tile  Device: Rolling Walker  Assistance: Stand by assistance  Quality of Gait: Decrease renato, decrease step length and flexed posture. Distance: 300ft  Comments: Cues given for safety and posture control with good returns  Ambulation 2  Surface - 2: level tile  Device 2: Single point cane  Assistance 2: Contact guard assistance  Quality of Gait 2: Decrease renato and flexed posture. Distance: 40ft  Comments:   Cues given for safety and posture. Balance  Sitting - Dynamic: Good  Standing - Static: Good;-  Standing - Dynamic: Fair;+  Comments: BUE support on RW. Exercises  Straight Leg Raise: 15reps  Hip Flexion: 15reps  Knee Long Arc Quad: 15reps  Ankle Pumps: 15reps  Upper Extremity: 5minutes on the Egometery to improve strength and endurance. Comments:  Seated Using 3lbs to improve strength. Other exercises  Other exercises?: Yes  Other exercises 1: Standing; Marching, mini sqaut ,and heel raises 10reps each. rest breaks as needed. Cues given for proper technique. Assessment   Body structures, Functions, Activity limitations: Decreased functional mobility ; Decreased balance;Decreased endurance  Assessment: Pt very motivated and cooperative, Amb 300ft using RW and SBA, 40ft with sinsle point cane. rest breaks as needed. Home with PRN assist.  Prognosis: Good  Patient Education:  Pacing and safe mobility. REQUIRES PT FOLLOW UP: Yes  Activity Tolerance  Activity Tolerance: Patient Tolerated treatment well  Activity Tolerance: Rest breaks as needed.      Goals  Short term goals  Time Frame for Short term goals: 14 visits  Short term goal 1: Perform bed mobility and functional transfers independently  Short term goal 2: Ambulate 300ft with least restrictive AD independently  Short term goal 3: Demo Good- dynamic standing balance to decrease risk of falls  Short term goal 4: Participate in 30 minutes of therapy to demo increased endurance    Plan    Plan  Times per week: 6-7x/wk  Current Treatment Recommendations: Strengthening, Balance Training, Functional Mobility Training, Endurance Training, Transfer Training, Patient/Caregiver Education & Training, Safety Education & Training, Home Exercise Program, Gait Training  Safety Devices  Type of devices:  All fall risk precautions in place, Gait belt, Left in chair, Call light within reach, Nurse notified  Restraints  Initially in place: No     Therapy Time   Individual Concurrent Group Co-treatment   Time In 0915         Time Out 1000         Minutes [address]

## 2019-03-24 NOTE — PROGRESS NOTES
Questionable of PE ofright middle lobe noted on CT chest report done in [place]. Patient has no hemodynamic compromise due to this, pulmonary hypertension is due to valvular disease. Patient will be on aspirin, Plavix and  eliquis - for afib , cad with pci - which should be adequate to treat RML PE .   D/w Dr Marifer Louise MD

## 2019-03-24 NOTE — PROGRESS NOTES
[facility]   Progress Note                   Date:   3/24/2019  Patient name: Haris No  Date of admission:  3/6/2019  8:48 PM  MRN:   [id number]  YOB: 1928  PCP: No primary care provider on file. Reason for Admission: exertional dyspnea    Subjective:   Underwent PCI to LAD with JEAN-CLAUDE on 3/11/19. Underwent TAVR on 3/15/19. Feels better today, denies any dyspnea and chest pain  A paced rhythm    Intake/Output Summary (Last 24 hours) at 3/24/2019 1133  Last data filed at 3/24/2019 0537  Gross per 24 hour   Intake 615 ml   Output 1275 ml   Net -660 ml         Intake/Output Summary (Last 24 hours) at 3/24/2019 1133  Last data filed at 3/24/2019 0537  Gross per 24 hour   Intake 615 ml   Output 1275 ml   Net -660 ml       Medications:   Scheduled Meds:   apixaban  2.5 mg Oral Daily    bumetanide  1 mg Oral BID    FLUoxetine  10 mg Oral Daily    carvedilol  6.25 mg Oral BID WC    famotidine  20 mg Oral Daily    sodium chloride flush  10 mL Intravenous 2 times per day    sodium chloride flush  10 mL Intravenous 2 times per day    levothyroxine  125 mcg Oral Daily    ALPRAZolam  0.25 mg Oral Once    QUEtiapine  25 mg Oral Nightly    sodium chloride flush  10 mL Intravenous 2 times per day    clopidogrel  75 mg Oral Daily    isosorbide mononitrate  30 mg Oral Daily    docusate sodium  100 mg Oral Daily    aspirin  81 mg Oral Daily    amiodarone  200 mg Oral Daily    atorvastatin  20 mg Oral Daily     Continuous Infusions:   norepinephrine 1 mcg/min (03/14/19 1050)     CBC:   No results for input(s): WBC, HGB, PLT in the last 72 hours. BMP:    Recent Labs     03/22/19  0441 03/23/19  0458 03/24/19  0424    140 140   K 3.9 3.7 3.7    99 99   CO2 29 28 29   * 106* 106*   CREATININE 3.00* 3.07* 3.12*   GLUCOSE 98 104* 96     Hepatic:   No results for input(s): AST, ALT, ALB, BILITOT, ALKPHOS in the last 72 hours.   Troponin: No results for input(s): systolic pressure is 60 mmHg suggesting moderate  pulmonary HTN. Cardiac Cath (3/11/19): Left main: 80-90% very calcified stenosis. This required rotational atherectomy and PTCA -JEAN-CLAUDE 4/0/15 mm stent, redcuing stenosis to 10% with AKHIL III flow. LAD: Proximal to mid area calcified 80% stenosis. This required Rotational atherectomy / JEAN-CLAUDE in mid and proximal area, reducing stenosis to 0% with AKHIL III flow  LCX: Diffuse disease (known from cath in [place])  RCA: Minimal 20-30% as reviewed from [address]    ECHO (3/17/19): Left ventricle is normal in size with mildly reduced systolic function;  Calculated ejection fraction via Dean's Method is 41%  Mild left ventricular hypertrophy. Evidence of diastolic dysfunction. Left atrium is severely dilated. Increased left atrial volume. Normal functioning bioprosthetic AVR in aortic position. No evidence of stenosis. No valvular regurgitation is seen. Trivial fredrick valvular regurgitation is noted. Mitral annular calcification and leaflet thickening with no evidence of  stenosis. Moderate mitral regurgitation. Mild tricuspid regurgitation. Estimated right ventricular systolic pressure is 51 mmHg. No significant pericardial effusion is seen. Pleural effusion is noted. Assessment / Acute Cardiac Problems:   1. Acute congestive heart failure (likely secondary to MV CAD and AS)  2. 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 TAVR on 3/15/19  4. 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 B/L Pleural effusions with RT & LT thoracentesis per IR (multiple times)  6. Contrast induced nephropathy  7. Moderate pericardial effusion without tamponade physiology likely secondary to CHF on admission, now resolved with diuresis  8. Dual chamber pacer for complete heart block, placed in January 2019 in [place]  9. Moderate Pulm HTN (likely Group 2)  10. Paroxysmal Afib and small RT PE diagnosed in [place], on Eliquis 2.5 mg QD at home.   11. Nose bleed after 2

## 2019-03-24 NOTE — PROGRESS NOTES
Physical Therapy  Facility/Department: [facility] CAR 1  Daily Treatment Note  NAME: Ladan Herrmann  : 12/10/1928  MRN: [id number]    Date of Service: 3/24/2019    Discharge Recommendations:  Home with assist PRN        Patient Diagnosis(es): The encounter diagnosis was Pulmonary hypertension ([address] has a past medical history of Aortic stenosis, Atrial fibrillation ([address] CAD (coronary artery disease), Chest pain, CHF (congestive heart failure) ([address] Chronic anemia, Chronic kidney disease, Hypertension, Pleural effusion on left, Pleural effusion, right, Pulmonary emboli ([address] Pulmonary hypertension ([address] and Thyroid disease. has a past surgical history that includes pacemaker placement (2019); thoracentesis; joint replacement; and Coronary angioplasty with stent (2019). Restrictions  Restrictions/Precautions  Restrictions/Precautions: Cardiac, General Precautions  Required Braces or Orthoses?: No  Implants present? : Pacemaker  Position Activity Restriction  Other position/activity restrictions: Up w/assist.  Subjective   Pt sitting up in his chair. Pt has no c/o pain. Orientation    Overall Orientation Status: Within Normal Limits    Objective     Transfers  Sit to Stand: Contact guard assistance  Stand to sit: Stand by assistance  Ambulation  Ambulation?: Yes  Ambulation 1  Surface: level tile  Device: Rolling Walker  Other Apparatus: Wheelchair follow  Assistance: Stand by assistance;Contact guard assistance  Quality of Gait: flexed posture, good return with cues for trunk extension, decreased pace and shortened step length. Distance: 400' x 1  Balance  Posture: Fair  Sitting - Static: Good  Sitting - Dynamic: Good  Standing - Static: Good;-  Standing - Dynamic: Fair;+       Exercises:  Seated UBE x 2.5 minutes each, FWD/BWD,good tolerance  Seated LE exercise program: Long Arc Quads, hip abduction with Red T-band/adduction with squeezing pillow, heel/toe raises, and marches.  Reps: 20x each with 2# ankle weights. RTB for B Hamstrings 2 x 10 with 5 sec. Holds. UE: bicep curls, modified tricep ext, punches. 2 x 10 each with 2# weight on a SPC. 4 lbs with Biceps. Assessment     Body structures, Functions, Activity limitations: Decreased functional mobility ; Decreased balance;Decreased endurance  Assessment: Improved endurance. Able to tolerate UE/LE strengthening with good tolerance and minimal rest breaks. Prognosis: Good  Patient Education: issued tband  REQUIRES PT FOLLOW UP: Yes  Activity Tolerance  Activity Tolerance: Patient Tolerated treatment well     Goals  Short term goals  Time Frame for Short term goals: 14 visits  Short term goal 1: Perform bed mobility and functional transfers independently  Short term goal 2: Ambulate 300ft with least restrictive AD independently  Short term goal 3: Demo Good- dynamic standing balance to decrease risk of falls  Short term goal 4: Participate in 30 minutes of therapy to demo increased endurance    Plan    Plan  Times per week: 6-7x/wk  Current Treatment Recommendations: Strengthening, Balance Training, Functional Mobility Training, Endurance Training, Transfer Training, Patient/Caregiver Education & Training, Safety Education & Training, Home Exercise Program, Gait Training  Safety Devices  Type of devices:  All fall risk precautions in place, Gait belt, Left in chair, Call light within reach, Nurse notified  Restraints  Initially in place: No     Therapy Time   Individual Concurrent Group Co-treatment   Time In   0305         Time Out   0335         Minutes   [address] [place] [address]

## 2019-03-24 NOTE — PROGRESS NOTES
(71.7 kg)   19 1315 157 lb 6.5 oz (71.4 kg)       Vital Signs:   Temperature:  Temp: 97.9 °F (36.6 °C)  TMax:   Temp (24hrs), Av.8 °F (36.6 °C), Min:97.6 °F (36.4 °C), Max:97.9 °F (36.6 °C)    Respirations:  Resp: 14  Pulse:   Pulse: 63  BP:    BP: (!) 138/55  BP Range: Systolic (48FVM), GJI:051 , Min:117 , NZD:389       Diastolic (58JNG), GDW:81, Min:49, Max:55      Physical Examination:     General:  Comfortable alert and awake ×3  Neck:   Neck was supple and patient has an elevated JVD  Chest:   Bilateral air entry still has decrease air entry at the right base  Cardiac:  S1 and S2 audible no S3. Abdomen: And nontender bowel sounds was positive  :   No suprapubic or flank tenderness. Neuro:  AAO x 3, No FND. SKIN:  No rashes, good skin turgor. Extremities:  Trace edema    Labs:     OOBMP:   Recent Labs     19  0441 19  0458 19  0424    140 140   K 3.9 3.7 3.7    99 99   CO2 29 28 29   * 106* 106*   CREATININE 3.00* 3.07* 3.12*   GLUCOSE 98 104* 96   CALCIUM 8.1* 7.8* 7.5*      OSPEP:  Lab Results   Component Value Date    PROT 5.2 2019    ALBCAL 2.5 2019    ALBPCT 53 2019    LABALPH 0.3 2019    LABALPH 0.8 2019    A1PCT 7 2019    A2PCT 18 2019    LABBETA 0.7 2019    BETAPCT 14 2019    GAMGLOB 0.4 2019    GGPCT 8 2019    PATH ELECTRONICALLY SIGNED.  Joy Rosario M.D. 2019     Urinalysis/Chemistries:      Lab Results   Component Value Date    NITRU NEGATIVE 2019    COLORU YELLOW 2019    PHUR 5.0 2019    WBCUA None 2019    RBCUA 0 TO 2 2019    MUCUS NOT REPORTED 2019    TRICHOMONAS NOT REPORTED 2019    YEAST NOT REPORTED 2019    BACTERIA NOT REPORTED 2019    SPECGRAV 1.024 2019    LEUKOCYTESUR NEGATIVE 2019    UROBILINOGEN Normal 2019    BILIRUBINUR NEGATIVE 2019    GLUCOSEU NEGATIVE 2019    KETUA NEGATIVE 03/12/2019    AMORPHOUS NOT REPORTED 03/12/2019      Urine Creatinine:     Lab Results   Component Value Date    LABCREA 42.4 03/23/2019     Radiology:     Reviewed. Assessment:        1.   acute kidney injury with a creatinine after reaching peak down to 3 mg/dL. Most likely this will be his new baseline. 2. Chronic kidney disease stage IIIBWith a baseline reatinine of 1.8-2 mg/dL  Ultrasound reveals right kidney 12.5 cm and left 11.2 cm  2. Nephrotic range proteinuria Due to nephrosclerosis further complicated by congesve cardiac failure. 3. Bilateral pleural effusions status post pleural taps  4. Severe pulmonary hypertension  5. Hx of Urinary retention  6. Advanced age  S/P TAVR  9. biventricularf failure from systolic and diasto;ic dysfxn    Plan:   1. Discontinue  Lasix  2. Start Bumex by mouth  3.  PT and OT    Nutrition   Please ensure that patient is on a renal diet/TF. Avoid nephrotoxic drugs/contrast exposure. We will continue to follow along with you.      Aneudy Chavira MD

## 2019-03-24 NOTE — PROGRESS NOTES
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

## 2019-03-24 NOTE — PROGRESS NOTES
PULMONARY PROGRESS NOTE      Patient:  Sharita Quinn  YOB: 1928    MRN: [id number]     Acct: [de-identified]     Admit date: 3/6/2019    REASON FOR CONSULT:- Pulmonary hypertension, bilateral pleural effusion with atelectasis and aortic stenosis with hypothyroidism    Pt seen and Chart reviewed. Patient is on room air. He is up in a chair. Denied any shortness of breath. Denied orthopnea. No chest pain or pressure. No fevers or chills or night sweats. Subjective:   Review of Systems -   General ROS: Completed and except as mentioned above were negative   Psychological ROS:  Completed and except as mentioned above were negative  Allergy and Immunology ROS:  Completed and except as mentioned above were negative  Hematological and Lymphatic ROS:  Completed and except as mentioned above were negative  Respiratory ROS:  Completed and except as mentioned above were negative  Cardiovascular ROS:  Completed and except as mentioned above were negative  Gastrointestinal ROS: Completed and except as mentioned above were negative  Genito-Urinary ROS:  Completed and except as mentioned above were negative  Musculoskeletal ROS:  Completed and except as mentioned above were negative  Neurological ROS:  Completed and except as mentioned above were negative  Dermatological ROS:  Completed and except as mentioned above were negative      Diet:  DIET RENAL; Low Sodium (2 GM);  Daily Fluid Restriction: 1800 ml  Dietary Nutrition Supplements: Renal Oral Supplement      Medications:Current Inpatient    Scheduled Meds:   furosemide  40 mg Intravenous BID    FLUoxetine  10 mg Oral Daily    carvedilol  6.25 mg Oral BID WC    famotidine  20 mg Oral Daily    sodium chloride flush  10 mL Intravenous 2 times per day    sodium chloride flush  10 mL Intravenous 2 times per day    levothyroxine  125 mcg Oral Daily    ALPRAZolam  0.25 mg Oral Once    QUEtiapine  25 mg Oral Nightly    sodium chloride flush  10 mL Intravenous 2 times per day    clopidogrel  75 mg Oral Daily    isosorbide mononitrate  30 mg Oral Daily    docusate sodium  100 mg Oral Daily    aspirin  81 mg Oral Daily    amiodarone  200 mg Oral Daily    atorvastatin  20 mg Oral Daily     Continuous Infusions:   norepinephrine 1 mcg/min (03/14/19 1050)     PRN Meds:sodium chloride flush, sodium chloride flush, acetaminophen, ondansetron, sodium chloride flush, acetaminophen, magnesium hydroxide    Objective:      Physical Exam:  Vitals: BP (!) 138/55   Pulse 63   Temp 97.9 °F (36.6 °C) (Oral)   Resp 14   Ht 5' 6\" (1.676 m)   Wt 158 lb 11.2 oz (72 kg)   SpO2 95%   BMI 25.61 kg/m²   24 hour intake/output:    Intake/Output Summary (Last 24 hours) at 3/24/2019 0853  Last data filed at 3/24/2019 0537  Gross per 24 hour   Intake 615 ml   Output 1475 ml   Net -860 ml     Last 3 weights: Wt Readings from Last 3 Encounters:   03/24/19 158 lb 11.2 oz (72 kg)         Physical Examination:   PHYSICAL EXAMINATION:  Vitals:    03/23/19 1743 03/23/19 1941 03/23/19 2352 03/24/19 0600   BP: (!) 151/53 (!) 117/49 (!) 138/55    Pulse: 61 58 63    Resp:  16 14    Temp:  97.9 °F (36.6 °C) 97.9 °F (36.6 °C)    TempSrc:  Oral Oral    SpO2:  96% 95%    Weight:    158 lb 11.2 oz (72 kg)   Height:         Constitutional: This is a well developed, well nourished, 18.5-24.9 - Normal 80y.o. year old male who is alert, oriented, cooperative and in no apparent distress. Head:normocephalic and atraumatic. EENT:  ILEANA. No conjunctival injections. Septum was midline, mucosa was without erythema, exudates or cobblestoning. No thrush was noted. Mallampati  II (soft palate, uvula, fauces visible)  Neck: Supple without thyromegaly. No elevated JVP. Trachea was midline. Respiratory: Chest was symmetrical without dullness to percussion. Breath sounds bilaterally were clear to auscultation.  There were no in the last 72 hours. BNP: No results for input(s): BNP in the last 72 hours. Lipids: No results for input(s): CHOL, TRIG, HDL, LDLCALC in the last 72 hours. Invalid input(s): LDL  ABGs: No results found for: PH, PCO2, PO2, HCO3, O2SAT  Thyroid:   Lab Results   Component Value Date    TSH 3.04 03/20/2019      Urinalysis: No results for input(s): BACTERIA, BLOODU, CLARITYU, COLORU, PHUR, PROTEINU, RBCUA, SPECGRAV, BILIRUBINUR, NITRU, WBCUA, LEUKOCYTESUR, GLUCOSEU in the last 72 hours. 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 *  Bibasilar atelectasis. Aortic stenosis. Hypothyroidism. History of cigarette smoking. Worsening renal function. Anemia with slight improvement. Leukocytosis, slightly worse    Plan:  Meds reviewed- changes made as appropriate. Patient is on diuretics. On amiodarone. On Plavix  Patient is on thyroid supplementation. Recommend Eliquis for DVT prophylaxis, if okay with CV surgery. Increase activity as permitted and tolerated. Questions patient had were answered to his satisfaction. Continue supplemental oxygen if needed. Cxr reviewed. None today  Diet reviewed  Thank you for having us involved in the care of your patient. Please call us if you have any questions or concerns.     Audrey Knight MD      3/24/2019, 8:53 AM    Pulmonary & Critical Care

## 2019-03-25 NOTE — PLAN OF CARE
Problem: Falls - Risk of:  Goal: Will remain free from falls  Description  Will remain free from falls  3/25/2019 0134 by Laurie Ya RN  Outcome: Ongoing  3/24/2019 1457 by Renée Clark RN  Outcome: Ongoing  Goal: Absence of physical injury  Description  Absence of physical injury  3/25/2019 0134 by Laurie Ya RN  Outcome: Ongoing  3/24/2019 1457 by Renée Clark RN  Outcome: Ongoing     Problem: Pain:  Description  Pain management should include both nonpharmacologic and pharmacologic interventions. Goal: Pain level will decrease  Description  Pain level will decrease  3/25/2019 0134 by Laurie Ya RN  Outcome: Ongoing  3/24/2019 1457 by Renée Clark RN  Outcome: Ongoing  Goal: Control of acute pain  Description  Control of acute pain  3/25/2019 0134 by Laurie Ya RN  Outcome: Ongoing  3/24/2019 1457 by Renée Clark RN  Outcome: Ongoing  Goal: Control of chronic pain  Description  Control of chronic pain  3/25/2019 0134 by Laurie Ya RN  Outcome: Ongoing  3/24/2019 1457 by Renée Clark RN  Outcome: Ongoing     Problem: Risk for Impaired Skin Integrity  Goal: Tissue integrity - skin and mucous membranes  Description  Structural intactness and normal physiological function of skin and  mucous membranes.   3/25/2019 0134 by Laurie Ya RN  Outcome: Ongoing  3/24/2019 1457 by Renée Clark RN  Outcome: Ongoing     Problem: Cardiac Output - Decreased:  Goal: Hemodynamic stability will improve  Description  Hemodynamic stability will improve  3/25/2019 0134 by Laurie Ya RN  Outcome: Ongoing  3/24/2019 1457 by Renée Clark RN  Outcome: Ongoing     Problem: Musculor/Skeletal Functional Status  Goal: Highest potential functional level  3/25/2019 0134 by Laurie Ya RN  Outcome: Ongoing  3/24/2019 1457 by Renée Clark RN  Outcome: Ongoing  Goal: Absence of falls  3/25/2019 0134 by Laurie Ya RN  Outcome: Ongoing  3/24/2019 1457 by Johann Sims Bhaskar Brumfield RN  Outcome: Ongoing     Problem: Nutrition  Goal: Optimal nutrition therapy  3/25/2019 0134 by Nery Penny RN  Outcome: Ongoing  3/24/2019 1457 by Luis Martinez RN  Outcome: Ongoing

## 2019-03-25 NOTE — PROGRESS NOTES
-s/p BAV, awaiting O/P TAVR    Physical Therapy  Facility/Department: [facility] CAR 1  Daily Treatment Note  NAME: Abhay Avalos  : 12/10/1928  MRN: [id number]    Date of Service: 3/25/2019    Discharge Recommendations:  No further therapy required at discharge. Equipment Needed: Yes  Mobility Devices: Hamp Post: Rolling    Patient Diagnosis(es): The encounter diagnosis was Pulmonary hypertension ([address] has a past medical history of Aortic stenosis, Atrial fibrillation ([address] CAD (coronary artery disease), Chest pain, CHF (congestive heart failure) ([address] Chronic anemia, Chronic kidney disease, Hypertension, Pleural effusion on left, Pleural effusion, right, Pulmonary emboli ([address] Pulmonary hypertension ([address] and Thyroid disease. has a past surgical history that includes pacemaker placement (2019); thoracentesis; joint replacement; and Coronary angioplasty with stent (2019). Restrictions  Restrictions/Precautions  Restrictions/Precautions: Cardiac, General Precautions  Required Braces or Orthoses?: No  Implants present? : Pacemaker  Position Activity Restriction  Other position/activity restrictions: Up w/assist.  Subjective   General  Chart Reviewed: Yes  Response To Previous Treatment: Patient with no complaints from previous session. Family / Caregiver Present: No  Subjective  Subjective: Pt alert in chair. Denies pain  General Comment  Comments: Returned to chair after session with call light in reach.   Pain Screening  Patient Currently in Pain: Denies  Vital Signs  Patient Currently in Pain: Denies       Orientation  Orientation  Overall Orientation Status: Within Normal Limits  Cognition      Objective      Transfers  Sit to Stand: Stand by assistance;Contact guard assistance  Stand to sit: Stand by assistance  Ambulation  Ambulation?: Yes  Ambulation 1  Surface: level tile  Device: Rolling Walker  Other Apparatus: Wheelchair follow  Assistance: Stand by assistance  Quality of Gait: Decrease renato, decrease step length and flexed posture. Distance: 250ft     Balance  Posture: Fair  Sitting - Static: Good  Sitting - Dynamic: Good  Standing - Static: Good;-  Standing - Dynamic: Fair;+       Exercises:  Seated UBE, 2' fwd, 2' retro, 1 rest break due to fatigue  Seated LE exercise program: Long Arc Quads, hip abduction/adduction, heel/toe raises, and marches. Reps: 20x each with 2# ankle weights  Upper extremity exercises: Bicep curl, shoulder flexion/extension, punches, tricep curl, shoulder abduction/adduction. Reps: 15x each with 2# weight      Assessment   Body structures, Functions, Activity limitations: Decreased functional mobility ; Decreased balance;Decreased endurance  Assessment: Good safety awareness with transfers and takes rest breaks appropriately. Prognosis: Good  REQUIRES PT FOLLOW UP: Yes  Activity Tolerance  Activity Tolerance: Patient Tolerated treatment well     Goals  Short term goals  Time Frame for Short term goals: 14 visits  Short term goal 1: Perform bed mobility and functional transfers independently  Short term goal 2: Ambulate 300ft with least restrictive AD independently  Short term goal 3: Demo Good- dynamic standing balance to decrease risk of falls  Short term goal 4: Participate in 30 minutes of therapy to demo increased endurance    Plan    Plan  Times per week: 6-7x/wk  Current Treatment Recommendations: Strengthening, Balance Training, Functional Mobility Training, Endurance Training, Transfer Training, Patient/Caregiver Education & Training, Safety Education & Training, Home Exercise Program, Gait Training  Safety Devices  Type of devices:  All fall risk precautions in place, Gait belt, Left in chair, Call light within reach  Restraints  Initially in place: No     Therapy Time   Individual Concurrent Group Co-treatment   Time In 0155         Time Out 0231         Minutes 2250 Jesus Rowan, PTA

## 2019-03-25 NOTE — PROGRESS NOTES
[facility]  Pre Op Progress Note    CC: Acute CHF, SOB      Subjective:  Mr. Brar Levels seen and examined Plan of care reviewed and questions answered. Physical Exam  Vital Signs: BP (!) 157/57   Pulse 64   Temp 98.2 °F (36.8 °C) (Oral)   Resp 18   Ht 5' 6\" (1.676 m)   Wt 158 lb 4.6 oz (71.8 kg)   SpO2 97%   BMI 25.55 kg/m²  O2 Flow Rate (L/min): 2 L/min       General: alert and oriented to person, place and time, well-developed and well-nourished, in no acute distress. Up in chair. Heart:Normal S1 and S2.  Regular rhythm. No murmurs, gallops, or rubs. Lungs: clear to auscultation bilaterally  Abdomen: soft, non tender, non distended, BSx4  Extremities: Trace edema      Scheduled Meds:    apixaban  2.5 mg Oral Daily    bumetanide  1 mg Oral BID    FLUoxetine  10 mg Oral Daily    carvedilol  6.25 mg Oral BID WC    famotidine  20 mg Oral Daily    sodium chloride flush  10 mL Intravenous 2 times per day    sodium chloride flush  10 mL Intravenous 2 times per day    levothyroxine  125 mcg Oral Daily    ALPRAZolam  0.25 mg Oral Once    QUEtiapine  25 mg Oral Nightly    sodium chloride flush  10 mL Intravenous 2 times per day    clopidogrel  75 mg Oral Daily    isosorbide mononitrate  30 mg Oral Daily    docusate sodium  100 mg Oral Daily    aspirin  81 mg Oral Daily    amiodarone  200 mg Oral Daily    atorvastatin  20 mg Oral Daily     Continuous Infusions:    norepinephrine 1 mcg/min (03/14/19 1050)       Data:  CBC: No results for input(s): WBC, HGB, HCT, MCV, PLT in the last 72 hours. BMP:   Recent Labs     03/23/19  0458 03/24/19  0424 03/25/19  0436    140 136   K 3.7 3.7 4.1   CL 99 99 95*   CO2 28 29 29   * 106* 102*   CREATININE 3.07* 3.12* 2.99*     PT/INR: No results for input(s): PROTIME, INR in the last 72 hours. APTT: No results for input(s): APTT in the last 72 hours.       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 A standard walker is necessary due to the patient's impaired ambulation and mobility restrictions and he can ambulate only by using a walker instead of a lesser assistive device, such as a cane or crutch. The need for this equipment was discussed with the patient and he understands and is in agreement. The above recommendations including medications and orders were discussed and agreed upon with Dr. Maricel Patricia, the attending on service for the cardiothoracic surgery group today.      Summer Hopkins, APRN, CNP

## 2019-03-25 NOTE — PROGRESS NOTES
[facility]   Progress Note                   Date:   3/25/2019  Patient name: Katerin Arellano  Date of admission:  3/6/2019  8:48 PM  MRN:   [id number]  YOB: 1928  PCP: No primary care provider on file. Reason for Admission: exertional dyspnea    Subjective:   Underwent PCI to LAD with JEAN-CLAUDE on 3/11/19. Underwent TAVR on 3/15/19. Paced rhythm. Good UOP. Denies any dyspnea, chest pain or palpitations. Feels good. Intake/Output Summary (Last 24 hours) at 3/25/2019 1212  Last data filed at 3/25/2019 9018  Gross per 24 hour   Intake 700 ml   Output 1575 ml   Net -875 ml         Intake/Output Summary (Last 24 hours) at 3/25/2019 1212  Last data filed at 3/25/2019 9682  Gross per 24 hour   Intake 700 ml   Output 1575 ml   Net -875 ml       Medications:   Scheduled Meds:   apixaban  2.5 mg Oral Daily    bumetanide  1 mg Oral BID    FLUoxetine  10 mg Oral Daily    carvedilol  6.25 mg Oral BID WC    famotidine  20 mg Oral Daily    sodium chloride flush  10 mL Intravenous 2 times per day    sodium chloride flush  10 mL Intravenous 2 times per day    levothyroxine  125 mcg Oral Daily    ALPRAZolam  0.25 mg Oral Once    QUEtiapine  25 mg Oral Nightly    sodium chloride flush  10 mL Intravenous 2 times per day    clopidogrel  75 mg Oral Daily    isosorbide mononitrate  30 mg Oral Daily    docusate sodium  100 mg Oral Daily    aspirin  81 mg Oral Daily    amiodarone  200 mg Oral Daily    atorvastatin  20 mg Oral Daily     Continuous Infusions:   norepinephrine 1 mcg/min (03/14/19 1050)     CBC:   No results for input(s): WBC, HGB, PLT in the last 72 hours.   BMP:    Recent Labs     03/23/19  0458 03/24/19  0424 03/25/19  0436    140 136   K 3.7 3.7 4.1   CL 99 99 95*   CO2 28 29 29   * 106* 102*   CREATININE 3.07* 3.12* 2.99*   GLUCOSE 104* 96 103*         Objective:   Vitals: BP (!) 135/49   Pulse 61   Temp 96.8 °F (36 °C) (Oral)   Resp 18   Ht 5' 6\" and proximal area, reducing stenosis to 0% with AKHIL III flow  LCX: Diffuse disease (known from cath in [place])  RCA: Minimal 20-30% as reviewed from Perlita Prince [facility]    ECHO (3/17/19): Left ventricle is normal in size with mildly reduced systolic function;  Calculated ejection fraction via Dean's Method is 41%  Mild left ventricular hypertrophy. Evidence of diastolic dysfunction. Left atrium is severely dilated. Increased left atrial volume. Normal functioning bioprosthetic AVR in aortic position. No evidence of stenosis. No valvular regurgitation is seen. Trivial fredrick valvular regurgitation is noted. Mitral annular calcification and leaflet thickening with no evidence of  stenosis. Moderate mitral regurgitation. Mild tricuspid regurgitation. Estimated right ventricular systolic pressure is 51 mmHg. No significant pericardial effusion is seen. Pleural effusion is noted. Assessment / Acute Cardiac Problems:   1. Acute congestive heart failure (likely secondary to MV CAD and AS)  2. 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 TAVR on 3/15/19  4. 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 B/L Pleural effusions with RT & LT thoracentesis per IR (multiple times)  6. Contrast induced nephropathy  7. Moderate pericardial effusion without tamponade physiology likely secondary to CHF on admission, now resolved with diuresis  8. Dual chamber pacer for complete heart block, placed in January 2019 in [place]  9. Moderate Pulm HTN (likely Group 2)  10. Paroxysmal Afib and small RT PE diagnosed in [place], on Eliquis 2.5 mg QD at home. 11. Nose bleed after 2 doses of Eliquis on 3/16 PM  12. Primary HTN  13. Dyslipidemia  14. Hypocalcemia    Plan of Treatment:   1. Continue Aspirin, Plavix and Eliquis. Eliquis should be dosed BID to be effective. Once 30 days of DAPT is complete, can D/C Aspirin and continue with only Plavix and Eliquis. 2. Continue with Coreg at current dose.   Can uptitrate dose as an outpatient. Not on ACEi due to HUYEN on CKD. Ok to resume once ok with Nephro  3. Continue Atorvastatin and Imdur as prescribed. 4. Ok to discharge with these medications    Will discuss with rounding attending Dr. Fidel Boast for final recommendations. Eran Schafer MD   Cardiology Fellow    Attending Physician Statement  I have discussed the case of Sharita Quinn including pertinent history and exam findings with the resident. I have seen and examined the patient and the key elements of the encounter have been performed by me. I agree with the assessment, plan and orders as documented by the resident With changes made to the note.      Electronically signed by Juan Diane MD on 3/25/2019 at 2:33 PM.    [facility]      [phone]

## 2019-03-25 NOTE — FLOWSHEET NOTE
DATE: 3/25/2019    NAME: Carlos Jensen  MRN: [id number]   : 12/10/1928    Patient not seen this date for Physical Therapy due to:  [] Blood transfusion in progress  [] Hemodialysis  [x]  Patient Declined; pt states he is tired out from working with OT this am. Pt requesting writer to return in PM.  [] Spine Precautions   [] Strict Bedrest  [] Surgery/ Procedure  [] Testing      [] Other        [] PT being discontinued at this time. Patient independent. No further needs. [] PT being discontinued at this time as the patient has been transferred to palliative care. No further needs.     Freddy Caruso, PTA

## 2019-03-25 NOTE — PROGRESS NOTES
Occupational Therapy  Facility/Department: [facility] 1  Daily Treatment Note  NAME: Kyle Rodriguez  : 12/10/1928  MRN: [id number]    Date of Service: 3/25/2019    Discharge Recommendations:  No therapy recommended at discharge. Assessment   Performance deficits / Impairments: Decreased ADL status; Decreased endurance;Decreased functional mobility ; Decreased balance  Assessment: Pt would benefit from continued acute care OT to address minimal deficits in ADL/ functional activities, endurance, balance and functional transfers/ mobility following admission. Treatment Diagnosis: TAVR  Prognosis: Good  Patient Education: OT POC and proper use of IS every hour. Pt demo understanding. REQUIRES OT FOLLOW UP: Yes  Activity Tolerance  Activity Tolerance: Patient Tolerated treatment well  Activity Tolerance: Minimal SOB with increased activity, VCs for proper pursed lipped breathing. Safety Devices  Safety Devices in place: Yes  Type of devices: All fall risk precautions in place;Call light within reach; Left in chair;Nurse notified  Restraints  Initially in place: No         Patient Diagnosis(es): The encounter diagnosis was Pulmonary hypertension ([address] has a past medical history of Aortic stenosis, Atrial fibrillation ([address] CAD (coronary artery disease), Chest pain, CHF (congestive heart failure) ([address] Chronic anemia, Chronic kidney disease, Hypertension, Pleural effusion on left, Pleural effusion, right, Pulmonary emboli ([address] Pulmonary hypertension ([address] and Thyroid disease. has a past surgical history that includes pacemaker placement (2019); thoracentesis; joint replacement; and Coronary angioplasty with stent (2019).     Restrictions  Restrictions/Precautions  Restrictions/Precautions: Cardiac, General Precautions  Required Braces or Orthoses?: No  Implants present? : Pacemaker  Position Activity Restriction  Other position/activity restrictions: Up w/assist.  Subjective   General  Patient assessed for rehabilitation services?: Yes  Family / Caregiver Present: No  Pain Assessment  Response to Pain Intervention: Patient Satisfied  Vital Signs  Patient Currently in Pain: Denies   Orientation     Objective    ADL  Feeding: Independent;Setup(Seated in recliner at tray table)  Grooming: Setup;Stand by assistance(standing at sink to complete oral care)  UE Bathing: Minimal assistance;Setup(writer assist with back)  LE Bathing: Setup;Stand by assistance(Pt bathed hips to knees)  UE Dressing: Minimal assistance;Setup(assist with gown)  Additional Comments: Pt completed bathing ADLs seated at sink, standing for fredrick care/bottom care and oral hygiene. Sx soap used appropriately. See above for LOF. Balance  Sitting Balance: Independent(Seated in recliner, chair at sink)  Standing Balance: Contact guard assistance(w/RW)  Standing Balance  Time: ~8-9 minutes  Activity: func mob from recliner<>bathroom, standing for fredrick/bottom care, oral care  Sit to stand: Contact guard assistance  Stand to sit: Contact guard assistance  Comment: No LOB w/RW  Functional Mobility  Functional - Mobility Device: Rolling Walker  Activity: To/from bathroom  Assist Level: Contact guard assistance  Functional Mobility Comments: VCs for hand/ body placement on RW during session, minimal SOB noted with increased activity.  No LOB noted   Bed mobility  Supine to Sit: Unable to assess(Pt seated in recliner upon writer arrival)  Sit to Supine: Unable to assess(Pt retired seated in recliner)  Transfers  Sit to stand: Contact guard assistance  Stand to sit: Contact guard assistance  Transfer Comments: VCs for hand/ body placement on RW during session      Plan   Plan  Times per week: 4-5x/wk        Goals  Short term goals  Time Frame for Short term goals: by discharge, pt will  Short term goal 1: demo I in UE ADL activities   Short term goal 2: demo MI in LE ADL activities with AD as needed  Short term goal 3: demo understanding and I use of EC/WS, fall prevention and proper pursed lipped breathing tech during functional activities   Short term goal 4: demo increased activity tolerance of 30+ min to assist with ADL/ functional activities  Short term goal 5: demo I in functional transfers/ mobility with use of RW to assist with ADL/ functional activities   Short term goal 6: demo understanding and I use of safe transfer tech during functional activities with use of RW  Patient Goals   Patient goals : to go home       Therapy Time   Individual Concurrent Group Co-treatment   Time In 0830         Time Out 0901         Minutes 31             RN OK'ed tx and pt agreeable. Seated in recliner upon writer arrival. See above for LOF for all tasks. Pt properly demo use of IS 10x. Retired seated in [facility], call light within reach and RN notified.      CHAD Clinton/GERALD

## 2019-03-25 NOTE — CARE COORDINATION
Met with patient to discuss walker. Patient states he does not want it, he has 2 at home in [place]. Has a cane he can use. Dr. Sly Harris notified.   Spoke with Neva Gomez at [facility], they do not do Outpatient PT at their facility

## 2019-03-25 NOTE — PROGRESS NOTES
PULMONARY PROGRESS NOTE      Patient:  Maximino Salazar  YOB: 1928    MRN: [id number]     Acct: [de-identified]     Admit date: 3/6/2019    REASON FOR CONSULT:- Pulmonary hypertension, bilateral pleural effusion with atelectasis and aortic stenosis with hypothyroidism    Pt seen and Chart reviewed. Patient is on room air. He is up in a chair. Denied any shortness of breath. Denied orthopnea. No chest pain or pressure. No fevers or chills or night sweats. Tolerating diet. He is comfortable    Subjective:   Review of Systems -   General ROS: Completed and except as mentioned above were negative   Psychological ROS:  Completed and except as mentioned above were negative  Allergy and Immunology ROS:  Completed and except as mentioned above were negative  Hematological and Lymphatic ROS:  Completed and except as mentioned above were negative  Respiratory ROS:  Completed and except as mentioned above were negative  Cardiovascular ROS:  Completed and except as mentioned above were negative  Gastrointestinal ROS: Completed and except as mentioned above were negative  Genito-Urinary ROS:  Completed and except as mentioned above were negative  Musculoskeletal ROS:  Completed and except as mentioned above were negative  Neurological ROS:  Completed and except as mentioned above were negative  Dermatological ROS:  Completed and except as mentioned above were negative      Diet:  DIET RENAL; Low Sodium (2 GM);  Daily Fluid Restriction: 1800 ml  Dietary Nutrition Supplements: Renal Oral Supplement      Medications:Current Inpatient    Scheduled Meds:   apixaban  2.5 mg Oral Daily    bumetanide  1 mg Oral BID    FLUoxetine  10 mg Oral Daily    carvedilol  6.25 mg Oral BID     famotidine  20 mg Oral Daily    sodium chloride flush  10 mL Intravenous 2 times per day    sodium chloride flush  10 mL Intravenous 2 times per day    levothyroxine  125 mcg Oral Daily    ALPRAZolam  0.25 mg Oral Once    QUEtiapine  25 mg Oral Nightly    sodium chloride flush  10 mL Intravenous 2 times per day    clopidogrel  75 mg Oral Daily    isosorbide mononitrate  30 mg Oral Daily    docusate sodium  100 mg Oral Daily    aspirin  81 mg Oral Daily    amiodarone  200 mg Oral Daily    atorvastatin  20 mg Oral Daily     Continuous Infusions:   norepinephrine 1 mcg/min (03/14/19 1050)     PRN Meds:sodium chloride flush, sodium chloride flush, acetaminophen, ondansetron, sodium chloride flush, acetaminophen, magnesium hydroxide    Objective:      Physical Exam:  Vitals: BP (!) 171/65   Pulse 61   Temp 98.6 °F (37 °C)   Resp 15   Ht 5' 6\" (1.676 m)   Wt 158 lb 4.6 oz (71.8 kg)   SpO2 97%   BMI 25.55 kg/m²   24 hour intake/output:    Intake/Output Summary (Last 24 hours) at 3/25/2019 1827  Last data filed at 3/25/2019 1755  Gross per 24 hour   Intake 1300 ml   Output 3175 ml   Net -1875 ml     Last 3 weights: Wt Readings from Last 3 Encounters:   03/25/19 158 lb 4.6 oz (71.8 kg)         Physical Examination:   PHYSICAL EXAMINATION:  Vitals:    03/25/19 0626 03/25/19 0700 03/25/19 1124 03/25/19 1804   BP:  (!) 157/57 (!) 135/49 (!) 171/65   Pulse:  64 61 61   Resp:    15   Temp:  98.2 °F (36.8 °C) 96.8 °F (36 °C) 98.6 °F (37 °C)   TempSrc:  Oral Oral    SpO2:       Weight: 158 lb 4.6 oz (71.8 kg)      Height:         Constitutional: This is a well developed, well nourished, 18.5-24.9 - Normal 80y.o. year old male who is alert, oriented, cooperative and in no apparent distress. Head:normocephalic and atraumatic. EENT:  ILEANA. No conjunctival injections. Septum was midline, mucosa was without erythema, exudates or cobblestoning. No thrush was noted. Mallampati  II (soft palate, uvula, fauces visible)  Neck: Supple without thyromegaly. No elevated JVP. Trachea was midline. Respiratory: Chest was symmetrical without dullness to percussion. Breath sounds bilaterally were clear to auscultation. There were no wheezes, rhonchi or rales. There is no intercostal retraction or use of accessory muscles. No egophony noted. Cardiovascular: Regular without murmur, clicks, gallops or rubs. Abdomen: Slightly rounded and soft without organomegaly. No rebound tenderness, rigidity or guarding was appreciated. Lymphatic: No lymphadenopathy. Musculoskeletal: Normal curvature of the spine. No gross muscle weakness. Extremities:  No lower extremity edema, no ulcerations, tenderness, varicosities or erythema. Muscle size, tone and strength are normal.  No involuntary movements are noted. Skin:  Warm and dry. Good color, turgor and pigmentation. No lesions or scars. No cyanosis or clubbing  Neurological/Psychiatric: The patient's general behavior, level of consciousness, thought content and emotional status is normal.          CBC:   No results for input(s): WBC, HGB, PLT in the last 72 hours. BMP:    Recent Labs     03/23/19  0458 03/24/19  0424 03/25/19  0436    140 136   K 3.7 3.7 4.1   CL 99 99 95*   CO2 28 29 29   * 106* 102*   CREATININE 3.07* 3.12* 2.99*   GLUCOSE 104* 96 103*     Calcium:  Recent Labs     03/25/19  0436   CALCIUM 8.0*     Ionized Calcium:No results for input(s): IONCA in the last 72 hours. Magnesium:  No results for input(s): MG in the last 72 hours. Phosphorus:No results for input(s): PHOS in the last 72 hours. BNP:No results for input(s): BNP in the last 72 hours. Glucose:  No results for input(s): POCGLU in the last 72 hours. HgbA1C: No results for input(s): LABA1C in the last 72 hours. INR:   No results for input(s): INR in the last 72 hours. Hepatic:   No results for input(s): ALKPHOS, ALT, AST, PROT, BILITOT, BILIDIR, LABALBU in the last 72 hours. Amylase and Lipase:No results for input(s): LACTA, AMYLASE in the last 72 hours. Lactic Acid: No results for input(s): LACTA in the last 72 hours.   CARDIAC ENZYMES:  No results for input(s): CKTOTAL, CKMB, CKMBINDEX, TROPONINI in the last 72 hours. BNP: No results for input(s): BNP in the last 72 hours. Lipids: No results for input(s): CHOL, TRIG, HDL, LDLCALC in the last 72 hours. Invalid input(s): LDL  ABGs: No results found for: PH, PCO2, PO2, HCO3, O2SAT  Thyroid:   Lab Results   Component Value Date    TSH 3.04 03/20/2019      Urinalysis: No results for input(s): BACTERIA, BLOODU, CLARITYU, COLORU, PHUR, PROTEINU, RBCUA, SPECGRAV, BILIRUBINUR, NITRU, WBCUA, LEUKOCYTESUR, GLUCOSEU in the last 72 hours. 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 *  Bibasilar atelectasis. Aortic stenosis. Hypothyroidism. History of cigarette smoking. Worsening renal function. Anemia with slight improvement. Leukocytosis, slightly worse    Plan:  Meds reviewed- changes made as appropriate. Patient is on diuretics, bumex  On amiodarone. On Plavix  Patient is on thyroid supplementation. On Eliquis 2.5 mg po qd  On Pepcid  Increase activity as permitted and tolerated. Questions patient had were answered to his satisfaction. Continue supplemental oxygen if needed. Cxr reviewed. None today  Diet reviewed  Thank you for having us involved in the care of your patient. Please call us if you have any questions or concerns.     René Cramer  3/25/2019, 6:27 PM    Pulmonary & Critical Care

## 2019-03-25 NOTE — PROGRESS NOTES
Renal Progress Note    Patient :  Lu Fallon; 80 y.o. MRN# [id number]  Location:  8716/8628-24  Attending:  Vini Ovalles MD  Admit Date:  3/6/2019   Hospital Day: 23      Subjective:       Patient was seen and examined. He was sitting comfortably. He was alert and oriented ×3. Continues to have good urine output  Renal chemistry essentially the same, if any maybe a touch better  Patient continues to have good appetite and no uremic symptoms    Outpatient Medications:     Medications Prior to Admission: amiodarone (CORDARONE) 200 MG tablet, Take 200 mg by mouth daily  atorvastatin (LIPITOR) 20 MG tablet, Take 20 mg by mouth daily  isosorbide mononitrate (IMDUR) 60 MG extended release tablet, Take 60 mg by mouth daily  levothyroxine (SYNTHROID) 100 MCG tablet, Take 100 mcg by mouth Daily  lisinopril (PRINIVIL;ZESTRIL) 40 MG tablet, Take 40 mg by mouth daily  apixaban (ELIQUIS) 2.5 MG TABS tablet, Take 2.5 mg by mouth daily  metoprolol tartrate (LOPRESSOR) 50 MG tablet, Take 50 mg by mouth 2 times daily    Current Medications:     Scheduled Meds:    apixaban  2.5 mg Oral Daily    bumetanide  1 mg Oral BID    FLUoxetine  10 mg Oral Daily    carvedilol  6.25 mg Oral BID WC    famotidine  20 mg Oral Daily    sodium chloride flush  10 mL Intravenous 2 times per day    sodium chloride flush  10 mL Intravenous 2 times per day    levothyroxine  125 mcg Oral Daily    ALPRAZolam  0.25 mg Oral Once    QUEtiapine  25 mg Oral Nightly    sodium chloride flush  10 mL Intravenous 2 times per day    clopidogrel  75 mg Oral Daily    isosorbide mononitrate  30 mg Oral Daily    docusate sodium  100 mg Oral Daily    aspirin  81 mg Oral Daily    amiodarone  200 mg Oral Daily    atorvastatin  20 mg Oral Daily     Input/Output:       I/O last 3 completed shifts: In: 700 [P.O.:700]  Out: [facility] [address] [Urine:1575].       Patient Vitals for the past 96 hrs (Last 3 readings):   Weight   03/25/19 0626 158 lb 4.6 oz (71.8 kg) [place] NEGATIVE 03/12/2019    AMORPHOUS NOT REPORTED 03/12/2019      Urine Creatinine:     Lab Results   Component Value Date    LABCREA 42.4 03/23/2019       Assessment:        1. acute kidney injury with a creatinine that is currently running around 3. I suspect he did have ATIN  . 2. Chronic kidney disease stage IIIBWith a baseline reatinine of 1.8-2 mg/dL  Ultrasound reveals right kidney 12.5 cm and left 11.2 cm  2. Nonnephrotic proteinuria Due to nephrosclerosis . 3. Bilateral pleural effusions status post pleural taps  4. Severe pulmonary hypertension  5. Hx of Urinary retention  6. S/P TAVR  7. biventricularf failure from systolic and diasto;ic dysfxn  8. Bilateral effusions     Plan:   1. Agree with bumex  2. Not having any uremic symptoms. Likely disc tomorrow on current diuretics. Will need close outpt follow up in 7-10 days with labs. 3.  PT and OT    Nutrition   Please ensure that patient is on a renal diet/TF. Avoid nephrotoxic drugs/contrast exposure. We will continue to follow along with you.

## 2019-03-26 NOTE — PROGRESS NOTES
NEPHROLOGY PROGRESS NOTE      SUBJECTIVE     Admitted with shortness of breath secondary to decompensated congestive heart failure in the face of aortic stenosis. Subsequently underwent PCI and PVR. Postprocedure course has been complicated by worsening of renal function and hypervolemia for which he is getting recurrent thoracocentesis and diuretic adjustment. Overnight had blood pressure issues and currently on nitroglycerin drip. Underwent thoracocentesis. No short of breath and Bumex was restarted also. After thoracocentesis he feels much better. Urine output is decent. Renal function continues to improve. OBJECTIVE     Vitals:    03/26/19 0900 03/26/19 1000 03/26/19 1030 03/26/19 1101   BP: (!) 170/69 (!) 146/66 133/71 (!) 143/47   Pulse: 67 62 64 60   Resp:    17   Temp:    97.3 °F (36.3 °C)   TempSrc:    Oral   SpO2: 93% (!) 86% (!) 84% 100%   Weight:       Height:         24HR INTAKE/OUTPUT:      Intake/Output Summary (Last 24 hours) at 3/26/2019 1204  Last data filed at 3/26/2019 0925  Gross per 24 hour   Intake 240 ml   Output 1825 ml   Net -1585 ml       General appearance:Awake, alert, in no acute distress  HEENT: PERRLA  Respiratory::vesicular breath sounds, reduced air entry  Cardiovascular:S1 S2 normal,no gallop or organic murmur. Abdomen:Non tender/non distended. Bowel sounds present  Extremities: No Cyanosis or Clubbing, present Lower extremity edema  Neurological:Alert and oriented. No abnormalities of mood, affect, memory, mentation, or behavior are noted      MEDICATIONS     Scheduled Meds:    bumetanide  1 mg Oral BID    spironolactone  25 mg Oral Daily    apixaban  2.5 mg Oral Daily    FLUoxetine  10 mg Oral Daily    carvedilol  6.25 mg Oral BID WC    famotidine  20 mg Oral Daily    sodium chloride flush  10 mL Intravenous 2 times per day    sodium chloride flush  10 mL Intravenous 2 times per day    levothyroxine  125 mcg Oral Daily    ALPRAZolam  0.25 mg Oral Once    pleural taps  8. HTN  9. Hx of Urinary retention  10. Advanced age    PLAN     1.PO Bumex and Aldactone  2. Avoid Nephrotoxins  3.  Will follow    Please do not hesitate to call with questions    This note is created with the assistance of a speech-recognition program. While intending to generate a document that actually reflects the content of the visit, no guarantees can be provided that every mistake has been identified and corrected by editing    Gelacio Esposito MD, MRCP Jatinder Franks), [address]   3/26/2019 12:04 PM  [facility]

## 2019-03-26 NOTE — PLAN OF CARE
Problem: Falls - Risk of:  Goal: Will remain free from falls  Description  Will remain free from falls  Outcome: Ongoing     Problem: Pain:  Description  Pain management should include both nonpharmacologic and pharmacologic interventions. Goal: Control of acute pain  Description  Control of acute pain  Outcome: Ongoing     Problem: Risk for Impaired Skin Integrity  Goal: Tissue integrity - skin and mucous membranes  Description  Structural intactness and normal physiological function of skin and  mucous membranes.   Outcome: Ongoing     Problem: Cardiac Output - Decreased:  Goal: Hemodynamic stability will improve  Description  Hemodynamic stability will improve  Outcome: Ongoing

## 2019-03-26 NOTE — PROGRESS NOTES
[facility]   Progress Note                   Date:   3/26/2019  Patient name: Christie English  Date of admission:  3/6/2019  8:48 PM  MRN:   [id number]  YOB: 1928  PCP: No primary care provider on file. Reason for Admission:     Subjective:       Clinical Changes / Abnormalities:    Patient in his chair, comfortable, after he had another pleuracentesis. He started having worsening SOB last night, beforew that was doing well  Repeat CX. Marlea Cockayne showed bilateral pleural effusion. His echocardiogram today showing EF 45% with moderate MR and the pleural effusion. His viatal last 24 hours suggest uncontrolled BP, which worsening his general condition  His rhythm is Pacing rhythm,      Medications:   Scheduled Meds:   bumetanide  1 mg Oral BID    spironolactone  25 mg Oral Daily    metoprolol succinate  50 mg Oral Daily    hydrALAZINE  50 mg Oral 3 times per day    apixaban  2.5 mg Oral Daily    FLUoxetine  10 mg Oral Daily    famotidine  20 mg Oral Daily    sodium chloride flush  10 mL Intravenous 2 times per day    sodium chloride flush  10 mL Intravenous 2 times per day    levothyroxine  125 mcg Oral Daily    ALPRAZolam  0.25 mg Oral Once    QUEtiapine  25 mg Oral Nightly    sodium chloride flush  10 mL Intravenous 2 times per day    clopidogrel  75 mg Oral Daily    isosorbide mononitrate  30 mg Oral Daily    docusate sodium  100 mg Oral Daily    aspirin  81 mg Oral Daily    amiodarone  200 mg Oral Daily    atorvastatin  20 mg Oral Daily     Continuous Infusions:   nitroGLYCERIN 15 mcg/min (03/26/19 1030)    norepinephrine 1 mcg/min (03/14/19 1050)     CBC: No results for input(s): WBC, HGB, PLT in the last 72 hours.   BMP:    Recent Labs     03/24/19  0424 03/25/19  0436 03/26/19  0431    136 136   K 3.7 4.1 4.0   CL 99 95* 97*   CO2 29 29 26   * 102* 103*   CREATININE 3.12* 2.99* 2.71*   GLUCOSE 96 103* 136*     Hepatic: No results for input(s): AST, ALT, ALB, BILITOT, ALKPHOS in the last 72 hours. Troponin: No results for input(s): TROPONINI in the last 72 hours. BNP: No results for input(s): BNP in the last 72 hours. Lipids: No results for input(s): CHOL, HDL in the last 72 hours. Invalid input(s): LDLCALCU  INR: No results for input(s): INR in the last 72 hours. Objective:   Vitals: BP (!) 143/47   Pulse 60   Temp 97.3 °F (36.3 °C) (Oral)   Resp 17   Ht 5' 6\" (1.676 m)   Wt 158 lb 4.6 oz (71.8 kg)   SpO2 100%   BMI 25.55 kg/m²   General appearance: alert and cooperative with exam  HEENT: Head: Normocephalic, no lesions, without obvious abnormality. Neck: +JVD 5 cm  Lungs: Decreased breath sound both basisly  Heart: regular rate and rhythm, S1, S2 normal, no murmur, click, rub or gallop  Abdomen: soft, non-tender; bowel sounds normal; no masses,  no +1 edema  Neurologic: Mental status: Alert, appears depressed anxious to go home    Echocardiogram / C. X ray all reviewed. Assessment / Acute Cardiac Problems:   1. Pleural effusion, bilateral: Etiology multifactorial  2. Post TAVR  3. CAD post Rotation atherectomy / Stent LM and LAD with residual disease in all other small branches. 4. HTN: Uncontrolled, making his general condition worse  5. 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 Change BB to long acting  2. Since having renal insufficiency will start afterload reduction to improve MR, using Hydralazine  3. Aggressive dieuresis, and with his renal status, if possible to increase his Bumex dose (Nephrology to advise)  4. Increase patient activities, to improve his hemodynamic and plan on DC  5.  If pleural

## 2019-03-26 NOTE — FLOWSHEET NOTE
DATE: 3/26/2019    NAME: Froy Woodard  MRN: [id number]   : 12/10/1928    Patient not seen this date for Physical Therapy due to:  [] Blood transfusion in progress  [] Hemodialysis  [x]  Patient Declined; pt resting in chair; refusing all mobility today. States he had a rough night. RN notified of pt's refusal  [] Spine Precautions   [] Strict Bedrest  [] Surgery/ Procedure  [] Testing      [] Other        [] PT being discontinued at this time. Patient independent. No further needs. [] PT being discontinued at this time as the patient has been transferred to palliative care. No further needs.     Tim Wisdom, PTA

## 2019-03-26 NOTE — PROGRESS NOTES
Pt still complaining of SOB, SPO2 94%, /71 HR 79, Dr Valdemar Price updated, 0.125mg of xanax, and STAT chest xray ordered.  Pt to be NPO tonight, PE to be drained 3/26AM.

## 2019-03-26 NOTE — PROGRESS NOTES
Mallampati  II (soft palate, uvula, fauces visible)  Neck: Supple without thyromegaly. No elevated JVP. Trachea was midline. Respiratory: Chest was symmetrical without dullness to percussion. Breath sounds bilaterally were clear to auscultation. There were no wheezes, rhonchi or rales. There is no intercostal retraction or use of accessory muscles. No egophony noted. Cardiovascular: Regular without murmur, clicks, gallops or rubs. Abdomen: Slightly rounded and soft without organomegaly. No rebound tenderness, rigidity or guarding was appreciated. Lymphatic: No lymphadenopathy. Musculoskeletal: Normal curvature of the spine. No gross muscle weakness. Extremities:  No lower extremity edema, no ulcerations, tenderness, varicosities or erythema. Muscle size, tone and strength are normal.  No involuntary movements are noted. Skin:  Warm and dry. Good color, turgor and pigmentation. No lesions or scars. No cyanosis or clubbing  Neurological/Psychiatric: The patient's general behavior, level of consciousness, thought content and emotional status is normal.          CBC:   No results for input(s): WBC, HGB, PLT in the last 72 hours. BMP:    Recent Labs     03/24/19  0424 03/25/19  0436 03/26/19  0431    136 136   K 3.7 4.1 4.0   CL 99 95* 97*   CO2 29 29 26   * 102* 103*   CREATININE 3.12* 2.99* 2.71*   GLUCOSE 96 103* 136*     Calcium:  Recent Labs     03/26/19  0431   CALCIUM 8.2*     Ionized Calcium:No results for input(s): IONCA in the last 72 hours. Magnesium:  No results for input(s): MG in the last 72 hours. Phosphorus:No results for input(s): PHOS in the last 72 hours. BNP:No results for input(s): BNP in the last 72 hours. Glucose:  No results for input(s): POCGLU in the last 72 hours. HgbA1C: No results for input(s): LABA1C in the last 72 hours. INR:   No results for input(s): INR in the last 72 hours.   Hepatic:   No results for input(s): ALKPHOS, ALT, AST, PROT, BILITOT, BILIDIR, LABALBU in the last 72 hours. Amylase and Lipase:No results for input(s): LACTA, AMYLASE in the last 72 hours. Lactic Acid: No results for input(s): LACTA in the last 72 hours. CARDIAC ENZYMES:  No results for input(s): CKTOTAL, CKMB, CKMBINDEX, TROPONINI in the last 72 hours. BNP: No results for input(s): BNP in the last 72 hours. Lipids: No results for input(s): CHOL, TRIG, HDL, LDLCALC in the last 72 hours. Invalid input(s): LDL  ABGs: No results found for: PH, PCO2, PO2, HCO3, O2SAT  Thyroid:   Lab Results   Component Value Date    TSH 3.04 03/20/2019      Urinalysis: No results for input(s): BACTERIA, BLOODU, CLARITYU, COLORU, PHUR, PROTEINU, RBCUA, SPECGRAV, BILIRUBINUR, NITRU, WBCUA, LEUKOCYTESUR, GLUCOSEU in the last 72 hours. 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 *  Bibasilar atelectasis. Aortic stenosis. Hypothyroidism. History of cigarette smoking. Worsening renal function. Anemia with slight improvement. Leukocytosis, slightly worse    Plan:  Meds reviewed- changes made as appropriate. Patient is on diuretics, bumex  On amiodarone. On Plavix  Patient is on thyroid supplementation. On Eliquis 2.5 mg po qd  On Pepcid  On Seroquel at hs. Increase activity as permitted and tolerated. Questions patient had were answered to his satisfaction. Continue supplemental oxygen if needed. Cxr reviewed.   None today  Diet reviewed    Should another Thoracentesis be performed orders for pleural fluid analysis placed. Will get venous dopplers to r/o dvt bilateral lower ext. Thank you for having us involved in the care of your patient. Please call us if you have any questions or concerns. Arias Wilson M.D.  PGY-3 Internal Medicine  [facility].     3/26/2019, 1:59 PM    Pulmonary & Critical Care

## 2019-03-26 NOTE — FLOWSHEET NOTE
DATE: 3/26/2019    NAME: Demian Ann  MRN: [id number]   : 12/10/1928    Patient not seen this date for Physical Therapy due to:  [] Blood transfusion in progress  [] Hemodialysis  []  Patient Declined  [] Spine Precautions   [] Strict Bedrest  [x] Surgery/ Procedure; IR for thoracentesis. Will check back this PM.  [] Testing      [] Other        [] PT being discontinued at this time. Patient independent. No further needs. [] PT being discontinued at this time as the patient has been transferred to palliative care. No further needs.     Nery Summers, PTA

## 2019-03-26 NOTE — PROGRESS NOTES
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 Shiv Little - bumex gtt started also - will update nephro, ramirez will be placed also for accuracy of I/O  Large left pleural effusion - seen on echo - will get IR stat tap today due to increased WOB, pulmonary and ID consulted for consolidations noted on CXR  Echo also showed moderate MR, will discuss medical management with cardiology due to continued episodes of acute congestive heart failure. The above recommendations including medications and orders were discussed and agreed upon with Dr. Valdemar Price, the attending on service for the cardiothoracic surgery group today.      Laurie Sharpe, APRN, CNP

## 2019-03-26 NOTE — CONSULTS
[facility] - Initial Consult Note  Today's Date and Time: 3/26/2019, 10:29 AM    Impression :   1. Recurrent bilateral pleural effusions w/lung compression  2. CHF  3. 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 Multivessel CAD s/p multiple stent placements  5. S/P TAVR  6. HUYEN  7. Urinary retention  8. Pulmonary hypertension    Recommendations:   · Monitor off antibiotics  · Aggressive pulmonary toilet  · Acapella valve and IS at bedside  · Diuresis per Cardio and Nephro  · Supportive care    Medical Decision Making/Summary/Discussion:   · 79 yo gentleman who developed complete heart block. Found to have multivessel CAD, severe aortic stenosis and pulmonary HTN. · Underwent pacemaker placement with subsequent bouts of acute CHF and multiple hospitalizations in [place]  · Brought to [facility] by family for further care. · Since admission at [facility] he has had cardiac catheterizations x 2 with multiple stent placements and a TAVR on 3-15. Pt remains on a nitroglycerine drip  · Pt continues to require frequent thoracenteses  · He has suffered an HUYEN and is currently on a bumex drip  · CXR 3/26 showed progressed moderate to large bilateral pleural effusions with consolidation. · ID consult placed for treatment of any potential pneumonia  · Pt is afebrile, without cough or sputum production. He remains SOB with exertion and at times, when quiet. · Currently no evidence of active infection. Pt will require aggressive pulmonary toilet and diuresis.  Plan to monitor off antibiotics  Infection Control Recommendations   · [place] Precautions    Antimicrobial Stewardship Recommendations     · Monitor off antibiotics    Coordination of Outpatient Care:   · Estimated Length of IV antimicrobials: None  · Patient will need Midline Catheter Insertion:   · Patient will need PICC line Insertion:  · Patient will need: Home IV , [place],  SNF,  LTAC TBD  · Patient will need outpatient wound care: No    Chief complaint/reason for consultation:   · Possible pneumonia    History of Present Illness:   Bhupinder Burnett is a 80y.o.-year-old  male who was initially admitted on 3/6/2019. Patient seen at the request of Dr. Edgardo Cooks    Pt is a 81 yo gentleman who has recently had multiple hospital admissions in [place] for decompensated heart failure. His symptoms began in December 2018. 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 A cardiac cath at that time showed LM and LAD calcified stenosis 80-90% in multiple areas, with severe disease in D1, D2, Om1 and OM2. Minimal disease in RCA. LV function with EF 25%. Severe aortic stenosis compromising his clinical improvedmnt     He underwent a dual chamber pacemaker placement. Following that, he had multiple hospitalizations for acute heart failure and shortness of breath. His baseline creatinine was 1.3, now >2.0. He has had multiple thoracenteses with transudative pleural fluid removed. He was brought to [facility] for further evaluation and was admitted on 3/6/19    An ECHO from admission showed   Normal left ventricle size with mildly reduced systolic function; Estimated  left ventricular ejection fraction 40-45%  Anterolateral wall hypokinesis. Mild left ventricular hypertrophy. Left atrium is moderately dilated. Grade II diastolic dysfunction. Heavily calcified aortic valve with reduced systolic excursion and evidence  of moderate stenosis. (Peak nataliia 3.62 m/s and mean gradient 29 mmHg)  Moderate posterior pericardial effusion without any echo signs of tamponade. Normal right ventricular size and function. Pacemaker lead seen in right ventricle. Moderate tricuspid regurgitation. Estimated right ventricular systolic pressure is 60 mmHg suggesting moderate  pulmonary HTN. He underwent an atherectomy/JEAN-CLAUDE of the left main and LAD on 3/6.   Because of the high surgical risk and renal function he was taken back to the cath lab on Pleural effusion on left 02/15/2019    Pleural effusion, right 02/15/2019    Pulmonary emboli (HCC) 02/15/2019    Pulmonary hypertension ([address] 2019    Thyroid disease     hypothyroidism       Past Surgical  History:     Past Surgical History:   Procedure Laterality Date    CORONARY ANGIOPLASTY WITH STENT PLACEMENT  2019    complex PCI of LT MAIN, LAD    JOINT REPLACEMENT      right ankle    PACEMAKER PLACEMENT  2019    THORACENTESIS         Medications:      apixaban  2.5 mg Oral Daily    FLUoxetine  10 mg Oral Daily    carvedilol  6.25 mg Oral BID WC    famotidine  20 mg Oral Daily    sodium chloride flush  10 mL Intravenous 2 times per day    sodium chloride flush  10 mL Intravenous 2 times per day    levothyroxine  125 mcg Oral Daily    ALPRAZolam  0.25 mg Oral Once    QUEtiapine  25 mg Oral Nightly    sodium chloride flush  10 mL Intravenous 2 times per day    clopidogrel  75 mg Oral Daily    isosorbide mononitrate  30 mg Oral Daily    docusate sodium  100 mg Oral Daily    aspirin  81 mg Oral Daily    amiodarone  200 mg Oral Daily    atorvastatin  20 mg Oral Daily       Social History:     Social History     Socioeconomic History    Marital status:       Spouse name: Not on file    Number of children: Not on file    Years of education: Not on file    Highest education level: Not on file   Occupational History     Employer: RETIRED   Social Needs    Financial resource strain: Not on file    Food insecurity:     Worry: Not on file     Inability: Not on file    Transportation needs:     Medical: Not on file     Non-medical: Not on file   Tobacco Use    Smoking status: Former Smoker     Last attempt to quit: 1989     Years since quittin.2    Smokeless tobacco: Never Used   Substance and Sexual Activity    Alcohol use: Not Currently    Drug use: Never    Sexual activity: Not on file   Lifestyle    Physical activity:     Days per week: Not on file Minutes per session: Not on file    Stress: Not on file   Relationships    Social connections:     Talks on phone: Not on file     Gets together: Not on file     Attends Jehovah's witness service: Not on file     Active member of club or organization: Not on file     Attends meetings of clubs or organizations: Not on file     Relationship status: Not on file    Intimate partner violence:     Fear of current or ex partner: Not on file     Emotionally abused: Not on file     Physically abused: Not on file     Forced sexual activity: Not on file   Other Topics Concern    Not on file   Social History Narrative    Not on file       Family History:   History reviewed. No pertinent family history. Allergies:   Quinolones     Review of Systems:   Constitutional: No fevers or chills. No systemic complaints  Head: No headaches  Eyes: No double vision or blurry vision. No conjunctival inflammation. ENT: No sore throat or runny nose. . No hearing loss, tinnitus or vertigo. Cardiovascular: No chest pain or palpitations. No shortness of breath. No [place]  Lung: Shortness of breath with exertion, no cough. No sputum production  Abdomen: No nausea, vomiting, diarrhea, or abdominal pain. Jerryl Boyers No cramps. Genitourinary: Urinary retention, ramirez in place  Musculoskeletal: No muscle aches or pains. No joint effusions, swelling or deformities  Hematologic: No bleeding or bruising. Neurologic: No headache, weakness, numbness, or tingling. Integument: No rash, no ulcers. Psychiatric: No depression. Endocrine: No polyuria, no polydipsia, no polyphagia.     Physical Examination :     Patient Vitals for the past 8 hrs:   BP Temp Temp src Pulse Resp SpO2   03/26/19 0900 (!) 170/69 -- -- 67 -- 93 %   03/26/19 0830 (!) 168/72 -- -- 68 -- 94 %   03/26/19 0800 (!) 169/77 97.5 °F (36.4 °C) Oral 71 15 93 %   03/26/19 0730 (!) 171/70 -- -- 69 -- 94 %   03/26/19 0700 (!) 149/54 -- -- 61 -- 95 %   03/26/19 0630 (!) 166/74 -- -- 67 -- 95 %   03/26/19 Component Value Date    MUCUS NOT REPORTED 03/12/2019    RBC 3.05 03/17/2019    TRICHOMONAS NOT REPORTED 03/12/2019    WBC 14.5 03/17/2019    YEAST NOT REPORTED 03/12/2019    TURBIDITY CLEAR 03/12/2019     Lab Results   Component Value Date    CREATININE 2.71 03/26/2019    GLUCOSE 136 03/26/2019       Medical Decision Making-Imaging:   3/26    Narrative   EXAMINATION:   SINGLE XRAY VIEW OF THE CHEST       3/26/2019 2:37 am       COMPARISON:   March 20, 2019.       HISTORY:   ORDERING SYSTEM PROVIDED HISTORY: SOB, PE   TECHNOLOGIST PROVIDED HISTORY:   SOB, PE       FINDINGS:   Frontal portable view of the chest.  Low lung volume.  Progressed moderate to   large bilateral pleural effusions with consolidation.  Indistinct pulmonary   vasculature.  No pneumothorax.  The cardiomediastinal silhouette is not well   evaluated.  Atherosclerotic thoracic aorta.  Left pectoral trans venous   dual-chamber cardiac pacer device.           Impression   Progressed moderate to large bilateral pleural effusions with consolidation. Superimposed infection is not excluded.             Medical Decision Making-Other: Thank you for allowing us to participate in the care of this patient. Please call with questions.     Roya Ball MD  Pager: [phone] - Office: [phone]

## 2019-03-27 NOTE — PROGRESS NOTES
QUEtiapine  25 mg Oral Nightly    sodium chloride flush  10 mL Intravenous 2 times per day    clopidogrel  75 mg Oral Daily    isosorbide mononitrate  30 mg Oral Daily    docusate sodium  100 mg Oral Daily    aspirin  81 mg Oral Daily    amiodarone  200 mg Oral Daily    atorvastatin  20 mg Oral Daily     Continuous Infusions:    nitroGLYCERIN Stopped (03/26/19 1315)    norepinephrine 1 mcg/min (03/14/19 1050)     PRN Meds:    Home Meds:                Medications Prior to Admission: amiodarone (CORDARONE) 200 MG tablet, Take 200 mg by mouth daily  atorvastatin (LIPITOR) 20 MG tablet, Take 20 mg by mouth daily  isosorbide mononitrate (IMDUR) 60 MG extended release tablet, Take 60 mg by mouth daily  levothyroxine (SYNTHROID) 100 MCG tablet, Take 100 mcg by mouth Daily  lisinopril (PRINIVIL;ZESTRIL) 40 MG tablet, Take 40 mg by mouth daily  apixaban (ELIQUIS) 2.5 MG TABS tablet, Take 2.5 mg by mouth daily  metoprolol tartrate (LOPRESSOR) 50 MG tablet, Take 50 mg by mouth 2 times daily    INVESTIGATIONS     Last 3 CMP:    Recent Labs     03/25/19  0436 03/26/19  0431 03/27/19  0448    136 139   K 4.1 4.0 3.6*   CL 95* 97* 97*   CO2 29 26 29   * 103* 101*   CREATININE 2.99* 2.71* 3.03*   CALCIUM 8.0* 8.2* 8.0*       Last 3 CBC:  Recent Labs     03/27/19  0448   WBC 10.6   RBC 2.94*   HGB 8.5*   HCT 26.9*   MCV 91.5   MCH 28.9   MCHC 31.6   RDW 15.8*      MPV 12.1       ASSESSMENT     1. Acute kidney injury secondary to ATN from hypoperfusion related to cardiorenal syndrome - improving  2. Chronic kidney disease stage IIIB with baseline creatinine suspected around 2  3. Nephrotic range proteinuria - Bx not feasible in view of he being on Asprin and cardiology recommends not to stop anticoagulant. Explained to patient and he does not want to do that. 4.  Status post TVR  5. Cardiomyopathy ejection fraction 40-45%/left ventricular diastole dysfunction  6. S/p PCI and atherectomy  3/11  7. Recurrent bilateral pleural effusion status post pleural taps  8. HTN  9. Hx of Urinary retention  10. Advanced age    PLAN     4. Increase Bumex to 2 mg BID and continue Aldactone  2. Avoid Nephrotoxins  3.  Will follow    Please do not hesitate to call with questions    This note is created with the assistance of a speech-recognition program. While intending to generate a document that actually reflects the content of the visit, no guarantees can be provided that every mistake has been identified and corrected by editing    Oscar Koo MD, MRCP Amaris Li   3/27/2019 12:31 PM  [facility]

## 2019-03-27 NOTE — PLAN OF CARE
Problem: Falls - Risk of:  Goal: Will remain free from falls  Description  Will remain free from falls  3/27/2019 1835 by Mirna Wagner RN  Outcome: Ongoing  3/27/2019 1102 by Coolidge Heimlich, RN  Outcome: Ongoing     Problem: Pain:  Description  Pain management should include both nonpharmacologic and pharmacologic interventions. Goal: Control of acute pain  Description  Control of acute pain  Outcome: Ongoing     Problem: Risk for Impaired Skin Integrity  Goal: Tissue integrity - skin and mucous membranes  Description  Structural intactness and normal physiological function of skin and  mucous membranes. 3/27/2019 1835 by Mirna Wagner RN  Outcome: Ongoing  3/27/2019 1102 by Coolidge Heimlich, RN  Outcome: Ongoing     Problem: Nutrition  Goal: Optimal nutrition therapy  3/27/2019 1113 by Darlene Hernandez RD, LD  Outcome: Ongoing  Note:   Nutrition Problem: Increased nutrient needs  Intervention: Food and/or Nutrient Delivery: Continue current diet, Continue current ONS  Nutritional Goals: Oral intakes to meet at least 50% of estimated nutrition needs.       Problem: Falls - Risk of:  Goal: Will remain free from falls  Description  Will remain free from falls  3/27/2019 1835 by Mirna Wagner RN  Outcome: Ongoing  3/27/2019 1102 by Coolidge Heimlich, RN  Outcome: Ongoing     Problem: Falls - Risk of:  Goal: Absence of physical injury  Description  Absence of physical injury  3/27/2019 1102 by Coolidge Heimlich, RN  Outcome: Ongoing

## 2019-03-27 NOTE — PROGRESS NOTES
Physical Therapy  Facility/Department: [facility] CAR 1  Daily Treatment Note  NAME: Elaina Wilder  : 12/10/1928  MRN: [id number]    Date of Service: 3/27/2019    Discharge Recommendations:  Home with assist PRN        Patient Diagnosis(es): The encounter diagnosis was Pulmonary hypertension ([address] has a past medical history of Aortic stenosis, Atrial fibrillation ([address] CAD (coronary artery disease), Chest pain, CHF (congestive heart failure) ([address] Chronic anemia, Chronic kidney disease, Hypertension, Pleural effusion on left, Pleural effusion, right, Pulmonary emboli ([address] Pulmonary hypertension ([address] and Thyroid disease. has a past surgical history that includes pacemaker placement (2019); thoracentesis; joint replacement; and Coronary angioplasty with stent (2019). Restrictions  Restrictions/Precautions  Restrictions/Precautions: Cardiac, General Precautions  Required Braces or Orthoses?: No  Implants present? : Pacemaker  Position Activity Restriction  Other position/activity restrictions: Up w/assist.  Subjective   General  Chart Reviewed: Yes  Response To Previous Treatment: Patient with no complaints from previous session. Family / Caregiver Present: No  Subjective  Subjective: In chair; reluctant to participate but stated \"Lets get this over with\". General Comment  Comments: Pt left on toilet; states he will pull cord when finished- RN notified.   Pain Screening  Patient Currently in Pain: No  Vital Signs  Patient Currently in Pain: No       Orientation  Orientation  Overall Orientation Status: Within Normal Limits  Cognition      Objective      Transfers  Sit to Stand: Contact guard assistance  Stand to sit: Stand by assistance;Contact guard assistance  Ambulation  Ambulation?: Yes  Ambulation 1  Surface: level tile  Device: Rolling Walker  Other Apparatus: Wheelchair follow  Assistance: Stand by assistance  Quality of Gait: Decrease renato, decrease step length and flexed posture. Distance: 70ft     Balance  Posture: Fair  Sitting - Static: Good  Sitting - Dynamic: Good  Standing - Static: Good;-  Standing - Dynamic: Fair;+       Exercises:  Seated LE exercise program: Long Arc Quads, hip abduction/adduction, heel/toe raises, and marches. Reps: 20x each with 2# ankle weights  Upper extremity exercises: Bicep curl, shoulder flexion/extension, punches, tricep curl, shoulder abduction/adduction. Reps: 15x each with 1# weight, fatigued quickly     Assessment   Body structures, Functions, Activity limitations: Decreased functional mobility ; Decreased balance;Decreased endurance  Assessment: Increased fatigue today. No LOB ambulating with SBA and RW  Prognosis: Good  REQUIRES PT FOLLOW UP: Yes  Activity Tolerance  Activity Tolerance: Patient limited by endurance; Patient limited by fatigue     Goals  Short term goals  Time Frame for Short term goals: 14 visits  Short term goal 1: Perform bed mobility and functional transfers independently  Short term goal 2: Ambulate 300ft with least restrictive AD independently  Short term goal 3: Demo Good- dynamic standing balance to decrease risk of falls  Short term goal 4: Participate in 30 minutes of therapy to demo increased endurance    Plan    Plan  Times per week: 6-7x/wk  Current Treatment Recommendations: Strengthening, Balance Training, Functional Mobility Training, Endurance Training, Transfer Training, Patient/Caregiver Education & Training, Safety Education & Training, Home Exercise Program, Gait Training  Safety Devices  Type of devices:  All fall risk precautions in place, Gait belt, Call light within reach, Nurse notified  Restraints  Initially in place: No     Therapy Time   Individual Concurrent Group Co-treatment   Time In 1017         Time Out 1052         Minutes [address] [facility], [facility]

## 2019-03-27 NOTE — PROGRESS NOTES
Explained to pt the HPV was negative so this is technically normal pap smear.     The ascus can be from stress or hormones  We will check again next year.     No questions     PULMONARY PROGRESS NOTE      Patient:  Nan Hedrick  YOB: 1928    MRN: [id number]     Acct: [de-identified]     Admit date: 3/6/2019    REASON FOR CONSULT:- Pulmonary hypertension, bilateral pleural effusion with atelectasis and aortic stenosis with hypothyroidism    Pt seen and Chart reviewed. Patient is on room air. He is up in a chair. Feels more comfortable today  Denied orthopnea. No chest pain or pressure. No fevers or chills or night sweats. Tolerating diet. Echo showing decreased EF (see report below). Lower ext venous dopplers negative. Subjective:   Review of Systems -   General ROS: Completed and except as mentioned above were negative   Psychological ROS:  Completed and except as mentioned above were negative  Allergy and Immunology ROS:  Completed and except as mentioned above were negative  Hematological and Lymphatic ROS:  Completed and except as mentioned above were negative  Respiratory ROS:  Completed and except as mentioned above were negative  Cardiovascular ROS:  Completed and except as mentioned above were negative  Gastrointestinal ROS: Completed and except as mentioned above were negative  Genito-Urinary ROS:  Completed and except as mentioned above were negative  Musculoskeletal ROS:  Completed and except as mentioned above were negative  Neurological ROS:  Completed and except as mentioned above were negative  Dermatological ROS:  Completed and except as mentioned above were negative      Diet:  DIET RENAL; Low Sodium (2 GM);  Daily Fluid Restriction: 1800 ml  Dietary Nutrition Supplements: Renal Oral Supplement      Medications:Current Inpatient    Scheduled Meds:   bumetanide  2 mg Oral BID    polyethylene glycol  17 g Oral Daily    spironolactone  25 mg Oral Daily    metoprolol succinate  50 mg Oral Daily    hydrALAZINE  50 mg Oral 3 times per day    apixaban  2.5 mg Oral Daily  FLUoxetine  10 mg Oral Daily    famotidine  20 mg Oral Daily    sodium chloride flush  10 mL Intravenous 2 times per day    sodium chloride flush  10 mL Intravenous 2 times per day    levothyroxine  125 mcg Oral Daily    ALPRAZolam  0.25 mg Oral Once    QUEtiapine  25 mg Oral Nightly    sodium chloride flush  10 mL Intravenous 2 times per day    clopidogrel  75 mg Oral Daily    isosorbide mononitrate  30 mg Oral Daily    docusate sodium  100 mg Oral Daily    aspirin  81 mg Oral Daily    amiodarone  200 mg Oral Daily    atorvastatin  20 mg Oral Daily     Continuous Infusions:   nitroGLYCERIN Stopped (03/26/19 1315)    norepinephrine 1 mcg/min (03/14/19 1050)     PRN Meds:acetaminophen, hydrALAZINE, sodium chloride flush, sodium chloride flush, ondansetron, sodium chloride flush, magnesium hydroxide    Objective:      Physical Exam:  Vitals: BP (!) 107/43   Pulse 62   Temp 97.9 °F (36.6 °C) (Oral)   Resp 14   Ht 5' 6\" (1.676 m)   Wt 153 lb (69.4 kg)   SpO2 97%   BMI 24.69 kg/m²   24 hour intake/output:    Intake/Output Summary (Last 24 hours) at 3/27/2019 1709  Last data filed at 3/27/2019 8159  Gross per 24 hour   Intake 373 ml   Output 825 ml   Net -452 ml     Last 3 weights: Wt Readings from Last 3 Encounters:   03/27/19 153 lb (69.4 kg)         Physical Examination:   PHYSICAL EXAMINATION:  Vitals:    03/27/19 0849 03/27/19 0850 03/27/19 1115 03/27/19 1600   BP:  (!) 100/45 (!) 123/45 (!) 107/43   Pulse:  60 62    Resp:       Temp:   97.3 °F (36.3 °C) 97.9 °F (36.6 °C)   TempSrc:   Oral Oral   SpO2: 96% 96% 97%    Weight:       Height:         Constitutional: This is a well developed, well nourished, 18.5-24.9 - Normal 80y.o. year old male who is alert, oriented, cooperative and in no apparent distress. Head:normocephalic and atraumatic. EENT:  ILEANA. No conjunctival injections. Septum was midline, mucosa was without erythema, exudates or cobblestoning. No thrush was noted. Mallampati  II (soft palate, uvula, fauces visible)  Neck: Supple without thyromegaly. No elevated JVP. Trachea was midline. Respiratory: Chest was symmetrical without dullness to percussion. Breath sounds bilaterally were clear to auscultation. There were no wheezes, rhonchi or rales. There is no intercostal retraction or use of accessory muscles. No egophony noted. Cardiovascular: Regular without murmur, clicks, gallops or rubs. Abdomen: Slightly rounded and soft without organomegaly. No rebound tenderness, rigidity or guarding was appreciated. Lymphatic: No lymphadenopathy. Musculoskeletal: Normal curvature of the spine. No gross muscle weakness. Extremities:  No lower extremity edema, no ulcerations, tenderness, varicosities or erythema. Muscle size, tone and strength are normal.  No involuntary movements are noted. Skin:  Warm and dry. Good color, turgor and pigmentation. No lesions or scars. No cyanosis or clubbing  Neurological/Psychiatric: The patient's general behavior, level of consciousness, thought content and emotional status is normal.          CBC:   Recent Labs     03/27/19  0448   WBC 10.6   HGB 8.5*        BMP:    Recent Labs     03/25/19  0436 03/26/19  0431 03/27/19  0448    136 139   K 4.1 4.0 3.6*   CL 95* 97* 97*   CO2 29 26 29   * 103* 101*   CREATININE 2.99* 2.71* 3.03*   GLUCOSE 103* 136* 95     Calcium:  Recent Labs     03/27/19  0448   CALCIUM 8.0*     Ionized Calcium:No results for input(s): IONCA in the last 72 hours. Magnesium:  No results for input(s): MG in the last 72 hours. Phosphorus:No results for input(s): PHOS in the last 72 hours. BNP:No results for input(s): BNP in the last 72 hours. Glucose:  No results for input(s): POCGLU in the last 72 hours. HgbA1C: No results for input(s): LABA1C in the last 72 hours. INR:   No results for input(s): INR in the last 72 hours.   Hepatic:   No results for input(s): ALKPHOS, ALT, AST, PROT, BILITOT, BILIDIR, LABALBU in the last 72 hours. Amylase and Lipase:No results for input(s): LACTA, AMYLASE in the last 72 hours. Lactic Acid: No results for input(s): LACTA in the last 72 hours. CARDIAC ENZYMES:  No results for input(s): CKTOTAL, CKMB, CKMBINDEX, TROPONINI in the last 72 hours. BNP: No results for input(s): BNP in the last 72 hours. Lipids: No results for input(s): CHOL, TRIG, HDL, LDLCALC in the last 72 hours. Invalid input(s): LDL  ABGs: No results found for: PH, PCO2, PO2, HCO3, O2SAT  Thyroid:   Lab Results   Component Value Date    TSH 3.04 03/20/2019      Urinalysis: No results for input(s): BACTERIA, BLOODU, CLARITYU, COLORU, PHUR, PROTEINU, RBCUA, SPECGRAV, BILIRUBINUR, NITRU, WBCUA, LEUKOCYTESUR, GLUCOSEU in the last 72 hours. CULTURES:    Review of the pleural effusion study shows that there was no malignancy seen on cytology  Venous Dopplers with no DVT involving lower extremities      3/26/2019 Echocardiogram  CONCLUSIONS    Summary  Left ventricle is normal in size, global left ventricular systolic function  is moderately reduced, calculated ejection fraction is 42%. Left atrium appears enlarged. Right atrium is enlarged. Bioprosthetic valve is seated in the aortic position. Appears to be  functioning normally, no evidence of stenosis. Mitral valve appears sclerotic with annular calcification. Moderate to severe mitral regurgitation. Mild tricuspid regurgitation. Pleural effusion noted. 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 *  Bibasilar atelectasis. Aortic stenosis. Hypothyroidism.   History of cigarette smoking. Worsening renal function today  Anemia with slight improvement. Leukocytosis, slightly worse    Plan:  Meds reviewed- changes made as appropriate. Patient is on diuretics, bumex  On amiodarone. On Plavix  Patient is on thyroid supplementation. Receiving Bumex and Aldactone  On Eliquis 2.5 mg po qd  Being monitored off antibiotics. On Pepcid  On Seroquel at hs. Increase activity as permitted and tolerated. Questions patient had were answered to his satisfaction. Continue supplemental oxygen if needed. Cxr reviewed. None today  Diet reviewed    Should another Thoracentesis be performed orders for pleural fluid analysis placed. May need pleural drainage catheter should effusions continue to return. Thank you for having us involved in the care of your patient. Please call us if you have any questions or concerns. Marybeth Delacruz M.D.  PGY-3 Internal Medicine  1 [facility].     3/27/2019, 5:09 PM    Pulmonary & Critical Care

## 2019-03-27 NOTE — PROGRESS NOTES
[facility] - Progress Note  Today's Date and Time: 3/27/2019, 9:22 AM    Impression :   1. Recurrent bilateral pleural effusions w/lung compression  2. CHF  3. 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 Multivessel CAD s/p multiple stent placements  5. S/P TAVR  6. HUYEN  7. Urinary retention  8. Pulmonary hypertension    Recommendations:   · Monitor off antibiotics  · Aggressive pulmonary toilet  · Acapella valve and IS at bedside  · Diuresis per Cardio and Nephro  · Supportive care    Medical Decision Making/Summary/Discussion:   · 79 yo gentleman who developed complete heart block. Found to have multivessel CAD, severe aortic stenosis and pulmonary HTN. · Underwent pacemaker placement with subsequent bouts of acute CHF and multiple hospitalizations in [place]  · Brought to [place] by family for further care. · Since admission at [address] [facility] he has had cardiac catheterizations x 2 with multiple stent placements and a TAVR on 3-15. Pt remains on a nitroglycerine drip  · Pt continues to require frequent thoracenteses  · He has suffered an HUYEN and is currently on a bumex drip  · CXR 3/26 showed progressed moderate to large bilateral pleural effusions with consolidation. · ID consult placed for treatment of any potential pneumonia  · Pt is afebrile, without cough or sputum production. He remains SOB with exertion and at times, when quiet. · Currently no evidence of active infection. Pt will require aggressive pulmonary toilet and diuresis.  Plan to monitor off antibiotics    Infection Control Recommendations   · [place] Precautions    Antimicrobial Stewardship Recommendations     · Monitor off antibiotics    Coordination of Outpatient Care:   · Estimated Length of IV antimicrobials: None  · Patient will need Midline Catheter Insertion:   · Patient will need PICC line Insertion:  · Patient will need: Home IV , [place],  SNF,  LTAC TBD  · Patient will need outpatient wound care: No    Chief back to the cath lab on 3/11 for PTCA-JEAN-CLAUDE LAD, PTCRA-JEAN-CLAUDE LM with plan to consider a TAVR if pt remained stable.     A TAVR was performed on 3/15     A carotid study showed less than 50% stenosis bilaterally.     He has had multiple thoracenteses since admission. Pt has been followed by CT surgery, cardiology, pulmonary, nephrology and urology.     On the evening of 3/25 pt developed hypertension and SOB at rest. A stat CXR was done that showed progressed moderate to large bilateral pleural effusions with consolidation.      On 3/26 pt underwent a Lt thoracentesis with 1400 ml clear pleural fluid removed. Pt reports feeling much better after the procedure and is asking to sit in the chair.      I am consulted to determine if Mr Hafsa Jean has pneumonia.     CURRENT EXAMINATION: 3/27/2019    Pt seen and examined at bedside. No acute events overnight. The patient feels much better this morning. He is breathing comfortably on room air. The patient denies fevers, chills, chest pain, sob, cough, sputum production, n/v, abdominal pain.     Pt is on bumex drip. Cardiac management discussed with Dr Gisele Ballesteros. Changes in management made to improve cardiac function. .     On exam pt is in no distress  Breath sounds are diminished bilateral bases Rt>Lt  Breath sounds are clear, no rales, rhonchi or wheezes     Afebrile, VSS     Labs reviewed: 3/27/2019    WBC 10.6  Hgb 8.5    Cr 2.71 > 3.03   > 101    Cultures:  Pleural fluid:  · 3/7 No growth       Discussed with patient, RN. Dr Nanette Lagunas. I have personally reviewed the past medical history, past surgical history, medications, social history, and family history, and I have updated the database accordingly.   Past Medical History:     Past Medical History:   Diagnosis Date    Aortic stenosis 02/15/2019    Atrial fibrillation (HCC)     CAD (coronary artery disease)     Chest pain 02/15/2019    CHF (congestive heart failure) (HCC)     Chronic anemia     Chronic kidney disease     Hypertension     Pleural effusion on left 02/15/2019    Pleural effusion, right 02/15/2019    Pulmonary emboli (HCC) 02/15/2019    Pulmonary hypertension ([address] 2019    Thyroid disease     hypothyroidism       Past Surgical  History:     Past Surgical History:   Procedure Laterality Date    CORONARY ANGIOPLASTY WITH STENT PLACEMENT  2019    complex PCI of LT MAIN, LAD    JOINT REPLACEMENT      right ankle    PACEMAKER PLACEMENT  2019    THORACENTESIS         Medications:      bumetanide  2 mg Oral BID    spironolactone  25 mg Oral Daily    metoprolol succinate  50 mg Oral Daily    hydrALAZINE  50 mg Oral 3 times per day    apixaban  2.5 mg Oral Daily    FLUoxetine  10 mg Oral Daily    famotidine  20 mg Oral Daily    sodium chloride flush  10 mL Intravenous 2 times per day    sodium chloride flush  10 mL Intravenous 2 times per day    levothyroxine  125 mcg Oral Daily    ALPRAZolam  0.25 mg Oral Once    QUEtiapine  25 mg Oral Nightly    sodium chloride flush  10 mL Intravenous 2 times per day    clopidogrel  75 mg Oral Daily    isosorbide mononitrate  30 mg Oral Daily    docusate sodium  100 mg Oral Daily    aspirin  81 mg Oral Daily    amiodarone  200 mg Oral Daily    atorvastatin  20 mg Oral Daily       Social History:     Social History     Socioeconomic History    Marital status:       Spouse name: Not on file    Number of children: Not on file    Years of education: Not on file    Highest education level: Not on file   Occupational History     Employer: RETIRED   Social Needs    Financial resource strain: Not on file    Food insecurity:     Worry: Not on file     Inability: Not on file    Transportation needs:     Medical: Not on file     Non-medical: Not on file   Tobacco Use    Smoking status: Former Smoker     Last attempt to quit:      Years since quittin.2    Smokeless tobacco: Never Used   Substance and Sexual Activity    0300 (!) 141/55 98.2 °F (36.8 °C) Axillary 65 14 94 % --   03/27/19 0200 (!) 137/52 -- -- 66 -- 95 % --     General Appearance: Awake, alert, and in no apparent distress  Head:  Normocephalic, no trauma  Eyes: Pupils equal, round, reactive to light and accommodation; extraocular movements intact; sclera anicteric. ENT: Oropharynx clear. Mouth/throat: mucosa pink and moist. No lesions. Dentition in good repair. Neck: Supple, without lymphadenopathy. Pulmonary/Chest: Clear to auscultation, without wheezes, rales, or rhonchi. Decreased breath sounds at bilateral bases Rt > Lt  Cardiovascular: Irregular rate and rhythm without murmurs, rubs, or gallops. Abdomen: Soft, non tender. Bowel sounds normal.  All four Extremities: Mild edema. Neurologic: No gross sensory or motor deficits. Skin: Warm and dry with good turgor. No signs of peripheral arterial or venous insufficiency. No ulcerations. No open wounds. Medical Decision Making -Laboratory:   I have independently reviewed/ordered the following labs:    CBC with Differential:   Recent Labs     03/27/19  0448   WBC 10.6   HGB 8.5*   HCT 26.9*      LYMPHOPCT 8*   MONOPCT 6     BMP:   Recent Labs     03/26/19  0431 03/27/19  0448    139   K 4.0 3.6*   CL 97* 97*   CO2 26 29   * 101*   CREATININE 2.71* 3.03*     Hepatic Function Panel: No results for input(s): PROT, LABALBU, BILIDIR, IBILI, BILITOT, ALKPHOS, ALT, AST in the last 72 hours. No results for input(s): RPR in the last 72 hours. No results for input(s): HIV in the last 72 hours. No results for input(s): BC in the last 72 hours.   Lab Results   Component Value Date    MUCUS NOT REPORTED 03/12/2019    RBC 2.94 03/27/2019    TRICHOMONAS NOT REPORTED 03/12/2019    WBC 10.6 03/27/2019    YEAST NOT REPORTED 03/12/2019    TURBIDITY CLEAR 03/12/2019     Lab Results   Component Value Date    CREATININE 3.03 03/27/2019    GLUCOSE 95 03/27/2019       Medical Decision Making-Imaging: Narrative   EXAMINATION:   SINGLE XRAY VIEW OF THE CHEST       3/26/2019 2:37 am       COMPARISON:   March 20, 2019.       HISTORY:   ORDERING SYSTEM PROVIDED HISTORY: SOB, PE   TECHNOLOGIST PROVIDED HISTORY:   SOB, PE       FINDINGS:   Frontal portable view of the chest.  Low lung volume.  Progressed moderate to   large bilateral pleural effusions with consolidation.  Indistinct pulmonary   vasculature.  No pneumothorax.  The cardiomediastinal silhouette is not well   evaluated.  Atherosclerotic thoracic aorta.  Left pectoral trans venous   dual-chamber cardiac pacer device.           Impression   Progressed moderate to large bilateral pleural effusions with consolidation. Superimposed infection is not excluded.             Medical Decision Making-Other: Thank you for allowing us to participate in the care of this patient. Please call with questions. Jacqueline Christensen    ATTESTATION:    I have discussed the case, including pertinent history and exam findings with the residents. I have seen and examined the patient and the key elements of the encounter have been performed by me. I have reviewed the laboratory data, other diagnostic studies and discussed them with the residents. I have updated the medical record where necessary. I agree with the assessment, plan and orders as documented by the resident.     Jeromy Ontiveros MD.

## 2019-03-27 NOTE — PROGRESS NOTES
Nutrition Assessment    Type and Reason for Visit: Reassess    Nutrition Recommendations: Continue renal, Low gm Na diet and 1800 mL FR. Continue renal supplements 3x/d.      Nutrition Assessment: Pt continues to have a good appetite and is consuming 50-75% of his meals and % of his supplements. Pt has had wt flux since admission, most likely r/t fluid. Will continue supplements to compliment po intake and monitor wt trends. Malnutrition Assessment:  · Malnutrition Status: At risk for malnutrition    Nutrition Risk Level: Moderate    Nutrient Needs:  · Estimated Daily Total Kcal: 1.3-1.4 ~>8193-1801 kcals/d   · Estimated Daily Protein (g): 1.2-1.4 gm/kg ~>85-99 gms/d    Nutrition Diagnosis:   · Problem: Increased nutrient needs  · Etiology: related to Catabolic illness, Increased demand for energy/nutrients     Signs and symptoms:  as evidenced by (Hemodialysis)    Objective Information:  · Nutrition-Focused Physical Findings: active bowel sounds  · Wound Type: None  · Current Nutrition Therapies:  · Oral Diet Orders: 2gm Sodium, Renal, Fluid Restriction   · Oral Diet intake: 51-75%  · Oral Nutrition Supplement (ONS) Orders: Renal Oral Supplement  · ONS intake: %  · Anthropometric Measures:  · Ht: 5' 6\" (167.6 cm)   · Current Body Wt: 153 lb (69.4 kg)  · Admission Body Wt: 156 lb (70.8 kg)  · Usual Body Wt: 165 lb (74.8 kg)(per pt)  · % Weight Change:  ,  Wt flux since admission   · Ideal Body Wt: 141 lb 15.6 oz (64.4 kg), % Ideal Body 111% (adm/ideal)  · BMI Classification: BMI 25.0 - 29.9 Overweight    Nutrition Interventions:   Continue current diet, Continue current ONS  Continued Inpatient Monitoring, Education Completed    Nutrition Evaluation:   · Evaluation: Goal achieved   · Goals: Oral intakes to meet at least 50% of estimated nutrition needs.     · Monitoring: Nutrition Progression, Meal Intake, Supplement Intake, Diet Tolerance, I&O, Weight, Pertinent Labs, Monitor Bowel Function      Electronically signed by Kaci Chery RD, LD on 3/27/19 at 11:11 AM    Contact Number: [phone]

## 2019-03-28 NOTE — PROGRESS NOTES
[facility]  Pre Op Progress Note    CC: Acute CHF, SOB, increased Cre      Subjective:  Mr. Chelle Bee seen and examined Plan of care reviewed and questions answered. Physical Exam  Vital Signs: BP (!) 134/57   Pulse 59   Temp 97.9 °F (36.6 °C) (Oral)   Resp 16   Ht 5' 6\" (1.676 m)   Wt 153 lb 14.1 oz (69.8 kg)   SpO2 95%   BMI 24.84 kg/m²  O2 Flow Rate (L/min): 2 L/min       General: alert and oriented to person, place and time. Up in chair, No apparent distress.   Heart:Rhythm: NSR  Lungs: clear to auscultation bilaterally and diminished breath sounds bibasilar  Abdomen: soft, non tender, non distended, BSx4  Extremities: 1+ edema      Scheduled Meds:    albumin human  25 g Intravenous Once    potassium chloride  20 mEq Oral Once    bumetanide  2 mg Oral BID    polyethylene glycol  17 g Oral Daily    spironolactone  25 mg Oral Daily    metoprolol succinate  50 mg Oral Daily    hydrALAZINE  50 mg Oral 3 times per day    apixaban  2.5 mg Oral Daily    FLUoxetine  10 mg Oral Daily    famotidine  20 mg Oral Daily    sodium chloride flush  10 mL Intravenous 2 times per day    sodium chloride flush  10 mL Intravenous 2 times per day    levothyroxine  125 mcg Oral Daily    ALPRAZolam  0.25 mg Oral Once    QUEtiapine  25 mg Oral Nightly    sodium chloride flush  10 mL Intravenous 2 times per day    clopidogrel  75 mg Oral Daily    isosorbide mononitrate  30 mg Oral Daily    docusate sodium  100 mg Oral Daily    aspirin  81 mg Oral Daily    amiodarone  200 mg Oral Daily    atorvastatin  20 mg Oral Daily     Continuous Infusions:    DOPamine      nitroGLYCERIN Stopped (03/26/19 1315)    norepinephrine 1 mcg/min (03/14/19 1050)       Data:  CBC:   Recent Labs     03/27/19  0448 03/28/19  0537   WBC 10.6 11.6*   HGB 8.5* 8.5*   HCT 26.9* 27.9*   MCV 91.5 92.7    179     BMP:   Recent Labs     03/26/19  0431 03/27/19  0448 03/28/19  0537    139 136

## 2019-03-28 NOTE — CONSULTS
Attestation signed by      Attending Physician Statement:    I have discussed the care of  Lu Fallon , including pertinent history and exam findings, with the Cardiology fellow/resident. I have seen and examined the patient and the key elements of all parts of the encounter have been performed by me. I agree with the assessment, plan and orders as documented by the fellow/resident, after I modified exam findings and plan of treatments, and the final version is my approved version of the assessment. Additional Comments: Will benefit from Biv upgrade as below due to RV pacing all time and low EF with NYHA class III         [facility]    Consult / H&P               Today's Date: 3/28/2019  Patient Name: Lu Fallon  Date of admission: 3/6/2019  8:48 PM  Patient's age: 80 y. o., 12/10/1928  Admission Dx: Pulmonary hypertension ([facility] [address] [I27.20]    Reason for Consult:  Cardiac evaluation    Requesting Physician: Vini Ovalles MD    CHIEF COMPLAINT:  Shortness of breath    History Obtained From:  patient, electronic medical record    HISTORY OF PRESENT ILLNESS:      The patient is a 80 y.o.  male who is admitted to the hospital for shortness of breath    He is a resident of [facility] and Dr Purnima Perez uncle and primarily admitted under his service. He had complete heart block in January and received a St chay`s PPM. He had small pulmonary embolism and paroxysmal A fib for which he is on [facility] with Eliquis 2.5 mg BID ,amiodarone  200 mg daily with rate control with Lopressor 50 BID. His cath in [facility] showed multivessel CAD and aortic stenosis with MG of 29 and Peak velocity of 3.6 with RVSP of 60 on admission with EF of 40-45%    He had PCI with rotational atherectomy for left main and LAD with JEAN-CLAUDE on 3/11/19, followed by T.A. V.R with 29 mm CoreValve on 3/15/19.     He had HUYEN on CKD with nephrotic range proteinuria and nephrology has been seeing the patient for diuresis and discussed about the RRT. He has multiple taps for thoracentesis with pleural effusion drainage. On our exam, he is sitting up in chair, denies any chest pain, shortness of breath. Device interrogation showed 95% V pacing with paroxysmal A Fib. Patient does not want to get any more procedure done and will discuss with Dr Beto Tse. Past Medical History:   has a past medical history of Aortic stenosis, Atrial fibrillation ([address] CAD (coronary artery disease), Chest pain, CHF (congestive heart failure) ([address] Chronic anemia, Chronic kidney disease, Hypertension, Pleural effusion on left, Pleural effusion, right, Pulmonary emboli ([address] Pulmonary hypertension ([address] and Thyroid disease. Past Surgical History:   has a past surgical history that includes pacemaker placement (01/20/2019); thoracentesis; joint replacement; and Coronary angioplasty with stent (03/11/2019). Home Medications:    Prior to Admission medications    Medication Sig Start Date End Date Taking?  Authorizing Provider   amiodarone (CORDARONE) 200 MG tablet Take 200 mg by mouth daily   Yes Historical Provider, MD   atorvastatin (LIPITOR) 20 MG tablet Take 20 mg by mouth daily   Yes Historical Provider, MD   isosorbide mononitrate (IMDUR) 60 MG extended release tablet Take 60 mg by mouth daily   Yes Historical Provider, MD   levothyroxine (SYNTHROID) 100 MCG tablet Take 100 mcg by mouth Daily   Yes Historical Provider, MD   lisinopril (PRINIVIL;ZESTRIL) 40 MG tablet Take 40 mg by mouth daily   Yes Historical Provider, MD   apixaban (ELIQUIS) 2.5 MG TABS tablet Take 2.5 mg by mouth daily   Yes Historical Provider, MD   metoprolol tartrate (LOPRESSOR) 50 MG tablet Take 50 mg by mouth 2 times daily   Yes Historical Provider, MD      Current Facility-Administered Medications: DOPamine (INTROPIN) 400 mg in dextrose 5 % 250 mL infusion, 2 mcg/kg/min, Intravenous, Continuous  bumetanide (BUMEX) tablet 2 mg, 2 mg, Oral, EKG:  V PACED    3/26/19  ECHO   Left ventricle is normal in size, global left ventricular systolic function  is moderately reduced, calculated ejection fraction is 42%. Left atrium appears enlarged. Right atrium is enlarged. Bioprosthetic valve is seated in the aortic position. Appears to be  functioning normally, no evidence of stenosis. Mitral valve appears sclerotic with annular calcification. Moderate to severe mitral regurgitation. Mild tricuspid regurgitation. Pleural effusion noted    Labs:     CBC:   Recent Labs     03/27/19  0448 03/28/19  0537   WBC 10.6 11.6*   HGB 8.5* 8.5*   HCT 26.9* 27.9*    179     BMP:   Recent Labs     03/27/19  0448 03/28/19  0537    136   K 3.6* 3.9   CO2 29 28   * 106*   CREATININE 3.03* 3.27*   LABGLOM 20* 18*   GLUCOSE 95 101*     BNP: No results for input(s): BNP in the last 72 hours. PT/INR: No results for input(s): PROTIME, INR in the last 72 hours. APTT:No results for input(s): APTT in the last 72 hours. CARDIAC ENZYMES:No results for input(s): CKTOTAL, CKMB, CKMBINDEX, TROPONINI in the last 72 hours. FASTING LIPID PANEL:No results found for: HDL, LDLDIRECT, LDLCALC, TRIG  LIVER PROFILE:No results for input(s): AST, ALT, LABALBU in the last 72 hours. IMPRESSION:    Patient Active Problem List   Diagnosis    Pulmonary hypertension (HCC)    Acute on chronic systolic congestive heart failure (HCC)    Pleural effusion due to CHF (congestive heart failure) ([address]    Pulmonary 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 heart disease (HCC)    Atelectasis, bilateral    HUYEN (acute kidney injury) ([address]    Azotemia    Aortic valve stenosis     1 Complete heart block S/P St chay PPM 1/21/19 with RV paced(95%)  2 Paroxysmal A fib( [id number]) 4  3 Bilateral pleural effusion S/P Multiple thoracentesis  4 Post TAVR  5 CAD S/P PCI with rotational atherectomy of left main and LAD  6 Chronic systolic CHF  7 HTN  8 HUYEN on CKD  RECOMMENDATIONS:  1.  Can be considered for the BI V Pacing since patient is RV paced(95%) of the time. He will benefit from it, but we anticipate use of contrast during this procedure which may worsen the renal condition and put the patient in to renal failure and need for the Hemodialysis. 2. Will need nephrology to discuss the risks of contrast induced nephropathy. 3. Will recommend continuing with rest of medications per primary team  4. Not on ARNI/ACE because of HUYEN on CKD. Discussed with patient and Nurse.     Electronically signed by Georgina Way MD on 3/28/2019 at 1:06 PM  Fellow Cardiology

## 2019-03-28 NOTE — PROGRESS NOTES
on Asprin and cardiology recommends not to stop anticoagulant. Explained to patient and he does not want to do that. 4.  Status post TVR  5. Cardiomyopathy ejection fraction 40-45%/left ventricular diastole dysfunction/moderate to severe mitral regurgitation  6. S/p PCI and atherectomy  3/11  7. Recurrent bilateral pleural effusion status post pleural taps  8. HTN  9. Hx of Urinary retention  10. Advanced age    PLAN     4. Continue diuretics   2. Avoid Nephrotoxins  3. DW option of RRT.   High risk of needing dialysis if renal function continues to decline    Please do not hesitate to call with questions    This note is created with the assistance of a speech-recognition program. While intending to generate a document that actually reflects the content of the visit, no guarantees can be provided that every mistake has been identified and corrected by editing    Mikayla Sharma MD, MRCP [place]), [address]   3/28/2019 9:55 AM  [facility]

## 2019-03-28 NOTE — PROGRESS NOTES
[facility] - Progress Note  Today's Date and Time: 3/28/2019, 11:04 AM    Impression :   1. Recurrent bilateral pleural effusions w/lung compression  2. CHF  3. 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 Multivessel CAD s/p multiple stent placements  5. S/P TAVR  6. HUYEN  7. Urinary retention  8. Pulmonary hypertension    Recommendations:   · Monitor off antibiotics  · Aggressive pulmonary toilet  · Acapella valve and IS at bedside  · Diuresis per Cardio and Nephro  · Supportive care    Medical Decision Making/Summary/Discussion:   · 79 yo gentleman who developed complete heart block. Found to have multivessel CAD, severe aortic stenosis and pulmonary HTN. · Underwent pacemaker placement with subsequent bouts of acute CHF and multiple hospitalizations in [place]  · Brought to [place] by family for further care. · Since admission at [facility] he has had cardiac catheterizations x 2 with multiple stent placements and a TAVR on 3-15. Pt remains on a nitroglycerine drip  · Pt continues to require frequent thoracenteses  · He has suffered an HUYEN and is currently on a bumex drip  · CXR 3/26 showed progressed moderate to large bilateral pleural effusions with consolidation. · ID consult placed for treatment of any potential pneumonia  · Pt is afebrile, without cough or sputum production. He remains SOB with exertion and at times, when quiet. · Currently no evidence of active infection. Pt will require aggressive pulmonary toilet and diuresis.  Plan to monitor off antibiotics    Infection Control Recommendations   · [place] Precautions    Antimicrobial Stewardship Recommendations     · Monitor off antibiotics    Coordination of Outpatient Care:   · Estimated Length of IV antimicrobials: None  · Patient will need Midline Catheter Insertion:   · Patient will need PICC line Insertion:  · Patient will need: Home IV , [place],  SNF,  LTAC TBD  · Patient will need outpatient wound care: No    Chief complaint/reason for consultation:   · Possible pneumonia      History of Present Illness:   Bhupinder Burnett is a 80y.o.-year-old  male who was initially admitted on 3/6/2019. Patient seen at the request of Dr. Edgardo Cooks     INITIAL HISTORY:    Pt is a 81 yo gentleman who has recently had multiple hospital admissions in [place] for decompensated heart failure. His symptoms began in December 2018. 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 A cardiac cath at that time showed LM and LAD calcified stenosis 80-90% in multiple areas, with severe disease in D1, D2, Om1 and OM2. Minimal disease in RCA. LV function with EF 25%. Severe aortic stenosis compromising his clinical improvedmnt      He underwent a dual chamber pacemaker placement. Following that, he had multiple hospitalizations for acute heart failure and shortness of breath. His baseline creatinine was 1.3, now >2.0. He has had multiple thoracenteses with transudative pleural fluid removed.      He was brought to [facility] for further evaluation and was admitted on 3/6/19     An ECHO from admission showed   Normal left ventricle size with mildly reduced systolic function; Estimated  left ventricular ejection fraction 40-45%  Anterolateral wall hypokinesis. Mild left ventricular hypertrophy. Left atrium is moderately dilated. Grade II diastolic dysfunction. Heavily calcified aortic valve with reduced systolic excursion and evidence  of moderate stenosis. (Peak nataliia 3.62 m/s and mean gradient 29 mmHg)  Moderate posterior pericardial effusion without any echo signs of tamponade. Normal right ventricular size and function. Pacemaker lead seen in right ventricle. Moderate tricuspid regurgitation. Estimated right ventricular systolic pressure is 60 mmHg suggesting moderate  pulmonary HTN.    He underwent an atherectomy/JEAN-CLAUDE of the left main and LAD on 3/6.   Because of the high surgical risk and renal function he was taken back to the cath lab on 3/11 for PTCA-JEAN-CLAUDE LAD, PTCRA-JEAN-CLAUDE LM with plan to consider a TAVR if pt remained stable.     A TAVR was performed on 3/15     A carotid study showed less than 50% stenosis bilaterally.     He has had multiple thoracenteses since admission. Pt has been followed by CT surgery, cardiology, pulmonary, nephrology and urology.     On the evening of 3/25 pt developed hypertension and SOB at rest. A stat CXR was done that showed progressed moderate to large bilateral pleural effusions with consolidation.      On 3/26 pt underwent a Lt thoracentesis with 1400 ml clear pleural fluid removed. Pt reports feeling much better after the procedure and is asking to sit in the chair.      I am consulted to determine if Mr Tiff Gonzalez has pneumonia.     CURRENT EXAMINATION: 3/28/2019    Pt seen and examined at bedside. No acute events overnight. The patient feels OK this morning. He is breathing comfortably on room air. The patient denies fevers, chills, chest pain, sob, cough, sputum production, n/v, abdominal pain.     On exam pt is in no distress  Breath sounds are diminished bilateral bases Rt>Lt    On low dose dopamine drip     Afebrile, VSS     Labs reviewed: 3/28/2019    WBC 10.6->11.6  Hgb 8.5    Cr 2.71 ->3.03->3.27  ->101->106    Cultures:  Pleural fluid:  · 3/7 No growth     CXR 3-28-19: Bilateral pleural effusions. Rt side larger. Discussed with patient, RN. I have personally reviewed the past medical history, past surgical history, medications, social history, and family history, and I have updated the database accordingly.   Past Medical History:     Past Medical History:   Diagnosis Date    Aortic stenosis 02/15/2019    Atrial fibrillation (HCC)     CAD (coronary artery disease)     Chest pain 02/15/2019    CHF (congestive heart failure) (HCC)     Chronic anemia     Chronic kidney disease     Hypertension     Pleural effusion on left 02/15/2019    Pleural effusion, right 02/15/2019    Pulmonary emboli (HCC) 02/15/2019    Pulmonary hypertension ([address] 2019    Thyroid disease     hypothyroidism       Past Surgical  History:     Past Surgical History:   Procedure Laterality Date    CORONARY ANGIOPLASTY WITH STENT PLACEMENT  2019    complex PCI of LT MAIN, LAD    JOINT REPLACEMENT      right ankle    PACEMAKER PLACEMENT  2019    THORACENTESIS         Medications:      bumetanide  2 mg Oral BID    polyethylene glycol  17 g Oral Daily    spironolactone  25 mg Oral Daily    metoprolol succinate  50 mg Oral Daily    hydrALAZINE  50 mg Oral 3 times per day    apixaban  2.5 mg Oral Daily    FLUoxetine  10 mg Oral Daily    famotidine  20 mg Oral Daily    sodium chloride flush  10 mL Intravenous 2 times per day    sodium chloride flush  10 mL Intravenous 2 times per day    levothyroxine  125 mcg Oral Daily    ALPRAZolam  0.25 mg Oral Once    QUEtiapine  25 mg Oral Nightly    sodium chloride flush  10 mL Intravenous 2 times per day    clopidogrel  75 mg Oral Daily    isosorbide mononitrate  30 mg Oral Daily    docusate sodium  100 mg Oral Daily    aspirin  81 mg Oral Daily    amiodarone  200 mg Oral Daily    atorvastatin  20 mg Oral Daily       Social History:     Social History     Socioeconomic History    Marital status:       Spouse name: Not on file    Number of children: Not on file    Years of education: Not on file    Highest education level: Not on file   Occupational History     Employer: RETIRED   Social Needs    Financial resource strain: Not on file    Food insecurity:     Worry: Not on file     Inability: Not on file    Transportation needs:     Medical: Not on file     Non-medical: Not on file   Tobacco Use    Smoking status: Former Smoker     Last attempt to quit:      Years since quittin.2    Smokeless tobacco: Never Used   Substance and Sexual Activity    Alcohol use: Not Currently    Drug use: Never   [facility] Sexual activity: Not on file   Lifestyle    Physical activity:     Days per week: Not on file     Minutes per session: Not on file    Stress: Not on file   Relationships    Social connections:     Talks on phone: Not on file     Gets together: Not on file     Attends Baptism service: Not on file     Active member of club or organization: Not on file     Attends meetings of clubs or organizations: Not on file     Relationship status: Not on file    Intimate partner violence:     Fear of current or ex partner: Not on file     Emotionally abused: Not on file     Physically abused: Not on file     Forced sexual activity: Not on file   Other Topics Concern    Not on file   Social History Narrative    Not on file       Family History:   History reviewed. No pertinent family history. Allergies:   Quinolones     Review of Systems:   Constitutional: No fevers or chills. No systemic complaints  Head: No headaches  Eyes: No double vision or blurry vision. ENT: No sore throat or runny nose. Cardiovascular: No chest pain or palpitations. Lung: shortness of breath with some activity, occasional cough. No sputum production  Abdomen: No nausea, vomiting, diarrhea, or abdominal pain. Genitourinary: Urinary retention. Johnston in place   Musculoskeletal: No muscle aches or pains. Hematologic: No bleeding or bruising. Neurologic: No headache, weakness, numbness, or tingling. Integument: No rash, no ulcers. Psychiatric: No depression. Endocrine: No polyuria, no polydipsia, no polyphagia.     Physical Examination :     Patient Vitals for the past 8 hrs:   BP Temp Temp src Pulse Resp SpO2 Weight   03/28/19 1028 -- -- -- 62 16 96 % --   03/28/19 0658 -- 97.9 °F (36.6 °C) Oral 59 -- 95 % --   03/28/19 0603 (!) 134/57 -- -- -- -- -- --   03/28/19 0435 -- -- -- -- -- -- 153 lb 14.1 oz (69.8 kg)   03/28/19 0315 (!) 136/50 98.2 °F (36.8 °C) Oral 62 16 93 % --     General Appearance: Awake, alert, and in no apparent distress  Head:  Normocephalic, no trauma  Eyes: Pupils equal, round, reactive to light and accommodation; extraocular movements intact; sclera anicteric. ENT: Oropharynx clear. Mouth/throat: mucosa pink and moist. No lesions. Dentition in good repair. Neck: Supple, without lymphadenopathy. Pulmonary/Chest: Clear to auscultation, without wheezes, rales, or rhonchi. Decreased breath sounds at bilateral bases Rt > Lt  Cardiovascular: Irregular rate and rhythm without murmurs, rubs, or gallops. Abdomen: Soft, non tender. Bowel sounds normal.  All four Extremities: Mild edema. Neurologic: No gross sensory or motor deficits. Skin: Warm and dry with good turgor. No signs of peripheral arterial or venous insufficiency. No ulcerations. No open wounds. Medical Decision Making -Laboratory:   I have independently reviewed/ordered the following labs:    CBC with Differential:   Recent Labs     03/27/19  0448 03/28/19  0537   WBC 10.6 11.6*   HGB 8.5* 8.5*   HCT 26.9* 27.9*    179   LYMPHOPCT 8* 4*   MONOPCT 6 5     BMP:   Recent Labs     03/27/19  0448 03/28/19  0537    136   K 3.6* 3.9   CL 97* 96*   CO2 29 28   * 106*   CREATININE 3.03* 3.27*     Hepatic Function Panel: No results for input(s): PROT, LABALBU, BILIDIR, IBILI, BILITOT, ALKPHOS, ALT, AST in the last 72 hours. No results for input(s): RPR in the last 72 hours. No results for input(s): HIV in the last 72 hours. No results for input(s): BC in the last 72 hours.   Lab Results   Component Value Date    MUCUS NOT REPORTED 03/12/2019    RBC 3.01 03/28/2019    TRICHOMONAS NOT REPORTED 03/12/2019    WBC 11.6 03/28/2019    YEAST NOT REPORTED 03/12/2019    TURBIDITY CLEAR 03/12/2019     Lab Results   Component Value Date    CREATININE 3.27 03/28/2019    GLUCOSE 101 03/28/2019       Medical Decision Making-Imaging:   3/27 CXR    Narrative   EXAMINATION:   TWO VIEWS OF THE CHEST       3/28/2019 7:56 am       COMPARISON:   Chest radiograph documented by the resident.     Dulce Maria Lindsay MD.

## 2019-03-28 NOTE — PROGRESS NOTES
times per day    apixaban  2.5 mg Oral Daily    FLUoxetine  10 mg Oral Daily    sodium chloride flush  10 mL Intravenous 2 times per day    sodium chloride flush  10 mL Intravenous 2 times per day    levothyroxine  125 mcg Oral Daily    ALPRAZolam  0.25 mg Oral Once    QUEtiapine  25 mg Oral Nightly    sodium chloride flush  10 mL Intravenous 2 times per day    clopidogrel  75 mg Oral Daily    isosorbide mononitrate  30 mg Oral Daily    docusate sodium  100 mg Oral Daily    amiodarone  200 mg Oral Daily    atorvastatin  20 mg Oral Daily     Continuous Infusions:   DOPamine 2 mcg/kg/min (03/28/19 0981)    nitroGLYCERIN Stopped (03/26/19 1315)    norepinephrine 1 mcg/min (03/14/19 1050)     PRN Meds:acetaminophen, hydrALAZINE, sodium chloride flush, sodium chloride flush, ondansetron, sodium chloride flush, magnesium hydroxide    Objective:      Physical Exam:  Vitals: BP (!) 129/43   Pulse 69   Temp 97.5 °F (36.4 °C) (Oral)   Resp 16   Ht 5' 6\" (1.676 m)   Wt 153 lb 14.1 oz (69.8 kg)   SpO2 92%   BMI 24.84 kg/m²   24 hour intake/output:    Intake/Output Summary (Last 24 hours) at 3/28/2019 1732  Last data filed at 3/28/2019 0653  Gross per 24 hour   Intake 220 ml   Output 950 ml   Net -730 ml     Last 3 weights: Wt Readings from Last 3 Encounters:   03/28/19 153 lb 14.1 oz (69.8 kg)         Physical Examination:   PHYSICAL EXAMINATION:  Vitals:    03/28/19 0603 03/28/19 0658 03/28/19 1028 03/28/19 1610   BP: (!) 134/57   (!) 129/43   Pulse:  59 62 69   Resp:   16 16   Temp:  97.9 °F (36.6 °C)  97.5 °F (36.4 °C)   TempSrc:  Oral  Oral   SpO2:  95% 96% 92%   Weight:       Height:         Constitutional: This is a well developed, well nourished, 18.5-24.9 - Normal 80y.o. year old male who is alert, oriented, cooperative and in no apparent distress. Head:normocephalic and atraumatic. EENT:  ILEANA. No conjunctival injections.  Septum was midline, mucosa was without erythema, exudates or cobblestoning. No thrush was noted. Mallampati  II (soft palate, uvula, fauces visible)  Neck: Supple without thyromegaly. No elevated JVP. Trachea was midline. Respiratory: Chest was symmetrical without dullness to percussion. Breath sounds bilaterally were clear to auscultation. There were no wheezes, rhonchi or rales. There is no intercostal retraction or use of accessory muscles. No egophony noted. Cardiovascular: Regular without murmur, clicks, gallops or rubs. Abdomen: Slightly rounded and soft without organomegaly. No rebound tenderness, rigidity or guarding was appreciated. Lymphatic: No lymphadenopathy. Musculoskeletal: Normal curvature of the spine. No gross muscle weakness. Extremities:  No lower extremity edema, no ulcerations, tenderness, varicosities or erythema. Muscle size, tone and strength are normal.  No involuntary movements are noted. Skin:  Warm and dry. Good color, turgor and pigmentation. No lesions or scars. No cyanosis or clubbing  Neurological/Psychiatric: The patient's general behavior, level of consciousness, thought content and emotional status is normal.          CBC:   Recent Labs     03/27/19  0448 03/28/19  0537   WBC 10.6 11.6*   HGB 8.5* 8.5*    179     BMP:    Recent Labs     03/26/19  0431 03/27/19  0448 03/28/19  0537    139 136   K 4.0 3.6* 3.9   CL 97* 97* 96*   CO2 26 29 28   * 101* 106*   CREATININE 2.71* 3.03* 3.27*   GLUCOSE 136* 95 101*     Calcium:  Recent Labs     03/28/19  0537   CALCIUM 8.2*     Ionized Calcium:No results for input(s): IONCA in the last 72 hours. Magnesium:  No results for input(s): MG in the last 72 hours. Phosphorus:No results for input(s): PHOS in the last 72 hours. BNP:No results for input(s): BNP in the last 72 hours. Glucose:  No results for input(s): POCGLU in the last 72 hours. HgbA1C: No results for input(s): LABA1C in the last 72 hours.   INR:   No results for input(s): INR in the last 72 hours. Hepatic:   No results for input(s): ALKPHOS, ALT, AST, PROT, BILITOT, BILIDIR, LABALBU in the last 72 hours. Amylase and Lipase:No results for input(s): LACTA, AMYLASE in the last 72 hours. Lactic Acid: No results for input(s): LACTA in the last 72 hours. CARDIAC ENZYMES:  No results for input(s): CKTOTAL, CKMB, CKMBINDEX, TROPONINI in the last 72 hours. BNP: No results for input(s): BNP in the last 72 hours. Lipids: No results for input(s): CHOL, TRIG, HDL, LDLCALC in the last 72 hours. Invalid input(s): LDL  ABGs: No results found for: PH, PCO2, PO2, HCO3, O2SAT  Thyroid:   Lab Results   Component Value Date    TSH 3.04 03/20/2019      Urinalysis: No results for input(s): BACTERIA, BLOODU, CLARITYU, COLORU, PHUR, PROTEINU, RBCUA, SPECGRAV, BILIRUBINUR, NITRU, WBCUA, LEUKOCYTESUR, GLUCOSEU in the last 72 hours. CULTURES:    Review of the pleural effusion study shows that there was no malignancy seen on cytology  Venous Dopplers with no DVT involving lower extremities      3/26/2019 Echocardiogram  CONCLUSIONS    Summary  Left ventricle is normal in size, global left ventricular systolic function  is moderately reduced, calculated ejection fraction is 42%. Left atrium appears enlarged. Right atrium is enlarged. Bioprosthetic valve is seated in the aortic position. Appears to be  functioning normally, no evidence of stenosis. Mitral valve appears sclerotic with annular calcification. Moderate to severe mitral regurgitation. Mild tricuspid regurgitation. Pleural effusion noted.       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 *  Bibasilar atelectasis. Aortic stenosis. Hypothyroidism. History of cigarette smoking. Renal function continues to decline today  Anemia with slight improvement. Leukocytosis, slightly worse    Plan:  Meds reviewed- changes made as appropriate. Patient is on diuretics, bumex  On amiodarone. On Plavix  Low-dose dopamine drip  Patient is on thyroid supplementation. Receiving Bumex and Aldactone, nephrology following, patient is high risk for requiring hemodialysis  Cardiology is following and is recommending BI-Ventricular pacer however are holding off at this time secondary to decreased renal function is patient will require IV contrast.  On Eliquis 2.5 mg po qd  Being monitored off antibiotics. On Pepcid  On Seroquel at hs. Increase activity as permitted and tolerated. Questions patient had were answered to his satisfaction. Continue supplemental oxygen if needed. Cxr reviewed. None today  Diet reviewed    Should another Thoracentesis be performed orders for pleural fluid analysis placed. May need pleural drainage catheter should effusions continue to return. Thank you for having us involved in the care of your patient. Please call us if you have any questions or concerns. Summer Veliz M.D.  PGY-3 Internal Medicine  [id number] .   3/28/2019, 5:32 PM    Pulmonary & Critical Care

## 2019-03-28 NOTE — PROGRESS NOTES
Occupational Therapy    Occupational Therapy Not Seen Note    DATE: 3/28/2019  Name: Enedelia Gonzalez  : 12/10/1928  MRN: [id number]    Patient not available for Occupational Therapy due to:    Patient [email or web address] Pt states \"I'm too wore out from all the tests and stuff today\".     Next Scheduled Treatment: 3-29-19    Electronically signed by ZACH Avendano on 3/28/2019 at 1:57 PM

## 2019-03-29 NOTE — PROGRESS NOTES
Dialysis Post Treatment Note  Patient tolerated treatment well. Denies complaints at time of discharge.    Vitals:    03/29/19 1910   BP: (!) 132/50   Pulse: 60   Resp: 12   Temp: 97.9 °F (36.6 °C)   SpO2: 96%     Pre-Weight = 70.2  Post-weight = Weight: 153 lb (69.4 kg)  Total Liters Processed = Total Liters Processed (l/min): 27.2 l/min  Rinseback Volume (mL) = Rinseback Volume (ml): 350 ml  Net Removal (mL) =300  Length of treatment=120    Pt tolerated 1st dialysis tx, no complications noted

## 2019-03-29 NOTE — FLOWSHEET NOTE
DATE: 3/29/2019    NAME: Katerin Arellano  MRN: [id number]   : 12/10/1928    Patient not seen this date for Physical Therapy due to:  [] Blood transfusion in progress  [] Hemodialysis  []  Patient Declined  [] Spine Precautions   [] Strict Bedrest  [] Surgery/ Procedure  [] Testing      [x] Other; per RN, hold PT today. Pt currently in IR and then going to dialysis. [] PT being discontinued at this time. Patient independent. No further needs. [] PT being discontinued at this time as the patient has been transferred to palliative care. No further needs.     Tadeo Perez, PTA

## 2019-03-29 NOTE — PROGRESS NOTES
Renal Progress Note    Patient :  Mora Lama; 80 y.o. MRN# [id number]  Location:  6730/2957-33  Attending:  Meghan Chan MD  Admit Date:  3/6/2019   Hospital Day: 23      Subjective:     Admitted with shortness of breath secondary to decompensated congestive heart failure in the face of aortic stenosis. Subsequently underwent PCI and PVR. Postprocedure course has been complicated by worsening of renal function and hypervolemia for which he is getting recurrent thoracocentesis and diuretic adjustment. Patient was seen and examined. Up to chair, did not report any complaints. On Bumex 2mg BID, Aldactone 25mg daily. Made about 1.3 L of urine last 24 hours. Creatinine today 3.49 MG/DL with BUN of 104. Potassium 4.2 and bicarb 26.     Outpatient Medications:     Medications Prior to Admission: amiodarone (CORDARONE) 200 MG tablet, Take 200 mg by mouth daily  atorvastatin (LIPITOR) 20 MG tablet, Take 20 mg by mouth daily  isosorbide mononitrate (IMDUR) 60 MG extended release tablet, Take 60 mg by mouth daily  levothyroxine (SYNTHROID) 100 MCG tablet, Take 100 mcg by mouth Daily  lisinopril (PRINIVIL;ZESTRIL) 40 MG tablet, Take 40 mg by mouth daily  apixaban (ELIQUIS) 2.5 MG TABS tablet, Take 2.5 mg by mouth daily  metoprolol tartrate (LOPRESSOR) 50 MG tablet, Take 50 mg by mouth 2 times daily    Current Medications:     Scheduled Meds:    sodium chloride  250 mL Intravenous Once    aspirin  81 mg Oral Daily    bumetanide  2 mg Oral BID    polyethylene glycol  17 g Oral Daily    spironolactone  25 mg Oral Daily    metoprolol succinate  50 mg Oral Daily    hydrALAZINE  50 mg Oral 3 times per day    apixaban  2.5 mg Oral Daily    FLUoxetine  10 mg Oral Daily    sodium chloride flush  10 mL Intravenous 2 times per day    sodium chloride flush  10 mL Intravenous 2 times per day    levothyroxine  125 mcg Oral Daily    ALPRAZolam  0.25 mg Oral Once    QUEtiapine  25 mg Oral Nightly    sodium chloride flush  10 mL Intravenous 2 times per day    clopidogrel  75 mg Oral Daily    isosorbide mononitrate  30 mg Oral Daily    docusate sodium  100 mg Oral Daily    amiodarone  200 mg Oral Daily    atorvastatin  20 mg Oral Daily     Continuous Infusions:    DOPamine 2 mcg/kg/min (19 0944)    nitroGLYCERIN Stopped (19 1315)    norepinephrine 1 mcg/min (19 1050)     PRN Meds:  acetaminophen, hydrALAZINE, sodium chloride flush, sodium chloride flush, ondansetron, sodium chloride flush, magnesium hydroxide    Input/Output:       I/O last 3 completed shifts: In: 1588.6 [P.O.:1220; I.V.:368.6]  Out: 1300 [Urine:1300]. Patient Vitals for the past 96 hrs (Last 3 readings):   Weight   19 0600 154 lb 12.2 oz (70.2 kg)   19 0435 153 lb 14.1 oz (69.8 kg)   19 0400 153 lb (69.4 kg)       Vital Signs:   Temperature:  Temp: 97.6 °F (36.4 °C)  TMax:   Temp (24hrs), Av.7 °F (36.5 °C), Min:97.5 °F (36.4 °C), Max:98.4 °F (36.9 °C)    Respirations:  Resp: 20  Pulse:   Pulse: 59  BP:    BP: (!) 118/45  BP Range: Systolic (52FAS), EUD:349 , Min:112 , IEI:863       Diastolic (87XRT), OTZ:12, Min:40, Max:71      Physical Examination:     General:  AAO x 3, speaking in full sentences, no accessory muscle use. HEENT: Atraumatic, normocephalic, no throat congestion, moist mucosa. Eyes:   Pupils equal, round and reactive to light, EOMI. Neck:   Supple  Chest:   Bilateral vesicular breath sounds, no rales or wheezes. Cardiac:  S1 S2 RR, no murmurs, gallops or rubs. Abdomen: Soft, non-tender, no masses or organomegaly, BS audible. :   No suprapubic or flank tenderness. Neuro:  AAO x 3, No FND. SKIN:  No rashes, good skin turgor. Extremities:  Trace edema.     Labs:       Recent Labs     19  0448 19  0537 19  0441   WBC 10.6 11.6* 10.0   RBC 2.94* 3.01* 2.68*   HGB 8.5* 8.5* 7.7*   HCT 26.9* 27.9* 24.8*   MCV 91.5 92.7 92.5   MCH 28.9 28.2 28.7   MCHC 31.6 30.5 31.0   RDW 15.8* 16.3* 16.6*    179 188   MPV 12.1 12.2 12.0      BMP:   Recent Labs     03/27/19  0448 03/28/19  0537 03/29/19  0441    136 134*   K 3.6* 3.9 4.2   CL 97* 96* 95*   CO2 29 28 26   * 106* 104*   CREATININE 3.03* 3.27* 3.49*   GLUCOSE 95 101* 108*   CALCIUM 8.0* 8.2* 8.4*      Albumin:    Recent Labs     03/29/19  0441   LABALBU 3.4*     SPEP:  Lab Results   Component Value Date    PROT 5.6 03/29/2019    ALBCAL 2.5 03/07/2019    ALBPCT 53 03/07/2019    LABALPH 0.3 03/07/2019    LABALPH 0.8 03/07/2019    A1PCT 7 03/07/2019    A2PCT 18 03/07/2019    LABBETA 0.7 03/07/2019    BETAPCT 14 03/07/2019    GAMGLOB 0.4 03/07/2019    GGPCT 8 03/07/2019    PATH ELECTRONICALLY SIGNED. Donald Reeves M.D. 03/07/2019     Urinalysis/Chemistries:      Lab Results   Component Value Date    NITRU NEGATIVE 03/29/2019    COLORU YELLOW 03/29/2019    PHUR 7.5 03/29/2019    WBCUA 50  03/29/2019    RBCUA 50  03/29/2019    MUCUS NOT REPORTED 03/29/2019    TRICHOMONAS NOT REPORTED 03/29/2019    YEAST NOT REPORTED 03/29/2019    BACTERIA NOT REPORTED 03/29/2019    SPECGRAV 1.011 03/29/2019    LEUKOCYTESUR LARGE 03/29/2019    UROBILINOGEN Normal 03/29/2019    BILIRUBINUR NEGATIVE 03/29/2019    GLUCOSEU NEGATIVE 03/29/2019    KETUA NEGATIVE 03/29/2019    AMORPHOUS NOT REPORTED 03/29/2019      Urine Creatinine:     Lab Results   Component Value Date    LABCREA 42.4 03/23/2019       Radiology:     Reviewed. Assessment:     1.  Acute kidney injury, ATN from contrast low flow from cardiogenic shock and forward failure creat upto 3.8, BUN , evolving. 2. 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  Nephrotic range proteinuria - Bx not feasible in view of he being on Asprin and cardiology recommends not to stop anticoagulant. Explained to patient and he does not want to do that. 3.  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  Cardiomyopathy ejection fraction 40-45%/left ventricular diastole dysfunction  5.  S/p PCI and atherectomy  3/11  6.  Moderate aortic stenosis with mean gradient 29 mmHg/moderate tricuspid regurgitation/moderate pulmonary hypertension with RVSP 60  7. HTN  8. Hx of Urinary retention  9. S/P TAVR  10. Advanced age        Plan:   1. Patient will get dialysis initiated today. We'll run as day 1 at blood flow rate of 200, 160 dialyzer, run for 2 hours and will keep it even. 2.  Will give 40 of Aranesp also  3. Spoke with Dr. Kimberlee Ramirez in detail about the patient's case and discussed with the patient also. It was discussed with the patient will require dialysis and risks (including SCD) versus benefits were discussed in detail and patient is agreeable for getting dialysis done. Patient's nurse was also updated. 4. Will get next run in am as day # 2.  5. Will follow. Nutrition   Please ensure that patient is on a renal diet/TF. Avoid nephrotoxic drugs/contrast exposure. We will continue to follow along with you. Shaheed Posadas MD  [facility]

## 2019-03-29 NOTE — PROGRESS NOTES
Dr Juliet Solomon called RN updated him on patient progress with dialysis. Patients BP 96/44 mean 57 heart rate 60 patient feeling well. Orders given for one time dose of albumin 25% 100 ml and give 250 ml bolus if patient needs. Dr Juliet Farnsworth stated wants to Keep Mean art pressure 60 or above.

## 2019-03-29 NOTE — CARE COORDINATION
Temp dialysis catheter placed, plan still for home w/nephew, declined need for a walker, follow as POC develops.

## 2019-03-29 NOTE — PLAN OF CARE
Patient is alert and oriented. Bed is locked and in the lowest position. Call light and bedside table are within reach. Will continue to monitor.

## 2019-03-29 NOTE — PROGRESS NOTES
RN spoke with Dr Lawyer Alex regarding evening dose of bumex 2 mg. Dr Lawyer Alex is ok with giving the dose.

## 2019-03-29 NOTE — PROGRESS NOTES
[facility] - Progress Note  Today's Date and Time: 3/29/2019, 11:13 AM    Impression :   1. Recurrent bilateral pleural effusions w/lung compression  2. CHF  3. 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 Multivessel CAD s/p multiple stent placements  5. S/P TAVR  6. HUYEN  7. Urinary retention  8. Pulmonary hypertension    Recommendations:   · Monitor off antibiotics  · Aggressive pulmonary toilet  · Acapella valve and IS at bedside  · Diuresis per Cardio and Nephro  · Supportive care    Medical Decision Making/Summary/Discussion:   · 79 yo gentleman who developed complete heart block. Found to have multivessel CAD, severe aortic stenosis and pulmonary HTN. · Underwent pacemaker placement with subsequent bouts of acute CHF and multiple hospitalizations in [place]  · Brought to [place] by family for further care. · Since admission at [facility] he has had cardiac catheterizations x 2 with multiple stent placements and a TAVR on 3-15. Pt remains on a nitroglycerine drip  · Pt continues to require frequent thoracenteses  · He has suffered an HUYEN and is currently on a bumex drip  · CXR 3/26 showed progressed moderate to large bilateral pleural effusions with consolidation. · ID consult placed for treatment of any potential pneumonia  · Pt is afebrile, without cough or sputum production. He remains SOB with exertion and at times, when quiet. · Currently no evidence of active infection. Pt will require aggressive pulmonary toilet and diuresis.  Plan to monitor off antibiotics    Infection Control Recommendations   · [place] Precautions    Antimicrobial Stewardship Recommendations     · Monitor off antibiotics    Coordination of Outpatient Care:   · Estimated Length of IV antimicrobials: None  · Patient will need Midline Catheter Insertion:   · Patient will need PICC line Insertion:  · Patient will need: Home IV , [place],  SNF,  LTAC TBD  · Patient will need outpatient wound care: No    Chief complaint/reason for consultation:   · Possible pneumonia      History of Present Illness:   Rodger Morales is a 80y.o.-year-old  male who was initially admitted on 3/6/2019. Patient seen at the request of Dr. Kimberlyn Sandoval     INITIAL HISTORY:    Pt is a 79 yo gentleman who has recently had multiple hospital admissions in [place] for decompensated heart failure. His symptoms began in December 2018. 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 A cardiac cath at that time showed LM and LAD calcified stenosis 80-90% in multiple areas, with severe disease in D1, D2, Om1 and OM2. Minimal disease in RCA. LV function with EF 25%. Severe aortic stenosis compromising his clinical improvedmnt      He underwent a dual chamber pacemaker placement. Following that, he had multiple hospitalizations for acute heart failure and shortness of breath. His baseline creatinine was 1.3, now >2.0. He has had multiple thoracenteses with transudative pleural fluid removed.      He was brought to [facility] for further evaluation and was admitted on 3/6/19     An ECHO from admission showed   Normal left ventricle size with mildly reduced systolic function; Estimated  left ventricular ejection fraction 40-45%  Anterolateral wall hypokinesis. Mild left ventricular hypertrophy. Left atrium is moderately dilated. Grade II diastolic dysfunction. Heavily calcified aortic valve with reduced systolic excursion and evidence  of moderate stenosis. (Peak nataliia 3.62 m/s and mean gradient 29 mmHg)  Moderate posterior pericardial effusion without any echo signs of tamponade. Normal right ventricular size and function. Pacemaker lead seen in right ventricle. Moderate tricuspid regurgitation. Estimated right ventricular systolic pressure is 60 mmHg suggesting moderate  pulmonary HTN.    He underwent an atherectomy/JEAN-CLAUDE of the left main and LAD on 3/6.   Because of the high surgical risk and renal function he was taken back to the cath lab on 3/11 for PTCA-JEAN-CLAUDE LAD, PTCRA-JEAN-CLAUDE LM with plan to consider a TAVR if pt remained stable.     A TAVR was performed on 3/15     A carotid study showed less than 50% stenosis bilaterally.     He has had multiple thoracenteses since admission. Pt has been followed by CT surgery, cardiology, pulmonary, nephrology and urology.     On the evening of 3/25 pt developed hypertension and SOB at rest. A stat CXR was done that showed progressed moderate to large bilateral pleural effusions with consolidation.      On 3/26 pt underwent a Lt thoracentesis with 1400 ml clear pleural fluid removed. Pt reports feeling much better after the procedure and is asking to sit in the chair.      I am consulted to determine if Mr Driss Centeno has pneumonia.     CURRENT EXAMINATION: 3/29/2019    Pt seen and examined at bedside. No acute events overnight. The patient feels SOB, now on O2 per NC   The patient denies fevers, chills, chest pain, sob, cough, sputum production, n/v, abdominal pain. He reports that he isnt feeling well in general.    He is considering starting HD     On exam pt is in no distress  Breath sounds are diminished bilateral bases Rt>Lt    Receiving 1u PRBC  Bumex on hold per CT surgery   CXR showing large bilateral pleural effusions with adjacent consolidation     Afebrile, VSS     Labs reviewed: 3/29/2019    WBC 10.6->11.6->10  Hgb 8.5->7.7    Cr 2.71 ->3.03->3.27->3.49  ->101->106->104    Cultures:  Pleural fluid:  · 3/7 No growth    Discussed with patient, RN. I have personally reviewed the past medical history, past surgical history, medications, social history, and family history, and I have updated the database accordingly.   Past Medical History:     Past Medical History:   Diagnosis Date    Aortic stenosis 02/15/2019    Atrial fibrillation (HCC)     CAD (coronary artery disease)     Chest pain 02/15/2019    CHF (congestive heart failure) (HCC)     Chronic anemia     Chronic 112/50 97.6 °F (36.4 °C) Oral 60 18 94 % -- --   03/29/19 0710 (!) 124/49 97.6 °F (36.4 °C) Oral 66 -- 92 % -- --   03/29/19 0600 -- -- -- -- -- -- 5' 6\" (1.676 m) 154 lb 12.2 oz (70.2 kg)   03/29/19 0335 (!) 140/71 97.7 °F (36.5 °C) Axillary 64 14 91 % -- --     General Appearance: Awake, alert, and in no apparent distress  Head:  Normocephalic, no trauma  Eyes: Pupils equal, round, reactive to light and accommodation; extraocular movements intact; sclera anicteric. ENT: Oropharynx clear. Mouth/throat: mucosa pink and moist. No lesions. Dentition in good repair. Neck: Supple, without lymphadenopathy. Pulmonary/Chest: Clear to auscultation, without wheezes, rales, or rhonchi. Decreased breath sounds at bilateral bases Rt > Lt  Cardiovascular: Irregular rate and rhythm without murmurs, rubs, or gallops. Abdomen: Soft, non tender. Bowel sounds normal.  All four Extremities: Mild edema. Neurologic: No gross sensory or motor deficits. Skin: Warm and dry with good turgor. No signs of peripheral arterial or venous insufficiency. No ulcerations. No open wounds. Medical Decision Making -Laboratory:   I have independently reviewed/ordered the following labs:    CBC with Differential:   Recent Labs     03/28/19  0537 03/29/19 0441   WBC 11.6* 10.0   HGB 8.5* 7.7*   HCT 27.9* 24.8*    188   LYMPHOPCT 4* 5*   MONOPCT 5 6     BMP:   Recent Labs     03/28/19  0537 03/29/19 0441    134*   K 3.9 4.2   CL 96* 95*   CO2 28 26   * 104*   CREATININE 3.27* 3.49*     Hepatic Function Panel:   Recent Labs     03/29/19 0441   PROT 5.6*   LABALBU 3.4*   BILIDIR 0.15   IBILI 0.40   BILITOT 0.55   ALKPHOS 94   ALT 18   AST 25     No results for input(s): RPR in the last 72 hours. No results for input(s): HIV in the last 72 hours. No results for input(s): BC in the last 72 hours.   Lab Results   Component Value Date    MUCUS NOT REPORTED 03/29/2019    RBC 2.68 03/29/2019    TRICHOMONAS NOT REPORTED 03/29/2019 WBC 10.0 03/29/2019    YEAST NOT REPORTED 03/29/2019    TURBIDITY CLEAR 03/29/2019     Lab Results   Component Value Date    CREATININE 3.49 03/29/2019    GLUCOSE 108 03/29/2019       Medical Decision Making-Imaging:   3/29 CXR      Narrative   EXAMINATION:   SINGLE XRAY VIEW OF THE CHEST       3/29/2019 8:26 am       COMPARISON:   Chest radiograph performed 03/28/2019.       HISTORY:   ORDERING SYSTEM PROVIDED HISTORY: effusions   TECHNOLOGIST PROVIDED HISTORY:   effusions       FINDINGS:   There are large bilateral effusions with adjacent consolidation.  There is   underlying pulmonary congestion.  There is no pneumothorax.  The heart is   enlarged with stable cardiac leads.  The upper abdomen is unremarkable.  The   extrathoracic soft tissues are unremarkable.           Impression   Cardiomegaly with large bilateral effusions and adjacent consolidation   concerning for pneumonia.               3/27 CXR    Narrative   EXAMINATION:   TWO VIEWS OF THE CHEST       3/28/2019 7:56 am       COMPARISON:   Chest radiograph performed 03/27/2019.       HISTORY:   ORDERING SYSTEM PROVIDED HISTORY: effusions   TECHNOLOGIST PROVIDED HISTORY:   effusions       FINDINGS:   There is a small left and a large right pleural effusion with adjacent   infiltrate.  There is no pneumothorax.  The mediastinal structures are stable   with stable cardiac leads.  The upper abdomen is unremarkable.  The   extrathoracic soft tissues are unremarkable.           Impression   Small left and large right pleural effusion with adjacent infiltrate   representing atelectasis versus pneumonia.  Overall there may be mild   improvement of the right-sided effusion.              Narrative   EXAMINATION:   SINGLE XRAY VIEW OF THE CHEST       3/26/2019 2:37 am       COMPARISON:   March 20, 2019.       HISTORY:   ORDERING SYSTEM PROVIDED HISTORY: SOB, PE   TECHNOLOGIST PROVIDED HISTORY:   SOB, PE       FINDINGS:   Frontal portable view of the chest.  Low lung volume.  Progressed moderate to   large bilateral pleural effusions with consolidation.  Indistinct pulmonary   vasculature.  No pneumothorax.  The cardiomediastinal silhouette is not well   evaluated.  Atherosclerotic thoracic aorta.  Left pectoral trans venous   dual-chamber cardiac pacer device.           Impression   Progressed moderate to large bilateral pleural effusions with consolidation. Superimposed infection is not excluded.             Medical Decision Making-Other: Thank you for allowing us to participate in the care of this patient. Please call with questions.     Ludmila Rollins MD

## 2019-03-29 NOTE — PROGRESS NOTES
Continues on dopamine. UO okay. Creatine up 3.49. Nephrology following. Give albumin and 1 unit PRBC. Continue current management.     Aftab Gamble

## 2019-03-30 NOTE — PROGRESS NOTES
PULMONARY PROGRESS NOTE      Patient:  Christie English  YOB: 1928    MRN: [id number]     Acct: [de-identified]     Admit date: 3/6/2019    REASON FOR CONSULT:- Pulmonary hypertension, bilateral pleural effusion with atelectasis and aortic stenosis with hypothyroidism    Pt seen and Chart reviewed. Up right in bed  On 2 L nasal cannula  No c/o sob  Receiving HD  No chest pain or pressure. No fevers or chills or night sweats. Tolerating diet. Subjective:   Review of Systems -   General ROS: Completed and except as mentioned above were negative   Psychological ROS:  Completed and except as mentioned above were negative  Allergy and Immunology ROS:  Completed and except as mentioned above were negative  Hematological and Lymphatic ROS:  Completed and except as mentioned above were negative  Respiratory ROS:  Completed and except as mentioned above were negative  Cardiovascular ROS:  Completed and except as mentioned above were negative  Gastrointestinal ROS: Completed and except as mentioned above were negative  Genito-Urinary ROS:  Completed and except as mentioned above were negative  Musculoskeletal ROS:  Completed and except as mentioned above were negative  Neurological ROS:  Completed and except as mentioned above were negative  Dermatological ROS:  Completed and except as mentioned above were negative      Diet:  DIET RENAL; Low Sodium (2 GM);  Daily Fluid Restriction: 1800 ml      Medications:Current Inpatient    Scheduled Meds:   albumin human  25 g Intravenous Once    aspirin  81 mg Oral Daily    bumetanide  2 mg Oral BID    polyethylene glycol  17 g Oral Daily    spironolactone  25 mg Oral Daily    metoprolol succinate  50 mg Oral Daily    hydrALAZINE  50 mg Oral 3 times per day    apixaban  2.5 mg Oral Daily    FLUoxetine  10 mg Oral Daily    sodium chloride flush  10 mL Intravenous 2 times per day    sodium chloride flush  10 mL Intravenous 2 times per day    levothyroxine  125 mcg Oral Daily    ALPRAZolam  0.25 mg Oral Once    QUEtiapine  25 mg Oral Nightly    sodium chloride flush  10 mL Intravenous 2 times per day    clopidogrel  75 mg Oral Daily    isosorbide mononitrate  30 mg Oral Daily    docusate sodium  100 mg Oral Daily    amiodarone  200 mg Oral Daily    atorvastatin  20 mg Oral Daily     Continuous Infusions:   DOPamine 2 mcg/kg/min (03/30/19 1406)    nitroGLYCERIN Stopped (03/26/19 1315)    norepinephrine 1 mcg/min (03/14/19 1050)     PRN Meds:acetaminophen, sodium chloride, sodium chloride, heparin (porcine), heparin (porcine), hydrALAZINE, sodium chloride flush, sodium chloride flush, ondansetron, sodium chloride flush, magnesium hydroxide    Objective:      Physical Exam:  Vitals: BP (!) 100/52   Pulse 60   Temp 98 °F (36.7 °C)   Resp 14   Ht 5' 6\" (1.676 m)   Wt 153 lb 7 oz (69.6 kg)   SpO2 95%   BMI 24.77 kg/m²   24 hour intake/output:    Intake/Output Summary (Last 24 hours) at 3/30/2019 1457  Last data filed at 3/30/2019 1145  Gross per 24 hour   Intake 769 ml   Output 1700 ml   Net -931 ml     Last 3 weights: Wt Readings from Last 3 Encounters:   03/30/19 153 lb 7 oz (69.6 kg)         Physical Examination:   PHYSICAL EXAMINATION:  Vitals:    03/30/19 1030 03/30/19 1100 03/30/19 1115 03/30/19 1145   BP:  (!) 104/57 (!) 100/52    Pulse:  60     Resp: 14 14 14    Temp: 97.3 °F (36.3 °C) 97.9 °F (36.6 °C) 98 °F (36.7 °C) 98 °F (36.7 °C)   TempSrc: Oral Oral Oral    SpO2:  95%     Weight:    153 lb 7 oz (69.6 kg)   Height:         Constitutional: This is a well developed, well nourished, 18.5-24.9 - Normal 80y.o. year old male who is alert, oriented, cooperative and in no apparent distress. Head:normocephalic and atraumatic. EENT:  ILEANA. No conjunctival injections. Septum was midline, mucosa was without erythema, exudates or cobblestoning. No thrush was noted. Mallampati  II (soft palate, uvula, fauces visible)  Neck: Supple without thyromegaly. No elevated JVP. Trachea was midline. Respiratory: Chest was symmetrical without dullness to percussion. Breath sounds bilaterally were clear to auscultation. There were no wheezes, rhonchi or rales. There is no intercostal retraction or use of accessory muscles. No egophony noted. Cardiovascular: Regular without murmur, clicks, gallops or rubs. Rt. Chest wall HD catheter. Abdomen: Slightly rounded and soft without organomegaly. No rebound tenderness, rigidity or guarding was appreciated. Lymphatic: No lymphadenopathy. Musculoskeletal: Normal curvature of the spine. No gross muscle weakness. Extremities:  No lower extremity edema, no ulcerations, tenderness, varicosities or erythema. Muscle size, tone and strength are normal.  No involuntary movements are noted. Skin:  Warm and dry. Good color, turgor and pigmentation. No lesions or scars. No cyanosis or clubbing  Neurological/Psychiatric: The patient's general behavior, level of consciousness, thought content and emotional status is normal.          CBC:   Recent Labs     03/28/19  0537 03/29/19  0441   WBC 11.6* 10.0   HGB 8.5* 7.7*    188     BMP:    Recent Labs     03/28/19  0537 03/29/19  0441 03/30/19  0451    134* 136   K 3.9 4.2 4.0   CL 96* 95* 95*   CO2 28 26 25   * 104* 76*   CREATININE 3.27* 3.49* 2.75*   GLUCOSE 101* 108* 107*     Calcium:  Recent Labs     03/30/19  0451   CALCIUM 8.5*     Ionized Calcium:No results for input(s): IONCA in the last 72 hours. Magnesium:  No results for input(s): MG in the last 72 hours. Phosphorus:No results for input(s): PHOS in the last 72 hours. BNP:No results for input(s): BNP in the last 72 hours. Glucose:  No results for input(s): POCGLU in the last 72 hours. HgbA1C: No results for input(s): LABA1C in the last 72 hours.   INR:   No results for input(s): INR in the last 72 hours. Hepatic:   Recent Labs     03/29/19  0441 03/29/19  1848   ALKPHOS 94  --    ALT 18  --    AST 25  --    PROT 5.6*  --    BILITOT 0.55  --    BILIDIR 0.15  --    LABALBU 3.4* 3.6     Amylase and Lipase:No results for input(s): LACTA, AMYLASE in the last 72 hours. Lactic Acid: No results for input(s): LACTA in the last 72 hours. CARDIAC ENZYMES:  No results for input(s): CKTOTAL, CKMB, CKMBINDEX, TROPONINI in the last 72 hours. BNP: No results for input(s): BNP in the last 72 hours. Lipids: No results for input(s): CHOL, TRIG, HDL, LDLCALC in the last 72 hours. Invalid input(s): LDL  ABGs: No results found for: PH, PCO2, PO2, HCO3, O2SAT  Thyroid:   Lab Results   Component Value Date    TSH 3.04 03/20/2019      Urinalysis:   Recent Labs     03/29/19  0705   BACTERIA NOT REPORTED   COLORU YELLOW   PHUR 7.5   PROTEINU 1+*   RBCUA 50    SPECGRAV 1.011   BILIRUBINUR NEGATIVE   NITRU NEGATIVE   WBCUA 50    LEUKOCYTESUR LARGE*   GLUCOSEU NEGATIVE       CULTURES:    Review of the pleural effusion study shows that there was no malignancy seen on cytology  Venous Dopplers with no DVT involving lower extremities      3/26/2019 Echocardiogram  CONCLUSIONS    Summary  Left ventricle is normal in size, global left ventricular systolic function  is moderately reduced, calculated ejection fraction is 42%. Left atrium appears enlarged. Right atrium is enlarged. Bioprosthetic valve is seated in the aortic position. Appears to be  functioning normally, no evidence of stenosis. Mitral valve appears sclerotic with annular calcification. Moderate to severe mitral regurgitation. Mild tricuspid regurgitation. Pleural effusion noted.       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

## 2019-03-30 NOTE — PROGRESS NOTES
Renal Progress Note    Patient :  Angelito Guillen; 80 y.o. MRN# [id number]  Location:  3902/4804-77  Attending:  Graciela Paz MD  Admit Date:  3/6/2019   Hospital Day: 24      Subjective:     Admitted with shortness of breath secondary to decompensated congestive heart failure in the face of aortic stenosis. Subsequently underwent PCI and PVR. Postprocedure course has been complicated by worsening of renal function and hypervolemia for which he is getting recurrent thoracocentesis and diuretic adjustment. Patient was seen and examined. Second tx today, well tolerated, got one unit of blood during tx. Made about 200cc's of urine last 24 hours. Creatinine better today 2.75 MG/DL with BUN of 76  Potassium 4.0 and bicarb 25. Was complaining of more shortness of breath today. Will be getting thoracentesis done today.     Outpatient Medications:     Medications Prior to Admission: amiodarone (CORDARONE) 200 MG tablet, Take 200 mg by mouth daily  atorvastatin (LIPITOR) 20 MG tablet, Take 20 mg by mouth daily  isosorbide mononitrate (IMDUR) 60 MG extended release tablet, Take 60 mg by mouth daily  levothyroxine (SYNTHROID) 100 MCG tablet, Take 100 mcg by mouth Daily  lisinopril (PRINIVIL;ZESTRIL) 40 MG tablet, Take 40 mg by mouth daily  apixaban (ELIQUIS) 2.5 MG TABS tablet, Take 2.5 mg by mouth daily  metoprolol tartrate (LOPRESSOR) 50 MG tablet, Take 50 mg by mouth 2 times daily    Current Medications:     Scheduled Meds:    albumin human  25 g Intravenous Once    aspirin  81 mg Oral Daily    bumetanide  2 mg Oral BID    polyethylene glycol  17 g Oral Daily    spironolactone  25 mg Oral Daily    metoprolol succinate  50 mg Oral Daily    hydrALAZINE  50 mg Oral 3 times per day    apixaban  2.5 mg Oral Daily    FLUoxetine  10 mg Oral Daily    sodium chloride flush  10 mL Intravenous 2 times per day    sodium chloride flush  10 mL Intravenous 2 times per day    levothyroxine  125 mcg Oral Daily    ALPRAZolam  0.25 mg Oral Once    QUEtiapine  25 mg Oral Nightly    sodium chloride flush  10 mL Intravenous 2 times per day    clopidogrel  75 mg Oral Daily    isosorbide mononitrate  30 mg Oral Daily    docusate sodium  100 mg Oral Daily    amiodarone  200 mg Oral Daily    atorvastatin  20 mg Oral Daily     Continuous Infusions:    DOPamine 2 mcg/kg/min (19 1406)    nitroGLYCERIN Stopped (19 1315)    norepinephrine 1 mcg/min (19 1050)     PRN Meds:  acetaminophen, sodium chloride, sodium chloride, heparin (porcine), heparin (porcine), hydrALAZINE, sodium chloride flush, sodium chloride flush, ondansetron, sodium chloride flush, magnesium hydroxide    Input/Output:       I/O last 3 completed shifts: In: 769 [P.O.:75; I.V.:194; Blood:500]  Out: 1700 [Urine:200]. Patient Vitals for the past 96 hrs (Last 3 readings):   Weight   19 1145 153 lb 7 oz (69.6 kg)   19 0850 152 lb 12.5 oz (69.3 kg)   19 0400 153 lb 3.5 oz (69.5 kg)       Vital Signs:   Temperature:  Temp: 98 °F (36.7 °C)  TMax:   Temp (24hrs), Av.8 °F (36.6 °C), Min:97.3 °F (36.3 °C), Max:98 °F (36.7 °C)    Respirations:  Resp: 14  Pulse:   Pulse: 60  BP:    BP: (!) 142/86  BP Range: Systolic (03KWG), UGW:772 , Min:96 , LHE:842       Diastolic (34OYL), EMS:36, Min:37, Max:87      Physical Examination:     General:  AAO x 3, speaking in full sentences, no accessory muscle use. HEENT: Atraumatic, normocephalic, no throat congestion, moist mucosa. Eyes:   Pupils equal, round and reactive to light, EOMI. Neck:   Supple  Chest:   Decreased breath sound right sided  Cardiac:  S1 S2 RR, no murmurs, gallops or rubs. Abdomen: Soft, non-tender, +BS, ND  :   No suprapubic or flank tenderness. Neuro:  AAO x 3, No FND. SKIN:  No rashes, good skin turgor. Extremities:  Trace edema.     Labs:       Recent Labs     19  0537 19  0441   WBC 11.6* 10.0   RBC 3.01* 2.68*   HGB 8.5* 7.7*   HCT 27.9* 24.8* MCV 92.7 92.5   MCH 28.2 28.7   MCHC 30.5 31.0   RDW 16.3* 16.6*    188   MPV 12.2 12.0      BMP:   Recent Labs     03/28/19  0537 03/29/19  0441 03/30/19  0451    134* 136   K 3.9 4.2 4.0   CL 96* 95* 95*   CO2 28 26 25   * 104* 76*   CREATININE 3.27* 3.49* 2.75*   GLUCOSE 101* 108* 107*   CALCIUM 8.2* 8.4* 8.5*      Albumin:    Recent Labs     03/29/19  0441 03/29/19  1848   LABALBU 3.4* 3.6     SPEP:  Lab Results   Component Value Date    PROT 5.6 03/29/2019    ALBCAL 2.5 03/07/2019    ALBPCT 53 03/07/2019    LABALPH 0.3 03/07/2019    LABALPH 0.8 03/07/2019    A1PCT 7 03/07/2019    A2PCT 18 03/07/2019    LABBETA 0.7 03/07/2019    BETAPCT 14 03/07/2019    GAMGLOB 0.4 03/07/2019    GGPCT 8 03/07/2019    PATH ELECTRONICALLY SIGNED. Dioni Srinivasan M.D. 03/07/2019     Urinalysis/Chemistries:      Lab Results   Component Value Date    NITRU NEGATIVE 03/29/2019    COLORU YELLOW 03/29/2019    PHUR 7.5 03/29/2019    WBCUA 50  03/29/2019    RBCUA 50  03/29/2019    MUCUS NOT REPORTED 03/29/2019    TRICHOMONAS NOT REPORTED 03/29/2019    YEAST NOT REPORTED 03/29/2019    BACTERIA NOT REPORTED 03/29/2019    SPECGRAV 1.011 03/29/2019    LEUKOCYTESUR LARGE 03/29/2019    UROBILINOGEN Normal 03/29/2019    BILIRUBINUR NEGATIVE 03/29/2019    GLUCOSEU NEGATIVE 03/29/2019    KETUA NEGATIVE 03/29/2019    AMORPHOUS NOT REPORTED 03/29/2019      Urine Creatinine:     Lab Results   Component Value Date    LABCREA 42.4 03/23/2019       Radiology:     Reviewed as available     Assessment:     1.  Acute kidney injury, ATN from contrast low flow from cardiogenic shock and forward failure creat upto 3.8, BUN , improving after starting dialysis.   2. 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  Nephrotic range proteinuria - Bx not feasible in view of he being on Asprin and cardiology recommends not to stop anticoagulant. Explained to patient and he does not want to do that. 3.  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  Cardiomyopathy ejection fraction 40-45%/left ventricular diastole dysfunction  5.  S/p PCI and atherectomy  3/11  6.  Moderate aortic stenosis with mean gradient 29 mmHg/moderate tricuspid regurgitation/moderate pulmonary hypertension with RVSP 60  7. HTN  8. Hx of Urinary retention  9. S/P TAVR  10. Advanced age    Plan:   1. Patient undergoing second dialysis tx today, orders discussed with HD nurse. 2.  Will give 40 of Aranesp also  3. BMP in am, daily weights, strict I/O  4. Will evaluate for next dialysis on Monday if patient still wants to continue dialysis. 5.  Spoke with primary Dr. Aisha Cruz and discussed the case in detail. 6.  Will follow. Nutrition   Please ensure that patient is on a renal diet/TF. Avoid nephrotoxic drugs/contrast exposure. We will continue to follow along with you. FRANCIS Osorio  [facility]       Attending Physician Statement  I have discussed the care of Barron Mason, including pertinent history and exam findings,  with the Fellow/Resident/CNP. I have reviewed the key elements of all parts of the encounter with the Fellow/Resident/CNP. I agree with the assessment, plan and orders as documented by the Fellow/Resident/CNP. Shaheed Porter MD   Nephrology [address] [place] [address]

## 2019-03-30 NOTE — PROGRESS NOTES
Dialysis Post Treatment Note  Patient tolerated treatment well. Denies complaints at time of discharge.    Vitals:    03/30/19 1145   BP: 127/66   Pulse: 65   Resp: 14   Temp: 98 °F (36.7 °C)   SpO2:      Pre-Weight =69.3  Post-weight = Weight: 153 lb 7 oz (69.6 kg)  Total Liters Processed = Total Liters Processed (l/min): 36.8 l/min  Rinseback Volume (mL) = Rinseback Volume (ml): 350 ml  Net Removal (mL) =500  Length of treatment=150    Pt tolerated his 2nd tx, received 1unit RBCs no complications

## 2019-03-30 NOTE — PROGRESS NOTES
[facility]  Pre Op Progress Note    CC: Acute CHF, SOB, increased Cre      Subjective:  Mr. Federica Daniels seen and examined Plan of care reviewed and questions answered. Physical Exam  Vital Signs: /66   Pulse 69   Temp 97.9 °F (36.6 °C) (Oral)   Resp 12   Ht 5' 6\" (1.676 m)   Wt 153 lb 3.5 oz (69.5 kg)   SpO2 92%   BMI 24.73 kg/m²  O2 Flow Rate (L/min): 3 L/min       General: alert and oriented to person, place and time. Up in chair, No apparent distress.   Heart:Rhythm: NSR  Lungs: clear to auscultation bilaterally and diminished breath sounds bibasilar  Abdomen: soft, non tender, non distended, BSx4  Extremities: 1+ edema      Scheduled Meds:    albumin human  25 g Intravenous Once    aspirin  81 mg Oral Daily    bumetanide  2 mg Oral BID    polyethylene glycol  17 g Oral Daily    spironolactone  25 mg Oral Daily    metoprolol succinate  50 mg Oral Daily    hydrALAZINE  50 mg Oral 3 times per day    apixaban  2.5 mg Oral Daily    FLUoxetine  10 mg Oral Daily    sodium chloride flush  10 mL Intravenous 2 times per day    sodium chloride flush  10 mL Intravenous 2 times per day    levothyroxine  125 mcg Oral Daily    ALPRAZolam  0.25 mg Oral Once    QUEtiapine  25 mg Oral Nightly    sodium chloride flush  10 mL Intravenous 2 times per day    clopidogrel  75 mg Oral Daily    isosorbide mononitrate  30 mg Oral Daily    docusate sodium  100 mg Oral Daily    amiodarone  200 mg Oral Daily    atorvastatin  20 mg Oral Daily     Continuous Infusions:    DOPamine 2 mcg/kg/min (03/28/19 0944)    nitroGLYCERIN Stopped (03/26/19 1315)    norepinephrine 1 mcg/min (03/14/19 1050)       Data:  CBC:   Recent Labs     03/28/19  0537 03/29/19  0441   WBC 11.6* 10.0   HGB 8.5* 7.7*   HCT 27.9* 24.8*   MCV 92.7 92.5    188     BMP:   Recent Labs     03/28/19  0537 03/29/19  0441 03/30/19  0451    134* 136   K 3.9 4.2 4.0   CL 96* 95* 95*   CO2 28 26 25   BUN 106* 104* 76*   CREATININE 3.27* 3.49* 2.75*     PT/INR: No results for input(s): PROTIME, INR in the last 72 hours. APTT: No results for input(s): APTT in the last 72 hours. 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 Medications adjusted per nephro. IR drained left pleural effusion 3/26/19, 1400ml. Shortness of breath greatly improved. ID recommends no antibiotics at this time, appreciate recommendations  Pulmonary - venous dopplers - negative, if right thora completed recommend fluid analysis  Cardiology changed to long acting BB, add hydralazine, possible drainage catheter if pleural fluid continues to build up. Dr Silvina Vo to disucss care with Dr Angy Eller  Will continue to monitor closely.   S/p temp HD - creat and BUN improved    The above recommendations including medications and orders were discussed and agreed upon with Dr. Silvina Vo, the attending on service for the cardiothoracic surgery group today.      Electronically signed by Marletta Claude, PA on 3/30/2019 at 9:12 AM

## 2019-03-30 NOTE — PROGRESS NOTES
Physical Therapy  DATE: 3/30/2019    NAME: Yareli Chávez  MRN: [id number]   : 12/10/1928    Patient not seen this date for Physical Therapy due to:  [] Blood transfusion in progress  [x] Hemodialysis  []  Patient Declined  [] Spine Precautions   [] Strict Bedrest  [] Surgery/ Procedure  [] Testing      [] Other        [] PT being discontinued at this time. Patient independent. No further needs. [] PT being discontinued at this time as the patient has been transferred to palliative care. No further needs.     Kaylene Enriquez, PTA

## 2019-03-31 NOTE — PROGRESS NOTES
[facility] - Progress Note  Today's Date and Time: 3/30/2019, 9:10 PM    Impression :   1. Recurrent bilateral pleural effusions w/lung compression  2. CHF  3. 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 Multivessel CAD s/p multiple stent placements  5. S/P TAVR  6. HUYEN  7. Urinary retention  8. Pulmonary hypertension    Recommendations:   · Monitor off antibiotics  · Aggressive pulmonary toilet  · Acapella valve and IS at bedside  · Diuresis per Cardio and Nephro  · Supportive care    Medical Decision Making/Summary/Discussion:   · 79 yo gentleman who developed complete heart block. Found to have multivessel CAD, severe aortic stenosis and pulmonary HTN. · Underwent pacemaker placement with subsequent bouts of acute CHF and multiple hospitalizations in [place]  · Brought to [facility] by family for further care. · Since admission at [facility] he has had cardiac catheterizations x 2 with multiple stent placements and a TAVR on 3-15. Pt remains on a nitroglycerine drip  · Pt continues to require frequent thoracenteses  · He has suffered an HUYEN and is currently on a bumex drip  · CXR 3/26 showed progressed moderate to large bilateral pleural effusions with consolidation. · ID consult placed for treatment of any potential pneumonia  · Pt is afebrile, without cough or sputum production. He remains SOB with exertion and at times, when quiet. · Currently no evidence of active infection. Pt will require aggressive pulmonary toilet and diuresis.  Plan to monitor off antibiotics    Infection Control Recommendations   · [place] Precautions    Antimicrobial Stewardship Recommendations     · Monitor off antibiotics    Coordination of Outpatient Care:   · Estimated Length of IV antimicrobials: None  · Patient will need Midline Catheter Insertion:   · Patient will need PICC line Insertion:  · Patient will need: Home IV , [place],  SNF,  LTAC TBD  · Patient will need outpatient wound care: No    Chief complaint/reason for consultation:   · Possible pneumonia      History of Present Illness:   Lennox Lemma is a 80y.o.-year-old  male who was initially admitted on 3/6/2019. Patient seen at the request of Dr. Rolly Goncalves     INITIAL HISTORY:    Pt is a 79 yo gentleman who has recently had multiple hospital admissions in [place] for decompensated heart failure. His symptoms began in December 2018. 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 A cardiac cath at that time showed LM and LAD calcified stenosis 80-90% in multiple areas, with severe disease in D1, D2, Om1 and OM2. Minimal disease in RCA. LV function with EF 25%. Severe aortic stenosis compromising his clinical improvedmnt      He underwent a dual chamber pacemaker placement. Following that, he had multiple hospitalizations for acute heart failure and shortness of breath. His baseline creatinine was 1.3, now >2.0. He has had multiple thoracenteses with transudative pleural fluid removed.      He was brought to [place] for further evaluation and was admitted on 3/6/19     An ECHO from admission showed   Normal left ventricle size with mildly reduced systolic function; Estimated  left ventricular ejection fraction 40-45%  Anterolateral wall hypokinesis. Mild left ventricular hypertrophy. Left atrium is moderately dilated. Grade II diastolic dysfunction. Heavily calcified aortic valve with reduced systolic excursion and evidence  of moderate stenosis. (Peak nataliia 3.62 m/s and mean gradient 29 mmHg)  Moderate posterior pericardial effusion without any echo signs of tamponade. Normal right ventricular size and function. Pacemaker lead seen in right ventricle. Moderate tricuspid regurgitation. Estimated right ventricular systolic pressure is 60 mmHg suggesting moderate  pulmonary HTN.    He underwent an atherectomy/JEAN-CLAUDE of the left main and LAD on 3/6.   Because of the high surgical risk and renal function he was taken back to the cath lab on 3/11 for PTCA-JEAN-CLAUDE LAD, PTCRA-JEAN-CLAUDE LM with plan to consider a TAVR if pt remained stable.     A TAVR was performed on 3/15     A carotid study showed less than 50% stenosis bilaterally.     He has had multiple thoracenteses since admission. Pt has been followed by CT surgery, cardiology, pulmonary, nephrology and urology.     On the evening of 3/25 pt developed hypertension and SOB at rest. A stat CXR was done that showed progressed moderate to large bilateral pleural effusions with consolidation.      On 3/26 pt underwent a Lt thoracentesis with 1400 ml clear pleural fluid removed. Pt reports feeling much better after the procedure and is asking to sit in the chair.      I am consulted to determine if Mr Joaquin Allen has pneumonia.     CURRENT EXAMINATION: 3/30/2019    Pt seen and examined at bedside. No acute events overnight. The patient feels SOB, now on  2 L O2 per NC   The patient denies fevers, chills, chest pain, sob, cough, sputum production, n/v, abdominal pain. He reports that he isnt feeling well in general.    Undergone HD today     On exam pt is in no distress  Breath sounds are diminished bilateral bases Rt>Lt    On oral bumex  On dopamine infusion  CXR showing large bilateral pleural effusions with adjacent consolidation     Afebrile, VSS     Labs reviewed: 3/30/2019    WBC 10.6->11.6->10  Hgb 8.5->7.7    Cr 2.71 ->3.03->3.27->3.49--2.75  ->101->106->104- 76    Cultures:  Pleural fluid:  · 3/7 No growth    Discussed with patient, RN. I have personally reviewed the past medical history, past surgical history, medications, social history, and family history, and I have updated the database accordingly.   Past Medical History:     Past Medical History:   Diagnosis Date    Aortic stenosis 02/15/2019    Atrial fibrillation (HCC)     CAD (coronary artery disease)     Chest pain 02/15/2019    CHF (congestive heart failure) (HCC)     Chronic anemia     Chronic kidney disease     Hypertension     Pleural effusion on left 02/15/2019    Pleural effusion, right 02/15/2019    Pulmonary emboli (HCC) 02/15/2019    Pulmonary hypertension ([address] 2019    Thyroid disease     hypothyroidism       Past Surgical  History:     Past Surgical History:   Procedure Laterality Date    CORONARY ANGIOPLASTY WITH STENT PLACEMENT  2019    complex PCI of LT MAIN, LAD    JOINT REPLACEMENT      right ankle    PACEMAKER PLACEMENT  2019    THORACENTESIS         Medications:      albumin human  25 g Intravenous Once    aspirin  81 mg Oral Daily    bumetanide  2 mg Oral BID    polyethylene glycol  17 g Oral Daily    spironolactone  25 mg Oral Daily    metoprolol succinate  50 mg Oral Daily    hydrALAZINE  50 mg Oral 3 times per day    apixaban  2.5 mg Oral Daily    FLUoxetine  10 mg Oral Daily    sodium chloride flush  10 mL Intravenous 2 times per day    sodium chloride flush  10 mL Intravenous 2 times per day    levothyroxine  125 mcg Oral Daily    ALPRAZolam  0.25 mg Oral Once    QUEtiapine  25 mg Oral Nightly    sodium chloride flush  10 mL Intravenous 2 times per day    clopidogrel  75 mg Oral Daily    isosorbide mononitrate  30 mg Oral Daily    docusate sodium  100 mg Oral Daily    amiodarone  200 mg Oral Daily    atorvastatin  20 mg Oral Daily       Social History:     Social History     Socioeconomic History    Marital status:       Spouse name: Not on file    Number of children: Not on file    Years of education: Not on file    Highest education level: Not on file   Occupational History     Employer: RETIRED   Social Needs    Financial resource strain: Not on file    Food insecurity:     Worry: Not on file     Inability: Not on file    Transportation needs:     Medical: Not on file     Non-medical: Not on file   Tobacco Use    Smoking status: Former Smoker     Last attempt to quit: 1989     Years since quittin.2    Smokeless tobacco: Never Used   Substance and Sexual Activity    Alcohol use: Not Currently    Drug use: Never    Sexual activity: Not on file   Lifestyle    Physical activity:     Days per week: Not on file     Minutes per session: Not on file    Stress: Not on file   Relationships    Social connections:     Talks on phone: Not on file     Gets together: Not on file     Attends [employer] service: Not on file     Active member of club or organization: Not on file     Attends meetings of clubs or organizations: Not on file     Relationship status: Not on file    Intimate partner violence:     Fear of current or ex partner: Not on file     Emotionally abused: Not on file     Physically abused: Not on file     Forced sexual activity: Not on file   Other Topics Concern    Not on file   Social History Narrative    Not on file       Family History:   History reviewed. No pertinent family history. Allergies:   Quinolones     Review of Systems:   Constitutional: No fevers or chills. No systemic complaints  Head: No headaches  Eyes: No double vision or blurry vision. ENT: No sore throat or runny nose. Cardiovascular: No chest pain or palpitations. Lung: shortness of breath with some activity, occasional cough. No sputum production  Abdomen: No nausea, vomiting, diarrhea, or abdominal pain. Genitourinary: Urinary retention. Johnston in place   Musculoskeletal: No muscle aches or pains. Hematologic: No bleeding or bruising. Neurologic: No headache, weakness, numbness, or tingling. Integument: No rash, no ulcers. Psychiatric: No depression. Endocrine: No polyuria, no polydipsia, no polyphagia.     Physical Examination :     Patient Vitals for the past 8 hrs:   BP Temp Temp src Pulse Resp SpO2   03/30/19 1830 (!) 112/56 97.7 °F (36.5 °C) Oral 60 16 98 %   03/30/19 1806 -- -- -- 62 -- 98 %   03/30/19 1747 -- -- -- 63 -- 93 %   03/30/19 1616 (!) 142/86 -- -- -- -- --   03/30/19 1608 (!) 142/86 -- -- 67 -- (!) 88 %   03/30/19 1603 -- -- -- -- -- 92 %   03/30/19 1600 (!) 129/100 -- Oral 68 20 (!) 89 %   03/30/19 1500 (!) 108/47 97.6 °F (36.4 °C) Oral 60 -- 92 %   03/30/19 1401 135/82 -- -- 71 -- --     General Appearance: Awake, alert, and in no apparent distress  Head:  Normocephalic, no trauma  Eyes: Pupils equal, round, reactive to light and accommodation; extraocular movements intact; sclera anicteric. ENT: Oropharynx clear. Mouth/throat: mucosa pink and moist. No lesions. Dentition in good repair. Neck: Supple, without lymphadenopathy. Pulmonary/Chest: Clear to auscultation, without wheezes, rales, or rhonchi. Decreased breath sounds at bilateral bases Rt > Lt  Cardiovascular: Irregular rate and rhythm without murmurs, rubs, or gallops. Abdomen: Soft, non tender. Bowel sounds normal.  All four Extremities: Mild edema. Neurologic: No gross sensory or motor deficits. Skin: Warm and dry with good turgor. No signs of peripheral arterial or venous insufficiency. No ulcerations. No open wounds. Medical Decision Making -Laboratory:   I have independently reviewed/ordered the following labs:    CBC with Differential:   Recent Labs     03/28/19  0537 03/29/19  0441   WBC 11.6* 10.0   HGB 8.5* 7.7*   HCT 27.9* 24.8*    188   LYMPHOPCT 4* 5*   MONOPCT 5 6     BMP:   Recent Labs     03/29/19  0441 03/30/19  0451   * 136   K 4.2 4.0   CL 95* 95*   CO2 26 25   * 76*   CREATININE 3.49* 2.75*     Hepatic Function Panel:   Recent Labs     03/29/19  0441 03/29/19  1848   PROT 5.6*  --    LABALBU 3.4* 3.6   BILIDIR 0.15  --    IBILI 0.40  --    BILITOT 0.55  --    ALKPHOS 94  --    ALT 18  --    AST 25  --      No results for input(s): RPR in the last 72 hours. No results for input(s): HIV in the last 72 hours. No results for input(s): BC in the last 72 hours.   Lab Results   Component Value Date    MUCUS NOT REPORTED 03/29/2019    RBC 2.68 03/29/2019    TRICHOMONAS NOT REPORTED 03/29/2019    WBC 10.0 03/29/2019    YEAST NOT REPORTED 03/29/2019    TURBIDITY CLEAR 03/29/2019     Lab Results   Component Value Date    CREATININE 2.75 03/30/2019    GLUCOSE 107 03/30/2019       Medical Decision Making-Imaging:   3/29 CXR      Narrative   EXAMINATION:   SINGLE XRAY VIEW OF THE CHEST       3/29/2019 8:26 am       COMPARISON:   Chest radiograph performed 03/28/2019.       HISTORY:   ORDERING SYSTEM PROVIDED HISTORY: effusions   TECHNOLOGIST PROVIDED HISTORY:   effusions       FINDINGS:   There are large bilateral effusions with adjacent consolidation.  There is   underlying pulmonary congestion.  There is no pneumothorax.  The heart is   enlarged with stable cardiac leads.  The upper abdomen is unremarkable.  The   extrathoracic soft tissues are unremarkable.           Impression   Cardiomegaly with large bilateral effusions and adjacent consolidation   concerning for pneumonia.               3/27 CXR    Narrative   EXAMINATION:   TWO VIEWS OF THE CHEST       3/28/2019 7:56 am       COMPARISON:   Chest radiograph performed 03/27/2019.       HISTORY:   ORDERING SYSTEM PROVIDED HISTORY: effusions   TECHNOLOGIST PROVIDED HISTORY:   effusions       FINDINGS:   There is a small left and a large right pleural effusion with adjacent   infiltrate.  There is no pneumothorax.  The mediastinal structures are stable   with stable cardiac leads.  The upper abdomen is unremarkable.  The   extrathoracic soft tissues are unremarkable.           Impression   Small left and large right pleural effusion with adjacent infiltrate   representing atelectasis versus pneumonia.  Overall there may be mild   improvement of the right-sided effusion.              Narrative   EXAMINATION:   SINGLE XRAY VIEW OF THE CHEST       3/26/2019 2:37 am       COMPARISON:   March 20, 2019.       HISTORY:   ORDERING SYSTEM PROVIDED HISTORY: SOB, PE   TECHNOLOGIST PROVIDED HISTORY:   SOB, PE       FINDINGS:   Frontal portable view of the chest.  Low lung volume.  Progressed moderate to

## 2019-03-31 NOTE — PROGRESS NOTES
[facility] - Progress Note  Today's Date and Time: 3/30/2019, 9:55 PM    Impression :   1. Recurrent bilateral pleural effusions   2. Bilateral atelectasis  3. 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 Multivessel CAD s/p multiple stent placements  5. S/P TAVR  6. Recommendations:   · Monitor off antibiotics  · Aggressive pulmonary toilet  · Acapella valve and IS at bedside  · Diuresis per Cardio and Nephro  · Supportive care    Medical Decision Making/Summary/Discussion:   · 81 yo gentleman who developed complete heart block. Found to have multivessel CAD, severe aortic stenosis and pulmonary HTN. · Underwent pacemaker placement with subsequent bouts of acute CHF and multiple hospitalizations in [place]  · Brought to [place] by family for further care. · Since admission at [facility] he has had cardiac catheterizations x 2 with multiple stent placements and a TAVR on 3-15. Pt remains on a nitroglycerine drip  · Pt continues to require frequent thoracenteses  · He has suffered an HUYEN and is currently on a bumex drip  · CXR 3/26 showed progressed moderate to large bilateral pleural effusions with consolidation. · ID consult placed for treatment of any potential pneumonia  · Pt is afebrile, without cough or sputum production. He remains SOB with exertion and at times, when quiet. · Currently no evidence of active infection. Pt will require aggressive pulmonary toilet and diuresis.  Plan to monitor off antibiotics    Infection Control Recommendations   · [place] Precautions    Antimicrobial Stewardship Recommendations     · Monitor off antibiotics    Coordination of Outpatient Care:   · Estimated Length of IV antimicrobials: None  · Patient will need Midline Catheter Insertion:   · Patient will need PICC line Insertion:  · Patient will need: Home IV , [place],  SNF,  LTAC TBD  · Patient will need outpatient wound care: No    Chief complaint/reason for consultation:   · Possible pneumonia      History of Present Illness:   Levar Colby is a 80y.o.-year-old  male who was initially admitted on 3/6/2019. Patient seen at the request of Dr. Betty Sandifer     INITIAL HISTORY:    Pt is a 79 yo gentleman who has recently had multiple hospital admissions in [place] for decompensated heart failure. His symptoms began in December 2018. 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 A cardiac cath at that time showed LM and LAD calcified stenosis 80-90% in multiple areas, with severe disease in D1, D2, Om1 and OM2. Minimal disease in RCA. LV function with EF 25%. Severe aortic stenosis compromising his clinical improvedmnt      He underwent a dual chamber pacemaker placement. Following that, he had multiple hospitalizations for acute heart failure and shortness of breath. His baseline creatinine was 1.3, now >2.0. He has had multiple thoracenteses with transudative pleural fluid removed.      He was brought to [place] for further evaluation and was admitted on 3/6/19     An ECHO from admission showed   Normal left ventricle size with mildly reduced systolic function; Estimated  left ventricular ejection fraction 40-45%  Anterolateral wall hypokinesis. Mild left ventricular hypertrophy. Left atrium is moderately dilated. Grade II diastolic dysfunction. Heavily calcified aortic valve with reduced systolic excursion and evidence  of moderate stenosis. (Peak nataliia 3.62 m/s and mean gradient 29 mmHg)  Moderate posterior pericardial effusion without any echo signs of tamponade. Normal right ventricular size and function. Pacemaker lead seen in right ventricle. Moderate tricuspid regurgitation. Estimated right ventricular systolic pressure is 60 mmHg suggesting moderate  pulmonary HTN.    He underwent an atherectomy/JEAN-CLAUDE of the left main and LAD on 3/6.   Because of the high surgical risk and renal function he was taken back to the cath lab on 3/11 for PTCA-JEAN-CLAUDE LAD, PTCRA-JEAN-CLAUDE LM with plan to consider a TAVR if pt remained stable.     A TAVR was performed on 3/15     A carotid study showed less than 50% stenosis bilaterally.     He has had multiple thoracenteses since admission. Pt has been followed by CT surgery, cardiology, pulmonary, nephrology and urology.     On the evening of 3/25 pt developed hypertension and SOB at rest. A stat CXR was done that showed progressed moderate to large bilateral pleural effusions with consolidation.      On 3/26 pt underwent a Lt thoracentesis with 1400 ml clear pleural fluid removed. Pt reports feeling much better after the procedure and is asking to sit in the chair.      I am consulted to determine if Mr Hafsa Jean has pneumonia.     CURRENT EXAMINATION: 3/30/2019  No fever  Pt seen and examined at bedside. No acute events overnight. The patient feels SOB, now on  2 L O2 per NC   The patient denies fevers, chills, chest pain, sob, cough, sputum production, n/v, abdominal pain. He reports that he isnt feeling well in general.    Undergone HD today     On exam pt is in no distress  Breath sounds are diminished bilateral bases Rt>Lt    On oral bumex  On dopamine infusion  CXR showing large bilateral pleural effusions with adjacent consolidation        Labs reviewed: 3/30/2019    WBC 10.6->11.6->10  Hgb 8.5->7.7    Cr 2.71 ->3.03->3.27->3.49--2.75  ->101->106->104- 76    Cultures:  Pleural fluid:  · 3/7 No growth    Discussed with patient, RN. I have personally reviewed the past medical history, past surgical history, medications, social history, and family history, and I have updated the database accordingly.   Past Medical History:     Past Medical History:   Diagnosis Date    Aortic stenosis 02/15/2019    Atrial fibrillation (HCC)     CAD (coronary artery disease)     Chest pain 02/15/2019    CHF (congestive heart failure) (HCC)     Chronic anemia     Chronic kidney disease     Hypertension     Pleural effusion on left use: Not Currently    Drug use: Never    Sexual activity: Not on file   Lifestyle    Physical activity:     Days per week: Not on file     Minutes per session: Not on file    Stress: Not on file   Relationships    Social connections:     Talks on phone: Not on file     Gets together: Not on file     Attends Christianity service: Not on file     Active member of club or organization: Not on file     Attends meetings of clubs or organizations: Not on file     Relationship status: Not on file    Intimate partner violence:     Fear of current or ex partner: Not on file     Emotionally abused: Not on file     Physically abused: Not on file     Forced sexual activity: Not on file   Other Topics Concern    Not on file   Social History Narrative    Not on file       Family History:   History reviewed. No pertinent family history. Allergies:   Quinolones     Review of Systems:   Constitutional: No fevers or chills. No systemic complaints  Head: No headaches  Eyes: No double vision or blurry vision. ENT: No sore throat or runny nose. Cardiovascular: No chest pain or palpitations. Lung: shortness of breath with some activity, occasional cough. No sputum production  Abdomen: No nausea, vomiting, diarrhea, or abdominal pain. Genitourinary: Urinary retention. Johnston in place   Musculoskeletal: No muscle aches or pains. Hematologic: No bleeding or bruising. Neurologic: No headache, weakness, numbness, or tingling. Integument: No rash, no ulcers. Psychiatric: No depression. Endocrine: No polyuria, no polydipsia, no polyphagia.     Physical Examination :     Patient Vitals for the past 8 hrs:   BP Temp Temp src Pulse Resp SpO2   03/30/19 1830 (!) 112/56 97.7 °F (36.5 °C) Oral 60 16 98 %   03/30/19 1806 -- -- -- 62 -- 98 %   03/30/19 1747 -- -- -- 63 -- 93 %   03/30/19 1616 (!) 142/86 -- -- -- -- --   03/30/19 1608 (!) 142/86 -- -- 67 -- (!) 88 %   03/30/19 1603 -- -- -- -- -- 92 %   03/30/19 1600 (!) 129/100 -- Oral 68 20 (!) 89 %   03/30/19 1500 (!) 108/47 97.6 °F (36.4 °C) Oral 60 -- 92 %   03/30/19 1401 135/82 -- -- 71 -- --     General Appearance: Awake, alert, and in no apparent distress  Head:  Normocephalic, no trauma  Eyes: Pupils equal, round, reactive to light and accommodation; extraocular movements intact; sclera anicteric. ENT: Oropharynx clear. Mouth/throat: mucosa pink and moist. No lesions. Dentition in good repair. Neck: Supple, without lymphadenopathy. Pulmonary/Chest: Clear to auscultation, without wheezes, rales, or rhonchi. Decreased breath sounds at bilateral bases Rt > Lt  Cardiovascular: Irregular rate and rhythm without murmurs, rubs, or gallops. Abdomen: Soft, non tender. Bowel sounds normal.  All four Extremities: Mild edema. Neurologic: No gross sensory or motor deficits. Skin: Warm and dry with good turgor. No signs of peripheral arterial or venous insufficiency. No ulcerations. No open wounds. Medical Decision Making -Laboratory:   I have independently reviewed/ordered the following labs:    CBC with Differential:   Recent Labs     03/28/19  0537 03/29/19  0441   WBC 11.6* 10.0   HGB 8.5* 7.7*   HCT 27.9* 24.8*    188   LYMPHOPCT 4* 5*   MONOPCT 5 6     BMP:   Recent Labs     03/29/19  0441 03/30/19  0451   * 136   K 4.2 4.0   CL 95* 95*   CO2 26 25   * 76*   CREATININE 3.49* 2.75*     Hepatic Function Panel:   Recent Labs     03/29/19  0441 03/29/19  1848   PROT 5.6*  --    LABALBU 3.4* 3.6   BILIDIR 0.15  --    IBILI 0.40  --    BILITOT 0.55  --    ALKPHOS 94  --    ALT 18  --    AST 25  --      No results for input(s): RPR in the last 72 hours. No results for input(s): HIV in the last 72 hours. No results for input(s): BC in the last 72 hours.   Lab Results   Component Value Date    MUCUS NOT REPORTED 03/29/2019    RBC 2.68 03/29/2019    TRICHOMONAS NOT REPORTED 03/29/2019    WBC 10.0 03/29/2019    YEAST NOT REPORTED 03/29/2019    TURBIDITY CLEAR 03/29/2019 Lab Results   Component Value Date    CREATININE 2.75 03/30/2019    GLUCOSE 107 03/30/2019       Medical Decision Making-Imaging:   3/29 CXR      Narrative   EXAMINATION:   SINGLE XRAY VIEW OF THE CHEST       3/29/2019 8:26 am       COMPARISON:   Chest radiograph performed 03/28/2019.       HISTORY:   ORDERING SYSTEM PROVIDED HISTORY: effusions   TECHNOLOGIST PROVIDED HISTORY:   effusions       FINDINGS:   There are large bilateral effusions with adjacent consolidation.  There is   underlying pulmonary congestion.  There is no pneumothorax.  The heart is   enlarged with stable cardiac leads.  The upper abdomen is unremarkable.  The   extrathoracic soft tissues are unremarkable.           Impression   Cardiomegaly with large bilateral effusions and adjacent consolidation   concerning for pneumonia.               3/27 CXR    Narrative   EXAMINATION:   TWO VIEWS OF THE CHEST       3/28/2019 7:56 am       COMPARISON:   Chest radiograph performed 03/27/2019.       HISTORY:   ORDERING SYSTEM PROVIDED HISTORY: effusions   TECHNOLOGIST PROVIDED HISTORY:   effusions       FINDINGS:   There is a small left and a large right pleural effusion with adjacent   infiltrate.  There is no pneumothorax.  The mediastinal structures are stable   with stable cardiac leads.  The upper abdomen is unremarkable.  The   extrathoracic soft tissues are unremarkable.           Impression   Small left and large right pleural effusion with adjacent infiltrate   representing atelectasis versus pneumonia.  Overall there may be mild   improvement of the right-sided effusion.              Narrative   EXAMINATION:   SINGLE XRAY VIEW OF THE CHEST       3/26/2019 2:37 am       COMPARISON:   March 20, 2019.       HISTORY:   ORDERING SYSTEM PROVIDED HISTORY: SOB, PE   TECHNOLOGIST PROVIDED HISTORY:   SOB, PE       FINDINGS:   Frontal portable view of the chest.  Low lung volume.  Progressed moderate to   large bilateral pleural effusions with consolidation.  Indistinct pulmonary   vasculature.  No pneumothorax.  The cardiomediastinal silhouette is not well   evaluated.  Atherosclerotic thoracic aorta.  Left pectoral trans venous   dual-chamber cardiac pacer device.           Impression   Progressed moderate to large bilateral pleural effusions with consolidation. Superimposed infection is not excluded.             Medical Decision Making-Other:         I have discussed the care of the patient, including pertinent history and exam findings,  with the resident. I have seen and examined the patient and the key elements of all parts of the encounter have been performed by me. I agree with the assessment, plan and orders as documented by the resident.     Jennifer Sheikh, Infectious Diseases       Carrie Conner MD

## 2019-03-31 NOTE — PROGRESS NOTES
[facility]  Pre Op Progress Note    CC: Acute CHF, SOB, increased Cre      Subjective:  Mr. Marcano Rank seen and examined Plan of care reviewed and questions answered. Physical Exam  Vital Signs: BP (!) 127/57   Pulse 65   Temp 98.2 °F (36.8 °C) (Axillary)   Resp 14   Ht 5' 6\" (1.676 m)   Wt 147 lb 11.3 oz (67 kg)   SpO2 96%   BMI 23.84 kg/m²  O2 Flow Rate (L/min): 1 L/min(decreased to 1)       General: alert and oriented to person, place and time. Up in chair, No apparent distress.   Heart:Rhythm: NSR  Lungs: clear to auscultation bilaterally and diminished breath sounds bibasilar  Abdomen: soft, non tender, non distended, BSx4  Extremities: 1+ edema      Scheduled Meds:    albumin human  25 g Intravenous Once    aspirin  81 mg Oral Daily    bumetanide  2 mg Oral BID    polyethylene glycol  17 g Oral Daily    spironolactone  25 mg Oral Daily    metoprolol succinate  50 mg Oral Daily    hydrALAZINE  50 mg Oral 3 times per day    apixaban  2.5 mg Oral Daily    FLUoxetine  10 mg Oral Daily    sodium chloride flush  10 mL Intravenous 2 times per day    sodium chloride flush  10 mL Intravenous 2 times per day    levothyroxine  125 mcg Oral Daily    ALPRAZolam  0.25 mg Oral Once    QUEtiapine  25 mg Oral Nightly    sodium chloride flush  10 mL Intravenous 2 times per day    clopidogrel  75 mg Oral Daily    isosorbide mononitrate  30 mg Oral Daily    docusate sodium  100 mg Oral Daily    amiodarone  200 mg Oral Daily    atorvastatin  20 mg Oral Daily     Continuous Infusions:    DOPamine 2 mcg/kg/min (03/30/19 2120)    nitroGLYCERIN Stopped (03/26/19 1315)       Data:  CBC:   Recent Labs     03/29/19  0441 03/31/19  0724   WBC 10.0  --    HGB 7.7* 9.1*   HCT 24.8* 29.6*   MCV 92.5  --      --      BMP:   Recent Labs     03/29/19  0441 03/30/19  0451 03/31/19  0724   * 136 135   K 4.2 4.0 3.7   CL 95* 95* 98   CO2 26 25 24   * 76* 55* CREATININE 3.49* 2.75* 2.87*     PT/INR:   Recent Labs     03/30/19  1707   PROTIME 12.1*   INR 1.2     APTT: No results for input(s): APTT in the last 72 hours. 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 Medications adjusted per nephro. IR drained left pleural effusion 3/26/19, 1400ml. Shortness of breath greatly improved. ID recommends no antibiotics at this time, appreciate recommendations  Pulmonary - venous dopplers - negative, if right thora completed recommend fluid analysis  Cardiology changed to long acting BB, add hydralazine, possible drainage catheter if pleural fluid continues to build up. Dr Kathie Smith to disucss care with Dr Maria Luisa Hurtado  Will continue to monitor closely.   S/p temp HD - creat and BUN improved  Decrease UOP - dopamine    The above recommendations including medications and orders were discussed and agreed upon with Dr. Kathie Smith, the attending on service for the cardiothoracic surgery group today.      Electronically signed by MIRYAM Baer on 3/31/2019 at 8:46 AM

## 2019-03-31 NOTE — PROGRESS NOTES
Pulmonary Progress Note  [facility]      Patient - Billy Kimbrough,  Age - 80 y.o.    - 12/10/1928      Room Number - 1015/1015-01   N -  [id number]   Acct # - [de-identified]  Date of Admission -  3/6/2019  8:48 PM    SUBJECTIVE  Up in chair. No distress. Awake, alert, conversant, and lucid. Oxygen saturation adequate on 2 L nasal oxygen. Dullness to percussion both lung bases. Otherwise lungs clear. Biventricular pacemaker planned for Thursday. OBJECTIVE    VITALS    height is 5' 6\" (1.676 m) and weight is 147 lb 11.3 oz (67 kg). His oral temperature is 98.4 °F (36.9 °C). His blood pressure is 104/65 and his pulse is 64. His respiration is 16 and oxygen saturation is 98%. Temperature Range: Temp: 98.4 °F (36.9 °C) Temp  Av.1 °F (36.7 °C)  Min: 97.7 °F (36.5 °C)  Max: 98.4 °F (36.9 °C)  BP Range:  Systolic (80BUG), OKX:549 , Min:104 , PAP:991     Diastolic (46VXO), HMR:00, Min:56, Max:100    Pulse Range: Pulse  Av.1  Min: 60  Max: 68  Respiration Range: Resp  Av.5  Min: 14  Max: 20  Current Pulse Ox[de-identified]  SpO2: 98 %  24HR Pulse Ox Range:  SpO2  Av.2 %  Min: 88 %  Max: 98 %  Oxygen Amount and Delivery:  O2 Flow Rate (L/min): 1 L/min      Wt Readings from Last 3 Encounters:   19 147 lb 11.3 oz (67 kg)       I/O (24 Hours)    Intake/Output Summary (Last 24 hours) at 3/31/2019 1501  Last data filed at 3/31/2019 1334  Gross per 24 hour   Intake 846.52 ml   Output 310 ml   Net 536.52 ml       EXAM  General Appearance  Awake, alert, oriented, in no acute distress  HEENT - Normal, Head is normocephalic, atraumatic. Neck - Supple, symmetrical, trachea midline dialysis catheter right internal jugular vein. Lungs - lungs clear. Dullness to percussion both bases. Cardiovascular - Heart sounds are normal.  Regular rate and rhythm without murmur, gallop or rub.   Abdomen - soft, nontender, nondistended, no masses or organomegaly  Neurologic - Awake, alert, oriented to name, place and time. There are no focal motor or sensory deficits  Skin - No bruising or bleeding  Extremities - No cyanosis, clubbing.   2+ edema    MEDS   albumin human  25 g Intravenous Once    albumin human  25 g Intravenous Once    aspirin  81 mg Oral Daily    bumetanide  2 mg Oral BID    polyethylene glycol  17 g Oral Daily    spironolactone  25 mg Oral Daily    metoprolol succinate  50 mg Oral Daily    hydrALAZINE  50 mg Oral 3 times per day    apixaban  2.5 mg Oral Daily    FLUoxetine  10 mg Oral Daily    sodium chloride flush  10 mL Intravenous 2 times per day    sodium chloride flush  10 mL Intravenous 2 times per day    levothyroxine  125 mcg Oral Daily    ALPRAZolam  0.25 mg Oral Once    QUEtiapine  25 mg Oral Nightly    sodium chloride flush  10 mL Intravenous 2 times per day    clopidogrel  75 mg Oral Daily    isosorbide mononitrate  30 mg Oral Daily    docusate sodium  100 mg Oral Daily    amiodarone  200 mg Oral Daily    atorvastatin  20 mg Oral Daily      DOPamine 2 mcg/kg/min (03/30/19 2120)    nitroGLYCERIN Stopped (03/26/19 1315)     acetaminophen, sodium chloride, sodium chloride, heparin (porcine), heparin (porcine), hydrALAZINE, sodium chloride flush, sodium chloride flush, ondansetron, sodium chloride flush, magnesium hydroxide    LABS  CBC   Recent Labs     03/29/19  0441 03/31/19  0724   WBC 10.0  --    HGB 7.7* 9.1*   HCT 24.8* 29.6*   MCV 92.5  --      --      BMP:   Recent Labs     03/31/19  0724      K 3.7   CL 98   CO2 24   BUN 55*   CREATININE 2.87*   GLUCOSE 92     No results found for: PH, PCO2, PO2, HCO3, O2SAT  Lab Results   Component Value Date    MODE NOT REPORTED 03/06/2019     LIVER PROFILE   Recent Labs     03/29/19  0441   AST 25   ALT 18   BILIDIR 0.15   BILITOT 0.55   ALKPHOS 94     INR   Recent Labs     03/30/19  1707   INR 1.2     PTT   Lab Results   Component Value Date    APTT 26.1 03/06/2019       CULTURES  Component Collected Lab Specimen Description 03/07/2019  4:02  [address]   . THORACENTESIS FLUID RIGHT    Special Requests 03/07/2019  4:02  [address]   NOT REPORTED    Direct Exam Abnormal  03/07/2019  4:02 PM [facility]    Direct Exam Abnormal  03/07/2019  4:02 PM [address] BLOOD CELLS SEEN    Direct Exam 03/07/2019  4:02 PM [address]    Direct Exam 03/07/2019  4:02  [address]   Gram stain made from cytocentrifuged specimen.  Organisms and cells will be concentrated. 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 Wean oxygen as tolerated. Keep O2 sat 90-92%  2. Hemodialysis as needed. 3. No specific intervention for pleural effusions. 4. Discussed with Dr. Daniella Goodwin.       Electronically signed by Codi Antoine DO on 3/31/2019 at 3:01 PM

## 2019-03-31 NOTE — PROGRESS NOTES
edema. Labs:       Recent Labs     03/29/19 0441 03/31/19  0724   WBC 10.0  --    RBC 2.68*  --    HGB 7.7* 9.1*   HCT 24.8* 29.6*   MCV 92.5  --    MCH 28.7  --    MCHC 31.0  --    RDW 16.6*  --      --    MPV 12.0  --       BMP:   Recent Labs     03/29/19 0441 03/30/19  0451 03/31/19  0724   * 136 135   K 4.2 4.0 3.7   CL 95* 95* 98   CO2 26 25 24   * 76* 55*   CREATININE 3.49* 2.75* 2.87*   GLUCOSE 108* 107* 92   CALCIUM 8.4* 8.5* 8.3*      Albumin:    Recent Labs     03/29/19 0441 03/29/19  1848   LABALBU 3.4* 3.6     SPEP:  Lab Results   Component Value Date    PROT 5.6 03/29/2019    ALBCAL 2.5 03/07/2019    ALBPCT 53 03/07/2019    LABALPH 0.3 03/07/2019    LABALPH 0.8 03/07/2019    A1PCT 7 03/07/2019    A2PCT 18 03/07/2019    LABBETA 0.7 03/07/2019    BETAPCT 14 03/07/2019    GAMGLOB 0.4 03/07/2019    GGPCT 8 03/07/2019    PATH ELECTRONICALLY SIGNED. Omayra Damon M.D. 03/07/2019     Urinalysis/Chemistries:      Lab Results   Component Value Date    NITRU NEGATIVE 03/29/2019    COLORU YELLOW 03/29/2019    PHUR 7.5 03/29/2019    WBCUA 50  03/29/2019    RBCUA 50  03/29/2019    MUCUS NOT REPORTED 03/29/2019    TRICHOMONAS NOT REPORTED 03/29/2019    YEAST NOT REPORTED 03/29/2019    BACTERIA NOT REPORTED 03/29/2019    SPECGRAV 1.011 03/29/2019    LEUKOCYTESUR LARGE 03/29/2019    UROBILINOGEN Normal 03/29/2019    BILIRUBINUR NEGATIVE 03/29/2019    GLUCOSEU NEGATIVE 03/29/2019    KETUA NEGATIVE 03/29/2019    AMORPHOUS NOT REPORTED 03/29/2019      Urine Creatinine:     Lab Results   Component Value Date    LABCREA 42.4 03/23/2019       Radiology:     Reviewed as available     Assessment:     1.  Acute kidney injury, ATN from contrast low flow from cardiogenic shock and forward failure creat upto 3.8, BUN , improving after starting dialysis.   2. 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  Nephrotic range proteinuria - Bx not feasible in view of he being on Asprin and cardiology recommends not to stop anticoagulant. Explained to patient and he does not want to do that. 3.  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  Cardiomyopathy ejection fraction 40-45%/left ventricular diastolic dysfunction  5.  S/p PCI and atherectomy  3/11  6.  Moderate aortic stenosis with mean gradient 29 mmHg/moderate tricuspid regurgitation/moderate pulmonary hypertension with RVSP 60  7. HTN  8. Hx of Urinary retention  9. S/P TAVR  10. Advanced age    Plan:   1. Continue Bumex 2 mg PO BID  2. BMP in am, daily weights, strict I/O  3. Will evaluate for next dialysis on Monday. Spoke and discussed the case with Dr. Liss Alan and also with the patient. 4.  Biventricular pacemaker planned for Thursday, it will require some contrast exposure and will help with dialysis as required. 5.  Will follow. Nutrition   Please ensure that patient is on a renal diet/TF. Avoid nephrotoxic drugs/contrast exposure. We will continue to follow along with you. Kimberly Valdivia MD  PGY-1 Resident  Internal Medicine  [facility]      Attending Physician Statement  I have discussed the care of Dick Blackburn, including pertinent history and exam findings,  with the Fellow/Resident/CNP. I have reviewed the key elements of all parts of the encounter with the Fellow/Resident/CNP. I agree with the assessment, plan and orders as documented by the Fellow/Resident/CNP. Shaheed Flores MD   Nephrology [address] [place] [address]

## 2019-04-01 NOTE — PROGRESS NOTES
Dialysis Post Treatment Note  Patient tolerated treatment well. Denies complaints at time of discharge. Vitals:    04/01/19 1145   BP: 129/49   Pulse: 60   Resp: 14   Temp: 97.7 °F (36.5 °C)   SpO2:      Pre-Weight = 67.4kg  Post-weight = Weight: 147 lb 7.8 oz (66.9 kg)  Total Liters Processed = Total Liters Processed (l/min): 40 l/min  Rinseback Volume (mL) = Rinseback Volume (ml): 340 ml  Net Removal (mL) = 860  Length of treatment=180    Pt tolerated his 3rd tx, no complications noted.

## 2019-04-01 NOTE — PROGRESS NOTES
Intravenous 2 times per day    levothyroxine  125 mcg Oral Daily    ALPRAZolam  0.25 mg Oral Once    QUEtiapine  25 mg Oral Nightly    sodium chloride flush  10 mL Intravenous 2 times per day    clopidogrel  75 mg Oral Daily    isosorbide mononitrate  30 mg Oral Daily    [Held by provider] docusate sodium  100 mg Oral Daily    amiodarone  200 mg Oral Daily    atorvastatin  20 mg Oral Daily     Continuous Infusions:    DOPamine 2 mcg/kg/min (03/31/19 1920)    nitroGLYCERIN Stopped (03/26/19 1315)     PRN Meds:    Home Meds:                Medications Prior to Admission: amiodarone (CORDARONE) 200 MG tablet, Take 200 mg by mouth daily  atorvastatin (LIPITOR) 20 MG tablet, Take 20 mg by mouth daily  isosorbide mononitrate (IMDUR) 60 MG extended release tablet, Take 60 mg by mouth daily  levothyroxine (SYNTHROID) 100 MCG tablet, Take 100 mcg by mouth Daily  lisinopril (PRINIVIL;ZESTRIL) 40 MG tablet, Take 40 mg by mouth daily  apixaban (ELIQUIS) 2.5 MG TABS tablet, Take 2.5 mg by mouth daily  metoprolol tartrate (LOPRESSOR) 50 MG tablet, Take 50 mg by mouth 2 times daily    INVESTIGATIONS     Last 3 CMP:    Recent Labs     03/29/19  1848  03/31/19  0724 04/01/19  0837 04/01/19  1142   NA  --    < > 135 138 141   K  --    < > 3.7 3.2* 3.2*   CL  --    < > 98 99 102   CO2  --    < > 24 25 25   BUN  --    < > 55* 66* 24*   CREATININE  --    < > 2.87* 3.20* 1.24*   CALCIUM  --    < > 8.3* 8.5* 7.7*   LABALBU 3.6  --   --   --   --     < > = values in this interval not displayed. Last 3 CBC:  Recent Labs     03/31/19  0724 04/01/19  0837   HGB 9.1* 8.6*   HCT 29.6* 27.1*       ASSESSMENT     1. Acute kidney injury secondary to ATN from hypoperfusion related to cardiorenal syndrome - worsening. Hemodialysis dependent since 29th of March  2. Chronic kidney disease stage IIIB with baseline creatinine suspected around 2  3.   Nephrotic range proteinuria - Bx not feasible in view of he being on Asprin and cardiology recommends not to stop anticoagulant. Explained to patient and he does not want to do that. 4.  Status post TVR  5. Cardiomyopathy ejection fraction 40-45%/left ventricular diastole dysfunction/moderate to severe mitral regurgitation  6. S/p PCI and atherectomy  3/11  7. Recurrent bilateral pleural effusion status post pleural taps  8. HTN  9. Hx of Urinary retention  10. Advanced age    PLAN     4. Had hemodialysis today. Tolerated the procedure well. 2.  Continue current diuretic regimen  3.   Will follow    Please do not hesitate to call with questions    This note is created with the assistance of a speech-recognition program. While intending to generate a document that actually reflects the content of the visit, no guarantees can be provided that every mistake has been identified and corrected by editing    Xena Odom MD, MRCP Jace Crigler), [address]   4/1/2019 1:38 PM  [facility]

## 2019-04-01 NOTE — PROGRESS NOTES
Physical Therapy  DATE: 2019    NAME: Uvaldo Otto  MRN: [id number]   : 12/10/1928    Patient not seen this date for Physical Therapy due to:  [] Blood transfusion in progress  [x] Hemodialysis     (will check back in the PM)  []  Patient Declined  [] Spine Precautions   [] Strict Bedrest  [] Surgery/ Procedure  [] Testing      [] Other        [] PT being discontinued at this time. Patient independent. No further needs. [] PT being discontinued at this time as the patient has been transferred to palliative care. No further needs.     Ran Murdock, PTA

## 2019-04-01 NOTE — PROGRESS NOTES
Pulmonary Progress Note  [facility]      Patient - Carlos Jensen,  Age - 80 y.o.    - 12/10/1928      Room Number - 1015/1015-01   MRN -  [id number]   Acct # - [de-identified]  Date of Admission -  3/6/2019  8:48 PM    SUBJECTIVE  No distress. Awake, alert, conversant, and lucid. Oxygen saturation adequate on 2 L nasal oxygen. HD today  On dopamine gtt  Denies any cough. No acute issues overnight  Biventricular pacemaker planned for Thursday. OBJECTIVE    VITALS    height is 5' 6\" (1.676 m) and weight is 147 lb 7.8 oz (66.9 kg). His temperature is 97.7 °F (36.5 °C). His blood pressure is 99/42 (abnormal) and his pulse is 60. His respiration is 14 and oxygen saturation is 98%. Temperature Range: Temp: 97.7 °F (36.5 °C) Temp  Av.9 °F (36.6 °C)  Min: 97.5 °F (36.4 °C)  Max: 98.4 °F (36.9 °C)  BP Range:  Systolic (78ZXO), LAV:048 , Min:99 , YBW:994     Diastolic (42BHG), CFR:54, Min:42, Max:68    Pulse Range: Pulse  Av.4  Min: 60  Max: 63  Respiration Range: Resp  Avg: 15.3  Min: 14  Max: 16  Current Pulse Ox[de-identified]  SpO2: 98 %  24HR Pulse Ox Range:  SpO2  Av %  Min: 98 %  Max: 98 %  Oxygen Amount and Delivery:  O2 Flow Rate (L/min): 1 L/min      Wt Readings from Last 3 Encounters:   19 147 lb 7.8 oz (66.9 kg)       I/O (24 Hours)    Intake/Output Summary (Last 24 hours) at 2019 1602  Last data filed at 2019 1145  Gross per 24 hour   Intake 1179 ml   Output 2100 ml   Net -921 ml       EXAM  General Appearance  Awake, alert, oriented, in no acute distress  HEENT - Normal, Head is normocephalic, atraumatic. Neck - Supple, symmetrical, trachea midline dialysis catheter right internal jugular vein. Lungs - lungs clear. Dullness to percussion both bases. Cardiovascular - Heart sounds are normal.  Regular rate and rhythm without murmur, gallop or rub.   Abdomen - soft, nontender, nondistended, no masses or organomegaly  Neurologic - Awake, alert, oriented to name, place and time. There are no focal motor or sensory deficits  Skin - No bruising or bleeding  Extremities - No cyanosis, clubbing. 2+ edema    MEDS   albumin human  25 g Intravenous Once    aspirin  81 mg Oral Daily    bumetanide  2 mg Oral BID    [Held by provider] polyethylene glycol  17 g Oral Daily    spironolactone  25 mg Oral Daily    metoprolol succinate  50 mg Oral Daily    hydrALAZINE  50 mg Oral 3 times per day    [Held by provider] apixaban  2.5 mg Oral Daily    FLUoxetine  10 mg Oral Daily    sodium chloride flush  10 mL Intravenous 2 times per day    sodium chloride flush  10 mL Intravenous 2 times per day    levothyroxine  125 mcg Oral Daily    ALPRAZolam  0.25 mg Oral Once    QUEtiapine  25 mg Oral Nightly    sodium chloride flush  10 mL Intravenous 2 times per day    clopidogrel  75 mg Oral Daily    isosorbide mononitrate  30 mg Oral Daily    [Held by provider] docusate sodium  100 mg Oral Daily    amiodarone  200 mg Oral Daily    atorvastatin  20 mg Oral Daily      DOPamine 2 mcg/kg/min (03/31/19 1920)    nitroGLYCERIN Stopped (03/26/19 1315)     aspirin, simethicone, acetaminophen, sodium chloride, sodium chloride, heparin (porcine), heparin (porcine), hydrALAZINE, sodium chloride flush, sodium chloride flush, ondansetron, sodium chloride flush, magnesium hydroxide    LABS  CBC   Recent Labs     04/01/19  0837   HGB 8.6*   HCT 27.1*     BMP:   Recent Labs     04/01/19  1142      K 3.2*      CO2 25   BUN 24*   CREATININE 1.24*   GLUCOSE 120*   MG 1.5*     No results found for: PH, PCO2, PO2, HCO3, O2SAT  Lab Results   Component Value Date    MODE NOT REPORTED 03/06/2019     LIVER PROFILE   No results for input(s): AST, ALT, LIPASE, BILIDIR, BILITOT, ALKPHOS in the last 72 hours. Invalid input(s):   AMYLASE,  ALB  INR   Recent Labs     03/30/19  1707   INR 1.2     PTT   Lab Results   Component Value Date    APTT 26.1 03/06/2019       CULTURES  Component Collected Lab   Specimen Description 03/07/2019  4:02  [address]   . THORACENTESIS FLUID RIGHT    Special Requests 03/07/2019  4:02  [address]   NOT REPORTED    Direct Exam Abnormal  03/07/2019  4:02 PM [facility]    Direct Exam Abnormal  03/07/2019  4:02 PM [address] BLOOD CELLS SEEN    Direct Exam 03/07/2019  4:02 PM [address]    Direct Exam 03/07/2019  4:02  [address]   Gram stain made from cytocentrifuged specimen.  Organisms and cells will be concentrated. Culture 03/07/2019  4:02  [address]   NO GROWTH 7 DAYS    Testing Performed By     Lab - Abbreviation Name Director Address Valid Date Range   [address] [employer]    Echo   Summary  Calculated ejection fraction via 3D Heart Model is 45%  M-Mode (Septal-Posterior wall motion delay 140 & 150 (> 1340 positive karime  dyssynchrony). Dysynchrony Index for TMSV were positive for Dysynchrony. 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 *        PLAN  1. Wean oxygen as tolerated. Keep O2 sat 90-92%  2. Hemodialysis as needed. 3. No specific intervention for pleural effusions.       Electronically signed by Livia Bradford MD on 4/1/2019 at 4:02 PM

## 2019-04-01 NOTE — PROGRESS NOTES
Physical Therapy  DATE: 2019    NAME: Evonne Guzman  MRN: [id number]   : 12/10/1928    Patient not seen this date for Physical Therapy due to:  [] Blood transfusion in progress  [] Hemodialysis  [x]  Patient Declined: Pt states he is not feeling well, refuses therapy stating \"not today. \" Pt. EDU on importance of getting out of bed; will check back tomorrow.   [] Spine Precautions   [] Strict Bedrest  [] Surgery/ Procedure  [] Testing      [] Other        [] PT being discontinued at this time. Patient independent. No further needs. [] PT being discontinued at this time as the patient has been transferred to palliative care. No further needs. [place], [place] Treatment performed by Student PTA under the supervision of co-signing PTA who agrees with all treatment and documentation.    Maria L Rg PTA

## 2019-04-01 NOTE — PROGRESS NOTES
[facility] - Progress Note  Today's Date and Time: 4/1/2019, 2:03 PM    Impression :   1. Recurrent bilateral pleural effusions w/lung compression  2. CHF  3. 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 Multivessel CAD s/p multiple stent placements  5. S/P TAVR  6. HUYEN  7. Urinary retention  8. Pulmonary hypertension    Recommendations:   · Monitor off antibiotics  · Aggressive pulmonary toilet  · Acapella valve and IS at bedside    Medical Decision Making/Summary/Discussion:   · 81 yo gentleman who developed complete heart block. Found to have multivessel CAD, severe aortic stenosis and pulmonary HTN. · Underwent pacemaker placement with subsequent bouts of acute CHF and multiple hospitalizations in [place]  · Brought to [address] [place] [address] by family for further care. · Since admission at [facility] he has had cardiac catheterizations x 2 with multiple stent placements and a TAVR on 3-15. Pt remains on a nitroglycerine drip  · Pt continues to require frequent thoracenteses  · He has suffered an HUYEN and is currently on a bumex drip  · CXR 3/26 showed progressed moderate to large bilateral pleural effusions with consolidation. · ID consult placed for treatment of any potential pneumonia  · Pt is afebrile, without cough or sputum production. He remains SOB with exertion and at times, when quiet. · Currently no evidence of active infection. Pt will require aggressive pulmonary toilet and diuresis. Plan to monitor off antibiotics  · Patient to continue HD and have pacemaker placed.     Infection Control Recommendations   · [place] Precautions    Antimicrobial Stewardship Recommendations     · Monitor off antibiotics    Coordination of Outpatient Care:   · Estimated Length of IV antimicrobials: None  · Patient will need Midline Catheter Insertion:   · Patient will need PICC line Insertion:  · Patient will need: Home IV , [place],  SNF,  LTAC TBD  · Patient will need outpatient wound care: No    Chief complaint/reason for consultation:   · Possible pneumonia      History of Present Illness:   Kyle Rodriguez is a 80y.o.-year-old  male who was initially admitted on 3/6/2019. Patient seen at the request of Dr. Rhianna Haji     INITIAL HISTORY:    Pt is a 79 yo gentleman who has recently had multiple hospital admissions in [place] for decompensated heart failure. His symptoms began in December 2018. 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 A cardiac cath at that time showed LM and LAD calcified stenosis 80-90% in multiple areas, with severe disease in D1, D2, Om1 and OM2. Minimal disease in RCA. LV function with EF 25%. Severe aortic stenosis compromising his clinical improvedmnt      He underwent a dual chamber pacemaker placement. Following that, he had multiple hospitalizations for acute heart failure and shortness of breath. His baseline creatinine was 1.3, now >2.0. He has had multiple thoracenteses with transudative pleural fluid removed.      He was brought to [facility] for further evaluation and was admitted on 3/6/19     An ECHO from admission showed   Normal left ventricle size with mildly reduced systolic function; Estimated  left ventricular ejection fraction 40-45%  Anterolateral wall hypokinesis. Mild left ventricular hypertrophy. Left atrium is moderately dilated. Grade II diastolic dysfunction. Heavily calcified aortic valve with reduced systolic excursion and evidence  of moderate stenosis. (Peak nataliia 3.62 m/s and mean gradient 29 mmHg)  Moderate posterior pericardial effusion without any echo signs of tamponade. Normal right ventricular size and function. Pacemaker lead seen in right ventricle. Moderate tricuspid regurgitation. Estimated right ventricular systolic pressure is 60 mmHg suggesting moderate  pulmonary HTN.    He underwent an atherectomy/JEAN-CLAUDE of the left main and LAD on 3/6.   Because of the high surgical risk and renal function he was taken back to the cath lab on 3/11 for PTCA-JEAN-CLAUDE LAD, PTCRA-JEAN-CLAUDE LM with plan to consider a TAVR if pt remained stable.     A TAVR was performed on 3/15     A carotid study showed less than 50% stenosis bilaterally.     He has had multiple thoracenteses since admission. Pt has been followed by CT surgery, cardiology, pulmonary, nephrology and urology.     On the evening of 3/25 pt developed hypertension and SOB at rest. A stat CXR was done that showed progressed moderate to large bilateral pleural effusions with consolidation.      On 3/26 pt underwent a Lt thoracentesis with 1400 ml clear pleural fluid removed. Pt reports feeling much better after the procedure and is asking to sit in the chair.      I am consulted to determine if Mr Anuj Sheriff has pneumonia.     CURRENT EXAMINATION: 4/1/2019    Pt seen and examined at bedside. No acute events overnight. The patient feels SOB, now on O2 per NC   The patient denies fevers, chills, chest pain, sob, cough, sputum production, n/v, abdominal pain. He reports that he isnt feeling well in general.    He is on HD  Pacemaker placement planned for 4-2-19     On exam pt is in no distress  Breath sounds are diminished bilateral bases Rt>Lt    Afebrile, VSS     Labs reviewed: 4/1/2019    WBC 10.6->11.6->10  Hgb 8.5->7.7-->8.6    Cr 2.71 ->3.03->3.27->3.2  ->101->106->104-->24    Cultures:  Pleural fluid:  · 3/7 No growth    Discussed with patient, RN. I have personally reviewed the past medical history, past surgical history, medications, social history, and family history, and I have updated the database accordingly.   Past Medical History:     Past Medical History:   Diagnosis Date    Aortic stenosis 02/15/2019    Atrial fibrillation (HCC)     CAD (coronary artery disease)     Chest pain 02/15/2019    CHF (congestive heart failure) (HCC)     Chronic anemia     Chronic kidney disease     Hypertension     Pleural effusion on left 02/15/2019    Pleural effusion, right 02/15/2019    Pulmonary emboli ([facility] [address] 02/15/2019    Pulmonary hypertension ([facility] [address] 2019    Thyroid disease     hypothyroidism       Past Surgical  History:     Past Surgical History:   Procedure Laterality Date    CORONARY ANGIOPLASTY WITH STENT PLACEMENT  2019    complex PCI of LT MAIN, LAD    JOINT REPLACEMENT      right ankle    PACEMAKER PLACEMENT  2019    THORACENTESIS         Medications:      albumin human  25 g Intravenous Once    aspirin  81 mg Oral Daily    bumetanide  2 mg Oral BID    [Held by provider] polyethylene glycol  17 g Oral Daily    spironolactone  25 mg Oral Daily    metoprolol succinate  50 mg Oral Daily    hydrALAZINE  50 mg Oral 3 times per day    [Held by provider] apixaban  2.5 mg Oral Daily    FLUoxetine  10 mg Oral Daily    sodium chloride flush  10 mL Intravenous 2 times per day    sodium chloride flush  10 mL Intravenous 2 times per day    levothyroxine  125 mcg Oral Daily    ALPRAZolam  0.25 mg Oral Once    QUEtiapine  25 mg Oral Nightly    sodium chloride flush  10 mL Intravenous 2 times per day    clopidogrel  75 mg Oral Daily    isosorbide mononitrate  30 mg Oral Daily    [Held by provider] docusate sodium  100 mg Oral Daily    amiodarone  200 mg Oral Daily    atorvastatin  20 mg Oral Daily       Social History:     Social History     Socioeconomic History    Marital status:       Spouse name: Not on file    Number of children: Not on file    Years of education: Not on file    Highest education level: Not on file   Occupational History     Employer: RETIRED   Social Needs    Financial resource strain: Not on file    Food insecurity:     Worry: Not on file     Inability: Not on file    Transportation needs:     Medical: Not on file     Non-medical: Not on file   Tobacco Use    Smoking status: Former Smoker     Last attempt to quit:      Years since quittin.2    Smokeless tobacco: Never Used   Substance and Sexual Activity    Alcohol use: Not Currently    Drug use: Never    Sexual activity: Not on file   Lifestyle    Physical activity:     Days per week: Not on file     Minutes per session: Not on file    Stress: Not on file   Relationships    Social connections:     Talks on phone: Not on file     Gets together: Not on file     Attends [employer] service: Not on file     Active member of club or organization: Not on file     Attends meetings of clubs or organizations: Not on file     Relationship status: Not on file    Intimate partner violence:     Fear of current or ex partner: Not on file     Emotionally abused: Not on file     Physically abused: Not on file     Forced sexual activity: Not on file   Other Topics Concern    Not on file   Social History Narrative    Not on file       Family History:   History reviewed. No pertinent family history. Allergies:   Quinolones     Review of Systems:   Constitutional: No fevers or chills. No systemic complaints  Head: No headaches  Eyes: No double vision or blurry vision. ENT: No sore throat or runny nose. Cardiovascular: No chest pain or palpitations. Lung: shortness of breath with some activity, occasional cough. No sputum production  Abdomen: No nausea, vomiting, diarrhea, or abdominal pain. Genitourinary: Urinary retention. Johnston in place   Musculoskeletal: No muscle aches or pains. Hematologic: No bleeding or bruising. Neurologic: No headache, weakness, numbness, or tingling. Integument: No rash, no ulcers. Psychiatric: No depression. Endocrine: No polyuria, no polydipsia, no polyphagia.     Physical Examination :     Patient Vitals for the past 8 hrs:   BP Temp Temp src Pulse Resp Weight   04/01/19 1100 (!) 99/42 98.4 °F (36.9 °C) Oral 60 -- --   04/01/19 1030 (!) 105/45 -- -- -- -- --   04/01/19 0830 (!) 140/50 -- -- -- -- --   04/01/19 0815 -- 97.7 °F (36.5 °C) -- -- 14 148 lb 9.4 oz (67.4 kg)   04/01/19 0630 (!) 158/68 98.1 °F (36.7 °C) Oral 61 -- --   04/01/19 0626 (!) 158/68 97.5 °F (36.4 °C) Oral 62 16 143 lb 4.8 oz (65 kg)     General Appearance: Awake, alert, and in no apparent distress  Head:  Normocephalic, no trauma  Eyes: Pupils equal, round, reactive to light and accommodation; extraocular movements intact; sclera anicteric. ENT: Oropharynx clear. Mouth/throat: mucosa pink and moist. No lesions. Dentition in good repair. Neck: Supple, without lymphadenopathy. Pulmonary/Chest: Clear to auscultation, without wheezes, rales, or rhonchi. Decreased breath sounds at bilateral bases Rt > Lt  Cardiovascular: Irregular rate and rhythm without murmurs, rubs, or gallops. Abdomen: Soft, non tender. Bowel sounds normal.  All four Extremities: Mild edema. Neurologic: No gross sensory or motor deficits. Skin: Warm and dry with good turgor. No signs of peripheral arterial or venous insufficiency. No ulcerations. No open wounds. Medical Decision Making -Laboratory:   I have independently reviewed/ordered the following labs:    CBC with Differential:   Recent Labs     03/31/19  0724 04/01/19  0837   HGB 9.1* 8.6*   HCT 29.6* 27.1*     BMP:   Recent Labs     04/01/19  0837 04/01/19  1142    141   K 3.2* 3.2*   CL 99 102   CO2 25 25   BUN 66* 24*   CREATININE 3.20* 1.24*   MG 1.8 1.5*     Hepatic Function Panel:   Recent Labs     03/29/19  1848   LABALBU 3.6     No results for input(s): RPR in the last 72 hours. No results for input(s): HIV in the last 72 hours. No results for input(s): BC in the last 72 hours.   Lab Results   Component Value Date    MUCUS NOT REPORTED 03/29/2019    RBC 2.68 03/29/2019    TRICHOMONAS NOT REPORTED 03/29/2019    WBC 10.0 03/29/2019    YEAST NOT REPORTED 03/29/2019    TURBIDITY CLEAR 03/29/2019     Lab Results   Component Value Date    CREATININE 1.24 04/01/2019    GLUCOSE 120 04/01/2019       Medical Decision Making-Imaging:   3/29 CXR      Narrative   EXAMINATION:   SINGLE XRAY VIEW OF THE CHEST       3/29/2019 8:26 am       COMPARISON:   Chest radiograph performed 03/28/2019.       HISTORY:   ORDERING SYSTEM PROVIDED HISTORY: effusions   TECHNOLOGIST PROVIDED HISTORY:   effusions       FINDINGS:   There are large bilateral effusions with adjacent consolidation.  There is   underlying pulmonary congestion.  There is no pneumothorax.  The heart is   enlarged with stable cardiac leads.  The upper abdomen is unremarkable.  The   extrathoracic soft tissues are unremarkable.           Impression   Cardiomegaly with large bilateral effusions and adjacent consolidation   concerning for pneumonia.               3/27 CXR    Narrative   EXAMINATION:   TWO VIEWS OF THE CHEST       3/28/2019 7:56 am       COMPARISON:   Chest radiograph performed 03/27/2019.       HISTORY:   ORDERING SYSTEM PROVIDED HISTORY: effusions   TECHNOLOGIST PROVIDED HISTORY:   effusions       FINDINGS:   There is a small left and a large right pleural effusion with adjacent   infiltrate.  There is no pneumothorax.  The mediastinal structures are stable   with stable cardiac leads.  The upper abdomen is unremarkable.  The   extrathoracic soft tissues are unremarkable.           Impression   Small left and large right pleural effusion with adjacent infiltrate   representing atelectasis versus pneumonia.  Overall there may be mild   improvement of the right-sided effusion.              Narrative   EXAMINATION:   SINGLE XRAY VIEW OF THE CHEST       3/26/2019 2:37 am       COMPARISON:   March 20, 2019.       HISTORY:   ORDERING SYSTEM PROVIDED HISTORY: SOB, PE   TECHNOLOGIST PROVIDED HISTORY:   SOB, PE       FINDINGS:   Frontal portable view of the chest.  Low lung volume.  Progressed moderate to   large bilateral pleural effusions with consolidation.  Indistinct pulmonary   vasculature.  No pneumothorax.  The cardiomediastinal silhouette is not well   evaluated.  Atherosclerotic thoracic aorta.  Left pectoral trans venous   dual-chamber cardiac pacer device.         Impression   Progressed moderate to large bilateral pleural effusions with consolidation. Superimposed infection is not excluded.             Medical Decision Making-Other: Thank you for allowing us to participate in the care of this patient. Please call with questions.     Roya Ball MD

## 2019-04-01 NOTE — PROGRESS NOTES
[facility]  Pre Op Progress Note    CC: CHF, SOB, Cre increased, new dialysis      Subjective:  Mr. Branham Crew seen and examined Plan of care reviewed and questions answered. States stomach is bloated today, diarrhea yesterday. Denies nausea and vomiting. Passing flatus. Physical Exam  Vital Signs: BP (!) 158/68   Pulse 61   Temp 98.1 °F (36.7 °C) (Oral)   Resp 16   Ht 5' 6\" (1.676 m)   Wt 143 lb 4.8 oz (65 kg)   SpO2 98%   BMI 23.13 kg/m²  O2 Flow Rate (L/min): 1 L/min       General: alert and oriented to person, place and time. Up in chair, No apparent distress. Heart:Normal S1 and S2.  Regular rhythm. No murmurs, gallops, or rubs.   and Rhythm: paced  Lungs: clear to auscultation bilaterally  Abdomen: soft, non tender, distended, BSx4, hyperactive  Extremities: negative      Scheduled Meds:    albumin human  25 g Intravenous Once    aspirin  81 mg Oral Daily    bumetanide  2 mg Oral BID    polyethylene glycol  17 g Oral Daily    spironolactone  25 mg Oral Daily    metoprolol succinate  50 mg Oral Daily    hydrALAZINE  50 mg Oral 3 times per day    [Held by provider] apixaban  2.5 mg Oral Daily    FLUoxetine  10 mg Oral Daily    sodium chloride flush  10 mL Intravenous 2 times per day    sodium chloride flush  10 mL Intravenous 2 times per day    levothyroxine  125 mcg Oral Daily    ALPRAZolam  0.25 mg Oral Once    QUEtiapine  25 mg Oral Nightly    sodium chloride flush  10 mL Intravenous 2 times per day    clopidogrel  75 mg Oral Daily    isosorbide mononitrate  30 mg Oral Daily    docusate sodium  100 mg Oral Daily    amiodarone  200 mg Oral Daily    atorvastatin  20 mg Oral Daily     Continuous Infusions:    DOPamine 2 mcg/kg/min (03/31/19 1920)    nitroGLYCERIN Stopped (03/26/19 1315)       Data:  CBC:   Recent Labs     03/31/19  0724   HGB 9.1*   HCT 29.6*     BMP:   Recent Labs     03/30/19  0451 03/31/19  0724    135   K 4.0 3.7   CL 95* 98 CO2 25 24   BUN 76* 55*   CREATININE 2.75* 2.87*     PT/INR:   Recent Labs     03/30/19  1707   PROTIME 12.1*   INR 1.2     APTT: No results for input(s): APTT in the last 72 hours. 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 Still on Dopamine gtt at 2 mcg/kg. min  Eliquis on hold for BiV PPM Thursday. EP following  ID monitoring off antibiotics  Pulmonary - wean O2 as tolerated, no intervention for pleural effusions  Will add simethicone to help with bloating, send stool for OB     The above recommendations including medications and orders were discussed and agreed upon with Dr. Hazel Garner, the attending on service for the cardiothoracic surgery group today.      Javon Noble, APRN, CNP

## 2019-04-01 NOTE — PROGRESS NOTES
Nutrition Assessment    Type and Reason for Visit: Reassess    Nutrition Recommendations: Continue general, 2 gm Na diet as tolerated. If K becomes elevated, recommend adding renal diet. Continue renal supplements 3x/d. Nutrition Assessment: Pt receiving HD during time of visit. Pt stated that his appetite was poor this morning. Pt reported that he is not hungry but is drinking % of his nutritional supplements. Pt continues to have wt flux, most likely r/t fluid imbalance. Will continue supplements to compliment po intake and monitor wt trends. Malnutrition Assessment:  · Malnutrition Status: At risk for malnutrition    Nutrition Risk Level:  Moderate    Nutrient Needs:  · Estimated Daily Total Kcal: 1.3-1.4 ~>9102-1430 kcals/d   · Estimated Daily Protein (g): 1.2-1.4 gm/kg ~>85-99 gms/d    Nutrition Diagnosis:   · Problem: Inadequate oral intake  · Etiology: related to Insufficient energy/nutrient consumption, Catabolic illness(decreased appetite)     Signs and symptoms:  as evidenced by Intake 0-25%, Intake 50-75%, Intake 25-50%(pt report/interview)    Objective Information:  · Nutrition-Focused Physical Findings: active bowel sounds  · Wound Type: None  · Current Nutrition Therapies:  · Oral Diet Orders: General, 2gm Sodium, Fluid Restriction   · Oral Diet intake: 51-75%, 26-50%, 1-25%  · Oral Nutrition Supplement (ONS) Orders: Renal Oral Supplement  · ONS intake: %  · Anthropometric Measures:  · Ht: 5' 6\" (167.6 cm)   · Current Body Wt: 148 lb (67.1 kg)  · Admission Body Wt: 156 lb (70.8 kg)  · Usual Body Wt: 165 lb (74.8 kg)(per pt)  · % Weight Change:  ,  Wt flux since admission   · Ideal Body Wt: 141 lb 15.6 oz (64.4 kg), % Ideal Body 111% (adm/ideal)  · BMI Classification: BMI 25.0 - 29.9 Overweight    Nutrition Interventions:   Continue current diet, Continue current ONS  Continued Inpatient Monitoring, Education Completed    Nutrition Evaluation:   · Evaluation: Goal achieved · Goals: Oral intakes to meet at least 50% of estimated nutrition needs.     · Monitoring: Nutrition Progression, Meal Intake, Supplement Intake, Diet Tolerance, I&O, Weight, Pertinent Labs, Monitor Bowel Function      Electronically signed by Aditi Persaud RD, LD on 4/1/19 at 11:16 AM    Contact Number: [phone]

## 2019-04-02 NOTE — PROGRESS NOTES
Patient admitted, consent signed and questions answered. Patient ready for procedure. Call light to reach with side rails up 2 of 2. History and physical complete.   2% Chlorhexidine cloths used to prep site

## 2019-04-02 NOTE — PROGRESS NOTES
[facility] - Progress Note  Today's Date and Time: 4/2/2019, 4:18 PM    Impression :   1. Recurrent bilateral pleural effusions w/lung compression  2. CHF  3. 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 Multivessel CAD s/p multiple stent placements  5. S/P TAVR  6. HUYEN  7. Urinary retention  8. Pulmonary hypertension  9. S/P Upgrade of dual chamber PPM to CRT-P on 4-2-19    Recommendations:   · Vancomycin IV prophylaxis for surgery  · Aggressive pulmonary toilet  · Acapella valve and IS at bedside    Medical Decision Making/Summary/Discussion:   · 79 yo gentleman who developed complete heart block. Found to have multivessel CAD, severe aortic stenosis and pulmonary HTN. · Underwent pacemaker placement with subsequent bouts of acute CHF and multiple hospitalizations in [place]  · Brought to [place] by family for further care. · Since admission at [facility] he has had cardiac catheterizations x 2 with multiple stent placements and a TAVR on 3-15. Pt remains on a nitroglycerine drip  · Pt continues to require frequent thoracenteses  · He has suffered an HUYEN and is currently on a bumex drip  · CXR 3/26 showed progressed moderate to large bilateral pleural effusions with consolidation. · ID consult placed for treatment of any potential pneumonia  · Pt is afebrile, without cough or sputum production. He remains SOB with exertion and at times, when quiet. · Currently no evidence of active infection. Pt will require aggressive pulmonary toilet and diuresis. Plan to monitor off antibiotics  · Patient to continue HD  · He had upgraded pacemaker placed on 4-2-19.     Infection Control Recommendations   · [place] Precautions    Antimicrobial Stewardship Recommendations     · Monitor off antibiotics    Coordination of Outpatient Care:   · Estimated Length of IV antimicrobials: None  · Patient will need Midline Catheter Insertion:   · Patient will need PICC line Insertion:  · Patient will need: Home IV , main and LAD on 3/6. Because of the high surgical risk and renal function he was taken back to the cath lab on 3/11 for PTCA-JEAN-CLAUDE LAD, PTCRA-JEAN-CLAUDE LM with plan to consider a TAVR if pt remained stable.     A TAVR was performed on 3/15     A carotid study showed less than 50% stenosis bilaterally.     He has had multiple thoracenteses since admission. Pt has been followed by CT surgery, cardiology, pulmonary, nephrology and urology.     On the evening of 3/25 pt developed hypertension and SOB at rest. A stat CXR was done that showed progressed moderate to large bilateral pleural effusions with consolidation.      On 3/26 pt underwent a Lt thoracentesis with 1400 ml clear pleural fluid removed. Pt reports feeling much better after the procedure and is asking to sit in the chair.      I am consulted to determine if Mr Anuj Sheriff has pneumonia.     CURRENT EXAMINATION: 4/2/2019    Pt seen and examined at bedside. No acute events overnight. The patient feels SOB, now on O2 per NC   The patient denies fevers, chills, chest pain, sob, cough, sputum production, n/v, abdominal pain. He had upgraded pacemaker placed on 4-2-19  Vancomycin given pre-op     On exam pt is in no distress  Breath sounds are diminished bilateral bases Rt>Lt    Afebrile, VSS     Labs reviewed: 4/2/2019    WBC 10.6->11.6->10  Hgb 8.5->7.7-->8.6    Cr 2.71 ->3.03->3.27->3.2--> 1.24  ->101->106->104-->24    Cultures:  Pleural fluid:  · 3/7 No growth    Discussed with patient, RN. I have personally reviewed the past medical history, past surgical history, medications, social history, and family history, and I have updated the database accordingly.   Past Medical History:     Past Medical History:   Diagnosis Date    Aortic stenosis 02/15/2019    Atrial fibrillation (HCC)     CAD (coronary artery disease)     Chest pain 02/15/2019    CHF (congestive heart failure) (HCC)     Chronic anemia     Chronic kidney disease     Hypertension  Pleural effusion on left 02/15/2019    Pleural effusion, right 02/15/2019    Pulmonary emboli (HCC) 02/15/2019    Pulmonary hypertension ([address] 03/06/2019    Thyroid disease     hypothyroidism       Past Surgical  History:     Past Surgical History:   Procedure Laterality Date    CORONARY ANGIOPLASTY WITH STENT PLACEMENT  03/11/2019    complex PCI of LT MAIN, LAD    JOINT REPLACEMENT      right ankle    OTHER SURGICAL HISTORY  04/02/2019    UPGRADE TO BI-V PACEMAKER    OTHER SURGICAL HISTORY  03/15/2019    TAVR PROCEDURE    PACEMAKER PLACEMENT  01/20/2019    THORACENTESIS         Medications:      bumetanide  3 mg Oral BID    vancomycin  1,000 mg Intravenous Once    bacitracin in 0.9 % sodium chloride irrigation  50,000 Units Topical Once    [START ON 4/3/2019] apixaban  2.5 mg Oral Daily    sodium chloride flush  10 mL Intravenous 2 times per day    albumin human  25 g Intravenous Once    aspirin  81 mg Oral Daily    [Held by provider] polyethylene glycol  17 g Oral Daily    spironolactone  25 mg Oral Daily    metoprolol succinate  50 mg Oral Daily    hydrALAZINE  50 mg Oral 3 times per day    FLUoxetine  10 mg Oral Daily    levothyroxine  125 mcg Oral Daily    ALPRAZolam  0.25 mg Oral Once    QUEtiapine  25 mg Oral Nightly    clopidogrel  75 mg Oral Daily    isosorbide mononitrate  30 mg Oral Daily    [Held by provider] docusate sodium  100 mg Oral Daily    amiodarone  200 mg Oral Daily    atorvastatin  20 mg Oral Daily       Social History:     Social History     Socioeconomic History    Marital status:       Spouse name: Not on file    Number of children: Not on file    Years of education: Not on file    Highest education level: Not on file   Occupational History     Employer: RETIRED   Social Needs    Financial resource strain: Not on file    Food insecurity:     Worry: Not on file     Inability: Not on file    Transportation needs:     Medical: Not on file Non-medical: Not on file   Tobacco Use    Smoking status: Former Smoker     Last attempt to quit:      Years since quittin.2    Smokeless tobacco: Never Used   Substance and Sexual Activity    Alcohol use: Not Currently    Drug use: Never    Sexual activity: Not on file   Lifestyle    Physical activity:     Days per week: Not on file     Minutes per session: Not on file    Stress: Not on file   Relationships    Social connections:     Talks on phone: Not on file     Gets together: Not on file     Attends Yazidi service: Not on file     Active member of club or organization: Not on file     Attends meetings of clubs or organizations: Not on file     Relationship status: Not on file    Intimate partner violence:     Fear of current or ex partner: Not on file     Emotionally abused: Not on file     Physically abused: Not on file     Forced sexual activity: Not on file   Other Topics Concern    Not on file   Social History Narrative    Not on file       Family History:   History reviewed. No pertinent family history. Allergies:   Quinolones     Review of Systems:   Constitutional: No fevers or chills. No systemic complaints  Head: No headaches  Eyes: No double vision or blurry vision. ENT: No sore throat or runny nose. Cardiovascular: No chest pain or palpitations. Lung: shortness of breath with some activity, occasional cough. No sputum production  Abdomen: No nausea, vomiting, diarrhea, or abdominal pain. Genitourinary: Urinary retention. Johnston in place   Musculoskeletal: No muscle aches or pains. Hematologic: No bleeding or bruising. Neurologic: No headache, weakness, numbness, or tingling. Integument: No rash, no ulcers. Psychiatric: No depression. Endocrine: No polyuria, no polydipsia, no polyphagia.     Physical Examination :     Patient Vitals for the past 8 hrs:   BP Temp Temp src Pulse Resp SpO2 Weight   19 1600 (!) 125/53 -- -- 67 -- -- --   19 1545 (!) 121/98 -- -- 67 -- 98 % --   04/02/19 1530 (!) 125/51 -- -- 67 -- -- --   04/02/19 1515 (!) 136/56 -- -- 63 -- 96 % --   04/02/19 1500 (!) 173/61 97.5 °F (36.4 °C) -- 60 -- -- --   04/02/19 1445 (!) 156/115 -- -- 61 -- 96 % --   04/02/19 1430 (!) 167/65 97.5 °F (36.4 °C) -- 60 15 97 % 150 lb 5.7 oz (68.2 kg)   04/02/19 1415 (!) 149/56 97.5 °F (36.4 °C) Oral 60 12 -- --   04/02/19 0945 -- -- -- 62 18 95 % --   04/02/19 0845 (!) 160/62 -- -- 62 -- -- --     General Appearance: Awake, alert, and in no apparent distress  Head:  Normocephalic, no trauma  Eyes: Pupils equal, round, reactive to light and accommodation; extraocular movements intact; sclera anicteric. ENT: Oropharynx clear. Mouth/throat: mucosa pink and moist. No lesions. Dentition in good repair. Neck: Supple, without lymphadenopathy. Pulmonary/Chest: Clear to auscultation, without wheezes, rales, or rhonchi. Decreased breath sounds at bilateral bases Rt > Lt  Cardiovascular: Irregular rate and rhythm without murmurs, rubs, or gallops. Abdomen: Soft, non tender. Bowel sounds normal.  All four Extremities: Mild edema. Neurologic: No gross sensory or motor deficits. Skin: Warm and dry with good turgor. No signs of peripheral arterial or venous insufficiency. No ulcerations. No open wounds. Medical Decision Making -Laboratory:   I have independently reviewed/ordered the following labs:    CBC with Differential:   Recent Labs     03/31/19  0724 04/01/19  0837   HGB 9.1* 8.6*   HCT 29.6* 27.1*     BMP:   Recent Labs     04/01/19  0837 04/01/19  1142    141   K 3.2* 3.2*   CL 99 102   CO2 25 25   BUN 66* 24*   CREATININE 3.20* 1.24*   MG 1.8 1.5*     Hepatic Function Panel:   No results for input(s): PROT, LABALBU, BILIDIR, IBILI, BILITOT, ALKPHOS, ALT, AST in the last 72 hours. No results for input(s): RPR in the last 72 hours. No results for input(s): HIV in the last 72 hours. No results for input(s): BC in the last 72 hours.   Lab Results HISTORY: SOB, PE   TECHNOLOGIST PROVIDED HISTORY:   SOB, PE       FINDINGS:   Frontal portable view of the chest.  Low lung volume.  Progressed moderate to   large bilateral pleural effusions with consolidation.  Indistinct pulmonary   vasculature.  No pneumothorax.  The cardiomediastinal silhouette is not well   evaluated.  Atherosclerotic thoracic aorta.  Left pectoral trans venous   dual-chamber cardiac pacer device.           Impression   Progressed moderate to large bilateral pleural effusions with consolidation. Superimposed infection is not excluded.             Medical Decision Making-Other: Thank you for allowing us to participate in the care of this patient. Please call with questions.     Hilary Hurley MD

## 2019-04-02 NOTE — PROGRESS NOTES
Occupational Therapy    Occupational Therapy Not Seen Note    DATE: 2019  Name: Elaina Wilder  : 12/10/1928  MRN: [id number]    Patient not available for Occupational Therapy due to:    Surgery/Procedure: Pacemaker placement    Next Scheduled Treatment: 4-3-19    Electronically signed by ZACH Al on 2019 at 1:19 PM

## 2019-04-02 NOTE — OP NOTE
Center Cardiology Consultant                Procedure Note  PPM-CRT Upgrade        Katerin Arellano (79 y.o., male)  12/10/1928  [id number]  4/2/2019      Procedure: Upgrade of dual chamber PPM to CRT-P    Operators:  Primary: Natalie Elaine MD     Indication: Recurrent CHF, EF 35%, with multiple fast pleural effusion build up requiring drainage. Positive evidence of dyssynchrony by tissue     Pre Procedure Conscious Sedation Data:    ASA Class:    [] I [x] II [] III [] IV    Mallampati Class:  [] I [] II [x] III [] IV     AICD / CRT Indication: Pre procedure review    Documentation to support the medical necessity f procedure. Reason for placement:     [] Initial [] Recall/Malfunction  [x] Upgrade  [] Beyond useful life limit    EF measured by:   [x] Echo [] Stress Test  [] Muga  [] Angiography       / Device Data:        Name    Medtronic (Device and CS lead) XX   [place] (RA and RV leads already in-nurys) XX          Model # Serial #   CRT-P [id number]   RA-Lead (already in place) [id number]   RV-Lead (already in place) 2088TC [id number]   LV-Lead / Gemini Musca [id number]     Leads and device program were documented in chart    · Sedation monitoring  · LV lead placement  · Test all RV and RA leads from before. · Intra - OP Lead Testing  · Coronary Sinus Angiogram   · Pocket Revision  · Generators Implant  · Fluoro time: 5 min      Procedure  After the usual preparation of the left neck and chest, the patient was draped in the usual sterile manner. Local anesthesia was infused below the left clavicle from the midline laterally. An incision was made inferior and parallel to the clavicle. The incision was carried down to the fascia. A pocket was formed inferior using blunt dissection. A thin walled 18 gauge needle was used to puncture the left subclavian vein using the modified Seldinger technique. A guide wire was passed into the right heart under fluoroscopic control. This was repeated with a second guide wire. RV Lead   Checked and function properly. RA Lead :  Checked and function properly. LV Lead Implant:  A 9.0 [place]bourg[place] sheath was then passed over the guide wire and the guide wire removed. a multi purpose guiding catheter was introduced and engaged with the coronary sinus. A     balloon tip catheter was used to perform coronary sinus angiogram and identify the lateral baranch. The left ventricular lead was advanced through the sheath into the lateral branch and positioned in the best position under fluoroscopic control, with multiple testing performed in that position . The acute pacing and sensing thresholds were measured and found to be satisfactory. Generator: The implanted leads were attached to the CRT-P using the setscrews. The pocket was irrigated with antibiotic solution. The pulse generator and leads were coiled and placed in the pocket. Fluoroscopy was used to verify the final placement of the pacemaker and leads. The pocket was closed using multiple layers of suture and a dry sterile dressing was applied. There were no complications, patient tolerated the procedure well. The patient left the EP lab in stable condition.           Impression / Device:  Successful Implantation of: CRT-P      Plan:  Telemetry monitoring  Interrogate pacemaker before discharge  CXR if needed  Wound check at [facility] in 7days  Discharge if patient remains stable        Electronically signed by Humberto Benitez MD on 4/2/2019 at 10:57 [address] Cardiology Consultant  [phone]

## 2019-04-02 NOTE — PROGRESS NOTES
sodium chloride flush  10 mL Intravenous 2 times per day    levothyroxine  125 mcg Oral Daily    ALPRAZolam  0.25 mg Oral Once    QUEtiapine  25 mg Oral Nightly    sodium chloride flush  10 mL Intravenous 2 times per day    clopidogrel  75 mg Oral Daily    isosorbide mononitrate  30 mg Oral Daily    [Held by provider] docusate sodium  100 mg Oral Daily    amiodarone  200 mg Oral Daily    atorvastatin  20 mg Oral Daily     Continuous Infusions:    sodium chloride 75 mL/hr at 04/01/19 2131    DOPamine 2 mcg/kg/min (04/01/19 1635)    nitroGLYCERIN Stopped (03/26/19 1315)     PRN Meds:    Home Meds:                Medications Prior to Admission: amiodarone (CORDARONE) 200 MG tablet, Take 200 mg by mouth daily  atorvastatin (LIPITOR) 20 MG tablet, Take 20 mg by mouth daily  isosorbide mononitrate (IMDUR) 60 MG extended release tablet, Take 60 mg by mouth daily  levothyroxine (SYNTHROID) 100 MCG tablet, Take 100 mcg by mouth Daily  lisinopril (PRINIVIL;ZESTRIL) 40 MG tablet, Take 40 mg by mouth daily  apixaban (ELIQUIS) 2.5 MG TABS tablet, Take 2.5 mg by mouth daily  metoprolol tartrate (LOPRESSOR) 50 MG tablet, Take 50 mg by mouth 2 times daily    INVESTIGATIONS     Last 3 CMP:    Recent Labs     03/31/19  0724 04/01/19  0837 04/01/19  1142    138 141   K 3.7 3.2* 3.2*   CL 98 99 102   CO2 24 25 25   BUN 55* 66* 24*   CREATININE 2.87* 3.20* 1.24*   CALCIUM 8.3* 8.5* 7.7*       Last 3 CBC:  Recent Labs     03/31/19  0724 04/01/19  0837   HGB 9.1* 8.6*   HCT 29.6* 27.1*       ASSESSMENT     1. Acute kidney injury secondary to ATN from hypoperfusion related to cardiorenal syndrome - worsening. Hemodialysis dependent since 29th of March  2. Chronic kidney disease stage IIIB with baseline creatinine suspected around 2  3. Nephrotic range proteinuria - Bx not feasible in view of he being on Asprin and cardiology recommends not to stop anticoagulant. Explained to patient and he does not want to do that.   4. Status post TVR  5. Cardiomyopathy ejection fraction 40-45%/left ventricular diastole dysfunction/moderate to severe mitral regurgitation  6. S/p PCI and atherectomy  3/11  7. Recurrent bilateral pleural effusion status post pleural taps  8. HTN  9. Hx of Urinary retention  10. Advanced age    PLAN     4. Hemodialysis today after the procedure. 2.  Increase Bumex to 3 mg 2 times a day. Discontinue IV fluids.   3.  Will follow    Please do not hesitate to call with questions    This note is created with the assistance of a speech-recognition program. While intending to generate a document that actually reflects the content of the visit, no guarantees can be provided that every mistake has been identified and corrected by editing    Carolyn Obando MD, MRCP Hazel Guzman, Martin Read   4/2/2019 9:26 AM  [facility]

## 2019-04-02 NOTE — PROGRESS NOTES
K 3.7 3.2* 3.2*   CL 98 99 102   CO2 24 25 25   BUN 55* 66* 24*   CREATININE 2.87* 3.20* 1.24*     PT/INR:   Recent Labs     03/30/19  1707   PROTIME 12.1*   INR 1.2     APTT: No results for input(s): APTT in the last 72 hours. Assessment & Plan:   Patient Active Problem List   Diagnosis    Pulmonary hypertension (HCC)    Acute on chronic systolic congestive heart failure (HCC)    Pleural effusion due to CHF (congestive heart failure) 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    Azotemia    Aortic valve stenosis    CAD in native artery     Johnston remains, Hemodialysis continues, nephrology managing closely. Still on Dopamine gtt at 2 mcg/kg. min  Eliquis on hold for BiV PPM Thursday. EP following  ID monitoring off antibiotics  Pulmonary - wean O2 as tolerated, no intervention for pleural effusions  Will add simethicone to help with bloating, send stool for [facility]      Cardiology planning for [facility] pacemaker placement today. Will need dialysis after to off load dye. The above recommendations including medications and orders were discussed and agreed upon with Dr. Carleen Zaragoza, the attending on service for the cardiothoracic surgery group today.      [facility], APRN, CNP

## 2019-04-02 NOTE — PROGRESS NOTES
BP: 142 55   Pulse: 67   Resp: 16   Temp: 97.3   SpO2: 98      Pre-Weight = 67.2kg  Post-weight = Weight: 66.8kg  Total Liters Processed = Total Liters Processed (l/min): 29.9 l/min  Rinseback Volume (mL) = Rinseback Volume (ml): 370 ml  Net Removal (mL) = 680  Length of treatment=120    Pt received 2hr tx to remove contrast. Pt tolerated tx well, no c/o.

## 2019-04-02 NOTE — PLAN OF CARE
Problem: Falls - Risk of:  Goal: Will remain free from falls  Description  Will remain free from falls  4/2/2019 0215 by Cristine Mondragon RN  Outcome: Ongoing  4/1/2019 1608 by Cholo Jensen RN  Outcome: Ongoing  Goal: Absence of physical injury  Description  Absence of physical injury  4/2/2019 0215 by Cristine Mondragon RN  Outcome: Ongoing  4/1/2019 1608 by Cholo Jensen RN  Outcome: Ongoing     Problem: Pain:  Description  Pain management should include both nonpharmacologic and pharmacologic interventions. Goal: Pain level will decrease  Description  Pain level will decrease  4/2/2019 0215 by Cristine Mondragon RN  Outcome: Ongoing  4/1/2019 1608 by Cholo Jensen RN  Outcome: Ongoing  Goal: Control of acute pain  Description  Control of acute pain  4/2/2019 0215 by Cristine Mondragon RN  Outcome: Ongoing  4/1/2019 1608 by Cholo Jensen RN  Outcome: Ongoing  Goal: Control of chronic pain  Description  Control of chronic pain  4/2/2019 0215 by Cristine Mondragon RN  Outcome: Ongoing  4/1/2019 1608 by Cholo Jensen RN  Outcome: Ongoing     Problem: Risk for Impaired Skin Integrity  Goal: Tissue integrity - skin and mucous membranes  Description  Structural intactness and normal physiological function of skin and  mucous membranes.   4/2/2019 0215 by Cristine Mondragon RN  Outcome: Ongoing  4/1/2019 1608 by Cholo Jensen RN  Outcome: Ongoing     Problem: Cardiac Output - Decreased:  Goal: Hemodynamic stability will improve  Description  Hemodynamic stability will improve  4/2/2019 0215 by Cristine Mondragon RN  Outcome: Ongoing  4/1/2019 1608 by Cholo Jensen RN  Outcome: Ongoing     Problem: Musculor/Skeletal Functional Status  Goal: Highest potential functional level  4/2/2019 0215 by Cristine Mondragon RN  Outcome: Ongoing  4/1/2019 1608 by Cholo Jensen RN  Outcome: Ongoing  Goal: Absence of falls  4/2/2019 0215 by Cristine Mondragon RN  Outcome: Ongoing  4/1/2019 1608 by Cholo Jensen RN  Outcome:

## 2019-04-02 NOTE — PROGRESS NOTES
Physical Therapy  DATE: 2019    NAME: Levar Colby  MRN: [id number]   : 12/10/1928    Patient not seen this date for Physical Therapy due to:  [] Blood transfusion in progress  [] Hemodialysis  []  Patient Declined  [] Spine Precautions   [] Strict Bedrest  [x] Surgery/ Procedure  [] Testing      [x] Other Pt off the floor for BI pacemaker placement today. Will check back in the PM as time allows           [] PT being discontinued at this time. Patient independent. No further needs. [] PT being discontinued at this time as the patient has been transferred to palliative care. No further needs.     Brianna Lara, PTA

## 2019-04-02 NOTE — PROGRESS NOTES
succinate  50 mg Oral Daily    hydrALAZINE  50 mg Oral 3 times per day    FLUoxetine  10 mg Oral Daily    levothyroxine  125 mcg Oral Daily    ALPRAZolam  0.25 mg Oral Once    QUEtiapine  25 mg Oral Nightly    clopidogrel  75 mg Oral Daily    isosorbide mononitrate  30 mg Oral Daily    [Held by provider] docusate sodium  100 mg Oral Daily    amiodarone  200 mg Oral Daily    atorvastatin  20 mg Oral Daily       Continuous Infusions:   sodium chloride 75 mL/hr at 04/01/19 2131    DOPamine 2 mcg/kg/min (04/01/19 1635)    nitroGLYCERIN Stopped (03/26/19 1315)       PRN Meds:      Labs:  CBC:   Recent Labs     03/31/19  0724 04/01/19  0837   HGB 9.1* 8.6*   HCT 29.6* 27.1*     BMP:   Recent Labs     03/31/19  0724 04/01/19  0837 04/01/19  1142    138 141   K 3.7 3.2* 3.2*   CL 98 99 102   CO2 24 25 25   BUN 55* 66* 24*   CREATININE 2.87* 3.20* 1.24*     LIVER PROFILE:   No results for input(s): AST, ALT, LIPASE, BILIDIR, BILITOT, ALKPHOS in the last 72 hours. Invalid input(s): AMYLASE,  ALB  PT/INR:   No results for input(s): PROTIME, INR in the last 72 hours. APTT:   No results for input(s): APTT in the last 72 hours. UA:  No results for input(s): NITRITE, COLORU, PHUR, LABCAST, WBCUA, RBCUA, MUCUS, TRICHOMONAS, YEAST, BACTERIA, CLARITYU, SPECGRAV, LEUKOCYTESUR, UROBILINOGEN, BILIRUBINUR, BLOODU, GLUCOSEU, AMORPHOUS in the last 72 hours. Invalid input(s): KETONESU  No results for input(s): PHART, XGF6LCB, PO2ART in the last 72 hours.     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

## 2019-04-03 NOTE — PROGRESS NOTES
CTS team and TCC team at bedside to assess oozing from PM site. Ordered to place safeguard for 2 hrs and re-evaluate. Will continue to monitor and notify care team as needed.

## 2019-04-03 NOTE — PROGRESS NOTES
Dr. Maricel Patricia at bedside - ordered to give 1 Norco for PM site pain, stop dopamine and give 1 of albumin. Also BMP and CBC. Will continue to monitor and notify care team as needed.

## 2019-04-03 NOTE — PROGRESS NOTES
Dr. Segun Galvan at bedside to evaluate pt's pacemaker site  Very minimal bleeding at incision  No need for a stitch at this time  Will update if bleeding resumes or anything changes

## 2019-04-03 NOTE — PROGRESS NOTES
NEPHROLOGY PROGRESS NOTE      SUBJECTIVE     Admitted with shortness of breath secondary to decompensated congestive heart failure in the face of aortic stenosis. Subsequently underwent PCI and PVR. Postprocedure course has been complicated by worsening of renal function and hypervolemia for which he is getting recurrent thoracocentesis and diuretic adjustment. Hemodialysis also initiated. Had 5 sessions off hemodialysis . Last one was yesterday. 1 KG taken off. /24 hrs. On oral diuretics and getting UF with HD. Had implantation of CRT -P yesterday and oozing from site. Hgb dropped and plans to give PRBc. Denies worsening shortness of breath. OBJECTIVE     Vitals:    04/03/19 0713 04/03/19 0714 04/03/19 0756 04/03/19 1119   BP: (!) 126/48 (!) 126/48  (!) 123/55   Pulse: 60 60  60   Resp: 18   18   Temp: 97.8 °F (36.6 °C)   97.6 °F (36.4 °C)   TempSrc: Oral   Oral   SpO2: 94%  95% 97%   Weight:       Height:         24HR INTAKE/OUTPUT:      Intake/Output Summary (Last 24 hours) at 4/3/2019 1152  Last data filed at 4/3/2019 0920  Gross per 24 hour   Intake 1390.72 ml   Output 325 ml   Net 1065.72 ml       General appearance:Awake, alert, in no acute distress  HEENT: PERRLA  Respiratory::vesicular breath sounds, reduced air entry  Cardiovascular:S1 S2 normal,no gallop or organic murmur. Abdomen:Non tender/non distended. Bowel sounds present  Extremities: No Cyanosis or Clubbing, present Lower extremity edema  Neurological:Alert and oriented. No abnormalities of mood, affect, memory, mentation, or behavior are noted      MEDICATIONS     Scheduled Meds:    lidocaine-EPINEPHrine  20 mL Intradermal Once    albumin human  25 g Intravenous Once    bumetanide  3 mg Oral BID    vancomycin  1,000 mg Intravenous Once    bacitracin in 0.9 % sodium chloride irrigation  50,000 Units Topical Once    sodium chloride flush  10 mL Intravenous 2 times per day    albumin human  25 g Intravenous Once    aspirin  81 mg Oral Daily    [Held by provider] polyethylene glycol  17 g Oral Daily    spironolactone  25 mg Oral Daily    metoprolol succinate  50 mg Oral Daily    hydrALAZINE  50 mg Oral 3 times per day    FLUoxetine  10 mg Oral Daily    levothyroxine  125 mcg Oral Daily    ALPRAZolam  0.25 mg Oral Once    QUEtiapine  25 mg Oral Nightly    clopidogrel  75 mg Oral Daily    isosorbide mononitrate  30 mg Oral Daily    [Held by provider] docusate sodium  100 mg Oral Daily    amiodarone  200 mg Oral Daily    atorvastatin  20 mg Oral Daily     Continuous Infusions:     PRN Meds:    Home Meds:                Medications Prior to Admission: amiodarone (CORDARONE) 200 MG tablet, Take 200 mg by mouth daily  atorvastatin (LIPITOR) 20 MG tablet, Take 20 mg by mouth daily  isosorbide mononitrate (IMDUR) 60 MG extended release tablet, Take 60 mg by mouth daily  levothyroxine (SYNTHROID) 100 MCG tablet, Take 100 mcg by mouth Daily  lisinopril (PRINIVIL;ZESTRIL) 40 MG tablet, Take 40 mg by mouth daily  apixaban (ELIQUIS) 2.5 MG TABS tablet, Take 2.5 mg by mouth daily  metoprolol tartrate (LOPRESSOR) 50 MG tablet, Take 50 mg by mouth 2 times daily    INVESTIGATIONS     Last 3 CMP:    Recent Labs     04/01/19  1142 04/02/19  1616 04/03/19  1041    140 138   K 3.2* 3.7 3.6*    100 100   CO2 25 22 22   BUN 24* 44* 38*   CREATININE 1.24* 2.74* 2.61*   CALCIUM 7.7* 8.6 8.2*       Last 3 CBC:  Recent Labs     04/01/19  0837 04/02/19 2019 04/03/19  1041   WBC  --   --  7.7   RBC  --   --  2.23*   HGB 8.6* 8.8* 6.5*   HCT 27.1* 29.9* 19.6*   MCV  --   --  87.9   MCH  --   --  29.1   MCHC  --   --  33.2   RDW  --   --  16.0*   PLT  --   --  See Reflexed IPF Result   MPV  --   --  NOT REPORTED       ASSESSMENT     1. Acute kidney injury secondary to ATN from hypoperfusion related to cardiorenal syndrome - worsening. Hemodialysis dependent since 29th of March  2.   Chronic kidney disease stage IIIB with baseline creatinine suspected around 2  3. Nephrotic range proteinuria - Bx not feasible in view of he being on Asprin and cardiology recommends not to stop anticoagulant. Explained to patient and he does not want to do that. 4.  Status post TVR  5. Cardiomyopathy ejection fraction 40-45%/left ventricular diastole dysfunction/moderate to severe mitral regurgitation S/p CRT4/3   6. S/p PCI and atherectomy  3/11  7. Recurrent bilateral pleural effusion status post pleural taps  8. HTN  9. Hx of Urinary retention  10. Advanced age    PLAN     4. HD am  2. Will need Perm cath next 24-48 hrs. 3.  Hematology review for thrombocytopenia.   3.  Chances of renal recovery slim    Please do not hesitate to call with questions    This note is created with the assistance of a speech-recognition program. While intending to generate a document that actually reflects the content of the visit, no guarantees can be provided that every mistake has been identified and corrected by editing    Robert Velásquez MD, MRCP Juanita Luna, Christine Cuellar   4/3/2019 11:52 AM  [facility]

## 2019-04-03 NOTE — PROGRESS NOTES
Dr. Presley Mingo back on the unit for update on patient  Site is without bleeding, swelling, or hematoma  Dressing remains clean, dry, & intact    Continue to monitor Yes

## 2019-04-03 NOTE — PROGRESS NOTES
[facility]  Pre Op Progress Note    CC: pain at [facility] site      Subjective:  Mr. Audrey Goddard seen and examined Plan of care reviewed and questions answered. Swelling of PPM site (left upper chest) noted, patient states it is very painful. Ice pack helped some last evening. Physical Exam  Vital Signs: BP (!) 126/48   Pulse 60   Temp 97.8 °F (36.6 °C) (Oral)   Resp 18   Ht 5' 6\" (1.676 m)   Wt 149 lb 0.5 oz (67.6 kg)   SpO2 95%   BMI 24.05 kg/m²  O2 Flow Rate (L/min): 2 L/min(decreased from 3)       General: alert and oriented to person, place and time. Up in chair, No apparent distress.   Heart:Rhythm: paced  Lungs: clear to auscultation bilaterally  Abdomen: soft, non tender, non distended, BSx4  Extremities: negative      Scheduled Meds:    lidocaine-EPINEPHrine  20 mL Intradermal Once    bumetanide  3 mg Oral BID    vancomycin  1,000 mg Intravenous Once    bacitracin in 0.9 % sodium chloride irrigation  50,000 Units Topical Once    apixaban  2.5 mg Oral Daily    sodium chloride flush  10 mL Intravenous 2 times per day    albumin human  25 g Intravenous Once    aspirin  81 mg Oral Daily    [Held by provider] polyethylene glycol  17 g Oral Daily    spironolactone  25 mg Oral Daily    metoprolol succinate  50 mg Oral Daily    hydrALAZINE  50 mg Oral 3 times per day    FLUoxetine  10 mg Oral Daily    levothyroxine  125 mcg Oral Daily    ALPRAZolam  0.25 mg Oral Once    QUEtiapine  25 mg Oral Nightly    clopidogrel  75 mg Oral Daily    isosorbide mononitrate  30 mg Oral Daily    [Held by provider] docusate sodium  100 mg Oral Daily    amiodarone  200 mg Oral Daily    atorvastatin  20 mg Oral Daily     Continuous Infusions:     Data:  CBC:   Recent Labs     04/01/19  0837 04/02/19  2019   HGB 8.6* 8.8*   HCT 27.1* 29.9*     BMP:   Recent Labs     04/01/19  0837 04/01/19  1142 04/02/19  1616    141 140   K 3.2* 3.2* 3.7   CL 99 102 100   CO2 25 25 22   BUN 66* 24* 44*   CREATININE 3.20* 1.24* 2.74*     PT/INR: No results for input(s): PROTIME, INR in the last 72 hours. APTT: No results for input(s): APTT in the last 72 hours. 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 Plan to stop dopamine gtt today. Albumin this am. Labs pending  Eliquis can be restarted when PPM site stable and ok with cardiology  ID monitoring off antibiotics  Pulmonary - wean O2 as tolerated, no intervention for pleural effusions  Will add simethicone to help with bloating - patient states improved. Also held colace and miralax for a few days.      Cardiology  BIV pacemaker placement yesterday. Per Dr. Kimberley Funk, will get limited echo today to assess mitral regurgitation and volume status. The above recommendations including medications and orders were discussed and agreed upon with Damion Buck, the attending on service for the cardiothoracic surgery group today.      FRANCIS Yusuf, CNP

## 2019-04-03 NOTE — PROGRESS NOTES
Updated Dr. Pedrito Huerta on pts H&H results & condition of site  No further signs of bleeding or increased swelling  Dressing remains clean, dry, & intact  Pt c/o mild discomfort at site, Ice applied intermittently

## 2019-04-03 NOTE — PROGRESS NOTES
PACEMAKER PLACEMENT  01/20/2019    THORACENTESIS                TOBACCO:   reports that he quit smoking about 30 years ago. He has never used smokeless tobacco.  ETOH:   reports that he drank alcohol. ALLERGIES:    Allergies   Allergen Reactions    Quinolones      Pt is allergic to fluoroquinolones       Home Meds:   Prior to Admission medications    Medication Sig Start Date End Date Taking? Authorizing Provider   amiodarone (CORDARONE) 200 MG tablet Take 200 mg by mouth daily   Yes Historical Provider, MD   atorvastatin (LIPITOR) 20 MG tablet Take 20 mg by mouth daily   Yes Historical Provider, MD   isosorbide mononitrate (IMDUR) 60 MG extended release tablet Take 60 mg by mouth daily   Yes Historical Provider, MD   levothyroxine (SYNTHROID) 100 MCG tablet Take 100 mcg by mouth Daily   Yes Historical Provider, MD   lisinopril (PRINIVIL;ZESTRIL) 40 MG tablet Take 40 mg by mouth daily   Yes Historical Provider, MD   apixaban (ELIQUIS) 2.5 MG TABS tablet Take 2.5 mg by mouth daily   Yes Historical Provider, MD   metoprolol tartrate (LOPRESSOR) 50 MG tablet Take 50 mg by mouth 2 times daily   Yes Historical Provider, MD         Intake/Output Summary (Last 24 hours) at 4/3/2019 1228  Last data filed at 4/3/2019 0920  Gross per 24 hour   Intake 1390.72 ml   Output 325 ml   Net 1065.72 ml         Diet   Dietary Nutrition Supplements: Renal Oral Supplement  DIET CARDIAC; Low Sodium (2 GM);  Daily Fluid Restriction: 1800 ml    Vitals:   BP (!) 123/55   Pulse 60   Temp 97.6 °F (36.4 °C) (Oral)   Resp 18   Ht 5' 6\" (1.676 m)   Wt 149 lb 0.5 oz (67.6 kg)   SpO2 97%   BMI 24.05 kg/m²  on         I/O (24 Hours)    Patient Vitals for the past 8 hrs:   BP Temp Temp src Pulse Resp SpO2 Weight   04/03/19 1119 (!) 123/55 97.6 °F (36.4 °C) Oral 60 18 97 % --   04/03/19 0756 -- -- -- -- -- 95 % --   04/03/19 0714 (!) 126/48 -- -- 60 -- -- --   04/03/19 0713 (!) 126/48 97.8 °F (36.6 °C) Oral 60 18 94 % --   04/03/19 0602 -- -- -- -- -- -- 149 lb 0.5 oz (67.6 kg)   04/03/19 0534 (!) 140/56 -- -- -- -- -- --       Intake/Output Summary (Last 24 hours) at 4/3/2019 1228  Last data filed at 4/3/2019 0920  Gross per 24 hour   Intake 1390.72 ml   Output 325 ml   Net 1065.72 ml     I/O last 3 completed shifts: In: 1150.7 [P.O.:240; I.V.:367.2; IV Piggyback:543.5]  Out: 475 [Urine:475]   Date 04/03/19 0000 - 04/03/19 2359   Shift 5037-8108 5355-5472 1731-4331 24 Hour Total   INTAKE   P.O.(mL/kg/hr) 240(0.4) 240  480   I. V.(mL/kg) 55.3(0.8)   55.3(0.8)   IV Piggyback(mL/kg) 543. 5(8)   543. 5(8)   Shift Total(mL/kg) 838.8(12.4) 240(3.6)  1078.8(16)   OUTPUT   Urine(mL/kg/hr) 130(0.2)   130   Shift Total(mL/kg) 130(1.9)   130(1.9)   Weight (kg) 67.6 67.6 67.6 67.6     Patient Vitals for the past 96 hrs (Last 3 readings):   Weight   04/03/19 0602 149 lb 0.5 oz (67.6 kg)   04/02/19 1708 147 lb 4.3 oz (66.8 kg)   04/02/19 1430 148 lb 2.4 oz (67.2 kg)          PHYSICAL EXAMINATION:  Head and neck atraumatic, normocephalic    Lymph nodes-no cervical, supraclavicular lymphadenopathy    Neck-no JVP elevation    Lungs - dull bases and dec bs and no wheeze no rales     CVS- S1, S2 regular. No S3 no S4, no murmurs    Abdomen-nontender, nondistended. Bowel sounds are present. No organomegaly    Lower extremity-+ edema    Upper extremity-no edema    Neurological-grossly normal cranial nerves.   No overt motor deficit        Medications:    Scheduled Meds:   lidocaine-EPINEPHrine  20 mL Intradermal Once    albumin human  25 g Intravenous Once    bumetanide  3 mg Oral BID    vancomycin  1,000 mg Intravenous Once    bacitracin in 0.9 % sodium chloride irrigation  50,000 Units Topical Once    sodium chloride flush  10 mL Intravenous 2 times per day    albumin human  25 g Intravenous Once    aspirin  81 mg Oral Daily    [Held by provider] polyethylene glycol  17 g Oral Daily    spironolactone  25 mg Oral Daily    metoprolol succinate  50 mg Oral Daily    hydrALAZINE  50 mg Oral 3 times per day    FLUoxetine  10 mg Oral Daily    levothyroxine  125 mcg Oral Daily    ALPRAZolam  0.25 mg Oral Once    QUEtiapine  25 mg Oral Nightly    clopidogrel  75 mg Oral Daily    isosorbide mononitrate  30 mg Oral Daily    [Held by provider] docusate sodium  100 mg Oral Daily    amiodarone  200 mg Oral Daily    atorvastatin  20 mg Oral Daily       Continuous Infusions:      PRN Meds:      Labs:  CBC:   Recent Labs     04/01/19  0837 04/02/19 2019 04/03/19  1041   WBC  --   --  7.7   HGB 8.6* 8.8* 6.5*   HCT 27.1* 29.9* 19.6*   MCV  --   --  87.9   PLT  --   --  See Reflexed IPF Result     BMP:   Recent Labs     04/01/19  1142 04/02/19  1616 04/03/19  1041    140 138   K 3.2* 3.7 3.6*    100 100   CO2 25 22 22   BUN 24* 44* 38*   CREATININE 1.24* 2.74* 2.61*     LIVER PROFILE:   No results for input(s): AST, ALT, LIPASE, BILIDIR, BILITOT, ALKPHOS in the last 72 hours. Invalid input(s): AMYLASE,  ALB  PT/INR:   No results for input(s): PROTIME, INR in the last 72 hours. APTT:   No results for input(s): APTT in the last 72 hours. UA:  No results for input(s): NITRITE, COLORU, PHUR, LABCAST, WBCUA, RBCUA, MUCUS, TRICHOMONAS, YEAST, BACTERIA, CLARITYU, SPECGRAV, LEUKOCYTESUR, UROBILINOGEN, BILIRUBINUR, BLOODU, GLUCOSEU, AMORPHOUS in the last 72 hours. Invalid input(s): KETONESU  No results for input(s): PHART, QTQ3ZQS, PO2ART in the last 72 hours.     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 *  Multi-vessel coronary artery disease post PCI  Severe aortic stenosis, post TAVR  Atrial fibrillation  Small right middle lobe pulmonary embolism.   Per chart, from imaging studies done in [facility]:  S/p Bivid  Cont hd    Diuresis   Agree with hematology eval for platelet drop    Electronically signed by Caryn Bedolla MD on 4/3/2019 at 12:28 PM

## 2019-04-03 NOTE — PROGRESS NOTES
hours.  Troponin: No results for input(s): TROPONINI in the last 72 hours. BNP: No results for input(s): BNP in the last 72 hours. Lipids: No results for input(s): CHOL, HDL in the last 72 hours. Invalid input(s): LDLCALCU  INR: No results for input(s): INR in the last 72 hours. Objective:   Vitals: BP (!) 138/53   Pulse 61   Temp 98.1 °F (36.7 °C) (Oral)   Resp 18   Ht 5' 6\" (1.676 m)   Wt 149 lb 0.5 oz (67.6 kg)   SpO2 97%   BMI 24.05 kg/m²     General appearance: awake, alert, in no apparent respiratory distress   HEENT: Head: Normocephalic, no lesions, without obvious abnormality  Neck: left side had device implanted  Lungs: clear to auscultation bilaterally, no basilar rales, no wheezing   Heart: regular rate and rhythm, S1, S2 normal, no murmur, click, rub or gallop  Abdomen: soft, non-tender; bowel sounds normal  Extremities: No LE edema  Neurologic: Mental status: Alert, oriented. Motor and sensory not done. EKG: V Paced      Echocardiogram:    3/26/19  ECHO   Left ventricle is normal in size, global left ventricular systolic function  is moderately reduced, calculated ejection fraction is 42%. Left atrium appears enlarged. Right atrium is enlarged. Bioprosthetic valve is seated in the aortic position. Appears to be  functioning normally, no evidence of stenosis. Mitral valve appears sclerotic with annular calcification. Moderate to severe mitral regurgitation. Mild tricuspid regurgitation. Pleural effusion noted           Coronary Angiography:   Findings:  Left main: 80-90% very calcified stenosis. This required rotational atherectomy and PTCA -JEAN-CLAUDE 4/0/15 mm stent, redcuing stenosis to 10% with TIMNI III flow. LAD: Proximal to mid area calcified 80% stenosis.  This required Rotational atherectomy / JEAN-CLAUDE in mid and proximal area, reducing stenosis to 0% with AKHIL III flow  LCX: Diffuse disease )(known from cath in [place])  RCA: Minimal 20-30% as reviewed from [place]

## 2019-04-03 NOTE — PROGRESS NOTES
[facility] - Progress Note  Today's Date and Time: 4/3/2019, 11:17 AM    Impression :   1. Recurrent bilateral pleural effusions w/lung compression  2. CHF  3. 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 Multivessel CAD s/p multiple stent placements  5. S/P TAVR  6. HUYEN on HD  7. Urinary retention  8. Pulmonary hypertension  9. S/P Upgrade of dual chamber PPM to CRT-P on 4-2-19    Recommendations:     · Aggressive pulmonary toilet  · Acapella valve and IS at bedside    Medical Decision Making/Summary/Discussion:   · 81 yo gentleman who developed complete heart block. Found to have multivessel CAD, severe aortic stenosis and pulmonary HTN. · Underwent pacemaker placement with subsequent bouts of acute CHF and multiple hospitalizations in [place]  · Brought to [place] by family for further care. · Since admission at [facility] he has had cardiac catheterizations x 2 with multiple stent placements and a TAVR on 3-15. Pt remains on a nitroglycerine drip  · Pt continues to require frequent thoracenteses  · He has suffered an HUYEN and is currently on a bumex drip  · CXR 3/26 showed progressed moderate to large bilateral pleural effusions with consolidation. · ID consult placed for treatment of any potential pneumonia  · Pt is afebrile, without cough or sputum production. He remains SOB with exertion and at times, when quiet. · Currently no evidence of active infection. Pt will require aggressive pulmonary toilet and diuresis. Plan to monitor off antibiotics  · Patient to continue HD  · He had upgraded pacemaker placed on 4-2-19. · Developed hematoma at pacer site, resolved with pressure.  [place] held 4-3    Infection Control Recommendations   · [place] Precautions    Antimicrobial Stewardship Recommendations     · Monitor off antibiotics    Coordination of Outpatient Care:   · Estimated Length of IV antimicrobials: None  · Patient will need Midline Catheter Insertion:   · Patient will need PICC line Insertion:  · Patient will need: Home IV , [place],  SNF,  LTAC TBD  · Patient will need outpatient wound care: No    Chief complaint/reason for consultation:   · Possible pneumonia      History of Present Illness:   Lu Fallon is a 80y.o.-year-old  male who was initially admitted on 3/6/2019. Patient seen at the request of Dr. Kimberley Funk     INITIAL HISTORY:    Pt is a 81 yo gentleman who has recently had multiple hospital admissions in [place] for decompensated heart failure. His symptoms began in December 2018. 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 A cardiac cath at that time showed LM and LAD calcified stenosis 80-90% in multiple areas, with severe disease in D1, D2, Om1 and OM2. Minimal disease in RCA. LV function with EF 25%. Severe aortic stenosis compromising his clinical improvedmnt      He underwent a dual chamber pacemaker placement. Following that, he had multiple hospitalizations for acute heart failure and shortness of breath. His baseline creatinine was 1.3, now >2.0. He has had multiple thoracenteses with transudative pleural fluid removed.      He was brought to [place] for further evaluation and was admitted on 3/6/19     An ECHO from admission showed   Normal left ventricle size with mildly reduced systolic function; Estimated  left ventricular ejection fraction 40-45%  Anterolateral wall hypokinesis. Mild left ventricular hypertrophy. Left atrium is moderately dilated. Grade II diastolic dysfunction. Heavily calcified aortic valve with reduced systolic excursion and evidence  of moderate stenosis. (Peak nataliia 3.62 m/s and mean gradient 29 mmHg)  Moderate posterior pericardial effusion without any echo signs of tamponade. Normal right ventricular size and function. Pacemaker lead seen in right ventricle. Moderate tricuspid regurgitation.   Estimated right ventricular systolic pressure is 60 mmHg suggesting moderate  pulmonary HTN.     He underwent an atherectomy/JEAN-CLAUDE of the left main and LAD on 3/6. Because of the high surgical risk and renal function he was taken back to the cath lab on 3/11 for PTCA-JEAN-CLAUDE LAD, PTCRA-JEAN-CLAUDE LM with plan to consider a TAVR if pt remained stable.     A TAVR was performed on 3/15     A carotid study showed less than 50% stenosis bilaterally.     He has had multiple thoracenteses since admission. Pt has been followed by CT surgery, cardiology, pulmonary, nephrology and urology.     On the evening of 3/25 pt developed hypertension and SOB at rest. A stat CXR was done that showed progressed moderate to large bilateral pleural effusions with consolidation.      On 3/26 pt underwent a Lt thoracentesis with 1400 ml clear pleural fluid removed. Pt reports feeling much better after the procedure and is asking to sit in the chair.      I am consulted to determine if Mr Driss Centeno has pneumonia. Pt had continued decline in renal function, HD initiated 3-29  PPV pacemaker placed 4-2, pt with hematoma at site, resolved with pressure. Anticoagulation held 4-3     CURRENT EXAMINATION: 4/3/2019    Pt seen and examined, up in chair   Developed hematoma at [address] site, resolved with application of pressure. Anticoagulation held  Hgb to 6.5 - 1u PRBC ordered    Per RN, pt with little sleep last night, also with significant pain at pacemaker site. He had upgraded pacemaker placed on 4-2-19  Vancomycin given pre-op     On exam pt is in no distress  Breath sounds are diminished bilateral bases Rt>Lt  On 2L O2, somnolent    CXR with worsening effusions and pulmonary edema    Minimal urine output    Afebrile, VSS     Labs reviewed: 4/3/2019    WBC 10.6->11.6->10->7.7  Hgb 8.5->7.7-->8.6->8.8->6.5    Cr 2.71 ->3.03->3.27->3.2--> 1.24->2.74  ->101->106->104-->24->44    Cultures:  Pleural fluid:  · 3/7 No growth    Discussed with Dr Lali Palomino, RN.     I have personally reviewed the past medical history, past surgical history, medications, social history, and family history, and I have updated the database accordingly.   Past Medical History:     Past Medical History:   Diagnosis Date    Aortic stenosis 02/15/2019    Atrial fibrillation (HCC)     CAD (coronary artery disease)     Chest pain 02/15/2019    CHF (congestive heart failure) (HCC)     Chronic anemia     Chronic kidney disease     Hypertension     Pleural effusion on left 02/15/2019    Pleural effusion, right 02/15/2019    Pulmonary emboli [facility] [address] 02/15/2019    Pulmonary hypertension ([facility] [address] 03/06/2019    Thyroid disease     hypothyroidism       Past Surgical  History:     Past Surgical History:   Procedure Laterality Date    CORONARY ANGIOPLASTY WITH STENT PLACEMENT  03/11/2019    complex PCI of LT MAIN, LAD    JOINT REPLACEMENT      right ankle    OTHER SURGICAL HISTORY  04/02/2019    UPGRADE TO BI-V PACEMAKER    OTHER SURGICAL HISTORY  03/15/2019    TAVR PROCEDURE    PACEMAKER PLACEMENT  01/20/2019    THORACENTESIS         Medications:      lidocaine-EPINEPHrine  20 mL Intradermal Once    bumetanide  3 mg Oral BID    vancomycin  1,000 mg Intravenous Once    bacitracin in 0.9 % sodium chloride irrigation  50,000 Units Topical Once    sodium chloride flush  10 mL Intravenous 2 times per day    albumin human  25 g Intravenous Once    aspirin  81 mg Oral Daily    [Held by provider] polyethylene glycol  17 g Oral Daily    spironolactone  25 mg Oral Daily    metoprolol succinate  50 mg Oral Daily    hydrALAZINE  50 mg Oral 3 times per day    FLUoxetine  10 mg Oral Daily    levothyroxine  125 mcg Oral Daily    ALPRAZolam  0.25 mg Oral Once    QUEtiapine  25 mg Oral Nightly    clopidogrel  75 mg Oral Daily    isosorbide mononitrate  30 mg Oral Daily    [Held by provider] docusate sodium  100 mg Oral Daily    amiodarone  200 mg Oral Daily    atorvastatin  20 mg Oral Daily       Social History:     Social History     Socioeconomic History    Marital status:      Spouse name: Not on file    Number of children: Not on file    Years of education: Not on file    Highest education level: Not on file   Occupational History     Employer: RETIRED   Social Needs    Financial resource strain: Not on file    Food insecurity:     Worry: Not on file     Inability: Not on file    Transportation needs:     Medical: Not on file     Non-medical: Not on file   Tobacco Use    Smoking status: Former Smoker     Last attempt to quit:      Years since quittin.2    Smokeless tobacco: Never Used   Substance and Sexual Activity    Alcohol use: Not Currently    Drug use: Never    Sexual activity: Not on file   Lifestyle    Physical activity:     Days per week: Not on file     Minutes per session: Not on file    Stress: Not on file   Relationships    Social connections:     Talks on phone: Not on file     Gets together: Not on file     Attends Yazdanism service: Not on file     Active member of club or organization: Not on file     Attends meetings of clubs or organizations: Not on file     Relationship status: Not on file    Intimate partner violence:     Fear of current or ex partner: Not on file     Emotionally abused: Not on file     Physically abused: Not on file     Forced sexual activity: Not on file   Other Topics Concern    Not on file   Social History Narrative    Not on file       Family History:   History reviewed. No pertinent family history.      Allergies:   Quinolones     Review of Systems:   Somnolent for exam    Physical Examination :     Patient Vitals for the past 8 hrs:   BP Temp Temp src Pulse Resp SpO2 Weight   19 0756 -- -- -- -- -- 95 % --   19 0714 (!) 126/48 -- -- 60 -- -- --   19 0713 (!) 126/48 97.8 °F (36.6 °C) Oral 60 18 94 % --   19 0602 -- -- -- -- -- -- 149 lb 0.5 oz (67.6 kg)   19 0534 (!) 140/56 -- -- -- -- -- --     General Appearance: Somnolent, in chair, and in no apparent distress  Head: Normocephalic, no trauma  Eyes: Pupils equal, round, reactive to light and accommodation; extraocular movements intact; sclera anicteric. ENT: Oropharynx clear. Mouth/throat: mucosa pink and moist. No lesions. Dentition in good repair. Neck: Supple, without lymphadenopathy. Pulmonary/Chest: Clear to auscultation, without wheezes, rales, or rhonchi. Decreased breath sounds at bilateral bases Rt > Lt  Cardiovascular: Irregular rate and rhythm without murmurs, rubs, or gallops. Abdomen: Soft. Bowel sounds normal.  All four Extremities: Mild edema. Neurologic: No gross sensory or motor deficits. Skin: Warm and dry with good turgor. No signs of peripheral arterial or venous insufficiency. No ulcerations. No open wounds. Medical Decision Making -Laboratory:   I have independently reviewed/ordered the following labs:    CBC with Differential:   Recent Labs     19  0837 19   HGB 8.6* 8.8*   HCT 27.1* 29.9*     BMP:   Recent Labs     19  0837 19  1142 19  1616    141 140   K 3.2* 3.2* 3.7   CL 99 102 100   CO2 25 25 22   BUN 66* 24* 44*   CREATININE 3.20* 1.24* 2.74*   MG 1.8 1.5*  --      Hepatic Function Panel:   No results for input(s): PROT, LABALBU, BILIDIR, IBILI, BILITOT, ALKPHOS, ALT, AST in the last 72 hours. No results for input(s): RPR in the last 72 hours. No results for input(s): HIV in the last 72 hours. No results for input(s): BC in the last 72 hours.   Lab Results   Component Value Date    MUCUS NOT REPORTED 2019    RBC 2.68 2019    TRICHOMONAS NOT REPORTED 2019    WBC 10.0 2019    YEAST NOT REPORTED 2019    TURBIDITY CLEAR 2019     Lab Results   Component Value Date    CREATININE 2.74 2019    GLUCOSE 97 2019       Medical Decision Making-Imagin-1 CXR    EXAMINATION:   SINGLE XRAY VIEW OF THE CHEST       2019 6:04 am       COMPARISON:   2019       HISTORY:   ORDERING SYSTEM PROVIDED HISTORY: SOB   TECHNOLOGIST PROVIDED HISTORY:   SOB       FINDINGS:   There is a left-sided transvenous pacer in place and a right internal jugular   dialysis catheter.  There is cardiomegaly with pulmonary vascular congestion   and edema.  There are bilateral pleural effusions which are increased.  The   pulmonary edema appears worse on the current exam.           Impression   Worsening edema and increased bilateral pleural effusions         4-1 AXR  EXAMINATION:   SINGLE SUPINE XRAY VIEW(S) OF THE ABDOMEN       4/1/2019 2:40 pm       COMPARISON:   None.       HISTORY:   ORDERING SYSTEM PROVIDED HISTORY: r/o ileus   TECHNOLOGIST PROVIDED HISTORY:   r/o ileus       Initial exam       FINDINGS:   Nonspecific bowel gas pattern with air-filled small and large bowel.  No   organomegaly noted.  No pathologic calcifications.  Degenerative changes of   the lower lumbar spine.           Impression   Nonspecific bowel gas pattern without evidence of small bowel obstruction.          3/29 CXR      Narrative   EXAMINATION:   SINGLE XRAY VIEW OF THE CHEST       3/29/2019 8:26 am       COMPARISON:   Chest radiograph performed 03/28/2019.       HISTORY:   ORDERING SYSTEM PROVIDED HISTORY: effusions   TECHNOLOGIST PROVIDED HISTORY:   effusions       FINDINGS:   There are large bilateral effusions with adjacent consolidation.  There is   underlying pulmonary congestion.  There is no pneumothorax.  The heart is   enlarged with stable cardiac leads.  The upper abdomen is unremarkable.  The   extrathoracic soft tissues are unremarkable.           Impression   Cardiomegaly with large bilateral effusions and adjacent consolidation   concerning for pneumonia.               3/27 CXR    Narrative   EXAMINATION:   TWO VIEWS OF THE CHEST       3/28/2019 7:56 am       COMPARISON:   Chest radiograph performed 03/27/2019.       HISTORY:   ORDERING SYSTEM PROVIDED HISTORY: effusions   TECHNOLOGIST PROVIDED HISTORY:   effusions       FINDINGS:   There is a small left and a large right pleural effusion with adjacent   infiltrate.  There is no pneumothorax.  The mediastinal structures are stable   with stable cardiac leads.  The upper abdomen is unremarkable.  The   extrathoracic soft tissues are unremarkable.           Impression   Small left and large right pleural effusion with adjacent infiltrate   representing atelectasis versus pneumonia.  Overall there may be mild   improvement of the right-sided effusion. Narrative   EXAMINATION:   SINGLE XRAY VIEW OF THE CHEST       3/26/2019 2:37 am       COMPARISON:   March 20, 2019.       HISTORY:   ORDERING SYSTEM PROVIDED HISTORY: SOB, PE   TECHNOLOGIST PROVIDED HISTORY:   SOB, PE       FINDINGS:   Frontal portable view of the chest.  Low lung volume.  Progressed moderate to   large bilateral pleural effusions with consolidation.  Indistinct pulmonary   vasculature.  No pneumothorax.  The cardiomediastinal silhouette is not well   evaluated.  Atherosclerotic thoracic aorta.  Left pectoral trans venous   dual-chamber cardiac pacer device.           Impression   Progressed moderate to large bilateral pleural effusions with consolidation. Superimposed infection is not excluded.             Medical Decision Making-Other: Thank you for allowing us to participate in the care of this patient. Please call with questions.     Lizbeth Doty MD

## 2019-04-03 NOTE — PLAN OF CARE
Problem: Falls - Risk of:  Goal: Will remain free from falls  Description  Will remain free from falls  Outcome: Ongoing  Goal: Absence of physical injury  Description  Absence of physical injury  Outcome: Ongoing     Problem: Pain:  Goal: Pain level will decrease  Description  Pain level will decrease  Outcome: Ongoing  Goal: Control of acute pain  Description  Control of acute pain  Outcome: Ongoing  Goal: Control of chronic pain  Description  Control of chronic pain  Outcome: Ongoing     Problem: Risk for Impaired Skin Integrity  Goal: Tissue integrity - skin and mucous membranes  Description  Structural intactness and normal physiological function of skin and  mucous membranes.   Outcome: Ongoing     Problem: Cardiac Output - Decreased:  Goal: Hemodynamic stability will improve  Description  Hemodynamic stability will improve  Outcome: Ongoing     Problem: Musculor/Skeletal Functional Status  Goal: Highest potential functional level  Outcome: Ongoing  Goal: Absence of falls  Outcome: Ongoing     Problem: Nutrition  Goal: Optimal nutrition therapy  Outcome: Ongoing

## 2019-04-03 NOTE — PROGRESS NOTES
Dr. Silvina Vo notified of low hgb, low U/O, and slight oozing from PM site. Orders: 1uprbc, 1 albumin, recheck in H and H after blood is transfused, and to keep stimulation to a minimum. Will continue to monitor and notify care team as needed. 1200: Dr. Silvina Vo notified of low platelets - ordered consult to hem/onc.

## 2019-04-03 NOTE — PROGRESS NOTES
Occupational Therapy  Facility/Department: [facility] CAR 1  Daily Treatment Note  NAME: Ladan Herrmann  : 12/10/1928  MRN: [id number]    Date of Service: 4/3/2019    Discharge Recommendations:  Further therapy recommended at discharge. OT Equipment Recommendations  Walker: Rolling  ADL Assistive Devices: Shower Chair with back  Other: Pt may benefit from use of shower chair to assist with bathing d/t decreased activity tolerance. Assessment   Performance deficits / Impairments: Decreased ADL status; Decreased endurance;Decreased functional mobility ; Decreased balance;Decreased ROM; Decreased strength;Decreased high-level IADLs  Assessment: Pt would benefit from continued acute care OT to address minimal deficits in ADL/ functional activities, endurance, balance and functional transfers/ mobility following admission. Treatment Diagnosis: TAVR, PPM  Prognosis: Good  Patient Education: OT POC and PPM precaution, Pt verbalized understanding/  REQUIRES OT FOLLOW UP: Yes  Activity Tolerance  Activity Tolerance: Patient limited by pain; Patient limited by fatigue  Safety Devices  Safety Devices in place: Yes  Type of devices: All fall risk precautions in place;Call light within reach; Left in chair;Nurse notified; Patient at risk for falls         Patient Diagnosis(es): The encounter diagnosis was Pulmonary hypertension ([address] has a past medical history of Aortic stenosis, Atrial fibrillation ([address] CAD (coronary artery disease), Chest pain, CHF (congestive heart failure) ([address] Chronic anemia, Chronic kidney disease, Hypertension, Pleural effusion on left, Pleural effusion, right, Pulmonary emboli ([address] Pulmonary hypertension ([address] and Thyroid disease.    has a past surgical history that includes pacemaker placement (2019); thoracentesis; joint replacement; Coronary angioplasty with stent (2019); other surgical history (2019); and other surgical history (03/15/2019). Restrictions  Restrictions/Precautions  Restrictions/Precautions: Cardiac, Surgical Protocols, ROM Restrictions, Weight Bearing, Fall Risk, General Precautions  Required Braces or Orthoses?: Yes(Sling for L UE)  Implants present? : Pacemaker(PPM 4/2)  Upper Extremity Weight Bearing Restrictions  Left Upper Extremity Weight Bearing: Non Weight Bearing(partial weight bearing for PPM 4/2)  Position Activity Restriction  Other position/activity restrictions: Up w/assist.  Subjective   General  Patient assessed for rehabilitation services?: Yes  Family / Caregiver Present: No  Pain Assessment  Pain Assessment: 0-10  Pain Level: 8  Pain Type: Surgical pain  Pain Location: Chest  Pain Orientation: Left;Upper   Orientation  Orientation  Overall Orientation Status: Within Normal Limits  Objective    ADL  Grooming: Setup;Stand by assistance (seated at tray table)  UE Bathing: Setup; Moderate assistance(assist w/back and BUE)  LE Bathing: Unable to assess(Pt declined)  UE Dressing: Moderate assistance;Setup(assist w/donning gown)  LE Dressing: Max A (adjusted CARLOS hose and footies; pt declined to have them changed)  Toileting: Max A ([place] cath)    Additional Comments: Pt in chair upon arrival. Pt completed grooming task while seated in chair. Pt completed bathing UB using Sx soap w/assist (see above). Pt declined LB bathing and dressing d/t pain and fatigue. Sling for L UE donned correctly. Pt wore O2 at 2 LNC throughout tx, SpO2 maintained at 99%. BLE elevated. Call light within reach. RN notified.      Balance  Sitting Balance: Stand by assistance  Standing Balance: Unable to assess(Pt declined)  Standing Balance  Sit to stand: Unable to assess(Pt declined)  Stand to sit: Unable to assess(Pt declined)     Transfers  Sit to stand: Unable to assess(Pt declined)  Stand to sit: Unable to assess(Pt declined)     Plan   Plan  Times per week: 4-5x/wk     Goals  Short term goals  Time Frame for Short term goals: by discharge, pt will  Short term goal 1: demo I in UE ADL activities   Short term goal 2: demo MI in LE ADL activities with AD as needed  Short term goal 3: demo understanding and I use of EC/WS, fall prevention and proper pursed lipped breathing tech during functional activities   Short term goal 4: demo increased activity tolerance of 30+ min to assist with ADL/ functional activities  Short term goal 5: demo I in functional transfers/ mobility with use of RW to assist with ADL/ functional activities   Short term goal 6: demo understanding and I use of safe transfer tech during functional activities with use of RW  Patient Goals   Patient goals : to go home       Therapy Time   Individual Concurrent Group Co-treatment   Time In  940         Time Out  [facility], S/GHANSHYAM Jacobs, CHAD/GERALD

## 2019-04-03 NOTE — PROGRESS NOTES
(NORCO) 5-325 MG per tablet 1 tablet, 1 tablet, Oral, Q8H PRN  albumin human 25 % solution 25 g, 25 g, Intravenous, Once  bumetanide (BUMEX) tablet 3 mg, 3 mg, Oral, BID  vancomycin (VANCOCIN) 1000 mg in dextrose 5% 200 mL IVPB, 1,000 mg, Intravenous, Once  bacitracin 50,000 Units in sodium chloride 0.9 % 1,000 mL irrigation, 50,000 Units, Topical, Once  sodium chloride flush 0.9 % injection 10 mL, 10 mL, Intravenous, 2 times per day  sodium chloride flush 0.9 % injection 10 mL, 10 mL, Intravenous, PRN  acetaminophen (TYLENOL) tablet 650 mg, 650 mg, Oral, Q4H PRN  aspirin suppository 600 mg, 600 mg, Rectal, Q6H PRN  simethicone (MYLICON) chewable tablet 80 mg, 80 mg, Oral, Q6H PRN  0.9 % sodium chloride bolus, 250 mL, Intravenous, PRN  0.9 % sodium chloride bolus, 150 mL, Intravenous, PRN  albumin human 25 % solution 25 g, 25 g, Intravenous, Once  heparin (porcine) injection 1,400 Units, 1,400 Units, Intercatheter, PRN  heparin (porcine) injection 1,500 Units, 1,500 Units, Intercatheter, PRN  aspirin EC tablet 81 mg, 81 mg, Oral, Daily  [Held by provider] polyethylene glycol (GLYCOLAX) packet 17 g, 17 g, Oral, Daily  spironolactone (ALDACTONE) tablet 25 mg, 25 mg, Oral, Daily  metoprolol succinate (TOPROL XL) extended release tablet 50 mg, 50 mg, Oral, Daily  hydrALAZINE (APRESOLINE) tablet 50 mg, 50 mg, Oral, 3 times per day  hydrALAZINE (APRESOLINE) injection 10 mg, 10 mg, Intravenous, Q4H PRN  FLUoxetine (PROZAC) capsule 10 mg, 10 mg, Oral, Daily  ondansetron (ZOFRAN) injection 4 mg, 4 mg, Intravenous, Q6H PRN  levothyroxine (SYNTHROID) tablet 125 mcg, 125 mcg, Oral, Daily  ALPRAZolam (XANAX) tablet 0.25 mg, 0.25 mg, Oral, Once  QUEtiapine (SEROQUEL) tablet 25 mg, 25 mg, Oral, Nightly  magnesium hydroxide (MILK OF MAGNESIA) 400 MG/5ML suspension 30 mL, 30 mL, Oral, Daily PRN  clopidogrel (PLAVIX) tablet 75 mg, 75 mg, Oral, Daily  isosorbide mononitrate (IMDUR) extended release tablet 30 mg, 30 mg, Oral, S2.    Abdomen:   · No masses or tenderness  · Bowel sounds present  Extremities:  ·  No Cyanosis or Clubbing  ·  Lower extremity edema: No  ·  Skin: Warm and dry  Neurological:  · Alert and oriented. DATA:    Diagnostics:    EKG:  V PACED    3/26/19  ECHO   Left ventricle is normal in size, global left ventricular systolic function  is moderately reduced, calculated ejection fraction is 42%. Left atrium appears enlarged. Right atrium is enlarged. Bioprosthetic valve is seated in the aortic position. Appears to be  functioning normally, no evidence of stenosis. Mitral valve appears sclerotic with annular calcification. Moderate to severe mitral regurgitation. Mild tricuspid regurgitation. Pleural effusion noted    Labs:     CBC:   Recent Labs     04/02/19  2019 04/03/19  1041   WBC  --  7.7   HGB 8.8* 6.5*   HCT 29.9* 19.6*   PLT  --  See Reflexed IPF Result     BMP:   Recent Labs     04/02/19  1616 04/03/19  1041    138   K 3.7 3.6*   CO2 22 22   BUN 44* 38*   CREATININE 2.74* 2.61*   LABGLOM 22* 23*   GLUCOSE 97 116*     BNP: No results for input(s): BNP in the last 72 hours. PT/INR: No results for input(s): PROTIME, INR in the last 72 hours. APTT:No results for input(s): APTT in the last 72 hours. CARDIAC ENZYMES:No results for input(s): CKTOTAL, CKMB, CKMBINDEX, TROPONINI in the last 72 hours. FASTING LIPID PANEL:No results found for: HDL, LDLDIRECT, LDLCALC, TRIG  LIVER PROFILE:No results for input(s): AST, ALT, LABALBU in the last 72 hours.     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 [address]    Azotemia    Aortic valve stenosis    CAD in native artery     1 Complete heart block S/P St chay PPM 1/21/19 with RV paced(95%)  2 Paroxysmal A fib( [id number]) 4  3 Bilateral pleural effusion S/P Multiple thoracentesis  4 Post TAVR  5 CAD S/P PCI with rotational atherectomy of left main and LAD  6 Chronic systolic CHF  7 HTN  8 HUYEN on CKD  RECOMMENDATIONS:  1. Can be considered for the BI V Pacing since patient is RV paced(95%) of the time. He will benefit from it, but we anticipate use of contrast during this procedure which may worsen the renal condition and put the patient in to renal failure and need for the Hemodialysis. 2. Will need nephrology to discuss the risks of contrast induced nephropathy. 3. Will recommend continuing with rest of medications per primary team  4. Not on ARNI/ACE because of HUYEN on CKD. Discussed with patient and Nurse.     Electronically signed by Verenice Zheng MD on 4/3/2019 at 1:03 PM  [facility]

## 2019-04-03 NOTE — CARE COORDINATION
Following for potential OP dialysis arrangements  Met with pt briefly, as pt stated he did not feel like talking  He was agreeable to writer asking a few questions  Asked pt if he planned to stay in town with his nephew at discharge and pt stated he planned to go back to [place] at discharge  Informed pt of role of helping arrange OP dialysis, if needed, and pt stated \"no\"  Asked pt if he was saying no because he did want to talk any further and he stated \"no, I just don't want dialysis\"  Will cont to follow

## 2019-04-04 NOTE — CONSULTS
CONSULT NOTE    PCP: No primary care provider on file. Referring Provider: Cindy Brumfield MD    VISIT DIAGNOSIS:  The encounter diagnosis was Pulmonary hypertension ([facility] [address] CC: Thrombocytopenia   SUBJECTIVE/HPI:  Chelle Alan is a very pleasant 80 y.o. Jean Claude Lim has significant cardiac history with multivessel CAD, CHF, pacemaker recently moved from [place] to be near relatives admitted for increase sob with exertion and cardiac issues. Heme onc consulted for thrombocytopenia, platelets on admission were normal around 200s and have beed declining to 57K and today are 90K. No SQ heparin however did have heparin flushes and has been in and out of  hospital recently. No bleeding, up in chair no chest pain or sob.      PAST MEDICAL HISTORY:      Diagnosis Date    Aortic stenosis 02/15/2019    Atrial fibrillation (HCC)     CAD (coronary artery disease)     Chest pain 02/15/2019    CHF (congestive heart failure) (HCC)     Chronic anemia     Chronic kidney disease     Hypertension     Pleural effusion on left 02/15/2019    Pleural effusion, right 02/15/2019    Pulmonary emboli ([address] 02/15/2019    Pulmonary hypertension ([address] 03/06/2019    Thyroid disease     hypothyroidism       PAST SURGICAL HISTORY:      Procedure Laterality Date    CORONARY ANGIOPLASTY WITH STENT PLACEMENT  03/11/2019    complex PCI of LT MAIN, LAD    JOINT REPLACEMENT      right ankle    OTHER SURGICAL HISTORY  04/02/2019    UPGRADE TO BI-V PACEMAKER    OTHER SURGICAL HISTORY  03/15/2019    TAVR PROCEDURE    PACEMAKER PLACEMENT  01/20/2019    THORACENTESIS         CURRENT MEDICATIONS:   Current Facility-Administered Medications   Medication Dose Route Frequency Provider Last Rate Last Dose    lidocaine-EPINEPHrine 1 percent-1:[id number] injection 20 mL  20 mL Intradermal Once Lilly Sue MD        HYDROcodone-acetaminophen (NORCO) 5-325 MG per tablet 1 tablet  1 tablet Oral Q8H PRN Cara Barnes MD   1 tablet at 04/03/19 0836    lidocaine PF 1 % injection 1 mL  1 mL Intradermal Once Merline Late, APRN - CNP        DOPamine (INTROPIN) 400 mg in dextrose 5 % 250 mL infusion  5 mcg/kg/min Intravenous Continuous Maik Pfeiffer MD 12.7 mL/hr at 04/03/19 2154 5 mcg/kg/min at 04/03/19 2154    bumetanide (BUMEX) tablet 3 mg  3 mg Oral BID Merced Chilel MD   3 mg at 04/04/19 0817    vancomycin (VANCOCIN) 1000 mg in dextrose 5% 200 mL IVPB  1,000 mg Intravenous Once Merced Chilel MD        bacitracin 50,000 Units in sodium chloride 0.9 % 1,000 mL irrigation  50,000 Units Topical Once Merced Chilel MD        sodium chloride flush 0.9 % injection 10 mL  10 mL Intravenous 2 times per day Merced Chilel MD   10 mL at 04/04/19 0821    sodium chloride flush 0.9 % injection 10 mL  10 mL Intravenous PRN Merced Chilel MD        acetaminophen (TYLENOL) tablet 650 mg  650 mg Oral Q4H PRN Merced Chilel MD        aspirin suppository 600 mg  600 mg Rectal Q6H PRN Janelle Jorgensen MD        simethicone (MYLICON) chewable tablet 80 mg  80 mg Oral Q6H PRN Janelle Lamar MD   80 mg at 04/04/19 0818    0.9 % sodium chloride bolus  250 mL Intravenous PRN Merced Chilel MD        0.9 % sodium chloride bolus  150 mL Intravenous PRN Merced Chilel MD        albumin human 25 % solution 25 g  25 g Intravenous Once Merced Chilel MD        heparin (porcine) injection 1,400 Units  1,400 Units Intercatheter PRN Merced Chilel MD   1,400 Units at 04/02/19 1707    heparin (porcine) injection 1,500 Units  1,500 Units Intercatheter PRN Merced Chilel MD   1,500 Units at 04/02/19 1707    aspirin EC tablet 81 mg  81 mg Oral Daily Janelle Lamar MD   81 mg at 04/04/19 0818    [Held by provider] polyethylene glycol (GLYCOLAX) packet 17 g  17 g Oral Daily Maik Pfeiffer MD   Stopped at 03/31/19 1030    spironolactone (ALDACTONE) tablet 25 mg  25 mg Oral Daily Merced Chilel MD   25 mg at 04/04/19 9700  metoprolol succinate (TOPROL XL) extended release tablet 50 mg  50 mg Oral Daily Janelle Lamar MD   50 mg at 19 0817    hydrALAZINE (APRESOLINE) tablet 50 mg  50 mg Oral 3 times per day Andre Bryant MD   50 mg at 19 2158    hydrALAZINE (APRESOLINE) injection 10 mg  10 mg Intravenous Q4H PRN Janelle Lamar MD        FLUoxetine (PROZAC) capsule 10 mg  10 mg Oral Daily Andre Bryant MD   10 mg at 19 0819    ondansetron (ZOFRAN) injection 4 mg  4 mg Intravenous Q6H PRN Janelle Lamar MD   4 mg at 19 1904    levothyroxine (SYNTHROID) tablet 125 mcg  125 mcg Oral Daily Andre Bryant MD   125 mcg at 19 9623    ALPRAZolam Jeraldene Halsted) tablet 0.25 mg  0.25 mg Oral Once Andre Bryant MD   Stopped at 03/15/19 1733    QUEtiapine (SEROQUEL) tablet 25 mg  25 mg Oral Nightly Andre Bryant MD   25 mg at 19 215    magnesium hydroxide (MILK OF MAGNESIA) 400 MG/5ML suspension 30 mL  30 mL Oral Daily PRN Andre Bryant MD   30 mL at 19 0910    clopidogrel (PLAVIX) tablet 75 mg  75 mg Oral Daily Andre Bryant MD   75 mg at 19 0818    isosorbide mononitrate (IMDUR) extended release tablet 30 mg  30 mg Oral Daily Andre Bryant MD   30 mg at 19 0817    [Held by provider] docusate sodium (COLACE) capsule 100 mg  100 mg Oral Daily Latasha Quiroz MD   100 mg at 19 1026    amiodarone (CORDARONE) tablet 200 mg  200 mg Oral Daily Andre Bryant MD   200 mg at 19 0818    atorvastatin (LIPITOR) tablet 20 mg  20 mg Oral Daily Andre Bryant MD   20 mg at 19 0818       ALLERGIES:   Allergies   Allergen Reactions    Quinolones      Pt is allergic to fluoroquinolones       FAMILY HISTORY:   History reviewed. No pertinent family history.     SOCIAL HISTORY:  Social History     Tobacco Use    Smoking status: Former Smoker     Last attempt to quit:      Years since quittin.2    Smokeless tobacco: Never Used Result Value Ref Range    WBC 12.7 (H) 3.5 - 11.3 k/uL    RBC 3.19 (L) 4.21 - 5.77 m/uL    Hemoglobin 9.2 (L) 13.0 - 17.0 g/dL    Hematocrit 30.3 (L) 40.7 - 50.3 %    MCV 95.0 82.6 - 102.9 fL    MCH 28.8 25.2 - 33.5 pg    MCHC 30.4 28.4 - 34.8 g/dL    RDW 15.0 (H) 11.8 - 14.4 %    Platelets 943 536 - 049 k/uL    MPV 11.5 8.1 - 13.5 fL    NRBC Automated 0.0 0.0 per 100 WBC   BASIC METABOLIC PANEL   Result Value Ref Range    Glucose 124 (H) 70 - 99 mg/dL     (HH) 8 - 23 mg/dL    CREATININE 2.00 (H) 0.70 - 1.20 mg/dL    Bun/Cre Ratio NOT REPORTED 9 - 20    Calcium 8.8 8.6 - 10.4 mg/dL    Sodium 139 135 - 144 mmol/L    Potassium 4.7 3.7 - 5.3 mmol/L    Chloride 103 98 - 107 mmol/L    CO2 23 20 - 31 mmol/L    Anion Gap 13 9 - 17 mmol/L    GFR Non-African American 32 (L) >60 mL/min    GFR  38 (L) >60 mL/min    GFR Comment          GFR Staging NOT REPORTED    MAGNESIUM   Result Value Ref Range    Magnesium 2.6 1.6 - 2.6 mg/dL   Brain Natriuretic Peptide   Result Value Ref Range    Pro-BNP 16,969 (H) <300 pg/mL    BNP Interpretation         PROTIME-INR   Result Value Ref Range    Protime 11.5 9.0 - 12.0 sec    INR 1.1    APTT   Result Value Ref Range    PTT 26.1 20.5 - 30.5 sec   T3, FREE   Result Value Ref Range    T3, Free 1.35 (L) 2.02 - 4.43 pg/mL   T4, FREE   Result Value Ref Range    Thyroxine, Free 1.37 0.93 - 1.70 ng/dL   TSH without Reflex   Result Value Ref Range    TSH 6.79 (H) 0.30 - 5.00 mIU/L   URINALYSIS   Result Value Ref Range    Color, UA YELLOW YELLOW    Turbidity UA CLEAR CLEAR    Glucose, Ur NEGATIVE NEGATIVE    Bilirubin Urine NEGATIVE NEGATIVE    Ketones, Urine NEGATIVE NEGATIVE    Specific Gravity, UA 1.016 1.005 - 1.030    Urine Hgb NEGATIVE NEGATIVE    pH, UA 6.0 5.0 - 8.0    Protein, UA 3+ (A) NEGATIVE    Urobilinogen, Urine Normal Normal    Nitrite, Urine NEGATIVE NEGATIVE    Leukocyte Esterase, Urine NEGATIVE NEGATIVE    Urinalysis Comments NOT REPORTED    Microscopic Urinalysis   Result Value Ref Range    -          WBC, UA None 0 - 5 /HPF    RBC, UA 0 TO 2 0 - 2 /HPF    Casts UA NOT REPORTED 0 - 2 /LPF    Crystals UA NOT REPORTED None /HPF    Epithelial Cells UA None 0 - 5 /HPF    Renal Epithelial, Urine NOT REPORTED 0 /HPF    Bacteria, UA NOT REPORTED None    Mucus, UA NOT REPORTED None    Trichomonas, UA NOT REPORTED None    Amorphous, UA NOT REPORTED None    Other Observations UA NOT REPORTED NOT REQ. Yeast, UA NOT REPORTED None   PROTEIN, BODY FLUID   Result Value Ref Range    Specimen Type . THORACENTESIS FLUID     Total Protein, Body Fluid 1.8 g/dL   Electrophoresis Protein, Serum without Reflex to Immunofixation   Result Value Ref Range    Total Protein 4.7 (L) 6.4 - 8.3 g/dL    Albumin (calculated) 2.5 (L) 3.2 - 5.2 g/dL    Albumin % 53 45 - 65 %    Alpha-1-Globulin 0.3 0.1 - 0.4 g/dL    Alpha 1 % 7 (H) 3 - 6 %    Alpha-2-Globulin 0.8 0.5 - 0.9 g/dL    Alpha 2 % 18 (H) 6 - 13 %    Beta Globulin 0.7 0.5 - 1.1 g/dL    Beta Percent 14 11 - 19 %    Gamma Globulin 0.4 (L) 0.5 - 1.5 g/dL    Gamma Globulin % 8 (L) 9 - 20 %    Total Prot. Sum 4.7 (L) 6.3 - 8.2 g/dL    Total Prot. Sum,% 100 98 - 102 %    Protein Electrophoresis, Serum       TOTAL PROTEIN IS DECREASED WITH A NORMAL ELECTROPHORETIC PATTERN. MAY BE OBSERVED WITH IATROGENIC AND/OR PATHOLOGIC DILUTIONAL STATES, NONSELECTIVE PROTEIN LOSS (GI OR RENAL), AND PROTEIN LOSS INTO EFFUSIONS. Pathologist ELECTRONICALLY SIGNED. Kiara Johnson M.D. [place]/Lambda Free Lt Chains, Serum Quant   Result Value Ref Range    Kappa Free Light Chains QNT 2.69 (H) 0.37 - 1.94 mg/dL    Lambda Free Light Chains QNT 2.46 0.57 - 2.63 mg/dL    Free Kappa/Lambda Ratio 1.09 0.26 - 1.65   Body Fluid Cell Count with Differential   Result Value Ref Range    Color, Fluid NOT REPORTED     Appearance, Fluid NOT REPORTED     WBC, Fluid 465 /mm3    RBC, Fluid 17,000 /mm3    Specimen Type . THORACENTESIS FLUID     Neutrophil Count, Fluid 22 % Lymphocytes, Body Fluid 22 %    Monocyte Count, Fluid  %    Eos, Fluid  0 %    Basos, Fluid  0 %    Other Cells, Fluid MONOCYTES %    Fluid Diff Comment     Glucose, Body Fluid   Result Value Ref Range    Specimen Type . THORACENTESIS FLUID     Glucose, Fluid 149 mg/dL   Lactate Dehydrogenase, Body Fluid   Result Value Ref Range    Specimen Type . THORACENTESIS FLUID     LD, Fluid 95 U/L   pH, Body Fluid   Result Value Ref Range    Specimen Type . THORACENTESIS FLUID     pH, Fluid 7.5    Cytology, Non-Gyn   Result Value Ref Range    Specimen Description NOT REPORTED     Case Number: [id number]    Protein / creatinine ratio, urine   Result Value Ref Range    Total Protein, Urine 217 mg/dL    Creatinine, Ur 56.2 39.0 - 259.0 mg/dL    Urine Total Protein Creatinine Ratio 3.86 (H) 0.00 - 0.20   Surgical Pathology   Result Value Ref Range    Surgical Pathology Report       [id number]  [address],  [address] [facility], [address]  [phone]  Fax: [phone]    [address] [place] [address]     Patient Name: Paco Vergara: [id number]  Path Number: [id number]  Collected: 3/7/2019  Received: 3/7/2019  Reported: 3/8/2019 11:22    -- Diagnosis --    THORACENTESIS FLUID RIGHT:   NEGATIVE FOR MALIGNANCY. CHUCK Hillman  **Electronically Signed Out**         rdd/3/8/2019       Clinical Information  No information given. Source of Specimen  1: THORACENTESIS FLUID RIGHT    Gross Description  \"RT THORA\" 1000.0 ml. red cloudy fluid. MICROSCOPIC DESCRIPTION     Microscopic examination performed.          CALCIUM, IONIZED   Result Value Ref Range    Calcium, Ion 1.15 1.13 - 1.33 mmol/L   Basic Metabolic Panel   Result Value Ref Range    Glucose 133 (H) 70 - 99 mg/dL     (HH) 8 - 23 mg/dL    CREATININE 2.30 (H) 0.70 - 1.20 mg/dL    Bun/Cre Ratio NOT REPORTED 9 - 20    Calcium 8.3 (L) 8.6 - 10.4 mg/dL Sodium 139 135 - 144 mmol/L    Potassium 4.2 3.7 - 5.3 mmol/L    Chloride 104 98 - 107 mmol/L    CO2 21 20 - 31 mmol/L    Anion Gap 14 9 - 17 mmol/L    GFR Non-African American 27 (L) >60 mL/min    GFR  33 (L) >60 mL/min    GFR Comment          GFR Staging NOT REPORTED    Magnesium   Result Value Ref Range    Magnesium 2.5 1.6 - 2.6 mg/dL   Phosphorus   Result Value Ref Range    Phosphorus 4.3 2.5 - 4.5 mg/dL   BASIC METABOLIC PANEL   Result Value Ref Range    Glucose 97 70 - 99 mg/dL    BUN 97 (HH) 8 - 23 mg/dL    CREATININE 2.15 (H) 0.70 - 1.20 mg/dL    Bun/Cre Ratio NOT REPORTED 9 - 20    Calcium 8.2 (L) 8.6 - 10.4 mg/dL    Sodium 140 135 - 144 mmol/L    Potassium 4.6 3.7 - 5.3 mmol/L    Chloride 104 98 - 107 mmol/L    CO2 25 20 - 31 mmol/L    Anion Gap 11 9 - 17 mmol/L    GFR Non-African American 29 (L) >60 mL/min    GFR  35 (L) >60 mL/min    GFR Comment          GFR Staging NOT REPORTED    MAGNESIUM   Result Value Ref Range    Magnesium 2.5 1.6 - 2.6 mg/dL   CALCIUM, IONIZED   Result Value Ref Range    Calcium, Ion 1.13 1.13 - 1.33 mmol/L   BASIC METABOLIC PANEL   Result Value Ref Range    Glucose 103 (H) 70 - 99 mg/dL    BUN 93 (HH) 8 - 23 mg/dL    CREATININE 2.12 (H) 0.70 - 1.20 mg/dL    Bun/Cre Ratio NOT REPORTED 9 - 20    Calcium 8.0 (L) 8.6 - 10.4 mg/dL    Sodium 138 135 - 144 mmol/L    Potassium 4.2 3.7 - 5.3 mmol/L    Chloride 105 98 - 107 mmol/L    CO2 23 20 - 31 mmol/L    Anion Gap 10 9 - 17 mmol/L    GFR Non-African American 29 (L) >60 mL/min    GFR  36 (L) >60 mL/min    GFR Comment          GFR Staging NOT REPORTED    Brain Natriuretic Peptide   Result Value Ref Range    Pro-BNP 14,280 (H) <300 pg/mL    BNP Interpretation         ALBUMIN   Result Value Ref Range    Alb 2.3 (L) 3.5 - 5.2 g/dL   PREALBUMIN   Result Value Ref Range    Prealbumin 9.4 (L) 20 - 40 mg/dL   Magnesium   Result Value Ref Range    Magnesium 2.4 1.6 - 2.6 mg/dL   BASIC METABOLIC PANEL   Result Value Ref Range    Glucose 106 (H) 70 - 99 mg/dL    BUN 89 (H) 8 - 23 mg/dL    CREATININE 2.07 (H) 0.70 - 1.20 mg/dL    Bun/Cre Ratio NOT REPORTED 9 - 20    Calcium 8.0 (L) 8.6 - 10.4 mg/dL    Sodium 140 135 - 144 mmol/L    Potassium 4.7 3.7 - 5.3 mmol/L    Chloride 105 98 - 107 mmol/L    CO2 24 20 - 31 mmol/L    Anion Gap 11 9 - 17 mmol/L    GFR Non-African American 30 (L) >60 mL/min    GFR  37 (L) >60 mL/min    GFR Comment          GFR Staging NOT REPORTED    CBC WITH AUTO DIFFERENTIAL   Result Value Ref Range    WBC 9.9 3.5 - 11.3 k/uL    RBC 3.27 (L) 4.21 - 5.77 m/uL    Hemoglobin 9.3 (L) 13.0 - 17.0 g/dL    Hematocrit 30.6 (L) 40.7 - 50.3 %    MCV 93.6 82.6 - 102.9 fL    MCH 28.4 25.2 - 33.5 pg    MCHC 30.4 28.4 - 34.8 g/dL    RDW 14.8 (H) 11.8 - 14.4 %    Platelets 856 615 - 686 k/uL    MPV 11.4 8.1 - 13.5 fL    NRBC Automated 0.0 0.0 per 100 WBC    Differential Type NOT REPORTED     WBC Morphology NOT REPORTED     RBC Morphology NOT REPORTED     Platelet Estimate NOT REPORTED     Immature Granulocytes 0 0 %    Seg Neutrophils 86 (H) 36 - 66 %    Lymphocytes 5 (L) 24 - 44 %    Monocytes 7 1 - 7 %    Eosinophils % 2 1 - 4 %    Basophils 0 0 - 2 %    Absolute Immature Granulocyte 0.00 0.00 - 0.30 k/uL    Segs Absolute 8.51 (H) 1.8 - 7.7 k/uL    Absolute Lymph # 0.50 (L) 1.0 - 4.8 k/uL    Absolute Mono # 0.69 0.1 - 0.8 k/uL    Absolute Eos # 0.20 0.0 - 0.4 k/uL    Basophils # 0.00 0.0 - 0.2 k/uL    Morphology ANISOCYTOSIS PRESENT    BASIC METABOLIC PANEL   Result Value Ref Range    Glucose 100 (H) 70 - 99 mg/dL    BUN 91 (HH) 8 - 23 mg/dL    CREATININE 2.03 (H) 0.70 - 1.20 mg/dL    Bun/Cre Ratio NOT REPORTED 9 - 20    Calcium 8.1 (L) 8.6 - 10.4 mg/dL    Sodium 142 135 - 144 mmol/L    Potassium 4.3 3.7 - 5.3 mmol/L    Chloride 106 98 - 107 mmol/L    CO2 24 20 - 31 mmol/L    Anion Gap 12 9 - 17 mmol/L    GFR Non-African American 31 (L) >60 mL/min    GFR  38 (L) >60 mL/min    GFR Comment          GFR Staging NOT REPORTED    PROTIME-INR   Result Value Ref Range    Protime 11.3 9.0 - 12.0 sec    INR 1.1    BASIC METABOLIC PANEL   Result Value Ref Range    Glucose 138 (H) 70 - 99 mg/dL    BUN 83 (H) 8 - 23 mg/dL    CREATININE 1.89 (H) 0.70 - 1.20 mg/dL    Bun/Cre Ratio NOT REPORTED 9 - 20    Calcium 8.5 (L) 8.6 - 10.4 mg/dL    Sodium 140 135 - 144 mmol/L    Potassium 4.4 3.7 - 5.3 mmol/L    Chloride 103 98 - 107 mmol/L    CO2 24 20 - 31 mmol/L    Anion Gap 13 9 - 17 mmol/L    GFR Non-African American 34 (L) >60 mL/min    GFR  41 (L) >60 mL/min    GFR Comment          GFR Staging NOT REPORTED    CBC   Result Value Ref Range    WBC 21.0 (H) 3.5 - 11.3 k/uL    RBC 3.46 (L) 4.21 - 5.77 m/uL    Hemoglobin 9.9 (L) 13.0 - 17.0 g/dL    Hematocrit 31.8 (L) 40.7 - 50.3 %    MCV 91.9 82.6 - 102.9 fL    MCH 28.6 25.2 - 33.5 pg    MCHC 31.1 28.4 - 34.8 g/dL    RDW 14.8 (H) 11.8 - 14.4 %    Platelets 133 729 - 221 k/uL    MPV 11.0 8.1 - 13.5 fL    NRBC Automated 0.0 0.0 per 100 WBC   Urinalysis Reflex to Culture   Result Value Ref Range    Color, UA YELLOW YELLOW    Turbidity UA CLEAR CLEAR    Glucose, Ur NEGATIVE NEGATIVE    Bilirubin Urine NEGATIVE NEGATIVE    Ketones, Urine NEGATIVE NEGATIVE    Specific Gravity, UA 1.024 1.005 - 1.030    Urine Hgb NEGATIVE NEGATIVE    pH, UA 5.0 5.0 - 8.0    Protein, UA 2+ (A) NEGATIVE    Urobilinogen, Urine Normal Normal    Nitrite, Urine NEGATIVE NEGATIVE    Leukocyte Esterase, Urine NEGATIVE NEGATIVE    Urinalysis Comments NOT REPORTED    Microscopic Urinalysis   Result Value Ref Range    -          WBC, UA None 0 - 5 /HPF    RBC, UA 0 TO 2 0 - 4 /HPF    Casts UA  0 - 8 /LPF     0 TO 2 HYALINE Reference range defined for non-centrifuged specimen.     Crystals UA NOT REPORTED None /HPF    Epithelial Cells UA None 0 - 5 /HPF    Renal Epithelial, Urine NOT REPORTED 0 /HPF    Bacteria, UA NOT REPORTED None    Mucus, UA NOT REPORTED None Trichomonas, UA NOT REPORTED None    Amorphous, UA NOT REPORTED None    Other Observations UA NOT REPORTED NOT REQ.     Yeast, UA NOT REPORTED None   BASIC METABOLIC PANEL   Result Value Ref Range    Glucose 125 (H) 70 - 99 mg/dL    BUN 93 (HH) 8 - 23 mg/dL    CREATININE 2.80 (H) 0.70 - 1.20 mg/dL    Bun/Cre Ratio NOT REPORTED 9 - 20    Calcium 8.2 (L) 8.6 - 10.4 mg/dL    Sodium 138 135 - 144 mmol/L    Potassium 4.2 3.7 - 5.3 mmol/L    Chloride 104 98 - 107 mmol/L    CO2 19 (L) 20 - 31 mmol/L    Anion Gap 15 9 - 17 mmol/L    GFR Non-African American 21 (L) >60 mL/min    GFR  26 (L) >60 mL/min    GFR Comment          GFR Staging NOT REPORTED    Protein, urine, random   Result Value Ref Range    Total Protein, Urine 195 mg/dL   Creatinine, Random Urine   Result Value Ref Range    Creatinine, Ur 210.1 39.0 - 259.0 mg/dL   BASIC METABOLIC PANEL   Result Value Ref Range    Glucose 122 (H) 70 - 99 mg/dL    BUN 95 (HH) 8 - 23 mg/dL    CREATININE 3.38 (H) 0.70 - 1.20 mg/dL    Bun/Cre Ratio NOT REPORTED 9 - 20    Calcium 8.0 (L) 8.6 - 10.4 mg/dL    Sodium 137 135 - 144 mmol/L    Potassium 4.3 3.7 - 5.3 mmol/L    Chloride 103 98 - 107 mmol/L    CO2 19 (L) 20 - 31 mmol/L    Anion Gap 15 9 - 17 mmol/L    GFR Non-African American 17 (L) >60 mL/min    GFR  21 (L) >60 mL/min    GFR Comment          GFR Staging NOT REPORTED    Electrolyte Panel   Result Value Ref Range    Sodium 136 135 - 144 mmol/L    Potassium 4.4 3.7 - 5.3 mmol/L    Chloride 103 98 - 107 mmol/L    CO2 18 (L) 20 - 31 mmol/L    Anion Gap 15 9 - 17 mmol/L   BASIC METABOLIC PANEL   Result Value Ref Range    Glucose 92 70 - 99 mg/dL     (HH) 8 - 23 mg/dL    CREATININE 3.97 (H) 0.70 - 1.20 mg/dL    Bun/Cre Ratio NOT REPORTED 9 - 20    Calcium 8.0 (L) 8.6 - 10.4 mg/dL    Sodium 137 135 - 144 mmol/L    Potassium 4.1 3.7 - 5.3 mmol/L    Chloride 104 98 - 107 mmol/L    CO2 18 (L) 20 - 31 mmol/L    Anion Gap 15 9 - 17 mmol/L    GFR Non- 14 (L) >60 mL/min    GFR  17 (L) >60 mL/min    GFR Comment          GFR Staging NOT REPORTED    PLATELET FUNCTION TEST   Result Value Ref Range    TAYLOR/EPI Clos Time 134 85 - 172 sec    Collagen Adenosine-5'-Diphosphate (Adp) Time 89 67 - 112 sec    Platelet Function Interp       Normal platelet function. If patient clinical history/Physical examination is positive for a bleeding diathesis, recommend repeat testing and/or additional primary hemostasis and/or coagulation studies.    CBC   Result Value Ref Range    WBC 9.6 3.5 - 11.3 k/uL    RBC 2.73 (L) 4.21 - 5.77 m/uL    Hemoglobin 7.9 (L) 13.0 - 17.0 g/dL    Hematocrit 25.9 (L) 40.7 - 50.3 %    MCV 94.9 82.6 - 102.9 fL    MCH 28.9 25.2 - 33.5 pg    MCHC 30.5 28.4 - 34.8 g/dL    RDW 15.1 (H) 11.8 - 14.4 %    Platelets 044 604 - 495 k/uL    MPV 11.6 8.1 - 13.5 fL    NRBC Automated 0.0 0.0 per 100 WBC   MAGNESIUM   Result Value Ref Range    Magnesium 2.3 1.6 - 2.6 mg/dL   CALCIUM, IONIZED   Result Value Ref Range    Calcium, Ion 1.11 (L) 1.13 - 1.33 mmol/L   FIBRINOGEN   Result Value Ref Range    Fibrinogen 359 140 - 420 mg/dL   PROTIME-INR   Result Value Ref Range    Protime 13.4 (H) 9.0 - 12.0 sec    INR 1.3    Bilirubin, Total   Result Value Ref Range    Total Bilirubin 0.29 (L) 0.3 - 1.2 mg/dL   CK isoenzymes   Result Value Ref Range    Total CK 59 39 - 308 U/L    CK-MB 7.7 <10.5 ng/mL    % CKMB 13.1 (H) 0.0 - 3.5 %    CKMB Interpretation NORMAL ISOENZYME PATTERN    Activated clotting time   Result Value Ref Range    Activated Clotting Time 225 (H) 79 - 149 sec   Activated clotting time   Result Value Ref Range    Activated Clotting Time 168 (H) 79 - 149 sec   Troponin   Result Value Ref Range    Troponin, High Sensitivity 3,045 (HH) 0 - 22 ng/L    Troponin T NOT REPORTED <0.03 ng/mL    Troponin Interp NOT REPORTED    Troponin   Result Value Ref Range    Troponin, High Sensitivity 2,936 (HH) 0 - 22 ng/L    Troponin T NOT REPORTED <0.03 ng/mL    Troponin Interp NOT REPORTED    BASIC METABOLIC PANEL   Result Value Ref Range    Glucose 100 (H) 70 - 99 mg/dL    BUN 99 (HH) 8 - 23 mg/dL    CREATININE 3.82 (H) 0.70 - 1.20 mg/dL    Bun/Cre Ratio NOT REPORTED 9 - 20    Calcium 7.4 (L) 8.6 - 10.4 mg/dL    Sodium 135 135 - 144 mmol/L    Potassium 3.8 3.7 - 5.3 mmol/L    Chloride 104 98 - 107 mmol/L    CO2 17 (L) 20 - 31 mmol/L    Anion Gap 14 9 - 17 mmol/L    GFR Non-African American 15 (L) >60 mL/min    GFR  18 (L) >60 mL/min    GFR Comment          GFR Staging NOT REPORTED    BASIC METABOLIC PANEL   Result Value Ref Range    Glucose 105 (H) 70 - 99 mg/dL     (HH) 8 - 23 mg/dL    CREATININE 3.89 (H) 0.70 - 1.20 mg/dL    Bun/Cre Ratio NOT REPORTED 9 - 20    Calcium 8.0 (L) 8.6 - 10.4 mg/dL    Sodium 135 135 - 144 mmol/L    Potassium 4.0 3.7 - 5.3 mmol/L    Chloride 101 98 - 107 mmol/L    CO2 19 (L) 20 - 31 mmol/L    Anion Gap 15 9 - 17 mmol/L    GFR Non-African American 15 (L) >60 mL/min    GFR  18 (L) >60 mL/min    GFR Comment          GFR Staging NOT REPORTED    CBC   Result Value Ref Range    WBC 14.5 (H) 3.5 - 11.3 k/uL    RBC 3.05 (L) 4.21 - 5.77 m/uL    Hemoglobin 8.8 (L) 13.0 - 17.0 g/dL    Hematocrit 27.6 (L) 40.7 - 50.3 %    MCV 90.5 82.6 - 102.9 fL    MCH 28.9 25.2 - 33.5 pg    MCHC 31.9 28.4 - 34.8 g/dL    RDW 14.9 (H) 11.8 - 14.4 %    Platelets 841 775 - 876 k/uL    MPV 12.1 8.1 - 13.5 fL    NRBC Automated 0.0 0.0 per 100 WBC   MAGNESIUM   Result Value Ref Range    Magnesium 2.2 1.6 - 2.6 mg/dL   BASIC METABOLIC PANEL   Result Value Ref Range    Glucose 98 70 - 99 mg/dL    BUN 99 (HH) 8 - 23 mg/dL    CREATININE 3.84 (H) 0.70 - 1.20 mg/dL    Bun/Cre Ratio NOT REPORTED 9 - 20    Calcium 7.9 (L) 8.6 - 10.4 mg/dL    Sodium 137 135 - 144 mmol/L    Potassium 3.4 (L) 3.7 - 5.3 mmol/L    Chloride 102 98 - 107 mmol/L    CO2 20 20 - 31 mmol/L    Anion Gap 15 9 - 17 mmol/L    GFR Non-African American 15 (L) >60 mL/min    GFR  18 (L) >60 mL/min    GFR Comment          GFR Staging NOT REPORTED    POTASSIUM   Result Value Ref Range    Potassium 4.1 3.7 - 5.3 mmol/L   Magnesium   Result Value Ref Range    Magnesium 2.1 1.6 - 2.6 mg/dL   BASIC METABOLIC PANEL   Result Value Ref Range    Glucose 99 70 - 99 mg/dL     (HH) 8 - 23 mg/dL    CREATININE 3.77 (H) 0.70 - 1.20 mg/dL    Bun/Cre Ratio NOT REPORTED 9 - 20    Calcium 8.0 (L) 8.6 - 10.4 mg/dL    Sodium 137 135 - 144 mmol/L    Potassium 3.4 (L) 3.7 - 5.3 mmol/L    Chloride 105 98 - 107 mmol/L    CO2 22 20 - 31 mmol/L    Anion Gap 10 9 - 17 mmol/L    GFR Non-African American 15 (L) >60 mL/min    GFR  18 (L) >60 mL/min    GFR Comment          GFR Staging NOT REPORTED    BASIC METABOLIC PANEL   Result Value Ref Range    Glucose 106 (H) 70 - 99 mg/dL     (HH) 8 - 23 mg/dL    CREATININE 3.58 (H) 0.70 - 1.20 mg/dL    Bun/Cre Ratio NOT REPORTED 9 - 20    Calcium 7.9 (L) 8.6 - 10.4 mg/dL    Sodium 138 135 - 144 mmol/L    Potassium 3.9 3.7 - 5.3 mmol/L    Chloride 100 98 - 107 mmol/L    CO2 24 20 - 31 mmol/L    Anion Gap 14 9 - 17 mmol/L    GFR Non-African American 16 (L) >60 mL/min    GFR African American 20 (L) >60 mL/min    GFR Comment          GFR Staging NOT REPORTED    T3, Free   Result Value Ref Range    T3, Free 0.87 (L) 2.02 - 4.43 pg/mL   T4, Free   Result Value Ref Range    Thyroxine, Free 1.14 0.93 - 1.70 ng/dL   Hepatic Function Panel   Result Value Ref Range    Alb 3.1 (L) 3.5 - 5.2 g/dL    Alkaline Phosphatase 124 40 - 129 U/L    ALT 30 5 - 41 U/L    AST 18 <40 U/L    Total Bilirubin 0.44 0.3 - 1.2 mg/dL    Bilirubin, Direct 0.13 <0.31 mg/dL    Bilirubin, Indirect 0.31 0.00 - 1.00 mg/dL    Total Protein 5.2 (L) 6.4 - 8.3 g/dL    Globulin NOT REPORTED 1.5 - 3.8 g/dL    Albumin/Globulin Ratio 1.5 1.0 - 2.5   TSH without Reflex   Result Value Ref Range    TSH 3.04 0.30 - 5.00 mIU/L   BASIC METABOLIC PANEL   Result Value Potassium 3.7 3.7 - 5.3 mmol/L    Chloride 99 98 - 107 mmol/L    CO2 29 20 - 31 mmol/L    Anion Gap 12 9 - 17 mmol/L    GFR Non-African American 19 (L) >60 mL/min    GFR  23 (L) >60 mL/min    GFR Comment          GFR Staging NOT REPORTED    BASIC METABOLIC PANEL   Result Value Ref Range    Glucose 103 (H) 70 - 99 mg/dL     (HH) 8 - 23 mg/dL    CREATININE 2.99 (H) 0.70 - 1.20 mg/dL    Bun/Cre Ratio NOT REPORTED 9 - 20    Calcium 8.0 (L) 8.6 - 10.4 mg/dL    Sodium 136 135 - 144 mmol/L    Potassium 4.1 3.7 - 5.3 mmol/L    Chloride 95 (L) 98 - 107 mmol/L    CO2 29 20 - 31 mmol/L    Anion Gap 12 9 - 17 mmol/L    GFR Non-African American 20 (L) >60 mL/min    GFR  24 (L) >60 mL/min    GFR Comment          GFR Staging NOT REPORTED    BASIC METABOLIC PANEL   Result Value Ref Range    Glucose 136 (H) 70 - 99 mg/dL     (HH) 8 - 23 mg/dL    CREATININE 2.71 (H) 0.70 - 1.20 mg/dL    Bun/Cre Ratio NOT REPORTED 9 - 20    Calcium 8.2 (L) 8.6 - 10.4 mg/dL    Sodium 136 135 - 144 mmol/L    Potassium 4.0 3.7 - 5.3 mmol/L    Chloride 97 (L) 98 - 107 mmol/L    CO2 26 20 - 31 mmol/L    Anion Gap 13 9 - 17 mmol/L    GFR Non-African American 22 (L) >60 mL/min    GFR  27 (L) >60 mL/min    GFR Comment          GFR Staging NOT REPORTED    BASIC METABOLIC PANEL   Result Value Ref Range    Glucose 95 70 - 99 mg/dL     (HH) 8 - 23 mg/dL    CREATININE 3.03 (H) 0.70 - 1.20 mg/dL    Bun/Cre Ratio NOT REPORTED 9 - 20    Calcium 8.0 (L) 8.6 - 10.4 mg/dL    Sodium 139 135 - 144 mmol/L    Potassium 3.6 (L) 3.7 - 5.3 mmol/L    Chloride 97 (L) 98 - 107 mmol/L    CO2 29 20 - 31 mmol/L    Anion Gap 13 9 - 17 mmol/L    GFR Non-African American 20 (L) >60 mL/min    GFR  24 (L) >60 mL/min    GFR Comment          GFR Staging NOT REPORTED    CBC Auto Differential   Result Value Ref Range    WBC 10.6 3.5 - 11.3 k/uL    RBC 2.94 (L) 4.21 - 5.77 m/uL    Hemoglobin 8.5 (L) 13.0 - 17.0 g/dL    Hematocrit 26.9 (L) 40.7 - 50.3 %    MCV 91.5 82.6 - 102.9 fL    MCH 28.9 25.2 - 33.5 pg    MCHC 31.6 28.4 - 34.8 g/dL    RDW 15.8 (H) 11.8 - 14.4 %    Platelets 509 915 - 768 k/uL    MPV 12.1 8.1 - 13.5 fL    NRBC Automated 0.0 0.0 per 100 WBC    Differential Type NOT REPORTED     Seg Neutrophils 83 (H) 36 - 65 %    Lymphocytes 8 (L) 24 - 43 %    Monocytes 6 3 - 12 %    Eosinophils % 3 1 - 4 %    Basophils 0 0 - 2 %    Immature Granulocytes 0 0 %    Segs Absolute 8.72 (H) 1.50 - 8.10 k/uL    Absolute Lymph # 0.80 (L) 1.10 - 3.70 k/uL    Absolute Mono # 0.63 0.10 - 1.20 k/uL    Absolute Eos # 0.36 0.00 - 0.44 k/uL    Basophils # 0.03 0.00 - 0.20 k/uL    Absolute Immature Granulocyte 0.04 0.00 - 0.30 k/uL    WBC Morphology NOT REPORTED     RBC Morphology ANISOCYTOSIS PRESENT     Platelet Estimate NOT REPORTED    BASIC METABOLIC PANEL   Result Value Ref Range    Glucose 101 (H) 70 - 99 mg/dL     (HH) 8 - 23 mg/dL    CREATININE 3.27 (H) 0.70 - 1.20 mg/dL    Bun/Cre Ratio NOT REPORTED 9 - 20    Calcium 8.2 (L) 8.6 - 10.4 mg/dL    Sodium 136 135 - 144 mmol/L    Potassium 3.9 3.7 - 5.3 mmol/L    Chloride 96 (L) 98 - 107 mmol/L    CO2 28 20 - 31 mmol/L    Anion Gap 12 9 - 17 mmol/L    GFR Non-African American 18 (L) >60 mL/min    GFR  22 (L) >60 mL/min    GFR Comment          GFR Staging NOT REPORTED    CBC Auto Differential   Result Value Ref Range    WBC 11.6 (H) 3.5 - 11.3 k/uL    RBC 3.01 (L) 4.21 - 5.77 m/uL    Hemoglobin 8.5 (L) 13.0 - 17.0 g/dL    Hematocrit 27.9 (L) 40.7 - 50.3 %    MCV 92.7 82.6 - 102.9 fL    MCH 28.2 25.2 - 33.5 pg    MCHC 30.5 28.4 - 34.8 g/dL    RDW 16.3 (H) 11.8 - 14.4 %    Platelets 079 834 - 797 k/uL    MPV 12.2 8.1 - 13.5 fL    NRBC Automated 0.0 0.0 per 100 WBC    Differential Type NOT REPORTED     WBC Morphology NOT REPORTED     RBC Morphology NOT REPORTED     Platelet Estimate NOT REPORTED     Immature Granulocytes 1 (H) 0 %    Seg Neutrophils 85 (H) 36 - 65 %    Lymphocytes 4 (L) 24 - 43 %    Monocytes 5 3 - 12 %    Eosinophils % 5 (H) 1 - 4 %    Basophils 0 0 - 2 %    Absolute Immature Granulocyte 0.12 0.00 - 0.30 k/uL    Segs Absolute 9.86 (H) 1.50 - 8.10 k/uL    Absolute Lymph # 0.46 (L) 1.10 - 3.70 k/uL    Absolute Mono # 0.58 0.10 - 1.20 k/uL    Absolute Eos # 0.58 (H) 0.00 - 0.44 k/uL    Basophils # 0.00 0.00 - 0.20 k/uL    Morphology ANISOCYTOSIS PRESENT    BASIC METABOLIC PANEL   Result Value Ref Range    Glucose 108 (H) 70 - 99 mg/dL     (HH) 8 - 23 mg/dL    CREATININE 3.49 (H) 0.70 - 1.20 mg/dL    Bun/Cre Ratio NOT REPORTED 9 - 20    Calcium 8.4 (L) 8.6 - 10.4 mg/dL    Sodium 134 (L) 135 - 144 mmol/L    Potassium 4.2 3.7 - 5.3 mmol/L    Chloride 95 (L) 98 - 107 mmol/L    CO2 26 20 - 31 mmol/L    Anion Gap 13 9 - 17 mmol/L    GFR Non-African American 17 (L) >60 mL/min    GFR African American 20 (L) >60 mL/min    GFR Comment          GFR Staging NOT REPORTED    CBC Auto Differential   Result Value Ref Range    WBC 10.0 3.5 - 11.3 k/uL    RBC 2.68 (L) 4.21 - 5.77 m/uL    Hemoglobin 7.7 (L) 13.0 - 17.0 g/dL    Hematocrit 24.8 (L) 40.7 - 50.3 %    MCV 92.5 82.6 - 102.9 fL    MCH 28.7 25.2 - 33.5 pg    MCHC 31.0 28.4 - 34.8 g/dL    RDW 16.6 (H) 11.8 - 14.4 %    Platelets 725 094 - 697 k/uL    MPV 12.0 8.1 - 13.5 fL    NRBC Automated 0.0 0.0 per 100 WBC    Differential Type NOT REPORTED     WBC Morphology NOT REPORTED     RBC Morphology NOT REPORTED     Platelet Estimate NOT REPORTED     Immature Granulocytes 0 0 %    Seg Neutrophils 84 (H) 36 - 66 %    Lymphocytes 5 (L) 24 - 44 %    Monocytes 6 1 - 7 %    Eosinophils % 5 (H) 1 - 4 %    Basophils 0 0 - 2 %    Absolute Immature Granulocyte 0.00 0.00 - 0.30 k/uL    Segs Absolute 8.40 (H) 1.8 - 7.7 k/uL    Absolute Lymph # 0.50 (L) 1.0 - 4.8 k/uL    Absolute Mono # 0.60 0.1 - 0.8 k/uL    Absolute Eos # 0.50 (H) 0.0 - 0.4 k/uL    Basophils # 0.00 0.0 - 0.2 k/uL    Morphology ANISOCYTOSIS PRESENT HEPATIC FUNCTION PANEL   Result Value Ref Range    Alb 3.4 (L) 3.5 - 5.2 g/dL    Alkaline Phosphatase 94 40 - 129 U/L    ALT 18 5 - 41 U/L    AST 25 <40 U/L    Total Bilirubin 0.55 0.3 - 1.2 mg/dL    Bilirubin, Direct 0.15 <0.31 mg/dL    Bilirubin, Indirect 0.40 0.00 - 1.00 mg/dL    Total Protein 5.6 (L) 6.4 - 8.3 g/dL    Globulin NOT REPORTED 1.5 - 3.8 g/dL    Albumin/Globulin Ratio 1.5 1.0 - 2.5   PREALBUMIN   Result Value Ref Range    Prealbumin 16.7 (L) 20 - 40 mg/dL   URINALYSIS   Result Value Ref Range    Color, UA YELLOW YELLOW    Turbidity UA CLEAR CLEAR    Glucose, Ur NEGATIVE NEGATIVE    Bilirubin Urine NEGATIVE NEGATIVE    Ketones, Urine NEGATIVE NEGATIVE    Specific Gravity, UA 1.011 1.005 - 1.030    Urine Hgb MODERATE (A) NEGATIVE    pH, UA 7.5 5.0 - 8.0    Protein, UA 1+ (A) NEGATIVE    Urobilinogen, Urine Normal Normal    Nitrite, Urine NEGATIVE NEGATIVE    Leukocyte Esterase, Urine LARGE (A) NEGATIVE    Urinalysis Comments NOT REPORTED    PROTEIN, URINE, RANDOM   Result Value Ref Range    Total Protein, Urine 57 mg/dL   Microscopic Urinalysis   Result Value Ref Range    -          WBC, UA 50  0 - 5 /HPF    RBC, UA 50  0 - 4 /HPF    Casts UA  0 - 8 /LPF     5 TO 10 HYALINE Reference range defined for non-centrifuged specimen. Crystals UA NOT REPORTED None /HPF    Epithelial Cells UA 0 TO 2 0 - 5 /HPF    Renal Epithelial, Urine NOT REPORTED 0 /HPF    Bacteria, UA NOT REPORTED None    Mucus, UA NOT REPORTED None    Trichomonas, UA NOT REPORTED None    Amorphous, UA NOT REPORTED None    Other Observations UA NOT REPORTED NOT REQ.     Yeast, UA NOT REPORTED None   Albumin   Result Value Ref Range    Alb 3.6 3.5 - 5.2 g/dL   Hepatitis B surface antibody   Result Value Ref Range    Hep B S Ab <3.50 <10 mIU/mL   Hepatitis B surface antigen   Result Value Ref Range    Hepatitis B Surface Ag NONREACTIVE NONREACTIVE   Hepatitis B core antibody, total   Result Value Ref Range    Hep B Core Total mL/min    GFR African American 22 (L) >60 mL/min    GFR Comment          GFR Staging NOT REPORTED    Magnesium   Result Value Ref Range    Magnesium 1.8 1.6 - 2.6 mg/dL   Basic Metabolic Panel w/ Reflex to MG   Result Value Ref Range    Glucose 120 (H) 70 - 99 mg/dL    BUN 24 (H) 8 - 23 mg/dL    CREATININE 1.24 (H) 0.70 - 1.20 mg/dL    Bun/Cre Ratio NOT REPORTED 9 - 20    Calcium 7.7 (L) 8.6 - 10.4 mg/dL    Sodium 141 135 - 144 mmol/L    Potassium 3.2 (L) 3.7 - 5.3 mmol/L    Chloride 102 98 - 107 mmol/L    CO2 25 20 - 31 mmol/L    Anion Gap 14 9 - 17 mmol/L    GFR Non-African American 55 (L) >60 mL/min    GFR African American >60 >60 mL/min    GFR Comment          GFR Staging NOT REPORTED    Magnesium   Result Value Ref Range    Magnesium 1.5 (L) 1.6 - 2.6 mg/dL   POTASSIUM (POC)   Result Value Ref Range    POC Potassium 3.5 3.5 - 4.5 mmol/L   BASIC METABOLIC PANEL   Result Value Ref Range    Glucose 97 70 - 99 mg/dL    BUN 44 (H) 8 - 23 mg/dL    CREATININE 2.74 (H) 0.70 - 1.20 mg/dL    Bun/Cre Ratio NOT REPORTED 9 - 20    Calcium 8.6 8.6 - 10.4 mg/dL    Sodium 140 135 - 144 mmol/L    Potassium 3.7 3.7 - 5.3 mmol/L    Chloride 100 98 - 107 mmol/L    CO2 22 20 - 31 mmol/L    Anion Gap 18 (H) 9 - 17 mmol/L    GFR Non-African American 22 (L) >60 mL/min    GFR  27 (L) >60 mL/min    GFR Comment          GFR Staging NOT REPORTED    HEMOGLOBIN AND HEMATOCRIT, BLOOD   Result Value Ref Range    Hemoglobin 8.8 (L) 13.0 - 17.0 g/dL    Hematocrit 29.9 (L) 40.7 - 50.3 %   BASIC METABOLIC PANEL   Result Value Ref Range    Glucose 116 (H) 70 - 99 mg/dL    BUN 38 (H) 8 - 23 mg/dL    CREATININE 2.61 (H) 0.70 - 1.20 mg/dL    Bun/Cre Ratio NOT REPORTED 9 - 20    Calcium 8.2 (L) 8.6 - 10.4 mg/dL    Sodium 138 135 - 144 mmol/L    Potassium 3.6 (L) 3.7 - 5.3 mmol/L    Chloride 100 98 - 107 mmol/L    CO2 22 20 - 31 mmol/L    Anion Gap 16 9 - 17 mmol/L    GFR Non-African American 23 (L) >60 mL/min    GFR African American 28 Absolute Immature Granulocyte 0.06 0.00 - 0.30 k/uL   Basic Metabolic Panel   Result Value Ref Range    Glucose 105 (H) 70 - 99 mg/dL    BUN 47 (H) 8 - 23 mg/dL    CREATININE 2.95 (H) 0.70 - 1.20 mg/dL    Bun/Cre Ratio NOT REPORTED 9 - 20    Calcium 9.1 8.6 - 10.4 mg/dL    Sodium 138 135 - 144 mmol/L    Potassium 3.8 3.7 - 5.3 mmol/L    Chloride 99 98 - 107 mmol/L    CO2 23 20 - 31 mmol/L    Anion Gap 16 9 - 17 mmol/L    GFR Non-African American 20 (L) >60 mL/min    GFR  24 (L) >60 mL/min    GFR Comment          GFR Staging NOT REPORTED    Differential   Result Value Ref Range    Differential Type NOT REPORTED     WBC Morphology NOT REPORTED     RBC Morphology NOT REPORTED     Platelet Estimate NOT REPORTED     Seg Neutrophils 82 (H) 36 - 65 %    Lymphocytes 5 (L) 24 - 43 %    Monocytes 7 3 - 12 %    Eosinophils % 5 (H) 1 - 4 %    Basophils 0 0 - 2 %    Immature Granulocytes 1 (H) 0 %    Segs Absolute 9.41 (H) 1.50 - 8.10 k/uL    Absolute Lymph # 0.56 (L) 1.10 - 3.70 k/uL    Absolute Mono # 0.74 0.10 - 1.20 k/uL    Absolute Eos # 0.61 (H) 0.00 - 0.44 k/uL    Basophils # 0.03 0.00 - 0.20 k/uL    Absolute Immature Granulocyte 0.08 0.00 - 0.30 k/uL   Reticulocytes   Result Value Ref Range    Retic % 2.0 (H) 0.5 - 1.9 %    Absolute Retic # 0.060 0.030 - 0.080 M/uL    Immature Retic Fract 8.500 2.7 - 18.3 %    Retic Hemoglobin 33.4 28.2 - 35.7 pg   Arterial Blood Gas, POC   Result Value Ref Range    POC pH 7.464 (H) 7.350 - 7.450    POC pCO2 32.0 (L) 35.0 - 48.0 mm Hg    POC PO2 58.6 (L) 83.0 - 108.0 mm Hg    POC HCO3 22.9 21.0 - 28.0 mmol/L    TCO2 (calc), Art 24 22.0 - 29.0 mmol/L    Negative Base Excess, Art NOT REPORTED 0.0 - 2.0    Positive Base Excess, Art 0 0.0 - 3.0    POC O2 SAT 92 (L) 94.0 - 98.0 %    O2 Device/Flow/% Room Air     Jarrett Test POSITIVE     Sample Site Right Radial Artery     Mode NOT REPORTED     FIO2 NOT REPORTED     Pt Temp NOT REPORTED     POC pH Temp NOT REPORTED     POC pCO2 Temp NOT REPORTED mm Hg    POC pO2 Temp NOT REPORTED mm Hg   POCT Glucose   Result Value Ref Range    POC Glucose 113 (H) 74 - 100 mg/dL   POC Glucose Fingerstick   Result Value Ref Range    POC Glucose 104 75 - 110 mg/dL   EKG 12 Lead   Result Value Ref Range    Ventricular Rate 79 BPM    Atrial Rate 79 BPM    QRS Duration 190 ms    Q-T Interval 488 ms    QTc Calculation (Bazett) 559 ms    P Axis 117 degrees    R Axis -29 degrees    T Axis 136 degrees   EKG 12 lead   Result Value Ref Range    Ventricular Rate 60 BPM    Atrial Rate 60 BPM    P-R Interval 206 ms    QRS Duration 176 ms    Q-T Interval 542 ms    QTc Calculation (Bazett) 542 ms    R Axis -27 degrees    T Axis 94 degrees   EKG 12 Lead   Result Value Ref Range    Ventricular Rate 60 BPM    Atrial Rate 60 BPM    P-R Interval 202 ms    QRS Duration 182 ms    Q-T Interval 502 ms    QTc Calculation (Bazett) 502 ms    R Axis -34 degrees    T Axis 135 degrees   EKG 12 Lead   Result Value Ref Range    Ventricular Rate 59 BPM    Atrial Rate 59 BPM    P-R Interval 198 ms    QRS Duration 208 ms    Q-T Interval 608 ms    QTc Calculation (Bazett) 601 ms    P Axis -11 degrees    R Axis -57 degrees    T Axis 124 degrees   EKG 12 Lead   Result Value Ref Range    Ventricular Rate 63 BPM    Atrial Rate 60 BPM    QRS Duration 204 ms    Q-T Interval 582 ms    QTc Calculation (Bazett) 595 ms    R Axis -49 degrees    T Axis 126 degrees   TYPE AND SCREEN   Result Value Ref Range    Expiration Date 03/18/2019     Arm Band Number BE [id number]     ABO/Rh O POSITIVE     Antibody Screen NEGATIVE     Unit Number [id number]     Product Code Leukocyte Reduced Red Cell     Unit Divison 00     Dispense Status TRANSFUSED     Transfusion Status OK TO TRANSFUSE     Crossmatch Result COMPATIBLE     Unit Number [id number]     Product Code Leukocyte Reduced Red Cell     Unit Divison 00     Dispense Status REL FROM [facility]     Transfusion Status OK TO TRANSFUSE     Crossmatch Result COMPATIBLE    BLOOD BANK SPECIMEN   Result Value Ref Range    Blood Bank Specimen NOT REPORTED    TYPE AND SCREEN   Result Value Ref Range    Expiration Date 04/01/2019     Arm Band Number [id number]     ABO/Rh O POSITIVE     Antibody Screen NEGATIVE     Unit Number [id number]     Product Code Leukocyte Reduced Red Cell     Unit Divison 00     Dispense Status TRANSFUSED     Transfusion Status OK TO TRANSFUSE     Crossmatch Result COMPATIBLE     Unit Number [id number]     Product Code Leukocyte Reduced Red Cell     Unit Divison 00     Dispense Status TRANSFUSED     Transfusion Status OK TO TRANSFUSE     Crossmatch Result COMPATIBLE    BLOOD BANK SPECIMEN   Result Value Ref Range    Blood Bank Specimen NOT REPORTED    TYPE AND SCREEN   Result Value Ref Range    Expiration Date 04/05/2019     Arm Band Number BE [id number]     ABO/Rh O POSITIVE     Antibody Screen NEGATIVE     Unit Number [id number]     Product Code Leukocyte Reduced Red Cell     Unit Divison 00     Dispense Status TRANSFUSED     Transfusion Status OK TO TRANSFUSE     Crossmatch Result COMPATIBLE    BLOOD BANK SPECIMEN   Result Value Ref Range    Blood Bank Specimen NOT REPORTED        IMPRESSION:   80year old male with history significant for cardiac issues, heme/onc consulted for thrombocytopenia     PLAN:     1. Thrombocytopenia  -normal plt count at admission decrease down to 57K but improved up to 90K, possible medication induced, will await HIT ab, and hold all heparin containing products including flushes  -discussed with RN  -no bleeding, no need for tranfusion. 2. HUYEN secondary to ATN-on HD recommend to hold heparin flushes at dialysis, discussed with RN. Please call with questions.        Electronically signed by Erum Barber, [facility] 4/4/2019 at 11:21 AM

## 2019-04-04 NOTE — PROGRESS NOTES
Occupational Therapy  Facility/Department: [facility]  Daily Treatment Note  NAME: Billy Kimbrough  : 12/10/1928  MRN: [id number]    Date of Service: 2019    Discharge Recommendations:  Further therapy recommended at discharge. Assessment   Performance deficits / Impairments: Decreased ADL status; Decreased endurance;Decreased functional mobility ; Decreased balance;Decreased ROM; Decreased strength;Decreased high-level IADLs  Assessment: Pt would benefit from continued acute care OT to address minimal deficits in ADL/ functional activities, endurance, balance and functional transfers/ mobility following admission. Treatment Diagnosis: TAVR, PPM  Prognosis: Good  Patient Education: OT POC and PPM precaution, Pt verbalized understanding/  REQUIRES OT FOLLOW UP: Yes  Activity Tolerance  Activity Tolerance: Patient limited by fatigue  Safety Devices  Safety Devices in place: Yes  Type of devices: All fall risk precautions in place;Call light within reach; Patient at risk for falls; Left in chair;Nurse notified         Patient Diagnosis(es): The encounter diagnosis was Pulmonary hypertension ([address] has a past medical history of Aortic stenosis, Atrial fibrillation ([address] CAD (coronary artery disease), Chest pain, CHF (congestive heart failure) ([address] Chronic anemia, Chronic kidney disease, Hypertension, Pleural effusion on left, Pleural effusion, right, Pulmonary emboli ([address] Pulmonary hypertension ([address] and Thyroid disease. has a past surgical history that includes pacemaker placement (2019); thoracentesis; joint replacement; Coronary angioplasty with stent (2019); other surgical history (2019); and other surgical history (03/15/2019).     Restrictions  Restrictions/Precautions  Restrictions/Precautions: Weight Bearing, Cardiac, General Precautions, ROM Restrictions, Surgical Protocols, Fall Risk  Required Braces or Orthoses?: Yes(Sling for L UE)  Implants present? : Pacemaker(4/2/19)  Upper Extremity Weight Bearing Restrictions  Left Upper Extremity Weight Bearing: (partial weight bearing for PPM 4/2)  Required Braces or Orthoses  Left Upper Extremity Brace/Splint: Sling  Position Activity Restriction  Other position/activity restrictions: Up w/assist.  Subjective   General  Patient assessed for rehabilitation services?: Yes  Family / Caregiver Present: No  Pain Assessment  Clinical Progression: Not changed  Response to Pain Intervention: None  Vital Signs  Patient Currently in Pain: Denies   Orientation     Objective    ADL  Feeding: Independent;Setup(Seated in recliner at tray table)  Additional Comments: Pt declined to do grooming/self care at this time. Pt given educ on pacemaker precautions with adlsl and proper donning of sling. Pt verbalized understanding. Call light in reach.       Plan   Plan  Times per week: 4-5x/wk     Goals  Short term goals  Time Frame for Short term goals: by discharge, pt will  Short term goal 1: demo I in UE ADL activities   Short term goal 2: demo MI in LE ADL activities with AD as needed  Short term goal 3: demo understanding and I use of EC/WS, fall prevention and proper pursed lipped breathing tech during functional activities   Short term goal 4: demo increased activity tolerance of 30+ min to assist with ADL/ functional activities  Short term goal 5: demo I in functional transfers/ mobility with use of RW to assist with ADL/ functional activities   Short term goal 6: demo understanding and I use of safe transfer tech during functional activities with use of RW  Patient Goals   Patient goals : to go home       Therapy Time   Individual Concurrent Group Co-treatment   Time In  1530         Time Out  1540         Minutes                   [address]/[place]

## 2019-04-04 NOTE — PROGRESS NOTES
mobility  Scooting: Stand by assistance  Comment: Pt seated in bedside chair upon therapist arrival.   Transfers  Sit to Stand: Contact guard assistance  Stand to sit: Stand by assistance  Comment: Pt transferred from bedside chair to RW, and from RW to bedside chair post tx. Ambulation  Ambulation?: Yes  Ambulation 1  Surface: level tile  Device: Rolling Walker  Assistance: Contact guard assistance  Quality of Gait: Decreased renato, short step length, flexed posture. Distance: 100ft   Comments: L UE on RW for balance only, 2 brief standing rest breaks     Balance  Posture: Fair  Sitting - Static: Good  Sitting - Dynamic: Good  Standing - Static: Good;-  Standing - Dynamic: Fair;+  Exercises  Seated LE exercise program: Long Arc Quads, hip abduction/adduction, heel/toe raises, and marches. Reps: x20  Incentive spirometer x12 1000ml  Comments: Pt required x2 rest breaks during seated exercise. Assessment   Body structures, Functions, Activity limitations: Decreased functional mobility ; Decreased balance;Decreased endurance  Assessment: Pt amb x100ft w/RW and no c/o of pain or sxs throughout. No LOB or seated rest breaks required to complete ambulation. Progressed exercise to x20 reps to inmprove muscular endurance. Pt required some encouragement to participate in pt this date. Prognosis: Good  Patient Education: EDU pt on importance of exercise, pt verbalized understanding. REQUIRES PT FOLLOW UP: Yes  Activity Tolerance  Activity Tolerance: Patient limited by endurance; Patient Tolerated treatment well     Goals  Short term goals  Time Frame for Short term goals: 14 visits  Short term goal 1: Perform bed mobility and functional transfers independently  Short term goal 2: Ambulate 300ft with least restrictive AD independently  Short term goal 3: Demo Good- dynamic standing balance to decrease risk of falls  Short term goal 4: Participate in 30 minutes of therapy to demo increased endurance    Plan Plan  Times per week: 6-7x/wk  Current Treatment Recommendations: Strengthening, Balance Training, Functional Mobility Training, Endurance Training, Transfer Training, Patient/Caregiver Education & Training, Safety Education & Training, Home Exercise Program, Gait Training  Safety Devices  Type of devices: All fall risk precautions in place, Gait belt, Call light within reach, Nurse notified, Left in chair  Restraints  Initially in place: No     Therapy Time   Individual Concurrent Group Co-treatment   Time In [address] [place], [place] Treatment performed by Student PTA under the supervision of co-signing PTA who agrees with all treatment and documentation.    Leticia Schofield, PTA

## 2019-04-04 NOTE — PROGRESS NOTES
NEPHROLOGY PROGRESS NOTE      SUBJECTIVE     Admitted with shortness of breath secondary to decompensated congestive heart failure in the face of aortic stenosis. Subsequently underwent PCI and PVR. Postprocedure course has been complicated by worsening of renal function and hypervolemia for which he is getting recurrent thoracocentesis and diuretic adjustment. Hemodialysis also initiated. Had 4 sessions of hemodialysis . Last one was 4/2/19.  1 KG taken off. Made about 325 mL of urine last 24 hours. On oral diuretics. Had upgrade of dual-chamber PPM to CRT -P on 4/2/19. Had and oozing from site. Hgb had dropped and status post 1 unit of blood on 4/3/19. Today he states he feels better, no new issues, no chest pain, does have pain at the CRT-P implantation site otherwise denies worsening shortness of breath. OBJECTIVE     Vitals:    04/04/19 0700 04/04/19 0701 04/04/19 0854 04/04/19 1155   BP: (!) 163/68 (!) 163/68  (!) 171/58   Pulse: 65 65  61   Resp: 16 16  17   Temp: 97.8 °F (36.6 °C) 97.8 °F (36.6 °C)  98 °F (36.7 °C)   TempSrc: Oral Oral  Oral   SpO2:   97%    Weight:       Height:         24HR INTAKE/OUTPUT:      Intake/Output Summary (Last 24 hours) at 4/4/2019 1532  Last data filed at 4/4/2019 1247  Gross per 24 hour   Intake 265.56 ml   Output 525 ml   Net -259.44 ml       General appearance:Awake, alert, in no acute distress  HEENT: PERRLA  Respiratory::vesicular breath sounds, reduced air entry  Cardiovascular:S1 S2 normal,no gallop or organic murmur. Abdomen:Non tender/non distended. Bowel sounds present  Extremities: No Cyanosis or Clubbing, present Lower extremity edema  Neurological:Alert and oriented. No abnormalities of mood, affect, memory, mentation, or behavior are noted    MEDICATIONS     Scheduled Meds:    hydrALAZINE  100 mg Oral 3 times per day    polyethylene glycol  17 g Oral Once    lidocaine-EPINEPHrine  20 mL Intradermal Once    lidocaine PF  1 mL Intradermal Once

## 2019-04-04 NOTE — PROGRESS NOTES
Pulmonary Progress Note    CC:  chf   Subjective:  No acute events     Review of Systems -  General ROS: fatigue  ENT ROS: negative for - headaches, oral lesions or sore throat  Cardiovascular ROS: no chest pain , +orthopnea   Gastrointestinal ROS: no abdominal pain, change in bowel habits, or black or bloody stools  Skin - no rash   Neuro - no blurry vision , no loc . No focal weakness   msk - no jt tenderness or swelling    Vascular - no claudication , rest completed and negative   Lymphatic - complete and negative   Hematology - oncology - complete and negative   Allergy immunology - complete and negative    no burning or hematuria      Immunization     There is no immunization history on file for this patient. Pneumococcal Vaccine     [] Up to date    [x] Indicated   [] Refused  [] Contraindicated       Influenza Vaccine   [] Up to date    [x] Indicated   [] Refused  [] Contraindicated     PAST MEDICAL HISTORY:       Diagnosis Date    Aortic stenosis 02/15/2019    Atrial fibrillation ([address]     CAD (coronary artery disease)     Chest pain 02/15/2019    CHF (congestive heart failure) (HCC)     Chronic anemia     Chronic kidney disease     Hypertension     Pleural effusion on left 02/15/2019    Pleural effusion, right 02/15/2019    Pulmonary emboli ([address] 02/15/2019    Pulmonary hypertension ([address] 03/06/2019    Thyroid disease     hypothyroidism         Family History:   History reviewed. No pertinent family history. SURGICAL HISTORY:   Past Surgical History:   Procedure Laterality Date    CORONARY ANGIOPLASTY WITH STENT PLACEMENT  03/11/2019    complex PCI of LT MAIN, LAD    JOINT REPLACEMENT      right ankle    OTHER SURGICAL HISTORY  04/02/2019    UPGRADE TO BI-V PACEMAKER    OTHER SURGICAL HISTORY  03/15/2019    TAVR PROCEDURE    PACEMAKER PLACEMENT  01/20/2019    THORACENTESIS                TOBACCO:   reports that he quit smoking about 30 years ago.  He has never used smokeless 265.6 [P.O.:120; I.V.:145.6]  Out: 525 [Urine:525]   Date 04/04/19 0000 - 04/04/19 2359   Shift 1239-7570 0615-6172 5021-8378 24 Hour Total   INTAKE   P.O.(mL/kg/hr) 120(0.2)   120   I. V.(mL/kg) 145. 6(2.1)   145. 6(2.1)   Shift Total(mL/kg) 265.6(3.9)   265.6(3.9)   OUTPUT   Urine(mL/kg/hr) 325(0.6) 200(0.4) 100 625   Shift Total(mL/kg) 325(4.8) 200(2.9) 100(1.5) 625(9.2)   Weight (kg) 68.1 68.1 68.1 68.1     Patient Vitals for the past 96 hrs (Last 3 readings):   Weight   04/04/19 0556 150 lb 2.1 oz (68.1 kg)   04/03/19 0602 149 lb 0.5 oz (67.6 kg)   04/02/19 1708 147 lb 4.3 oz (66.8 kg)          PHYSICAL EXAMINATION:  Head and neck atraumatic, normocephalic    Lymph nodes-no cervical, supraclavicular lymphadenopathy    Neck-no JVP elevation    Lungs - dull bases and dec bs and no wheeze no rales     CVS- S1, S2 regular. No S3 no S4, no murmurs    Abdomen-nontender, nondistended. Bowel sounds are present. No organomegaly    Lower extremity-+ edema    Upper extremity-no edema    Neurological-grossly normal cranial nerves.   No overt motor deficit        Medications:    Scheduled Meds:   hydrALAZINE  100 mg Oral 3 times per day    albumin human  25 g Intravenous Once    lidocaine-EPINEPHrine  20 mL Intradermal Once    lidocaine PF  1 mL Intradermal Once    bumetanide  3 mg Oral BID    vancomycin  1,000 mg Intravenous Once    bacitracin in 0.9 % sodium chloride irrigation  50,000 Units Topical Once    sodium chloride flush  10 mL Intravenous 2 times per day    albumin human  25 g Intravenous Once    aspirin  81 mg Oral Daily    polyethylene glycol  17 g Oral Daily    spironolactone  25 mg Oral Daily    metoprolol succinate  50 mg Oral Daily    FLUoxetine  10 mg Oral Daily    levothyroxine  125 mcg Oral Daily    ALPRAZolam  0.25 mg Oral Once    QUEtiapine  25 mg Oral Nightly    clopidogrel  75 mg Oral Daily    isosorbide mononitrate  30 mg Oral Daily    [Held by provider] docusate sodium  100 mg nephrology   diuretic per nephrology   Electronically signed by Penelope Dean MD on 4/4/2019 at 7:42 PM

## 2019-04-04 NOTE — PROGRESS NOTES
[facility] - Progress Note  Today's Date and Time: 4/4/2019, 11:51 AM    Impression :   1. Recurrent bilateral pleural effusions w/lung compression  2. CHF  3. 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 Multivessel CAD s/p multiple stent placements  5. S/P TAVR  6. HUYEN on HD  7. Urinary retention  8. Pulmonary hypertension  9. S/P Upgrade of dual chamber PPM to CRT-P on 4-2-19  10. Anemia s/p transfusion 4/3    Recommendations:     · Aggressive pulmonary toilet  · Acapella valve and IS at bedside    Medical Decision Making/Summary/Discussion:   · 81 yo gentleman who developed complete heart block. Found to have multivessel CAD, severe aortic stenosis and pulmonary HTN. · Underwent pacemaker placement with subsequent bouts of acute CHF and multiple hospitalizations in [place]  · Brought to [place] by family for further care. · Since admission at [facility] he has had cardiac catheterizations x 2 with multiple stent placements and a TAVR on 3-15. Pt remains on a nitroglycerine drip  · Pt continues to require frequent thoracenteses  · He has suffered an HUYEN and is currently on a bumex drip  · CXR 3/26 showed progressed moderate to large bilateral pleural effusions with consolidation. · ID consult placed for treatment of any potential pneumonia  · Pt is afebrile, without cough or sputum production. He remains SOB with exertion and at times, when quiet. · Currently no evidence of active infection. Pt will require aggressive pulmonary toilet and diuresis. Plan to monitor off antibiotics  · Patient to continue HD  · He had upgraded pacemaker placed on 4-2-19. · Developed hematoma at pacer site, resolved with pressure. Elequis held 4-3.  Hgb to 6.5 received 1u PRBC    Infection Control Recommendations   · [place] Precautions    Antimicrobial Stewardship Recommendations     · Monitor off antibiotics    Coordination of Outpatient Care:   · Estimated Length of IV antimicrobials: None  · Patient will need Midline Catheter Insertion:   · Patient will need PICC line Insertion:  · Patient will need: Home IV , [place],  SNF,  LTAC TBD  · Patient will need outpatient wound care: No    Chief complaint/reason for consultation:   · Possible pneumonia      History of Present Illness:   Tika Godwin is a 80y.o.-year-old  male who was initially admitted on 3/6/2019. Patient seen at the request of Dr. Beto Tse     INITIAL HISTORY:    Pt is a 79 yo gentleman who has recently had multiple hospital admissions in [place] for decompensated heart failure. His symptoms began in December 2018. 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 A cardiac cath at that time showed LM and LAD calcified stenosis 80-90% in multiple areas, with severe disease in D1, D2, Om1 and OM2. Minimal disease in RCA. LV function with EF 25%. Severe aortic stenosis compromising his clinical improvedmnt      He underwent a dual chamber pacemaker placement. Following that, he had multiple hospitalizations for acute heart failure and shortness of breath. His baseline creatinine was 1.3, now >2.0. He has had multiple thoracenteses with transudative pleural fluid removed.      He was brought to [facility] for further evaluation and was admitted on 3/6/19     An ECHO from admission showed   Normal left ventricle size with mildly reduced systolic function; Estimated  left ventricular ejection fraction 40-45%  Anterolateral wall hypokinesis. Mild left ventricular hypertrophy. Left atrium is moderately dilated. Grade II diastolic dysfunction. Heavily calcified aortic valve with reduced systolic excursion and evidence  of moderate stenosis. (Peak nataliia 3.62 m/s and mean gradient 29 mmHg)  Moderate posterior pericardial effusion without any echo signs of tamponade. Normal right ventricular size and function. Pacemaker lead seen in right ventricle. Moderate tricuspid regurgitation.   Estimated right ventricular systolic pressure is 60 mmHg suggesting moderate  pulmonary HTN.    He underwent an atherectomy/JEAN-CLAUDE of the left main and LAD on 3/6. Because of the high surgical risk and renal function he was taken back to the cath lab on 3/11 for PTCA-JEAN-CLAUDE LAD, PTCRA-JEAN-CLAUDE LM with plan to consider a TAVR if pt remained stable.     A TAVR was performed on 3/15     A carotid study showed less than 50% stenosis bilaterally.     He has had multiple thoracenteses since admission. Pt has been followed by CT surgery, cardiology, pulmonary, nephrology and urology.     On the evening of 3/25 pt developed hypertension and SOB at rest. A stat CXR was done that showed progressed moderate to large bilateral pleural effusions with consolidation.      On 3/26 pt underwent a Lt thoracentesis with 1400 ml clear pleural fluid removed. Pt reports feeling much better after the procedure and is asking to sit in the chair.      I am consulted to determine if Mr Ban Perez has pneumonia. Pt had continued decline in renal function, HD initiated 3-29  PPV pacemaker placed 4-2, pt with hematoma at site, resolved with pressure. Anticoagulation held 4-3. Hgb to 6.5 received 1u PRBC     CURRENT EXAMINATION: 4/4/2019    Pt seen and examined, up in chair   Developed hematoma at PPV site, resolved with application of pressure. Anticoagulation held  Hgb to 6.5 - 1u PRBC given 4/3  He is more awake and alert today  Looks better  No complaints, states that his Lt chest remains tender but is better today.  No chills, fevers or cough    He had upgraded pacemaker placed on 4-2-19  Vancomycin given pre-op     On exam pt is in no distress  Breath sounds are diminished bilateral bases   On 2L O2    CXR with worsening effusions and pulmonary edema    Minimal urine output    Pacer site with hematoma, no active oozing but pt with dried blood on gown          Afebrile, VSS     Labs reviewed: 4/4/2019    WBC 7.7->11.4->10.5  Hgb 8.8->6.5->8.3->8.9  Plt 57->90  Cr 2.71 ->3.03->3.27->3.2--> 1.24->2.74->2.61->2.95  ->101->106->104-->24->44->38->47    Cultures:  Pleural fluid:  · 3/7 No growth    Discussed with Pt, RN. I have personally reviewed the past medical history, past surgical history, medications, social history, and family history, and I have updated the database accordingly.   Past Medical History:     Past Medical History:   Diagnosis Date    Aortic stenosis 02/15/2019    Atrial fibrillation (HCC)     CAD (coronary artery disease)     Chest pain 02/15/2019    CHF (congestive heart failure) (HCC)     Chronic anemia     Chronic kidney disease     Hypertension     Pleural effusion on left 02/15/2019    Pleural effusion, right 02/15/2019    Pulmonary emboli ([address] 02/15/2019    Pulmonary hypertension ([facility] 03/06/2019    Thyroid disease     hypothyroidism       Past Surgical  History:     Past Surgical History:   Procedure Laterality Date    CORONARY ANGIOPLASTY WITH STENT PLACEMENT  03/11/2019    complex PCI of LT MAIN, LAD    JOINT REPLACEMENT      right ankle    OTHER SURGICAL HISTORY  04/02/2019    UPGRADE TO BI-V PACEMAKER    OTHER SURGICAL HISTORY  03/15/2019    TAVR PROCEDURE    PACEMAKER PLACEMENT  01/20/2019    THORACENTESIS         Medications:      lidocaine-EPINEPHrine  20 mL Intradermal Once    lidocaine PF  1 mL Intradermal Once    bumetanide  3 mg Oral BID    vancomycin  1,000 mg Intravenous Once    bacitracin in 0.9 % sodium chloride irrigation  50,000 Units Topical Once    sodium chloride flush  10 mL Intravenous 2 times per day    albumin human  25 g Intravenous Once    aspirin  81 mg Oral Daily    [Held by provider] polyethylene glycol  17 g Oral Daily    spironolactone  25 mg Oral Daily    metoprolol succinate  50 mg Oral Daily    hydrALAZINE  50 mg Oral 3 times per day    FLUoxetine  10 mg Oral Daily    levothyroxine  125 mcg Oral Daily    ALPRAZolam  0.25 mg Oral Once    QUEtiapine  25 mg Oral Nightly    clopidogrel  75 mg Oral Daily    isosorbide mononitrate  30 mg Oral Daily    [Held by provider] docusate sodium  100 mg Oral Daily    amiodarone  200 mg Oral Daily    atorvastatin  20 mg Oral Daily       Social History:     Social History     Socioeconomic History    Marital status:      Spouse name: Not on file    Number of children: Not on file    Years of education: Not on file    Highest education level: Not on file   Occupational History     Employer: RETIRED   Social Needs    Financial resource strain: Not on file    Food insecurity:     Worry: Not on file     Inability: Not on file    Transportation needs:     Medical: Not on file     Non-medical: Not on file   Tobacco Use    Smoking status: Former Smoker     Last attempt to quit:      Years since quittin.2    Smokeless tobacco: Never Used   Substance and Sexual Activity    Alcohol use: Not Currently    Drug use: Never    Sexual activity: Not on file   Lifestyle    Physical activity:     Days per week: Not on file     Minutes per session: Not on file    Stress: Not on file   Relationships    Social connections:     Talks on phone: Not on file     Gets together: Not on file     Attends Zoroastrian service: Not on file     Active member of club or organization: Not on file     Attends meetings of clubs or organizations: Not on file     Relationship status: Not on file    Intimate partner violence:     Fear of current or ex partner: Not on file     Emotionally abused: Not on file     Physically abused: Not on file     Forced sexual activity: Not on file   Other Topics Concern    Not on file   Social History Narrative    Not on file       Family History:   History reviewed. No pertinent family history.      Allergies:   Quinolones     Review of Systems:   KASHIF, reports continued tenderness at pacemaker site, but improved    Physical Examination :     Patient Vitals for the past 8 hrs:   BP Temp Temp src Pulse Resp SpO2 Weight CREATININE 2.95 2019    GLUCOSE 105 2019       Medical Decision Making-Imagin- CXR    EXAMINATION:   SINGLE XRAY VIEW OF THE CHEST       2019 6:04 am       COMPARISON:   2019       HISTORY:   ORDERING SYSTEM PROVIDED HISTORY: SOB   TECHNOLOGIST PROVIDED HISTORY:   SOB       FINDINGS:   There is a left-sided transvenous pacer in place and a right internal jugular   dialysis catheter.  There is cardiomegaly with pulmonary vascular congestion   and edema.  There are bilateral pleural effusions which are increased.  The   pulmonary edema appears worse on the current exam.           Impression   Worsening edema and increased bilateral pleural effusions         4- AXR  EXAMINATION:   SINGLE SUPINE XRAY VIEW(S) OF THE ABDOMEN       2019 2:40 pm       COMPARISON:   None.       HISTORY:   ORDERING SYSTEM PROVIDED HISTORY: r/o ileus   TECHNOLOGIST PROVIDED HISTORY:   r/o ileus       Initial exam       FINDINGS:   Nonspecific bowel gas pattern with air-filled small and large bowel.  No   organomegaly noted.  No pathologic calcifications.  Degenerative changes of   the lower lumbar spine.           Impression   Nonspecific bowel gas pattern without evidence of small bowel obstruction.          3/29 CXR      Narrative   EXAMINATION:   SINGLE XRAY VIEW OF THE CHEST       3/29/2019 8:26 am       COMPARISON:   Chest radiograph performed 2019.       HISTORY:   ORDERING SYSTEM PROVIDED HISTORY: effusions   TECHNOLOGIST PROVIDED HISTORY:   effusions       FINDINGS:   There are large bilateral effusions with adjacent consolidation.  There is   underlying pulmonary congestion.  There is no pneumothorax.  The heart is   enlarged with stable cardiac leads.  The upper abdomen is unremarkable.  The   extrathoracic soft tissues are unremarkable.           Impression   Cardiomegaly with large bilateral effusions and adjacent consolidation   concerning for pneumonia.               3/27 CXR    Narrative EXAMINATION:   TWO VIEWS OF THE CHEST       3/28/2019 7:56 am       COMPARISON:   Chest radiograph performed 03/27/2019.       HISTORY:   ORDERING SYSTEM PROVIDED HISTORY: effusions   TECHNOLOGIST PROVIDED HISTORY:   effusions       FINDINGS:   There is a small left and a large right pleural effusion with adjacent   infiltrate.  There is no pneumothorax.  The mediastinal structures are stable   with stable cardiac leads.  The upper abdomen is unremarkable.  The   extrathoracic soft tissues are unremarkable.           Impression   Small left and large right pleural effusion with adjacent infiltrate   representing atelectasis versus pneumonia.  Overall there may be mild   improvement of the right-sided effusion. Narrative   EXAMINATION:   SINGLE XRAY VIEW OF THE CHEST       3/26/2019 2:37 am       COMPARISON:   March 20, 2019.       HISTORY:   ORDERING SYSTEM PROVIDED HISTORY: SOB, PE   TECHNOLOGIST PROVIDED HISTORY:   SOB, PE       FINDINGS:   Frontal portable view of the chest.  Low lung volume.  Progressed moderate to   large bilateral pleural effusions with consolidation.  Indistinct pulmonary   vasculature.  No pneumothorax.  The cardiomediastinal silhouette is not well   evaluated.  Atherosclerotic thoracic aorta.  Left pectoral trans venous   dual-chamber cardiac pacer device.           Impression   Progressed moderate to large bilateral pleural effusions with consolidation. Superimposed infection is not excluded.             Medical Decision Making-Other: Thank you for allowing us to participate in the care of this patient. Please call with questions.     Petr Peralta MD

## 2019-04-05 PROBLEM — E44.0 MODERATE MALNUTRITION (HCC): Status: ACTIVE | Noted: 2019-01-01

## 2019-04-05 NOTE — PROGRESS NOTES
Physical Therapy  Facility/Department: [facility]  Daily Treatment Note  NAME: Amanda Kuo  : 12/10/1928  MRN: [id number]    Date of Service: 2019    Discharge Recommendations:  Further therapy recommended at discharge. PT Equipment Recommendations  Equipment Needed: Yes  Mobility Devices: [employer]: Rolling    Patient Diagnosis(es): The encounter diagnosis was Pulmonary hypertension ([address] has a past medical history of Aortic stenosis, Atrial fibrillation ([address] CAD (coronary artery disease), Chest pain, CHF (congestive heart failure) ([address] Chronic anemia, Chronic kidney disease, Hypertension, Pleural effusion on left, Pleural effusion, right, Pulmonary emboli ([address] Pulmonary hypertension ([address] and Thyroid disease. has a past surgical history that includes pacemaker placement (2019); thoracentesis; joint replacement; Coronary angioplasty with stent (2019); other surgical history (2019); and other surgical history (03/15/2019). Restrictions  Restrictions/Precautions  Restrictions/Precautions: Weight Bearing, Cardiac, General Precautions, ROM Restrictions, Surgical Protocols, Fall Risk  Required Braces or Orthoses?: Yes(Sling for L UE)  Implants present? : Pacemaker(19)  Upper Extremity Weight Bearing Restrictions  Left Upper Extremity Weight Bearing: (partial weight bearing for PPM )  Required Braces or Orthoses  Left Upper Extremity Brace/Splint: Sling  Position Activity Restriction  Other position/activity restrictions: Up w/assist.    Subjective   General  Response To Previous Treatment: Patient with no complaints from previous session. Family / Caregiver Present: No  Subjective  Subjective: RN and pt agreeable to PT this date. Pt seated in bedside chair prior to tx; pt returned to chair post tx. Pt reports 5/10 pain at surgical incision site this date, worse with movement.    Pain Screening  Patient Currently in Pain: Yes  Pain Assessment  Pain Assessment: 0-10  Pain Level: 5  Pain Type: Surgical pain  Pain Location: Chest  Pain Orientation: Left;Upper  Pain Descriptors: Discomfort  Pain Frequency: Continuous  Pain Onset: On-going  Clinical Progression: Not changed  Functional Pain Assessment: Prevents or interferes some active activities and ADLs  Non-Pharmaceutical Pain Intervention(s): Distraction; Ambulation/Increased Activity; Therapeutic presence  Response to Pain Intervention: Patient Satisfied  Vital Signs  Patient Currently in Pain: Yes       Orientation  Orientation  Overall Orientation Status: Within Normal Limits     Objective   Bed mobility  Comment: Pt seated in bedside chair upon therapist arrival.   Transfers  Sit to Stand: Minimal Assistance  Stand to sit: Minimal Assistance  Comment: Transfers performed w/RW. Ambulation  Ambulation?: Yes  Ambulation 1  Surface: level tile  Device: Rolling Walker  Other Apparatus: Wheelchair follow;O2(2L O2)  Assistance: Contact guard assistance  Quality of Gait: Decreased renato, short step length, flexed posture, very easily SOB  Distance: 50ft  Comments: Cues given to stay closer to RW when amb w/good return demo. W/C used to return to room after 50ft amb. Attempted amb on room air,  However not tolerated, stating at 87. Donned 2L O2, returned to 93%. Balance  Posture: Fair  Sitting - Static: Good  Sitting - Dynamic: Good  Standing - Static: Good;-  Standing - Dynamic: Fair;+  Exercises  Seated LE exercise program: Long Arc Quads, hip abduction/adduction, heel/toe raises, and marches. Reps: x20  Comments: x3 rest breaks required throughout exercises       Assessment   Body structures, Functions, Activity limitations: Decreased functional mobility ; Decreased balance;Decreased endurance  Assessment: Pt amb 50ft w/RW CGA and w/c follow. W/C used to return to pt room after amb d/t decreased endurance. SpO2 93% after amb. Seated exercise tolerated well w/x3 rest breaks needed throughout exercise.  Pt demo'd decreased endurance and standing balance t/o PT this date. Prognosis: Good  Patient Education: Correct breathing technique; pt w/correct return demo. REQUIRES PT FOLLOW UP: Yes  Activity Tolerance  Activity Tolerance: Patient limited by endurance; Patient Tolerated treatment well;Patient limited by fatigue  Activity Tolerance: Rest breaks as needed. Goals  Short term goals  Time Frame for Short term goals: 14 visits  Short term goal 1: Perform bed mobility and functional transfers independently  Short term goal 2: Ambulate 300ft with least restrictive AD independently  Short term goal 3: Demo Good- dynamic standing balance to decrease risk of falls  Short term goal 4: Participate in 30 minutes of therapy to demo increased endurance    Plan    Plan  Times per week: 6-7x/wk  Current Treatment Recommendations: Strengthening, Balance Training, Functional Mobility Training, Endurance Training, Transfer Training, Patient/Caregiver Education & Training, Safety Education & Training, Home Exercise Program, Gait Training  Safety Devices  Type of devices: All fall risk precautions in place, Gait belt, Call light within reach, Nurse notified, Left in chair  Restraints  Initially in place: No     Therapy Time   Individual Concurrent Group Co-treatment   Time In 0845         Time Out 0924         Minutes [address] [place], [place] Treatment performed by Student PTA under the supervision of co-signing PTA who agrees with all treatment and documentation.    Matt Basilio PTA

## 2019-04-05 NOTE — PROGRESS NOTES
[facility]  Pre Op Progress Note    CC: PPM site sore, No BM in a couple days      Subjective:  Mr. Anuj Sheriff seen and examined Plan of care reviewed and questions answered. Physical Exam  Vital Signs: BP (!) 148/45   Pulse 63   Temp 97.9 °F (36.6 °C) (Oral)   Resp 16   Ht 5' 6\" (1.676 m)   Wt 150 lb 5.7 oz (68.2 kg)   SpO2 95%   BMI 24.27 kg/m²  O2 Flow Rate (L/min): 1 L/min       General: alert and oriented to person, place and time. Up in chair, No apparent distress.   Heart:Rhythm: paced  Lungs: clear to auscultation bilaterally and diminished breath sounds bibasilar  Abdomen: soft, non tender, non distended, BSx4  Extremities: negative      Scheduled Meds:    docusate sodium  100 mg Oral BID    hydrALAZINE  100 mg Oral 3 times per day    lidocaine-EPINEPHrine  20 mL Intradermal Once    lidocaine PF  1 mL Intradermal Once    bumetanide  3 mg Oral BID    vancomycin  1,000 mg Intravenous Once    bacitracin in 0.9 % sodium chloride irrigation  50,000 Units Topical Once    sodium chloride flush  10 mL Intravenous 2 times per day    albumin human  25 g Intravenous Once    aspirin  81 mg Oral Daily    spironolactone  25 mg Oral Daily    metoprolol succinate  50 mg Oral Daily    FLUoxetine  10 mg Oral Daily    levothyroxine  125 mcg Oral Daily    ALPRAZolam  0.25 mg Oral Once    QUEtiapine  25 mg Oral Nightly    clopidogrel  75 mg Oral Daily    isosorbide mononitrate  30 mg Oral Daily    [Held by provider] docusate sodium  100 mg Oral Daily    amiodarone  200 mg Oral Daily    atorvastatin  20 mg Oral Daily     Continuous Infusions:     Data:  CBC:   Recent Labs     04/03/19  1853 04/04/19  0820 04/05/19  0424   WBC 11.4* 10.5 8.6   HGB 8.3* 8.9* 7.4*   HCT 25.4* 28.1* 23.5*   MCV 89.1 90.4 90.4   PLT 57* 90* 92*     BMP:   Recent Labs     04/03/19  1041 04/04/19  0820 04/05/19  0424    138 138   K 3.6* 3.8 3.5*    99 99   CO2 22 23 23   BUN 38* 47* 54*   CREATININE 2.61* 2.95* 3.29*     PT/INR: No results for input(s): PROTIME, INR in the last 72 hours. APTT: No results for input(s): APTT in the last 72 hours. 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 nephrology managing closely. Eliquis can be restarted when PPM site stable and ok with cardiology  ID monitoring off antibiotics  Pulmonary - wean O2 as tolerated, no intervention for pleural effusions  Constipated - start colace, give suppository this morning    Cardiology  BIV pacemaker placement         The above recommendations including medications and orders were discussed and agreed upon with Dr. Bharti Alva, the attending on service for the cardiothoracic surgery group today.      Sherrie Eastman, APRN, CNP

## 2019-04-05 NOTE — PROGRESS NOTES
Dialysis Post Treatment Note  Patient tolerated treatment well. Denies complaints at time of discharge.    Vitals:    04/05/19 1419   BP: (!) 149/62   Pulse: 60   Resp:    Temp: 97.7 °F (36.5 °C)   SpO2:      Pre-Weight = 68.1  Post-weight = Weight: 149 lb 4 oz (67.7 kg)  Total Liters Processed = Total Liters Processed (l/min): 77.6 l/min  Rinseback Volume (mL) = Rinseback Volume (ml): 370 ml  Net Removal (mL) = @FLOW([id number]  Length of treatment=180 minutes

## 2019-04-05 NOTE — PROGRESS NOTES
Nutrition Assessment    Type and Reason for Visit: Reassess    Nutrition Recommendations: Continue cardiac, 2 gm Na diet w/ 1800 mL FR. Recommend Nepro supplements 3x/d per MD discretion. Nutrition Assessment: Pt's last HD was 4/2 -holding off on HD at this time. Pt stated that his appetite continues to be poor and is eating 30% of his meals. Encouraged the importance of adequate po/fluid intake, pt stated understanding. All supplements d/c'd per MD request. Recommend adding supplements d/t moderate malnutrition and decreased po intake. Malnutrition Assessment:  · Malnutrition Status: Meets the criteria for moderate malnutrition  · Context: (Acute on Chronic)  · Findings of the 6 clinical characteristics of malnutrition (Minimum of 2 out of 6 clinical characteristics is required to make the diagnosis of moderate or severe Protein Calorie Malnutrition based on AND/ASPEN Guidelines):  1. Energy Intake-Less than or equal to 75% of estimated energy requirement, Greater than or equal to 7 days    2. Weight Loss-Unable to assess,    3. Fat Loss-No significant subcutaneous fat loss, Orbital  4. Muscle Loss-No significant muscle mass loss,    5. Fluid Accumulation-Mild fluid accumulation, Generalized, Extremities  6.  Strength-Not measured    Nutrition Risk Level: High    Nutrient Needs:  · Estimated Daily Total Kcal: 1.3-1.4 ~>6028-7408 kcals/d   · Estimated Daily Protein (g): 1.2-1.4 gm/kg ~>85-99 gms/d    Nutrition Diagnosis:   · Problem:  Moderate malnutrition  · Etiology: related to Insufficient energy/nutrient consumption, Catabolic illness(decreased appetite)     Signs and symptoms:  as evidenced by Intake 25-50%    Objective Information:  · Nutrition-Focused Physical Findings: active bowel sounds  · Wound Type: Open Wounds  · Current Nutrition Therapies:  · Oral Diet Orders: 2gm Sodium, Cardiac, Fluid Restriction   · Oral Diet intake: 26-50%  · Oral Nutrition Supplement (ONS) Orders: None  · Anthropometric Measures:  · Ht: 5' 6\" (167.6 cm)   · Current Body Wt: 150 lb (68 kg)  · Admission Body Wt: 156 lb (70.8 kg)  · Usual Body Wt: 165 lb (74.8 kg)(per pt)  · % Weight Change:  ,  Wt flux since admission   · Ideal Body Wt: 141 lb 15.6 oz (64.4 kg), % Ideal Body 111% (adm/ideal)  · BMI Classification: BMI 25.0 - 29.9 Overweight    Nutrition Interventions:   Continue current diet(Restart supplements as able)  Continued Inpatient Monitoring, Education Completed    Nutrition Evaluation:   · Evaluation: Progress towards goals declining   · Goals: Oral intakes to meet at least 50% of estimated nutrition needs.     · Monitoring: Nutrition Progression, Meal Intake, Diet Tolerance, Skin Integrity, Wound Healing, I&O, Weight, Pertinent Labs, Monitor Hemodynamic Status, Monitor Bowel Function      Electronically signed by Maverick Jorgensen RD, LD on 4/5/19 at 12:21 PM    Contact Number: [phone]

## 2019-04-05 NOTE — PROGRESS NOTES
NEPHROLOGY PROGRESS NOTE      SUBJECTIVE     Admitted with shortness of breath secondary to decompensated congestive heart failure in the face of aortic stenosis. Subsequently underwent PCI and PVR. Postprocedure course has been complicated by worsening of renal function and hypervolemia for which he is getting recurrent thoracocentesis and diuretic adjustment. Hemodialysis also initiated. Today he feels short of breath, plan for dialysis. Se creatinine 3.29, BUN 54. Had 4 sessions of hemodialysis . Last one was 4/2/19.  1 KG taken off. On Bumex 3 mg BID, UOP in last 24 hrs 685 ml. Negative 4.9L   Had upgrade of dual-chamber PPM to CRT -P on 4/2/19. Had and oozing from site. Hgb had dropped and status post 1 unit of blood on 4/3/19. OBJECTIVE     Vitals:    04/05/19 0508 04/05/19 0534 04/05/19 0659 04/05/19 0706   BP: (!) 177/55  (!) 160/54 (!) 148/45   Pulse: 62  68 63   Resp: 15  16    Temp: 98.2 °F (36.8 °C)  97.9 °F (36.6 °C)    TempSrc: Oral  Oral    SpO2:   94%    Weight:  150 lb 5.7 oz (68.2 kg)     Height:         24HR INTAKE/OUTPUT:      Intake/Output Summary (Last 24 hours) at 4/5/2019 0741  Last data filed at 4/5/2019 0510  Gross per 24 hour   Intake 10 ml   Output 685 ml   Net -675 ml       General appearance:Awake, alert, in no acute distress on nasal oxygen. HEENT: PERRLA  Respiratory::vesicular breath sounds, reduced air entry  Cardiovascular:S1 S2 normal,no gallop or organic murmur, tender CRT site with staples and ecchymosis. Abdomen:Non tender/non distended. Bowel sounds present  Extremities: No Cyanosis or Clubbing, present Lower extremity edema  Neurological:Alert and oriented. No abnormalities of mood, affect, memory, mentation, or behavior are noted    MEDICATIONS     Scheduled Meds:    hydrALAZINE  100 mg Oral 3 times per day    lidocaine-EPINEPHrine  20 mL Intradermal Once    lidocaine PF  1 mL Intradermal Once    bumetanide  3 mg Oral BID    vancomycin  1,000 mg Intravenous Once    bacitracin in 0.9 % sodium chloride irrigation  50,000 Units Topical Once    sodium chloride flush  10 mL Intravenous 2 times per day    albumin human  25 g Intravenous Once    aspirin  81 mg Oral Daily    spironolactone  25 mg Oral Daily    metoprolol succinate  50 mg Oral Daily    FLUoxetine  10 mg Oral Daily    levothyroxine  125 mcg Oral Daily    ALPRAZolam  0.25 mg Oral Once    QUEtiapine  25 mg Oral Nightly    clopidogrel  75 mg Oral Daily    isosorbide mononitrate  30 mg Oral Daily    [Held by provider] docusate sodium  100 mg Oral Daily    amiodarone  200 mg Oral Daily    atorvastatin  20 mg Oral Daily     Continuous Infusions:     PRN Meds:    Home Meds:                Medications Prior to Admission: amiodarone (CORDARONE) 200 MG tablet, Take 200 mg by mouth daily  atorvastatin (LIPITOR) 20 MG tablet, Take 20 mg by mouth daily  isosorbide mononitrate (IMDUR) 60 MG extended release tablet, Take 60 mg by mouth daily  levothyroxine (SYNTHROID) 100 MCG tablet, Take 100 mcg by mouth Daily  lisinopril (PRINIVIL;ZESTRIL) 40 MG tablet, Take 40 mg by mouth daily  apixaban (ELIQUIS) 2.5 MG TABS tablet, Take 2.5 mg by mouth daily  metoprolol tartrate (LOPRESSOR) 50 MG tablet, Take 50 mg by mouth 2 times daily    INVESTIGATIONS     Last 3 CMP:    Recent Labs     04/03/19  1041 04/04/19  0820 04/05/19  0424    138 138   K 3.6* 3.8 3.5*    99 99   CO2 22 23 23   BUN 38* 47* 54*   CREATININE 2.61* 2.95* 3.29*   CALCIUM 8.2* 9.1 8.7       Last 3 CBC:  Recent Labs     04/03/19  1853 04/04/19  0820 04/05/19  0424   WBC 11.4* 10.5 8.6   RBC 2.85* 3.11* 2.60*   HGB 8.3* 8.9* 7.4*   HCT 25.4* 28.1* 23.5*   MCV 89.1 90.4 90.4   MCH 29.1 28.6 28.5   MCHC 32.7 31.7 31.5   RDW 15.8* 16.0* 15.9*   PLT 57* 90* 92*   MPV 11.9 12.4 12.4       ASSESSMENT     1. Acute kidney injury secondary to ATN from hypoperfusion related to cardiorenal syndrome - worsening.   Hemodialysis dependent since 29th of March  2. Chronic kidney disease stage IIIB with baseline creatinine suspected around 2  3. Nephrotic range proteinuria - Bx not feasible in view of he being on Asprin and cardiology recommends not to stop anticoagulant. Explained to patient and he does not want to do that. 4.  Status post TVR  5. Cardiomyopathy ejection fraction 40-45%/left ventricular diastole dysfunction/moderate to severe mitral regurgitation S/p CRT4/3   6. S/p PCI and atherectomy  3/11  7. Recurrent bilateral pleural effusion status post pleural taps  8. HTN  9. Hx of Urinary retention  10. Thrombocytopenia  11. Advanced age    PLAN     4. Patient was seen on HD at bedside. Orders were confirmed with the HD nurse. 2.  Spoke with primary Dr. Carleen Zaragoza and discussed the case. 3.  If dialysis to be continued patient will need [facility] cath before discharge. 4.  Hematology- Hit antibody negative. 5.  Chances of renal recovery slim  6. Will follow. Fazal Scott MD  PGY-3, Nephrology  [facility]      Attending Physician Statement  I have discussed the care of Argyle Brunner, including pertinent history and exam findings,  with the Fellow/Resident/CNP. I have reviewed the key elements of all parts of the encounter with the Fellow/Resident/CNP. I agree with the assessment, plan and orders as documented by the Fellow/Resident/CNP. Shaheed Shaw MD   Nephrology [address] [place] [address]

## 2019-04-05 NOTE — PROGRESS NOTES
CONSULT NOTE    PCP: No primary care provider on file. Referring Provider: Marjie Harada, MD    VISIT DIAGNOSIS:  The encounter diagnosis was Pulmonary hypertension ([address] CC: Thrombocytopenia   Initial presentation :  Levar Colby is a very pleasant 80 y.o. Radhames Lyon has significant cardiac history with multivessel CAD, CHF, pacemaker recently moved from [place] to be near relatives admitted for increase sob with exertion and cardiac issues. Heme onc consulted for thrombocytopenia, platelets on admission were normal around 200s and have beed declining to 57K and today are 90K. No SQ heparin however did have heparin flushes and has been in and out of  hospital recently. S: HIT ab negative, plts improving to 92K, no bleeding, however patient states he feels \"horrible\" denies chest pain or sob.       PAST MEDICAL HISTORY:      Diagnosis Date    Aortic stenosis 02/15/2019    Atrial fibrillation (HCC)     CAD (coronary artery disease)     Chest pain 02/15/2019    CHF (congestive heart failure) (HCC)     Chronic anemia     Chronic kidney disease     Hypertension     Pleural effusion on left 02/15/2019    Pleural effusion, right 02/15/2019    Pulmonary emboli ([address] 02/15/2019    Pulmonary hypertension ([address] 03/06/2019    Thyroid disease     hypothyroidism       PAST SURGICAL HISTORY:      Procedure Laterality Date    CORONARY ANGIOPLASTY WITH STENT PLACEMENT  03/11/2019    complex PCI of LT MAIN, LAD    JOINT REPLACEMENT      right ankle    OTHER SURGICAL HISTORY  04/02/2019    UPGRADE TO BI-V PACEMAKER    OTHER SURGICAL HISTORY  03/15/2019    TAVR PROCEDURE    PACEMAKER PLACEMENT  01/20/2019    THORACENTESIS         CURRENT MEDICATIONS:   Current Facility-Administered Medications   Medication Dose Route Frequency Provider Last Rate Last Dose    docusate sodium (COLACE) capsule 100 mg  100 mg Oral BID Bonilla Jean APRN - CNP        bisacodyl (DULCOLAX) suppository 10 mg  10 mg Rectal Daily PRN FRANCIS Ny - CNP        dextrose 5% bolus 500 mL  500 mL Intravenous Once Valarie Gonzales MD        hydrALAZINE (APRESOLINE) tablet 100 mg  100 mg Oral 3 times per day Monroe Chaudhary MD   100 mg at 04/05/19 1506    lidocaine-EPINEPHrine 1 percent-1:[id number] injection 20 mL  20 mL Intradermal Once Antonio Abarca MD        HYDROcodone-acetaminophen ([address]) 5-325 MG per tablet 1 tablet  1 tablet Oral Q8H PRN Valarie Gonzales MD   1 tablet at 04/03/19 0836    lidocaine PF 1 % injection 1 mL  1 mL Intradermal Once FRANCIS Ny CNP        bumetanide (BUMEX) tablet 3 mg  3 mg Oral BID Antonio Abarca MD   3 mg at 04/05/19 1507    vancomycin (VANCOCIN) 1000 mg in dextrose 5% 200 mL IVPB  1,000 mg Intravenous Once Antonio Abarca MD        bacitracin 50,000 Units in sodium chloride 0.9 % 1,000 mL irrigation  50,000 Units Topical Once Antonio Abarca MD        sodium chloride flush 0.9 % injection 10 mL  10 mL Intravenous 2 times per day Antonio Abarca MD   10 mL at 04/05/19 1508    sodium chloride flush 0.9 % injection 10 mL  10 mL Intravenous PRN Antonio Abarca MD        acetaminophen (TYLENOL) tablet 650 mg  650 mg Oral Q4H PRN Antonio Abarca MD        aspirin suppository 600 mg  600 mg Rectal Q6H PRN Antonio Abarca MD        simethicone (MYLICON) chewable tablet 80 mg  80 mg Oral Q6H PRN Antonio Abarca MD   80 mg at 04/05/19 1505    0.9 % sodium chloride bolus  250 mL Intravenous PRN Antonio Abarca MD        0.9 % sodium chloride bolus  150 mL Intravenous PRN Antonio Abarca MD        albumin human 25 % solution 25 g  25 g Intravenous Once Antonio Abarca MD        heparin (porcine) injection 1,400 Units  1,400 Units Intercatheter PRN Antonio Abarca MD   1,400 Units at 04/05/19 1428    heparin (porcine) injection 1,500 Units  1,500 Units Intercatheter PRN Antonio Abarca MD   1,500 Units at 04/05/19 1429    aspirin EC tablet 81 mg quittin.2    Smokeless tobacco: Never Used   Substance Use Topics    Alcohol use: Not Currently    Drug use: Never       REVIEW OF SYSTEMS:   Review of Systems   Constitutional: Positive for fatigue. HENT: Negative. Eyes: Negative. Respiratory: Negative. Cardiovascular: Negative. Gastrointestinal: Negative. Endocrine: Negative. Genitourinary: Negative. Musculoskeletal: Negative. Skin: Negative. Allergic/Immunologic: Negative. Neurological: Negative. Hematological: Negative. Psychiatric/Behavioral: Negative. PHYSICAL EXAM:   Vitals:    19 1419   BP: (!) 149/62   Pulse: 60   Resp:    Temp: 97.7 °F (36.5 °C)   SpO2:        Physical Exam   Constitutional: He is oriented to person, place, and time. He appears well-developed and well-nourished. HENT:   Head: Normocephalic and atraumatic. Eyes: Pupils are equal, round, and reactive to light. EOM are normal.   Neck: Normal range of motion. Cardiovascular:   S1S2   Pulmonary/Chest:   Diminished b/l    Abdominal: Soft. Bowel sounds are normal.   Musculoskeletal: Normal range of motion. Neurological: He is alert and oriented to person, place, and time. Skin: Skin is warm and dry. LABS:   Results for orders placed or performed during the hospital encounter of 19   Body Fluid Culture   Result Value Ref Range    Specimen Description . THORACENTESIS FLUID RIGHT     Special Requests NOT REPORTED     Direct Exam MANY NEUTROPHILS (A)     Direct Exam MANY MONONUCLEAR WHITE BLOOD CELLS SEEN (A)     Direct Exam NO BACTERIA SEEN     Direct Exam       Gram stain made from cytocentrifuged specimen. Organisms and cells will be concentrated. Culture NO GROWTH 7 DAYS    Gram stain   Result Value Ref Range    Specimen Description . OTHER     Special Requests NOT REPORTED     Direct Exam NO SAMPLE RECEIVED    Culture, Body Fluid   Result Value Ref Range    Specimen Description . OTHER     Special Requests NOT REPORTED     Direct Exam NO SAMPLE RECEIVED     Culture NOT REPORTED    CBC   Result Value Ref Range    WBC 12.7 (H) 3.5 - 11.3 k/uL    RBC 3.19 (L) 4.21 - 5.77 m/uL    Hemoglobin 9.2 (L) 13.0 - 17.0 g/dL    Hematocrit 30.3 (L) 40.7 - 50.3 %    MCV 95.0 82.6 - 102.9 fL    MCH 28.8 25.2 - 33.5 pg    MCHC 30.4 28.4 - 34.8 g/dL    RDW 15.0 (H) 11.8 - 14.4 %    Platelets 905 730 - 639 k/uL    MPV 11.5 8.1 - 13.5 fL    NRBC Automated 0.0 0.0 per 100 WBC   BASIC METABOLIC PANEL   Result Value Ref Range    Glucose 124 (H) 70 - 99 mg/dL     (HH) 8 - 23 mg/dL    CREATININE 2.00 (H) 0.70 - 1.20 mg/dL    Bun/Cre Ratio NOT REPORTED 9 - 20    Calcium 8.8 8.6 - 10.4 mg/dL    Sodium 139 135 - 144 mmol/L    Potassium 4.7 3.7 - 5.3 mmol/L    Chloride 103 98 - 107 mmol/L    CO2 23 20 - 31 mmol/L    Anion Gap 13 9 - 17 mmol/L    GFR Non-African American 32 (L) >60 mL/min    GFR  38 (L) >60 mL/min    GFR Comment          GFR Staging NOT REPORTED    MAGNESIUM   Result Value Ref Range    Magnesium 2.6 1.6 - 2.6 mg/dL   Brain Natriuretic Peptide   Result Value Ref Range    Pro-BNP 16,969 (H) <300 pg/mL    BNP Interpretation         PROTIME-INR   Result Value Ref Range    Protime 11.5 9.0 - 12.0 sec    INR 1.1    APTT   Result Value Ref Range    PTT 26.1 20.5 - 30.5 sec   T3, FREE   Result Value Ref Range    T3, Free 1.35 (L) 2.02 - 4.43 pg/mL   T4, FREE   Result Value Ref Range    Thyroxine, Free 1.37 0.93 - 1.70 ng/dL   TSH without Reflex   Result Value Ref Range    TSH 6.79 (H) 0.30 - 5.00 mIU/L   URINALYSIS   Result Value Ref Range    Color, UA YELLOW YELLOW    Turbidity UA CLEAR CLEAR    Glucose, Ur NEGATIVE NEGATIVE    Bilirubin Urine NEGATIVE NEGATIVE    Ketones, Urine NEGATIVE NEGATIVE    Specific Gravity, UA 1.016 1.005 - 1.030    Urine Hgb NEGATIVE NEGATIVE    pH, UA 6.0 5.0 - 8.0    Protein, UA 3+ (A) NEGATIVE    Urobilinogen, Urine Normal Normal    Nitrite, Urine NEGATIVE NEGATIVE    Leukocyte Esterase, /mm3    Specimen Type . THORACENTESIS FLUID     Neutrophil Count, Fluid 22 %    Lymphocytes, Body Fluid 22 %    Monocyte Count, Fluid  %    Eos, Fluid  0 %    Basos, Fluid  0 %    Other Cells, Fluid MONOCYTES %    Fluid Diff Comment     Glucose, Body Fluid   Result Value Ref Range    Specimen Type . THORACENTESIS FLUID     Glucose, Fluid 149 mg/dL   Lactate Dehydrogenase, Body Fluid   Result Value Ref Range    Specimen Type . THORACENTESIS FLUID     LD, Fluid 95 U/L   pH, Body Fluid   Result Value Ref Range    Specimen Type . THORACENTESIS FLUID     pH, Fluid 7.5    Cytology, Non-Gyn   Result Value Ref Range    Specimen Description NOT REPORTED     Case Number: [id number]    Protein / creatinine ratio, urine   Result Value Ref Range    Total Protein, Urine 217 mg/dL    Creatinine, Ur 56.2 39.0 - 259.0 mg/dL    Urine Total Protein Creatinine Ratio 3.86 (H) 0.00 - 0.20   Surgical Pathology   Result Value Ref Range    Surgical Pathology Report       [id number]  [employer]  [address] [place] [address], [facility]. [place], [address]  [phone]  Fax: [phone]    [address] [place] [address]     Patient Name: Albert [place]: [id number]  Path Number: [id number]  Collected: 3/7/2019  Received: 3/7/2019  Reported: 3/8/2019 11:22    -- Diagnosis --    THORACENTESIS FLUID RIGHT:   NEGATIVE FOR MALIGNANCY. CHUCK [place]  **Electronically Signed Out**         rdd/3/8/2019       Clinical Information  No information given. Source of Specimen  1: THORACENTESIS FLUID RIGHT    Gross Description  \"RT THORA\" 1000.0 ml. red cloudy fluid. MICROSCOPIC DESCRIPTION     Microscopic examination performed.          CALCIUM, IONIZED   Result Value Ref Range    Calcium, Ion 1.15 1.13 - 1.33 mmol/L   Basic Metabolic Panel   Result Value Ref Range    Glucose 133 (H) 70 - 99 mg/dL     (HH) 8 - 23 mg/dL    CREATININE 2.30 (H) 0.70 - 1.20 mg/dL Bun/Cre Ratio NOT REPORTED 9 - 20    Calcium 8.3 (L) 8.6 - 10.4 mg/dL    Sodium 139 135 - 144 mmol/L    Potassium 4.2 3.7 - 5.3 mmol/L    Chloride 104 98 - 107 mmol/L    CO2 21 20 - 31 mmol/L    Anion Gap 14 9 - 17 mmol/L    GFR Non-African American 27 (L) >60 mL/min    GFR  33 (L) >60 mL/min    GFR Comment          GFR Staging NOT REPORTED    Magnesium   Result Value Ref Range    Magnesium 2.5 1.6 - 2.6 mg/dL   Phosphorus   Result Value Ref Range    Phosphorus 4.3 2.5 - 4.5 mg/dL   BASIC METABOLIC PANEL   Result Value Ref Range    Glucose 97 70 - 99 mg/dL    BUN 97 (HH) 8 - 23 mg/dL    CREATININE 2.15 (H) 0.70 - 1.20 mg/dL    Bun/Cre Ratio NOT REPORTED 9 - 20    Calcium 8.2 (L) 8.6 - 10.4 mg/dL    Sodium 140 135 - 144 mmol/L    Potassium 4.6 3.7 - 5.3 mmol/L    Chloride 104 98 - 107 mmol/L    CO2 25 20 - 31 mmol/L    Anion Gap 11 9 - 17 mmol/L    GFR Non-African American 29 (L) >60 mL/min    GFR  35 (L) >60 mL/min    GFR Comment          GFR Staging NOT REPORTED    MAGNESIUM   Result Value Ref Range    Magnesium 2.5 1.6 - 2.6 mg/dL   CALCIUM, IONIZED   Result Value Ref Range    Calcium, Ion 1.13 1.13 - 1.33 mmol/L   BASIC METABOLIC PANEL   Result Value Ref Range    Glucose 103 (H) 70 - 99 mg/dL    BUN 93 (HH) 8 - 23 mg/dL    CREATININE 2.12 (H) 0.70 - 1.20 mg/dL    Bun/Cre Ratio NOT REPORTED 9 - 20    Calcium 8.0 (L) 8.6 - 10.4 mg/dL    Sodium 138 135 - 144 mmol/L    Potassium 4.2 3.7 - 5.3 mmol/L    Chloride 105 98 - 107 mmol/L    CO2 23 20 - 31 mmol/L    Anion Gap 10 9 - 17 mmol/L    GFR Non-African American 29 (L) >60 mL/min    GFR  36 (L) >60 mL/min    GFR Comment          GFR Staging NOT REPORTED    Brain Natriuretic Peptide   Result Value Ref Range    Pro-BNP 14,280 (H) <300 pg/mL    BNP Interpretation         ALBUMIN   Result Value Ref Range    Alb 2.3 (L) 3.5 - 5.2 g/dL   PREALBUMIN   Result Value Ref Range    Prealbumin 9.4 (L) 20 - 40 mg/dL   Magnesium Non- 31 (L) >60 mL/min    GFR  38 (L) >60 mL/min    GFR Comment          GFR Staging NOT REPORTED    PROTIME-INR   Result Value Ref Range    Protime 11.3 9.0 - 12.0 sec    INR 1.1    BASIC METABOLIC PANEL   Result Value Ref Range    Glucose 138 (H) 70 - 99 mg/dL    BUN 83 (H) 8 - 23 mg/dL    CREATININE 1.89 (H) 0.70 - 1.20 mg/dL    Bun/Cre Ratio NOT REPORTED 9 - 20    Calcium 8.5 (L) 8.6 - 10.4 mg/dL    Sodium 140 135 - 144 mmol/L    Potassium 4.4 3.7 - 5.3 mmol/L    Chloride 103 98 - 107 mmol/L    CO2 24 20 - 31 mmol/L    Anion Gap 13 9 - 17 mmol/L    GFR Non-African American 34 (L) >60 mL/min    GFR  41 (L) >60 mL/min    GFR Comment          GFR Staging NOT REPORTED    CBC   Result Value Ref Range    WBC 21.0 (H) 3.5 - 11.3 k/uL    RBC 3.46 (L) 4.21 - 5.77 m/uL    Hemoglobin 9.9 (L) 13.0 - 17.0 g/dL    Hematocrit 31.8 (L) 40.7 - 50.3 %    MCV 91.9 82.6 - 102.9 fL    MCH 28.6 25.2 - 33.5 pg    MCHC 31.1 28.4 - 34.8 g/dL    RDW 14.8 (H) 11.8 - 14.4 %    Platelets 295 433 - 311 k/uL    MPV 11.0 8.1 - 13.5 fL    NRBC Automated 0.0 0.0 per 100 WBC   Urinalysis Reflex to Culture   Result Value Ref Range    Color, UA YELLOW YELLOW    Turbidity UA CLEAR CLEAR    Glucose, Ur NEGATIVE NEGATIVE    Bilirubin Urine NEGATIVE NEGATIVE    Ketones, Urine NEGATIVE NEGATIVE    Specific Gravity, UA 1.024 1.005 - 1.030    Urine Hgb NEGATIVE NEGATIVE    pH, UA 5.0 5.0 - 8.0    Protein, UA 2+ (A) NEGATIVE    Urobilinogen, Urine Normal Normal    Nitrite, Urine NEGATIVE NEGATIVE    Leukocyte Esterase, Urine NEGATIVE NEGATIVE    Urinalysis Comments NOT REPORTED    Microscopic Urinalysis   Result Value Ref Range    -          WBC, UA None 0 - 5 /HPF    RBC, UA 0 TO 2 0 - 4 /HPF    Casts UA  0 - 8 /LPF     0 TO 2 HYALINE Reference range defined for non-centrifuged specimen.     Crystals UA NOT REPORTED None /HPF    Epithelial Cells UA None 0 - 5 /HPF    Renal Epithelial, Urine NOT REPORTED 0 mmol/L    CO2 18 (L) 20 - 31 mmol/L    Anion Gap 15 9 - 17 mmol/L    GFR Non-African American 14 (L) >60 mL/min    GFR  17 (L) >60 mL/min    GFR Comment          GFR Staging NOT REPORTED    PLATELET FUNCTION TEST   Result Value Ref Range    TAYLOR/EPI Clos Time 134 85 - 172 sec    Collagen Adenosine-5'-Diphosphate (Adp) Time 89 67 - 112 sec    Platelet Function Interp       Normal platelet function. If patient clinical history/Physical examination is positive for a bleeding diathesis, recommend repeat testing and/or additional primary hemostasis and/or coagulation studies.    CBC   Result Value Ref Range    WBC 9.6 3.5 - 11.3 k/uL    RBC 2.73 (L) 4.21 - 5.77 m/uL    Hemoglobin 7.9 (L) 13.0 - 17.0 g/dL    Hematocrit 25.9 (L) 40.7 - 50.3 %    MCV 94.9 82.6 - 102.9 fL    MCH 28.9 25.2 - 33.5 pg    MCHC 30.5 28.4 - 34.8 g/dL    RDW 15.1 (H) 11.8 - 14.4 %    Platelets 396 504 - 122 k/uL    MPV 11.6 8.1 - 13.5 fL    NRBC Automated 0.0 0.0 per 100 WBC   MAGNESIUM   Result Value Ref Range    Magnesium 2.3 1.6 - 2.6 mg/dL   CALCIUM, IONIZED   Result Value Ref Range    Calcium, Ion 1.11 (L) 1.13 - 1.33 mmol/L   FIBRINOGEN   Result Value Ref Range    Fibrinogen 359 140 - 420 mg/dL   PROTIME-INR   Result Value Ref Range    Protime 13.4 (H) 9.0 - 12.0 sec    INR 1.3    Bilirubin, Total   Result Value Ref Range    Total Bilirubin 0.29 (L) 0.3 - 1.2 mg/dL   CK isoenzymes   Result Value Ref Range    Total CK 59 39 - 308 U/L    CK-MB 7.7 <10.5 ng/mL    % CKMB 13.1 (H) 0.0 - 3.5 %    CKMB Interpretation NORMAL ISOENZYME PATTERN    Activated clotting time   Result Value Ref Range    Activated Clotting Time 225 (H) 79 - 149 sec   Activated clotting time   Result Value Ref Range    Activated Clotting Time 168 (H) 79 - 149 sec   Troponin   Result Value Ref Range    Troponin, High Sensitivity 3,045 (HH) 0 - 22 ng/L    Troponin T NOT REPORTED <0.03 ng/mL    Troponin Interp NOT REPORTED    Troponin   Result Value Ref Range Troponin, High Sensitivity 2,936 (HH) 0 - 22 ng/L    Troponin T NOT REPORTED <0.03 ng/mL    Troponin Interp NOT REPORTED    BASIC METABOLIC PANEL   Result Value Ref Range    Glucose 100 (H) 70 - 99 mg/dL    BUN 99 (HH) 8 - 23 mg/dL    CREATININE 3.82 (H) 0.70 - 1.20 mg/dL    Bun/Cre Ratio NOT REPORTED 9 - 20    Calcium 7.4 (L) 8.6 - 10.4 mg/dL    Sodium 135 135 - 144 mmol/L    Potassium 3.8 3.7 - 5.3 mmol/L    Chloride 104 98 - 107 mmol/L    CO2 17 (L) 20 - 31 mmol/L    Anion Gap 14 9 - 17 mmol/L    GFR Non-African American 15 (L) >60 mL/min    GFR  18 (L) >60 mL/min    GFR Comment          GFR Staging NOT REPORTED    BASIC METABOLIC PANEL   Result Value Ref Range    Glucose 105 (H) 70 - 99 mg/dL     (HH) 8 - 23 mg/dL    CREATININE 3.89 (H) 0.70 - 1.20 mg/dL    Bun/Cre Ratio NOT REPORTED 9 - 20    Calcium 8.0 (L) 8.6 - 10.4 mg/dL    Sodium 135 135 - 144 mmol/L    Potassium 4.0 3.7 - 5.3 mmol/L    Chloride 101 98 - 107 mmol/L    CO2 19 (L) 20 - 31 mmol/L    Anion Gap 15 9 - 17 mmol/L    GFR Non-African American 15 (L) >60 mL/min    GFR  18 (L) >60 mL/min    GFR Comment          GFR Staging NOT REPORTED    CBC   Result Value Ref Range    WBC 14.5 (H) 3.5 - 11.3 k/uL    RBC 3.05 (L) 4.21 - 5.77 m/uL    Hemoglobin 8.8 (L) 13.0 - 17.0 g/dL    Hematocrit 27.6 (L) 40.7 - 50.3 %    MCV 90.5 82.6 - 102.9 fL    MCH 28.9 25.2 - 33.5 pg    MCHC 31.9 28.4 - 34.8 g/dL    RDW 14.9 (H) 11.8 - 14.4 %    Platelets 105 921 - 639 k/uL    MPV 12.1 8.1 - 13.5 fL    NRBC Automated 0.0 0.0 per 100 WBC   MAGNESIUM   Result Value Ref Range    Magnesium 2.2 1.6 - 2.6 mg/dL   BASIC METABOLIC PANEL   Result Value Ref Range    Glucose 98 70 - 99 mg/dL    BUN 99 (HH) 8 - 23 mg/dL    CREATININE 3.84 (H) 0.70 - 1.20 mg/dL    Bun/Cre Ratio NOT REPORTED 9 - 20    Calcium 7.9 (L) 8.6 - 10.4 mg/dL    Sodium 137 135 - 144 mmol/L    Potassium 3.4 (L) 3.7 - 5.3 mmol/L    Chloride 102 98 - 107 mmol/L    CO2 20 20 - 31 mmol/L    Anion Gap 15 9 - 17 mmol/L    GFR Non-African American 15 (L) >60 mL/min    GFR  18 (L) >60 mL/min    GFR Comment          GFR Staging NOT REPORTED    POTASSIUM   Result Value Ref Range    Potassium 4.1 3.7 - 5.3 mmol/L   Magnesium   Result Value Ref Range    Magnesium 2.1 1.6 - 2.6 mg/dL   BASIC METABOLIC PANEL   Result Value Ref Range    Glucose 99 70 - 99 mg/dL     (HH) 8 - 23 mg/dL    CREATININE 3.77 (H) 0.70 - 1.20 mg/dL    Bun/Cre Ratio NOT REPORTED 9 - 20    Calcium 8.0 (L) 8.6 - 10.4 mg/dL    Sodium 137 135 - 144 mmol/L    Potassium 3.4 (L) 3.7 - 5.3 mmol/L    Chloride 105 98 - 107 mmol/L    CO2 22 20 - 31 mmol/L    Anion Gap 10 9 - 17 mmol/L    GFR Non-African American 15 (L) >60 mL/min    GFR  18 (L) >60 mL/min    GFR Comment          GFR Staging NOT REPORTED    BASIC METABOLIC PANEL   Result Value Ref Range    Glucose 106 (H) 70 - 99 mg/dL     (HH) 8 - 23 mg/dL    CREATININE 3.58 (H) 0.70 - 1.20 mg/dL    Bun/Cre Ratio NOT REPORTED 9 - 20    Calcium 7.9 (L) 8.6 - 10.4 mg/dL    Sodium 138 135 - 144 mmol/L    Potassium 3.9 3.7 - 5.3 mmol/L    Chloride 100 98 - 107 mmol/L    CO2 24 20 - 31 mmol/L    Anion Gap 14 9 - 17 mmol/L    GFR Non-African American 16 (L) >60 mL/min    GFR African American 20 (L) >60 mL/min    GFR Comment          GFR Staging NOT REPORTED    T3, Free   Result Value Ref Range    T3, Free 0.87 (L) 2.02 - 4.43 pg/mL   T4, Free   Result Value Ref Range    Thyroxine, Free 1.14 0.93 - 1.70 ng/dL   Hepatic Function Panel   Result Value Ref Range    Alb 3.1 (L) 3.5 - 5.2 g/dL    Alkaline Phosphatase 124 40 - 129 U/L    ALT 30 5 - 41 U/L    AST 18 <40 U/L    Total Bilirubin 0.44 0.3 - 1.2 mg/dL    Bilirubin, Direct 0.13 <0.31 mg/dL    Bilirubin, Indirect 0.31 0.00 - 1.00 mg/dL    Total Protein 5.2 (L) 6.4 - 8.3 g/dL    Globulin NOT REPORTED 1.5 - 3.8 g/dL    Albumin/Globulin Ratio 1.5 1.0 - 2.5   TSH without Reflex   Result Value Ref Range TSH 3.04 0.30 - 5.00 mIU/L   BASIC METABOLIC PANEL   Result Value Ref Range    Glucose 97 70 - 99 mg/dL     (HH) 8 - 23 mg/dL    CREATININE 3.30 (H) 0.70 - 1.20 mg/dL    Bun/Cre Ratio NOT REPORTED 9 - 20    Calcium 7.8 (L) 8.6 - 10.4 mg/dL    Sodium 140 135 - 144 mmol/L    Potassium 3.6 (L) 3.7 - 5.3 mmol/L    Chloride 102 98 - 107 mmol/L    CO2 27 20 - 31 mmol/L    Anion Gap 11 9 - 17 mmol/L    GFR Non-African American 18 (L) >60 mL/min    GFR  21 (L) >60 mL/min    GFR Comment          GFR Staging NOT REPORTED    BASIC METABOLIC PANEL   Result Value Ref Range    Glucose 98 70 - 99 mg/dL     (HH) 8 - 23 mg/dL    CREATININE 3.00 (H) 0.70 - 1.20 mg/dL    Bun/Cre Ratio NOT REPORTED 9 - 20    Calcium 8.1 (L) 8.6 - 10.4 mg/dL    Sodium 141 135 - 144 mmol/L    Potassium 3.9 3.7 - 5.3 mmol/L    Chloride 100 98 - 107 mmol/L    CO2 29 20 - 31 mmol/L    Anion Gap 12 9 - 17 mmol/L    GFR Non-African American 20 (L) >60 mL/min    GFR  24 (L) >60 mL/min    GFR Comment          GFR Staging NOT REPORTED    BASIC METABOLIC PANEL   Result Value Ref Range    Glucose 104 (H) 70 - 99 mg/dL     (HH) 8 - 23 mg/dL    CREATININE 3.07 (H) 0.70 - 1.20 mg/dL    Bun/Cre Ratio NOT REPORTED 9 - 20    Calcium 7.8 (L) 8.6 - 10.4 mg/dL    Sodium 140 135 - 144 mmol/L    Potassium 3.7 3.7 - 5.3 mmol/L    Chloride 99 98 - 107 mmol/L    CO2 28 20 - 31 mmol/L    Anion Gap 13 9 - 17 mmol/L    GFR Non-African American 19 (L) >60 mL/min    GFR  23 (L) >60 mL/min    GFR Comment          GFR Staging NOT REPORTED    Protein / Creatinine Ratio, Urine   Result Value Ref Range    Total Protein, Urine 34 mg/dL    Creatinine, Ur 42.4 39.0 - 259.0 mg/dL    Urine Total Protein Creatinine Ratio 0.80 (H) 0.00 - 2.43   BASIC METABOLIC PANEL   Result Value Ref Range    Glucose 96 70 - 99 mg/dL     (HH) 8 - 23 mg/dL    CREATININE 3.12 (H) 0.70 - 1.20 mg/dL    Bun/Cre Ratio NOT REPORTED 9 - 20 k/uL    RBC 2.94 (L) 4.21 - 5.77 m/uL    Hemoglobin 8.5 (L) 13.0 - 17.0 g/dL    Hematocrit 26.9 (L) 40.7 - 50.3 %    MCV 91.5 82.6 - 102.9 fL    MCH 28.9 25.2 - 33.5 pg    MCHC 31.6 28.4 - 34.8 g/dL    RDW 15.8 (H) 11.8 - 14.4 %    Platelets 561 528 - 911 k/uL    MPV 12.1 8.1 - 13.5 fL    NRBC Automated 0.0 0.0 per 100 WBC    Differential Type NOT REPORTED     Seg Neutrophils 83 (H) 36 - 65 %    Lymphocytes 8 (L) 24 - 43 %    Monocytes 6 3 - 12 %    Eosinophils % 3 1 - 4 %    Basophils 0 0 - 2 %    Immature Granulocytes 0 0 %    Segs Absolute 8.72 (H) 1.50 - 8.10 k/uL    Absolute Lymph # 0.80 (L) 1.10 - 3.70 k/uL    Absolute Mono # 0.63 0.10 - 1.20 k/uL    Absolute Eos # 0.36 0.00 - 0.44 k/uL    Basophils # 0.03 0.00 - 0.20 k/uL    Absolute Immature Granulocyte 0.04 0.00 - 0.30 k/uL    WBC Morphology NOT REPORTED     RBC Morphology ANISOCYTOSIS PRESENT     Platelet Estimate NOT REPORTED    BASIC METABOLIC PANEL   Result Value Ref Range    Glucose 101 (H) 70 - 99 mg/dL     (HH) 8 - 23 mg/dL    CREATININE 3.27 (H) 0.70 - 1.20 mg/dL    Bun/Cre Ratio NOT REPORTED 9 - 20    Calcium 8.2 (L) 8.6 - 10.4 mg/dL    Sodium 136 135 - 144 mmol/L    Potassium 3.9 3.7 - 5.3 mmol/L    Chloride 96 (L) 98 - 107 mmol/L    CO2 28 20 - 31 mmol/L    Anion Gap 12 9 - 17 mmol/L    GFR Non-African American 18 (L) >60 mL/min    GFR  22 (L) >60 mL/min    GFR Comment          GFR Staging NOT REPORTED    CBC Auto Differential   Result Value Ref Range    WBC 11.6 (H) 3.5 - 11.3 k/uL    RBC 3.01 (L) 4.21 - 5.77 m/uL    Hemoglobin 8.5 (L) 13.0 - 17.0 g/dL    Hematocrit 27.9 (L) 40.7 - 50.3 %    MCV 92.7 82.6 - 102.9 fL    MCH 28.2 25.2 - 33.5 pg    MCHC 30.5 28.4 - 34.8 g/dL    RDW 16.3 (H) 11.8 - 14.4 %    Platelets 508 731 - 225 k/uL    MPV 12.2 8.1 - 13.5 fL    NRBC Automated 0.0 0.0 per 100 WBC    Differential Type NOT REPORTED     WBC Morphology NOT REPORTED     RBC Morphology NOT REPORTED     Platelet Estimate NOT REPORTED     Immature Granulocytes 1 (H) 0 %    Seg Neutrophils 85 (H) 36 - 65 %    Lymphocytes 4 (L) 24 - 43 %    Monocytes 5 3 - 12 %    Eosinophils % 5 (H) 1 - 4 %    Basophils 0 0 - 2 %    Absolute Immature Granulocyte 0.12 0.00 - 0.30 k/uL    Segs Absolute 9.86 (H) 1.50 - 8.10 k/uL    Absolute Lymph # 0.46 (L) 1.10 - 3.70 k/uL    Absolute Mono # 0.58 0.10 - 1.20 k/uL    Absolute Eos # 0.58 (H) 0.00 - 0.44 k/uL    Basophils # 0.00 0.00 - 0.20 k/uL    Morphology ANISOCYTOSIS PRESENT    BASIC METABOLIC PANEL   Result Value Ref Range    Glucose 108 (H) 70 - 99 mg/dL     (HH) 8 - 23 mg/dL    CREATININE 3.49 (H) 0.70 - 1.20 mg/dL    Bun/Cre Ratio NOT REPORTED 9 - 20    Calcium 8.4 (L) 8.6 - 10.4 mg/dL    Sodium 134 (L) 135 - 144 mmol/L    Potassium 4.2 3.7 - 5.3 mmol/L    Chloride 95 (L) 98 - 107 mmol/L    CO2 26 20 - 31 mmol/L    Anion Gap 13 9 - 17 mmol/L    GFR Non-African American 17 (L) >60 mL/min    GFR African American 20 (L) >60 mL/min    GFR Comment          GFR Staging NOT REPORTED    CBC Auto Differential   Result Value Ref Range    WBC 10.0 3.5 - 11.3 k/uL    RBC 2.68 (L) 4.21 - 5.77 m/uL    Hemoglobin 7.7 (L) 13.0 - 17.0 g/dL    Hematocrit 24.8 (L) 40.7 - 50.3 %    MCV 92.5 82.6 - 102.9 fL    MCH 28.7 25.2 - 33.5 pg    MCHC 31.0 28.4 - 34.8 g/dL    RDW 16.6 (H) 11.8 - 14.4 %    Platelets 246 680 - 769 k/uL    MPV 12.0 8.1 - 13.5 fL    NRBC Automated 0.0 0.0 per 100 WBC    Differential Type NOT REPORTED     WBC Morphology NOT REPORTED     RBC Morphology NOT REPORTED     Platelet Estimate NOT REPORTED     Immature Granulocytes 0 0 %    Seg Neutrophils 84 (H) 36 - 66 %    Lymphocytes 5 (L) 24 - 44 %    Monocytes 6 1 - 7 %    Eosinophils % 5 (H) 1 - 4 %    Basophils 0 0 - 2 %    Absolute Immature Granulocyte 0.00 0.00 - 0.30 k/uL    Segs Absolute 8.40 (H) 1.8 - 7.7 k/uL    Absolute Lymph # 0.50 (L) 1.0 - 4.8 k/uL    Absolute Mono # 0.60 0.1 - 0.8 k/uL    Absolute Eos # 0.50 (H) 0.0 - 0.4 k/uL Anion Gap 14 9 - 17 mmol/L    GFR Non-African American 18 (L) >60 mL/min    GFR  22 (L) >60 mL/min    GFR Comment          GFR Staging NOT REPORTED    Magnesium   Result Value Ref Range    Magnesium 1.8 1.6 - 2.6 mg/dL   Basic Metabolic Panel w/ Reflex to MG   Result Value Ref Range    Glucose 120 (H) 70 - 99 mg/dL    BUN 24 (H) 8 - 23 mg/dL    CREATININE 1.24 (H) 0.70 - 1.20 mg/dL    Bun/Cre Ratio NOT REPORTED 9 - 20    Calcium 7.7 (L) 8.6 - 10.4 mg/dL    Sodium 141 135 - 144 mmol/L    Potassium 3.2 (L) 3.7 - 5.3 mmol/L    Chloride 102 98 - 107 mmol/L    CO2 25 20 - 31 mmol/L    Anion Gap 14 9 - 17 mmol/L    GFR Non-African American 55 (L) >60 mL/min    GFR African American >60 >60 mL/min    GFR Comment          GFR Staging NOT REPORTED    Magnesium   Result Value Ref Range    Magnesium 1.5 (L) 1.6 - 2.6 mg/dL   POTASSIUM (POC)   Result Value Ref Range    POC Potassium 3.5 3.5 - 4.5 mmol/L   BASIC METABOLIC PANEL   Result Value Ref Range    Glucose 97 70 - 99 mg/dL    BUN 44 (H) 8 - 23 mg/dL    CREATININE 2.74 (H) 0.70 - 1.20 mg/dL    Bun/Cre Ratio NOT REPORTED 9 - 20    Calcium 8.6 8.6 - 10.4 mg/dL    Sodium 140 135 - 144 mmol/L    Potassium 3.7 3.7 - 5.3 mmol/L    Chloride 100 98 - 107 mmol/L    CO2 22 20 - 31 mmol/L    Anion Gap 18 (H) 9 - 17 mmol/L    GFR Non-African American 22 (L) >60 mL/min    GFR  27 (L) >60 mL/min    GFR Comment          GFR Staging NOT REPORTED    HEMOGLOBIN AND HEMATOCRIT, BLOOD   Result Value Ref Range    Hemoglobin 8.8 (L) 13.0 - 17.0 g/dL    Hematocrit 29.9 (L) 40.7 - 50.3 %   BASIC METABOLIC PANEL   Result Value Ref Range    Glucose 116 (H) 70 - 99 mg/dL    BUN 38 (H) 8 - 23 mg/dL    CREATININE 2.61 (H) 0.70 - 1.20 mg/dL    Bun/Cre Ratio NOT REPORTED 9 - 20    Calcium 8.2 (L) 8.6 - 10.4 mg/dL    Sodium 138 135 - 144 mmol/L    Potassium 3.6 (L) 3.7 - 5.3 mmol/L    Chloride 100 98 - 107 mmol/L    CO2 22 20 - 31 mmol/L    Anion Gap 16 9 - 17 mmol/L    GFR k/uL    Absolute Lymph # 0.80 (L) 1.10 - 3.70 k/uL    Absolute Mono # 0.76 0.10 - 1.20 k/uL    Absolute Eos # 0.55 (H) 0.00 - 0.44 k/uL    Basophils # 0.04 0.00 - 0.20 k/uL    Absolute Immature Granulocyte 0.06 0.00 - 0.30 k/uL   Basic Metabolic Panel   Result Value Ref Range    Glucose 105 (H) 70 - 99 mg/dL    BUN 47 (H) 8 - 23 mg/dL    CREATININE 2.95 (H) 0.70 - 1.20 mg/dL    Bun/Cre Ratio NOT REPORTED 9 - 20    Calcium 9.1 8.6 - 10.4 mg/dL    Sodium 138 135 - 144 mmol/L    Potassium 3.8 3.7 - 5.3 mmol/L    Chloride 99 98 - 107 mmol/L    CO2 23 20 - 31 mmol/L    Anion Gap 16 9 - 17 mmol/L    GFR Non-African American 20 (L) >60 mL/min    GFR  24 (L) >60 mL/min    GFR Comment          GFR Staging NOT REPORTED    Differential   Result Value Ref Range    Differential Type NOT REPORTED     WBC Morphology NOT REPORTED     RBC Morphology NOT REPORTED     Platelet Estimate NOT REPORTED     Seg Neutrophils 82 (H) 36 - 65 %    Lymphocytes 5 (L) 24 - 43 %    Monocytes 7 3 - 12 %    Eosinophils % 5 (H) 1 - 4 %    Basophils 0 0 - 2 %    Immature Granulocytes 1 (H) 0 %    Segs Absolute 9.41 (H) 1.50 - 8.10 k/uL    Absolute Lymph # 0.56 (L) 1.10 - 3.70 k/uL    Absolute Mono # 0.74 0.10 - 1.20 k/uL    Absolute Eos # 0.61 (H) 0.00 - 0.44 k/uL    Basophils # 0.03 0.00 - 0.20 k/uL    Absolute Immature Granulocyte 0.08 0.00 - 0.30 k/uL   Path Review, Smear   Result Value Ref Range    Pathologist Review SEE REPORT    Reticulocytes   Result Value Ref Range    Retic % 2.0 (H) 0.5 - 1.9 %    Absolute Retic # 0.060 0.030 - 0.080 M/uL    Immature Retic Fract 8.500 2.7 - 18.3 %    Retic Hemoglobin 33.4 28.2 - 35.7 pg   Surgical Pathology   Result Value Ref Range    Surgical Pathology Report       [id number]  [facility]  ANATOMIC PATHOLOGY  [address] [place] [address],  [address].   [place], [address]  [phone]  Fax: [phone]  SURGICAL PATHOLOGY CONSULTATION    Patient Name: Donny Go, Mayur Chery  MR#: [id number]  Specimen #[id number]    Procedures/Addenda  PERIPHERAL BLOOD REPORT     Date Ordered:     4/4/2019     Status:  Signed Out       Date Complete:     4/4/2019     By: Sherrie Cross M.D. Date Reported:     4/4/2019       INTERPRETATION  PERIPHERAL BLOOD:  REACTIVE NEUTROPHILIA, MODERATE NORMOCYTIC ANEMIA,  FAVORING HYPOPROLIFERATIVE ANEMIA, MODERATE TO SEVERE THROMBOCYTOPENIA  AND MILD EOSINOPHILIA. RESULTS-COMMENTS                           PERIPHERAL BLOOD STUDY    HEMOGRAM                              DIFFERENTIAL %     ABSOLUTE  (K/UL)    WBC (K/uL)     11.4               IMM GRANS          1          0.08        RBC (K/uL)     2.85               SEGS               82          9.41  HGB (G/dL)     8.3               LYMPHS           5          0.56  HCT (%)     25.4                                     MCV (FL.)     89.1               MONOS          7          0.74  MCH (PG.)     29.1               EOS               5          0.61  MCHC (g/dL)     32.7               BASO                         0.03  RDW (%)     15.8                                          PLT (k/uL)     57                                          RETIC (%)     2.0                                     Absolute RetCt     0.060                                                                                                                         PERIPHERAL EXAMINATION BY PATHOLOGIST    PLATELETS: Normal          LEUKOCYTES: Minimal neutrophilic left shift     ERYTHROCYTES: Anisocytosis, otherwise normal         Sherrie Cross M.D.                    Source:  1: PERIPHERAL BLOOD FOR REVIEW BY PATHOLOGIST     CBC Auto Differential   Result Value Ref Range    WBC 8.6 3.5 - 11.3 k/uL    RBC 2.60 (L) 4.21 - 5.77 m/uL    Hemoglobin 7.4 (L) 13.0 - 17.0 g/dL    Hematocrit 23.5 (L) 40.7 - 50.3 %    MCV 90.4 82.6 - 102.9 fL    MCH 28.5 25.2 - 33.5 pg    MCHC 31.5 28.4 - 34.8 g/dL    RDW 15.9 (H) 11.8 - 14.4 % Platelets 92 (L) 254 - 453 k/uL    MPV 12.4 8.1 - 13.5 fL    NRBC Automated 0.0 0.0 per 100 WBC    Differential Type NOT REPORTED     WBC Morphology NOT REPORTED     RBC Morphology NOT REPORTED     Platelet Estimate NOT REPORTED     Immature Granulocytes 1 (H) 0 %    Seg Neutrophils 82 (H) 36 - 65 %    Lymphocytes 6 (L) 24 - 43 %    Monocytes 8 3 - 12 %    Eosinophils % 3 1 - 4 %    Basophils 0 0 - 2 %    Absolute Immature Granulocyte 0.09 0.00 - 0.30 k/uL    Segs Absolute 7.04 1.50 - 8.10 k/uL    Absolute Lymph # 0.52 (L) 1.10 - 3.70 k/uL    Absolute Mono # 0.69 0.10 - 1.20 k/uL    Absolute Eos # 0.26 0.00 - 0.44 k/uL    Basophils # 0.00 0.00 - 0.20 k/uL    Morphology ANISOCYTOSIS PRESENT    Basic Metabolic Panel   Result Value Ref Range    Glucose 97 70 - 99 mg/dL    BUN 54 (H) 8 - 23 mg/dL    CREATININE 3.29 (H) 0.70 - 1.20 mg/dL    Bun/Cre Ratio NOT REPORTED 9 - 20    Calcium 8.7 8.6 - 10.4 mg/dL    Sodium 138 135 - 144 mmol/L    Potassium 3.5 (L) 3.7 - 5.3 mmol/L    Chloride 99 98 - 107 mmol/L    CO2 23 20 - 31 mmol/L    Anion Gap 16 9 - 17 mmol/L    GFR Non-African American 18 (L) >60 mL/min    GFR  22 (L) >60 mL/min    GFR Comment          GFR Staging NOT REPORTED    Arterial Blood Gas, POC   Result Value Ref Range    POC pH 7.464 (H) 7.350 - 7.450    POC pCO2 32.0 (L) 35.0 - 48.0 mm Hg    POC PO2 58.6 (L) 83.0 - 108.0 mm Hg    POC HCO3 22.9 21.0 - 28.0 mmol/L    TCO2 (calc), Art 24 22.0 - 29.0 mmol/L    Negative Base Excess, Art NOT REPORTED 0.0 - 2.0    Positive Base Excess, Art 0 0.0 - 3.0    POC O2 SAT 92 (L) 94.0 - 98.0 %    O2 Device/Flow/% Room [employer] Test POSITIVE     Sample Site Right Radial Artery     Mode NOT REPORTED     FIO2 NOT REPORTED     Pt Temp NOT REPORTED     POC pH Temp NOT REPORTED     POC pCO2 Temp NOT REPORTED mm Hg    POC pO2 Temp NOT REPORTED mm Hg   POCT Glucose   Result Value Ref Range    POC Glucose 113 (H) 74 - 100 mg/dL   POC Glucose Fingerstick   Result Value Ref Range    POC Glucose 104 75 - 110 mg/dL   EKG 12 Lead   Result Value Ref Range    Ventricular Rate 79 BPM    Atrial Rate 79 BPM    QRS Duration 190 ms    Q-T Interval 488 ms    QTc Calculation (Bazett) 559 ms    P Axis 117 degrees    R Axis -29 degrees    T Axis 136 degrees   EKG 12 lead   Result Value Ref Range    Ventricular Rate 60 BPM    Atrial Rate 60 BPM    P-R Interval 206 ms    QRS Duration 176 ms    Q-T Interval 542 ms    QTc Calculation (Bazett) 542 ms    R Axis -27 degrees    T Axis 94 degrees   EKG 12 Lead   Result Value Ref Range    Ventricular Rate 60 BPM    Atrial Rate 60 BPM    P-R Interval 202 ms    QRS Duration 182 ms    Q-T Interval 502 ms    QTc Calculation (Bazett) 502 ms    R Axis -34 degrees    T Axis 135 degrees   EKG 12 Lead   Result Value Ref Range    Ventricular Rate 59 BPM    Atrial Rate 59 BPM    P-R Interval 198 ms    QRS Duration 208 ms    Q-T Interval 608 ms    QTc Calculation (Bazett) 601 ms    P Axis -11 degrees    R Axis -57 degrees    T Axis 124 degrees   EKG 12 Lead   Result Value Ref Range    Ventricular Rate 63 BPM    Atrial Rate 60 BPM    QRS Duration 204 ms    Q-T Interval 582 ms    QTc Calculation (Bazett) 595 ms    R Axis -49 degrees    T Axis 126 degrees   TYPE AND SCREEN   Result Value Ref Range    Expiration Date 03/18/2019     Arm Band Number [id number]     ABO/Rh O POSITIVE     Antibody Screen NEGATIVE     Unit Number [id number]     Product Code Leukocyte Reduced Red Cell     Unit Divison 00     Dispense Status TRANSFUSED     Transfusion Status OK TO TRANSFUSE     Crossmatch Result COMPATIBLE     Unit Number [id number]     Product Code Leukocyte Reduced Red Cell     Unit Divison 00     Dispense Status REL FROM [facility]     Transfusion Status OK TO TRANSFUSE     Crossmatch Result COMPATIBLE      *Note: Due to a large number of results and/or encounters for the requested time period, some results have not been displayed.  A complete set of results can be found

## 2019-04-05 NOTE — PROGRESS NOTES
Pulmonary Progress Note    CC:  chf   Subjective:  No acute events     Review of Systems -  General ROS: fatigue  ENT ROS: negative for - headaches, oral lesions or sore throat  Cardiovascular ROS: no chest pain , +orthopnea   Gastrointestinal ROS: no abdominal pain, change in bowel habits, or black or bloody stools  Skin - no rash   Neuro - no blurry vision , no loc . No focal weakness   msk - no jt tenderness or swelling    Vascular - no claudication , rest completed and negative   Lymphatic - complete and negative   Hematology - oncology - complete and negative   Allergy immunology - complete and negative    no burning or hematuria      Immunization     There is no immunization history on file for this patient. Pneumococcal Vaccine     [] Up to date    [x] Indicated   [] Refused  [] Contraindicated       Influenza Vaccine   [] Up to date    [x] Indicated   [] Refused  [] Contraindicated     PAST MEDICAL HISTORY:       Diagnosis Date    Aortic stenosis 02/15/2019    Atrial fibrillation ([address]     CAD (coronary artery disease)     Chest pain 02/15/2019    CHF (congestive heart failure) (HCC)     Chronic anemia     Chronic kidney disease     Hypertension     Pleural effusion on left 02/15/2019    Pleural effusion, right 02/15/2019    Pulmonary emboli ([address] 02/15/2019    Pulmonary hypertension ([address] 03/06/2019    Thyroid disease     hypothyroidism         Family History:   History reviewed. No pertinent family history. SURGICAL HISTORY:   Past Surgical History:   Procedure Laterality Date    CORONARY ANGIOPLASTY WITH STENT PLACEMENT  03/11/2019    complex PCI of LT MAIN, LAD    JOINT REPLACEMENT      right ankle    OTHER SURGICAL HISTORY  04/02/2019    UPGRADE TO BI-V PACEMAKER    OTHER SURGICAL HISTORY  03/15/2019    TAVR PROCEDURE    PACEMAKER PLACEMENT  01/20/2019    THORACENTESIS                TOBACCO:   reports that he quit smoking about 30 years ago.  He has never used smokeless tobacco.  ETOH:   reports that he drank alcohol. ALLERGIES:    Allergies   Allergen Reactions    Quinolones      Pt is allergic to fluoroquinolones       Home Meds:   Prior to Admission medications    Medication Sig Start Date End Date Taking? Authorizing Provider   amiodarone (CORDARONE) 200 MG tablet Take 200 mg by mouth daily   Yes Historical Provider, MD   atorvastatin (LIPITOR) 20 MG tablet Take 20 mg by mouth daily   Yes Historical Provider, MD   isosorbide mononitrate (IMDUR) 60 MG extended release tablet Take 60 mg by mouth daily   Yes Historical Provider, MD   levothyroxine (SYNTHROID) 100 MCG tablet Take 100 mcg by mouth Daily   Yes Historical Provider, MD   lisinopril (PRINIVIL;ZESTRIL) 40 MG tablet Take 40 mg by mouth daily   Yes Historical Provider, MD   apixaban (ELIQUIS) 2.5 MG TABS tablet Take 2.5 mg by mouth daily   Yes Historical Provider, MD   metoprolol tartrate (LOPRESSOR) 50 MG tablet Take 50 mg by mouth 2 times daily   Yes Historical Provider, MD         Intake/Output Summary (Last 24 hours) at 4/5/2019 1715  Last data filed at 4/5/2019 1419  Gross per 24 hour   Intake 630 ml   Output 1565 ml   Net -935 ml         Diet   DIET CARDIAC; Low Sodium (2 GM);  Daily Fluid Restriction: 1800 ml  Dietary Nutrition Supplements:    Vitals:   BP (!) 134/44   Pulse 62   Temp 97.9 °F (36.6 °C) (Oral)   Resp 16   Ht 5' 6\" (1.676 m)   Wt 149 lb 4 oz (67.7 kg)   SpO2 95%   BMI 24.09 kg/m²  on         I/O (24 Hours)    Patient Vitals for the past 8 hrs:   BP Temp Temp src Pulse Resp SpO2 Weight   04/05/19 1640 (!) 134/44 97.9 °F (36.6 °C) Oral 62 16 95 % --   04/05/19 1419 (!) 149/62 97.7 °F (36.5 °C) -- 60 -- -- 149 lb 4 oz (67.7 kg)   04/05/19 1335 (!) 142/64 -- -- 60 -- -- --   04/05/19 1305 139/62 -- -- 60 -- -- --   04/05/19 1235 (!) 149/64 -- -- 60 -- -- --   04/05/19 1205 (!) 148/64 -- -- 60 -- -- --   04/05/19 1135 (!) 154/70 -- -- 60 -- -- --   04/05/19 1105 (!) 147/64 -- -- 60 -- -- -- 04/05/19 1035 (!) 152/70 -- -- 60 -- -- --   04/05/19 1020 (!) 150/78 98.3 °F (36.8 °C) -- 60 12 -- 150 lb 2.1 oz (68.1 kg)       Intake/Output Summary (Last 24 hours) at 4/5/2019 1715  Last data filed at 4/5/2019 1419  Gross per 24 hour   Intake 630 ml   Output 1565 ml   Net -935 ml     I/O last 3 completed shifts: In: 630 [I.V.:10]  Out: 1565 [Urine:485]   Date 04/05/19 0000 - 04/05/19 2359   Shift 2844-5404 4054-7444 8353-7518 24 Hour Total   INTAKE   Dialysis(mL/kg)  620(9.2)  620(9.2)   Shift Total(mL/kg)  620(9.2)  620(9.2)   OUTPUT   Urine(mL/kg/hr) 280(0.5)   280   Dialysis  1080  1080   Shift Total(mL/kg) 280(4.1) 1080(16)  1360(20.1)   Weight (kg) 68.2 67.7 67.7 67.7     Patient Vitals for the past 96 hrs (Last 3 readings):   Weight   04/05/19 1419 149 lb 4 oz (67.7 kg)   04/05/19 1020 150 lb 2.1 oz (68.1 kg)   04/05/19 0534 150 lb 5.7 oz (68.2 kg)      xray   B/l effusion and atelectasis   Vascular congestion    PHYSICAL EXAMINATION:  Head and neck atraumatic, normocephalic    Lymph nodes-no cervical, supraclavicular lymphadenopathy    Neck-no JVP elevation    Lungs - dull bases and dec bs and no wheeze no rales     CVS- S1, S2 regular. No S3 no S4, no murmurs    Abdomen-nontender, nondistended. Bowel sounds are present. No organomegaly    Lower extremity-+ edema    Upper extremity-no edema    Neurological-grossly normal cranial nerves.   No overt motor deficit        Medications:    Scheduled Meds:   docusate sodium  100 mg Oral BID    dextrose 5%  500 mL Intravenous Once    hydrALAZINE  100 mg Oral 3 times per day    lidocaine-EPINEPHrine  20 mL Intradermal Once    lidocaine PF  1 mL Intradermal Once    bumetanide  3 mg Oral BID    vancomycin  1,000 mg Intravenous Once    bacitracin in 0.9 % sodium chloride irrigation  50,000 Units Topical Once    sodium chloride flush  10 mL Intravenous 2 times per day    albumin human  25 g Intravenous Once    aspirin  81 mg Oral Daily    spironolactone  25 mg Oral Daily    metoprolol succinate  50 mg Oral Daily    FLUoxetine  10 mg Oral Daily    levothyroxine  125 mcg Oral Daily    ALPRAZolam  0.25 mg Oral Once    QUEtiapine  25 mg Oral Nightly    clopidogrel  75 mg Oral Daily    isosorbide mononitrate  30 mg Oral Daily    [Held by provider] docusate sodium  100 mg Oral Daily    amiodarone  200 mg Oral Daily    atorvastatin  20 mg Oral Daily       Continuous Infusions:      PRN Meds:      Labs:  CBC:   Recent Labs     04/03/19  1853 04/04/19  0820 04/05/19  0424   WBC 11.4* 10.5 8.6   HGB 8.3* 8.9* 7.4*   HCT 25.4* 28.1* 23.5*   MCV 89.1 90.4 90.4   PLT 57* 90* 92*     BMP:   Recent Labs     04/03/19  1041 04/04/19  0820 04/05/19  0424    138 138   K 3.6* 3.8 3.5*    99 99   CO2 22 23 23   BUN 38* 47* 54*   CREATININE 2.61* 2.95* 3.29*     LIVER PROFILE:   No results for input(s): AST, ALT, LIPASE, BILIDIR, BILITOT, ALKPHOS in the last 72 hours. Invalid input(s): AMYLASE,  ALB  PT/INR:   No results for input(s): PROTIME, INR in the last 72 hours. APTT:   No results for input(s): APTT in the last 72 hours. UA:  No results for input(s): NITRITE, COLORU, PHUR, LABCAST, WBCUA, RBCUA, MUCUS, TRICHOMONAS, YEAST, BACTERIA, CLARITYU, SPECGRAV, LEUKOCYTESUR, UROBILINOGEN, BILIRUBINUR, BLOODU, GLUCOSEU, AMORPHOUS in the last 72 hours. Invalid input(s): KETONESU  No results for input(s): PHART, JOR7APZ, PO2ART in the last 72 hours.     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 *  Multi-vessel coronary artery disease post PCI  Severe aortic stenosis, post TAVR  Atrial fibrillation  Small right middle lobe pulmonary embolism.   Per chart, from imaging studies done in [place]  Thrombocytopenia - hit negative     Plan:  Continue hd   Cont is and deep breathing   Electronically signed by Maxi Farias MD on 4/5/2019 at 5:15 PM

## 2019-04-05 NOTE — PROGRESS NOTES
[facility] - Progress Note  Today's Date and Time: 4/5/2019, 12:51 PM    Impression :   1. Recurrent bilateral pleural effusions w/lung compression  2. CHF  3. 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 Multivessel CAD s/p multiple stent placements  5. S/P TAVR  6. HUYEN on HD  7. Urinary retention  8. Pulmonary hypertension  9. S/P Upgrade of dual chamber PPM to CRT-P on 4-2-19  10. Anemia s/p transfusion 4/3    Recommendations:     · Aggressive pulmonary toilet  · Acapella valve and IS at bedside    Medical Decision Making/Summary/Discussion:   · 81 yo gentleman who developed complete heart block. Found to have multivessel CAD, severe aortic stenosis and pulmonary HTN. · Underwent pacemaker placement with subsequent bouts of acute CHF and multiple hospitalizations in [place]  · Brought to [facility] by family for further care. · Since admission at [facility] he has had cardiac catheterizations x 2 with multiple stent placements and a TAVR on 3-15. Pt remains on a nitroglycerine drip  · Pt continues to require frequent thoracenteses  · He has suffered an HUYEN and is currently on a bumex drip  · CXR 3/26 showed progressed moderate to large bilateral pleural effusions with consolidation. · ID consult placed for treatment of any potential pneumonia  · Pt is afebrile, without cough or sputum production. He remains SOB with exertion and at times, when quiet. · Currently no evidence of active infection. Pt will require aggressive pulmonary toilet and diuresis. Plan to monitor off antibiotics  · Patient to continue HD  · He had upgraded pacemaker placed on 4-2-19. · Developed hematoma at pacer site, resolved with pressure. Elequis held 4-3.  Hgb to 6.5 received 1u PRBC    Infection Control Recommendations   · [place] Precautions    Antimicrobial Stewardship Recommendations     · Monitor off antibiotics    Coordination of Outpatient Care:   · Estimated Length of IV antimicrobials: None  · Patient will need Midline Catheter Insertion:   · Patient will need PICC line Insertion:  · Patient will need: Home IV , [place],  SNF,  LTAC TBD  · Patient will need outpatient wound care: No    Chief complaint/reason for consultation:   · Possible pneumonia      History of Present Illness:   Christie English is a 80y.o.-year-old  male who was initially admitted on 3/6/2019. Patient seen at the request of Dr. Valdemar Price     INITIAL HISTORY:    Pt is a 79 yo gentleman who has recently had multiple hospital admissions in [place] for decompensated heart failure. His symptoms began in December 2018. 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 A cardiac cath at that time showed LM and LAD calcified stenosis 80-90% in multiple areas, with severe disease in D1, D2, Om1 and OM2. Minimal disease in RCA. LV function with EF 25%. Severe aortic stenosis compromising his clinical improvedmnt      He underwent a dual chamber pacemaker placement. Following that, he had multiple hospitalizations for acute heart failure and shortness of breath. His baseline creatinine was 1.3, now >2.0. He has had multiple thoracenteses with transudative pleural fluid removed.      He was brought to [place] for further evaluation and was admitted on 3/6/19     An ECHO from admission showed   Normal left ventricle size with mildly reduced systolic function; Estimated  left ventricular ejection fraction 40-45%  Anterolateral wall hypokinesis. Mild left ventricular hypertrophy. Left atrium is moderately dilated. Grade II diastolic dysfunction. Heavily calcified aortic valve with reduced systolic excursion and evidence  of moderate stenosis. (Peak nataliia 3.62 m/s and mean gradient 29 mmHg)  Moderate posterior pericardial effusion without any echo signs of tamponade. Normal right ventricular size and function. Pacemaker lead seen in right ventricle. Moderate tricuspid regurgitation.   Estimated right ventricular systolic pressure is 60 mmHg suggesting moderate  pulmonary HTN.    He underwent an atherectomy/JEAN-CLAUDE of the left main and LAD on 3/6. Because of the high surgical risk and renal function he was taken back to the cath lab on 3/11 for PTCA-JEAN-CLAUDE LAD, PTCRA-JEAN-CLAUDE LM with plan to consider a TAVR if pt remained stable.     A TAVR was performed on 3/15     A carotid study showed less than 50% stenosis bilaterally.     He has had multiple thoracenteses since admission. Pt has been followed by CT surgery, cardiology, pulmonary, nephrology and urology.     On the evening of 3/25 pt developed hypertension and SOB at rest. A stat CXR was done that showed progressed moderate to large bilateral pleural effusions with consolidation.      On 3/26 pt underwent a Lt thoracentesis with 1400 ml clear pleural fluid removed. Pt reports feeling much better after the procedure and is asking to sit in the chair.      I am consulted to determine if Mr Eliot Rutledge has pneumonia. Pt had continued decline in renal function, HD initiated 3-29  PPV pacemaker placed 4-2, pt with hematoma at site, resolved with pressure. Anticoagulation held 4-3. Hgb to 6.5 received 1u PRBC     CURRENT EXAMINATION: 4/5/2019    Pt seen and examined, in HD  Easily roused, reports continued tenderness at pacemaker site    He had upgraded pacemaker placed on 4-2-19  Vancomycin given pre-op     On exam pt is in no distress  Breath sounds are diminished bilateral bases   On 2L O2    CXR with pulmonary edema and bilateral pleural effusions    Johnston in place with clear urine    Pacer site with hematoma, no active oozing staples intact      Afebrile   VSS     Labs reviewed: 4/5/2019    WBC 7.7->11.4->10.5->8.6  Hgb 8.8->6.5->8.3->8.9->7.4  Plt 57->90->92  Cr 2.74->2.61->2.95->3.29  BUN 44->38->47->54    Cultures:  Pleural fluid:  · 3/7 No growth    Discussed with Pt, HD RN.     I have personally reviewed the past medical history, past surgical history, medications, social history, and family history, and I have updated the database accordingly.   Past Medical History:     Past Medical History:   Diagnosis Date    Aortic stenosis 02/15/2019    Atrial fibrillation (HCC)     CAD (coronary artery disease)     Chest pain 02/15/2019    CHF (congestive heart failure) (HCC)     Chronic anemia     Chronic kidney disease     Hypertension     Pleural effusion on left 02/15/2019    Pleural effusion, right 02/15/2019    Pulmonary emboli ([address] 02/15/2019    Pulmonary hypertension ([facility] [address] 03/06/2019    Thyroid disease     hypothyroidism       Past Surgical  History:     Past Surgical History:   Procedure Laterality Date    CORONARY ANGIOPLASTY WITH STENT PLACEMENT  03/11/2019    complex PCI of LT MAIN, LAD    JOINT REPLACEMENT      right ankle    OTHER SURGICAL HISTORY  04/02/2019    UPGRADE TO BI-V PACEMAKER    OTHER SURGICAL HISTORY  03/15/2019    TAVR PROCEDURE    PACEMAKER PLACEMENT  01/20/2019    THORACENTESIS         Medications:      docusate sodium  100 mg Oral BID    hydrALAZINE  100 mg Oral 3 times per day    lidocaine-EPINEPHrine  20 mL Intradermal Once    lidocaine PF  1 mL Intradermal Once    bumetanide  3 mg Oral BID    vancomycin  1,000 mg Intravenous Once    bacitracin in 0.9 % sodium chloride irrigation  50,000 Units Topical Once    sodium chloride flush  10 mL Intravenous 2 times per day    albumin human  25 g Intravenous Once    aspirin  81 mg Oral Daily    spironolactone  25 mg Oral Daily    metoprolol succinate  50 mg Oral Daily    FLUoxetine  10 mg Oral Daily    levothyroxine  125 mcg Oral Daily    ALPRAZolam  0.25 mg Oral Once    QUEtiapine  25 mg Oral Nightly    clopidogrel  75 mg Oral Daily    isosorbide mononitrate  30 mg Oral Daily    [Held by provider] docusate sodium  100 mg Oral Daily    amiodarone  200 mg Oral Daily    atorvastatin  20 mg Oral Daily       Social History:     Social History     Socioeconomic History    Marital status:      Spouse name: Not on file    Number of children: Not on file    Years of education: Not on file    Highest education level: Not on file   Occupational History     Employer: RETIRED   Social Needs    Financial resource strain: Not on file    Food insecurity:     Worry: Not on file     Inability: Not on file    Transportation needs:     Medical: Not on file     Non-medical: Not on file   Tobacco Use    Smoking status: Former Smoker     Last attempt to quit:      Years since quittin.2    Smokeless tobacco: Never Used   Substance and Sexual Activity    Alcohol use: Not Currently    Drug use: Never    Sexual activity: Not on file   Lifestyle    Physical activity:     Days per week: Not on file     Minutes per session: Not on file    Stress: Not on file   Relationships    Social connections:     Talks on phone: Not on file     Gets together: Not on file     Attends Congregational service: Not on file     Active member of club or organization: Not on file     Attends meetings of clubs or organizations: Not on file     Relationship status: Not on file    Intimate partner violence:     Fear of current or ex partner: Not on file     Emotionally abused: Not on file     Physically abused: Not on file     Forced sexual activity: Not on file   Other Topics Concern    Not on file   Social History Narrative    Not on file       Family History:   History reviewed. No pertinent family history.      Allergies:   Quinolones     Review of Systems:   AA, reports continued tenderness at pacemaker site  No chills or fevers, no SOB  Seen on HD    Physical Examination :     Patient Vitals for the past 8 hrs:   BP Temp Temp src Pulse Resp SpO2 Weight   19 1135 (!) 154/70 -- -- 60 -- -- --   19 1105 (!) 147/64 -- -- 60 -- -- --   19 1035 (!) 152/70 -- -- 60 -- -- --   19 1020 (!) 150/78 98.3 °F (36.8 °C) -- 60 12 -- 150 lb 2.1 oz (68.1 kg)   19 0823 -- -- -- -- -- 95 % --   19 0110 (!) 148/45 -- -- 63 -- -- --   04/05/19 0659 (!) 160/54 97.9 °F (36.6 °C) Oral 68 16 94 % --   04/05/19 0534 -- -- -- -- -- -- 150 lb 5.7 oz (68.2 kg)   04/05/19 0508 (!) 177/55 98.2 °F (36.8 °C) Oral 62 15 -- --     General Appearance: Seen in HD, easily roused, no distress  Head:  Normocephalic, no trauma  Eyes: Pupils equal, round, reactive to light and accommodation; extraocular movements intact; sclera anicteric. ENT: Oropharynx clear. Mouth/throat: mucosa pink and moist. No lesions. Dentition in good repair. Neck: Supple, without lymphadenopathy. Pulmonary/Chest: Clear to auscultation, without wheezes, rales, or rhonchi. Decreased breath sounds at bilateral bases   Cardiovascular: Regular rate and rhythm without murmurs, rubs, or gallops. Paced  Abdomen: Soft. Bowel sounds normal.  : Johnston in place  All four Extremities: Mild edema. Neurologic: No gross sensory or motor deficits. Skin: Warm and dry with good turgor. No signs of peripheral arterial or venous insufficiency. No ulcerations. No open wounds. Lt chest pacemaker incision with hematoma, staples intact, no oozing or bleeding noted    Medical Decision Making -Laboratory:   I have independently reviewed/ordered the following labs:    CBC with Differential:   Recent Labs     04/04/19  0820 04/05/19  0424   WBC 10.5 8.6   HGB 8.9* 7.4*   HCT 28.1* 23.5*   PLT 90* 92*   LYMPHOPCT 8* 6*   MONOPCT 7 8     BMP:   Recent Labs     04/04/19  0820 04/05/19  0424    138   K 3.8 3.5*   CL 99 99   CO2 23 23   BUN 47* 54*   CREATININE 2.95* 3.29*     Hepatic Function Panel:   No results for input(s): PROT, LABALBU, BILIDIR, IBILI, BILITOT, ALKPHOS, ALT, AST in the last 72 hours. No results for input(s): RPR in the last 72 hours. No results for input(s): HIV in the last 72 hours. No results for input(s): BC in the last 72 hours.   Lab Results   Component Value Date    MUCUS NOT REPORTED 03/29/2019    RBC 2.60 04/05/2019    TRICHOMONAS NOT REPORTED 2019    WBC 8.6 2019    YEAST NOT REPORTED 2019    TURBIDITY CLEAR 2019     Lab Results   Component Value Date    CREATININE 3.29 2019    GLUCOSE 97 2019       Medical Decision Making-Imagin-5 CXR    EXAMINATION:   SINGLE XRAY VIEW OF THE CHEST       2019 5:39 am       COMPARISON:   2019       HISTORY:   ORDERING SYSTEM PROVIDED HISTORY: pleural effusions   TECHNOLOGIST PROVIDED HISTORY:   pleural effusions       FINDINGS:   AP portable view of the chest time stamped at 519 hours demonstrates   overlying cardiac monitoring electrodes.  Right internal jugular catheter   terminates in the distal superior vena cava.  Multiple polar pacemaker enters   from the left with intact leads in appropriate positions.  Pacemaker battery   box and skin clips overlie the left axilla.  Heart is enlarged and there is   pulmonary vascular congestion with bilateral perihilar opacities consistent   with pulmonary edema.  Moderate bilateral pleural effusions are noted.  No   extrapleural air is seen.  No midline shift is noted.  Osseous structures are   stable.           Impression   Continued cardiomegaly, opacities most compatible with pulmonary edema and   bilateral pleural effusions.          4- CXR    EXAMINATION:   SINGLE XRAY VIEW OF THE CHEST       2019 6:04 am       COMPARISON:   2019       HISTORY:   ORDERING SYSTEM PROVIDED HISTORY: SOB   TECHNOLOGIST PROVIDED HISTORY:   SOB       FINDINGS:   There is a left-sided transvenous pacer in place and a right internal jugular   dialysis catheter.  There is cardiomegaly with pulmonary vascular congestion   and edema.  There are bilateral pleural effusions which are increased.  The   pulmonary edema appears worse on the current exam.           Impression   Worsening edema and increased bilateral pleural effusions         4- AXR  EXAMINATION:   SINGLE SUPINE XRAY VIEW(S) OF THE ABDOMEN       2019 2:40 pm     CHEST       3/26/2019 2:37 am       COMPARISON:   March 20, 2019.       HISTORY:   ORDERING SYSTEM PROVIDED HISTORY: SOB, PE   TECHNOLOGIST PROVIDED HISTORY:   SOB, PE       FINDINGS:   Frontal portable view of the chest.  Low lung volume.  Progressed moderate to   large bilateral pleural effusions with consolidation.  Indistinct pulmonary   vasculature.  No pneumothorax.  The cardiomediastinal silhouette is not well   evaluated.  Atherosclerotic thoracic aorta.  Left pectoral trans venous   dual-chamber cardiac pacer device.           Impression   Progressed moderate to large bilateral pleural effusions with consolidation. Superimposed infection is not excluded.             Medical Decision Making-Other: Thank you for allowing us to participate in the care of this patient. Please call with questions.     Shay Mendez MD

## 2019-04-06 NOTE — PROGRESS NOTES
NEPHROLOGY PROGRESS NOTE      SUBJECTIVE     Admitted with shortness of breath secondary to decompensated congestive heart failure in the face of aortic stenosis. Subsequently underwent PCI and PVR. Postprocedure course has been complicated by worsening of renal function and hypervolemia for which he is getting recurrent thoracocentesis and diuretic adjustment. Hemodialysis also initiated. Feels better. But is still edematous. Last dialysis session yesterday. Negative for 3 L, UOP 50 ml /24 hrs  On Bumex and Aldactone K 3.6  Had 5 sessions of hemodialysis . Last one was 4/5/19. Had upgrade of dual-chamber PPM to CRT -P on 4/2/19. Had and oozing from site. Hgb had dropped and status post 1 unit of blood on 4/3/19. OBJECTIVE     Vitals:    04/05/19 1419 04/05/19 1640 04/05/19 1822 04/06/19 0656   BP: (!) 149/62 (!) 134/44 (!) 143/49 (!) 156/67   Pulse: 60 62 62 61   Resp:  16 14 16   Temp: 97.7 °F (36.5 °C) 97.9 °F (36.6 °C) 97.7 °F (36.5 °C) 97.9 °F (36.6 °C)   TempSrc:  Oral Oral Oral   SpO2:  95% 96% 97%   Weight: 149 lb 4 oz (67.7 kg)      Height:         24HR INTAKE/OUTPUT:      Intake/Output Summary (Last 24 hours) at 4/6/2019 0743  Last data filed at 4/6/2019 0739  Gross per 24 hour   Intake 975 ml   Output 1230 ml   Net -255 ml       General appearance:Awake, alert, in no acute distress on nasal oxygen. HEENT: PERRLA  Respiratory::vesicular breath sounds, reduced air entry  Cardiovascular:S1 S2 normal,no gallop or organic murmur, tender CRT site with staples and ecchymosis. Abdomen:Non tender/non distended. Bowel sounds present  Extremities: No Cyanosis or Clubbing, present Lower extremity edema  Neurological:Alert and oriented. No abnormalities of mood, affect, memory, mentation, or behavior are noted    MEDICATIONS     Scheduled Meds:    docusate sodium  100 mg Oral BID    dextrose 5%  500 mL Intravenous Once    hydrALAZINE  100 mg Oral 3 times per day    lidocaine-EPINEPHrine  20 mL 12.2       ASSESSMENT     1. Acute kidney injury secondary to ATN from hypoperfusion related to cardiorenal syndrome - worsening. Hemodialysis dependent since 29th of March  2. Chronic kidney disease stage IIIB with baseline creatinine suspected around 2  3. Nephrotic range proteinuria - Bx not feasible in view of he being on Asprin and cardiology recommends not to stop anticoagulant. Explained to patient and he does not want to do that. 4.  Status post TVR  5. Cardiomyopathy ejection fraction 40-45%/left ventricular diastole dysfunction/moderate to severe mitral regurgitation S/p CRT4/3   6. S/p PCI and atherectomy  3/11  7. Recurrent bilateral pleural effusion status post pleural taps  8. HTN  9. Hx of Urinary retention  10. Thrombocytopenia  11. Advanced age    PLAN     4. Continue Bumex 3 mg BID, Aldactone 25 mg. Metolazone 10 mg QD added. 2.  Spoke with primary Dr. Kimberley Funk and discussed the case. 3.  If dialysis to be continued patient will need [facility] cath before discharge. 4.  Hematology- Hit antibody negative. 5.  Chances of renal recovery slim  6. Will follow.     Demond Ayon MD  PGY-3, Nephrology  [address] [place]

## 2019-04-06 NOTE — PROGRESS NOTES
Patient refused nighttime medications. Patient educated on the importance of taking Bumex, Hydralazine, and Seroquel. Patient A/Ox4 and still refused medications after receiving education. Writer documented in [facility].

## 2019-04-06 NOTE — PROGRESS NOTES
NEPHROLOGY PROGRESS NOTE      SUBJECTIVE     Admitted with shortness of breath secondary to decompensated congestive heart failure in the face of aortic stenosis. Subsequently underwent PCI and PVR. Postprocedure course has been complicated by worsening of renal function and hypervolemia for which he is getting recurrent thoracocentesis and diuretic adjustment. Hemodialysis also initiated. Today he feels ok in regards to his symptoms of short of breath,      On Bumex 3 mg BID, UOP in last 24 hrs 50 mL. Negative 3.0L   Had upgrade of dual-chamber PPM to CRT -P on 4/2/19. Had and oozing from site. Hgb had dropped and status post 1 unit of blood on 4/3/19. OBJECTIVE     Vitals:    04/06/19 0656 04/06/19 0816 04/06/19 0946 04/06/19 1120   BP: (!) 156/67  (!) 146/55 (!) 129/43   Pulse: 61   64   Resp: 16   16   Temp: 97.9 °F (36.6 °C)   98.4 °F (36.9 °C)   TempSrc: Oral   Oral   SpO2: 97% 96%  97%   Weight:       Height:         24HR INTAKE/OUTPUT:      Intake/Output Summary (Last 24 hours) at 4/6/2019 1246  Last data filed at 4/6/2019 0946  Gross per 24 hour   Intake 1165 ml   Output 1230 ml   Net -65 ml       General appearance: Awake, alert, in no acute distress on nasal oxygen. HEENT: PERRLA  Respiratory::vesicular breath sounds, reduced air entry  Cardiovascular:S1 S2 normal,no gallop or organic murmur, tender CRT site with staples and ecchymosis. Abdomen:Non tender/non distended. Bowel sounds present  Extremities: No Cyanosis or Clubbing, present Lower extremity edema  Neurological:Alert and oriented. No abnormalities of mood, affect, memory, mentation, or behavior are noted    MEDICATIONS     Scheduled Meds:    simethicone  80 mg Oral Q6H    docusate sodium  100 mg Oral BID    dextrose 5%  500 mL Intravenous Once    hydrALAZINE  100 mg Oral 3 times per day    lidocaine-EPINEPHrine  20 mL Intradermal Once    lidocaine PF  1 mL Intradermal Once    bumetanide  3 mg Oral BID    vancomycin  1,000 mg syndrome - worsening. Hemodialysis dependent since 29th of March  2. Chronic kidney disease stage IIIB with baseline creatinine suspected around 2  3. Nephrotic range proteinuria - Bx not feasible in view of he being on Asprin and cardiology recommends not to stop anticoagulant. Explained to patient and he does not want to do that. 4.  Status post TVR  5. Cardiomyopathy ejection fraction 40-45%/left ventricular diastole dysfunction/moderate to severe mitral regurgitation S/p CRT4/3   6. S/p PCI and atherectomy  3/11  7. Recurrent bilateral pleural effusion status post pleural taps  8. HTN  9. Hx of Urinary retention  10. Thrombocytopenia  11. Advanced age    PLAN     4.  HD yesterday at bedside. 2.  Zaroxolyn 10 mg daily with Bumex ordered. 2.  If dialysis to be continued patient will need [facility] before discharge. 4.  Hematology- Hit antibody negative. 5.  Chances of renal recovery slim  6. Will follow. FRANCIS Sosa-CNP  [facility]    Attending Physician Statement  I have discussed the care of Abhay Avalos, including pertinent history and exam findings with the resident/fellow. I have reviewed the key elements of all parts of the encounter with the resident/fellow. I have seen and examined the patient with the resident/fellow. I agree with the assessment and plan and status of the problem list as documented. Addiitionally I discussed case with Dr Maricel Patricia .   Gato Lira MD

## 2019-04-06 NOTE — PROGRESS NOTES
[facility]  Pre Op Progress Note    CC: PPM site sore, abdominal distention      Subjective:  Mr. Branham Crew seen and examined Plan of care reviewed and questions answered. Refusing oral medications last evening. States his abdomen Is bloated. Did not really want to eat this morning either. Physical Exam  Vital Signs: BP (!) 156/67   Pulse 61   Temp 97.9 °F (36.6 °C) (Oral)   Resp 16   Ht 5' 6\" (1.676 m)   Wt 149 lb 4 oz (67.7 kg)   SpO2 96%   BMI 24.09 kg/m²  O2 Flow Rate (L/min): 1 L/min(decreased from 2)       General: alert and oriented to person, place and time, well-developed and well-nourished, in no acute distress. Up in chair, No apparent distress.   Heart:Rhythm: paced, ecchymosis and swelling over PPM site, tender to touch  Lungs: clear to auscultation bilaterally and diminished breath sounds bibasilar  Abdomen: soft, non tender, non distended, BSx4  Extremities: negative      Scheduled Meds:    docusate sodium  100 mg Oral BID    dextrose 5%  500 mL Intravenous Once    hydrALAZINE  100 mg Oral 3 times per day    lidocaine-EPINEPHrine  20 mL Intradermal Once    lidocaine PF  1 mL Intradermal Once    bumetanide  3 mg Oral BID    vancomycin  1,000 mg Intravenous Once    bacitracin in 0.9 % sodium chloride irrigation  50,000 Units Topical Once    sodium chloride flush  10 mL Intravenous 2 times per day    albumin human  25 g Intravenous Once    aspirin  81 mg Oral Daily    spironolactone  25 mg Oral Daily    metoprolol succinate  50 mg Oral Daily    FLUoxetine  10 mg Oral Daily    levothyroxine  125 mcg Oral Daily    ALPRAZolam  0.25 mg Oral Once    QUEtiapine  25 mg Oral Nightly    clopidogrel  75 mg Oral Daily    isosorbide mononitrate  30 mg Oral Daily    [Held by provider] docusate sodium  100 mg Oral Daily    amiodarone  200 mg Oral Daily    atorvastatin  20 mg Oral Daily     Continuous Infusions:     Data:  CBC:   Recent Labs 04/04/19  0820 04/05/19  0424 04/06/19  0617   WBC 10.5 8.6 9.7   HGB 8.9* 7.4* 7.5*   HCT 28.1* 23.5* 24.2*   MCV 90.4 90.4 92.0   PLT 90* 92* 73*     BMP:   Recent Labs     04/04/19  0820 04/05/19  0424 04/05/19 2137 04/06/19  0617    138  --  135   K 3.8 3.5* 3.8 3.6*   CL 99 99  --  97*   CO2 23 23  --  26   BUN 47* 54*  --  33*   CREATININE 2.95* 3.29*  --  2.48*     PT/INR: No results for input(s): PROTIME, INR in the last 72 hours. APTT: No results for input(s): APTT in the last 72 hours. 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 . nephrology managing closely.   Eliquis can be restarted when PPM site stable and ok with cardiology  ID monitoring off antibiotics  Pulmonary - wean O2 as tolerated, no intervention for pleural effusions  Constipated - start colace, give suppository this morning    Cardiology  BIV pacemaker placement   Refused PO medications last evening, due to abdominal bloating. Discussed whether he wants to keep up with dialysis, patient states he is still deciding, really does not want to do it permanently. He states it is a hard decision to make. The above recommendations including medications and orders were discussed and agreed upon with Dr. Juliette Jackson, the attending on service for the cardiothoracic surgery group today.      Christine Diaz, APRN, CNP

## 2019-04-06 NOTE — PROGRESS NOTES
[facility] - Progress Note  Today's Date and Time: 4/6/2019, 3:04 PM    Impression :   1. Recurrent bilateral pleural effusions w/lung compression  2. CHF  3. 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 Multivessel CAD s/p multiple stent placements  5. S/P TAVR  6. HUYEN on HD  7. Urinary retention  8. Pulmonary hypertension  9. S/P Upgrade of dual chamber PPM to CRT-P on 4-2-19  10. Anemia s/p transfusion 4/3    Recommendations:     · Aggressive pulmonary toilet  · Acapella valve and IS at bedside    Medical Decision Making/Summary/Discussion:   · 81 yo gentleman who developed complete heart block. Found to have multivessel CAD, severe aortic stenosis and pulmonary HTN. · Underwent pacemaker placement with subsequent bouts of acute CHF and multiple hospitalizations in [place]  · Brought to [facility] by family for further care. · Since admission at [address] [facility] he has had cardiac catheterizations x 2 with multiple stent placements and a TAVR on 3-15. Pt remains on a nitroglycerine drip  · Pt continues to require frequent thoracenteses  · He has suffered an HUYEN and is currently on a bumex drip  · CXR 3/26 showed progressed moderate to large bilateral pleural effusions with consolidation. · ID consult placed for treatment of any potential pneumonia  · Pt is afebrile, without cough or sputum production. He remains SOB with exertion and at times, when quiet. · Currently no evidence of active infection. Pt will require aggressive pulmonary toilet and diuresis. Plan to monitor off antibiotics  · Patient to continue HD  · He had upgraded pacemaker placed on 4-2-19. · Developed hematoma at pacer site, resolved with pressure. Elequis held 4-3.  Hgb to 6.5 received 1u PRBC  · Patient is deciding if he wants to continue hemodialysis long term or not    Infection Control Recommendations   · Collins Precautions    Antimicrobial Stewardship Recommendations     · Monitor off antibiotics    Coordination of Outpatient Care:   · Estimated Length of IV antimicrobials: None  · Patient will need Midline Catheter Insertion:   · Patient will need PICC line Insertion:  · Patient will need: Home IV , [place],  SNF,  LTAC TBD  · Patient will need outpatient wound care: No    Chief complaint/reason for consultation:   · Possible pneumonia      History of Present Illness:   Abhay Avalos is a 80y.o.-year-old  male who was initially admitted on 3/6/2019. Patient seen at the request of Dr. Maricel Patricia     INITIAL HISTORY:    Pt is a 81 yo gentleman who has recently had multiple hospital admissions in [place] for decompensated heart failure. His symptoms began in December 2018. 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 A cardiac cath at that time showed LM and LAD calcified stenosis 80-90% in multiple areas, with severe disease in D1, D2, Om1 and OM2. Minimal disease in RCA. LV function with EF 25%. Severe aortic stenosis compromising his clinical improvedmnt      He underwent a dual chamber pacemaker placement. Following that, he had multiple hospitalizations for acute heart failure and shortness of breath. His baseline creatinine was 1.3, now >2.0. He has had multiple thoracenteses with transudative pleural fluid removed.      He was brought to [place] for further evaluation and was admitted on 3/6/19     An ECHO from admission showed   Normal left ventricle size with mildly reduced systolic function; Estimated  left ventricular ejection fraction 40-45%  Anterolateral wall hypokinesis. Mild left ventricular hypertrophy. Left atrium is moderately dilated. Grade II diastolic dysfunction. Heavily calcified aortic valve with reduced systolic excursion and evidence  of moderate stenosis. (Peak nataliia 3.62 m/s and mean gradient 29 mmHg)  Moderate posterior pericardial effusion without any echo signs of tamponade. Normal right ventricular size and function.   Pacemaker lead seen in right ventricle. Moderate tricuspid regurgitation. Estimated right ventricular systolic pressure is 60 mmHg suggesting moderate  pulmonary HTN.    He underwent an atherectomy/JEAN-CLAUDE of the left main and LAD on 3/6. Because of the high surgical risk and renal function he was taken back to the cath lab on 3/11 for PTCA-JEAN-CLAUDE LAD, PTCRA-JEAN-CLAUDE LM with plan to consider a TAVR if pt remained stable.     A TAVR was performed on 3/15     A carotid study showed less than 50% stenosis bilaterally.     He has had multiple thoracenteses since admission. Pt has been followed by CT surgery, cardiology, pulmonary, nephrology and urology.     On the evening of 3/25 pt developed hypertension and SOB at rest. A stat CXR was done that showed progressed moderate to large bilateral pleural effusions with consolidation.      On 3/26 pt underwent a Lt thoracentesis with 1400 ml clear pleural fluid removed. Pt reports feeling much better after the procedure and is asking to sit in the chair.      I am consulted to determine if Mr Ruby Choudhary has pneumonia. Pt had continued decline in renal function, HD initiated 3-29  PPV pacemaker placed 4-2, pt with hematoma at site, resolved with pressure. Anticoagulation held 4-3. Hgb to 6.5 received 1u PRBC     CURRENT EXAMINATION: 4/6/2019    Pt seen and examined. Says he is feeling \"so - so\" today. No fevers or chills. No increasing shortness of breath. Had some abdominal bloating last night, refused to take his medications because of it. On bumex and metolazone daily per neprhology. Planning for perm catheter placement before discharge. HIT antibody negative.  Okay to proceed with heparin use per heme/onc    He had upgraded pacemaker placed on 4-2-19  Vancomycin given pre-op     On exam pt is in no distress  Breath sounds are diminished bilateral bases   On 1L O2    CXR with pulmonary edema and bilateral pleural effusions    Johnston in place with clear urine    Pacer site with hematoma, no active oozing staples intact      Afebrile   VSS     Labs reviewed: 4/6/2019    WBC 7.7->11.4->10.5->8.6 -> 9.7  Hgb 8.8->6.5->8.3->8.9->7.4 -> 7.5  Plt 57->90->92 -> 73  Cr 2.74->2.61->2.95->3.29 -> 2.48  BUN 44->38->47->54 -> 33    Cultures:  Pleural fluid:  · 3/7 No growth, many neutrophils seen, no bacteria, and no growth    Discussed with Pt, HD RN. I have personally reviewed the past medical history, past surgical history, medications, social history, and family history, and I have updated the database accordingly.   Past Medical History:     Past Medical History:   Diagnosis Date    Aortic stenosis 02/15/2019    Atrial fibrillation (HCC)     CAD (coronary artery disease)     Chest pain 02/15/2019    CHF (congestive heart failure) (HCC)     Chronic anemia     Chronic kidney disease     Hypertension     Pleural effusion on left 02/15/2019    Pleural effusion, right 02/15/2019    Pulmonary emboli ([address] 02/15/2019    Pulmonary hypertension ([address] 03/06/2019    Thyroid disease     hypothyroidism       Past Surgical  History:     Past Surgical History:   Procedure Laterality Date    CORONARY ANGIOPLASTY WITH STENT PLACEMENT  03/11/2019    complex PCI of LT MAIN, LAD    JOINT REPLACEMENT      right ankle    OTHER SURGICAL HISTORY  04/02/2019    UPGRADE TO BI-V PACEMAKER    OTHER SURGICAL HISTORY  03/15/2019    TAVR PROCEDURE    PACEMAKER PLACEMENT  01/20/2019    THORACENTESIS         Medications:      simethicone  80 mg Oral Q6H    metolazone  10 mg Oral Daily    docusate sodium  100 mg Oral BID    dextrose 5%  500 mL Intravenous Once    hydrALAZINE  100 mg Oral 3 times per day    lidocaine-EPINEPHrine  20 mL Intradermal Once    lidocaine PF  1 mL Intradermal Once    bumetanide  3 mg Oral BID    vancomycin  1,000 mg Intravenous Once    bacitracin in 0.9 % sodium chloride irrigation  50,000 Units Topical Once    sodium chloride flush  10 mL Intravenous 2 times per day    albumin human  25 g Intravenous Once    aspirin  81 mg Oral Daily    spironolactone  25 mg Oral Daily    metoprolol succinate  50 mg Oral Daily    FLUoxetine  10 mg Oral Daily    levothyroxine  125 mcg Oral Daily    ALPRAZolam  0.25 mg Oral Once    QUEtiapine  25 mg Oral Nightly    clopidogrel  75 mg Oral Daily    isosorbide mononitrate  30 mg Oral Daily    [Held by provider] docusate sodium  100 mg Oral Daily    amiodarone  200 mg Oral Daily    atorvastatin  20 mg Oral Daily       Social History:     Social History     Socioeconomic History    Marital status:       Spouse name: Not on file    Number of children: Not on file    Years of education: Not on file    Highest education level: Not on file   Occupational History     Employer: RETIRED   Social Needs    Financial resource strain: Not on file    Food insecurity:     Worry: Not on file     Inability: Not on file    Transportation needs:     Medical: Not on file     Non-medical: Not on file   Tobacco Use    Smoking status: Former Smoker     Last attempt to quit:      Years since quittin.2    Smokeless tobacco: Never Used   Substance and Sexual Activity    Alcohol use: Not Currently    Drug use: Never    Sexual activity: Not on file   Lifestyle    Physical activity:     Days per week: Not on file     Minutes per session: Not on file    Stress: Not on file   Relationships    Social connections:     Talks on phone: Not on file     Gets together: Not on file     Attends [employer] service: Not on file     Active member of club or organization: Not on file     Attends meetings of clubs or organizations: Not on file     Relationship status: Not on file    Intimate partner violence:     Fear of current or ex partner: Not on file     Emotionally abused: Not on file     Physically abused: Not on file     Forced sexual activity: Not on file   Other Topics Concern    Not on file   Social History Narrative    Not on file       Family History: History reviewed. No pertinent family history. Allergies:   Quinolones     Review of Systems:   AA, reports continued tenderness at pacemaker site  No chills or fevers, no SOB  Seen on HD    Physical Examination :     Patient Vitals for the past 8 hrs:   BP Temp Temp src Pulse Resp SpO2   04/06/19 1400 -- -- -- 61 -- --   04/06/19 1300 -- -- -- 61 -- --   04/06/19 1200 -- -- -- 61 -- --   04/06/19 1120 (!) 129/43 98.4 °F (36.9 °C) Oral 64 16 97 %   04/06/19 0946 (!) 146/55 -- -- -- -- --   04/06/19 0816 -- -- -- -- -- 96 %     General Appearance: Seen in HD, easily roused, no distress  Head:  Normocephalic, no trauma  Eyes: Pupils equal, round, reactive to light and accommodation; extraocular movements intact; sclera anicteric. ENT: Oropharynx clear. Mouth/throat: mucosa pink and moist. No lesions. Dentition in good repair. Neck: Supple, without lymphadenopathy. Pulmonary/Chest: Clear to auscultation, without wheezes, rales, or rhonchi. Decreased breath sounds at bilateral bases   Cardiovascular: Regular rate and rhythm without murmurs, rubs, or gallops. Paced  Abdomen: Soft. Bowel sounds normal.  : Johnston in place  All four Extremities: Mild edema. Neurologic: No gross sensory or motor deficits. Skin: Warm and dry with good turgor. No signs of peripheral arterial or venous insufficiency. No ulcerations. No open wounds.  Lt chest pacemaker incision with hematoma, staples intact, no oozing or bleeding noted    Medical Decision Making -Laboratory:   I have independently reviewed/ordered the following labs:    CBC with Differential:   Recent Labs     04/05/19 0424 04/06/19  0617   WBC 8.6 9.7   HGB 7.4* 7.5*   HCT 23.5* 24.2*   PLT 92* 73*   LYMPHOPCT 6* 12*   MONOPCT 8 6     BMP:   Recent Labs     04/05/19  0424 04/05/19 2137 04/06/19  0617     --  135   K 3.5* 3.8 3.6*   CL 99  --  97*   CO2 23  --  26   BUN 54*  --  33*   CREATININE 3.29*  --  2.48*     Hepatic Function Panel:   No results for input(s): PROT, LABALBU, BILIDIR, IBILI, BILITOT, ALKPHOS, ALT, AST in the last 72 hours. No results for input(s): RPR in the last 72 hours. No results for input(s): HIV in the last 72 hours. No results for input(s): BC in the last 72 hours. Lab Results   Component Value Date    MUCUS NOT REPORTED 2019    RBC 2.63 2019    TRICHOMONAS NOT REPORTED 2019    WBC 9.7 2019    YEAST NOT REPORTED 2019    TURBIDITY CLEAR 2019     Lab Results   Component Value Date    CREATININE 2.48 2019    GLUCOSE 101 2019       Medical Decision Making-Imagin-5 CXR    EXAMINATION:   SINGLE XRAY VIEW OF THE CHEST       2019 5:39 am       COMPARISON:   2019       HISTORY:   ORDERING SYSTEM PROVIDED HISTORY: pleural effusions   TECHNOLOGIST PROVIDED HISTORY:   pleural effusions       FINDINGS:   AP portable view of the chest time stamped at 519 hours demonstrates   overlying cardiac monitoring electrodes.  Right internal jugular catheter   terminates in the distal superior vena cava.  Multiple polar pacemaker enters   from the left with intact leads in appropriate positions.  Pacemaker battery   box and skin clips overlie the left axilla.  Heart is enlarged and there is   pulmonary vascular congestion with bilateral perihilar opacities consistent   with pulmonary edema.  Moderate bilateral pleural effusions are noted.  No   extrapleural air is seen.  No midline shift is noted.  Osseous structures are   stable.           Impression   Continued cardiomegaly, opacities most compatible with pulmonary edema and   bilateral pleural effusions.          4- CXR    EXAMINATION:   SINGLE XRAY VIEW OF THE CHEST       2019 6:04 am       COMPARISON:   2019       HISTORY:   ORDERING SYSTEM PROVIDED HISTORY: SOB   TECHNOLOGIST PROVIDED HISTORY:   SOB       FINDINGS:   There is a left-sided transvenous pacer in place and a right internal jugular   dialysis catheter. [place] Plump is cardiomegaly with pulmonary vascular congestion   and edema.  There are bilateral pleural effusions which are increased.  The   pulmonary edema appears worse on the current exam.           Impression   Worsening edema and increased bilateral pleural effusions         4-1 AXR  EXAMINATION:   SINGLE SUPINE XRAY VIEW(S) OF THE ABDOMEN       4/1/2019 2:40 pm       COMPARISON:   None.       HISTORY:   ORDERING SYSTEM PROVIDED HISTORY: r/o ileus   TECHNOLOGIST PROVIDED HISTORY:   r/o ileus       Initial exam       FINDINGS:   Nonspecific bowel gas pattern with air-filled small and large bowel.  No   organomegaly noted.  No pathologic calcifications.  Degenerative changes of   the lower lumbar spine.           Impression   Nonspecific bowel gas pattern without evidence of small bowel obstruction. 3/29 CXR      Narrative   EXAMINATION:   SINGLE XRAY VIEW OF THE CHEST       3/29/2019 8:26 am       COMPARISON:   Chest radiograph performed 03/28/2019.       HISTORY:   ORDERING SYSTEM PROVIDED HISTORY: effusions   TECHNOLOGIST PROVIDED HISTORY:   effusions       FINDINGS:   There are large bilateral effusions with adjacent consolidation.  There is   underlying pulmonary congestion.  There is no pneumothorax.  The heart is   enlarged with stable cardiac leads.  The upper abdomen is unremarkable.  The   extrathoracic soft tissues are unremarkable.           Impression   Cardiomegaly with large bilateral effusions and adjacent consolidation   concerning for pneumonia.               3/27 CXR    Narrative   EXAMINATION:   TWO VIEWS OF THE CHEST       3/28/2019 7:56 am       COMPARISON:   Chest radiograph performed 03/27/2019.       HISTORY:   ORDERING SYSTEM PROVIDED HISTORY: effusions   TECHNOLOGIST PROVIDED HISTORY:   effusions       FINDINGS:   There is a small left and a large right pleural effusion with adjacent   infiltrate.  There is no pneumothorax.  The mediastinal structures are stable   with stable cardiac leads.  The upper abdomen is unremarkable.  The   extrathoracic soft tissues are unremarkable.           Impression   Small left and large right pleural effusion with adjacent infiltrate   representing atelectasis versus pneumonia.  Overall there may be mild   improvement of the right-sided effusion. Narrative   EXAMINATION:   SINGLE XRAY VIEW OF THE CHEST       3/26/2019 2:37 am       COMPARISON:   March 20, 2019.       HISTORY:   ORDERING SYSTEM PROVIDED HISTORY: SOB, PE   TECHNOLOGIST PROVIDED HISTORY:   SOB, PE       FINDINGS:   Frontal portable view of the chest.  Low lung volume.  Progressed moderate to   large bilateral pleural effusions with consolidation.  Indistinct pulmonary   vasculature.  No pneumothorax.  The cardiomediastinal silhouette is not well   evaluated.  Atherosclerotic thoracic aorta.  Left pectoral trans venous   dual-chamber cardiac pacer device.           Impression   Progressed moderate to large bilateral pleural effusions with consolidation. Superimposed infection is not excluded.             Medical Decision Making-Other: Thank you for allowing us to participate in the care of this patient. Please call with questions. Juno Segal     ATTESTATION:    I have discussed the case, including pertinent history and exam findings with the residents. I have seen and examined the patient and the key elements of the encounter have been performed by me. I have reviewed the laboratory data, other diagnostic studies and discussed them with the residents. I have updated the medical record where necessary. I agree with the assessment, plan and orders as documented by the resident.     Mechelle Cruz MD.

## 2019-04-06 NOTE — FLOWSHEET NOTE
DATE: 2019    NAME: Dick Blackburn  MRN: [id number]   : 12/10/1928    Patient not seen this date for Physical Therapy due to:  [] Blood transfusion in progress  [] Hemodialysis  []  Patient Declined  [] Spine Precautions   [] Strict Bedrest  [] Surgery/ Procedure  [] Testing      [x] Other: pt not in room at this time        [] PT being discontinued at this time. Patient independent. No further needs. [] PT being discontinued at this time as the patient has been transferred to palliative care. No further needs.     Elio Armijo, PTA

## 2019-04-07 NOTE — PROGRESS NOTES
[id number] [facility]   Progress Note    CC: PPM site sore, abdomen distended, a little improved      Subjective:  Mr. Reynaldo Martin seen and examined Plan of care reviewed and questions answered. Had 2 soft, loose bowel movements yesterday. Did not want suppository. Physical Exam  Vital Signs: BP (!) 153/57   Pulse 61   Temp 97.2 °F (36.2 °C) (Oral)   Resp 16   Ht 5' 6\" (1.676 m)   Wt 149 lb 4 oz (67.7 kg)   SpO2 95%   BMI 24.09 kg/m²  O2 Flow Rate (L/min): 1 L/min       General: alert and oriented to person, place and time. Up in chair, No apparent distress. Heart:Normal S1 and S2. Paced rhythm. No murmurs, gallops, or rubs.    Lungs: clear to auscultation bilaterally and diminished breath sounds bibasilar, large amount of ecchymosis over left chest PPm site, goes to left shoulder/upper arm  Abdomen: soft, non tender, slightly distended, BSx4  Extremities: negative      Scheduled Meds:    simethicone  80 mg Oral Q6H    metolazone  10 mg Oral Daily    docusate sodium  100 mg Oral BID    hydrALAZINE  100 mg Oral 3 times per day    lidocaine-EPINEPHrine  20 mL Intradermal Once    lidocaine PF  1 mL Intradermal Once    bumetanide  3 mg Oral BID    vancomycin  1,000 mg Intravenous Once    bacitracin in 0.9 % sodium chloride irrigation  50,000 Units Topical Once    sodium chloride flush  10 mL Intravenous 2 times per day    albumin human  25 g Intravenous Once    aspirin  81 mg Oral Daily    spironolactone  25 mg Oral Daily    metoprolol succinate  50 mg Oral Daily    FLUoxetine  10 mg Oral Daily    levothyroxine  125 mcg Oral Daily    ALPRAZolam  0.25 mg Oral Once    QUEtiapine  25 mg Oral Nightly    clopidogrel  75 mg Oral Daily    isosorbide mononitrate  30 mg Oral Daily    [Held by provider] docusate sodium  100 mg Oral Daily    amiodarone  200 mg Oral Daily    atorvastatin  20 mg Oral Daily     Continuous Infusions:    dextrose 20 mL/hr at 04/06/19 3274

## 2019-04-07 NOTE — PROGRESS NOTES
Care:   · Estimated Length of IV antimicrobials: None  · Patient will need Midline Catheter Insertion:   · Patient will need PICC line Insertion:  · Patient will need: Home IV , [place],  SNF,  LTAC TBD  · Patient will need outpatient wound care: No    Chief complaint/reason for consultation:   · Possible pneumonia      History of Present Illness:   Haris No is a 80y.o.-year-old  male who was initially admitted on 3/6/2019. Patient seen at the request of Dr. Keeley Roman     INITIAL HISTORY:    Pt is a 81 yo gentleman who has recently had multiple hospital admissions in [place] for decompensated heart failure. His symptoms began in December 2018. 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 A cardiac cath at that time showed LM and LAD calcified stenosis 80-90% in multiple areas, with severe disease in D1, D2, Om1 and OM2. Minimal disease in RCA. LV function with EF 25%. Severe aortic stenosis compromising his clinical improvedmnt      He underwent a dual chamber pacemaker placement. Following that, he had multiple hospitalizations for acute heart failure and shortness of breath. His baseline creatinine was 1.3, now >2.0. He has had multiple thoracenteses with transudative pleural fluid removed.      He was brought to [place] for further evaluation and was admitted on 3/6/19     An ECHO from admission showed   Normal left ventricle size with mildly reduced systolic function; Estimated  left ventricular ejection fraction 40-45%  Anterolateral wall hypokinesis. Mild left ventricular hypertrophy. Left atrium is moderately dilated. Grade II diastolic dysfunction. Heavily calcified aortic valve with reduced systolic excursion and evidence  of moderate stenosis. (Peak nataliia 3.62 m/s and mean gradient 29 mmHg)  Moderate posterior pericardial effusion without any echo signs of tamponade. Normal right ventricular size and function.   Pacemaker lead seen in right human  25 g Intravenous Once    aspirin  81 mg Oral Daily    spironolactone  25 mg Oral Daily    metoprolol succinate  50 mg Oral Daily    FLUoxetine  10 mg Oral Daily    levothyroxine  125 mcg Oral Daily    ALPRAZolam  0.25 mg Oral Once    QUEtiapine  25 mg Oral Nightly    clopidogrel  75 mg Oral Daily    isosorbide mononitrate  30 mg Oral Daily    [Held by provider] docusate sodium  100 mg Oral Daily    amiodarone  200 mg Oral Daily    atorvastatin  20 mg Oral Daily       Social History:     Social History     Socioeconomic History    Marital status:       Spouse name: Not on file    Number of children: Not on file    Years of education: Not on file    Highest education level: Not on file   Occupational History     Employer: RETIRED   Social Needs    Financial resource strain: Not on file    Food insecurity:     Worry: Not on file     Inability: Not on file    Transportation needs:     Medical: Not on file     Non-medical: Not on file   Tobacco Use    Smoking status: Former Smoker     Last attempt to quit:      Years since quittin.    Smokeless tobacco: Never Used   Substance and Sexual Activity    Alcohol use: Not Currently    Drug use: Never    Sexual activity: Not on file   Lifestyle    Physical activity:     Days per week: Not on file     Minutes per session: Not on file    Stress: Not on file   Relationships    Social connections:     Talks on phone: Not on file     Gets together: Not on file     Attends [employer] service: Not on file     Active member of club or organization: Not on file     Attends meetings of clubs or organizations: Not on file     Relationship status: Not on file    Intimate partner violence:     Fear of current or ex partner: Not on file     Emotionally abused: Not on file     Physically abused: Not on file     Forced sexual activity: Not on file   Other Topics Concern    Not on file   Social History Narrative    Not on file       Family History:   History reviewed. No pertinent family history. Allergies:   Quinolones     Review of Systems:   AA, reports continued tenderness at pacemaker site  No chills or fevers, no SOB  Seen on HD    Physical Examination :     Patient Vitals for the past 8 hrs:   BP Temp Temp src Pulse Resp SpO2   04/07/19 1613 (!) 131/45 97.7 °F (36.5 °C) Oral 60 -- 99 %   04/07/19 1510 (!) 118/37 -- -- 60 -- --   04/07/19 1118 (!) 139/49 97.8 °F (36.6 °C) Oral 61 16 98 %     General Appearance: Seen in HD, easily roused, no distress  Head:  Normocephalic, no trauma  Eyes: Pupils equal, round, reactive to light and accommodation; extraocular movements intact; sclera anicteric. ENT: Oropharynx clear. Mouth/throat: mucosa pink and moist. No lesions. Dentition in good repair. Neck: Supple, without lymphadenopathy. Pulmonary/Chest: Clear to auscultation, without wheezes, rales, or rhonchi. Decreased breath sounds at bilateral bases   Cardiovascular: Regular rate and rhythm without murmurs, rubs, or gallops. Paced  Abdomen: Soft. Bowel sounds normal.  : Johnston in place  All four Extremities: Mild edema. Neurologic: No gross sensory or motor deficits. Skin: Warm and dry with good turgor. No signs of peripheral arterial or venous insufficiency. No ulcerations. No open wounds. Lt chest pacemaker incision with hematoma, staples intact, no oozing or bleeding noted    Medical Decision Making -Laboratory:   I have independently reviewed/ordered the following labs:    CBC with Differential:   Recent Labs     04/06/19  0617 04/07/19  0526   WBC 9.7 10.7   HGB 7.5* 7.6*   HCT 24.2* 25.1*   PLT 73* 101*   LYMPHOPCT 12* 7*   MONOPCT 6 9     BMP:   Recent Labs     04/06/19  0617 04/07/19  0526    131*   K 3.6* 3.5*   CL 97* 94*   CO2 26 24   BUN 33* 43*   CREATININE 2.48* 2.85*     Hepatic Function Panel:   No results for input(s): PROT, LABALBU, BILIDIR, IBILI, BILITOT, ALKPHOS, ALT, AST in the last 72 hours.   No results for increased.  The   pulmonary edema appears worse on the current exam.           Impression   Worsening edema and increased bilateral pleural effusions         4-1 AXR  EXAMINATION:   SINGLE SUPINE XRAY VIEW(S) OF THE ABDOMEN       4/1/2019 2:40 pm       COMPARISON:   None.       HISTORY:   ORDERING SYSTEM PROVIDED HISTORY: r/o ileus   TECHNOLOGIST PROVIDED HISTORY:   r/o ileus       Initial exam       FINDINGS:   Nonspecific bowel gas pattern with air-filled small and large bowel.  No   organomegaly noted.  No pathologic calcifications.  Degenerative changes of   the lower lumbar spine.           Impression   Nonspecific bowel gas pattern without evidence of small bowel obstruction.          3/29 CXR      Narrative   EXAMINATION:   SINGLE XRAY VIEW OF THE CHEST       3/29/2019 8:26 am       COMPARISON:   Chest radiograph performed 03/28/2019.       HISTORY:   ORDERING SYSTEM PROVIDED HISTORY: effusions   TECHNOLOGIST PROVIDED HISTORY:   effusions       FINDINGS:   There are large bilateral effusions with adjacent consolidation.  There is   underlying pulmonary congestion.  There is no pneumothorax.  The heart is   enlarged with stable cardiac leads.  The upper abdomen is unremarkable.  The   extrathoracic soft tissues are unremarkable.           Impression   Cardiomegaly with large bilateral effusions and adjacent consolidation   concerning for pneumonia.               3/27 CXR    Narrative   EXAMINATION:   TWO VIEWS OF THE CHEST       3/28/2019 7:56 am       COMPARISON:   Chest radiograph performed 03/27/2019.       HISTORY:   ORDERING SYSTEM PROVIDED HISTORY: effusions   TECHNOLOGIST PROVIDED HISTORY:   effusions       FINDINGS:   There is a small left and a large right pleural effusion with adjacent   infiltrate.  There is no pneumothorax.  The mediastinal structures are stable   with stable cardiac leads.  The upper abdomen is unremarkable.  The   extrathoracic soft tissues are unremarkable.           Impression Small left and large right pleural effusion with adjacent infiltrate   representing atelectasis versus pneumonia.  Overall there may be mild   improvement of the right-sided effusion. Narrative   EXAMINATION:   SINGLE XRAY VIEW OF THE CHEST       3/26/2019 2:37 am       COMPARISON:   March 20, 2019.       HISTORY:   ORDERING SYSTEM PROVIDED HISTORY: SOB, PE   TECHNOLOGIST PROVIDED HISTORY:   SOB, PE       FINDINGS:   Frontal portable view of the chest.  Low lung volume.  Progressed moderate to   large bilateral pleural effusions with consolidation.  Indistinct pulmonary   vasculature.  No pneumothorax.  The cardiomediastinal silhouette is not well   evaluated.  Atherosclerotic thoracic aorta.  Left pectoral trans venous   dual-chamber cardiac pacer device.           Impression   Progressed moderate to large bilateral pleural effusions with consolidation. Superimposed infection is not excluded.             Medical Decision Making-Other: Thank you for allowing us to participate in the care of this patient. Please call with questions.     Karina Macias MD

## 2019-04-07 NOTE — PROGRESS NOTES
1 mL Intradermal Once    bumetanide  3 mg Oral BID    vancomycin  1,000 mg Intravenous Once    bacitracin in 0.9 % sodium chloride irrigation  50,000 Units Topical Once    sodium chloride flush  10 mL Intravenous 2 times per day    albumin human  25 g Intravenous Once    aspirin  81 mg Oral Daily    spironolactone  25 mg Oral Daily    metoprolol succinate  50 mg Oral Daily    FLUoxetine  10 mg Oral Daily    levothyroxine  125 mcg Oral Daily    ALPRAZolam  0.25 mg Oral Once    QUEtiapine  25 mg Oral Nightly    clopidogrel  75 mg Oral Daily    isosorbide mononitrate  30 mg Oral Daily    [Held by provider] docusate sodium  100 mg Oral Daily    amiodarone  200 mg Oral Daily    atorvastatin  20 mg Oral Daily     Continuous Infusions:    dextrose 20 mL/hr at 04/06/19 9305     PRN Meds:    Home Meds:                Medications Prior to Admission: amiodarone (CORDARONE) 200 MG tablet, Take 200 mg by mouth daily  atorvastatin (LIPITOR) 20 MG tablet, Take 20 mg by mouth daily  isosorbide mononitrate (IMDUR) 60 MG extended release tablet, Take 60 mg by mouth daily  levothyroxine (SYNTHROID) 100 MCG tablet, Take 100 mcg by mouth Daily  lisinopril (PRINIVIL;ZESTRIL) 40 MG tablet, Take 40 mg by mouth daily  apixaban (ELIQUIS) 2.5 MG TABS tablet, Take 2.5 mg by mouth daily  metoprolol tartrate (LOPRESSOR) 50 MG tablet, Take 50 mg by mouth 2 times daily    INVESTIGATIONS     Last 3 CMP:    Recent Labs     04/05/19 0424 04/05/19 2137 04/06/19 0617 04/07/19  0526     --  135 131*   K 3.5* 3.8 3.6* 3.5*   CL 99  --  97* 94*   CO2 23  --  26 24   BUN 54*  --  33* 43*   CREATININE 3.29*  --  2.48* 2.85*   CALCIUM 8.7  --  8.3* 8.2*       Last 3 CBC:  Recent Labs     04/05/19 0424 04/06/19 0617 04/07/19  0526   WBC 8.6 9.7 10.7   RBC 2.60* 2.63* 2.67*   HGB 7.4* 7.5* 7.6*   HCT 23.5* 24.2* 25.1*   MCV 90.4 92.0 94.0   MCH 28.5 28.5 28.5   MCHC 31.5 31.0 30.3   RDW 15.9* 15.9* 15.9*   PLT 92* 73* 101*   MPV 12.4 12.2 12.2       ASSESSMENT     1. Acute kidney injury secondary to ATN from hypoperfusion related to cardiorenal syndrome - worsening. Hemodialysis dependent since 29th of March  2. Chronic kidney disease stage IIIB with baseline creatinine suspected around 2  3. Nephrotic range proteinuria - Bx not feasible in view of he being on Asprin and cardiology recommends not to stop anticoagulant. Explained to patient and he does not want to do that. 4.  Status post TVR  5. Cardiomyopathy ejection fraction 40-45%/left ventricular diastole dysfunction/moderate to severe mitral regurgitation S/p CRT4/3   6. S/p PCI and atherectomy  3/11  7. Recurrent bilateral pleural effusion status post pleural taps  8. HTN  9. Hx of Urinary retention  10. Thrombocytopenia  11. Advanced age    PLAN     4.  HD Intermittently. 2.  Zaroxolyn 10 mg daily with Bumex ordered. Bumex increased to 4 mg  PO BID. 2.  If dialysis to be continued patient will need [facility] before discharge. 4.  Hematology- Hit antibody negative. 5.  Chances of renal recovery slim  6. Will follow. Aiden Kapoor MD  PGY-3, Nephrology resident  [facility] [place]    Attending Physician Statement  I have discussed the care of Chelle Alan, including pertinent history and exam findings with the resident/fellow. I have reviewed the key elements of all parts of the encounter with the resident/fellow. I have seen and examined the patient with the resident/fellow. I agree with the assessment and plan and status of the problem list as documented.     I have discussed with Dr Silvina Vo as well     Les Montejo MD

## 2019-04-07 NOTE — FLOWSHEET NOTE
DATE: 2019    NAME: Adam Dumont  MRN: [id number]   : 12/10/1928    Patient not seen this date for Physical Therapy due to:  [] Blood transfusion in progress  [] Hemodialysis  [x]  Patient Declined: pt adamantly refusing any PT this date, \"too tired\", RN notified  [] Spine Precautions   [] Strict Bedrest  [] Surgery/ Procedure  [] Testing      [] Other        [] PT being discontinued at this time. Patient independent. No further needs. [] PT being discontinued at this time as the patient has been transferred to palliative care. No further needs.     Sohail Del Toro, PTA

## 2019-04-07 NOTE — PROGRESS NOTES
Pulmonary Progress Note    CC:  chf   Subjective:  No acute events     Review of Systems -  General ROS: fatigue  ENT ROS: negative for - headaches, oral lesions or sore throat  Cardiovascular ROS: no chest pain , +orthopnea   Gastrointestinal ROS: no abdominal pain, change in bowel habits, or black or bloody stools  Skin - no rash   Neuro - no blurry vision , no loc . No focal weakness   msk - no jt tenderness or swelling    Vascular - no claudication , rest completed and negative   Lymphatic - complete and negative   Hematology - oncology - complete and negative   Allergy immunology - complete and negative    no burning or hematuria      Immunization     There is no immunization history on file for this patient. Pneumococcal Vaccine     [] Up to date    [x] Indicated   [] Refused  [] Contraindicated       Influenza Vaccine   [] Up to date    [x] Indicated   [] Refused  [] Contraindicated     PAST MEDICAL HISTORY:       Diagnosis Date    Aortic stenosis 02/15/2019    Atrial fibrillation ([address]     CAD (coronary artery disease)     Chest pain 02/15/2019    CHF (congestive heart failure) (HCC)     Chronic anemia     Chronic kidney disease     Hypertension     Pleural effusion on left 02/15/2019    Pleural effusion, right 02/15/2019    Pulmonary emboli ([address] 02/15/2019    Pulmonary hypertension ([address] 03/06/2019    Thyroid disease     hypothyroidism         Family History:   History reviewed. No pertinent family history. SURGICAL HISTORY:   Past Surgical History:   Procedure Laterality Date    CORONARY ANGIOPLASTY WITH STENT PLACEMENT  03/11/2019    complex PCI of LT MAIN, LAD    JOINT REPLACEMENT      right ankle    OTHER SURGICAL HISTORY  04/02/2019    UPGRADE TO BI-V PACEMAKER    OTHER SURGICAL HISTORY  03/15/2019    TAVR PROCEDURE    PACEMAKER PLACEMENT  01/20/2019    THORACENTESIS                TOBACCO:   reports that he quit smoking about 30 years ago.  He has never used smokeless tobacco.  ETOH:   reports that he drank alcohol. ALLERGIES:    Allergies   Allergen Reactions    Quinolones      Pt is allergic to fluoroquinolones       Home Meds:   Prior to Admission medications    Medication Sig Start Date End Date Taking? Authorizing Provider   amiodarone (CORDARONE) 200 MG tablet Take 200 mg by mouth daily   Yes Historical Provider, MD   atorvastatin (LIPITOR) 20 MG tablet Take 20 mg by mouth daily   Yes Historical Provider, MD   isosorbide mononitrate (IMDUR) 60 MG extended release tablet Take 60 mg by mouth daily   Yes Historical Provider, MD   levothyroxine (SYNTHROID) 100 MCG tablet Take 100 mcg by mouth Daily   Yes Historical Provider, MD   lisinopril (PRINIVIL;ZESTRIL) 40 MG tablet Take 40 mg by mouth daily   Yes Historical Provider, MD   apixaban (ELIQUIS) 2.5 MG TABS tablet Take 2.5 mg by mouth daily   Yes Historical Provider, MD   metoprolol tartrate (LOPRESSOR) 50 MG tablet Take 50 mg by mouth 2 times daily   Yes Historical Provider, MD         Intake/Output Summary (Last 24 hours) at 4/7/2019 1540  Last data filed at 4/7/2019 1400  Gross per 24 hour   Intake 642 ml   Output 467 ml   Net 175 ml         Diet   DIET CARDIAC; Low Sodium (2 GM); Daily Fluid Restriction: 1800 ml  Dietary Nutrition Supplements:  Dietary Nutrition Supplements: Frozen Oral Supplement    Vitals:   BP (!) 118/37   Pulse 60   Temp 97.8 °F (36.6 °C) (Oral)   Resp 16   Ht 5' 6\" (1.676 m)   Wt 154 lb 5.2 oz (70 kg)   SpO2 98%   BMI 24.91 kg/m²  on         I/O (24 Hours)    Patient Vitals for the past 8 hrs:   BP Temp Temp src Pulse Resp SpO2   04/07/19 1510 (!) 118/37 -- -- 60 -- --   04/07/19 1118 (!) 139/49 97.8 °F (36.6 °C) Oral 61 16 98 %   04/07/19 0803 -- -- -- -- -- 98 %       Intake/Output Summary (Last 24 hours) at 4/7/2019 1540  Last data filed at 4/7/2019 1400  Gross per 24 hour   Intake 642 ml   Output 467 ml   Net 175 ml     I/O last 3 completed shifts:   In: 295 [P.O.:400; mononitrate  30 mg Oral Daily    [Held by provider] docusate sodium  100 mg Oral Daily    amiodarone  200 mg Oral Daily    atorvastatin  20 mg Oral Daily       Continuous Infusions:   dextrose 20 mL/hr at 04/06/19 1858       PRN Meds:      Labs:  CBC:   Recent Labs     04/05/19 0424 04/06/19 0617 04/07/19  0526   WBC 8.6 9.7 10.7   HGB 7.4* 7.5* 7.6*   HCT 23.5* 24.2* 25.1*   MCV 90.4 92.0 94.0   PLT 92* 73* 101*     BMP:   Recent Labs     04/05/19 0424 04/05/19 2137 04/06/19 0617 04/07/19  0526     --  135 131*   K 3.5* 3.8 3.6* 3.5*   CL 99  --  97* 94*   CO2 23  --  26 24   BUN 54*  --  33* 43*   CREATININE 3.29*  --  2.48* 2.85*     LIVER PROFILE:   No results for input(s): AST, ALT, LIPASE, BILIDIR, BILITOT, ALKPHOS in the last 72 hours. Invalid input(s): AMYLASE,  ALB  PT/INR:   No results for input(s): PROTIME, INR in the last 72 hours. APTT:   No results for input(s): APTT in the last 72 hours. UA:  No results for input(s): NITRITE, COLORU, PHUR, LABCAST, WBCUA, RBCUA, MUCUS, TRICHOMONAS, YEAST, BACTERIA, CLARITYU, SPECGRAV, LEUKOCYTESUR, UROBILINOGEN, BILIRUBINUR, BLOODU, GLUCOSEU, AMORPHOUS in the last 72 hours. Invalid input(s): KETONESU  No results for input(s): PHART, [id number], PO2ART in the last 72 hours. 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  *  Multi-vessel coronary artery disease post PCI  Severe aortic stenosis, post TAVR  Atrial fibrillation  Small right middle lobe pulmonary embolism.   Per chart, from imaging studies done in [place]  Thrombocytopenia - hit negative     Plan:    Continue hemodialysis per nephrology  Cont incentive spirometry and deep breathing   Encourage physical therapy    Electronically signed by Arthur Brooks MD on 4/7/2019 at 3:40 PM

## 2019-04-07 NOTE — FLOWSHEET NOTE
Refused PT  Up to bathroom . Has had 2 sm soft stools.  \"Stomach feels better:    Am care completed as pt will allow writer to do    Bumex increased this ashley

## 2019-04-07 NOTE — FLOWSHEET NOTE
Pt has tolerated meds with applesauce. However he refuses any solid food . Reassurance offered to him as he discusses his care and treatment. Has been up with walker to [facility] and has had 2 soft/loose brown stools. REFUSES suppository. Johnston has drained small amt murray/uine  Toperates apple sauce/juice. H20

## 2019-04-07 NOTE — PROGRESS NOTES
CONSULT NOTE    PCP: No primary care provider on file. Referring Provider: Raffi Holm MD    VISIT DIAGNOSIS:  The encounter diagnosis was Pulmonary hypertension ([address] CC: Thrombocytopenia   Initial presentation :  Jessica Elam is a very pleasant 80 y.o. Elli Hall has significant cardiac history with multivessel CAD, CHF, pacemaker recently moved from [place] to be near relatives admitted for increase sob with exertion and cardiac issues. Heme onc consulted for thrombocytopenia, platelets on admission were normal around 200s and have beed declining to 57K and today are 90K. No SQ heparin however did have heparin flushes and has been in and out of [facility] recently. S:     Denies new complaint or interval even. Platelet count improved to 101,000. No bleeding noted. Patient says he feels the same not any better. Denies chest pain or shortness of breath.       PAST MEDICAL HISTORY:      Diagnosis Date    Aortic stenosis 02/15/2019    Atrial fibrillation (HCC)     CAD (coronary artery disease)     Chest pain 02/15/2019    CHF (congestive heart failure) (HCC)     Chronic anemia     Chronic kidney disease     Hypertension     Pleural effusion on left 02/15/2019    Pleural effusion, right 02/15/2019    Pulmonary emboli ([address] 02/15/2019    Pulmonary hypertension ([address] 03/06/2019    Thyroid disease     hypothyroidism       PAST SURGICAL HISTORY:      Procedure Laterality Date    CORONARY ANGIOPLASTY WITH STENT PLACEMENT  03/11/2019    complex PCI of LT MAIN, LAD    JOINT REPLACEMENT      right ankle    OTHER SURGICAL HISTORY  04/02/2019    UPGRADE TO BI-V PACEMAKER    OTHER SURGICAL HISTORY  03/15/2019    TAVR PROCEDURE    PACEMAKER PLACEMENT  01/20/2019    THORACENTESIS         CURRENT MEDICATIONS:   Current Facility-Administered Medications   Medication Dose Route Frequency Provider Last Rate Last Dose    bumetanide (BUMEX) tablet 4 mg  4 mg Oral BID Anna Trujillo MD  [START ON 4/8/2019] predniSONE (DELTASONE) tablet 40 mg  40 mg Oral Daily Howie Vila MD        [facility]) chewable tablet 80 mg  80 mg Oral Q6H Howie Vila MD   80 mg at 04/07/19 1604    metolazone (ZAROXOLYN) tablet 10 mg  10 mg Oral Daily George Charles APRN - CNP   10 mg at 04/07/19 0929    dextrose 5 % solution   Intravenous Continuous Howie Vila MD 20 mL/hr at 04/06/19 1858      docusate sodium (COLACE) capsule 100 mg  100 mg Oral BID FRANCIS Garcias - CNP   100 mg at 04/07/19 0900    bisacodyl (DULCOLAX) suppository 10 mg  10 mg Rectal Daily PRN Cookie Shipley APRN - CNP        hydrALAZINE (APRESOLINE) tablet 100 mg  100 mg Oral 3 times per day Usha Asencio MD   100 mg at 04/07/19 1604    lidocaine-EPINEPHrine 1 percent-1:[id number] injection 20 mL  20 mL Intradermal Once Javier Alonzo MD        HYDROcodone-acetaminophen (Manuel Gonzalo) 5-325 MG per tablet 1 tablet  1 tablet Oral Q8H PRN Howie Vila MD   1 tablet at 04/03/19 0836    lidocaine PF 1 % injection 1 mL  1 mL Intradermal Once FRANCIS Garcias - CNP        vancomycin (VANCOCIN) 1000 mg in dextrose 5% 200 mL IVPB  1,000 mg Intravenous Once Javier Alonzo MD        bacitracin 50,000 Units in sodium chloride 0.9 % 1,000 mL irrigation  50,000 Units Topical Once Javier Alonzo MD        sodium chloride flush 0.9 % injection 10 mL  10 mL Intravenous 2 times per day Javier Alonzo MD   10 mL at 04/07/19 0939    sodium chloride flush 0.9 % injection 10 mL  10 mL Intravenous PRN Javier Alonzo MD        acetaminophen (TYLENOL) tablet 650 mg  650 mg Oral Q4H PRN Javier Alonzo MD        aspirin suppository 600 mg  600 mg Rectal Q6H PRN Javier Alonzo MD        0.9 % sodium chloride bolus  250 mL Intravenous PRN Javier Alonzo MD        0.9 % sodium chloride bolus  150 mL Intravenous PRN Javier Alonzo MD        albumin human 25 % solution 25 g  25 g Intravenous Once Janelle Roger Munoz MD        heparin (porcine) injection 1,400 Units  1,400 Units Intercatheter PRN Corey Kumar MD   1,400 Units at 04/05/19 1428    heparin (porcine) injection 1,500 Units  1,500 Units Intercatheter PRN Corey Kumar MD   1,500 Units at 04/05/19 1429    aspirin EC tablet 81 mg  81 mg Oral Daily Janelle Lamar MD   81 mg at 04/07/19 0948    spironolactone (ALDACTONE) tablet 25 mg  25 mg Oral Daily Corey Kumar MD   25 mg at 04/07/19 2192    metoprolol succinate (TOPROL XL) extended release tablet 50 mg  50 mg Oral Daily Corey Kumar MD   50 mg at 04/07/19 0936    hydrALAZINE (APRESOLINE) injection 10 mg  10 mg Intravenous Q4H PRN Janelle Lamar MD        FLUoxetine (PROZAC) capsule 10 mg  10 mg Oral Daily Coery Kumar MD   10 mg at 04/07/19 0941    ondansetron (ZOFRAN) injection 4 mg  4 mg Intravenous Q6H PRN Janelle Lamar MD   4 mg at 03/27/19 1904    levothyroxine (SYNTHROID) tablet 125 mcg  125 mcg Oral Daily Corey Kumar MD   125 mcg at 04/07/19 0620    ALPRAZolam Shan Yeimi) tablet 0.25 mg  0.25 mg Oral Once Corey Kumar MD   Stopped at 03/15/19 1733    QUEtiapine (SEROQUEL) tablet 25 mg  25 mg Oral Nightly Corey Kumar MD   25 mg at 04/06/19 2116    magnesium hydroxide (MILK OF MAGNESIA) 400 MG/5ML suspension 30 mL  30 mL Oral Daily PRN Corey Kumar MD   30 mL at 03/25/19 0910    clopidogrel (PLAVIX) tablet 75 mg  75 mg Oral Daily Corey Kumar MD   75 mg at 04/07/19 7691    isosorbide mononitrate (IMDUR) extended release tablet 30 mg  30 mg Oral Daily Corey Kumar MD   30 mg at 04/07/19 0929    [Held by provider] docusate sodium (COLACE) capsule 100 mg  100 mg Oral Daily Latasha Quiroz MD   100 mg at 03/31/19 1026    amiodarone (CORDARONE) tablet 200 mg  200 mg Oral Daily Corey Kumar MD   200 mg at 04/07/19 0930    atorvastatin (LIPITOR) tablet 20 mg  20 mg Oral Daily Corey Kumar MD   20 mg at 04/07/19 0932 ALLERGIES:   Allergies   Allergen Reactions    Quinolones      Pt is allergic to fluoroquinolones       FAMILY HISTORY:   History reviewed. No pertinent family history. SOCIAL HISTORY:  Social History     Tobacco Use    Smoking status: Former Smoker     Last attempt to quit:      Years since quittin.2    Smokeless tobacco: Never Used   Substance Use Topics    Alcohol use: Not Currently    Drug use: Never       REVIEW OF SYSTEMS:   Review of Systems   Constitutional: Positive for fatigue. HENT: Negative. Eyes: Negative. Respiratory: Negative. Cardiovascular: Negative. Gastrointestinal: Negative. Endocrine: Negative. Genitourinary: Negative. Musculoskeletal: Negative. Skin: Negative. Allergic/Immunologic: Negative. Neurological: Negative. Hematological: Negative. Psychiatric/Behavioral: Negative. PHYSICAL EXAM:   Vitals:    19 1613   BP: (!) 131/45   Pulse: 60   Resp:    Temp: 97.7 °F (36.5 °C)   SpO2: 99%       Physical Exam   Constitutional: He is oriented to person, place, and time. He appears well-developed and well-nourished. HENT:   Head: Normocephalic and atraumatic. Eyes: Pupils are equal, round, and reactive to light. EOM are normal.   Neck: Normal range of motion. Cardiovascular:   S1S2   Pulmonary/Chest:   Diminished b/l    Abdominal: Soft. Bowel sounds are normal.   Musculoskeletal: Normal range of motion. Neurological: He is alert and oriented to person, place, and time. Skin: Skin is warm and dry. LABS:   Results for orders placed or performed during the hospital encounter of 19   Body Fluid Culture   Result Value Ref Range    Specimen Description . THORACENTESIS FLUID RIGHT     Special Requests NOT REPORTED     Direct Exam MANY NEUTROPHILS (A)     Direct Exam MANY MONONUCLEAR WHITE BLOOD CELLS SEEN (A)     Direct Exam NO BACTERIA SEEN     Direct Exam       Gram stain made from cytocentrifuged specimen. Organisms and cells will be concentrated. Culture NO GROWTH 7 DAYS    Gram stain   Result Value Ref Range    Specimen Description . OTHER     Special Requests NOT REPORTED     Direct Exam NO SAMPLE RECEIVED    Culture, Body Fluid   Result Value Ref Range    Specimen Description . OTHER     Special Requests NOT REPORTED     Direct Exam NO SAMPLE RECEIVED     Culture NOT REPORTED    CBC   Result Value Ref Range    WBC 12.7 (H) 3.5 - 11.3 k/uL    RBC 3.19 (L) 4.21 - 5.77 m/uL    Hemoglobin 9.2 (L) 13.0 - 17.0 g/dL    Hematocrit 30.3 (L) 40.7 - 50.3 %    MCV 95.0 82.6 - 102.9 fL    MCH 28.8 25.2 - 33.5 pg    MCHC 30.4 28.4 - 34.8 g/dL    RDW 15.0 (H) 11.8 - 14.4 %    Platelets 683 854 - 910 k/uL    MPV 11.5 8.1 - 13.5 fL    NRBC Automated 0.0 0.0 per 100 WBC   BASIC METABOLIC PANEL   Result Value Ref Range    Glucose 124 (H) 70 - 99 mg/dL     (HH) 8 - 23 mg/dL    CREATININE 2.00 (H) 0.70 - 1.20 mg/dL    Bun/Cre Ratio NOT REPORTED 9 - 20    Calcium 8.8 8.6 - 10.4 mg/dL    Sodium 139 135 - 144 mmol/L    Potassium 4.7 3.7 - 5.3 mmol/L    Chloride 103 98 - 107 mmol/L    CO2 23 20 - 31 mmol/L    Anion Gap 13 9 - 17 mmol/L    GFR Non-African American 32 (L) >60 mL/min    GFR  38 (L) >60 mL/min    GFR Comment          GFR Staging NOT REPORTED    MAGNESIUM   Result Value Ref Range    Magnesium 2.6 1.6 - 2.6 mg/dL   Brain Natriuretic Peptide   Result Value Ref Range    Pro-BNP 16,969 (H) <300 pg/mL    BNP Interpretation         PROTIME-INR   Result Value Ref Range    Protime 11.5 9.0 - 12.0 sec    INR 1.1    APTT   Result Value Ref Range    PTT 26.1 20.5 - 30.5 sec   T3, FREE   Result Value Ref Range    T3, Free 1.35 (L) 2.02 - 4.43 pg/mL   T4, FREE   Result Value Ref Range    Thyroxine, Free 1.37 0.93 - 1.70 ng/dL   TSH without Reflex   Result Value Ref Range    TSH 6.79 (H) 0.30 - 5.00 mIU/L   URINALYSIS   Result Value Ref Range    Color, UA YELLOW YELLOW    Turbidity UA CLEAR CLEAR    Glucose, Ur NEGATIVE NEGATIVE    Bilirubin Urine NEGATIVE NEGATIVE    Ketones, Urine NEGATIVE NEGATIVE    Specific Gravity, UA 1.016 1.005 - 1.030    Urine Hgb NEGATIVE NEGATIVE    pH, UA 6.0 5.0 - 8.0    Protein, UA 3+ (A) NEGATIVE    Urobilinogen, Urine Normal Normal    Nitrite, Urine NEGATIVE NEGATIVE    Leukocyte Esterase, Urine NEGATIVE NEGATIVE    Urinalysis Comments NOT REPORTED    Microscopic Urinalysis   Result Value Ref Range    -          WBC, UA None 0 - 5 /HPF    RBC, UA 0 TO 2 0 - 2 /HPF    Casts UA NOT REPORTED 0 - 2 /LPF    Crystals UA NOT REPORTED None /HPF    Epithelial Cells UA None 0 - 5 /HPF    Renal Epithelial, Urine NOT REPORTED 0 /HPF    Bacteria, UA NOT REPORTED None    Mucus, UA NOT REPORTED None    Trichomonas, UA NOT REPORTED None    Amorphous, UA NOT REPORTED None    Other Observations UA NOT REPORTED NOT REQ. Yeast, UA NOT REPORTED None   PROTEIN, BODY FLUID   Result Value Ref Range    Specimen Type . THORACENTESIS FLUID     Total Protein, Body Fluid 1.8 g/dL   Electrophoresis Protein, Serum without Reflex to Immunofixation   Result Value Ref Range    Total Protein 4.7 (L) 6.4 - 8.3 g/dL    Albumin (calculated) 2.5 (L) 3.2 - 5.2 g/dL    Albumin % 53 45 - 65 %    Alpha-1-Globulin 0.3 0.1 - 0.4 g/dL    Alpha 1 % 7 (H) 3 - 6 %    Alpha-2-Globulin 0.8 0.5 - 0.9 g/dL    Alpha 2 % 18 (H) 6 - 13 %    Beta Globulin 0.7 0.5 - 1.1 g/dL    Beta Percent 14 11 - 19 %    Gamma Globulin 0.4 (L) 0.5 - 1.5 g/dL    Gamma Globulin % 8 (L) 9 - 20 %    Total Prot. Sum 4.7 (L) 6.3 - 8.2 g/dL    Total Prot. Sum,% 100 98 - 102 %    Protein Electrophoresis, Serum       TOTAL PROTEIN IS DECREASED WITH A NORMAL ELECTROPHORETIC PATTERN. MAY BE OBSERVED WITH IATROGENIC AND/OR PATHOLOGIC DILUTIONAL STATES, NONSELECTIVE PROTEIN LOSS (GI OR RENAL), AND PROTEIN LOSS INTO EFFUSIONS. Pathologist ELECTRONICALLY SIGNED. Rosalia Donnelyl M.D.     [place]/Lambda Free Lt Chains, Serum Quant   Result Value Ref Range    Kappa Free Light Chains QNT 2.69 (H) 0.37 - 1.94 mg/dL    Lambda Free Light Chains QNT 2.46 0.57 - 2.63 mg/dL    Free Kappa/Lambda Ratio 1.09 0.26 - 1.65   Body Fluid Cell Count with Differential   Result Value Ref Range    Color, Fluid NOT REPORTED     Appearance, Fluid NOT REPORTED     WBC, Fluid 465 /mm3    RBC, Fluid 17,000 /mm3    Specimen Type . THORACENTESIS FLUID     Neutrophil Count, Fluid 22 %    Lymphocytes, Body Fluid 22 %    Monocyte Count, Fluid  %    Eos, Fluid  0 %    Basos, Fluid  0 %    Other Cells, Fluid MONOCYTES %    Fluid Diff Comment     Glucose, Body Fluid   Result Value Ref Range    Specimen Type . THORACENTESIS FLUID     Glucose, Fluid 149 mg/dL   Lactate Dehydrogenase, Body Fluid   Result Value Ref Range    Specimen Type . THORACENTESIS FLUID     LD, Fluid 95 U/L   pH, Body Fluid   Result Value Ref Range    Specimen Type . THORACENTESIS FLUID     pH, Fluid 7.5    Cytology, Non-Gyn   Result Value Ref Range    Specimen Description NOT REPORTED     Case Number: [id number]    Protein / creatinine ratio, urine   Result Value Ref Range    Total Protein, Urine 217 mg/dL    Creatinine, Ur 56.2 39.0 - 259.0 mg/dL    Urine Total Protein Creatinine Ratio 3.86 (H) 0.00 - 0.20   Surgical Pathology   Result Value Ref Range    Surgical Pathology Report       [id number]  [place] [address]. [place], [address]  [phone]  Fax: [phone]    [address] [place] [address]     Patient Name: Mic Ricardo: [id number]  Path Number: [id number]  Collected: 3/7/2019  Received: 3/7/2019  Reported: 3/8/2019 11:22    -- Diagnosis --    THORACENTESIS FLUID RIGHT:   NEGATIVE FOR MALIGNANCY. CHUCK Bautista  **Electronically Signed Out**         rdd/3/8/2019       Clinical Information  No information given.       Source of Specimen  1: THORACENTESIS FLUID RIGHT    Gross Description  \"RT THORA\" 1000.0 ml. red cloudy fluid.      MICROSCOPIC DESCRIPTION     Microscopic examination performed.          CALCIUM, IONIZED   Result Value Ref Range    Calcium, Ion 1.15 1.13 - 1.33 mmol/L   Basic Metabolic Panel   Result Value Ref Range    Glucose 133 (H) 70 - 99 mg/dL     (HH) 8 - 23 mg/dL    CREATININE 2.30 (H) 0.70 - 1.20 mg/dL    Bun/Cre Ratio NOT REPORTED 9 - 20    Calcium 8.3 (L) 8.6 - 10.4 mg/dL    Sodium 139 135 - 144 mmol/L    Potassium 4.2 3.7 - 5.3 mmol/L    Chloride 104 98 - 107 mmol/L    CO2 21 20 - 31 mmol/L    Anion Gap 14 9 - 17 mmol/L    GFR Non-African American 27 (L) >60 mL/min    GFR  33 (L) >60 mL/min    GFR Comment          GFR Staging NOT REPORTED    Magnesium   Result Value Ref Range    Magnesium 2.5 1.6 - 2.6 mg/dL   Phosphorus   Result Value Ref Range    Phosphorus 4.3 2.5 - 4.5 mg/dL   BASIC METABOLIC PANEL   Result Value Ref Range    Glucose 97 70 - 99 mg/dL    BUN 97 (HH) 8 - 23 mg/dL    CREATININE 2.15 (H) 0.70 - 1.20 mg/dL    Bun/Cre Ratio NOT REPORTED 9 - 20    Calcium 8.2 (L) 8.6 - 10.4 mg/dL    Sodium 140 135 - 144 mmol/L    Potassium 4.6 3.7 - 5.3 mmol/L    Chloride 104 98 - 107 mmol/L    CO2 25 20 - 31 mmol/L    Anion Gap 11 9 - 17 mmol/L    GFR Non-African American 29 (L) >60 mL/min    GFR  35 (L) >60 mL/min    GFR Comment          GFR Staging NOT REPORTED    MAGNESIUM   Result Value Ref Range    Magnesium 2.5 1.6 - 2.6 mg/dL   CALCIUM, IONIZED   Result Value Ref Range    Calcium, Ion 1.13 1.13 - 1.33 mmol/L   BASIC METABOLIC PANEL   Result Value Ref Range    Glucose 103 (H) 70 - 99 mg/dL    BUN 93 (HH) 8 - 23 mg/dL    CREATININE 2.12 (H) 0.70 - 1.20 mg/dL    Bun/Cre Ratio NOT REPORTED 9 - 20    Calcium 8.0 (L) 8.6 - 10.4 mg/dL    Sodium 138 135 - 144 mmol/L    Potassium 4.2 3.7 - 5.3 mmol/L    Chloride 105 98 - 107 mmol/L    CO2 23 20 - 31 mmol/L    Anion Gap 10 9 - 17 mmol/L    GFR Non-African American 29 (L) >60 mL/min    GFR  36 (L) >60 mL/min GFR Comment          GFR Staging NOT REPORTED    Brain Natriuretic Peptide   Result Value Ref Range    Pro-BNP 14,280 (H) <300 pg/mL    BNP Interpretation         ALBUMIN   Result Value Ref Range    Alb 2.3 (L) 3.5 - 5.2 g/dL   PREALBUMIN   Result Value Ref Range    Prealbumin 9.4 (L) 20 - 40 mg/dL   Magnesium   Result Value Ref Range    Magnesium 2.4 1.6 - 2.6 mg/dL   BASIC METABOLIC PANEL   Result Value Ref Range    Glucose 106 (H) 70 - 99 mg/dL    BUN 89 (H) 8 - 23 mg/dL    CREATININE 2.07 (H) 0.70 - 1.20 mg/dL    Bun/Cre Ratio NOT REPORTED 9 - 20    Calcium 8.0 (L) 8.6 - 10.4 mg/dL    Sodium 140 135 - 144 mmol/L    Potassium 4.7 3.7 - 5.3 mmol/L    Chloride 105 98 - 107 mmol/L    CO2 24 20 - 31 mmol/L    Anion Gap 11 9 - 17 mmol/L    GFR Non-African American 30 (L) >60 mL/min    GFR  37 (L) >60 mL/min    GFR Comment          GFR Staging NOT REPORTED    CBC WITH AUTO DIFFERENTIAL   Result Value Ref Range    WBC 9.9 3.5 - 11.3 k/uL    RBC 3.27 (L) 4.21 - 5.77 m/uL    Hemoglobin 9.3 (L) 13.0 - 17.0 g/dL    Hematocrit 30.6 (L) 40.7 - 50.3 %    MCV 93.6 82.6 - 102.9 fL    MCH 28.4 25.2 - 33.5 pg    MCHC 30.4 28.4 - 34.8 g/dL    RDW 14.8 (H) 11.8 - 14.4 %    Platelets 026 852 - 243 k/uL    MPV 11.4 8.1 - 13.5 fL    NRBC Automated 0.0 0.0 per 100 WBC    Differential Type NOT REPORTED     WBC Morphology NOT REPORTED     RBC Morphology NOT REPORTED     Platelet Estimate NOT REPORTED     Immature Granulocytes 0 0 %    Seg Neutrophils 86 (H) 36 - 66 %    Lymphocytes 5 (L) 24 - 44 %    Monocytes 7 1 - 7 %    Eosinophils % 2 1 - 4 %    Basophils 0 0 - 2 %    Absolute Immature Granulocyte 0.00 0.00 - 0.30 k/uL    Segs Absolute 8.51 (H) 1.8 - 7.7 k/uL    Absolute Lymph # 0.50 (L) 1.0 - 4.8 k/uL    Absolute Mono # 0.69 0.1 - 0.8 k/uL    Absolute Eos # 0.20 0.0 - 0.4 k/uL    Basophils # 0.00 0.0 - 0.2 k/uL    Morphology ANISOCYTOSIS PRESENT    BASIC METABOLIC PANEL   Result Value Ref Range    Glucose 100 (H) 70 - 99 mg/dL    BUN 91 (HH) 8 - 23 mg/dL    CREATININE 2.03 (H) 0.70 - 1.20 mg/dL    Bun/Cre Ratio NOT REPORTED 9 - 20    Calcium 8.1 (L) 8.6 - 10.4 mg/dL    Sodium 142 135 - 144 mmol/L    Potassium 4.3 3.7 - 5.3 mmol/L    Chloride 106 98 - 107 mmol/L    CO2 24 20 - 31 mmol/L    Anion Gap 12 9 - 17 mmol/L    GFR Non-African American 31 (L) >60 mL/min    GFR  38 (L) >60 mL/min    GFR Comment          GFR Staging NOT REPORTED    PROTIME-INR   Result Value Ref Range    Protime 11.3 9.0 - 12.0 sec    INR 1.1    BASIC METABOLIC PANEL   Result Value Ref Range    Glucose 138 (H) 70 - 99 mg/dL    BUN 83 (H) 8 - 23 mg/dL    CREATININE 1.89 (H) 0.70 - 1.20 mg/dL    Bun/Cre Ratio NOT REPORTED 9 - 20    Calcium 8.5 (L) 8.6 - 10.4 mg/dL    Sodium 140 135 - 144 mmol/L    Potassium 4.4 3.7 - 5.3 mmol/L    Chloride 103 98 - 107 mmol/L    CO2 24 20 - 31 mmol/L    Anion Gap 13 9 - 17 mmol/L    GFR Non-African American 34 (L) >60 mL/min    GFR  41 (L) >60 mL/min    GFR Comment          GFR Staging NOT REPORTED    CBC   Result Value Ref Range    WBC 21.0 (H) 3.5 - 11.3 k/uL    RBC 3.46 (L) 4.21 - 5.77 m/uL    Hemoglobin 9.9 (L) 13.0 - 17.0 g/dL    Hematocrit 31.8 (L) 40.7 - 50.3 %    MCV 91.9 82.6 - 102.9 fL    MCH 28.6 25.2 - 33.5 pg    MCHC 31.1 28.4 - 34.8 g/dL    RDW 14.8 (H) 11.8 - 14.4 %    Platelets 032 515 - 785 k/uL    MPV 11.0 8.1 - 13.5 fL    NRBC Automated 0.0 0.0 per 100 WBC   Urinalysis Reflex to Culture   Result Value Ref Range    Color, UA YELLOW YELLOW    Turbidity UA CLEAR CLEAR    Glucose, Ur NEGATIVE NEGATIVE    Bilirubin Urine NEGATIVE NEGATIVE    Ketones, Urine NEGATIVE NEGATIVE    Specific Gravity, UA 1.024 1.005 - 1.030    Urine Hgb NEGATIVE NEGATIVE    pH, UA 5.0 5.0 - 8.0    Protein, UA 2+ (A) NEGATIVE    Urobilinogen, Urine Normal Normal    Nitrite, Urine NEGATIVE NEGATIVE    Leukocyte Esterase, Urine NEGATIVE NEGATIVE    Urinalysis Comments NOT REPORTED    Microscopic Urinalysis   Result Value Ref Range    -          WBC, UA None 0 - 5 /HPF    RBC, UA 0 TO 2 0 - 4 /HPF    Casts UA  0 - 8 /LPF     0 TO 2 HYALINE Reference range defined for non-centrifuged specimen. Crystals UA NOT REPORTED None /HPF    Epithelial Cells UA None 0 - 5 /HPF    Renal Epithelial, Urine NOT REPORTED 0 /HPF    Bacteria, UA NOT REPORTED None    Mucus, UA NOT REPORTED None    Trichomonas, UA NOT REPORTED None    Amorphous, UA NOT REPORTED None    Other Observations UA NOT REPORTED NOT REQ.     Yeast, UA NOT REPORTED None   BASIC METABOLIC PANEL   Result Value Ref Range    Glucose 125 (H) 70 - 99 mg/dL    BUN 93 (HH) 8 - 23 mg/dL    CREATININE 2.80 (H) 0.70 - 1.20 mg/dL    Bun/Cre Ratio NOT REPORTED 9 - 20    Calcium 8.2 (L) 8.6 - 10.4 mg/dL    Sodium 138 135 - 144 mmol/L    Potassium 4.2 3.7 - 5.3 mmol/L    Chloride 104 98 - 107 mmol/L    CO2 19 (L) 20 - 31 mmol/L    Anion Gap 15 9 - 17 mmol/L    GFR Non-African American 21 (L) >60 mL/min    GFR  26 (L) >60 mL/min    GFR Comment          GFR Staging NOT REPORTED    Protein, urine, random   Result Value Ref Range    Total Protein, Urine 195 mg/dL   Creatinine, Random Urine   Result Value Ref Range    Creatinine, Ur 210.1 39.0 - 259.0 mg/dL   BASIC METABOLIC PANEL   Result Value Ref Range    Glucose 122 (H) 70 - 99 mg/dL    BUN 95 (HH) 8 - 23 mg/dL    CREATININE 3.38 (H) 0.70 - 1.20 mg/dL    Bun/Cre Ratio NOT REPORTED 9 - 20    Calcium 8.0 (L) 8.6 - 10.4 mg/dL    Sodium 137 135 - 144 mmol/L    Potassium 4.3 3.7 - 5.3 mmol/L    Chloride 103 98 - 107 mmol/L    CO2 19 (L) 20 - 31 mmol/L    Anion Gap 15 9 - 17 mmol/L    GFR Non-African American 17 (L) >60 mL/min    GFR  21 (L) >60 mL/min    GFR Comment          GFR Staging NOT REPORTED    Electrolyte Panel   Result Value Ref Range    Sodium 136 135 - 144 mmol/L    Potassium 4.4 3.7 - 5.3 mmol/L    Chloride 103 98 - 107 mmol/L    CO2 18 (L) 20 - 31 mmol/L    Anion Gap 15 9 - 17 mmol/L   BASIC METABOLIC PANEL   Result Value Ref Range    Glucose 92 70 - 99 mg/dL     (HH) 8 - 23 mg/dL    CREATININE 3.97 (H) 0.70 - 1.20 mg/dL    Bun/Cre Ratio NOT REPORTED 9 - 20    Calcium 8.0 (L) 8.6 - 10.4 mg/dL    Sodium 137 135 - 144 mmol/L    Potassium 4.1 3.7 - 5.3 mmol/L    Chloride 104 98 - 107 mmol/L    CO2 18 (L) 20 - 31 mmol/L    Anion Gap 15 9 - 17 mmol/L    GFR Non-African American 14 (L) >60 mL/min    GFR  17 (L) >60 mL/min    GFR Comment          GFR Staging NOT REPORTED    PLATELET FUNCTION TEST   Result Value Ref Range    TAYLOR/EPI Clos Time 134 85 - 172 sec    Collagen Adenosine-5'-Diphosphate (Adp) Time 89 67 - 112 sec    Platelet Function Interp       Normal platelet function. If patient clinical history/Physical examination is positive for a bleeding diathesis, recommend repeat testing and/or additional primary hemostasis and/or coagulation studies.    CBC   Result Value Ref Range    WBC 9.6 3.5 - 11.3 k/uL    RBC 2.73 (L) 4.21 - 5.77 m/uL    Hemoglobin 7.9 (L) 13.0 - 17.0 g/dL    Hematocrit 25.9 (L) 40.7 - 50.3 %    MCV 94.9 82.6 - 102.9 fL    MCH 28.9 25.2 - 33.5 pg    MCHC 30.5 28.4 - 34.8 g/dL    RDW 15.1 (H) 11.8 - 14.4 %    Platelets 031 265 - 230 k/uL    MPV 11.6 8.1 - 13.5 fL    NRBC Automated 0.0 0.0 per 100 WBC   MAGNESIUM   Result Value Ref Range    Magnesium 2.3 1.6 - 2.6 mg/dL   CALCIUM, IONIZED   Result Value Ref Range    Calcium, Ion 1.11 (L) 1.13 - 1.33 mmol/L   FIBRINOGEN   Result Value Ref Range    Fibrinogen 359 140 - 420 mg/dL   PROTIME-INR   Result Value Ref Range    Protime 13.4 (H) 9.0 - 12.0 sec    INR 1.3    Bilirubin, Total   Result Value Ref Range    Total Bilirubin 0.29 (L) 0.3 - 1.2 mg/dL   CK isoenzymes   Result Value Ref Range    Total CK 59 39 - 308 U/L    CK-MB 7.7 <10.5 ng/mL    % CKMB 13.1 (H) 0.0 - 3.5 %    CKMB Interpretation NORMAL ISOENZYME PATTERN    Activated clotting time   Result Value Ref Range    Activated Clotting Time 225 (H) 79 - 149 sec   Activated clotting time   Result Value Ref Range    Activated Clotting Time 168 (H) 79 - 149 sec   Troponin   Result Value Ref Range    Troponin, High Sensitivity 3,045 (HH) 0 - 22 ng/L    Troponin T NOT REPORTED <0.03 ng/mL    Troponin Interp NOT REPORTED    Troponin   Result Value Ref Range    Troponin, High Sensitivity 2,936 (HH) 0 - 22 ng/L    Troponin T NOT REPORTED <0.03 ng/mL    Troponin Interp NOT REPORTED    BASIC METABOLIC PANEL   Result Value Ref Range    Glucose 100 (H) 70 - 99 mg/dL    BUN 99 (HH) 8 - 23 mg/dL    CREATININE 3.82 (H) 0.70 - 1.20 mg/dL    Bun/Cre Ratio NOT REPORTED 9 - 20    Calcium 7.4 (L) 8.6 - 10.4 mg/dL    Sodium 135 135 - 144 mmol/L    Potassium 3.8 3.7 - 5.3 mmol/L    Chloride 104 98 - 107 mmol/L    CO2 17 (L) 20 - 31 mmol/L    Anion Gap 14 9 - 17 mmol/L    GFR Non-African American 15 (L) >60 mL/min    GFR  18 (L) >60 mL/min    GFR Comment          GFR Staging NOT REPORTED    BASIC METABOLIC PANEL   Result Value Ref Range    Glucose 105 (H) 70 - 99 mg/dL     (HH) 8 - 23 mg/dL    CREATININE 3.89 (H) 0.70 - 1.20 mg/dL    Bun/Cre Ratio NOT REPORTED 9 - 20    Calcium 8.0 (L) 8.6 - 10.4 mg/dL    Sodium 135 135 - 144 mmol/L    Potassium 4.0 3.7 - 5.3 mmol/L    Chloride 101 98 - 107 mmol/L    CO2 19 (L) 20 - 31 mmol/L    Anion Gap 15 9 - 17 mmol/L    GFR Non-African American 15 (L) >60 mL/min    GFR  18 (L) >60 mL/min    GFR Comment          GFR Staging NOT REPORTED    CBC   Result Value Ref Range    WBC 14.5 (H) 3.5 - 11.3 k/uL    RBC 3.05 (L) 4.21 - 5.77 m/uL    Hemoglobin 8.8 (L) 13.0 - 17.0 g/dL    Hematocrit 27.6 (L) 40.7 - 50.3 %    MCV 90.5 82.6 - 102.9 fL    MCH 28.9 25.2 - 33.5 pg    MCHC 31.9 28.4 - 34.8 g/dL    RDW 14.9 (H) 11.8 - 14.4 %    Platelets 312 203 - 520 k/uL    MPV 12.1 8.1 - 13.5 fL    NRBC Automated 0.0 0.0 per 100 WBC   MAGNESIUM   Result Value Ref Range    Magnesium 2.2 1.6 - 2.6 mg/dL   BASIC METABOLIC PANEL   Result Value Ref Range    Glucose 98 70 - 99 mg/dL    BUN 99 (HH) 8 - 23 mg/dL    CREATININE 3.84 (H) 0.70 - 1.20 mg/dL    Bun/Cre Ratio NOT REPORTED 9 - 20    Calcium 7.9 (L) 8.6 - 10.4 mg/dL    Sodium 137 135 - 144 mmol/L    Potassium 3.4 (L) 3.7 - 5.3 mmol/L    Chloride 102 98 - 107 mmol/L    CO2 20 20 - 31 mmol/L    Anion Gap 15 9 - 17 mmol/L    GFR Non-African American 15 (L) >60 mL/min    GFR  18 (L) >60 mL/min    GFR Comment          GFR Staging NOT REPORTED    POTASSIUM   Result Value Ref Range    Potassium 4.1 3.7 - 5.3 mmol/L   Magnesium   Result Value Ref Range    Magnesium 2.1 1.6 - 2.6 mg/dL   BASIC METABOLIC PANEL   Result Value Ref Range    Glucose 99 70 - 99 mg/dL     (HH) 8 - 23 mg/dL    CREATININE 3.77 (H) 0.70 - 1.20 mg/dL    Bun/Cre Ratio NOT REPORTED 9 - 20    Calcium 8.0 (L) 8.6 - 10.4 mg/dL    Sodium 137 135 - 144 mmol/L    Potassium 3.4 (L) 3.7 - 5.3 mmol/L    Chloride 105 98 - 107 mmol/L    CO2 22 20 - 31 mmol/L    Anion Gap 10 9 - 17 mmol/L    GFR Non-African American 15 (L) >60 mL/min    GFR  18 (L) >60 mL/min    GFR Comment          GFR Staging NOT REPORTED    BASIC METABOLIC PANEL   Result Value Ref Range    Glucose 106 (H) 70 - 99 mg/dL     (HH) 8 - 23 mg/dL    CREATININE 3.58 (H) 0.70 - 1.20 mg/dL    Bun/Cre Ratio NOT REPORTED 9 - 20    Calcium 7.9 (L) 8.6 - 10.4 mg/dL    Sodium 138 135 - 144 mmol/L    Potassium 3.9 3.7 - 5.3 mmol/L    Chloride 100 98 - 107 mmol/L    CO2 24 20 - 31 mmol/L    Anion Gap 14 9 - 17 mmol/L    GFR Non-African American 16 (L) >60 mL/min    GFR African American 20 (L) >60 mL/min    GFR Comment          GFR Staging NOT REPORTED    T3, Free   Result Value Ref Range    T3, Free 0.87 (L) 2.02 - 4.43 pg/mL   T4, Free   Result Value Ref Range    Thyroxine, Free 1.14 0.93 - 1.70 ng/dL   Hepatic Function Panel   Result Value Ref Range    Alb 3.1 (L) 3.5 - 5.2 g/dL    Alkaline Phosphatase 124 40 - 129 U/L    ALT 30 5 - 41 U/L    AST 18 <40 U/L    Total Bilirubin 0.44 0.3 - 1.2 mg/dL    Bilirubin, Direct 0.13 <0.31 mg/dL    Bilirubin, Indirect 0.31 0.00 - 1.00 mg/dL    Total Protein 5.2 (L) 6.4 - 8.3 g/dL    Globulin NOT REPORTED 1.5 - 3.8 g/dL    Albumin/Globulin Ratio 1.5 1.0 - 2.5   TSH without Reflex   Result Value Ref Range    TSH 3.04 0.30 - 5.00 mIU/L   BASIC METABOLIC PANEL   Result Value Ref Range    Glucose 97 70 - 99 mg/dL     (HH) 8 - 23 mg/dL    CREATININE 3.30 (H) 0.70 - 1.20 mg/dL    Bun/Cre Ratio NOT REPORTED 9 - 20    Calcium 7.8 (L) 8.6 - 10.4 mg/dL    Sodium 140 135 - 144 mmol/L    Potassium 3.6 (L) 3.7 - 5.3 mmol/L    Chloride 102 98 - 107 mmol/L    CO2 27 20 - 31 mmol/L    Anion Gap 11 9 - 17 mmol/L    GFR Non-African American 18 (L) >60 mL/min    GFR  21 (L) >60 mL/min    GFR Comment          GFR Staging NOT REPORTED    BASIC METABOLIC PANEL   Result Value Ref Range    Glucose 98 70 - 99 mg/dL     (HH) 8 - 23 mg/dL    CREATININE 3.00 (H) 0.70 - 1.20 mg/dL    Bun/Cre Ratio NOT REPORTED 9 - 20    Calcium 8.1 (L) 8.6 - 10.4 mg/dL    Sodium 141 135 - 144 mmol/L    Potassium 3.9 3.7 - 5.3 mmol/L    Chloride 100 98 - 107 mmol/L    CO2 29 20 - 31 mmol/L    Anion Gap 12 9 - 17 mmol/L    GFR Non-African American 20 (L) >60 mL/min    GFR  24 (L) >60 mL/min    GFR Comment          GFR Staging NOT REPORTED    BASIC METABOLIC PANEL   Result Value Ref Range    Glucose 104 (H) 70 - 99 mg/dL     (HH) 8 - 23 mg/dL    CREATININE 3.07 (H) 0.70 - 1.20 mg/dL    Bun/Cre Ratio NOT REPORTED 9 - 20    Calcium 7.8 (L) 8.6 - 10.4 mg/dL    Sodium 140 135 - 144 mmol/L    Potassium 3.7 3.7 - 5.3 mmol/L    Chloride 99 98 - 107 mmol/L    CO2 28 20 - 31 mmol/L    Anion Gap 13 9 - 17 mmol/L    GFR Non-African American 19 (L) >60 mL/min    GFR  23 (L) >60 mL/min    GFR Comment          GFR Staging NOT REPORTED    Protein / Creatinine Ratio, Urine   Result Value Ref Range Total Protein, Urine 34 mg/dL    Creatinine, Ur 42.4 39.0 - 259.0 mg/dL    Urine Total Protein Creatinine Ratio 0.80 (H) 0.00 - 0.92   BASIC METABOLIC PANEL   Result Value Ref Range    Glucose 96 70 - 99 mg/dL     (HH) 8 - 23 mg/dL    CREATININE 3.12 (H) 0.70 - 1.20 mg/dL    Bun/Cre Ratio NOT REPORTED 9 - 20    Calcium 7.5 (L) 8.6 - 10.4 mg/dL    Sodium 140 135 - 144 mmol/L    Potassium 3.7 3.7 - 5.3 mmol/L    Chloride 99 98 - 107 mmol/L    CO2 29 20 - 31 mmol/L    Anion Gap 12 9 - 17 mmol/L    GFR Non-African American 19 (L) >60 mL/min    GFR  23 (L) >60 mL/min    GFR Comment          GFR Staging NOT REPORTED    BASIC METABOLIC PANEL   Result Value Ref Range    Glucose 103 (H) 70 - 99 mg/dL     (HH) 8 - 23 mg/dL    CREATININE 2.99 (H) 0.70 - 1.20 mg/dL    Bun/Cre Ratio NOT REPORTED 9 - 20    Calcium 8.0 (L) 8.6 - 10.4 mg/dL    Sodium 136 135 - 144 mmol/L    Potassium 4.1 3.7 - 5.3 mmol/L    Chloride 95 (L) 98 - 107 mmol/L    CO2 29 20 - 31 mmol/L    Anion Gap 12 9 - 17 mmol/L    GFR Non-African American 20 (L) >60 mL/min    GFR  24 (L) >60 mL/min    GFR Comment          GFR Staging NOT REPORTED    BASIC METABOLIC PANEL   Result Value Ref Range    Glucose 136 (H) 70 - 99 mg/dL     (HH) 8 - 23 mg/dL    CREATININE 2.71 (H) 0.70 - 1.20 mg/dL    Bun/Cre Ratio NOT REPORTED 9 - 20    Calcium 8.2 (L) 8.6 - 10.4 mg/dL    Sodium 136 135 - 144 mmol/L    Potassium 4.0 3.7 - 5.3 mmol/L    Chloride 97 (L) 98 - 107 mmol/L    CO2 26 20 - 31 mmol/L    Anion Gap 13 9 - 17 mmol/L    GFR Non-African American 22 (L) >60 mL/min    GFR  27 (L) >60 mL/min    GFR Comment          GFR Staging NOT REPORTED    BASIC METABOLIC PANEL   Result Value Ref Range    Glucose 95 70 - 99 mg/dL     (HH) 8 - 23 mg/dL    CREATININE 3.03 (H) 0.70 - 1.20 mg/dL    Bun/Cre Ratio NOT REPORTED 9 - 20    Calcium 8.0 (L) 8.6 - 10.4 mg/dL    Sodium 139 135 - 144 mmol/L    Potassium 3.6 (L) 3.7 - 5.3 mmol/L    Chloride 97 (L) 98 - 107 mmol/L    CO2 29 20 - 31 mmol/L    Anion Gap 13 9 - 17 mmol/L    GFR Non-African American 20 (L) >60 mL/min    GFR  24 (L) >60 mL/min    GFR Comment          GFR Staging NOT REPORTED    CBC Auto Differential   Result Value Ref Range    WBC 10.6 3.5 - 11.3 k/uL    RBC 2.94 (L) 4.21 - 5.77 m/uL    Hemoglobin 8.5 (L) 13.0 - 17.0 g/dL    Hematocrit 26.9 (L) 40.7 - 50.3 %    MCV 91.5 82.6 - 102.9 fL    MCH 28.9 25.2 - 33.5 pg    MCHC 31.6 28.4 - 34.8 g/dL    RDW 15.8 (H) 11.8 - 14.4 %    Platelets 212 405 - 534 k/uL    MPV 12.1 8.1 - 13.5 fL    NRBC Automated 0.0 0.0 per 100 WBC    Differential Type NOT REPORTED     Seg Neutrophils 83 (H) 36 - 65 %    Lymphocytes 8 (L) 24 - 43 %    Monocytes 6 3 - 12 %    Eosinophils % 3 1 - 4 %    Basophils 0 0 - 2 %    Immature Granulocytes 0 0 %    Segs Absolute 8.72 (H) 1.50 - 8.10 k/uL    Absolute Lymph # 0.80 (L) 1.10 - 3.70 k/uL    Absolute Mono # 0.63 0.10 - 1.20 k/uL    Absolute Eos # 0.36 0.00 - 0.44 k/uL    Basophils # 0.03 0.00 - 0.20 k/uL    Absolute Immature Granulocyte 0.04 0.00 - 0.30 k/uL    WBC Morphology NOT REPORTED     RBC Morphology ANISOCYTOSIS PRESENT     Platelet Estimate NOT REPORTED    BASIC METABOLIC PANEL   Result Value Ref Range    Glucose 101 (H) 70 - 99 mg/dL     (HH) 8 - 23 mg/dL    CREATININE 3.27 (H) 0.70 - 1.20 mg/dL    Bun/Cre Ratio NOT REPORTED 9 - 20    Calcium 8.2 (L) 8.6 - 10.4 mg/dL    Sodium 136 135 - 144 mmol/L    Potassium 3.9 3.7 - 5.3 mmol/L    Chloride 96 (L) 98 - 107 mmol/L    CO2 28 20 - 31 mmol/L    Anion Gap 12 9 - 17 mmol/L    GFR Non-African American 18 (L) >60 mL/min    GFR  22 (L) >60 mL/min    GFR Comment          GFR Staging NOT REPORTED    CBC Auto Differential   Result Value Ref Range    WBC 11.6 (H) 3.5 - 11.3 k/uL    RBC 3.01 (L) 4.21 - 5.77 m/uL    Hemoglobin 8.5 (L) 13.0 - 17.0 g/dL    Hematocrit 27.9 (L) 40.7 - 50.3 %    MCV 92.7 82.6 - 24 - 44 %    Monocytes 6 1 - 7 %    Eosinophils % 5 (H) 1 - 4 %    Basophils 0 0 - 2 %    Absolute Immature Granulocyte 0.00 0.00 - 0.30 k/uL    Segs Absolute 8.40 (H) 1.8 - 7.7 k/uL    Absolute Lymph # 0.50 (L) 1.0 - 4.8 k/uL    Absolute Mono # 0.60 0.1 - 0.8 k/uL    Absolute Eos # 0.50 (H) 0.0 - 0.4 k/uL    Basophils # 0.00 0.0 - 0.2 k/uL    Morphology ANISOCYTOSIS PRESENT    HEPATIC FUNCTION PANEL   Result Value Ref Range    Alb 3.4 (L) 3.5 - 5.2 g/dL    Alkaline Phosphatase 94 40 - 129 U/L    ALT 18 5 - 41 U/L    AST 25 <40 U/L    Total Bilirubin 0.55 0.3 - 1.2 mg/dL    Bilirubin, Direct 0.15 <0.31 mg/dL    Bilirubin, Indirect 0.40 0.00 - 1.00 mg/dL    Total Protein 5.6 (L) 6.4 - 8.3 g/dL    Globulin NOT REPORTED 1.5 - 3.8 g/dL    Albumin/Globulin Ratio 1.5 1.0 - 2.5   PREALBUMIN   Result Value Ref Range    Prealbumin 16.7 (L) 20 - 40 mg/dL   URINALYSIS   Result Value Ref Range    Color, UA YELLOW YELLOW    Turbidity UA CLEAR CLEAR    Glucose, Ur NEGATIVE NEGATIVE    Bilirubin Urine NEGATIVE NEGATIVE    Ketones, Urine NEGATIVE NEGATIVE    Specific Gravity, UA 1.011 1.005 - 1.030    Urine Hgb MODERATE (A) NEGATIVE    pH, UA 7.5 5.0 - 8.0    Protein, UA 1+ (A) NEGATIVE    Urobilinogen, Urine Normal Normal    Nitrite, Urine NEGATIVE NEGATIVE    Leukocyte Esterase, Urine LARGE (A) NEGATIVE    Urinalysis Comments NOT REPORTED    PROTEIN, URINE, RANDOM   Result Value Ref Range    Total Protein, Urine 57 mg/dL   Microscopic Urinalysis   Result Value Ref Range    -          WBC, UA 50  0 - 5 /HPF    RBC, UA 50  0 - 4 /HPF    Casts UA  0 - 8 /LPF     5 TO 10 HYALINE Reference range defined for non-centrifuged specimen.     Crystals UA NOT REPORTED None /HPF    Epithelial Cells UA 0 TO 2 0 - 5 /HPF    Renal Epithelial, Urine NOT REPORTED 0 /HPF    Bacteria, UA NOT REPORTED None    Mucus, UA NOT REPORTED None    Trichomonas, UA NOT REPORTED None    Amorphous, UA NOT REPORTED None    Other Observations UA NOT REPORTED NOT REQ.     Yeast, UA NOT REPORTED None   Albumin   Result Value Ref Range    Alb 3.6 3.5 - 5.2 g/dL   Hepatitis B surface antibody   Result Value Ref Range    Hep B S Ab <3.50 <10 mIU/mL   Hepatitis B surface antigen   Result Value Ref Range    Hepatitis B Surface Ag NONREACTIVE NONREACTIVE   Hepatitis B core antibody, total   Result Value Ref Range    Hep B Core Total Ab NONREACTIVE NONREACTIVE   Hepatitis C Antibody   Result Value Ref Range    Hepatitis C Ab NONREACTIVE NONREACTIVE   BASIC METABOLIC PANEL   Result Value Ref Range    Glucose 107 (H) 70 - 99 mg/dL    BUN 76 (H) 8 - 23 mg/dL    CREATININE 2.75 (H) 0.70 - 1.20 mg/dL    Bun/Cre Ratio NOT REPORTED 9 - 20    Calcium 8.5 (L) 8.6 - 10.4 mg/dL    Sodium 136 135 - 144 mmol/L    Potassium 4.0 3.7 - 5.3 mmol/L    Chloride 95 (L) 98 - 107 mmol/L    CO2 25 20 - 31 mmol/L    Anion Gap 16 9 - 17 mmol/L    GFR Non-African American 22 (L) >60 mL/min    GFR  26 (L) >60 mL/min    GFR Comment          GFR Staging NOT REPORTED    PROTIME-INR   Result Value Ref Range    Protime 12.1 (H) 9.0 - 12.0 sec    INR 1.2    Basic Metabolic Panel w/ Reflex to MG   Result Value Ref Range    Glucose 92 70 - 99 mg/dL    BUN 55 (H) 8 - 23 mg/dL    CREATININE 2.87 (H) 0.70 - 1.20 mg/dL    Bun/Cre Ratio NOT REPORTED 9 - 20    Calcium 8.3 (L) 8.6 - 10.4 mg/dL    Sodium 135 135 - 144 mmol/L    Potassium 3.7 3.7 - 5.3 mmol/L    Chloride 98 98 - 107 mmol/L    CO2 24 20 - 31 mmol/L    Anion Gap 13 9 - 17 mmol/L    GFR Non-African American 21 (L) >60 mL/min    GFR  25 (L) >60 mL/min    GFR Comment          GFR Staging NOT REPORTED    HEMOGLOBIN AND HEMATOCRIT, BLOOD   Result Value Ref Range    Hemoglobin 9.1 (L) 13.0 - 17.0 g/dL    Hematocrit 29.6 (L) 40.7 - 50.3 %   HEMOGLOBIN AND HEMATOCRIT, BLOOD   Result Value Ref Range    Hemoglobin 8.6 (L) 13.0 - 17.0 g/dL    Hematocrit 27.1 (L) 40.7 - 50.3 %   Basic Metabolic Panel w/ Reflex to MG   Result Value Ref Range    Glucose 108 (H) 70 - 99 mg/dL    BUN 66 (H) 8 - 23 mg/dL    CREATININE 3.20 (H) 0.70 - 1.20 mg/dL    Bun/Cre Ratio NOT REPORTED 9 - 20    Calcium 8.5 (L) 8.6 - 10.4 mg/dL    Sodium 138 135 - 144 mmol/L    Potassium 3.2 (L) 3.7 - 5.3 mmol/L    Chloride 99 98 - 107 mmol/L    CO2 25 20 - 31 mmol/L    Anion Gap 14 9 - 17 mmol/L    GFR Non-African American 18 (L) >60 mL/min    GFR  22 (L) >60 mL/min    GFR Comment          GFR Staging NOT REPORTED    Magnesium   Result Value Ref Range    Magnesium 1.8 1.6 - 2.6 mg/dL   Basic Metabolic Panel w/ Reflex to MG   Result Value Ref Range    Glucose 120 (H) 70 - 99 mg/dL    BUN 24 (H) 8 - 23 mg/dL    CREATININE 1.24 (H) 0.70 - 1.20 mg/dL    Bun/Cre Ratio NOT REPORTED 9 - 20    Calcium 7.7 (L) 8.6 - 10.4 mg/dL    Sodium 141 135 - 144 mmol/L    Potassium 3.2 (L) 3.7 - 5.3 mmol/L    Chloride 102 98 - 107 mmol/L    CO2 25 20 - 31 mmol/L    Anion Gap 14 9 - 17 mmol/L    GFR Non-African American 55 (L) >60 mL/min    GFR African American >60 >60 mL/min    GFR Comment          GFR Staging NOT REPORTED    Magnesium   Result Value Ref Range    Magnesium 1.5 (L) 1.6 - 2.6 mg/dL   POTASSIUM (POC)   Result Value Ref Range    POC Potassium 3.5 3.5 - 4.5 mmol/L   BASIC METABOLIC PANEL   Result Value Ref Range    Glucose 97 70 - 99 mg/dL    BUN 44 (H) 8 - 23 mg/dL    CREATININE 2.74 (H) 0.70 - 1.20 mg/dL    Bun/Cre Ratio NOT REPORTED 9 - 20    Calcium 8.6 8.6 - 10.4 mg/dL    Sodium 140 135 - 144 mmol/L    Potassium 3.7 3.7 - 5.3 mmol/L    Chloride 100 98 - 107 mmol/L    CO2 22 20 - 31 mmol/L    Anion Gap 18 (H) 9 - 17 mmol/L    GFR Non-African American 22 (L) >60 mL/min    GFR  27 (L) >60 mL/min    GFR Comment          GFR Staging NOT REPORTED    HEMOGLOBIN AND HEMATOCRIT, BLOOD   Result Value Ref Range    Hemoglobin 8.8 (L) 13.0 - 17.0 g/dL    Hematocrit 29.9 (L) 40.7 - 50.3 %   BASIC METABOLIC PANEL   Result Value Ref Range    Glucose 116 (H) 70 - 99 mg/dL    BUN 38 (H) 8 - 23 mg/dL    CREATININE 2.61 (H) 0.70 - 1.20 mg/dL    Bun/Cre Ratio NOT REPORTED 9 - 20    Calcium 8.2 (L) 8.6 - 10.4 mg/dL    Sodium 138 135 - 144 mmol/L    Potassium 3.6 (L) 3.7 - 5.3 mmol/L    Chloride 100 98 - 107 mmol/L    CO2 22 20 - 31 mmol/L    Anion Gap 16 9 - 17 mmol/L    GFR Non-African American 23 (L) >60 mL/min    GFR  28 (L) >60 mL/min    GFR Comment          GFR Staging NOT REPORTED    CBC   Result Value Ref Range    WBC 7.7 3.5 - 11.3 k/uL    RBC 2.23 (L) 4.21 - 5.77 m/uL    Hemoglobin 6.5 (LL) 13.0 - 17.0 g/dL    Hematocrit 19.6 (L) 40.7 - 50.3 %    MCV 87.9 82.6 - 102.9 fL    MCH 29.1 25.2 - 33.5 pg    MCHC 33.2 28.4 - 34.8 g/dL    RDW 16.0 (H) 11.8 - 14.4 %    Platelets See Reflexed IPF Result 138 - 453 k/uL    MPV NOT REPORTED 8.1 - 13.5 fL    NRBC Automated 0.0 0.0 per 100 WBC   Immature Platelet Fraction   Result Value Ref Range    Platelet, Immature Fraction 9.7 1.1 - 10.3 %    Platelet, Fluorescence 41 (L) 138 - 453 k/uL   CBC   Result Value Ref Range    WBC 11.4 (H) 3.5 - 11.3 k/uL    RBC 2.85 (L) 4.21 - 5.77 m/uL    Hemoglobin 8.3 (L) 13.0 - 17.0 g/dL    Hematocrit 25.4 (L) 40.7 - 50.3 %    MCV 89.1 82.6 - 102.9 fL    MCH 29.1 25.2 - 33.5 pg    MCHC 32.7 28.4 - 34.8 g/dL    RDW 15.8 (H) 11.8 - 14.4 %    Platelets 57 (L) 487 - 453 k/uL    MPV 11.9 8.1 - 13.5 fL    NRBC Automated 0.0 0.0 per 100 WBC   Heparin-induced Platelet Antibody   Result Value Ref Range    Heparin Induced Plt Ab 0.176 0.000 - 0.400 O. D.   CBC Auto Differential   Result Value Ref Range    WBC 10.5 3.5 - 11.3 k/uL    RBC 3.11 (L) 4.21 - 5.77 m/uL    Hemoglobin 8.9 (L) 13.0 - 17.0 g/dL    Hematocrit 28.1 (L) 40.7 - 50.3 %    MCV 90.4 82.6 - 102.9 fL    MCH 28.6 25.2 - 33.5 pg    MCHC 31.7 28.4 - 34.8 g/dL    RDW 16.0 (H) 11.8 - 14.4 %    Platelets 90 (L) 446 - 453 k/uL    MPV 12.4 8.1 - 13.5 fL    NRBC Automated 0.0 0.0 per 100 WBC    Differential Type NOT REPORTED     WBC Morphology NOT REPORTED     RBC Morphology ANISOCYTOSIS PRESENT     Platelet Estimate NOT REPORTED     Seg Neutrophils 79 (H) 36 - 65 %    Lymphocytes 8 (L) 24 - 43 %    Monocytes 7 3 - 12 %    Eosinophils % 5 (H) 1 - 4 %    Basophils 0 0 - 2 %    Immature Granulocytes 1 (H) 0 %    Segs Absolute 8.29 (H) 1.50 - 8.10 k/uL    Absolute Lymph # 0.80 (L) 1.10 - 3.70 k/uL    Absolute Mono # 0.76 0.10 - 1.20 k/uL    Absolute Eos # 0.55 (H) 0.00 - 0.44 k/uL    Basophils # 0.04 0.00 - 0.20 k/uL    Absolute Immature Granulocyte 0.06 0.00 - 0.30 k/uL   Basic Metabolic Panel   Result Value Ref Range    Glucose 105 (H) 70 - 99 mg/dL    BUN 47 (H) 8 - 23 mg/dL    CREATININE 2.95 (H) 0.70 - 1.20 mg/dL    Bun/Cre Ratio NOT REPORTED 9 - 20    Calcium 9.1 8.6 - 10.4 mg/dL    Sodium 138 135 - 144 mmol/L    Potassium 3.8 3.7 - 5.3 mmol/L    Chloride 99 98 - 107 mmol/L    CO2 23 20 - 31 mmol/L    Anion Gap 16 9 - 17 mmol/L    GFR Non-African American 20 (L) >60 mL/min    GFR  24 (L) >60 mL/min    GFR Comment          GFR Staging NOT REPORTED    Differential   Result Value Ref Range    Differential Type NOT REPORTED     WBC Morphology NOT REPORTED     RBC Morphology NOT REPORTED     Platelet Estimate NOT REPORTED     Seg Neutrophils 82 (H) 36 - 65 %    Lymphocytes 5 (L) 24 - 43 %    Monocytes 7 3 - 12 %    Eosinophils % 5 (H) 1 - 4 %    Basophils 0 0 - 2 %    Immature Granulocytes 1 (H) 0 %    Segs Absolute 9.41 (H) 1.50 - 8.10 k/uL    Absolute Lymph # 0.56 (L) 1.10 - 3.70 k/uL    Absolute Mono # 0.74 0.10 - 1.20 k/uL    Absolute Eos # 0.61 (H) 0.00 - 0.44 k/uL    Basophils # 0.03 0.00 - 0.20 k/uL    Absolute Immature Granulocyte 0.08 0.00 - 0.30 k/uL   Path Review, Smear   Result Value Ref Range    Pathologist Review SEE REPORT    Reticulocytes   Result Value Ref Range    Retic % 2.0 (H) 0.5 - 1.9 %    Absolute Retic # 0.060 0.030 - 0.080 M/uL    Immature Retic Fract 8.500 2.7 - 18.3 %    Retic Hemoglobin 33.4 28.2 - 35.7 pg   Surgical Pathology   Result Value Ref Range    Surgical Pathology Report       [id number]  [employer]  [facility]  ANATOMIC PATHOLOGY  [address] [place] [address], [facility]. [place], [address]  [phone]  Fax: [phone]  SURGICAL PATHOLOGY CONSULTATION    Patient Name: Nevin Sanchez  MR#: [id number]  Specimen #[id number]    Procedures/Addenda  PERIPHERAL BLOOD REPORT     Date Ordered:     4/4/2019     Status:  Signed Out       Date Complete:     4/4/2019     By: Jen Proctor M.D. Date Reported:     4/4/2019       INTERPRETATION  PERIPHERAL BLOOD:  REACTIVE NEUTROPHILIA, MODERATE NORMOCYTIC ANEMIA,  FAVORING HYPOPROLIFERATIVE ANEMIA, MODERATE TO SEVERE THROMBOCYTOPENIA  AND MILD EOSINOPHILIA. RESULTS-COMMENTS                           PERIPHERAL BLOOD STUDY    HEMOGRAM                              DIFFERENTIAL %     ABSOLUTE  (K/UL)    WBC (K/uL)     11.4               IMM GRANS          1          0.08        RBC (K/uL)     2.85               SEGS               82          9.41  HGB (G/dL)     8.3               LYMPHS           5          0.56  HCT (%)     25.4                                     MCV (FL.)     89.1               MONOS          7          0.74  MCH (PG.)     29.1               EOS               5          0.61  MCHC (g/dL)     32.7               BASO                         0.03  RDW (%)     15.8                                          PLT (k/uL)     57                                          RETIC (%)     2.0                                     Absolute RetCt     0.060                                                                                                                         PERIPHERAL EXAMINATION BY PATHOLOGIST    PLATELETS: Normal          LEUKOCYTES: Minimal neutrophilic left shift     ERYTHROCYTES: Anisocytosis, otherwise normal         Jen Proctor M.D.                    Source:  1: PERIPHERAL BLOOD FOR REVIEW BY PATHOLOGIST     CBC Auto Differential   Result Value Ref Range    WBC 8.6 3.5 - 11.3 k/uL    RBC 2.60 (L) 4.21 - 5.77 m/uL    Hemoglobin 7.4 (L) 13.0 - 17.0 g/dL    Hematocrit 23.5 (L) 40.7 - 50.3 %    MCV 90.4 82.6 - 102.9 fL    MCH 28.5 25.2 - 33.5 pg    MCHC 31.5 28.4 - 34.8 g/dL    RDW 15.9 (H) 11.8 - 14.4 %    Platelets 92 (L) 390 - 453 k/uL    MPV 12.4 8.1 - 13.5 fL    NRBC Automated 0.0 0.0 per 100 WBC    Differential Type NOT REPORTED     WBC Morphology NOT REPORTED     RBC Morphology NOT REPORTED     Platelet Estimate NOT REPORTED     Immature Granulocytes 1 (H) 0 %    Seg Neutrophils 82 (H) 36 - 65 %    Lymphocytes 6 (L) 24 - 43 %    Monocytes 8 3 - 12 %    Eosinophils % 3 1 - 4 %    Basophils 0 0 - 2 %    Absolute Immature Granulocyte 0.09 0.00 - 0.30 k/uL    Segs Absolute 7.04 1.50 - 8.10 k/uL    Absolute Lymph # 0.52 (L) 1.10 - 3.70 k/uL    Absolute Mono # 0.69 0.10 - 1.20 k/uL    Absolute Eos # 0.26 0.00 - 0.44 k/uL    Basophils # 0.00 0.00 - 0.20 k/uL    Morphology ANISOCYTOSIS PRESENT    Basic Metabolic Panel   Result Value Ref Range    Glucose 97 70 - 99 mg/dL    BUN 54 (H) 8 - 23 mg/dL    CREATININE 3.29 (H) 0.70 - 1.20 mg/dL    Bun/Cre Ratio NOT REPORTED 9 - 20    Calcium 8.7 8.6 - 10.4 mg/dL    Sodium 138 135 - 144 mmol/L    Potassium 3.5 (L) 3.7 - 5.3 mmol/L    Chloride 99 98 - 107 mmol/L    CO2 23 20 - 31 mmol/L    Anion Gap 16 9 - 17 mmol/L    GFR Non-African American 18 (L) >60 mL/min    GFR  22 (L) >60 mL/min    GFR Comment          GFR Staging NOT REPORTED    CBC Auto Differential   Result Value Ref Range    WBC 9.7 3.5 - 11.3 k/uL    RBC 2.63 (L) 4.21 - 5.77 m/uL    Hemoglobin 7.5 (L) 13.0 - 17.0 g/dL    Hematocrit 24.2 (L) 40.7 - 50.3 %    MCV 92.0 82.6 - 102.9 fL    MCH 28.5 25.2 - 33.5 pg    MCHC 31.0 28.4 - 34.8 g/dL    RDW 15.9 (H) 11.8 - 14.4 %    Platelets 73 (L) 965 - 453 k/uL    MPV 12.2 8.1 - 13.5 fL    NRBC Automated 0.0 0.0 per 100 WBC    Differential Type NOT 1.10 - 3.70 k/uL    Absolute Mono # 0.95 0.10 - 1.20 k/uL    Absolute Eos # 0.47 (H) 0.00 - 0.44 k/uL    Basophils # 0.03 0.00 - 0.20 k/uL    Absolute Immature Granulocyte 0.06 0.00 - 0.30 k/uL     *Note: Due to a large number of results and/or encounters for the requested time period, some results have not been displayed. A complete set of results can be found in Results Review. IMPRESSION:   80year old male with history significant for cardiac issues, heme/onc consulted for thrombocytopenia   . No chief complaint on file. .  Patient Active Problem List   Diagnosis    Pulmonary hypertension ([address]    Acute on chronic systolic congestive heart failure (HCC)    Pleural effusion due to CHF (congestive heart failure) ([address]    Pulmonary 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 heart disease (HCC)    Atelectasis, bilateral    HUYEN (acute kidney injury) ([address]    Azotemia    Aortic valve stenosis    CAD in native artery    Moderate malnutrition (HCC)    Thrombocytopenia ([address]        PLAN:     1. Thrombocytopenia  2. Continue to monitor platelet count. Transfuse to keep above 10,000. Okay to give and evaluation as long as platelet count above 50,000  3. Heparin-induced thrombocytopenia antibodies negative. Okay to use heparin  4. We will check for nutritional deficiencies which could be causing cytopenias. 5. Continue supportive care and symptom management. Please call with questions.        Electronically signed by Ezio Macedo, [address] [place] [address] 4/7/2019 at 6:44 PM

## 2019-04-08 NOTE — PROGRESS NOTES
[facility]   Progress Note     CC: PPM site sore, abdomen distended, a little improved        Subjective:  Mr. Ban Perez seen and examined Plan of care reviewed and questions answered. Had 2 soft, loose bowel movements yesterday. Did not want suppository.        Physical Exam  Vital Signs: BP (!) 153/57   Pulse 61   Temp 97.2 °F (36.2 °C) (Oral)   Resp 16   Ht 5' 6\" (1.676 m)   Wt 149 lb 4 oz (67.7 kg)   SpO2 95%   BMI 24.09 kg/m²  O2 Flow Rate (L/min): 1 L/min         General: alert and oriented to person, place and time. Up in chair, No apparent distress. Heart:Normal S1 and S2. Paced rhythm. No murmurs, gallops, or rubs.    Lungs: clear to auscultation bilaterally and diminished breath sounds bibasilar, large amount of ecchymosis over left chest PPm site, goes to left shoulder/upper arm  Abdomen: soft, non tender, slightly distended, BSx4  Extremities: negative        Scheduled Meds:   Scheduled Medications    simethicone  80 mg Oral Q6H    metolazone  10 mg Oral Daily    docusate sodium  100 mg Oral BID    hydrALAZINE  100 mg Oral 3 times per day    lidocaine-EPINEPHrine  20 mL Intradermal Once    lidocaine PF  1 mL Intradermal Once    bumetanide  3 mg Oral BID    vancomycin  1,000 mg Intravenous Once    bacitracin in 0.9 % sodium chloride irrigation  50,000 Units Topical Once    sodium chloride flush  10 mL Intravenous 2 times per day    albumin human  25 g Intravenous Once    aspirin  81 mg Oral Daily    spironolactone  25 mg Oral Daily    metoprolol succinate  50 mg Oral Daily    FLUoxetine  10 mg Oral Daily    levothyroxine  125 mcg Oral Daily    ALPRAZolam  0.25 mg Oral Once    QUEtiapine  25 mg Oral Nightly    clopidogrel  75 mg Oral Daily    isosorbide mononitrate  30 mg Oral Daily    [Held by provider] docusate sodium  100 mg Oral Daily    amiodarone  200 mg Oral Daily    atorvastatin  20 mg Oral Daily         Continuous Infusions:

## 2019-04-08 NOTE — PROGRESS NOTES
Occupational Therapy    Occupational Therapy Not Seen Note    DATE: 2019  Name: Anuja Guevara  : 12/10/1928  MRN: [id number]    Patient not available for Occupational Therapy due to:    Hemodialysis    Next Scheduled Treatment: 19    Electronically signed by GHANSHYAM Barrow on 2019 at 3:19 PM

## 2019-04-08 NOTE — PROGRESS NOTES
Physical Therapy  Facility/Department: [facility]  Daily Treatment Note  NAME: Katerin Arellano  : 12/10/1928  MRN: [id number]    Date of Service: 2019    Discharge Recommendations:  Further therapy recommended at discharge. PT Equipment Recommendations  Equipment Needed: Yes  Mobility Devices: Lauree Jaime: Rolling    Patient Diagnosis(es): The encounter diagnosis was Pulmonary hypertension ([address] has a past medical history of Aortic stenosis, Atrial fibrillation ([address] CAD (coronary artery disease), Chest pain, CHF (congestive heart failure) ([address] Chronic anemia, Chronic kidney disease, Hypertension, Pleural effusion on left, Pleural effusion, right, Pulmonary emboli ([address] Pulmonary hypertension ([address] and Thyroid disease. has a past surgical history that includes pacemaker placement (2019); thoracentesis; joint replacement; Coronary angioplasty with stent (2019); other surgical history (2019); and other surgical history (03/15/2019). Restrictions  Restrictions/Precautions  Restrictions/Precautions: Weight Bearing, Cardiac, General Precautions, ROM Restrictions, Surgical Protocols, Fall Risk  Required Braces or Orthoses?: Yes(Sling for L UE)  Implants present? : Pacemaker(19)  Upper Extremity Weight Bearing Restrictions  Left Upper Extremity Weight Bearing: (partial weight bearing for PPM )  Required Braces or Orthoses  Left Upper Extremity Brace/Splint: Sling  Position Activity Restriction  Other position/activity restrictions: Up w/assist.    Subjective   General  Response To Previous Treatment: Patient with no complaints from previous session. Family / Caregiver Present: No  Subjective  Subjective: RN and pt agreeable to PT this date. Pt seated in bedside chair prior to tx; pt returned to chair post tx.   Pain Screening  Patient Currently in Pain: Denies  Vital Signs  Patient Currently in Pain: Denies       Orientation  Orientation  Overall Orientation Status: Within Normal Limits       Objective   Bed mobility  Comment: Pt seated in bedside chair upon therapist arrival.   Transfers  Sit to Stand: Minimal Assistance  Stand to sit: Contact guard assistance  Comment: Transfers w/RW   Ambulation  Ambulation?: Yes  Ambulation 1  Surface: level tile  Device: Rolling Walker  Assistance: Contact guard assistance  Quality of Gait: Decreased renato, short step length, flexed posture, excessive heel strike. Distance: 100ft. Comments: Pt amb w/out O2 this date. SpO2 at 89% on room air post amb, quickly returned to 94% w/rest break and O2 on 1L. Balance  Posture: Fair  Sitting - Static: Good  Sitting - Dynamic: Good  Standing - Static: Good;-  Standing - Dynamic: Fair;+  Exercises  Seated LE exercise program: Long Arc Quads, hip abduction/adduction, heel/toe raises, and marches. Reps: x20  Upper extremity exercises: Bicep curl, shoulder flexion/extension, punches, tricep curl, shoulder abduction/adduction. Reps: x20  Incentive inspirometer 1000ml x12  Comments: frequent rest breaks needed d/t fatigue. Assessment   Body structures, Functions, Activity limitations: Decreased functional mobility ; Decreased balance;Decreased endurance  Assessment: Pt improved amb from 50ft to x100ft w/RW CGA and no O2 needed. Seated exercise tolerated well w/correct carry over and return demo. Short rest breaks necessary d/t fatigue in between exercises. Prognosis: Good  Patient Education: Exercise rationale   REQUIRES PT FOLLOW UP: Yes  Activity Tolerance  Activity Tolerance: Patient limited by endurance; Patient Tolerated treatment well;Patient limited by fatigue  Activity Tolerance: Rest breaks as needed.      Goals  Short term goals  Time Frame for Short term goals: 14 visits  Short term goal 1: Perform bed mobility and functional transfers independently  Short term goal 2: Ambulate 300ft with least restrictive AD independently  Short term goal 3: Demo Good- dynamic standing balance to decrease risk of falls  Short term goal 4: Participate in 30 minutes of therapy to demo increased endurance    Plan    Plan  Times per week: 6-7x/wk  Current Treatment Recommendations: Strengthening, Balance Training, Functional Mobility Training, Endurance Training, Transfer Training, Patient/Caregiver Education & Training, Safety Education & Training, Home Exercise Program, Gait Training  Safety Devices  Type of devices: All fall risk precautions in place, Gait belt, Call light within reach, Nurse notified, Left in chair  Restraints  Initially in place: No     Therapy Time   Individual Concurrent Group Co-treatment   Time In 0939         Time Out [address], [place] Treatment performed by Student PTA under the supervision of co-signing PTA who agrees with all treatment and documentation.    Brianna Lara, PTA

## 2019-04-08 NOTE — PLAN OF CARE
Problem: Falls - Risk of:  Goal: Will remain free from falls  Description  Will remain free from falls  Outcome: Ongoing     Problem: Pain:  Goal: Control of acute pain  Description  Control of acute pain  Outcome: Ongoing     Problem: Risk for Impaired Skin Integrity  Goal: Tissue integrity - skin and mucous membranes  Description  Structural intactness and normal physiological function of skin and  mucous membranes.   Outcome: Ongoing     Problem: Cardiac Output - Decreased:  Goal: Hemodynamic stability will improve  Description  Hemodynamic stability will improve  Outcome: Ongoing     Problem: Musculor/Skeletal Functional Status  Goal: Highest potential functional level  Outcome: Ongoing     Problem: Nutrition  Goal: Optimal nutrition therapy  Outcome: Ongoing

## 2019-04-08 NOTE — PROGRESS NOTES
Dialysis Post Treatment Note  Patient tolerated treatment well. Denies complaints at time of discharge.    Vitals:    04/08/19 1825   BP: (!) 116/59   Pulse:    Resp:    Temp: (!) 60 °F (15.6 °C)   SpO2:      Pre-Weight = 68.4kg  Post-weight = Weight: 149 lb 0.5 oz (67.6 kg)  Total Liters Processed = Total Liters Processed (l/min): 74.8 l/min  Rinseback Volume (mL) = Rinseback Volume (ml): 350 ml  Net Removal (mL) =2410     Length of treatment=3.0  Pt dialized well bp dropped in the beginning , 200 n/s given , bp came up , net removed 2410  Post weight 66.7kg

## 2019-04-08 NOTE — PROGRESS NOTES
Pulmonary Progress Note    CC:  chf   Subjective:  No acute events     Review of Systems -  General ROS: fatigue  ENT ROS: negative for - headaches, oral lesions or sore throat  Cardiovascular ROS: no chest pain , +orthopnea   Gastrointestinal ROS: no abdominal pain, change in bowel habits, or black or bloody stools  Skin - no rash   Neuro - no blurry vision , no loc . No focal weakness   msk - no jt tenderness or swelling    Vascular - no claudication , rest completed and negative   Lymphatic - complete and negative   Hematology - oncology - complete and negative   Allergy immunology - complete and negative    no burning or hematuria      Immunization     There is no immunization history on file for this patient. Pneumococcal Vaccine     [] Up to date    [x] Indicated   [] Refused  [] Contraindicated       Influenza Vaccine   [] Up to date    [x] Indicated   [] Refused  [] Contraindicated     PAST MEDICAL HISTORY:       Diagnosis Date    Aortic stenosis 02/15/2019    Atrial fibrillation ([address]     CAD (coronary artery disease)     Chest pain 02/15/2019    CHF (congestive heart failure) (HCC)     Chronic anemia     Chronic kidney disease     Hypertension     Pleural effusion on left 02/15/2019    Pleural effusion, right 02/15/2019    Pulmonary emboli ([address] 02/15/2019    Pulmonary hypertension ([address] 03/06/2019    Thyroid disease     hypothyroidism         Family History:   History reviewed. No pertinent family history. SURGICAL HISTORY:   Past Surgical History:   Procedure Laterality Date    CORONARY ANGIOPLASTY WITH STENT PLACEMENT  03/11/2019    complex PCI of LT MAIN, LAD    JOINT REPLACEMENT      right ankle    OTHER SURGICAL HISTORY  04/02/2019    UPGRADE TO BI-V PACEMAKER    OTHER SURGICAL HISTORY  03/15/2019    TAVR PROCEDURE    PACEMAKER PLACEMENT  01/20/2019    THORACENTESIS                TOBACCO:   reports that he quit smoking about 30 years ago.  He has never used smokeless tobacco.  ETOH:   reports that he drank alcohol. ALLERGIES:    Allergies   Allergen Reactions    Quinolones      Pt is allergic to fluoroquinolones       Home Meds:   Prior to Admission medications    Medication Sig Start Date End Date Taking? Authorizing Provider   amiodarone (CORDARONE) 200 MG tablet Take 200 mg by mouth daily   Yes Historical Provider, MD   atorvastatin (LIPITOR) 20 MG tablet Take 20 mg by mouth daily   Yes Historical Provider, MD   isosorbide mononitrate (IMDUR) 60 MG extended release tablet Take 60 mg by mouth daily   Yes Historical Provider, MD   levothyroxine (SYNTHROID) 100 MCG tablet Take 100 mcg by mouth Daily   Yes Historical Provider, MD   lisinopril (PRINIVIL;ZESTRIL) 40 MG tablet Take 40 mg by mouth daily   Yes Historical Provider, MD   apixaban (ELIQUIS) 2.5 MG TABS tablet Take 2.5 mg by mouth daily   Yes Historical Provider, MD   metoprolol tartrate (LOPRESSOR) 50 MG tablet Take 50 mg by mouth 2 times daily   Yes Historical Provider, MD         Intake/Output Summary (Last 24 hours) at 4/8/2019 1652  Last data filed at 4/8/2019 1248  Gross per 24 hour   Intake 620 ml   Output 464 ml   Net 156 ml         Diet   DIET CARDIAC; Low Sodium (2 GM);  Daily Fluid Restriction: 1800 ml  Dietary Nutrition Supplements:  Dietary Nutrition Supplements: Frozen Oral Supplement  Dietary Nutrition Supplements: Standard High Calorie Oral Supplement    Vitals:   BP (!) 95/42   Pulse 59   Temp 97.9 °F (36.6 °C)   Resp 16   Ht 5' 6\" (1.676 m)   Wt 150 lb 12.7 oz (68.4 kg)   SpO2 95%   BMI 24.34 kg/m²  on         I/O (24 Hours)    Patient Vitals for the past 8 hrs:   BP Temp Temp src Pulse Resp SpO2 Weight   04/08/19 1633 (!) 95/42 97.9 °F (36.6 °C) -- 59 -- -- --   04/08/19 1603 (!) 88/56 97.9 °F (36.6 °C) -- 60 -- -- --   04/08/19 1545 (!) 101/42 97.9 °F (36.6 °C) -- 68 -- -- --   04/08/19 1536 85/60 -- -- 60 -- -- --   04/08/19 1534 86/61 -- -- 67 -- -- --   04/08/19 1532 -- 97.9 °F (36.6 sodium chloride flush  10 mL Intravenous 2 times per day    albumin human  25 g Intravenous Once    aspirin  81 mg Oral Daily    spironolactone  25 mg Oral Daily    metoprolol succinate  50 mg Oral Daily    FLUoxetine  10 mg Oral Daily    levothyroxine  125 mcg Oral Daily    ALPRAZolam  0.25 mg Oral Once    QUEtiapine  25 mg Oral Nightly    clopidogrel  75 mg Oral Daily    isosorbide mononitrate  30 mg Oral Daily    [Held by provider] docusate sodium  100 mg Oral Daily    amiodarone  200 mg Oral Daily    atorvastatin  20 mg Oral Daily       Continuous Infusions:   dextrose 20 mL/hr at 04/06/19 1858       PRN Meds:      Labs:  CBC:   Recent Labs     04/06/19 0617 04/07/19  0526 04/08/19  0809   WBC 9.7 10.7 11.7*   HGB 7.5* 7.6* 8.6*   HCT 24.2* 25.1* 28.3*   MCV 92.0 94.0 93.7   PLT 73* 101* 145     BMP:   Recent Labs     04/06/19 0617 04/07/19 0526 04/08/19  0809    131* 130*   K 3.6* 3.5* 3.8   CL 97* 94* 92*   CO2 26 24 22   BUN 33* 43* 54*   CREATININE 2.48* 2.85* 3.64*     LIVER PROFILE:   No results for input(s): AST, ALT, LIPASE, BILIDIR, BILITOT, ALKPHOS in the last 72 hours. Invalid input(s): AMYLASE,  ALB  PT/INR:   No results for input(s): PROTIME, INR in the last 72 hours. APTT:   No results for input(s): APTT in the last 72 hours. UA:  No results for input(s): NITRITE, COLORU, PHUR, LABCAST, WBCUA, RBCUA, MUCUS, TRICHOMONAS, YEAST, BACTERIA, CLARITYU, SPECGRAV, LEUKOCYTESUR, UROBILINOGEN, BILIRUBINUR, BLOODU, GLUCOSEU, AMORPHOUS in the last 72 hours. Invalid input(s): KETONESU  No results for input(s): PHART, POR3VXV, PO2ART in the last 72 hours.     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 *  Multi-vessel coronary artery disease post PCI  Severe aortic stenosis, post TAVR  Atrial fibrillation  Small right middle lobe pulmonary embolism.   Per chart, from imaging studies done in [place]  Thrombocytopenia - hit negative     Plan:    Continue hemodialysis per nephrology  Cont incentive spirometry and deep breathing   Encourage physical therapy    Electronically signed by Kirt Caicedo MD on 4/8/2019 at 4:52 PM

## 2019-04-08 NOTE — PROGRESS NOTES
[facility] - Progress Note  Today's Date and Time: 4/8/2019, 3:36 PM    Impression :   1. Recurrent bilateral pleural effusions w/lung compression  2. CHF  3. 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 Multivessel CAD s/p multiple stent placements  5. S/P TAVR  6. HUYEN on HD  7. Urinary retention  8. Pulmonary hypertension  9. S/P Upgrade of dual chamber PPM to CRT-P on 4-2-19  10. Anemia s/p transfusion 4/3    Recommendations:     · Aggressive pulmonary toilet  · Acapella valve and IS at bedside  · Wound care Lt chest incision    Medical Decision Making/Summary/Discussion:   · 79 yo gentleman who developed complete heart block. Found to have multivessel CAD, severe aortic stenosis and pulmonary HTN. · Underwent pacemaker placement with subsequent bouts of acute CHF and multiple hospitalizations in [place]  · Brought to [place] by family for further care. · Since admission at [facility] he has had cardiac catheterizations x 2 with multiple stent placements and a TAVR on 3-15. Pt remains on a nitroglycerine drip  · Pt continues to require frequent thoracenteses  · He has suffered an HUYEN and is currently on a bumex drip  · CXR 3/26 showed progressed moderate to large bilateral pleural effusions with consolidation. · ID consult placed for treatment of any potential pneumonia  · Pt is afebrile, without cough or sputum production. He remains SOB with exertion and at times, when quiet. · Currently no evidence of active infection. Pt will require aggressive pulmonary toilet and diuresis. Plan to monitor off antibiotics  · Patient to continue HD  · He had upgraded pacemaker placed on 4-2-19. · Developed hematoma at pacer site, resolved with pressure. Elequis held 4-3.  Hgb to 6.5 received 1u PRBC  · Patient is continuing hemodialysis     Infection Control Recommendations   · Thompson Precautions    Antimicrobial Stewardship Recommendations     · Monitor off antibiotics    Coordination of Outpatient Care: · Estimated Length of IV antimicrobials: None  · Patient will need Midline Catheter Insertion:   · Patient will need PICC line Insertion:  · Patient will need: Home IV , [place],  SNF,  LTAC TBD  · Patient will need outpatient wound care: No    Chief complaint/reason for consultation:   · Possible pneumonia      History of Present Illness:   Sal Chin is a 80y.o.-year-old  male who was initially admitted on 3/6/2019. Patient seen at the request of Dr. Daniella Goodwin     INITIAL HISTORY:    Pt is a 79 yo gentleman who has recently had multiple hospital admissions in [place] for decompensated heart failure. His symptoms began in December 2018. 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 A cardiac cath at that time showed LM and LAD calcified stenosis 80-90% in multiple areas, with severe disease in D1, D2, Om1 and OM2. Minimal disease in RCA. LV function with EF 25%. Severe aortic stenosis compromising his clinical improvedmnt      He underwent a dual chamber pacemaker placement. Following that, he had multiple hospitalizations for acute heart failure and shortness of breath. His baseline creatinine was 1.3, now >2.0. He has had multiple thoracenteses with transudative pleural fluid removed.      He was brought to [place] for further evaluation and was admitted on 3/6/19     An ECHO from admission showed   Normal left ventricle size with mildly reduced systolic function; Estimated  left ventricular ejection fraction 40-45%  Anterolateral wall hypokinesis. Mild left ventricular hypertrophy. Left atrium is moderately dilated. Grade II diastolic dysfunction. Heavily calcified aortic valve with reduced systolic excursion and evidence  of moderate stenosis. (Peak nataliia 3.62 m/s and mean gradient 29 mmHg)  Moderate posterior pericardial effusion without any echo signs of tamponade. Normal right ventricular size and function.   Pacemaker lead seen in right ventricle. Moderate tricuspid regurgitation. Estimated right ventricular systolic pressure is 60 mmHg suggesting moderate  pulmonary HTN.    He underwent an atherectomy/JEAN-CLAUDE of the left main and LAD on 3/6. Because of the high surgical risk and renal function he was taken back to the cath lab on 3/11 for PTCA-JEAN-CLAUDE LAD, PTCRA-JEAN-CLAUDE LM with plan to consider a TAVR if pt remained stable.     A TAVR was performed on 3/15     A carotid study showed less than 50% stenosis bilaterally.     He has had multiple thoracenteses since admission. Pt has been followed by CT surgery, cardiology, pulmonary, nephrology and urology.     On the evening of 3/25 pt developed hypertension and SOB at rest. A stat CXR was done that showed progressed moderate to large bilateral pleural effusions with consolidation.      On 3/26 pt underwent a Lt thoracentesis with 1400 ml clear pleural fluid removed. Pt reports feeling much better after the procedure and is asking to sit in the chair.      I am consulted to determine if Mr Kait Suárez has pneumonia. Pt had continued decline in renal function, HD initiated 3-29  PPV pacemaker placed 4-2, pt with hematoma at site, resolved with pressure. Anticoagulation held 4-3. Hgb to 6.5 received 1u PRBC     CURRENT EXAMINATION: 4/8/2019    Pt seen in HD. Feels tired. No increasing shortness of breath. Had two sift BMs last night. No diarrhea. .     On bumex and metolazone daily per neprhology. Planning for perm catheter placement before discharge. HIT antibody negative. Okay to proceed with heparin use per heme/onc    He had upgraded pacemaker placed on 4-2-19  Vancomycin given pre-op     On exam pt is in no distress  Breath sounds are diminished bilateral bases   On 1L O2  Lt chest with ecchymoses from pacemaker.       CXR with pulmonary edema and bilateral pleural effusions    Pacer site with hematoma, no active oozing staples intact      Afebrile   VSS     Labs reviewed: 4/8/2019    WBC FLUoxetine  10 mg Oral Daily    levothyroxine  125 mcg Oral Daily    ALPRAZolam  0.25 mg Oral Once    QUEtiapine  25 mg Oral Nightly    clopidogrel  75 mg Oral Daily    isosorbide mononitrate  30 mg Oral Daily    [Held by provider] docusate sodium  100 mg Oral Daily    amiodarone  200 mg Oral Daily    atorvastatin  20 mg Oral Daily       Social History:     Social History     Socioeconomic History    Marital status:      Spouse name: Not on file    Number of children: Not on file    Years of education: Not on file    Highest education level: Not on file   Occupational History     Employer: RETIRED   Social Needs    Financial resource strain: Not on file    Food insecurity:     Worry: Not on file     Inability: Not on file    Transportation needs:     Medical: Not on file     Non-medical: Not on file   Tobacco Use    Smoking status: Former Smoker     Last attempt to quit:      Years since quittin.2    Smokeless tobacco: Never Used   Substance and Sexual Activity    Alcohol use: Not Currently    Drug use: Never    Sexual activity: Not on file   Lifestyle    Physical activity:     Days per week: Not on file     Minutes per session: Not on file    Stress: Not on file   Relationships    Social connections:     Talks on phone: Not on file     Gets together: Not on file     Attends Advent service: Not on file     Active member of club or organization: Not on file     Attends meetings of clubs or organizations: Not on file     Relationship status: Not on file    Intimate partner violence:     Fear of current or ex partner: Not on file     Emotionally abused: Not on file     Physically abused: Not on file     Forced sexual activity: Not on file   Other Topics Concern    Not on file   Social History Narrative    Not on file       Family History:   History reviewed. No pertinent family history.      Allergies:   Quinolones     Review of Systems:   AA, reports continued tenderness at pacemaker site  No chills or fevers, no SOB  Seen on HD    Physical Examination :     Patient Vitals for the past 8 hrs:   BP Temp Temp src Pulse Resp SpO2 Weight   04/08/19 1502 (!) 133/59 97.9 °F (36.6 °C) -- 60 -- -- --   04/08/19 1450 (!) 140/63 97.9 °F (36.6 °C) -- 61 16 -- 150 lb 12.7 oz (68.4 kg)   04/08/19 1248 (!) 124/39 97.9 °F (36.6 °C) Oral 68 17 95 % --   04/08/19 0752 -- -- -- -- -- 97 % --     General Appearance: Seen in HD, easily roused, no distress  Head:  Normocephalic, no trauma  Eyes: Pupils equal, round, reactive to light and accommodation; extraocular movements intact; sclera anicteric. ENT: Oropharynx clear. Mouth/throat: mucosa pink and moist. No lesions. Dentition in good repair. Neck: Supple, without lymphadenopathy. Pulmonary/Chest: Clear to auscultation, without wheezes, rales, or rhonchi. Decreased breath sounds at bilateral bases   Cardiovascular: Regular rate and rhythm without murmurs, rubs, or gallops. Paced  Abdomen: Soft. Bowel sounds normal.  : Johnston in place  All four Extremities: Mild edema. Neurologic: No gross sensory or motor deficits. Skin: Warm and dry with good turgor. No signs of peripheral arterial or venous insufficiency. No ulcerations. No open wounds. Lt chest pacemaker incision with hematoma, staples intact, no oozing or bleeding noted    Medical Decision Making -Laboratory:   I have independently reviewed/ordered the following labs:    CBC with Differential:   Recent Labs     04/07/19  0526 04/08/19  0809   WBC 10.7 11.7*   HGB 7.6* 8.6*   HCT 25.1* 28.3*   * 145   LYMPHOPCT 7* 7*   MONOPCT 9 7     BMP:   Recent Labs     04/07/19  0526 04/08/19  0809   * 130*   K 3.5* 3.8   CL 94* 92*   CO2 24 22   BUN 43* 54*   CREATININE 2.85* 3.64*     Hepatic Function Panel:   No results for input(s): PROT, LABALBU, BILIDIR, IBILI, BILITOT, ALKPHOS, ALT, AST in the last 72 hours. No results for input(s): RPR in the last 72 hours.   No results for input(s): exam.           Impression   Worsening edema and increased bilateral pleural effusions         4-1 AXR  EXAMINATION:   SINGLE SUPINE XRAY VIEW(S) OF THE ABDOMEN       4/1/2019 2:40 pm       COMPARISON:   None.       HISTORY:   ORDERING SYSTEM PROVIDED HISTORY: r/o ileus   TECHNOLOGIST PROVIDED HISTORY:   r/o ileus       Initial exam       FINDINGS:   Nonspecific bowel gas pattern with air-filled small and large bowel.  No   organomegaly noted.  No pathologic calcifications.  Degenerative changes of   the lower lumbar spine.           Impression   Nonspecific bowel gas pattern without evidence of small bowel obstruction.          3/29 CXR      Narrative   EXAMINATION:   SINGLE XRAY VIEW OF THE CHEST       3/29/2019 8:26 am       COMPARISON:   Chest radiograph performed 03/28/2019.       HISTORY:   ORDERING SYSTEM PROVIDED HISTORY: effusions   TECHNOLOGIST PROVIDED HISTORY:   effusions       FINDINGS:   There are large bilateral effusions with adjacent consolidation.  There is   underlying pulmonary congestion.  There is no pneumothorax.  The heart is   enlarged with stable cardiac leads.  The upper abdomen is unremarkable.  The   extrathoracic soft tissues are unremarkable.           Impression   Cardiomegaly with large bilateral effusions and adjacent consolidation   concerning for pneumonia.               3/27 CXR    Narrative   EXAMINATION:   TWO VIEWS OF THE CHEST       3/28/2019 7:56 am       COMPARISON:   Chest radiograph performed 03/27/2019.       HISTORY:   ORDERING SYSTEM PROVIDED HISTORY: effusions   TECHNOLOGIST PROVIDED HISTORY:   effusions       FINDINGS:   There is a small left and a large right pleural effusion with adjacent   infiltrate.  There is no pneumothorax.  The mediastinal structures are stable   with stable cardiac leads.  The upper abdomen is unremarkable.  The   extrathoracic soft tissues are unremarkable.           Impression   Small left and large right pleural effusion with adjacent infiltrate   representing atelectasis versus pneumonia.  Overall there may be mild   improvement of the right-sided effusion. Narrative   EXAMINATION:   SINGLE XRAY VIEW OF THE CHEST       3/26/2019 2:37 am       COMPARISON:   March 20, 2019.       HISTORY:   ORDERING SYSTEM PROVIDED HISTORY: SOB, PE   TECHNOLOGIST PROVIDED HISTORY:   SOB, PE       FINDINGS:   Frontal portable view of the chest.  Low lung volume.  Progressed moderate to   large bilateral pleural effusions with consolidation.  Indistinct pulmonary   vasculature.  No pneumothorax.  The cardiomediastinal silhouette is not well   evaluated.  Atherosclerotic thoracic aorta.  Left pectoral trans venous   dual-chamber cardiac pacer device.           Impression   Progressed moderate to large bilateral pleural effusions with consolidation. Superimposed infection is not excluded.             Medical Decision Making-Other: Thank you for allowing us to participate in the care of this patient. Please call with questions.     Tr Pelayo MD

## 2019-04-09 NOTE — PROGRESS NOTES
Occupational Therapy  Facility/Department: [facility]  Daily Treatment Note  NAME: Billy Kimbrough  : 12/10/1928  MRN: [id number]    Date of Service: 2019    Discharge Recommendations:  Further therapy recommended at discharge. Assessment   Performance deficits / Impairments: Decreased ADL status; Decreased endurance;Decreased functional mobility ; Decreased balance;Decreased ROM; Decreased strength;Decreased high-level IADLs  Assessment: Pt would benefit from continued acute care OT to address deficits in ADL/ functional activities, endurance, balance and functional transfers/ mobility following sx. Treatment Diagnosis: TAVR, PPM  Prognosis: Good  Patient Education: OT POC and PPM precaution, Issued written info and pt verbalized understanding. REQUIRES OT FOLLOW UP: Yes  Activity Tolerance  Activity Tolerance: Patient Tolerated treatment well  Safety Devices  Safety Devices in place: Yes  Type of devices: All fall risk precautions in place;Call light within reach; Patient at risk for falls; Left in chair;Nurse notified         Patient Diagnosis(es): The encounter diagnosis was Pulmonary hypertension ([address] has a past medical history of Aortic stenosis, Atrial fibrillation ([address] CAD (coronary artery disease), Chest pain, CHF (congestive heart failure) ([address] Chronic anemia, Chronic kidney disease, Hypertension, Pleural effusion on left, Pleural effusion, right, Pulmonary emboli ([address] Pulmonary hypertension ([address] and Thyroid disease. has a past surgical history that includes pacemaker placement (2019); thoracentesis; joint replacement; Coronary angioplasty with stent (2019); other surgical history (2019); and other surgical history (03/15/2019).     Restrictions  Restrictions/Precautions  Restrictions/Precautions: Weight Bearing, Cardiac, General Precautions, ROM Restrictions, Surgical Protocols, Fall Risk  Required Braces or Orthoses?: Yes(Sling for L UE, but not on or in room upon arrival)  Implants present? : Pacemaker(4/2/19)  Upper Extremity Weight Bearing Restrictions  Left Upper Extremity Weight Bearing: (partial weight bearing for PPM 4/2/19)  Required Braces or Orthoses  Left Upper Extremity Brace/Splint: Sling(not on or in room upon arrival)  Position Activity Restriction  Other position/activity restrictions: Up w/assist.  Subjective   General  Patient assessed for rehabilitation services?: Yes  Family / Caregiver Present: No  Pain Assessment  Response to Pain Intervention: Patient Satisfied  Vital Signs  Patient Currently in Pain: Denies      Objective    ADL  Feeding: Independent;Setup(Seated in recliner at tray table)  Additional Comments: Pt states \"I took a bath with the nurse earlier today\". Educ given (see above). Pt remained in chair, call light and phone in reach.        Plan   Plan  Times per week: 4-5x/wk     Goals  Short term goals  Time Frame for Short term goals: by discharge, pt will  Short term goal 1: demo I in UE ADL activities   Short term goal 2: demo MI in LE ADL activities with AD as needed  Short term goal 3: demo understanding and I use of EC/WS, fall prevention and proper pursed lipped breathing tech during functional activities   Short term goal 4: demo increased activity tolerance of 30+ min to assist with ADL/ functional activities  Short term goal 5: demo I in functional transfers/ mobility with use of RW to assist with ADL/ functional activities   Short term goal 6: demo understanding and I use of safe transfer tech during functional activities with use of RW  Patient Goals   Patient goals : to go home     Therapy Time   Individual Concurrent Group Co-treatment   Time In  1500         Time Out  [address] Cutler/GERALD

## 2019-04-09 NOTE — PROGRESS NOTES
Writer in at bedside with Dr Shannon Holm discussing the plan of dialysis and a change out of temp cath to [facility] cath. Patient stated he is unwilling to continue dialysis and will not get a change out from temp cath to perma cath. Patient stated he wants the temp cath removed and  He wants to go home to [place]. The patient has stated from the first day of dialysis that he will not be continuing  Dialysis if it is going to be permanent. He just wants to go home. Patient is alert and oriented x4 and of sound mind.

## 2019-04-09 NOTE — PROGRESS NOTES
Nutrition Assessment    Type and Reason for Visit: Reassess    Nutrition Recommendations: Continue cardiac, 2 gm Na diet w/ 1800 mL FR. Continue magic cup supplements 3x/d. Recommend Nepro supplements 3x/d. Nutrition Assessment:  Pt remains nutritionally compromised aeb moderate malnutrition. At risk for further compromise d/t HD. Per EMR, pt refuses several meals and consumes 1-75%, depending on the day. Pt is consuming % of his supplements. Will continue supplements to compliment po intake and monitor wt trends. Malnutrition Assessment:  · Malnutrition Status: Meets the criteria for moderate malnutrition  · Context: (Acute on Chronic)  · Findings of the 6 clinical characteristics of malnutrition (Minimum of 2 out of 6 clinical characteristics is required to make the diagnosis of moderate or severe Protein Calorie Malnutrition based on AND/ASPEN Guidelines):  1. Energy Intake-Less than or equal to 75% of estimated energy requirement, Greater than or equal to 7 days    2. Weight Loss-Unable to assess,    3. Fat Loss-No significant subcutaneous fat loss, Orbital  4. Muscle Loss-No significant muscle mass loss,    5. Fluid Accumulation-Mild fluid accumulation, Generalized, Extremities  6.  Strength-Not measured    Nutrition Risk Level: Moderate    Nutrient Needs:  · Estimated Daily Total Kcal: 1.3-1.4 ~>4609-0997 kcals/d   · Estimated Daily Protein (g): 1.2-1.4 gm/kg ~>85-99 gms/d    Nutrition Diagnosis:   · Problem:  Moderate malnutrition  · Etiology: related to Insufficient energy/nutrient consumption, Catabolic illness(decreased appetite)     Signs and symptoms:  as evidenced by Intake 0-25%, Intake 25-50%, Intake 50-75%, Localized or generalized fluid accumulation    Objective Information:  · Nutrition-Focused Physical Findings: active bowel sounds  · Wound Type: Open Wounds  · Current Nutrition Therapies:  · Oral Diet Orders: 2gm Sodium, Cardiac, Fluid Restriction   · Oral Diet intake: 1-25%,

## 2019-04-09 NOTE — PROGRESS NOTES
Physical Therapy  DATE: 2019    NAME: Claude Sahara  MRN: [id number]   : 12/10/1928    Patient not seen this date for Physical Therapy due to:  [] Blood transfusion in progress  [] Hemodialysis  [x]  Patient Declined: Pt declined PT this AM stating he is \"too tired to do anything,\" and needs to sleep. Will check back later.   [] Spine Precautions   [] Strict Bedrest  [] Surgery/ Procedure  [] Testing      [] Other        [] PT being discontinued at this time. Patient independent. No further needs. [] PT being discontinued at this time as the patient has been transferred to palliative care. No further needs. [place], [place] Treatment performed by Student PTA under the supervision of co-signing PTA who agrees with all treatment and documentation.    Nick Archer PTA

## 2019-04-09 NOTE — PROGRESS NOTES
[facility]   Progress Note     CC: PPM site sore, abdomen improved, hemodialysis yesterday        Subjective:  Mr. Shea seen and examined Plan of care reviewed and questions answered. Had 2 soft, loose bowel movements yesterday.         Physical Exam  Vital Signs: BP (!) 153/57   Pulse 61   Temp 97.2 °F (36.2 °C) (Oral)   Resp 16   Ht 5' 6\" (1.676 m)   Wt 149 lb 4 oz (67.7 kg)   SpO2 95%   BMI 24.09 kg/m²  O2 Flow Rate (L/min): 1 L/min         General: alert and oriented to person, place and time. Up in chair, No apparent distress. Heart:Normal S1 and S2.  Paced rhythm. No murmurs, gallops, or rubs.    Lungs: clear to auscultation bilaterally and diminished breath sounds bibasilar, large amount of ecchymosis over left chest PPm site, goes to left shoulder/upper arm  Abdomen: soft, non tender, slightly distended, BSx4  Extremities: negative        Scheduled Meds:   Scheduled Medications    simethicone  80 mg Oral Q6H    metolazone  10 mg Oral Daily    docusate sodium  100 mg Oral BID    hydrALAZINE  100 mg Oral 3 times per day    lidocaine-EPINEPHrine  20 mL Intradermal Once    lidocaine PF  1 mL Intradermal Once    bumetanide  3 mg Oral BID    vancomycin  1,000 mg Intravenous Once    bacitracin in 0.9 % sodium chloride irrigation  50,000 Units Topical Once    sodium chloride flush  10 mL Intravenous 2 times per day    albumin human  25 g Intravenous Once    aspirin  81 mg Oral Daily    spironolactone  25 mg Oral Daily    metoprolol succinate  50 mg Oral Daily    FLUoxetine  10 mg Oral Daily    levothyroxine  125 mcg Oral Daily    ALPRAZolam  0.25 mg Oral Once    QUEtiapine  25 mg Oral Nightly    clopidogrel  75 mg Oral Daily    isosorbide mononitrate  30 mg Oral Daily    [Held by provider] docusate sodium  100 mg Oral Daily    amiodarone  200 mg Oral Daily    atorvastatin  20 mg Oral Daily         Continuous Infusions:   Infusions Meds    dextrose 20

## 2019-04-09 NOTE — PROGRESS NOTES
Dr Timothy Aguilar in at bedside talking to patient. Patient has stated to Dr Timothy Aguilar that he does not want to continue diaysis. Pallative team called Dr Timothy Aguilar regarding pallative consult placed by RN. Dr Timothy Aguilar told pallative Nurse he does not want them to see patient.

## 2019-04-09 NOTE — CARE COORDINATION
Following for OP dialysis needs  Spoke with CM, who will f/u with nephew on dialysis unit preference  Will make arrangements once known

## 2019-04-09 NOTE — PROGRESS NOTES
Dr.Brar maldonado at patient bedside; new orders given to d/c ramirez. 1330 Patient requested to leave in ramirez for comfort reasons    1630 patient still requesting to have ramirez left in for comfort reasons.      [address] Polk notified of patient's wishes to leave ramirez in place; okay to leave in place until morning

## 2019-04-09 NOTE — PROGRESS NOTES
ecchymosis. Abdomen: soft, not obese, Non tender/non distended. Bowel sounds present  Extremities: No Cyanosis or Clubbing, present Lower extremity edema  Neurological:Alert and oriented. No abnormalities of mood, affect, memory, mentation, or behavior are noted    MEDICATIONS     Scheduled Meds:    bumetanide  4 mg Oral BID    simethicone  80 mg Oral Q6H    docusate sodium  100 mg Oral BID    hydrALAZINE  100 mg Oral 3 times per day    lidocaine-EPINEPHrine  20 mL Intradermal Once    lidocaine PF  1 mL Intradermal Once    vancomycin  1,000 mg Intravenous Once    bacitracin in 0.9 % sodium chloride irrigation  50,000 Units Topical Once    sodium chloride flush  10 mL Intravenous 2 times per day    albumin human  25 g Intravenous Once    aspirin  81 mg Oral Daily    spironolactone  25 mg Oral Daily    metoprolol succinate  50 mg Oral Daily    FLUoxetine  10 mg Oral Daily    levothyroxine  125 mcg Oral Daily    ALPRAZolam  0.25 mg Oral Once    QUEtiapine  25 mg Oral Nightly    clopidogrel  75 mg Oral Daily    isosorbide mononitrate  30 mg Oral Daily    [Held by provider] docusate sodium  100 mg Oral Daily    amiodarone  200 mg Oral Daily    atorvastatin  20 mg Oral Daily     Continuous Infusions:    dextrose 20 mL/hr at 04/06/19 1858     PRN Meds:    Home Meds:                Medications Prior to Admission: amiodarone (CORDARONE) 200 MG tablet, Take 200 mg by mouth daily  atorvastatin (LIPITOR) 20 MG tablet, Take 20 mg by mouth daily  isosorbide mononitrate (IMDUR) 60 MG extended release tablet, Take 60 mg by mouth daily  levothyroxine (SYNTHROID) 100 MCG tablet, Take 100 mcg by mouth Daily  lisinopril (PRINIVIL;ZESTRIL) 40 MG tablet, Take 40 mg by mouth daily  apixaban (ELIQUIS) 2.5 MG TABS tablet, Take 2.5 mg by mouth daily  metoprolol tartrate (LOPRESSOR) 50 MG tablet, Take 50 mg by mouth 2 times daily    INVESTIGATIONS     Last 3 CMP:    Recent Labs     04/07/19  0526 04/08/19  0809 04/09/19  0431   * 130* 133*   K 3.5* 3.8 3.8   CL 94* 92* 98   CO2 24 22 24   BUN 43* 54* 31*   CREATININE 2.85* 3.64* 2.57*   CALCIUM 8.2* 8.6 7.8*       Last 3 CBC:  Recent Labs     04/07/19  0526 04/08/19  0809 04/09/19  0431   WBC 10.7 11.7* 9.6   RBC 2.67* 3.02* 2.57*   HGB 7.6* 8.6* 7.2*   HCT 25.1* 28.3* 25.3*   MCV 94.0 93.7 98.4   MCH 28.5 28.5 28.0   MCHC 30.3 30.4 28.5   RDW 15.9* 16.0* 16.1*   * 145 See Reflexed IPF Result   MPV 12.2 11.5 NOT REPORTED       ASSESSMENT     1. Acute kidney injury secondary to ATN from hypoperfusion related to cardiorenal syndrome - worsening. Hemodialysis dependent since 29th of March  2. Chronic kidney disease stage IV with baseline creatinine suspected around 2-3  3. Nephrotic range proteinuria - Bx not feasible in view of he being on Asprin and cardiology recommends not to stop anticoagulant. Explained to patient and he does not want to do that. 4.  Status post TVR  5. Cardiomyopathy ejection fraction 40-45%/left ventricular diastole dysfunction/moderate to severe mitral regurgitation S/p CRT4/3   6. S/p PCI and atherectomy  3/11  7. Recurrent bilateral pleural effusion status post pleural taps  8. HTN  9. Hx of Urinary retention  10. Thrombocytopenia resolved. 6. Advanced age  15. Hyponatremia    PLAN     1. Hemodialysis tomorrow  2. Bumex increased to 4 mg  PO BID. 2.  The patient will need [facility] cath before discharge, Dr Mraicel Patricia we'll arrange that sometime this week. 4.  Hematology- Hit antibody negative. 5.  Chances of renal recovery slim  6. Discontinue metolazone with the hyponatremia  7. Discontinue Johnston   8.   Will need outpatient dialysis spot before discharge

## 2019-04-09 NOTE — PROGRESS NOTES
· Estimated Length of IV antimicrobials: None  · Patient will need Midline Catheter Insertion:   · Patient will need PICC line Insertion:  · Patient will need: Home IV , [place],  SNF,  LTAC TBD  · Patient will need outpatient wound care: No    Chief complaint/reason for consultation:   · Possible pneumonia      History of Present Illness:   Tika Godwin is a 80y.o.-year-old  male who was initially admitted on 3/6/2019. Patient seen at the request of Dr. Bteo Tse     INITIAL HISTORY:    Pt is a 81 yo gentleman who has recently had multiple hospital admissions in [place] for decompensated heart failure. His symptoms began in December 2018. 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 A cardiac cath at that time showed LM and LAD calcified stenosis 80-90% in multiple areas, with severe disease in D1, D2, Om1 and OM2. Minimal disease in RCA. LV function with EF 25%. Severe aortic stenosis compromising his clinical improvedmnt      He underwent a dual chamber pacemaker placement. Following that, he had multiple hospitalizations for acute heart failure and shortness of breath. His baseline creatinine was 1.3, now >2.0. He has had multiple thoracenteses with transudative pleural fluid removed.      He was brought to [place] for further evaluation and was admitted on 3/6/19     An ECHO from admission showed   Normal left ventricle size with mildly reduced systolic function; Estimated  left ventricular ejection fraction 40-45%  Anterolateral wall hypokinesis. Mild left ventricular hypertrophy. Left atrium is moderately dilated. Grade II diastolic dysfunction. Heavily calcified aortic valve with reduced systolic excursion and evidence  of moderate stenosis. (Peak nataliia 3.62 m/s and mean gradient 29 mmHg)  Moderate posterior pericardial effusion without any echo signs of tamponade. Normal right ventricular size and function.   Pacemaker lead seen in right fluid:  · 3/7 No growth, many neutrophils seen, no bacteria, and no growth    Discussed with Pt, HD RN. I have personally reviewed the past medical history, past surgical history, medications, social history, and family history, and I have updated the database accordingly.   Past Medical History:     Past Medical History:   Diagnosis Date    Aortic stenosis 02/15/2019    Atrial fibrillation (HCC)     CAD (coronary artery disease)     Chest pain 02/15/2019    CHF (congestive heart failure) (HCC)     Chronic anemia     Chronic kidney disease     Hypertension     Pleural effusion on left 02/15/2019    Pleural effusion, right 02/15/2019    Pulmonary emboli ([address] 02/15/2019    Pulmonary hypertension ([address] 03/06/2019    Thyroid disease     hypothyroidism       Past Surgical  History:     Past Surgical History:   Procedure Laterality Date    CORONARY ANGIOPLASTY WITH STENT PLACEMENT  03/11/2019    complex PCI of LT MAIN, LAD    JOINT REPLACEMENT      right ankle    OTHER SURGICAL HISTORY  04/02/2019    UPGRADE TO BI-V PACEMAKER    OTHER SURGICAL HISTORY  03/15/2019    TAVR PROCEDURE    PACEMAKER PLACEMENT  01/20/2019    THORACENTESIS         Medications:      bumetanide  4 mg Oral BID    simethicone  80 mg Oral Q6H    docusate sodium  100 mg Oral BID    hydrALAZINE  100 mg Oral 3 times per day    lidocaine-EPINEPHrine  20 mL Intradermal Once    lidocaine PF  1 mL Intradermal Once    vancomycin  1,000 mg Intravenous Once    bacitracin in 0.9 % sodium chloride irrigation  50,000 Units Topical Once    sodium chloride flush  10 mL Intravenous 2 times per day    albumin human  25 g Intravenous Once    aspirin  81 mg Oral Daily    spironolactone  25 mg Oral Daily    metoprolol succinate  50 mg Oral Daily    FLUoxetine  10 mg Oral Daily    levothyroxine  125 mcg Oral Daily    ALPRAZolam  0.25 mg Oral Once    QUEtiapine  25 mg Oral Nightly    clopidogrel  75 mg Oral Daily    isosorbide mononitrate  30 mg Oral Daily    [Held by provider] docusate sodium  100 mg Oral Daily    amiodarone  200 mg Oral Daily    atorvastatin  20 mg Oral Daily       Social History:     Social History     Socioeconomic History    Marital status:      Spouse name: Not on file    Number of children: Not on file    Years of education: Not on file    Highest education level: Not on file   Occupational History     Employer: RETIRED   Social Needs    Financial resource strain: Not on file    Food insecurity:     Worry: Not on file     Inability: Not on file    Transportation needs:     Medical: Not on file     Non-medical: Not on file   Tobacco Use    Smoking status: Former Smoker     Last attempt to quit:      Years since quittin.2    Smokeless tobacco: Never Used   Substance and Sexual Activity    Alcohol use: Not Currently    Drug use: Never    Sexual activity: Not on file   Lifestyle    Physical activity:     Days per week: Not on file     Minutes per session: Not on file    Stress: Not on file   Relationships    Social connections:     Talks on phone: Not on file     Gets together: Not on file     Attends Taoist service: Not on file     Active member of club or organization: Not on file     Attends meetings of clubs or organizations: Not on file     Relationship status: Not on file    Intimate partner violence:     Fear of current or ex partner: Not on file     Emotionally abused: Not on file     Physically abused: Not on file     Forced sexual activity: Not on file   Other Topics Concern    Not on file   Social History Narrative    Not on file       Family History:   History reviewed. No pertinent family history.      Allergies:   Quinolones     Review of Systems:   AA, reports continued tenderness at pacemaker site  No chills or fevers, no SOB  Seen on HD    Physical Examination :     Patient Vitals for the past 8 hrs:   BP Temp Temp src Pulse SpO2   19 1211 (!) 118/37 98.1 °F (36.7 °C) Oral 60 94 %   04/09/19 0810 -- -- -- -- 93 %   04/09/19 0728 (!) 146/43 98 °F (36.7 °C) Oral 69 (!) 87 %     General Appearance: Seen in HD, easily roused, no distress  Head:  Normocephalic, no trauma  Eyes: Pupils equal, round, reactive to light and accommodation; extraocular movements intact; sclera anicteric. ENT: Oropharynx clear. Mouth/throat: mucosa pink and moist. No lesions. Dentition in good repair. Neck: Supple, without lymphadenopathy. Pulmonary/Chest: Clear to auscultation, without wheezes, rales, or rhonchi. Decreased breath sounds at bilateral bases   Cardiovascular: Regular rate and rhythm without murmurs, rubs, or gallops. Paced  Abdomen: Soft. Bowel sounds normal.  : Johnston in place  All four Extremities: Mild edema. Neurologic: No gross sensory or motor deficits. Skin: Warm and dry with good turgor. No signs of peripheral arterial or venous insufficiency. No ulcerations. No open wounds. Lt chest pacemaker incision with hematoma, staples intact, no oozing or bleeding noted    Medical Decision Making -Laboratory:   I have independently reviewed/ordered the following labs:    CBC with Differential:   Recent Labs     04/08/19  0809 04/09/19  0431   WBC 11.7* 9.6   HGB 8.6* 7.2*   HCT 28.3* 25.3*    See Reflexed IPF Result   LYMPHOPCT 7* 6*   MONOPCT 7 8     BMP:   Recent Labs     04/08/19  0809 04/09/19  0431   * 133*   K 3.8 3.8   CL 92* 98   CO2 22 24   BUN 54* 31*   CREATININE 3.64* 2.57*     Hepatic Function Panel:   No results for input(s): PROT, LABALBU, BILIDIR, IBILI, BILITOT, ALKPHOS, ALT, AST in the last 72 hours. No results for input(s): RPR in the last 72 hours. No results for input(s): HIV in the last 72 hours. No results for input(s): BC in the last 72 hours.   Lab Results   Component Value Date    MUCUS NOT REPORTED 03/29/2019    RBC 2.57 04/09/2019    TRICHOMONAS NOT REPORTED 03/29/2019    WBC 9.6 04/09/2019    YEAST NOT REPORTED 03/29/2019    TURBIDITY CLEAR 2019     Lab Results   Component Value Date    CREATININE 2.57 2019    GLUCOSE 94 2019       Medical Decision Making-Imagin-5 CXR    EXAMINATION:   SINGLE XRAY VIEW OF THE CHEST       2019 5:39 am       COMPARISON:   2019       HISTORY:   ORDERING SYSTEM PROVIDED HISTORY: pleural effusions   TECHNOLOGIST PROVIDED HISTORY:   pleural effusions       FINDINGS:   AP portable view of the chest time stamped at 519 hours demonstrates   overlying cardiac monitoring electrodes.  Right internal jugular catheter   terminates in the distal superior vena cava.  Multiple polar pacemaker enters   from the left with intact leads in appropriate positions.  Pacemaker battery   box and skin clips overlie the left axilla.  Heart is enlarged and there is   pulmonary vascular congestion with bilateral perihilar opacities consistent   with pulmonary edema.  Moderate bilateral pleural effusions are noted.  No   extrapleural air is seen.  No midline shift is noted.  Osseous structures are   stable.           Impression   Continued cardiomegaly, opacities most compatible with pulmonary edema and   bilateral pleural effusions.          4- CXR    EXAMINATION:   SINGLE XRAY VIEW OF THE CHEST       2019 6:04 am       COMPARISON:   2019       HISTORY:   ORDERING SYSTEM PROVIDED HISTORY: SOB   TECHNOLOGIST PROVIDED HISTORY:   SOB       FINDINGS:   There is a left-sided transvenous pacer in place and a right internal jugular   dialysis catheter.  There is cardiomegaly with pulmonary vascular congestion   and edema.  There are bilateral pleural effusions which are increased.  The   pulmonary edema appears worse on the current exam.           Impression   Worsening edema and increased bilateral pleural effusions         4- AXR  EXAMINATION:   SINGLE SUPINE XRAY VIEW(S) OF THE ABDOMEN       2019 2:40 pm       COMPARISON:   None.       HISTORY:   ORDERING SYSTEM PROVIDED HISTORY: r/o ileus TECHNOLOGIST PROVIDED HISTORY:   r/o ileus       Initial exam       FINDINGS:   Nonspecific bowel gas pattern with air-filled small and large bowel.  No   organomegaly noted.  No pathologic calcifications.  Degenerative changes of   the lower lumbar spine.           Impression   Nonspecific bowel gas pattern without evidence of small bowel obstruction. 3/29 CXR      Narrative   EXAMINATION:   SINGLE XRAY VIEW OF THE CHEST       3/29/2019 8:26 am       COMPARISON:   Chest radiograph performed 03/28/2019.       HISTORY:   ORDERING SYSTEM PROVIDED HISTORY: effusions   TECHNOLOGIST PROVIDED HISTORY:   effusions       FINDINGS:   There are large bilateral effusions with adjacent consolidation.  There is   underlying pulmonary congestion.  There is no pneumothorax.  The heart is   enlarged with stable cardiac leads.  The upper abdomen is unremarkable.  The   extrathoracic soft tissues are unremarkable.           Impression   Cardiomegaly with large bilateral effusions and adjacent consolidation   concerning for pneumonia.               3/27 CXR    Narrative   EXAMINATION:   TWO VIEWS OF THE CHEST       3/28/2019 7:56 am       COMPARISON:   Chest radiograph performed 03/27/2019.       HISTORY:   ORDERING SYSTEM PROVIDED HISTORY: effusions   TECHNOLOGIST PROVIDED HISTORY:   effusions       FINDINGS:   There is a small left and a large right pleural effusion with adjacent   infiltrate.  There is no pneumothorax.  The mediastinal structures are stable   with stable cardiac leads.  The upper abdomen is unremarkable.  The   extrathoracic soft tissues are unremarkable.           Impression   Small left and large right pleural effusion with adjacent infiltrate   representing atelectasis versus pneumonia.  Overall there may be mild   improvement of the right-sided effusion.              Narrative   EXAMINATION:   SINGLE XRAY VIEW OF THE CHEST       3/26/2019 2:37 am       COMPARISON:   March 20, 2019.       HISTORY: ORDERING SYSTEM PROVIDED HISTORY: SOB, PE   TECHNOLOGIST PROVIDED HISTORY:   SOB, PE       FINDINGS:   Frontal portable view of the chest.  Low lung volume.  Progressed moderate to   large bilateral pleural effusions with consolidation.  Indistinct pulmonary   vasculature.  No pneumothorax.  The cardiomediastinal silhouette is not well   evaluated.  Atherosclerotic thoracic aorta.  Left pectoral trans venous   dual-chamber cardiac pacer device.           Impression   Progressed moderate to large bilateral pleural effusions with consolidation. Superimposed infection is not excluded.             Medical Decision Making-Other: Thank you for allowing us to participate in the care of this patient. Please call with questions.     Marvell Krabbe, MD

## 2019-04-09 NOTE — PROGRESS NOTES
Pulmonary Progress Note    CC:  chf   Subjective:  No acute events   SOB and Creatinine better following HD yesterday    Review of Systems -  General ROS: fatigue  ENT ROS: negative for - headaches, oral lesions or sore throat  Cardiovascular ROS: no chest pain , +orthopnea   Gastrointestinal ROS: no abdominal pain, change in bowel habits, or black or bloody stools  Skin - no rash   Neuro - no blurry vision , no loc . No focal weakness   msk - no jt tenderness or swelling    Vascular - no claudication , rest completed and negative   Lymphatic - complete and negative   Hematology - oncology - complete and negative   Allergy immunology - complete and negative    no burning or hematuria      Immunization     There is no immunization history on file for this patient. Pneumococcal Vaccine     [] Up to date    [x] Indicated   [] Refused  [] Contraindicated       Influenza Vaccine   [] Up to date    [x] Indicated   [] Refused  [] Contraindicated     PAST MEDICAL HISTORY:       Diagnosis Date    Aortic stenosis 02/15/2019    Atrial fibrillation ([address]     CAD (coronary artery disease)     Chest pain 02/15/2019    CHF (congestive heart failure) (HCC)     Chronic anemia     Chronic kidney disease     Hypertension     Pleural effusion on left 02/15/2019    Pleural effusion, right 02/15/2019    Pulmonary emboli ([address] 02/15/2019    Pulmonary hypertension ([address] 03/06/2019    Thyroid disease     hypothyroidism         Family History:   History reviewed. No pertinent family history.     SURGICAL HISTORY:   Past Surgical History:   Procedure Laterality Date    CORONARY ANGIOPLASTY WITH STENT PLACEMENT  03/11/2019    complex PCI of LT MAIN, LAD    JOINT REPLACEMENT      right ankle    OTHER SURGICAL HISTORY  04/02/2019    UPGRADE TO BI-V PACEMAKER    OTHER SURGICAL HISTORY  03/15/2019    TAVR PROCEDURE    PACEMAKER PLACEMENT  01/20/2019    THORACENTESIS                TOBACCO:   reports that he quit smoking about 30 years ago. He has never used smokeless tobacco.  ETOH:   reports that he drank alcohol. ALLERGIES:    Allergies   Allergen Reactions    Quinolones      Pt is allergic to fluoroquinolones       Home Meds:   Prior to Admission medications    Medication Sig Start Date End Date Taking? Authorizing Provider   amiodarone (CORDARONE) 200 MG tablet Take 200 mg by mouth daily   Yes Historical Provider, MD   atorvastatin (LIPITOR) 20 MG tablet Take 20 mg by mouth daily   Yes Historical Provider, MD   isosorbide mononitrate (IMDUR) 60 MG extended release tablet Take 60 mg by mouth daily   Yes Historical Provider, MD   levothyroxine (SYNTHROID) 100 MCG tablet Take 100 mcg by mouth Daily   Yes Historical Provider, MD   lisinopril (PRINIVIL;ZESTRIL) 40 MG tablet Take 40 mg by mouth daily   Yes Historical Provider, MD   apixaban (ELIQUIS) 2.5 MG TABS tablet Take 2.5 mg by mouth daily   Yes Historical Provider, MD   metoprolol tartrate (LOPRESSOR) 50 MG tablet Take 50 mg by mouth 2 times daily   Yes Historical Provider, MD         Intake/Output Summary (Last 24 hours) at 4/9/2019 1510  Last data filed at 4/9/2019 0600  Gross per 24 hour   Intake 940 ml   Output 2920 ml   Net -1980 ml         Diet   DIET CARDIAC; Low Sodium (2 GM);  Daily Fluid Restriction: 1800 ml  Dietary Nutrition Supplements:  Dietary Nutrition Supplements: Frozen Oral Supplement  Dietary Nutrition Supplements: Renal Oral Supplement    Vitals:   BP (!) 118/37   Pulse 60   Temp 98.1 °F (36.7 °C) (Oral)   Resp 18   Ht 5' 6\" (1.676 m)   Wt 147 lb 0.8 oz (66.7 kg)   SpO2 94%   BMI 23.73 kg/m²  on         I/O (24 Hours)    Patient Vitals for the past 8 hrs:   BP Temp Temp src Pulse SpO2   04/09/19 1211 (!) 118/37 98.1 °F (36.7 °C) Oral 60 94 %   04/09/19 0810 -- -- -- -- 93 %   04/09/19 0728 (!) 146/43 98 °F (36.7 °C) Oral 69 (!) 87 %       Intake/Output Summary (Last 24 hours) at 4/9/2019 1510  Last data filed at 4/9/2019 0600  Gross per 24 hour   Intake 940 ml   Output 2920 ml   Net -1980 ml     I/O last 3 completed shifts: In: 940 [P.O.:590]  Out: 2920 [Urine:160]   Date 04/09/19 0000 - 04/09/19 2359   Shift 2533-7356 2958-1591 5101-8304 24 Hour Total   INTAKE   P.O.(mL/kg/hr) 350(0.7)   350   Shift Total(mL/kg) 350(5.2)   350(5.2)   OUTPUT   Urine(mL/kg/hr) 75(0.1)   75   Shift Total(mL/kg) 75(1.1)   75(1.1)   Weight (kg) 66.7 66.7 66.7 66.7     Patient Vitals for the past 96 hrs (Last 3 readings):   Weight   04/08/19 1825 147 lb 0.8 oz (66.7 kg)   04/08/19 1450 150 lb 12.7 oz (68.4 kg)   04/08/19 0601 141 lb 12.1 oz (64.3 kg)      xray   B/l effusion and atelectasis   Vascular congestion    PHYSICAL EXAMINATION:  Head and neck atraumatic, normocephalic    Lymph nodes-no cervical, supraclavicular lymphadenopathy    Neck-no JVP elevation    Lungs - dull bases and dec bs and no wheeze no rales     CVS- S1, S2 regular. No S3 no S4, no murmurs    Abdomen-nontender, nondistended. Bowel sounds are present. No organomegaly    Lower extremity-+ edema    Upper extremity-no edema    Neurological-grossly normal cranial nerves.   No overt motor deficit        Medications:    Scheduled Meds:   bumetanide  4 mg Oral BID    simethicone  80 mg Oral Q6H    docusate sodium  100 mg Oral BID    hydrALAZINE  100 mg Oral 3 times per day    lidocaine-EPINEPHrine  20 mL Intradermal Once    lidocaine PF  1 mL Intradermal Once    vancomycin  1,000 mg Intravenous Once    bacitracin in 0.9 % sodium chloride irrigation  50,000 Units Topical Once    sodium chloride flush  10 mL Intravenous 2 times per day    albumin human  25 g Intravenous Once    aspirin  81 mg Oral Daily    spironolactone  25 mg Oral Daily    metoprolol succinate  50 mg Oral Daily    FLUoxetine  10 mg Oral Daily    levothyroxine  125 mcg Oral Daily    ALPRAZolam  0.25 mg Oral Once    QUEtiapine  25 mg Oral Nightly    clopidogrel  75 mg Oral Daily    isosorbide mononitrate  30 mg Oral Daily    [Held by provider] docusate sodium  100 mg Oral Daily    amiodarone  200 mg Oral Daily    atorvastatin  20 mg Oral Daily       Continuous Infusions:   dextrose 20 mL/hr at 04/06/19 1858       PRN Meds:      Labs:  CBC:   Recent Labs     04/07/19  0526 04/08/19  0809 04/09/19  0431   WBC 10.7 11.7* 9.6   HGB 7.6* 8.6* 7.2*   HCT 25.1* 28.3* 25.3*   MCV 94.0 93.7 98.4   * 145 See Reflexed IPF Result     BMP:   Recent Labs     04/07/19  0526 04/08/19  0809 04/09/19  0431   * 130* 133*   K 3.5* 3.8 3.8   CL 94* 92* 98   CO2 24 22 24   BUN 43* 54* 31*   CREATININE 2.85* 3.64* 2.57*     LIVER PROFILE:   No results for input(s): AST, ALT, LIPASE, BILIDIR, BILITOT, ALKPHOS in the last 72 hours. Invalid input(s): AMYLASE,  ALB  PT/INR:   No results for input(s): PROTIME, INR in the last 72 hours. APTT:   No results for input(s): APTT in the last 72 hours. UA:  No results for input(s): NITRITE, COLORU, PHUR, LABCAST, WBCUA, RBCUA, MUCUS, TRICHOMONAS, YEAST, BACTERIA, CLARITYU, SPECGRAV, LEUKOCYTESUR, UROBILINOGEN, BILIRUBINUR, BLOODU, GLUCOSEU, AMORPHOUS in the last 72 hours. Invalid input(s): KETONESU  No results for input(s): PHART, [id number], PO2ART in the last 72 hours. 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  *  Multi-vessel coronary artery disease post PCI  Severe aortic stenosis, post TAVR  Atrial fibrillation  Small right middle lobe pulmonary embolism.   Per chart, from imaging studies done in [place]  Thrombocytopenia - hit negative     Plan:    Continue hemodialysis per nephrology  Cont incentive spirometry and deep breathing   Encourage physical therapy    Electronically signed by Pinky Navas MD on 4/9/2019 at 3:10 PM

## 2019-04-10 NOTE — PROGRESS NOTES
at patient bedside with writer to discuss treatment options. Spoke with patient in full detail about dialysis and possible options the patient has in relation to continuing or stopping treatment. Informed patient that he would be back in one day after speaking with  to hear patients thoughts on how he would like his care to be continued. Patient acknowledged that he would be willing to speak further and make his final decision at a later time.

## 2019-04-10 NOTE — PROGRESS NOTES
[facility] - Progress Note  Today's Date and Time: 4/10/2019, 2:03 PM    Impression :   1. Recurrent bilateral pleural effusions w/lung compression  2. CHF  3. 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 Multivessel CAD s/p multiple stent placements  5. S/P TAVR  6. HUYEN on HD  7. Urinary retention  8. Pulmonary hypertension  9. S/P Upgrade of dual chamber PPM to CRT-P on 4-2-19  10. Anemia s/p transfusion 4/3    Recommendations:     · Wound care Lt chest incision  · HD as per nephrology    Medical Decision Making/Summary/Discussion:   · 81 yo gentleman who developed complete heart block. Found to have multivessel CAD, severe aortic stenosis and pulmonary HTN. · Underwent pacemaker placement with subsequent bouts of acute CHF and multiple hospitalizations in [place]  · Brought to [facility] by family for further care. · Since admission at [facility] he has had cardiac catheterizations x 2 with multiple stent placements and a TAVR on 3-15. Pt remains on a nitroglycerine drip  · Pt continues to require frequent thoracenteses  · He has suffered an HUYEN and is currently on a bumex drip  · CXR 3/26 showed progressed moderate to large bilateral pleural effusions with consolidation. · ID consult placed for treatment of any potential pneumonia  · Pt is afebrile, without cough or sputum production. He remains SOB with exertion and at times, when quiet. · Currently no evidence of active infection. Pt will require aggressive pulmonary toilet and diuresis. Plan to monitor off antibiotics  · Patient to continue HD  · He had upgraded pacemaker placed on 4-2-19. · Developed hematoma at pacer site, resolved with pressure. Elequis held 4-3. Hgb to 6.5 received 1u PRBC. Site ecchymotic but no signs of infection.   · Patient is continuing hemodialysis     Infection Control Recommendations   · [place] Precautions    Antimicrobial Stewardship Recommendations     · Monitor off antibiotics    Coordination of Outpatient Care: · Estimated Length of IV antimicrobials: None  · Patient will need Midline Catheter Insertion:   · Patient will need PICC line Insertion:  · Patient will need: Home IV , [place],  SNF,  LTAC TBD  · Patient will need outpatient wound care: No    Chief complaint/reason for consultation:   · Possible pneumonia      History of Present Illness:   Shanel Ro is a 80y.o.-year-old  male who was initially admitted on 3/6/2019. Patient seen at the request of Dr. Nanette Lagunas     INITIAL HISTORY:    Pt is a 79 yo gentleman who has recently had multiple hospital admissions in [place] for decompensated heart failure. His symptoms began in December 2018. 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 A cardiac cath at that time showed LM and LAD calcified stenosis 80-90% in multiple areas, with severe disease in D1, D2, Om1 and OM2. Minimal disease in RCA. LV function with EF 25%. Severe aortic stenosis compromising his clinical improvedmnt      He underwent a dual chamber pacemaker placement. Following that, he had multiple hospitalizations for acute heart failure and shortness of breath. His baseline creatinine was 1.3, now >2.0. He has had multiple thoracenteses with transudative pleural fluid removed.      He was brought to [facility] for further evaluation and was admitted on 3/6/19     An ECHO from admission showed   Normal left ventricle size with mildly reduced systolic function; Estimated  left ventricular ejection fraction 40-45%  Anterolateral wall hypokinesis. Mild left ventricular hypertrophy. Left atrium is moderately dilated. Grade II diastolic dysfunction. Heavily calcified aortic valve with reduced systolic excursion and evidence  of moderate stenosis. (Peak nataliia 3.62 m/s and mean gradient 29 mmHg)  Moderate posterior pericardial effusion without any echo signs of tamponade. Normal right ventricular size and function.   Pacemaker lead seen in right ventricle. Moderate tricuspid regurgitation. Estimated right ventricular systolic pressure is 60 mmHg suggesting moderate  pulmonary HTN.    He underwent an atherectomy/JEAN-CLAUDE of the left main and LAD on 3/6. Because of the high surgical risk and renal function he was taken back to the cath lab on 3/11 for PTCA-JEAN-CLAUDE LAD, PTCRA-JEAN-CLAUDE LM with plan to consider a TAVR if pt remained stable.     A TAVR was performed on 3/15     A carotid study showed less than 50% stenosis bilaterally.     He has had multiple thoracenteses since admission. Pt has been followed by CT surgery, cardiology, pulmonary, nephrology and urology.     On the evening of 3/25 pt developed hypertension and SOB at rest. A stat CXR was done that showed progressed moderate to large bilateral pleural effusions with consolidation.      On 3/26 pt underwent a Lt thoracentesis with 1400 ml clear pleural fluid removed. Pt reports feeling much better after the procedure and is asking to sit in the chair.      I am consulted to determine if Mr Mynor Lucas has pneumonia. Pt had continued decline in renal function, HD initiated 3-29  PPV pacemaker placed 4-2, pt with hematoma at site, resolved with pressure. Anticoagulation held 4-3. Hgb to 6.5 received 1u PRBC     CURRENT EXAMINATION: 4/10/2019    Pt seen at bedside. Comfortable  On nasal cannula    Afebrile  VS stable, hypertensive. One reading was 174/54. Repeat is now 130/47. Planning for perm catheter placement before discharge. He had upgraded pacemaker placed on 4-2-19  Vancomycin given pre-op  No further antibiotics have been required     On exam pt is in no distress  Breath sounds are diminished bilateral bases   Lt chest with ecchymoses from pacemaker.     CXR today shows persistent pulmonary edema and bilateral pleural effusions.     Labs reviewed: 4/10/2019    WBC 7.7->11.4->10.5->8.6 -> 9.6 -> 11.8  Hgb 8.8->6.5->8.3->8.9->7.4 -> 7.2  Plt 57->90->92 -> 73-->105    Cr 2.74->2.61->2.95->3.29 -> 2. 57  BUN 44->38->47->54 -> 24    Cultures:  Pleural fluid:  · 3/7 No growth, many neutrophils seen, no bacteria, and no growth    Discussed with Pt, HD RN. I have personally reviewed the past medical history, past surgical history, medications, social history, and family history, and I have updated the database accordingly.   Past Medical History:     Past Medical History:   Diagnosis Date    Aortic stenosis 02/15/2019    Atrial fibrillation (HCC)     CAD (coronary artery disease)     Chest pain 02/15/2019    CHF (congestive heart failure) (HCC)     Chronic anemia     Chronic kidney disease     Hypertension     Pleural effusion on left 02/15/2019    Pleural effusion, right 02/15/2019    Pulmonary emboli ([facility] 02/15/2019    Pulmonary hypertension ([facility] 03/06/2019    Thyroid disease     hypothyroidism       Past Surgical  History:     Past Surgical History:   Procedure Laterality Date    CORONARY ANGIOPLASTY WITH STENT PLACEMENT  03/11/2019    complex PCI of LT MAIN, LAD    JOINT REPLACEMENT      right ankle    OTHER SURGICAL HISTORY  04/02/2019    UPGRADE TO BI-V PACEMAKER    OTHER SURGICAL HISTORY  03/15/2019    TAVR PROCEDURE    PACEMAKER PLACEMENT  01/20/2019    THORACENTESIS         Medications:      bumetanide  4 mg Oral BID    simethicone  80 mg Oral Q6H    docusate sodium  100 mg Oral BID    hydrALAZINE  100 mg Oral 3 times per day    lidocaine-EPINEPHrine  20 mL Intradermal Once    lidocaine PF  1 mL Intradermal Once    vancomycin  1,000 mg Intravenous Once    bacitracin in 0.9 % sodium chloride irrigation  50,000 Units Topical Once    sodium chloride flush  10 mL Intravenous 2 times per day    albumin human  25 g Intravenous Once    aspirin  81 mg Oral Daily    spironolactone  25 mg Oral Daily    metoprolol succinate  50 mg Oral Daily    FLUoxetine  10 mg Oral Daily    levothyroxine  125 mcg Oral Daily    ALPRAZolam  0.25 mg Oral Once    QUEtiapine  25 mg Oral Nightly  clopidogrel  75 mg Oral Daily    isosorbide mononitrate  30 mg Oral Daily    [Held by provider] docusate sodium  100 mg Oral Daily    amiodarone  200 mg Oral Daily    atorvastatin  20 mg Oral Daily       Social History:     Social History     Socioeconomic History    Marital status:      Spouse name: Not on file    Number of children: Not on file    Years of education: Not on file    Highest education level: Not on file   Occupational History     Employer: RETIRED   Social Needs    Financial resource strain: Not on file    Food insecurity:     Worry: Not on file     Inability: Not on file    Transportation needs:     Medical: Not on file     Non-medical: Not on file   Tobacco Use    Smoking status: Former Smoker     Last attempt to quit:      Years since quittin.2    Smokeless tobacco: Never Used   Substance and Sexual Activity    Alcohol use: Not Currently    Drug use: Never    Sexual activity: Not on file   Lifestyle    Physical activity:     Days per week: Not on file     Minutes per session: Not on file    Stress: Not on file   Relationships    Social connections:     Talks on phone: Not on file     Gets together: Not on file     Attends [employer] service: Not on file     Active member of club or organization: Not on file     Attends meetings of clubs or organizations: Not on file     Relationship status: Not on file    Intimate partner violence:     Fear of current or ex partner: Not on file     Emotionally abused: Not on file     Physically abused: Not on file     Forced sexual activity: Not on file   Other Topics Concern    Not on file   Social History Narrative    Not on file       Family History:   History reviewed. No pertinent family history.      Allergies:   Quinolones     Review of Systems:   AA, reports continued tenderness at pacemaker site  No chills or fevers, no SOB  Seen on HD    Physical Examination :     Patient Vitals for the past 8 hrs:   BP Temp Temp src Pulse Resp SpO2   04/10/19 1130 (!) 130/47 97.8 °F (36.6 °C) Oral 60 17 95 %   04/10/19 0806 -- -- -- -- -- 96 %   04/10/19 0650 (!) 174/54 -- -- 61 -- --     General Appearance: Seen in HD, easily roused, no distress  Head:  Normocephalic, no trauma  Eyes: Pupils equal, round, reactive to light and accommodation; extraocular movements intact; sclera anicteric. ENT: Oropharynx clear. Mouth/throat: mucosa pink and moist. No lesions. Dentition in good repair. Neck: Supple, without lymphadenopathy. Pulmonary/Chest: Clear to auscultation, without wheezes, rales, or rhonchi. Decreased breath sounds at bilateral bases   Cardiovascular: Regular rate and rhythm without murmurs, rubs, or gallops. Paced  Abdomen: Soft. Bowel sounds normal.  : Johnston in place  All four Extremities: Mild edema. Neurologic: No gross sensory or motor deficits. Skin: Warm and dry with good turgor. No signs of peripheral arterial or venous insufficiency. No ulcerations. No open wounds. Lt chest pacemaker incision with hematoma, staples intact, no oozing or bleeding noted    Medical Decision Making -Laboratory:   I have independently reviewed/ordered the following labs:    CBC with Differential:   Recent Labs     04/09/19  0431 04/10/19  0447   WBC 9.6 11.8*   HGB 7.2* 8.0*   HCT 25.3* 26.2*   PLT See Reflexed IPF Result See Reflexed IPF Result   LYMPHOPCT 6* 7*   MONOPCT 8 8     BMP:   Recent Labs     04/09/19  0431 04/10/19  0447   * 130*   K 3.8 3.7   CL 98 94*   CO2 24 24   BUN 31* 42*   CREATININE 2.57* 3.21*     Hepatic Function Panel:   No results for input(s): PROT, LABALBU, BILIDIR, IBILI, BILITOT, ALKPHOS, ALT, AST in the last 72 hours. No results for input(s): RPR in the last 72 hours. No results for input(s): HIV in the last 72 hours. No results for input(s): BC in the last 72 hours.   Lab Results   Component Value Date    MUCUS NOT REPORTED 03/29/2019    RBC 2.79 04/10/2019    TRICHOMONAS NOT REPORTED 03/29/2019    WBC 11.8 04/10/2019    YEAST NOT REPORTED 2019    TURBIDITY CLEAR 2019     Lab Results   Component Value Date    CREATININE 3.21 04/10/2019    GLUCOSE 90 04/10/2019       Medical Decision Making-Imagin-5 CXR    EXAMINATION:   SINGLE XRAY VIEW OF THE CHEST       2019 5:39 am       COMPARISON:   2019       HISTORY:   ORDERING SYSTEM PROVIDED HISTORY: pleural effusions   TECHNOLOGIST PROVIDED HISTORY:   pleural effusions       FINDINGS:   AP portable view of the chest time stamped at 519 hours demonstrates   overlying cardiac monitoring electrodes.  Right internal jugular catheter   terminates in the distal superior vena cava.  Multiple polar pacemaker enters   from the left with intact leads in appropriate positions.  Pacemaker battery   box and skin clips overlie the left axilla.  Heart is enlarged and there is   pulmonary vascular congestion with bilateral perihilar opacities consistent   with pulmonary edema.  Moderate bilateral pleural effusions are noted.  No   extrapleural air is seen.  No midline shift is noted.  Osseous structures are   stable.           Impression   Continued cardiomegaly, opacities most compatible with pulmonary edema and   bilateral pleural effusions.          4- CXR    EXAMINATION:   SINGLE XRAY VIEW OF THE CHEST       2019 6:04 am       COMPARISON:   2019       HISTORY:   ORDERING SYSTEM PROVIDED HISTORY: SOB   TECHNOLOGIST PROVIDED HISTORY:   SOB       FINDINGS:   There is a left-sided transvenous pacer in place and a right internal jugular   dialysis catheter.  There is cardiomegaly with pulmonary vascular congestion   and edema.  There are bilateral pleural effusions which are increased.  The   pulmonary edema appears worse on the current exam.           Impression   Worsening edema and increased bilateral pleural effusions         4- AXR  EXAMINATION:   SINGLE SUPINE XRAY VIEW(S) OF THE ABDOMEN       2019 2:40 pm       COMPARISON:   None.         COMPARISON:   March 20, 2019.       HISTORY:   ORDERING SYSTEM PROVIDED HISTORY: SOB, PE   TECHNOLOGIST PROVIDED HISTORY:   SOB, PE       FINDINGS:   Frontal portable view of the chest.  Low lung volume.  Progressed moderate to   large bilateral pleural effusions with consolidation.  Indistinct pulmonary   vasculature.  No pneumothorax.  The cardiomediastinal silhouette is not well   evaluated.  Atherosclerotic thoracic aorta.  Left pectoral trans venous   dual-chamber cardiac pacer device.           Impression   Progressed moderate to large bilateral pleural effusions with consolidation. Superimposed infection is not excluded.             Medical Decision Making-Other: Thank you for allowing us to participate in the care of this patient. Please call with questions. Emily Segal     ATTESTATION:    I have discussed the case, including pertinent history and exam findings with the residents. I have seen and examined the patient and the key elements of the encounter have been performed by me. I have reviewed the laboratory data, other diagnostic studies and discussed them with the residents. I have updated the medical record where necessary. I agree with the assessment, plan and orders as documented by the resident.     Betty Patterson MD.

## 2019-04-10 NOTE — PROGRESS NOTES
and group on floor rounding. Per 's request writer asked Maggie Mckenzie if it was okay to d/c lipitor and if the hydralazine dose could be changed to achieve a higher blood pressure for kidney function. Maggie Mckenzie wants to continue lipitor and asked writer to refer to  with questions regarding hydralazine in reference to kidney function.  also on floor rounding during same time period; writer referred to THE [facility] regarding hydralazine dose. Dr. Kaylen Tavarez said he would evaluate dosage.

## 2019-04-10 NOTE — PROGRESS NOTES
Occupational Therapy  Facility/Department: [facility] CAR 1  Daily Treatment Note  NAME: Demian Ann  : 12/10/1928  MRN: [id number]    Date of Service: 4/10/2019    Discharge Recommendations:  Further therapy recommended at discharge. Assessment   Performance deficits / Impairments: Decreased ADL status; Decreased endurance;Decreased functional mobility ; Decreased balance;Decreased ROM; Decreased strength;Decreased high-level IADLs  Assessment: Pt would benefit from continued acute care OT to address deficits in ADL/ functional activities, endurance, balance and functional transfers/ mobility following sx. Treatment Diagnosis: TAVR, PPM  Prognosis: Good  Patient Education: OT POC and PPM precaution, Issued written info and pt verbalized understanding. REQUIRES OT FOLLOW UP: Yes  Activity Tolerance  Activity Tolerance: Patient Tolerated treatment well  Safety Devices  Safety Devices in place: Yes  Type of devices: All fall risk precautions in place;Call light within reach; Patient at risk for falls; Left in chair;Nurse notified         Patient Diagnosis(es): The encounter diagnosis was Pulmonary hypertension ([address] has a past medical history of Aortic stenosis, Atrial fibrillation ([address] CAD (coronary artery disease), Chest pain, CHF (congestive heart failure) ([address] Chronic anemia, Chronic kidney disease, Hypertension, Pleural effusion on left, Pleural effusion, right, Pulmonary emboli ([address] Pulmonary hypertension ([address] and Thyroid disease. has a past surgical history that includes pacemaker placement (2019); thoracentesis; joint replacement; Coronary angioplasty with stent (2019); other surgical history (2019); and other surgical history (03/15/2019).     Restrictions  Restrictions/Precautions  Restrictions/Precautions: Weight Bearing, Cardiac, General Precautions, ROM Restrictions, Surgical Protocols, Fall Risk  Required Braces or Orthoses?: Yes(Sling for L UE, but not on or in room upon arrival)  Implants present? : Pacemaker(4/2/19)  Upper Extremity Weight Bearing Restrictions  Left Upper Extremity Weight Bearing: (partial weight bearing for PPM 4/2)  Required Braces or Orthoses  Left Upper Extremity Brace/Splint: Sling(not on or in room upon arrival)  Position Activity Restriction  Other position/activity restrictions: Up w/assist.  Subjective   General  Patient assessed for rehabilitation services?: Yes  Family / Caregiver Present: No  Pain Assessment  Clinical Progression: Gradually improving  Response to Pain Intervention: Patient Satisfied  Vital Signs  Patient Currently in Pain: Denies     Objective    ADL  Feeding: Independent;Setup(Seated in recliner at tray table)  Grooming: Stand by assistance;Setup  Toileting: Minimal assistance (assist for wiping bottom)     Balance  Sitting Balance: Stand by assistance  Standing Balance: Contact guard assistance  Standing Balance  Time: Pt stood for approx 5 min for transfers, func mob and fredrick care.   Activity: func mob from recliner<>bathroom, standing for pericare  Sit to stand: Minimal assistance  Stand to sit: Contact guard assistance  Comment: No LOB w/RW  Functional Mobility  Functional - Mobility Device: Rolling Walker  Activity: To/from bathroom  Assist Level: Contact guard assistance  Functional Mobility Comments: No LOB  Transfers  Stand Step Transfers: Contact guard assistance  Sit to stand: Minimal assistance  Stand to sit: Contact guard assistance  Transfer Comments: Vc's not to pull up w/LUE from toilet using RW    Plan   Plan  Times per week: 4-5x/wk     Goals  Short term goals  Time Frame for Short term goals: by discharge, pt will  Short term goal 1: demo I in UE ADL activities   Short term goal 2: demo MI in LE ADL activities with AD as needed  Short term goal 3: demo understanding and I use of EC/WS, fall prevention and proper pursed lipped breathing tech during functional activities   Short term goal 4: demo increased activity tolerance of 30+ min to assist with ADL/ functional activities  Short term goal 5: demo I in functional transfers/ mobility with use of RW to assist with ADL/ functional activities   Short term goal 6: demo understanding and I use of safe transfer tech during functional activities with use of RW  Patient Goals   Patient goals : to go home     Therapy Time   Individual Concurrent Group Co-treatment   Time In 1330         Time Out  20 [facility], [place]/

## 2019-04-10 NOTE — PROGRESS NOTES
Writer in the room with Dr Ferny Moreno. Dr Ferny Moreno explained to patient in great detail the dialysis process and the difference of each dialysis treatments. Dr Ferny Moreno explained to patient if the patient does not continue dialysis the results are \"The End of the Road\" Dr Ferny Moreno explained to the patient that is ultimately up to him if he wants to continue the treatments or not. Dr Ferny Moreno is not doing a treatment today and will leave it up to the patient to make his decision. Patient has not made a decision about dialysis at this time.

## 2019-04-10 NOTE — PROGRESS NOTES
Denies       Orientation  Orientation  Overall Orientation Status: Within Normal Limits     Objective   Bed mobility  Comment: PT seated in bedside chair upon therapist arrival.   Transfers  Sit to Stand: Contact guard assistance  Stand to sit: Contact guard assistance  Ambulation  Ambulation?: Yes  Ambulation 1  Surface: level tile  Device: Rolling Walker  Assistance: Contact guard assistance  Quality of Gait: Decreased renato, short step length, flexed posture, excessive heel strike. Distance: 130ft  Comments: Pt amb w/out O2 this date. SpO2 at 88% post amb, quickly returned to 93% w/rest break and O2 on 1.5L. X2 10 second standing rest breaks during ambulation. Balance  Posture: Fair  Sitting - Static: Good  Sitting - Dynamic: Good  Standing - Static: Good;-  Standing - Dynamic: Fair;+  Comments: BUE support on RW. Exercises  Seated LE exercise program: Long Arc Quads, hip abduction/adduction, heel/toe raises, and marches. Reps: x20  Incentive spirometer: 1000ml x10  Comments: Exercise limited d/t decreased endurance this date. Assessment   Body structures, Functions, Activity limitations: Decreased functional mobility ; Decreased balance;Decreased endurance  Assessment: Pt improved amb from 100ft to x130ft w/RW CGA, no O2. x2 10 sec tanding rest breaks during ambulation d/t decreased endurance. Short rest breaks necessary d/t decreased endurance in between seated exercises. Prognosis: Good  Patient Education: Proper breathing technique during ambulation; pt w/good return demo. REQUIRES PT FOLLOW UP: Yes  Activity Tolerance  Activity Tolerance: Patient limited by endurance; Patient Tolerated treatment well  Activity Tolerance: Rest breaks as needed.      Goals  Short term goals  Time Frame for Short term goals: 14 visits  Short term goal 1: Perform bed mobility and functional transfers independently  Short term goal 2: Ambulate 300ft with least restrictive AD independently  Short term goal 3: Demo Good- dynamic standing balance to decrease risk of falls  Short term goal 4: Participate in 30 minutes of therapy to demo increased endurance    Plan    Plan  Times per week: 6-7x/wk  Current Treatment Recommendations: Strengthening, Balance Training, Functional Mobility Training, Endurance Training, Transfer Training, Patient/Caregiver Education & Training, Safety Education & Training, Home Exercise Program, Gait Training  Safety Devices  Type of devices: All fall risk precautions in place, Gait belt, Call light within reach, Nurse notified, Left in chair  Restraints  Initially in place: No     Therapy Time   Individual Concurrent Group Co-treatment   Time In 0936         Time Out 1010         Minutes [address] [place], [place] Treatment performed by Student PTA under the supervision of co-signing PTA who agrees with all treatment and documentation.    Lucie Hemphill, PTA

## 2019-04-10 NOTE — PLAN OF CARE
Problem: Falls - Risk of:  Goal: Will remain free from falls  Description  Will remain free from falls  Outcome: Ongoing  Goal: Absence of physical injury  Description  Absence of physical injury  Outcome: Ongoing  Note:   Bed lock and in lowest position, side rails up x 2, room free from clutter, call light within reach, alert and oriented x 4 and remains free from falls. Problem: Pain:  Goal: Pain level will decrease  Description  Pain level will decrease  Outcome: Ongoing  Goal: Control of acute pain  Description  Control of acute pain  Outcome: Ongoing  Goal: Control of chronic pain  Description  Control of chronic pain  Outcome: Ongoing  Note:   Patient ask if having pain and patient's denies pain. Patient instructed to notify writer if having any pain. Problem: Risk for Impaired Skin Integrity  Goal: Tissue integrity - skin and mucous membranes  Description  Structural intactness and normal physiological function of skin and  mucous membranes. Outcome: Ongoing  Note:   Patient able to turn per self, skin assessment complete and no new sign signs of skin breakdown.       Problem: Cardiac Output - Decreased:  Goal: Hemodynamic stability will improve  Description  Hemodynamic stability will improve  Outcome: Ongoing

## 2019-04-10 NOTE — CONSULTS
though not his DPOA for medical.      Process (conversation with patient, family, doctor, nurse, care coordinator, ethics committee member, pastoral care, consultants, , others):  I reviewed the chart and spoke to several providers including the patient's attending, nurse, nephrologist and pulmonologist and risk to understand the situation and the questions they might have. Recommendations: The medical/surgical team ensures that the patient's wishes are heard and followed by all his care providers. His freely given consent is required for treatments and testing because he has capacity to make his own decisions. Clarify with the patient that currently his niece is his DPOA for legal and financial issues. His daughter in [place] would be his legal surrogate for medical unless he completes paper work naming someone else. That someone else does not need to be family and does not need to be physically present. It needs to be someone whom he chooses to carry out his wishes. The responsibility of the legal surrogate, either by family order of decision makers or DPOA for medical,  is to ensure that the medical team carries out the patient's wishes. The legal surrogate would only be called upon if and when the patient does not have capacity to make his own decisions.     Thank you for the opportunity to serve the patient and medical/surgical team.

## 2019-04-10 NOTE — PROGRESS NOTES
basilar crackles. ,  No wheezing or rhonchi appreciated  Cardiovascular:  Paced rhythm, positive S1 and S2 and no rubs  Abdomen: soft, not obese, Non tender/non distended. Bowel sounds are active  Extremities: No Cyanosis or Clubbing or pitting lower extremity edema  Neurological: Alert and oriented x 3.   No abnormalities of mood, affect, memory, mentation, or behavior are noted    MEDICATIONS     Scheduled Meds:    bumetanide  4 mg Oral BID    simethicone  80 mg Oral Q6H    docusate sodium  100 mg Oral BID    hydrALAZINE  100 mg Oral 3 times per day    lidocaine-EPINEPHrine  20 mL Intradermal Once    lidocaine PF  1 mL Intradermal Once    vancomycin  1,000 mg Intravenous Once    bacitracin in 0.9 % sodium chloride irrigation  50,000 Units Topical Once    sodium chloride flush  10 mL Intravenous 2 times per day    albumin human  25 g Intravenous Once    aspirin  81 mg Oral Daily    spironolactone  25 mg Oral Daily    metoprolol succinate  50 mg Oral Daily    FLUoxetine  10 mg Oral Daily    levothyroxine  125 mcg Oral Daily    ALPRAZolam  0.25 mg Oral Once    QUEtiapine  25 mg Oral Nightly    clopidogrel  75 mg Oral Daily    isosorbide mononitrate  30 mg Oral Daily    [Held by provider] docusate sodium  100 mg Oral Daily    amiodarone  200 mg Oral Daily    atorvastatin  20 mg Oral Daily     Continuous Infusions:    dextrose 20 mL/hr at 04/06/19 1858     PRN Meds:    Home Meds:                Medications Prior to Admission: amiodarone (CORDARONE) 200 MG tablet, Take 200 mg by mouth daily  atorvastatin (LIPITOR) 20 MG tablet, Take 20 mg by mouth daily  isosorbide mononitrate (IMDUR) 60 MG extended release tablet, Take 60 mg by mouth daily  levothyroxine (SYNTHROID) 100 MCG tablet, Take 100 mcg by mouth Daily  lisinopril (PRINIVIL;ZESTRIL) 40 MG tablet, Take 40 mg by mouth daily  apixaban (ELIQUIS) 2.5 MG TABS tablet, Take 2.5 mg by mouth daily  metoprolol tartrate (LOPRESSOR) 50 MG tablet, Take 50 mg by mouth 2 times daily    INVESTIGATIONS     Last 3 CMP:    Recent Labs     04/08/19  0809 04/09/19  0431 04/10/19  0447   * 133* 130*   K 3.8 3.8 3.7   CL 92* 98 94*   CO2 22 24 24   BUN 54* 31* 42*   CREATININE 3.64* 2.57* 3.21*   CALCIUM 8.6 7.8* 8.6       Last 3 CBC:  Recent Labs     04/08/19  0809 04/09/19  0431 04/10/19  0447   WBC 11.7* 9.6 11.8*   RBC 3.02* 2.57* 2.79*   HGB 8.6* 7.2* 8.0*   HCT 28.3* 25.3* 26.2*   MCV 93.7 98.4 93.9   MCH 28.5 28.0 28.7   MCHC 30.4 28.5 30.5   RDW 16.0* 16.1* 16.3*    See Reflexed IPF Result See Reflexed IPF Result   MPV 11.5 NOT REPORTED NOT REPORTED       ASSESSMENT     1. Acute kidney injury secondary to ATN from hypoperfusion related to cardiorenal syndrome - worsening. Hemodialysis dependent since 29th of March  2. Chronic kidney disease stage IV with baseline creatinine suspected around 2-3  3. Nephrotic range proteinuria - Bx not feasible in view of he being on Asprin and cardiology recommends not to stop anticoagulant. Explained to patient and he does not want to do that. 4.  Status post TVR  5. Cardiomyopathy ejection fraction 40-45%/left ventricular diastole dysfunction/moderate to severe mitral regurgitation S/p CRT4/3   6. S/p PCI and atherectomy  3/11  7. Recurrent bilateral pleural effusion status post pleural taps  8. HTN  9. Pulmonary edema/CHF, improved with dialysis and ultrafiltration  10. Thrombocytopenia resolved. 6. Advanced age  15. Hyponatremia, relatively mild and dilutional in nature    PLAN     1. No hemodialysis today.   I spent about 30 minutes in the presence of the nurses discussing with the patient how dialysis works, what we are trying to achieve with the dialysis procedure, including effective toxin clearance and fluid removal.  I did state we could cut the time and possibly performing some isolated ultrafiltration as part of the dialysis prescription to help his symptoms of weakness and feeling poorly that occurred after dialysis through the day yesterday, following the dialysis on 4/8/19. We did discuss that the alternative to performing dialysis would likely be toxin buildup and worsening respiratory symptoms from pulmonary edema/congestive heart failure and likely eventual death from those two factors. He stated he was pleased that we had our discussion. He stated his breathing was improving today and that he was able to move around better without much respiratory problem. He is currently going between room air and 1 liter of oxygen supplemented by nasal cannula. Appetite is reasonable. He still has a Ramirez catheter in place. I did recommend that we discontinue the Ramirez catheter. He is agreeable to removing the Ramirez as he can now breathe better when moving around. At the end of our discussion, I believe he is likely leaning toward continuing dialysis in the hospital and as an outpatient. 2.  Bumex was previously increased to 4 mg  PO BID. 2.  The patient will require a perma cath prior to discharge as long as dialysis will be continued outside of the hospital  4. Hematology- HIT antibody negative. 5.  Chances of renal recovery slim  6. Metolazone was discontinued because of the hyponatremia  7. Remove ramirez catheter today    Apparently, there was an Ethics consult requested. I do not see the necessity of the Ethics consult at this time in light of the discussion as documented above.        Mary Lewis MD  [facility]

## 2019-04-10 NOTE — PROGRESS NOTES
PULMONARY PROGRESS NOTE      Patient:  Haris No  YOB: 1928    MRN: [id number]     Acct: [de-identified]     Admit date: 3/6/2019    REASON FOR CONSULT:- Pulmonary hypertension, bilateral pleural effusion with atelectasis and aortic stenosis with hypothyroidism    Pt seen and Chart reviewed. Patient is on room air. He needs nasal cannula oxygen intermittently  He is up in a chair. Denied any shortness of breath. Denied orthopnea. No chest pain or pressure. No fevers or chills or night sweats. Subjective:   Review of Systems -   General ROS: Completed and except as mentioned above were negative   Psychological ROS:  Completed and except as mentioned above were negative  Allergy and Immunology ROS:  Completed and except as mentioned above were negative  Hematological and Lymphatic ROS:  Completed and except as mentioned above were negative  Respiratory ROS:  Completed and except as mentioned above were negative  Cardiovascular ROS:  Completed and except as mentioned above were negative  Gastrointestinal ROS: Completed and except as mentioned above were negative  Genito-Urinary ROS:  Completed and except as mentioned above were negative  Musculoskeletal ROS:  Completed and except as mentioned above were negative  Neurological ROS:  Completed and except as mentioned above were negative  Dermatological ROS:  Completed and except as mentioned above were negative      Diet:  DIET CARDIAC; Low Sodium (2 GM);  Daily Fluid Restriction: 1800 ml  Dietary Nutrition Supplements:  Dietary Nutrition Supplements: Frozen Oral Supplement  Dietary Nutrition Supplements: Renal Oral Supplement      Medications:Current Inpatient    Scheduled Meds:   bumetanide  4 mg Oral BID    simethicone  80 mg Oral Q6H    docusate sodium  100 mg Oral BID    hydrALAZINE  100 mg Oral 3 times per day    lidocaine-EPINEPHrine  20 mL Intradermal Once results for input(s): BNP in the last 72 hours. Glucose:  No results for input(s): POCGLU in the last 72 hours. HgbA1C: No results for input(s): LABA1C in the last 72 hours. INR:   No results for input(s): INR in the last 72 hours. Hepatic:   No results for input(s): ALKPHOS, ALT, AST, PROT, BILITOT, BILIDIR, LABALBU in the last 72 hours. Amylase and Lipase:No results for input(s): LACTA, AMYLASE in the last 72 hours. Lactic Acid: No results for input(s): LACTA in the last 72 hours. CARDIAC ENZYMES:  No results for input(s): CKTOTAL, CKMB, CKMBINDEX, TROPONINI in the last 72 hours. BNP: No results for input(s): BNP in the last 72 hours. Lipids: No results for input(s): CHOL, TRIG, HDL, LDLCALC in the last 72 hours. Invalid input(s): LDL  ABGs: No results found for: PH, PCO2, PO2, HCO3, O2SAT  Thyroid:   Lab Results   Component Value Date    TSH 3.04 03/20/2019      Urinalysis: No results for input(s): BACTERIA, BLOODU, CLARITYU, COLORU, PHUR, PROTEINU, RBCUA, SPECGRAV, BILIRUBINUR, NITRU, WBCUA, LEUKOCYTESUR, GLUCOSEU in the last 72 hours. 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 *  Bibasilar atelectasis. Aortic stenosis. Hypothyroidism. History of cigarette smoking. Worsening renal function. On intermittent hemodialysis  Anemia with slight improvement. Hyponatremia secondary to renal failure, slightly worse    Plan:  Meds reviewed- changes made as appropriate. On Bumex  Patient is on thyroid supplementation.   Increase activity as permitted and tolerated. Questions patient had were answered to his satisfaction. Continue supplemental oxygen if needed  Patient was informed of the need for using oxygen. Patient was educated on the importance of compliance and the benefits of oxygen use. Complications of oxygen use, including dryness of the nostrils, epistaxis and also the fire hazard were explained to the patient. Patient willingly accepts to use  the oxygen as recommended. Cxr reviewed. Showed pulmonary vascular congestive changes with bilateral effusions and pulmonary edema. Diet reviewed  Thank you for having us involved in the care of your patient. Please call us if you have any questions or concerns.     Allie Malone MD      4/10/2019, 8:50 AM    Pulmonary & Critical Care

## 2019-04-10 NOTE — CARE COORDINATION
Likely to continue with dialysis, discussed with Dr. Daniella Goodwin, continue to follow for needs.   MARIANELA Horowitz regarding likely need for OP HD placement

## 2019-04-10 NOTE — PROGRESS NOTES
Writer in patient's room at this time and notified Dr. Rose Marie Dotson with Nephrology does want ramirez catheter discontinued and removed. Patient does not want catheter removed and refusing at this time. Patient is alert and oriented x 4.

## 2019-04-11 NOTE — PROGRESS NOTES
Dialysis Post Treatment Note  Patient tolerated treatment well. Denies complaints at time of discharge. Vitals:    04/11/19 1437   BP: (!) 110/47   Pulse: 60   Resp:    Temp: 98 °F (36.7 °C)   SpO2:      Pre-Weight = 66.8kg  Post-weight = Weight: 142 lb 3.2 oz (64.5 kg)  Total Liters Processed = Total Liters Processed (l/min): 83.6 l/min  Rinseback Volume (mL) = Rinseback Volume (ml): 360 ml  Net Removal (mL) = 3.020  Length of treatment=210mins  Pt responsed to tx well. bp dropped during tx by the end of tx bp waas stable. CASSIDY Sterling was notified.

## 2019-04-11 NOTE — PROGRESS NOTES
Pt transported back to unit via w/c by RN charting note. Before leaving hemodialysis to return to car 1 Dr. Benito Nolan spoke with pt regarding his medical vs financial POA paperwork being that he only has legal paperwork naming a financial POA not medical. Pt verbalized that he wanted his niece Allison Dailey to be his legal POA for financial as well as medical decision making. Demian Ann verbalized his understanding of the process and need to sign additional paperwork and is agreeable to such. Upon arriving back to the patients room 1015 his physician Dr. Daniel Simon also spoke with him in the presence of the charting RN, Tani Rolon as well as his nurse Rach Nguyen. asking him his wishes for medical POA and Merlinda Mais verbalized that he wants his niece Allison Dailey to be his medical and financial POA. He also verbalized at that time to Dr. Daniel Simon that after lengthy discussions with Dr. Cheryl Medel w/nephrology that he does wish to continue hemodialysis while here as well as after discharge.

## 2019-04-11 NOTE — PROGRESS NOTES
[facility]  Pre Op Progress Note    CC: hemodialysis      Subjective:  Mr. Karely Garrido seen and examined Plan of care reviewed and questions answered. Up to chair and eating breakfast      Physical Exam  Vital Signs: BP (!) 148/62   Pulse 60   Temp 97.8 °F (36.6 °C) (Oral)   Resp 18   Ht 5' 6\" (1.676 m)   Wt 138 lb 10.7 oz (62.9 kg)   SpO2 93%   BMI 22.38 kg/m²  O2 Flow Rate (L/min): 1 L/min       General: alert and oriented to person, place and time. Up in chair, No apparent distress.   Heart:Rhythm: paced  Lungs: clear to auscultation bilaterally  Abdomen: soft, non tender, non distended, BSx4  Extremities: negative      Scheduled Meds:    bumetanide  4 mg Oral BID    simethicone  80 mg Oral Q6H    docusate sodium  100 mg Oral BID    hydrALAZINE  100 mg Oral 3 times per day    lidocaine-EPINEPHrine  20 mL Intradermal Once    lidocaine PF  1 mL Intradermal Once    vancomycin  1,000 mg Intravenous Once    bacitracin in 0.9 % sodium chloride irrigation  50,000 Units Topical Once    sodium chloride flush  10 mL Intravenous 2 times per day    albumin human  25 g Intravenous Once    aspirin  81 mg Oral Daily    spironolactone  25 mg Oral Daily    metoprolol succinate  50 mg Oral Daily    FLUoxetine  10 mg Oral Daily    levothyroxine  125 mcg Oral Daily    ALPRAZolam  0.25 mg Oral Once    QUEtiapine  25 mg Oral Nightly    clopidogrel  75 mg Oral Daily    isosorbide mononitrate  30 mg Oral Daily    [Held by provider] docusate sodium  100 mg Oral Daily    amiodarone  200 mg Oral Daily    atorvastatin  20 mg Oral Daily     Continuous Infusions:    dextrose 20 mL/hr at 04/06/19 1858       Data:  CBC:   Recent Labs     04/09/19  0431 04/10/19  0447 04/11/19  0423   WBC 9.6 11.8* 12.6*   HGB 7.2* 8.0* 7.6*   HCT 25.3* 26.2* 24.8*   MCV 98.4 93.9 93.6   PLT See Reflexed IPF Result See Reflexed IPF Result See Reflexed IPF Result     BMP:   Recent Labs     04/09/19  1521 04/10/19  0447 04/11/19  0423   * 130* 132*   K 3.8 3.7 4.0   CL 98 94* 95*   CO2 24 24 26   BUN 31* 42* 52*   CREATININE 2.57* 3.21* 3.56*     PT/INR: No results for input(s): PROTIME, INR in the last 72 hours. APTT: No results for input(s): APTT in the last 72 hours. 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 Dialysis planned for this morning. Per Notes (Dr Ferny Moreno) & Dr. Kimberlee Ramirez, patient planning to continue with dialysis. 2. Plan for tunnel cath prior to discharge and OP dialysis will need to be set up. The above recommendations including medications and orders were discussed and agreed upon with Dr. Kimberlee Ramirez, the attending on service for the cardiothoracic surgery group today.      Geri Dee APRN, CNP

## 2019-04-11 NOTE — PROGRESS NOTES
Nephrology Progress Note      SUBJECTIVE       Pt was seen and examined. No acute issues overnite. Stable hemodynamics . Patient was evaluated on hemodialysis today. He is awake and alert. Yesterday's discussion and detailed note by Dr. Manuel Santiago was reviewed. Patient denies any nausea vomiting at this time. He denies any chest pain. He continues to exhibit significant edema. OBJECTIVE      CURRENT TEMPERATURE:  Temp: 98.4 °F (36.9 °C)  MAXIMUM TEMPERATURE OVER 24HRS:  Temp (24hrs), Av.9 °F (36.6 °C), Min:97.4 °F (36.3 °C), Max:98.4 °F (36.9 °C)    CURRENT RESPIRATORY RATE:  Resp: 16  CURRENT PULSE:  Pulse: 60  CURRENT BLOOD PRESSURE:  BP: (!) 143/67  24HR BLOOD PRESSURE RANGE:  Systolic (89MPG), DNL:401 , Min:140 , DQZ:577   ; Diastolic (36ICK), XIZ:88, Min:50, Max:70    24HR INTAKE/OUTPUT:      Intake/Output Summary (Last 24 hours) at 2019 1145  Last data filed at 2019 0817  Gross per 24 hour   Intake 600 ml   Output 580 ml   Net 20 ml     WEIGHT :  Patient Vitals for the past 96 hrs (Last 3 readings):   Weight   19 1058 147 lb 4.3 oz (66.8 kg)   04/10/19 0532 138 lb 10.7 oz (62.9 kg)   19 1825 147 lb 0.8 oz (66.7 kg)     PHYSICAL EXAM      GENERAL APPEARANCE: Awake, alert,  SKIN: warm and dry, no rash or erythema  EYES: conjunctivae normal and sclera anicteric  ENT: no thrush no pharyngeal congestion   NECK:   + JVD. No carotid bruits and no carotid lymphadenopathy . PULMONARY: lungs are decreased on auscultation. CADRDIOVASCULAR: S1 and S2 normal NO S3 and NO S4 . No rubs , no murmur. ABDOMEN: soft nontender, bowel sounds present, no organomegaly, no ascites.      EXTREMITIES: no cyanosis, clubbing ++ edema     CURRENT MEDICATIONS        0.9 % sodium chloride bolus PRN   0.9 % sodium chloride bolus PRN   bumetanide (BUMEX) tablet 4 mg BID   simethicone (MYLICON) chewable tablet 80 mg Q6H   dextrose 5 % solution Continuous   docusate sodium (COLACE) capsule 100 mg BID bisacodyl (DULCOLAX) suppository 10 mg Daily PRN   hydrALAZINE (APRESOLINE) tablet 100 mg 3 times per day   lidocaine-EPINEPHrine 1 percent-1:[id number] injection 20 mL Once   HYDROcodone-acetaminophen (NORCO) 5-325 MG per tablet 1 tablet Q8H PRN   lidocaine PF 1 % injection 1 mL Once   vancomycin (VANCOCIN) 1000 mg in dextrose 5% 200 mL IVPB Once   bacitracin 50,000 Units in sodium chloride 0.9 % 1,000 mL irrigation Once   sodium chloride flush 0.9 % injection 10 mL 2 times per day   sodium chloride flush 0.9 % injection 10 mL PRN   acetaminophen (TYLENOL) tablet 650 mg Q4H PRN   aspirin suppository 600 mg Q6H PRN   0.9 % sodium chloride bolus PRN   0.9 % sodium chloride bolus PRN   albumin human 25 % solution 25 g Once   heparin (porcine) injection 1,400 Units PRN   heparin (porcine) injection 1,500 Units PRN   aspirin EC tablet 81 mg Daily   spironolactone (ALDACTONE) tablet 25 mg Daily   metoprolol succinate (TOPROL XL) extended release tablet 50 mg Daily   hydrALAZINE (APRESOLINE) injection 10 mg Q4H PRN   FLUoxetine (PROZAC) capsule 10 mg Daily   ondansetron (ZOFRAN) injection 4 mg Q6H PRN   levothyroxine (SYNTHROID) tablet 125 mcg Daily   ALPRAZolam (XANAX) tablet 0.25 mg Once   QUEtiapine (SEROQUEL) tablet 25 mg Nightly   magnesium hydroxide (MILK OF MAGNESIA) 400 MG/5ML suspension 30 mL Daily PRN   clopidogrel (PLAVIX) tablet 75 mg Daily   isosorbide mononitrate (IMDUR) extended release tablet 30 mg Daily   [Held by provider] docusate sodium (COLACE) capsule 100 mg Daily   amiodarone (CORDARONE) tablet 200 mg Daily   atorvastatin (LIPITOR) tablet 20 mg Daily         LABS      CBC: Recent Labs     04/09/19  0431 04/10/19  0447 04/11/19  0423   WBC 9.6 11.8* 12.6*   RBC 2.57* 2.79* 2.65*   HGB 7.2* 8.0* 7.6*   HCT 25.3* 26.2* 24.8*   MCV 98.4 93.9 93.6   MCH 28.0 28.7 28.7   MCHC 28.5 30.5 30.6   RDW 16.1* 16.3* 16.4*   PLT See Reflexed IPF Result See Reflexed IPF Result See Reflexed IPF Result   MPV NOT REPORTED NOT REPORTED NOT REPORTED      BMP: Recent Labs     04/09/19  0431 04/10/19  0447 04/11/19  0423   * 130* 132*   K 3.8 3.7 4.0   CL 98 94* 95*   CO2 24 24 26   BUN 31* 42* 52*   CREATININE 2.57* 3.21* 3.56*   GLUCOSE 94 90 91   CALCIUM 7.8* 8.6 8.5*      IRON:    Lab Results   Component Value Date    IRON 31 04/08/2019     IRON SATURATION:  Lab Results   Component Value Date    LABIRON 18 04/08/2019     TIBC:    Lab Results   Component Value Date    TIBC 169 04/08/2019     FERRITIN:    Lab Results   Component Value Date    FERRITIN 884 04/08/2019     GONSALO: No results found for: GONSALO    SPEP: Lab Results   Component Value Date    PROT 5.6 03/29/2019    ALBCAL 2.5 03/07/2019    ALBPCT 53 03/07/2019    LABALPH 0.3 03/07/2019    LABALPH 0.8 03/07/2019    A1PCT 7 03/07/2019    A2PCT 18 03/07/2019    LABBETA 0.7 03/07/2019    BETAPCT 14 03/07/2019    GAMGLOB 0.4 03/07/2019    GGPCT 8 03/07/2019    PATH ELECTRONICALLY SIGNED. Kayla Cooney M.D. 03/07/2019       URINE CREATININE:  Lab Results   Component Value Date    LABCREA 42.4 03/23/2019       URINALYSIS:  U/A: Lab Results   Component Value Date    NITRU NEGATIVE 03/29/2019    COLORU YELLOW 03/29/2019    PHUR 7.5 03/29/2019    WBCUA 50  03/29/2019    RBCUA 50  03/29/2019    MUCUS NOT REPORTED 03/29/2019    TRICHOMONAS NOT REPORTED 03/29/2019    YEAST NOT REPORTED 03/29/2019    BACTERIA NOT REPORTED 03/29/2019    SPECGRAV 1.011 03/29/2019    LEUKOCYTESUR LARGE 03/29/2019    UROBILINOGEN Normal 03/29/2019    BILIRUBINUR NEGATIVE 03/29/2019    GLUCOSEU NEGATIVE 03/29/2019    KETUA NEGATIVE 03/29/2019    AMORPHOUS NOT REPORTED 03/29/2019           ASSESSMENT      1. 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 He's been hemodialysis dependent now for about 10 days or so. 2. HTN: Blood pressures are stable  3. Status post TAVR   4.  Status post PCI and atherectomy.   5. Recurrent bilateral pleural effusion status post thoracentesis  6. Hyponatremia primarily dilutional in nature  7. Evidence of volume overload    PLAN      1. Hemodialysis under way. Attempting to do 3 kg off as blood pressure will allow. Thus far he is feeling okay and his hemodynamic status is very stable as well. 2.  Patient will benefit from IV benefit for her. Aranesp ordered as well  3. Follow up labs ordered. 4. Following along       Please do not hesitate to call with questions.     Electronically signed by Mart Mae MD on 4/11/2019 at 11:45 AM

## 2019-04-11 NOTE — PROGRESS NOTES
[facility] - Progress Note  Today's Date and Time: 4/11/2019, 12:01 PM    Impression :   1. Recurrent bilateral pleural effusions w/lung compression  2. CHF  3. 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 Multivessel CAD s/p multiple stent placements  5. S/P TAVR  6. HUYEN on HD  7. Urinary retention  8. Pulmonary hypertension  9. S/P Upgrade of dual chamber PPM to CRT-P on 4-2-19  10. Anemia s/p transfusion 4/3    Recommendations:     · Wound care Lt chest incision  · HD as per nephrology    Medical Decision Making/Summary/Discussion:   · 81 yo gentleman who developed complete heart block. Found to have multivessel CAD, severe aortic stenosis and pulmonary HTN. · Underwent pacemaker placement with subsequent bouts of acute CHF and multiple hospitalizations in [place]  · Brought to [facility] by family for further care. · Since admission at [facility] he has had cardiac catheterizations x 2 with multiple stent placements and a TAVR on 3-15. Pt remains on a nitroglycerine drip  · Pt continues to require frequent thoracenteses  · He has suffered an HUYEN and is currently on a bumex drip  · CXR 3/26 showed progressed moderate to large bilateral pleural effusions with consolidation. · ID consult placed for treatment of any potential pneumonia  · Pt is afebrile, without cough or sputum production. He remains SOB with exertion and at times, when quiet. · Currently no evidence of active infection. Pt will require aggressive pulmonary toilet and diuresis. Plan to monitor off antibiotics  · Patient to continue HD  · He had upgraded pacemaker placed on 4-2-19. · Developed hematoma at pacer site, resolved with pressure. Elequis held 4-3. Hgb to 6.5 received 1u PRBC. Site ecchymotic but no signs of infection.   · Patient is continuing hemodialysis  · Will likely continue hemodialysis as an outpatient      Infection Control Recommendations   · [place] Precautions    Antimicrobial Stewardship Recommendations · Monitor off antibiotics    Coordination of Outpatient Care:   · Estimated Length of IV antimicrobials: None  · Patient will need Midline Catheter Insertion:   · Patient will need PICC line Insertion:  · Patient will need: Home IV , [place],  SNF,  LTAC TBD  · Patient will need outpatient wound care: No    Chief complaint/reason for consultation:   · Possible pneumonia      History of Present Illness:   Haris No is a 80y.o.-year-old  male who was initially admitted on 3/6/2019. Patient seen at the request of Dr. Keeley Roman     INITIAL HISTORY:    Pt is a 79 yo gentleman who has recently had multiple hospital admissions in [place] for decompensated heart failure. His symptoms began in December 2018. 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 A cardiac cath at that time showed LM and LAD calcified stenosis 80-90% in multiple areas, with severe disease in D1, D2, Om1 and OM2. Minimal disease in RCA. LV function with EF 25%. Severe aortic stenosis compromising his clinical improvedmnt      He underwent a dual chamber pacemaker placement. Following that, he had multiple hospitalizations for acute heart failure and shortness of breath. His baseline creatinine was 1.3, now >2.0. He has had multiple thoracenteses with transudative pleural fluid removed.      He was brought to [facility] for further evaluation and was admitted on 3/6/19     An ECHO from admission showed   Normal left ventricle size with mildly reduced systolic function; Estimated  left ventricular ejection fraction 40-45%  Anterolateral wall hypokinesis. Mild left ventricular hypertrophy. Left atrium is moderately dilated. Grade II diastolic dysfunction. Heavily calcified aortic valve with reduced systolic excursion and evidence  of moderate stenosis. (Peak nataliia 3.62 m/s and mean gradient 29 mmHg)  Moderate posterior pericardial effusion without any echo signs of tamponade.   Normal right ventricular size and function. Pacemaker lead seen in right ventricle. Moderate tricuspid regurgitation. Estimated right ventricular systolic pressure is 60 mmHg suggesting moderate  pulmonary HTN.    He underwent an atherectomy/JEAN-CLAUDE of the left main and LAD on 3/6. Because of the high surgical risk and renal function he was taken back to the cath lab on 3/11 for PTCA-JEAN-CLAUDE LAD, PTCRA-JEAN-CLAUDE LM with plan to consider a TAVR if pt remained stable.     A TAVR was performed on 3/15     A carotid study showed less than 50% stenosis bilaterally.     He has had multiple thoracenteses since admission. Pt has been followed by CT surgery, cardiology, pulmonary, nephrology and urology.     On the evening of 3/25 pt developed hypertension and SOB at rest. A stat CXR was done that showed progressed moderate to large bilateral pleural effusions with consolidation.      On 3/26 pt underwent a Lt thoracentesis with 1400 ml clear pleural fluid removed. Pt reports feeling much better after the procedure and is asking to sit in the chair.      I am consulted to determine if Mr Chelle Bee has pneumonia. Pt had continued decline in renal function, HD initiated 3-29  PPV pacemaker placed 4-2, pt with hematoma at site, resolved with pressure. Anticoagulation held 4-3. Hgb to 6.5 received 1u PRBC     CURRENT EXAMINATION: 4/11/2019    Pt seen and examined at hemodialysis today. Very sleep today, but could be aroused. Reports that his breathing is improving. Per nephrology note yesterday, Dr. Johana Loaiza had extended conversation about dialysis with Mr. Chelle Bee. At this time, it seems that the patient is likely to opt for outpatient dialysis with permacath placement. Afebrile  VS stable. Planning for perm catheter placement before discharge.      He had upgraded pacemaker placed on 4-2-19  Vancomycin given pre-op  No further antibiotics have been required     On exam pt is in no distress  Breath sounds are diminished bilateral bases   Lt chest with ecchymoses from pacemaker. CXR shows persistent pulmonary edema and bilateral pleural effusions.     Labs reviewed: 4/11/2019    WBC 7.7->11.4->10.5->8.6 -> 9.6 -> 11.8 -> 12.6  Hgb 8.8->6.5->8.3->8.9->7.4 -> 7.2 -> 7.6  Plt 57->90->92 -> 73-->105 -> 137    Cr 2.74->2.61->2.95->3.29 -> 2.57  BUN 44->38->47->54 -> 24    Cultures:  Pleural fluid:  · 3/7 No growth, many neutrophils seen, no bacteria, and no growth    Discussed with Pt, HD RN. I have personally reviewed the past medical history, past surgical history, medications, social history, and family history, and I have updated the database accordingly.   Past Medical History:     Past Medical History:   Diagnosis Date    Aortic stenosis 02/15/2019    Atrial fibrillation (HCC)     CAD (coronary artery disease)     Chest pain 02/15/2019    CHF (congestive heart failure) (HCC)     Chronic anemia     Chronic kidney disease     Hypertension     Pleural effusion on left 02/15/2019    Pleural effusion, right 02/15/2019    Pulmonary emboli (HCC) 02/15/2019    Pulmonary hypertension ([address] 03/06/2019    Thyroid disease     hypothyroidism       Past Surgical  History:     Past Surgical History:   Procedure Laterality Date    CORONARY ANGIOPLASTY WITH STENT PLACEMENT  03/11/2019    complex PCI of LT MAIN, LAD    JOINT REPLACEMENT      right ankle    OTHER SURGICAL HISTORY  04/02/2019    UPGRADE TO BI-V PACEMAKER    OTHER SURGICAL HISTORY  03/15/2019    TAVR PROCEDURE    PACEMAKER PLACEMENT  01/20/2019    THORACENTESIS         Medications:      albumin human  25 g Intravenous Once    iron sucrose  200 mg Intravenous Q24H    [START ON 4/18/2019] darbepoetin marya-polysorbate  100 mcg Subcutaneous Weekly - Thursday    bumetanide  4 mg Oral BID    simethicone  80 mg Oral Q6H    docusate sodium  100 mg Oral BID    hydrALAZINE  100 mg Oral 3 times per day    lidocaine-EPINEPHrine  20 mL Intradermal Once    lidocaine PF  1 mL Intradermal Once    vancomycin  1,000 mg Intravenous Once    bacitracin in 0.9 % sodium chloride irrigation  50,000 Units Topical Once    sodium chloride flush  10 mL Intravenous 2 times per day    aspirin  81 mg Oral Daily    spironolactone  25 mg Oral Daily    metoprolol succinate  50 mg Oral Daily    FLUoxetine  10 mg Oral Daily    levothyroxine  125 mcg Oral Daily    ALPRAZolam  0.25 mg Oral Once    QUEtiapine  25 mg Oral Nightly    clopidogrel  75 mg Oral Daily    isosorbide mononitrate  30 mg Oral Daily    [Held by provider] docusate sodium  100 mg Oral Daily    amiodarone  200 mg Oral Daily    atorvastatin  20 mg Oral Daily       Social History:     Social History     Socioeconomic History    Marital status:       Spouse name: Not on file    Number of children: Not on file    Years of education: Not on file    Highest education level: Not on file   Occupational History     Employer: RETIRED   Social Needs    Financial resource strain: Not on file    Food insecurity:     Worry: Not on file     Inability: Not on file    Transportation needs:     Medical: Not on file     Non-medical: Not on file   Tobacco Use    Smoking status: Former Smoker     Last attempt to quit:      Years since quittin.2    Smokeless tobacco: Never Used   Substance and Sexual Activity    Alcohol use: Not Currently    Drug use: Never    Sexual activity: Not on file   Lifestyle    Physical activity:     Days per week: Not on file     Minutes per session: Not on file    Stress: Not on file   Relationships    Social connections:     Talks on phone: Not on file     Gets together: Not on file     Attends Congregation service: Not on file     Active member of club or organization: Not on file     Attends meetings of clubs or organizations: Not on file     Relationship status: Not on file    Intimate partner violence:     Fear of current or ex partner: Not on file     Emotionally abused: Not on file     Physically abused: Not on file     Forced sexual activity: Not on file   Other Topics Concern    Not on file   Social History Narrative    Not on file       Family History:   History reviewed. No pertinent family history. Allergies:   Quinolones     Review of Systems:   AA, reports continued tenderness at pacemaker site  No chills or fevers, no SOB  Seen on HD    Physical Examination :     Patient Vitals for the past 8 hrs:   BP Temp Temp src Pulse Resp SpO2 Weight   04/11/19 1144 (!) 131/48 -- -- 60 -- -- --   04/11/19 1106 (!) 143/67 -- -- 60 -- -- --   04/11/19 1058 (!) 176/70 98.4 °F (36.9 °C) -- 61 16 -- 147 lb 4.3 oz (66.8 kg)   04/11/19 0815 -- -- -- -- -- 93 % --   04/11/19 0710 (!) 148/62 97.8 °F (36.6 °C) Oral 60 18 93 % --     General Appearance: Seen in HD, easily roused, no distress  Head:  Normocephalic, no trauma  Eyes: Pupils equal, round, reactive to light and accommodation; extraocular movements intact; sclera anicteric. ENT: Oropharynx clear. Mouth/throat: mucosa pink and moist. No lesions. Dentition in good repair. Neck: Supple, without lymphadenopathy. Pulmonary/Chest: Clear to auscultation, without wheezes, rales, or rhonchi. Decreased breath sounds at bilateral bases   Cardiovascular: Regular rate and rhythm without murmurs, rubs, or gallops. Paced  Abdomen: Soft. Bowel sounds normal.  : Johnston in place  All four Extremities: Mild edema. Neurologic: No gross sensory or motor deficits. Skin: Warm and dry with good turgor. No signs of peripheral arterial or venous insufficiency. No ulcerations. No open wounds.  Lt chest pacemaker incision with hematoma, staples intact, no oozing or bleeding noted    Medical Decision Making -Laboratory:   I have independently reviewed/ordered the following labs:    CBC with Differential:   Recent Labs     04/10/19  0447 04/11/19  0423   WBC 11.8* 12.6*   HGB 8.0* 7.6*   HCT 26.2* 24.8*   PLT See Reflexed IPF Result See Reflexed IPF Result   LYMPHOPCT 7* 6* MONOPCT 8 8     BMP:   Recent Labs     04/10/19  0447 19  0423   * 132*   K 3.7 4.0   CL 94* 95*   CO2 24 26   BUN 42* 52*   CREATININE 3.21* 3.56*     Hepatic Function Panel:   No results for input(s): PROT, LABALBU, BILIDIR, IBILI, BILITOT, ALKPHOS, ALT, AST in the last 72 hours. No results for input(s): RPR in the last 72 hours. No results for input(s): HIV in the last 72 hours. No results for input(s): BC in the last 72 hours. Lab Results   Component Value Date    MUCUS NOT REPORTED 2019    RBC 2.65 2019    TRICHOMONAS NOT REPORTED 2019    WBC 12.6 2019    YEAST NOT REPORTED 2019    TURBIDITY CLEAR 2019     Lab Results   Component Value Date    CREATININE 3.56 2019    GLUCOSE 91 2019       Medical Decision Making-Imagin-5 CXR    EXAMINATION:   SINGLE XRAY VIEW OF THE CHEST       2019 5:39 am       COMPARISON:   2019       HISTORY:   ORDERING SYSTEM PROVIDED HISTORY: pleural effusions   TECHNOLOGIST PROVIDED HISTORY:   pleural effusions       FINDINGS:   AP portable view of the chest time stamped at 519 hours demonstrates   overlying cardiac monitoring electrodes.  Right internal jugular catheter   terminates in the distal superior vena cava.  Multiple polar pacemaker enters   from the left with intact leads in appropriate positions.  Pacemaker battery   box and skin clips overlie the left axilla.  Heart is enlarged and there is   pulmonary vascular congestion with bilateral perihilar opacities consistent   with pulmonary edema.  Moderate bilateral pleural effusions are noted.  No   extrapleural air is seen.  No midline shift is noted.  Osseous structures are   stable.           Impression   Continued cardiomegaly, opacities most compatible with pulmonary edema and   bilateral pleural effusions.          4- CXR    EXAMINATION:   SINGLE XRAY VIEW OF THE CHEST       2019 6:04 am       COMPARISON:   2019       HISTORY: effusions       FINDINGS:   There is a small left and a large right pleural effusion with adjacent   infiltrate.  There is no pneumothorax.  The mediastinal structures are stable   with stable cardiac leads.  The upper abdomen is unremarkable.  The   extrathoracic soft tissues are unremarkable.           Impression   Small left and large right pleural effusion with adjacent infiltrate   representing atelectasis versus pneumonia.  Overall there may be mild   improvement of the right-sided effusion. Narrative   EXAMINATION:   SINGLE XRAY VIEW OF THE CHEST       3/26/2019 2:37 am       COMPARISON:   March 20, 2019.       HISTORY:   ORDERING SYSTEM PROVIDED HISTORY: SOB, PE   TECHNOLOGIST PROVIDED HISTORY:   SOB, PE       FINDINGS:   Frontal portable view of the chest.  Low lung volume.  Progressed moderate to   large bilateral pleural effusions with consolidation.  Indistinct pulmonary   vasculature.  No pneumothorax.  The cardiomediastinal silhouette is not well   evaluated.  Atherosclerotic thoracic aorta.  Left pectoral trans venous   dual-chamber cardiac pacer device.           Impression   Progressed moderate to large bilateral pleural effusions with consolidation. Superimposed infection is not excluded.             Medical Decision Making-Other: Thank you for allowing us to participate in the care of this patient. Please call with questions. Edelmira Segal     ATTESTATION:    I have discussed the case, including pertinent history and exam findings with the residents. I have seen and examined the patient and the key elements of the encounter have been performed by me. I have reviewed the laboratory data, other diagnostic studies and discussed them with the residents. I have updated the medical record where necessary. I agree with the assessment, plan and orders as documented by the resident.     Katelyn Guillen MD.

## 2019-04-11 NOTE — PROGRESS NOTES
Physical Therapy  Facility/Department: [facility]  Daily Treatment Note  NAME: Maximino Salazar  : 12/10/1928  MRN: [id number]    Date of Service: 2019    Discharge Recommendations:  Further therapy recommended at discharge. PT Equipment Recommendations  Equipment Needed: Yes  Mobility Devices: Delora Ports: Rolling    Patient Diagnosis(es): The encounter diagnosis was Pulmonary hypertension ([address] has a past medical history of Aortic stenosis, Atrial fibrillation ([address] CAD (coronary artery disease), Chest pain, CHF (congestive heart failure) ([address] Chronic anemia, Chronic kidney disease, Hypertension, Pleural effusion on left, Pleural effusion, right, Pulmonary emboli ([address] Pulmonary hypertension ([address] and Thyroid disease. has a past surgical history that includes pacemaker placement (2019); thoracentesis; joint replacement; Coronary angioplasty with stent (2019); other surgical history (2019); and other surgical history (03/15/2019). Restrictions  Restrictions/Precautions  Restrictions/Precautions: Weight Bearing, Cardiac, General Precautions, ROM Restrictions, Surgical Protocols, Fall Risk  Required Braces or Orthoses?: LUE sling for comfort  Implants present? : Pacemaker(19)  Upper Extremity Weight Bearing Restrictions  Left Upper Extremity Weight Bearing: (partial weight bearing for PPM )  Required Braces or Orthoses  Left Upper Extremity Brace/Splint: Sling   Position Activity Restriction  Other position/activity restrictions: Up w/assist.    Subjective   General  Response To Previous Treatment: Patient with no complaints from previous session. Family / Caregiver Present: No  Subjective  Subjective: RN and pt agreeable to PT this date. Pt seated in bedside chair prior to tx; pt returned to chair post tx.   Pain Screening  Patient Currently in Pain: Denies  Vital Signs  Patient Currently in Pain: Denies       Orientation  Orientation  Overall Orientation Status: least restrictive AD independently  Short term goal 3: Demo Good- dynamic standing balance to decrease risk of falls  Short term goal 4: Participate in 30 minutes of therapy to demo increased endurance    Plan    Plan  Times per week: 6-7x/wk  Current Treatment Recommendations: Strengthening, Balance Training, Functional Mobility Training, Endurance Training, Transfer Training, Patient/Caregiver Education & Training, Safety Education & Training, Home Exercise Program, Gait Training  Safety Devices  Type of devices: All fall risk precautions in place, Gait belt, Call light within reach, Nurse notified, Left in chair  Restraints  Initially in place: No     Therapy Time   Individual Concurrent Group Co-treatment   Time In 0843         Time Out 0913         Minutes [address] Treatment performed by Student PTA under the supervision of co-signing PTA who agrees with all treatment and documentation.    Karina Noland PTA

## 2019-04-12 NOTE — CARE COORDINATION
Spoke with guanaco  re: OP dialysis unit preference  He stated he will advise writer on Mon after he speaks with marquise

## 2019-04-12 NOTE — PROGRESS NOTES
pacemaker. CXR shows persistent pulmonary edema and bilateral pleural effusions.     Labs reviewed: 4/12/2019    WBC 7.7->11.4->10.5->8.6 -> 9.6 -> 11.8 -> 12.6 -> 10.5  Hgb 8.8->6.5->8.3->8.9->7.4 -> 7.2 -> 7.6 -> 7.0  Plt 57->90->92 -> 73-->105 -> 137 -> 120    Cr 2.74->2.61->2.95->3.29 -> 2.57 -> 2.41  BUN 44->38->47->54 -> 24 -> 28    Cultures:  Pleural fluid:  · 3/7 No growth, many neutrophils seen, no bacteria, and no growth    Discussed with Pt, HD RN. I have personally reviewed the past medical history, past surgical history, medications, social history, and family history, and I have updated the database accordingly.   Past Medical History:     Past Medical History:   Diagnosis Date    Aortic stenosis 02/15/2019    Atrial fibrillation (HCC)     CAD (coronary artery disease)     Chest pain 02/15/2019    CHF (congestive heart failure) (HCC)     Chronic anemia     Chronic kidney disease     Hypertension     Pleural effusion on left 02/15/2019    Pleural effusion, right 02/15/2019    Pulmonary emboli ([address] 02/15/2019    Pulmonary hypertension ([facility] [address] 03/06/2019    Thyroid disease     hypothyroidism       Past Surgical  History:     Past Surgical History:   Procedure Laterality Date    CORONARY ANGIOPLASTY WITH STENT PLACEMENT  03/11/2019    complex PCI of LT MAIN, LAD    JOINT REPLACEMENT      right ankle    OTHER SURGICAL HISTORY  04/02/2019    UPGRADE TO BI-V PACEMAKER    OTHER SURGICAL HISTORY  03/15/2019    TAVR PROCEDURE    PACEMAKER PLACEMENT  01/20/2019    THORACENTESIS         Medications:      iron sucrose  200 mg Intravenous Q24H    [START ON 4/19/2019] darbepoetin marya-polysorbate  100 mcg Subcutaneous Once per day on Fri    bumetanide  4 mg Oral BID    simethicone  80 mg Oral Q6H    docusate sodium  100 mg Oral BID    hydrALAZINE  100 mg Oral 3 times per day    lidocaine-EPINEPHrine  20 mL Intradermal Once    lidocaine PF  1 mL Intradermal Once    bacitracin in 0.9 % Topics Concern    Not on file   Social History Narrative    Not on file       Family History:   History reviewed. No pertinent family history. Allergies:   Quinolones     Review of Systems:   AA, reports continued tenderness at pacemaker site  No chills or fevers, no SOB  Seen on HD    Physical Examination :     Patient Vitals for the past 8 hrs:   BP Temp Temp src Pulse Resp SpO2   04/12/19 1154 (!) 135/48 98.1 °F (36.7 °C) Oral 62 16 97 %   04/12/19 0706 (!) 149/60 98.1 °F (36.7 °C) Oral 68 14 95 %     General Appearance: Seen in HD, easily roused, no distress  Head:  Normocephalic, no trauma  Eyes: Pupils equal, round, reactive to light and accommodation; extraocular movements intact; sclera anicteric. ENT: Oropharynx clear. Mouth/throat: mucosa pink and moist. No lesions. Dentition in good repair. Neck: Supple, without lymphadenopathy. Pulmonary/Chest: Clear to auscultation, without wheezes, rales, or rhonchi. Decreased breath sounds at bilateral bases   Cardiovascular: Regular rate and rhythm without murmurs, rubs, or gallops. Paced  Abdomen: Soft. Bowel sounds normal.  : Johnston in place  All four Extremities: Mild edema. Neurologic: No gross sensory or motor deficits. Skin: Warm and dry with good turgor. No signs of peripheral arterial or venous insufficiency. No ulcerations. No open wounds.  Lt chest pacemaker incision with hematoma, staples intact, no oozing or bleeding noted    Medical Decision Making -Laboratory:   I have independently reviewed/ordered the following labs:    CBC with Differential:   Recent Labs     04/11/19 0423 04/12/19 0439   WBC 12.6* 10.4   HGB 7.6* 7.0*   HCT 24.8* 22.6*   PLT See Reflexed IPF Result 120*   LYMPHOPCT 6* 6*   MONOPCT 8 7     BMP:   Recent Labs     04/11/19 0423 04/12/19 0439   * 134*   K 4.0 4.0   CL 95* 98   CO2 26 26   BUN 52* 28*   CREATININE 3.56* 2.41*     Hepatic Function Panel:   No results for input(s): PROT, LABALBU, BILIDIR, IBILI, BILITOT, ALKPHOS, ALT, AST in the last 72 hours. No results for input(s): RPR in the last 72 hours. No results for input(s): HIV in the last 72 hours. No results for input(s): BC in the last 72 hours. Lab Results   Component Value Date    MUCUS NOT REPORTED 2019    RBC 2.39 2019    TRICHOMONAS NOT REPORTED 2019    WBC 10.4 2019    YEAST NOT REPORTED 2019    TURBIDITY CLEAR 2019     Lab Results   Component Value Date    CREATININE 2.41 2019    GLUCOSE 97 2019       Medical Decision Making-Imagin-5 CXR    EXAMINATION:   SINGLE XRAY VIEW OF THE CHEST       2019 5:39 am       COMPARISON:   2019       HISTORY:   ORDERING SYSTEM PROVIDED HISTORY: pleural effusions   TECHNOLOGIST PROVIDED HISTORY:   pleural effusions       FINDINGS:   AP portable view of the chest time stamped at 519 hours demonstrates   overlying cardiac monitoring electrodes.  Right internal jugular catheter   terminates in the distal superior vena cava.  Multiple polar pacemaker enters   from the left with intact leads in appropriate positions.  Pacemaker battery   box and skin clips overlie the left axilla.  Heart is enlarged and there is   pulmonary vascular congestion with bilateral perihilar opacities consistent   with pulmonary edema.  Moderate bilateral pleural effusions are noted.  No   extrapleural air is seen.  No midline shift is noted.  Osseous structures are   stable.           Impression   Continued cardiomegaly, opacities most compatible with pulmonary edema and   bilateral pleural effusions.          4- CXR    EXAMINATION:   SINGLE XRAY VIEW OF THE CHEST       2019 6:04 am       COMPARISON:   2019       HISTORY:   ORDERING SYSTEM PROVIDED HISTORY: SOB   TECHNOLOGIST PROVIDED HISTORY:   SOB       FINDINGS:   There is a left-sided transvenous pacer in place and a right internal jugular   dialysis catheter.  There is cardiomegaly with pulmonary vascular congestion   and edema.  There are bilateral pleural effusions which are increased.  The   pulmonary edema appears worse on the current exam.           Impression   Worsening edema and increased bilateral pleural effusions         4-1 AXR  EXAMINATION:   SINGLE SUPINE XRAY VIEW(S) OF THE ABDOMEN       4/1/2019 2:40 pm       COMPARISON:   None.       HISTORY:   ORDERING SYSTEM PROVIDED HISTORY: r/o ileus   TECHNOLOGIST PROVIDED HISTORY:   r/o ileus       Initial exam       FINDINGS:   Nonspecific bowel gas pattern with air-filled small and large bowel.  No   organomegaly noted.  No pathologic calcifications.  Degenerative changes of   the lower lumbar spine.           Impression   Nonspecific bowel gas pattern without evidence of small bowel obstruction. 3/29 CXR      Narrative   EXAMINATION:   SINGLE XRAY VIEW OF THE CHEST       3/29/2019 8:26 am       COMPARISON:   Chest radiograph performed 03/28/2019.       HISTORY:   ORDERING SYSTEM PROVIDED HISTORY: effusions   TECHNOLOGIST PROVIDED HISTORY:   effusions       FINDINGS:   There are large bilateral effusions with adjacent consolidation.  There is   underlying pulmonary congestion.  There is no pneumothorax.  The heart is   enlarged with stable cardiac leads.  The upper abdomen is unremarkable.  The   extrathoracic soft tissues are unremarkable.           Impression   Cardiomegaly with large bilateral effusions and adjacent consolidation   concerning for pneumonia.               3/27 CXR    Narrative   EXAMINATION:   TWO VIEWS OF THE CHEST       3/28/2019 7:56 am       COMPARISON:   Chest radiograph performed 03/27/2019.       HISTORY:   ORDERING SYSTEM PROVIDED HISTORY: effusions   TECHNOLOGIST PROVIDED HISTORY:   effusions       FINDINGS:   There is a small left and a large right pleural effusion with adjacent   infiltrate.  There is no pneumothorax.  The mediastinal structures are stable   with stable cardiac leads.  The upper abdomen is unremarkable.  The   extrathoracic soft tissues are unremarkable.           Impression   Small left and large right pleural effusion with adjacent infiltrate   representing atelectasis versus pneumonia.  Overall there may be mild   improvement of the right-sided effusion. Narrative   EXAMINATION:   SINGLE XRAY VIEW OF THE CHEST       3/26/2019 2:37 am       COMPARISON:   March 20, 2019.       HISTORY:   ORDERING SYSTEM PROVIDED HISTORY: SOB, PE   TECHNOLOGIST PROVIDED HISTORY:   SOB, PE       FINDINGS:   Frontal portable view of the chest.  Low lung volume.  Progressed moderate to   large bilateral pleural effusions with consolidation.  Indistinct pulmonary   vasculature.  No pneumothorax.  The cardiomediastinal silhouette is not well   evaluated.  Atherosclerotic thoracic aorta.  Left pectoral trans venous   dual-chamber cardiac pacer device.           Impression   Progressed moderate to large bilateral pleural effusions with consolidation. Superimposed infection is not excluded.             Medical Decision Making-Other: Thank you for allowing us to participate in the care of this patient. Please call with questions. Abilio Pendleton [place]     ATTESTATION:    I have discussed the case, including pertinent history and exam findings with the residents. I have seen and examined the patient and the key elements of the encounter have been performed by me. I have reviewed the laboratory data, other diagnostic studies and discussed them with the residents. I have updated the medical record where necessary. I agree with the assessment, plan and orders as documented by the resident.     Jeferson Salvador MD.

## 2019-04-12 NOTE — PROGRESS NOTES
Nephrology Progress Note      SUBJECTIVE       Pt was seen and examined. No acute issues overnite. Stable hemodynamics . Patient is a little SOB today again. He is awake and alert. Still quite edematous. Patient denies any nausea vomiting at this time. He denies any chest pain. He continues to exhibit significant edema. OBJECTIVE      CURRENT TEMPERATURE:  Temp: 98.1 °F (36.7 °C)  MAXIMUM TEMPERATURE OVER 24HRS:  Temp (24hrs), Av.9 °F (36.6 °C), Min:97.3 °F (36.3 °C), Max:98.1 °F (36.7 °C)    CURRENT RESPIRATORY RATE:  Resp: 16  CURRENT PULSE:  Pulse: 62  CURRENT BLOOD PRESSURE:  BP: (!) 135/48  24HR BLOOD PRESSURE RANGE:  Systolic (34QNB), VRZ:713 , Min:91 , VWP:459   ; Diastolic (84URQ), XCT:06, Min:34, Max:67    24HR INTAKE/OUTPUT:      Intake/Output Summary (Last 24 hours) at 2019 1216  Last data filed at 2019 0800  Gross per 24 hour   Intake 800 ml   Output 200 ml   Net 600 ml     WEIGHT :  Patient Vitals for the past 96 hrs (Last 3 readings):   Weight   19 1437 142 lb 3.2 oz (64.5 kg)   19 1058 147 lb 4.3 oz (66.8 kg)   04/10/19 0532 138 lb 10.7 oz (62.9 kg)     PHYSICAL EXAM      GENERAL APPEARANCE: Awake, alert,  SKIN: warm and dry, no rash or erythema  EYES: conjunctivae normal and sclera anicteric  ENT: no thrush no pharyngeal congestion   NECK:   + JVD. No carotid bruits and no carotid lymphadenopathy . PULMONARY: lungs are decreased on auscultation. CADRDIOVASCULAR: S1 and S2 normal NO S3 and NO S4 . No rubs , no murmur. ABDOMEN: soft nontender, bowel sounds present, no organomegaly, no ascites.      EXTREMITIES: no cyanosis, clubbing ++ edema     CURRENT MEDICATIONS        0.9 % sodium chloride bolus PRN   0.9 % sodium chloride bolus PRN   iron sucrose (VENOFER) 200 mg in sodium chloride 0.9 % 100 mL IVPB Q24H   [START ON 2019] darbepoetin marya-polysorbate (ARANESP) injection 100 mcg Once per day on Fri   bumetanide (BUMEX) tablet 4 mg BID   simethicone [facility]) chewable tablet 80 mg Q6H   dextrose 5 % solution Continuous   docusate sodium (COLACE) capsule 100 mg BID   bisacodyl (DULCOLAX) suppository 10 mg Daily PRN   hydrALAZINE (APRESOLINE) tablet 100 mg 3 times per day   lidocaine-EPINEPHrine 1 percent-1:[id number] injection 20 mL Once   HYDROcodone-acetaminophen (NORCO) 5-325 MG per tablet 1 tablet Q8H PRN   lidocaine PF 1 % injection 1 mL Once   bacitracin 50,000 Units in sodium chloride 0.9 % 1,000 mL irrigation Once   sodium chloride flush 0.9 % injection 10 mL 2 times per day   sodium chloride flush 0.9 % injection 10 mL PRN   acetaminophen (TYLENOL) tablet 650 mg Q4H PRN   aspirin suppository 600 mg Q6H PRN   0.9 % sodium chloride bolus PRN   0.9 % sodium chloride bolus PRN   heparin (porcine) injection 1,400 Units PRN   heparin (porcine) injection 1,500 Units PRN   aspirin EC tablet 81 mg Daily   spironolactone (ALDACTONE) tablet 25 mg Daily   metoprolol succinate (TOPROL XL) extended release tablet 50 mg Daily   hydrALAZINE (APRESOLINE) injection 10 mg Q4H PRN   FLUoxetine (PROZAC) capsule 10 mg Daily   ondansetron (ZOFRAN) injection 4 mg Q6H PRN   levothyroxine (SYNTHROID) tablet 125 mcg Daily   ALPRAZolam (XANAX) tablet 0.25 mg Once   QUEtiapine (SEROQUEL) tablet 25 mg Nightly   magnesium hydroxide (MILK OF MAGNESIA) 400 MG/5ML suspension 30 mL Daily PRN   clopidogrel (PLAVIX) tablet 75 mg Daily   isosorbide mononitrate (IMDUR) extended release tablet 30 mg Daily   [Held by provider] docusate sodium (COLACE) capsule 100 mg Daily   amiodarone (CORDARONE) tablet 200 mg Daily   atorvastatin (LIPITOR) tablet 20 mg Daily         LABS      CBC:   Recent Labs     04/10/19  0447 04/11/19  0423 04/12/19  0439   WBC 11.8* 12.6* 10.4   RBC 2.79* 2.65* 2.39*   HGB 8.0* 7.6* 7.0*   HCT 26.2* 24.8* 22.6*   MCV 93.9 93.6 94.6   MCH 28.7 28.7 29.3   MCHC 30.5 30.6 31.0   RDW 16.3* 16.4* 16.6*   PLT See Reflexed IPF Result See Reflexed IPF Result 120*   MPV NOT REPORTED NOT REPORTED 11.4      BMP:   Recent Labs     04/10/19  0447 04/11/19  0423 04/12/19  0439   * 132* 134*   K 3.7 4.0 4.0   CL 94* 95* 98   CO2 24 26 26   BUN 42* 52* 28*   CREATININE 3.21* 3.56* 2.41*   GLUCOSE 90 91 97   CALCIUM 8.6 8.5* 8.4*      IRON:    Lab Results   Component Value Date    IRON 31 04/08/2019     IRON SATURATION:    Lab Results   Component Value Date    LABIRON 18 04/08/2019     TIBC:    Lab Results   Component Value Date    TIBC 169 04/08/2019     FERRITIN:    Lab Results   Component Value Date    FERRITIN 884 04/08/2019     GONSALO: No results found for: GONSALO    SPEP:   Lab Results   Component Value Date    PROT 5.6 03/29/2019    ALBCAL 2.5 03/07/2019    ALBPCT 53 03/07/2019    LABALPH 0.3 03/07/2019    LABALPH 0.8 03/07/2019    A1PCT 7 03/07/2019    A2PCT 18 03/07/2019    LABBETA 0.7 03/07/2019    BETAPCT 14 03/07/2019    GAMGLOB 0.4 03/07/2019    GGPCT 8 03/07/2019    PATH ELECTRONICALLY SIGNED. Shemar Yin M.D. 03/07/2019       URINE CREATININE:    Lab Results   Component Value Date    LABCREA 42.4 03/23/2019       URINALYSIS:  U/A:   Lab Results   Component Value Date    NITRU NEGATIVE 03/29/2019    COLORU YELLOW 03/29/2019    PHUR 7.5 03/29/2019    WBCUA 50  03/29/2019    RBCUA 50  03/29/2019    MUCUS NOT REPORTED 03/29/2019    TRICHOMONAS NOT REPORTED 03/29/2019    YEAST NOT REPORTED 03/29/2019    BACTERIA NOT REPORTED 03/29/2019    SPECGRAV 1.011 03/29/2019    LEUKOCYTESUR LARGE 03/29/2019    UROBILINOGEN Normal 03/29/2019    BILIRUBINUR NEGATIVE 03/29/2019    GLUCOSEU NEGATIVE 03/29/2019    KETUA NEGATIVE 03/29/2019    AMORPHOUS NOT REPORTED 03/29/2019           ASSESSMENT      1. 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 He's been hemodialysis dependent now   2. HTN: Blood pressures are stable  3. Status post TAVR   4.  Status post PCI and atherectomy.   5.  Recurrent bilateral pleural effusion status post thoracentesis  6. Hyponatremia primarily dilutional in nature  7. Evidence of volume overload  8. Anemia of chronic disease. PLAN      1. Pt to have UF today . Attempting to removr 2-2.5  kg off as blood pressure will allow. .   2. Patient will receive venofer again today . Aranesp ordered as well  3. Follow up labs ordered. 4. Telephone discussion with Dr Silvina oV        Please do not hesitate to call with questions.     Electronically signed by Les Montejo MD on 4/12/2019 at 12:16 PM

## 2019-04-12 NOTE — PROGRESS NOTES
[facility]  Pre Op Progress Note    CC: hemodialysis        Subjective:  Mr. Odalis Yin seen and examined Plan of care reviewed and questions answered. Up to chair and eating breakfast             Physical Exam  Vital Signs: BP (!) 149/60   Pulse 68   Temp 98.1 °F (36.7 °C) (Oral)   Resp 14   Ht 5' 6\" (1.676 m)   Wt 142 lb 3.2 oz (64.5 kg)   SpO2 95%   BMI 22.95 kg/m²  O2 Flow Rate (L/min): 2 L/min       General: alert and oriented to person, place and time. Up in chair, No apparent distress.   Heart:Rhythm: paced  Lungs: clear to auscultation bilaterally  Abdomen: soft, non tender, non distended, BSx4  Extremities: negative      Scheduled Meds:    iron sucrose  200 mg Intravenous Q24H    [START ON 4/19/2019] darbepoetin marya-polysorbate  100 mcg Subcutaneous Once per day on Fri    bumetanide  4 mg Oral BID    simethicone  80 mg Oral Q6H    docusate sodium  100 mg Oral BID    hydrALAZINE  100 mg Oral 3 times per day    lidocaine-EPINEPHrine  20 mL Intradermal Once    lidocaine PF  1 mL Intradermal Once    bacitracin in 0.9 % sodium chloride irrigation  50,000 Units Topical Once    sodium chloride flush  10 mL Intravenous 2 times per day    aspirin  81 mg Oral Daily    spironolactone  25 mg Oral Daily    metoprolol succinate  50 mg Oral Daily    FLUoxetine  10 mg Oral Daily    levothyroxine  125 mcg Oral Daily    ALPRAZolam  0.25 mg Oral Once    QUEtiapine  25 mg Oral Nightly    clopidogrel  75 mg Oral Daily    isosorbide mononitrate  30 mg Oral Daily    [Held by provider] docusate sodium  100 mg Oral Daily    amiodarone  200 mg Oral Daily    atorvastatin  20 mg Oral Daily     Continuous Infusions:    dextrose 20 mL/hr at 04/06/19 1858       Data:  CBC:   Recent Labs     04/10/19  0447 04/11/19  0423 04/12/19  0439   WBC 11.8* 12.6* 10.4   HGB 8.0* 7.6* 7.0*   HCT 26.2* 24.8* 22.6*   MCV 93.9 93.6 94.6   PLT See Reflexed IPF Result See Reflexed IPF Result 120*

## 2019-04-12 NOTE — PROGRESS NOTES
used smokeless tobacco.  ETOH:   reports that he drank alcohol. ALLERGIES:    Allergies   Allergen Reactions    Quinolones      Pt is allergic to fluoroquinolones       Home Meds:   Prior to Admission medications    Medication Sig Start Date End Date Taking? Authorizing Provider   amiodarone (CORDARONE) 200 MG tablet Take 200 mg by mouth daily   Yes Historical Provider, MD   atorvastatin (LIPITOR) 20 MG tablet Take 20 mg by mouth daily   Yes Historical Provider, MD   isosorbide mononitrate (IMDUR) 60 MG extended release tablet Take 60 mg by mouth daily   Yes Historical Provider, MD   levothyroxine (SYNTHROID) 100 MCG tablet Take 100 mcg by mouth Daily   Yes Historical Provider, MD   lisinopril (PRINIVIL;ZESTRIL) 40 MG tablet Take 40 mg by mouth daily   Yes Historical Provider, MD   apixaban (ELIQUIS) 2.5 MG TABS tablet Take 2.5 mg by mouth daily   Yes Historical Provider, MD   metoprolol tartrate (LOPRESSOR) 50 MG tablet Take 50 mg by mouth 2 times daily   Yes Historical Provider, MD         Intake/Output Summary (Last 24 hours) at 4/12/2019 1421  Last data filed at 4/12/2019 1200  Gross per 24 hour   Intake 1040 ml   Output 200 ml   Net 840 ml         Diet   DIET CARDIAC; Low Sodium (2 GM);  Daily Fluid Restriction: 1800 ml  Dietary Nutrition Supplements:  Dietary Nutrition Supplements: Frozen Oral Supplement  Dietary Nutrition Supplements: Renal Oral Supplement    Vitals:   BP (!) 135/48   Pulse 62   Temp 98.1 °F (36.7 °C) (Oral)   Resp 16   Ht 5' 6\" (1.676 m)   Wt 142 lb 3.2 oz (64.5 kg)   SpO2 97%   BMI 22.95 kg/m²  on         I/O (24 Hours)    Patient Vitals for the past 8 hrs:   BP Temp Temp src Pulse Resp SpO2   04/12/19 1154 (!) 135/48 98.1 °F (36.7 °C) Oral 62 16 97 %   04/12/19 0706 (!) 149/60 98.1 °F (36.7 °C) Oral 68 14 95 %       Intake/Output Summary (Last 24 hours) at 4/12/2019 1421  Last data filed at 4/12/2019 1200  Gross per 24 hour   Intake 1040 ml   Output 200 ml   Net 840 ml I/O last 3 completed shifts: In: 740 [P.O.:240]  Out: -    Date 04/12/19 0000 - 04/12/19 2359   Shift 3796-6057 9784-9368 1247-6890 24 Hour Total   INTAKE   P.O.(mL/kg/hr)  540  540   Shift Total(mL/kg)  540(8.4)  540(8.4)   OUTPUT   Urine(mL/kg/hr)  200  200   Shift Total(mL/kg)  200(3.1)  200(3.1)   Weight (kg) 64.5 64.5 64.5 64.5     Patient Vitals for the past 96 hrs (Last 3 readings):   Weight   04/11/19 1437 142 lb 3.2 oz (64.5 kg)   04/11/19 1058 147 lb 4.3 oz (66.8 kg)   04/10/19 0532 138 lb 10.7 oz (62.9 kg)     Exam   Head and neck atraumatic, normocephalic    Lymph nodes-no cervical, supraclavicular lymphadenopathy    Neck-=JVP elevation    Lungs - dec bases   Dull bases     Clear ant   CVS- S1, S2 regular. No S3 no S4, 1/6 murmurs    Abdomen-nontender, nondistended. Bowel sounds are present. No organomegaly    Lower extremity-+edema    Upper extremity-+ edema    Neurological-grossly normal cranial nerves.   No overt motor deficit      Medications:    Scheduled Meds:   iron sucrose  200 mg Intravenous Q24H    [START ON 4/19/2019] darbepoetin marya-polysorbate  100 mcg Subcutaneous Once per day on Fri    bumetanide  4 mg Oral BID    simethicone  80 mg Oral Q6H    docusate sodium  100 mg Oral BID    hydrALAZINE  100 mg Oral 3 times per day    lidocaine-EPINEPHrine  20 mL Intradermal Once    lidocaine PF  1 mL Intradermal Once    bacitracin in 0.9 % sodium chloride irrigation  50,000 Units Topical Once    sodium chloride flush  10 mL Intravenous 2 times per day    aspirin  81 mg Oral Daily    spironolactone  25 mg Oral Daily    metoprolol succinate  50 mg Oral Daily    FLUoxetine  10 mg Oral Daily    levothyroxine  125 mcg Oral Daily    ALPRAZolam  0.25 mg Oral Once    QUEtiapine  25 mg Oral Nightly    clopidogrel  75 mg Oral Daily    isosorbide mononitrate  30 mg Oral Daily    [Held by provider] docusate sodium  100 mg Oral Daily    amiodarone  200 mg Oral Daily    atorvastatin 20 mg Oral Daily       Continuous Infusions:   dextrose 20 mL/hr at 04/06/19 1858       PRN Meds:      Labs:  CBC:   Recent Labs     04/10/19  0447 04/11/19  0423 04/12/19  0439   WBC 11.8* 12.6* 10.4   HGB 8.0* 7.6* 7.0*   HCT 26.2* 24.8* 22.6*   MCV 93.9 93.6 94.6   PLT See Reflexed IPF Result See Reflexed IPF Result 120*     BMP:   Recent Labs     04/10/19  0447 04/11/19  0423 04/12/19  0439   * 132* 134*   K 3.7 4.0 4.0   CL 94* 95* 98   CO2 24 26 26   BUN 42* 52* 28*   CREATININE 3.21* 3.56* 2.41*     LIVER PROFILE:   No results for input(s): AST, ALT, LIPASE, BILIDIR, BILITOT, ALKPHOS in the last 72 hours. Invalid input(s): AMYLASE,  ALB  PT/INR:   No results for input(s): PROTIME, INR in the last 72 hours. APTT:   No results for input(s): APTT in the last 72 hours. UA:  No results for input(s): NITRITE, COLORU, PHUR, LABCAST, WBCUA, RBCUA, MUCUS, TRICHOMONAS, YEAST, BACTERIA, CLARITYU, SPECGRAV, LEUKOCYTESUR, UROBILINOGEN, BILIRUBINUR, BLOODU, GLUCOSEU, AMORPHOUS in the last 72 hours. Invalid input(s): KETONESU  No results for input(s): PHART, QBC2LLJ, PO2ART in the last 72 hours. 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 *  Multi-vessel coronary artery disease post PCI  Severe aortic stenosis, post TAVR  Atrial fibrillation  Small right middle lobe pulmonary embolism.   Per chart, from imaging studies done in [place]  Anemia of chronic diease     Plan:  Continue fluid removal     Electronically signed by Heidi Sousa MD on 4/12/2019 at 2:21 PM

## 2019-04-12 NOTE — PROGRESS NOTES
Braces or Orthoses?: Yes(Sling for L UE, but not on or in room upon arrival)  Implants present? : Pacemaker(4/2/19)  Upper Extremity Weight Bearing Restrictions  Left Upper Extremity Weight Bearing: (partial weight bearing for PPM 4/2)  Required Braces or Orthoses  Left Upper Extremity Brace/Splint: Sling(not on or in room upon arrival)  Position Activity Restriction  Other position/activity restrictions: Up w/assist.  Subjective   General  Patient assessed for rehabilitation services?: Yes  Family / Caregiver Present: No  Pain Assessment  Clinical Progression: Gradually improving  Response to Pain Intervention: Patient Satisfied  Vital Signs  Patient Currently in Pain: Denies   Orientation     Objective    ADL  Feeding: Independent;Setup(Seated in recliner at tray table)  Grooming: Stand by assistance;Setup(Standing at sink for oral care, Seated to wash face and head)  UE Bathing: Minimal assistance;Setup; Increased time to complete(Seated, needs assist w/washing back)  LE Bathing: Moderate assistance;Setup; Increased time to complete(Seated, needed assist with lower legs, feet & bottom)  UE Dressing: Minimal assistance;Setup(w/gown)  LE Dressing: Maximum assistance;Setup(w/CARLOS hose and footies)  Toileting: Moderate assistance(w/wiping bottom)      Pt up in chair upon arrival. Hal Chow per RN for OT tx. Transferred to bathroom for toileting and seated at sink for bathing using sx soap and dressing (see above for LOF). Pt stood for oral care and transferred back to recliner, BLE elevated, call light and phone in reach. Pt had min SOB with increased activity. Pt used RW with no LOB. Vc's to keep pacemaker precautions with LUE.      Balance  Sitting Balance: Stand by assistance  Standing Balance: Contact guard assistance(w/RW)  Standing Balance  Time: Pt stood for approx 10 min total for transfers, func mob, fredrick care and brushing teeth at sink  Sit to stand: Minimal assistance  Stand to sit: Contact guard assistance  Comment:

## 2019-04-12 NOTE — PROGRESS NOTES
Physical Therapy  Facility/Department: [facility] CAR 1  Daily Treatment Note  NAME: Lu Fallon  : 12/10/1928  MRN: [id number]    Date of Service: 2019    Discharge Recommendations:  Further therapy recommended at discharge. Patient Diagnosis(es): The encounter diagnosis was Pulmonary hypertension ([address] has a past medical history of Aortic stenosis, Atrial fibrillation ([address] CAD (coronary artery disease), Chest pain, CHF (congestive heart failure) ([address] Chronic anemia, Chronic kidney disease, Hypertension, Pleural effusion on left, Pleural effusion, right, Pulmonary emboli ([address] Pulmonary hypertension ([address] and Thyroid disease. has a past surgical history that includes pacemaker placement (2019); thoracentesis; joint replacement; Coronary angioplasty with stent (2019); other surgical history (2019); and other surgical history (03/15/2019). Restrictions  Restrictions/Precautions  Restrictions/Precautions: Weight Bearing, Cardiac, General Precautions, ROM Restrictions, Surgical Protocols, Fall Risk  Required Braces or Orthoses?: LUE sling for comfort  Implants present? : Pacemaker(19)  Upper Extremity Weight Bearing Restrictions  Left Upper Extremity Weight Bearing: (partial weight bearing for PPM )  Required Braces or Orthoses  Left Upper Extremity Brace/Splint: Sling   Position Activity Restriction  Other position/activity restrictions: Up w/assist.    Subjective   Pt sleeping in chair upon arrival, easily wakened and agreeable for therapy this afternoon. No c/o pain. Orientation  WNL  Objective    Transfers  Sit to Stand: Minimal Assistance  Stand to sit: Contact guard assistance  Ambulation  Ambulation?: Yes  Ambulation 1  Surface: level tile  Device: Rolling Walker  Assistance: Contact guard assistance  Quality of Gait: Decreased renato, short step length, flexed posture, excessive heel strike.    Distance: 50ft x2  Comments: Pt stated he felt \"lousey\" during ambulation stating that he cannot breathe. Balance  Posture: Fair  Sitting - Static: Good  Sitting - Dynamic: Good  Standing - Static: Good;-  Standing - Dynamic: Good;-  Comments: BUE support on RW. Exercises  Comments: Pt decline exercises this date. Assessment    Body structures, Functions, Activity limitations: Decreased functional mobility ; Decreased balance;Decreased endurance  Assessment: Pt amb 50ft x2 CGA with use of RW. Pt stated he felt \"lousey\" during ambulation and could not breathe. Prognosis: Good  Patient Education: Proper breathing technique during ambulation; pt w/good return demo. REQUIRES PT FOLLOW UP: Yes  Activity Tolerance  Activity Tolerance: Patient limited by endurance; Patient Tolerated treatment well  Activity Tolerance: Rest breaks as needed. Goals  Short term goals  Time Frame for Short term goals: 25 visits  Short term goal 1: Perform bed mobility and functional transfers independently  Short term goal 2: Ambulate 300ft with least restrictive AD independently  Short term goal 3: Demo Good- dynamic standing balance to decrease risk of falls  Short term goal 4: Participate in 30 minutes of therapy to demo increased endurance    Plan    Plan  Times per week: 6-7x/wk  Current Treatment Recommendations: Strengthening, Balance Training, Functional Mobility Training, Endurance Training, Transfer Training, Patient/Caregiver Education & Training, Safety Education & Training, Home Exercise Program, Gait Training  Safety Devices  Type of devices: All fall risk precautions in place, Gait belt, Call light within reach, Nurse notified, Left in chair  Restraints  Initially in place: No     Therapy Time   Individual Concurrent Group Co-treatment   Time In  0230         Time Out 0245         Minutes [address], [place]   Treatment performed by Student PTA under the supervision of co-signing PTA who agrees with all treatment and documentation.    Davida Li PTA

## 2019-04-13 NOTE — PROGRESS NOTES
Pulmonary Progress Note    CC:  chf      Subjective:  No acute events   Still has edema     Review of Systems -  General ROS: fatigue  ENT ROS: negative for - headaches, oral lesions or sore throat  Cardiovascular ROS: no chest pain , +orthopnea   Gastrointestinal ROS: no abdominal pain, change in bowel habits, or black or bloody stools  Skin - no rash   Neuro - no blurry vision , no loc . No focal weakness   msk - no jt tenderness or swelling    Vascular - no claudication , rest completed and negative   Lymphatic - complete and negative   Hematology - oncology - complete and negative   Allergy immunology - complete and negative    no burning or hematuria      PAST MEDICAL HISTORY:       Diagnosis Date    Aortic stenosis 02/15/2019    Atrial fibrillation (HCC)     CAD (coronary artery disease)     Chest pain 02/15/2019    CHF (congestive heart failure) (HCC)     Chronic anemia     Chronic kidney disease     Hypertension     Pleural effusion on left 02/15/2019    Pleural effusion, right 02/15/2019    Pulmonary emboli ([address] 02/15/2019    Pulmonary hypertension ([address] 03/06/2019    Thyroid disease     hypothyroidism     Family History:   History reviewed. No pertinent family history. SURGICAL HISTORY:   Past Surgical History:   Procedure Laterality Date    CORONARY ANGIOPLASTY WITH STENT PLACEMENT  03/11/2019    complex PCI of LT MAIN, LAD    JOINT REPLACEMENT      right ankle    OTHER SURGICAL HISTORY  04/02/2019    UPGRADE TO BI-V PACEMAKER    OTHER SURGICAL HISTORY  03/15/2019    TAVR PROCEDURE    PACEMAKER PLACEMENT  01/20/2019    THORACENTESIS         TOBACCO:   reports that he quit smoking about 30 years ago. He has never used smokeless tobacco.  ETOH:   reports that he drank alcohol. ALLERGIES:    Allergies   Allergen Reactions    Quinolones      Pt is allergic to fluoroquinolones       Home Meds:   Prior to Admission medications    Medication Sig Start Date End Date Taking? Authorizing Provider   amiodarone (CORDARONE) 200 MG tablet Take 200 mg by mouth daily   Yes Historical Provider, MD   atorvastatin (LIPITOR) 20 MG tablet Take 20 mg by mouth daily   Yes Historical Provider, MD   isosorbide mononitrate (IMDUR) 60 MG extended release tablet Take 60 mg by mouth daily   Yes Historical Provider, MD   levothyroxine (SYNTHROID) 100 MCG tablet Take 100 mcg by mouth Daily   Yes Historical Provider, MD   lisinopril (PRINIVIL;ZESTRIL) 40 MG tablet Take 40 mg by mouth daily   Yes Historical Provider, MD   apixaban (ELIQUIS) 2.5 MG TABS tablet Take 2.5 mg by mouth daily   Yes Historical Provider, MD   metoprolol tartrate (LOPRESSOR) 50 MG tablet Take 50 mg by mouth 2 times daily   Yes Historical Provider, MD         Intake/Output Summary (Last 24 hours) at 4/13/2019 1532  Last data filed at 4/13/2019 0756  Gross per 24 hour   Intake 300 ml   Output 80 ml   Net 220 ml         Diet   DIET CARDIAC; Low Sodium (2 GM);  Daily Fluid Restriction: 1800 ml  Dietary Nutrition Supplements:  Dietary Nutrition Supplements: Frozen Oral Supplement  Dietary Nutrition Supplements: Renal Oral Supplement    Vitals:   BP (!) 110/58   Pulse 73   Temp 97.5 °F (36.4 °C)   Resp 18   Ht 5' 6\" (1.676 m)   Wt 134 lb 0.6 oz (60.8 kg)   SpO2 94%   BMI 21.63 kg/m²  on         I/O (24 Hours)    Patient Vitals for the past 8 hrs:   BP Temp Temp src Pulse Weight   04/13/19 1305 (!) 110/58 -- -- 73 134 lb 0.6 oz (60.8 kg)   04/13/19 1220 (!) 84/41 -- -- 60 --   04/13/19 1152 (!) 83/40 -- -- 60 --   04/13/19 1140 (!) 115/44 -- -- 60 --   04/13/19 1121 (!) 84/40 -- -- 60 --   04/13/19 1053 (!) 95/43 -- -- 62 --   04/13/19 1020 (!) 101/44 -- -- 60 --   04/13/19 0950 (!) 117/50 -- -- 60 --   04/13/19 0945 (!) 139/55 97.5 °F (36.4 °C) -- 64 139 lb 15.9 oz (63.5 kg)   04/13/19 0830 -- -- -- 60 --   04/13/19 0756 (!) 157/46 98.2 °F (36.8 °C) Oral 60 --       Intake/Output Summary (Last 24 hours) at 4/13/2019 1532  Last data Infusions:   dextrose 20 mL/hr at 04/06/19 1858       PRN Meds:      Labs:  CBC:   Recent Labs     04/11/19 0423 04/12/19 0439 04/13/19  0516   WBC 12.6* 10.4 15.8*   HGB 7.6* 7.0* 7.3*   HCT 24.8* 22.6* 23.8*   MCV 93.6 94.6 94.1   PLT See Reflexed IPF Result 120* 115*     BMP:   Recent Labs     04/11/19 0423 04/12/19 0439 04/13/19  0516   * 134* 132*   K 4.0 4.0 4.1   CL 95* 98 95*   CO2 26 26 26   BUN 52* 28* 43*   CREATININE 3.56* 2.41* 3.22*     LIVER PROFILE:   No results for input(s): AST, ALT, LIPASE, BILIDIR, BILITOT, ALKPHOS in the last 72 hours. Invalid input(s): AMYLASE,  ALB  PT/INR:   No results for input(s): PROTIME, INR in the last 72 hours. APTT:   No results for input(s): APTT in the last 72 hours. UA:  No results for input(s): NITRITE, COLORU, PHUR, LABCAST, WBCUA, RBCUA, MUCUS, TRICHOMONAS, YEAST, BACTERIA, CLARITYU, SPECGRAV, LEUKOCYTESUR, UROBILINOGEN, BILIRUBINUR, BLOODU, GLUCOSEU, AMORPHOUS in the last 72 hours. Invalid input(s): KETONESU  No results for input(s): PHART, PIC2QDF, PO2ART in the last 72 hours. 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 *  Multi-vessel coronary artery disease post PCI  Severe aortic stenosis, post TAVR  Atrial fibrillation  Small right middle lobe pulmonary embolism.   Per chart, from imaging studies done in [place]  Anemia of chronic diease     Plan:  Continue fluid removal

## 2019-04-13 NOTE — PROGRESS NOTES
Occupational Therapy Not Seen Note    DATE: 2019  Name: Jacy Cline  : 12/10/1928  MRN: [id number]    Patient not available for Occupational Therapy due to: Attempt this afternoon but pt was off the unit at  tx.     Next Scheduled Treatment: 4/15/19    Electronically signed by GHANSHYAM Judge on 2019 at 10:31 AM

## 2019-04-13 NOTE — PROGRESS NOTES
Renal Progress Note    Patient :  Bhupinder Burnett; 80 y.o. MRN# [id number]  Location:  [facility]/3102-04  Attending:  Arminda Majano MD  Admit Date:  3/6/2019   Hospital Day: 45    Subjective   Admitted with shortness of breath secondary to decompensated congestive heart failure in the face of aortic stenosis. Subsequently underwent PCI and PVR. Postprocedure course has been complicated by worsening of renal function and hypervolemia for which he is getting recurrent thoracocentesis and diuretic adjustment. Hemodialysis also initiated. Following for HUYEN secondary to hypoperfusion and cardiorenal, now ESRD, started on dialysis this admission. Schedule is MWF. Had Ultrafiltration only yesterday. Removed 2.2kg  BP ranges low 100-150.   He is on Bumex 4mg PO BID  Urine output minimal <300    Objective     VS: BP (!) 157/46   Pulse 60   Temp 98.2 °F (36.8 °C) (Oral)   Resp 18   Ht 5' 6\" (1.676 m)   Wt 139 lb 8.8 oz (63.3 kg)   SpO2 94%   BMI 22.52 kg/m²   MAXIMUM TEMPERATURE OVER 24HRS:  Temp (24hrs), Av °F (36.7 °C), Min:97.7 °F (36.5 °C), Max:98.2 °F (36.8 °C)    24HR BLOOD PRESSURE RANGE:  Systolic (12MCK), UNK:849 , Min:105 , OEE:051   ; Diastolic (49GLN), RSW:16, Min:43, Max:62    24HR INTAKE/OUTPUT:      Intake/Output Summary (Last 24 hours) at 2019 1027  Last data filed at 2019 0756  Gross per 24 hour   Intake 660 ml   Output 80 ml   Net 580 ml     WEIGHT :  Patient Vitals for the past 96 hrs (Last 3 readings):   Weight   19 1840 139 lb 8.8 oz (63.3 kg)   19 1605 144 lb 6.4 oz (65.5 kg)   19 1437 142 lb 3.2 oz (64.5 kg)       Current Medications:     Scheduled Meds:    iron sucrose  200 mg Intravenous Q24H    [START ON 2019] darbepoetin marya-polysorbate  100 mcg Subcutaneous Once per day on Fri    bumetanide  4 mg Oral BID    simethicone  80 mg Oral Q6H    docusate sodium  100 mg Oral BID    hydrALAZINE  100 mg Oral 3 times per day    lidocaine-EPINEPHrine 20 mL Intradermal Once    lidocaine PF  1 mL Intradermal Once    bacitracin in 0.9 % sodium chloride irrigation  50,000 Units Topical Once    sodium chloride flush  10 mL Intravenous 2 times per day    aspirin  81 mg Oral Daily    spironolactone  25 mg Oral Daily    metoprolol succinate  50 mg Oral Daily    FLUoxetine  10 mg Oral Daily    levothyroxine  125 mcg Oral Daily    ALPRAZolam  0.25 mg Oral Once    QUEtiapine  25 mg Oral Nightly    clopidogrel  75 mg Oral Daily    isosorbide mononitrate  30 mg Oral Daily    [Held by provider] docusate sodium  100 mg Oral Daily    amiodarone  200 mg Oral Daily    atorvastatin  20 mg Oral Daily     Continuous Infusions:    dextrose 20 mL/hr at 04/06/19 1858       Physical Examination:     General:  AAO x 3, speaking in full sentences, no accessory muscle use. Chest:   Decreased breath sounds, no rales or wheezes. Cardiac:  S1 S2 RR,+ murmurs, gallops or rubs, +JVD  Abdomen: Soft, non-tender, non distended, BS audible. SKIN:  Warm and dry  Extremities      ++edema, no clubbing, No cyanosis  Neuro:  AAO x 3, No FND.    Rt IJ temp cath    Labs:       Recent Labs     04/11/19 0423 04/12/19 0439 04/13/19  0516   WBC 12.6* 10.4 15.8*   RBC 2.65* 2.39* 2.53*   HGB 7.6* 7.0* 7.3*   HCT 24.8* 22.6* 23.8*   MCV 93.6 94.6 94.1   MCH 28.7 29.3 28.9   MCHC 30.6 31.0 30.7   RDW 16.4* 16.6* 16.6*   PLT See Reflexed IPF Result 120* 115*   MPV NOT REPORTED 11.4 12.1      BMP:   Recent Labs     04/11/19 0423 04/12/19 0439 04/13/19  0516   * 134* 132*   K 4.0 4.0 4.1   CL 95* 98 95*   CO2 26 26 26   BUN 52* 28* 43*   CREATININE 3.56* 2.41* 3.22*   GLUCOSE 91 97 91   CALCIUM 8.5* 8.4* 8.9        Urinalysis/Chemistries:      Lab Results   Component Value Date    NITRU NEGATIVE 03/29/2019    COLORU YELLOW 03/29/2019    PHUR 7.5 03/29/2019    WBCUA 50  03/29/2019    RBCUA 50  03/29/2019    MUCUS NOT REPORTED 03/29/2019    TRICHOMONAS NOT REPORTED 03/29/2019 YEAST NOT REPORTED 03/29/2019    BACTERIA NOT REPORTED 03/29/2019    SPECGRAV 1.011 03/29/2019    LEUKOCYTESUR LARGE 03/29/2019    UROBILINOGEN Normal 03/29/2019    BILIRUBINUR NEGATIVE 03/29/2019    GLUCOSEU NEGATIVE 03/29/2019    [address] NEGATIVE 03/29/2019    AMORPHOUS NOT REPORTED 03/29/2019       Radiology:     CXR:   No recent  Assessment:     1. HUYEN secondary to hypoperfusion related to cardiorenal syndrome. Started on HD this admission since 29th of March  2. CKD IV with baseline creatinine 2, now appears to be ESRD  3. Nephrotic range proteinuria Bx not feasible in view of he being on Asprin and cardiology recommends not to stop anticoagulant. Explained to patient and he does not want to do that. 4. Recurrent pleural effusions s/p thoracentesis  5. 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 S/P PCI and multiple stent palcement  7. HTN  8. 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 S/P Upgrade of dual chamber PPM to CRT-P on 4-2-19  10. Hx if urinary retention    Plan:   1. HD today  2. Will need Perm cath and outpatient dialysis spot  3. Seen on HD. Orders reviewed      Nutrition   Renal Diet/TF      aSrah Diaz CNP  [facility]. Attending Physician Statement  I have discussed the care of Carlos Jensen, including pertinent history and exam findings,  with the CNP. I have reviewed the key elements of all parts of the encounter with the CNP. I agree with the assessment, plan and orders as documented by the resident.     Jessica Emery MD, MRCP [place] Watters, [place]   4/13/2019 11:24 AM    Nephrology [address] [place] [address]

## 2019-04-13 NOTE — FLOWSHEET NOTE
DATE: 2019    NAME: Mora Lama  MRN: [id number]   : 12/10/1928    Patient not seen this date for Physical Therapy due to:  [] Blood transfusion in progress  [x] Hemodialysis  []  Patient Declined  [] Spine Precautions   [] Strict Bedrest  [] Surgery/ Procedure  [] Testing      [] Other        [] PT being discontinued at this time. Patient independent. No further needs. [] PT being discontinued at this time as the patient has been transferred to palliative care. No further needs.     Elli Lockwood, PTA

## 2019-04-14 NOTE — CONSULTS
Division of Vascular Surgery        New Consult      Physician Requesting Consult:  Dr. Fredi Bergman    Reason for Consult:   Femoral artery pseudoaneurysm     Chief Complaint:      No complaints this morning    History of Present Illness:      Enedelia Gonzalez is a 80 y.o. gentleman who has had a prolonged hospital course. Was admitted for shortness of breath and decompensated heart failure. Ultimately underwent thoracentesis, PCI, TAVR over his hospital course. Was noted to have swelling over his left groin site and imaging revealed 4 cm pseudoaneurysm. With everything going on he developed renal failure and is currently getting dialysis thru a temporary catheter. Medical History:     Past Medical History:   Diagnosis Date    Aortic stenosis 02/15/2019    Atrial fibrillation (HCC)     CAD (coronary artery disease)     Chest pain 02/15/2019    CHF (congestive heart failure) (HCC)     Chronic anemia     Chronic kidney disease     Hypertension     Pleural effusion on left 02/15/2019    Pleural effusion, right 02/15/2019    Pulmonary emboli ([address] 02/15/2019    Pulmonary hypertension ([address] 03/06/2019    Thyroid disease     hypothyroidism       Surgical History:     Past Surgical History:   Procedure Laterality Date    CORONARY ANGIOPLASTY WITH STENT PLACEMENT  03/11/2019    complex PCI of LT MAIN, LAD    JOINT REPLACEMENT      right ankle    OTHER SURGICAL HISTORY  04/02/2019    UPGRADE TO BI-V PACEMAKER    OTHER SURGICAL HISTORY  03/15/2019    TAVR PROCEDURE    PACEMAKER PLACEMENT  01/20/2019    THORACENTESIS         Family History:     History reviewed. No pertinent family history.     Allergies:       Quinolones    Medications:      Current Facility-Administered Medications   Medication Dose Route Frequency Provider Last Rate Last Dose    thrombin 5000 units kit             sodium chloride (PF) 0.9 % injection             thrombin (recombinant) (RECOTHROM) 5,000 Units  1 each Topical Once Jacky Gurrola MD        midodrine (PROAMATINE) tablet 5 mg  5 mg Oral PRN Oscar Koo MD   5 mg at 04/13/19 1139    0.9 % sodium chloride bolus  250 mL Intravenous PRN Gael John MD        0.9 % sodium chloride bolus  150 mL Intravenous PRN Gael John MD        bumetanide (BUMEX) tablet 4 mg  4 mg Oral BID Richie Harrison MD   4 mg at 04/14/19 0857    simethicone (MYLICON) chewable tablet 80 mg  80 mg Oral Q6H Jackson Shine MD   80 mg at 04/14/19 0859    dextrose 5 % solution   Intravenous Continuous Jackson Shine MD 20 mL/hr at 04/06/19 1858      docusate sodium (COLACE) capsule 100 mg  100 mg Oral BID AlFRANCIS Sanchez - CNP   100 mg at 04/13/19 2147    bisacodyl (DULCOLAX) suppository 10 mg  10 mg Rectal Daily PRN FRANCIS Saldivar - DYLAN        hydrALAZINE (APRESOLINE) tablet 100 mg  100 mg Oral 3 times per day Marcela Vicente MD   100 mg at 04/14/19 0854    lidocaine-EPINEPHrine 1 percent-1:[id number] injection 20 mL  20 mL Intradermal Once Wilver De La Cruz MD        HYDROcodone-acetaminophen ([address]) 5-325 MG per tablet 1 tablet  1 tablet Oral Q8H PRN Jackson Shine MD   1 tablet at 04/03/19 0836    lidocaine PF 1 % injection 1 mL  1 mL Intradermal Once FRANCIS Saldivar - DYLAN        bacitracin 50,000 Units in sodium chloride 0.9 % 1,000 mL irrigation  50,000 Units Topical Once Wilver De La Cruz MD        sodium chloride flush 0.9 % injection 10 mL  10 mL Intravenous 2 times per day Wilver De La Cruz MD   10 mL at 04/14/19 0902    sodium chloride flush 0.9 % injection 10 mL  10 mL Intravenous PRN Wilver De La Cruz MD        acetaminophen (TYLENOL) tablet 650 mg  650 mg Oral Q4H PRN Wilver De La Cruz MD        aspirin suppository 600 mg  600 mg Rectal Q6H PRN Wilver De La Cruz MD        0.9 % sodium chloride bolus  250 mL Intravenous PRN Wilver De La Cruz MD        0.9 % sodium chloride bolus  150 mL Intravenous PRN Wilver De La Cruz MD        heparin (porcine) injection 1,400 Units  1,400 Units Intercatheter PRN Shahana Lebron MD   1,400 Units at 04/13/19 1057    heparin (porcine) injection 1,500 Units  1,500 Units Intercatheter PRN Shahana Lebron MD   1,500 Units at 04/13/19 1058    aspirin EC tablet 81 mg  81 mg Oral Daily Janelle Lamar MD   81 mg at 04/14/19 0855    spironolactone (ALDACTONE) tablet 25 mg  25 mg Oral Daily Shahana Lebron MD   25 mg at 04/14/19 0858    metoprolol succinate (TOPROL XL) extended release tablet 50 mg  50 mg Oral Daily Shahana Lebron MD   50 mg at 04/14/19 1102    hydrALAZINE (APRESOLINE) injection 10 mg  10 mg Intravenous Q4H PRN Janelle Lamar MD        FLUoxetine (PROZAC) capsule 10 mg  10 mg Oral Daily Shahana Lebron MD   10 mg at 04/14/19 0901    ondansetron (ZOFRAN) injection 4 mg  4 mg Intravenous Q6H PRN Shahana Lebron MD   4 mg at 03/27/19 1904    levothyroxine (SYNTHROID) tablet 125 mcg  125 mcg Oral Daily Shahana Lebron MD   125 mcg at 04/14/19 0855    ALPRAZolam Darlen Lobe) tablet 0.25 mg  0.25 mg Oral Once Shahana Lebron MD   Stopped at 03/15/19 1733    QUEtiapine (SEROQUEL) tablet 25 mg  25 mg Oral Nightly Shahana Lebron MD   25 mg at 04/13/19 2147    magnesium hydroxide (MILK OF MAGNESIA) 400 MG/5ML suspension 30 mL  30 mL Oral Daily PRN Shahana Lebron MD   30 mL at 03/25/19 0910    clopidogrel (PLAVIX) tablet 75 mg  75 mg Oral Daily Shahana Lebron MD   75 mg at 04/14/19 0855    isosorbide mononitrate (IMDUR) extended release tablet 30 mg  30 mg Oral Daily Shahana Lebron MD   30 mg at 04/14/19 0900    [Held by provider] docusate sodium (COLACE) capsule 100 mg  100 mg Oral Daily Latasha Quiroz MD   100 mg at 03/31/19 1026    amiodarone (CORDARONE) tablet 200 mg  200 mg Oral Daily Shahana Lebron MD   200 mg at 04/14/19 0859    atorvastatin (LIPITOR) tablet 20 mg  20 mg Oral Daily Shahana Lebron MD   20 mg at 04/14/19 0900       Social History:     Tobacco: reports that he quit smoking about 30 years ago. He has never used smokeless tobacco.  Alcohol:      reports that he drank alcohol. Drug Use:  reports that he does not use drugs. Review of Systems:     Review of Systems   Constitutional: Positive for fatigue. Negative for chills and fever. HENT: Negative. Eyes: Negative for visual disturbance. Respiratory: Positive for shortness of breath. Negative for chest tightness. Cardiovascular: Positive for leg swelling. Negative for chest pain. Gastrointestinal: Negative for abdominal pain. Endocrine: Negative. Genitourinary: Negative. Musculoskeletal: Negative. Skin: Negative. Allergic/Immunologic: Negative. Neurological: Negative. Hematological: Negative. Psychiatric/Behavioral: Negative. Physical Exam:     Vitals:  BP (!) 90/28   Pulse 57   Temp 97.5 °F (36.4 °C) (Oral)   Resp 12   Ht 5' 6\" (1.676 m)   Wt 135 lb 9.3 oz (61.5 kg)   SpO2 97%   BMI 21.88 kg/m²     Physical Exam   Constitutional: He is oriented to person, place, and time. He appears well-developed and well-nourished. Eyes: Conjunctivae and EOM are normal.   Neck: Carotid bruit is not present. Cardiovascular: Normal rate and regular rhythm. Pulses:       Femoral pulses are 2+ on the right side, and 2+ on the left side. Dorsalis pedis pulses are Detected w/ doppler on the right side, and Detected w/ doppler on the left side. Posterior tibial pulses are Detected w/ doppler on the right side, and Detected w/ doppler on the left side. Pulsatile mass in left groin   Pulmonary/Chest: Effort normal. No respiratory distress. Abdominal: Soft. Normal appearance. He exhibits no pulsatile midline mass. There is no tenderness. Feet:   Right Foot:   Skin Integrity: Negative for ulcer or skin breakdown. Left Foot:   Skin Integrity: Negative for ulcer or skin breakdown. Neurological: He is alert and oriented to person, place, and time.  He

## 2019-04-14 NOTE — PROCEDURES
[address] [facility]                                 PROCEDURE NOTE    PATIENT NAME: Kevin Thompson                   :        12/10/1928  MED REC NO:   [id number]                             ROOM:       1660  ACCOUNT NO:   [de-identified]                           ADMIT DATE: 2019  PROVIDER:     Darcie Shelley MD    DATE OF PROCEDURE:  2019    PREOPERATIVE DIAGNOSIS:  Left femoral artery pseudoaneurysm. POSTOPERATIVE DIAGNOSIS:  Left femoral artery pseudoaneurysm. PROCEDURE PERFORMED:  Ultrasound-guided thrombin injection into left   femoral artery pseudoaneurysm. SURGEON:  Darcie Shelley MD    ESTIMATED BLOOD LOSS:  Zero. INDICATION:  The patient is a 63-year-old gentleman who has had a  prolonged hospital course and has undergone PCI and percutaneous TAVR. It was noted that he had some swelling in his left groin. It was  pulsatile. He underwent an ultrasound which revealed a 4-cm femoral  artery pseudoaneurysm. Given these findings, he was recommended to  undergo ultrasound-guided thrombin injection. Risks and benefits had  been explained to him as well as his nephew, Dr. Rhianna Haji. DESCRIPTION OF PROCEDURE:  At the bedside, the left femoral artery and  groin were evaluated under ultrasound. There was a large pseudoaneurysm  with good color flow. The area was cleaned with ChloraPrep. Thrombin  had been drawn up and diluted and placed in syringes. Under ultrasound  guidance, the femoral artery pseudoaneurysm was accessed with a spinal  needle. Pulsatile bleeding was noted coming out of the syringe. It was  then injected with 2000 units of thrombin diluted in saline. Thrombus  was seen forming within the pseudoaneurysm, and there was no more color  flow. Manual pressure was held for a few minutes. The patient  continued to have a good femoral pulse and Doppler signals in his foot. At this point, the patient tolerated the procedure well and was  completed.         Cheryl Jones MD    D: 04/14/2019 13:06:40       T: 04/14/2019 16:24:04     [email or web address]  Job#: [id number]     Doc#: [id number]    CC:

## 2019-04-14 NOTE — PROGRESS NOTES
Renal Progress Note    Patient :  Adam Dumont; 80 y.o. MRN# [id number]  Location:  [facility]/2665-56  Attending:  Tracy Cagle MD  Admit Date:  3/6/2019   Hospital Day: 44    Subjective   Admitted with shortness of breath secondary to decompensated congestive heart failure in the face of aortic stenosis. Subsequently underwent PCI and PVR. Postprocedure course has been complicated by worsening of renal function and hypervolemia for which he is getting recurrent thoracocentesis and diuretic adjustment. Hemodialysis also initiated. Schedule is MWF. Had dialysis yesterday. 2700 removed. Tolerated well. BP ranges low 100-150.   He is on Bumex 4mg PO BID  Urine output minimal <200    Objective     VS: BP (!) 132/58   Pulse 58   Temp 97.7 °F (36.5 °C) (Oral)   Resp 16   Ht 5' 6\" (1.676 m)   Wt 135 lb 9.3 oz (61.5 kg)   SpO2 97%   BMI 21.88 kg/m²   MAXIMUM TEMPERATURE OVER 24HRS:  Temp (24hrs), Av.9 °F (36.6 °C), Min:97.5 °F (36.4 °C), Max:98.4 °F (36.9 °C)    24HR BLOOD PRESSURE RANGE:  Systolic (97FUR), XMT:989 , Min:83 , VRK:392   ; Diastolic (07KDR), ZVB:61, Min:40, Max:58    24HR INTAKE/OUTPUT:      Intake/Output Summary (Last 24 hours) at 2019  Last data filed at 2019 1730  Gross per 24 hour   Intake 640 ml   Output 200 ml   Net 440 ml     WEIGHT :  Patient Vitals for the past 96 hrs (Last 3 readings):   Weight   19 0318 135 lb 9.3 oz (61.5 kg)   19 1305 134 lb 0.6 oz (60.8 kg)   19 0945 139 lb 15.9 oz (63.5 kg)       Current Medications:     Scheduled Meds:    bumetanide  4 mg Oral BID    simethicone  80 mg Oral Q6H    docusate sodium  100 mg Oral BID    hydrALAZINE  100 mg Oral 3 times per day    lidocaine-EPINEPHrine  20 mL Intradermal Once    lidocaine PF  1 mL Intradermal Once    bacitracin in 0.9 % sodium chloride irrigation  50,000 Units Topical Once    sodium chloride flush  10 mL Intravenous 2 times per day    aspirin  81 mg Oral Daily    spironolactone  25 mg Oral Daily    metoprolol succinate  50 mg Oral Daily    FLUoxetine  10 mg Oral Daily    levothyroxine  125 mcg Oral Daily    ALPRAZolam  0.25 mg Oral Once    QUEtiapine  25 mg Oral Nightly    clopidogrel  75 mg Oral Daily    isosorbide mononitrate  30 mg Oral Daily    [Held by provider] docusate sodium  100 mg Oral Daily    amiodarone  200 mg Oral Daily    atorvastatin  20 mg Oral Daily     Continuous Infusions:    dextrose 20 mL/hr at 04/06/19 1858       Physical Examination:     General:  AAO x 3, speaking in full sentences, no accessory muscle use. Chest:   Decreased breath sounds, no rales or wheezes. Cardiac:  S1 S2 irregular,+ murmurs, gallops or rubs, +JVD  Abdomen: Soft, non-tender, non distended, BS audible. SKIN:  Warm and dry  Extremities      ++edema, no clubbing, No cyanosis  Neuro:  AAO x 3, No FND.    Rt IJ temp cath    Labs:       Recent Labs     04/12/19 0439 04/13/19 0516 04/14/19 0338   WBC 10.4 15.8* 13.7*   RBC 2.39* 2.53* 2.51*   HGB 7.0* 7.3* 7.2*   HCT 22.6* 23.8* 23.8*   MCV 94.6 94.1 94.8   MCH 29.3 28.9 28.7   MCHC 31.0 30.7 30.3   RDW 16.6* 16.6* 16.7*   * 115* See Reflexed IPF Result   MPV 11.4 12.1 NOT REPORTED      BMP:   Recent Labs     04/12/19 0439 04/13/19  0516 04/14/19 0338   * 132* 133*   K 4.0 4.1 3.7   CL 98 95* 96*   CO2 26 26 24   BUN 28* 43* 27*   CREATININE 2.41* 3.22* 2.79*   GLUCOSE 97 91 93   CALCIUM 8.4* 8.9 8.4*        Urinalysis/Chemistries:      Lab Results   Component Value Date    NITRU NEGATIVE 03/29/2019    COLORU YELLOW 03/29/2019    PHUR 7.5 03/29/2019    WBCUA 50  03/29/2019    RBCUA 50  03/29/2019    MUCUS NOT REPORTED 03/29/2019    TRICHOMONAS NOT REPORTED 03/29/2019    YEAST NOT REPORTED 03/29/2019    BACTERIA NOT REPORTED 03/29/2019    SPECGRAV 1.011 03/29/2019    LEUKOCYTESUR LARGE 03/29/2019    UROBILINOGEN Normal 03/29/2019    BILIRUBINUR NEGATIVE 03/29/2019    GLUCOSEU NEGATIVE 03/29/2019    [address] NEGATIVE 03/29/2019    AMORPHOUS NOT REPORTED 03/29/2019       Radiology:     CXR:   No recent  Assessment:     1. HUYEN secondary to hypoperfusion related to cardiorenal syndrome. Started on HD this admission since 29th of March  2. CKD IV with baseline creatinine 2, now appears to be ESRD  3. Nephrotic range proteinuria Bx not feasible in view of he being on Asprin and cardiology recommends not to stop anticoagulant. Explained to patient and he does not want to do that. 4. Recurrent pleural effusions s/p thoracentesis  5. 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 S/P PCI and multiple stent placement 3/11/19  7. HTN  8. 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 S/P Upgrade of dual chamber PPM to CRT-P on 4-2-19  10. Hx if urinary retention    Plan:   1. HD per schedule  2. Tunnel catheter placement by vascular surgeon  3. Will d/w Dr. Joe Del Cid   Renal Diet/TF      Rahul Coker  [facility]  [facility]. Attending Physician Statement  I have discussed the care of Argyle Brunner, including pertinent history and exam findings,  with the CNP. I have reviewed the key elements of all parts of the encounter with the CNP. I agree with the assessment, plan and orders as documented.     Robert Velásquez MD, MRCP Juanita Luna, FACP   4/14/2019 12:20 PM    Nephrology [address] [place] [address]

## 2019-04-14 NOTE — PLAN OF CARE
Problem: Falls - Risk of:  Goal: Will remain free from falls  Description  Will remain free from falls  4/14/2019 0320 by Ranjit Sweeney RN  Outcome: Ongoing  4/13/2019 1902 by Sanjeev Yanes RN  Outcome: Met This Shift  Goal: Absence of physical injury  Description  Absence of physical injury  4/14/2019 0320 by Ranjit Sweeney RN  Outcome: Ongoing  4/13/2019 1902 by Sanjeev Yanes RN  Outcome: Met This Shift     Problem: Pain:  Goal: Pain level will decrease  Description  Pain level will decrease  4/14/2019 0320 by Ranjit Sweeney RN  Outcome: Ongoing  4/13/2019 1902 by Sanjeev Yanes RN  Outcome: Met This Shift  Goal: Control of acute pain  Description  Control of acute pain  4/14/2019 0320 by Ranjit Sweeney RN  Outcome: Ongoing  4/13/2019 1902 by Sanjeev Yanes RN  Outcome: Met This Shift  Goal: Control of chronic pain  Description  Control of chronic pain  4/14/2019 0320 by Ranjit Sweeney RN  Outcome: Ongoing  4/13/2019 1902 by Sanjeev Yanes RN  Outcome: Met This Shift     Problem: Risk for Impaired Skin Integrity  Goal: Tissue integrity - skin and mucous membranes  Description  Structural intactness and normal physiological function of skin and  mucous membranes.   4/14/2019 0320 by Ranjit Sweeney RN  Outcome: Ongoing  4/13/2019 1902 by Sanjeev Yanes RN  Outcome: Met This Shift     Problem: Cardiac Output - Decreased:  Goal: Hemodynamic stability will improve  Description  Hemodynamic stability will improve  4/14/2019 0320 by Ranjit Sweeney RN  Outcome: Ongoing  4/13/2019 1902 by Sanjeev Yanes RN  Outcome: Met This Shift     Problem: Musculor/Skeletal Functional Status  Goal: Highest potential functional level  4/14/2019 0320 by Ranjit Sweeney RN  Outcome: Ongoing  4/13/2019 1902 by Sanjeev Yanes RN  Outcome: Ongoing  Goal: Absence of falls  4/14/2019 0320 by Ranjit Sweeney RN  Outcome: Ongoing  4/13/2019 1902 by Sanjeev Yanes RN  Outcome: Met This Shift     Problem: Nutrition  Goal: Optimal nutrition therapy  4/14/2019 0320 by Komal Valdez RN  Outcome: Ongoing  4/13/2019 1902 by George Perkins RN  Outcome: Ongoing

## 2019-04-14 NOTE — FLOWSHEET NOTE
DATE: 2019    NAME: Claude Sahara  MRN: [id number]   : 12/10/1928    Patient not seen this date for Physical Therapy due to:  [] Blood transfusion in progress  [] Hemodialysis  [x]  Patient Declined; pt resting in chair; adamantly refusing all mobility including chair exercises. [] Strict Bedrest  [] Surgery/ Procedure  [] Testing      [] Other        [] PT being discontinued at this time. Patient independent. No further needs. [] PT being discontinued at this time as the patient has been transferred to palliative care. No further needs.     Bo Schwartz, PTA

## 2019-04-14 NOTE — PROGRESS NOTES
Pulmonary Progress Note    CC:  chf      Subjective:  No acute events, continues on nasal cannula, denies any new complaints    Review of Systems -  General ROS: fatigue  ENT ROS: negative for - headaches, oral lesions or sore throat  Cardiovascular ROS: no chest pain , +orthopnea   Gastrointestinal ROS: no abdominal pain, change in bowel habits, or black or bloody stools  Skin - no rash   Neuro - no blurry vision , no loc . No focal weakness   msk - no jt tenderness or swelling    Vascular - no claudication , rest completed and negative   Lymphatic - complete and negative   Hematology - oncology - complete and negative   Allergy immunology - complete and negative    no burning or hematuria      PAST MEDICAL HISTORY:       Diagnosis Date    Aortic stenosis 02/15/2019    Atrial fibrillation (HCC)     CAD (coronary artery disease)     Chest pain 02/15/2019    CHF (congestive heart failure) (HCC)     Chronic anemia     Chronic kidney disease     Hypertension     Pleural effusion on left 02/15/2019    Pleural effusion, right 02/15/2019    Pulmonary emboli ([address] 02/15/2019    Pulmonary hypertension ([address] 03/06/2019    Thyroid disease     hypothyroidism     SURGICAL HISTORY:   Past Surgical History:   Procedure Laterality Date    CORONARY ANGIOPLASTY WITH STENT PLACEMENT  03/11/2019    complex PCI of LT MAIN, LAD    JOINT REPLACEMENT      right ankle    OTHER SURGICAL HISTORY  04/02/2019    UPGRADE TO BI-V PACEMAKER    OTHER SURGICAL HISTORY  03/15/2019    TAVR PROCEDURE    PACEMAKER PLACEMENT  01/20/2019    THORACENTESIS       TOBACCO:   reports that he quit smoking about 30 years ago. He has never used smokeless tobacco.  ETOH:   reports that he drank alcohol.     ALLERGIES:    Allergies   Allergen Reactions    Quinolones      Pt is allergic to fluoroquinolones         Intake/Output Summary (Last 24 hours) at 4/14/2019 1413  Last data filed at 4/13/2019 1730  Gross per 24 hour   Intake 100 ml mg Oral 3 times per day    lidocaine-EPINEPHrine  20 mL Intradermal Once    lidocaine PF  1 mL Intradermal Once    bacitracin in 0.9 % sodium chloride irrigation  50,000 Units Topical Once    sodium chloride flush  10 mL Intravenous 2 times per day    aspirin  81 mg Oral Daily    spironolactone  25 mg Oral Daily    metoprolol succinate  50 mg Oral Daily    FLUoxetine  10 mg Oral Daily    levothyroxine  125 mcg Oral Daily    ALPRAZolam  0.25 mg Oral Once    QUEtiapine  25 mg Oral Nightly    clopidogrel  75 mg Oral Daily    isosorbide mononitrate  30 mg Oral Daily    [Held by provider] docusate sodium  100 mg Oral Daily    amiodarone  200 mg Oral Daily    atorvastatin  20 mg Oral Daily     Continuous Infusions:   dextrose 20 mL/hr at 04/06/19 1858     Labs:  CBC:   Recent Labs     04/12/19 0439 04/13/19 0516 04/14/19  0338   WBC 10.4 15.8* 13.7*   HGB 7.0* 7.3* 7.2*   HCT 22.6* 23.8* 23.8*   MCV 94.6 94.1 94.8   * 115* See Reflexed IPF Result     BMP:   Recent Labs     04/12/19 0439 04/13/19  0516 04/14/19 0338   * 132* 133*   K 4.0 4.1 3.7   CL 98 95* 96*   CO2 26 26 24   BUN 28* 43* 27*   CREATININE 2.41* 3.22* 2.79*     LIVER PROFILE:   No results for input(s): AST, ALT, LIPASE, BILIDIR, BILITOT, ALKPHOS in the last 72 hours. Invalid input(s): AMYLASE,  ALB  PT/INR:   No results for input(s): PROTIME, INR in the last 72 hours. APTT:   No results for input(s): APTT in the last 72 hours. UA:  No results for input(s): NITRITE, COLORU, PHUR, LABCAST, WBCUA, RBCUA, MUCUS, TRICHOMONAS, YEAST, BACTERIA, CLARITYU, SPECGRAV, LEUKOCYTESUR, UROBILINOGEN, BILIRUBINUR, BLOODU, GLUCOSEU, AMORPHOUS in the last 72 hours. Invalid input(s): KETONESU  No results for input(s): PHART, VTB9ILC, PO2ART in the last 72 hours.     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 *  Multi-vessel coronary artery disease post PCI  Severe aortic stenosis, post TAVR  Atrial fibrillation  Small right middle lobe pulmonary embolism.   Per chart, from imaging studies done in [place]  Anemia of chronic diease     Plan:  Continue supplemental oxygen with nasal cannula  Encourage incentive spirometry  Fluid management/dialysis as per nephrology    Electronically signed by Andrea Osullivan MD on 4/14/2019 at 2:13 PM

## 2019-04-15 PROBLEM — E43 SEVERE MALNUTRITION (HCC): Status: ACTIVE | Noted: 2019-01-01

## 2019-04-15 PROBLEM — E44.0 MODERATE MALNUTRITION (HCC): Status: RESOLVED | Noted: 2019-01-01 | Resolved: 2019-01-01

## 2019-04-15 NOTE — PROGRESS NOTES
Vascular Surgery Progress Note         PATIENT NAME: Hrais No     TODAY'S DATE: 4/15/2019, 6:01 AM    SUBJECTIVE:    Pt seen and examined. No issues overnight. Consult for left groin pseudoaneurysm confirmed by US 4/14 s/p PCI and TAVR. Pt able to ambulate with nurse's assistance. Pt tolerating cardiac diet. Pt reports no changes to b/l LE. Low urine output overnight per RN. OBJECTIVE:   Vitals:  BP (!) 130/46   Pulse 65   Temp 98.4 °F (36.9 °C) (Oral)   Resp 18   Ht 5' 6\" (1.676 m)   Wt 136 lb 7.4 oz (61.9 kg)   SpO2 96%   BMI 22.03 kg/m²      INTAKE/OUTPUT:      Intake/Output Summary (Last 24 hours) at 4/15/2019 0601  Last data filed at 4/14/2019 1800  Gross per 24 hour   Intake 680 ml   Output 190 ml   Net 490 ml               GENERAL: AOx3, NAD  HEENT: EOMI bilaterally  CARDIAC: Regular rate and rhythm. ABDOMEN: Soft, NT, ND, Active Bowel Sounds  EXTREMITY: wearing compression stockings, moves all extremities, no edema. Pulses 2+ b/ Rad/Fem, +signal to b/l DP/PT  NEURO: no focal deficits, Gross motor intact.     Data:  CBC with Differential:    Lab Results   Component Value Date    WBC 13.7 04/14/2019    RBC 2.51 04/14/2019    HGB 7.2 04/14/2019    HCT 23.8 04/14/2019    PLT See Reflexed IPF Result 04/14/2019    MCV 94.8 04/14/2019    MCH 28.7 04/14/2019    MCHC 30.3 04/14/2019    RDW 16.7 04/14/2019    LYMPHOPCT 5 04/14/2019    MONOPCT 7 04/14/2019    BASOPCT 0 04/14/2019    MONOSABS 0.89 04/14/2019    LYMPHSABS 0.73 04/14/2019    EOSABS 0.14 04/14/2019    BASOSABS 0.03 04/14/2019    DIFFTYPE NOT REPORTED 04/14/2019     BMP:    Lab Results   Component Value Date     04/14/2019    K 3.7 04/14/2019    CL 96 04/14/2019    CO2 24 04/14/2019    BUN 27 04/14/2019    LABALBU 3.6 03/29/2019    CREATININE 2.79 04/14/2019    CALCIUM 8.4 04/14/2019    GFRAA 26 04/14/2019    LABGLOM 21 04/14/2019    GLUCOSE 93 04/14/2019       US Left Groin:  Results: confirmed 4cm left femoral artery pseudoaneurysm. ASSESSMENT   1. 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 HUYEN - cardiorenal syndrome - HD since 3/29/2019  3. ESRD  4. Pleural effusions s/p thoracentesis    PLAN  1. Continue cardiac diet  2. Ambulation as tolerated  3. Tunneled dialysis catheter rt side Thursday  4. Repeat arterial duplex this am of L groin  5. Cont recs and management per primary     Electronically signed by Ludger Oppenheim  on 4/15/2019 at 6:01 AM     Patient seen and examined at bedside. Changes made to above note as needed including assessment and plan. Pt denies any f/c, n/v, sob, or chest pain. No pain in L groin site. No complaints at this time.  F/U L fem arterial duplex this am    Electronically signed by Rachel Mitchell DO on 4/15/2019 at 7:11 AM

## 2019-04-15 NOTE — CARE COORDINATION
Transition planning met with nephew await decision on whether or not to proceed with [facility], need  pm&r order.  Also, discussed op dialysis site agreeable to [facility] but hasn't made a decision on location yet said he would notify sw tomorrow

## 2019-04-15 NOTE — FLOWSHEET NOTE
Left groin intact after thrombin injection with Dr Niyah Orozco  Band aid at sit of sm area still slight firmness.   Pulses per doppler    Pt needs much encouragement to take bites of food and liquids  And to be repositioned

## 2019-04-15 NOTE — PLAN OF CARE
Problem: Falls - Risk of:  Goal: Will remain free from falls  Description  Will remain free from falls  4/15/2019 0241 by Jennifer Crawford RN  Outcome: Ongoing  Goal: Absence of physical injury  Description  Absence of physical injury  4/15/2019 0241 by Jennifer Crawford RN  Outcome: Ongoing     Problem: Pain:  Goal: Pain level will decrease  Description  Pain level will decrease  4/15/2019 0241 by Jennifer Crawford RN  Outcome: Ongoing  Goal: Control of acute pain  Description  Control of acute pain  4/15/2019 0241 by Jennifer Crawford RN  Outcome: Ongoing  Goal: Control of chronic pain  Description  Control of chronic pain  4/15/2019 0241 by Jennifer Crawford RN  Outcome: Ongoing     Problem: Risk for Impaired Skin Integrity  Goal: Tissue integrity - skin and mucous membranes  Description  Structural intactness and normal physiological function of skin and  mucous membranes.   4/15/2019 0241 by Jennifer Crawford RN  Outcome: Ongoing     Problem: Cardiac Output - Decreased:  Goal: Hemodynamic stability will improve  Description  Hemodynamic stability will improve  4/15/2019 0241 by Jennifer Crawford RN  Outcome: Ongoing     Problem: Musculor/Skeletal Functional Status  Goal: Highest potential functional level  4/15/2019 0241 by Jennifer Crawford RN  Outcome: Ongoing  Goal: Absence of falls  4/15/2019 0241 by Jennifer Crawford RN  Outcome: Ongoing     Problem: Nutrition  Goal: Optimal nutrition therapy  4/15/2019 0241 by Jennifer Crawford RN  Outcome: Ongoing

## 2019-04-15 NOTE — FLOWSHEET NOTE
DATE: 4/15/2019    NAME: Evonne Leeelor  MRN: [id number]   : 12/10/1928    Patient not seen this date for Physical Therapy due to:  [] Blood transfusion in progress  [] Hemodialysis  []  Patient Declined  [] Spine Precautions   [] Strict Bedrest  [] Surgery/ Procedure  [] Testing      [x] Other; per RN, hold PT at this time. Pt just returned to chair after utilizing restroom and will be getting back to bed soon for Thrombin injection. [] PT being discontinued at this time. Patient independent. No further needs. [] PT being discontinued at this time as the patient has been transferred to palliative care. No further needs.     Negro Acevedo, TOMMIE

## 2019-04-15 NOTE — PROGRESS NOTES
[facility] - Progress Note  Today's Date and Time: 4/15/2019, 8:39 AM    Impression :   1. Recurrent bilateral pleural effusions w/lung compression  2. CHF  3. 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 Multivessel CAD s/p multiple stent placements  5. S/P TAVR  6. HUYEN on HD  7. Urinary retention  8. Pulmonary hypertension  9. S/P Upgrade of dual chamber PPM to CRT-P on 4-2-19  10. Anemia s/p transfusion 4/3  11. Right groin pseudoaneurysm s/p thrombin injection     Recommendations:     · Wound care Lt chest incision  · HD as per nephrology  · Monitor off antibiotics    Medical Decision Making/Summary/Discussion:   · 81 yo gentleman who developed complete heart block. Found to have multivessel CAD, severe aortic stenosis and pulmonary HTN. · Underwent pacemaker placement with subsequent bouts of acute CHF and multiple hospitalizations in [place]  · Brought to [place] by family for further care. · Since admission at [facility] he has had cardiac catheterizations x 2 with multiple stent placements and a TAVR on 3-15. Pt remains on a nitroglycerine drip  · Pt continues to require frequent thoracenteses  · He has suffered an HUYEN and is currently on a bumex drip  · CXR 3/26 showed progressed moderate to large bilateral pleural effusions with consolidation. · ID consult placed for treatment of any potential pneumonia  · Pt is afebrile, without cough or sputum production. He remains SOB with exertion and at times, when quiet. · Currently no evidence of active infection. Pt will require aggressive pulmonary toilet and diuresis. Plan to monitor off antibiotics  · Patient to continue HD  · He had upgraded pacemaker placed on 4-2-19. · Developed hematoma at pacer site, resolved with pressure. Elequis held 4-3. Hgb to 6.5 received 1u PRBC. Site ecchymotic but no signs of infection.   · Patient is continuing hemodialysis  · Will likely continue hemodialysis as an outpatient    · Right groin pseudoaneurysm s/p thrombin injection      Infection Control Recommendations   · [place] Precautions    Antimicrobial Stewardship Recommendations     · Monitor off antibiotics    Coordination of Outpatient Care:   · Estimated Length of IV antimicrobials: None  · Patient will need Midline Catheter Insertion:   · Patient will need PICC line Insertion:  · Patient will need: Home IV , [place],  SNF,  LTAC TBD  · Patient will need outpatient wound care: No    Chief complaint/reason for consultation:   · Possible pneumonia      History of Present Illness:   Haris No is a 80y.o.-year-old  male who was initially admitted on 3/6/2019. Patient seen at the request of Dr. Keeley Roman     INITIAL HISTORY:    Pt is a 81 yo gentleman who has recently had multiple hospital admissions in [place] for decompensated heart failure. His symptoms began in December 2018. 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 A cardiac cath at that time showed LM and LAD calcified stenosis 80-90% in multiple areas, with severe disease in D1, D2, Om1 and OM2. Minimal disease in RCA. LV function with EF 25%. Severe aortic stenosis compromising his clinical improvedmnt      He underwent a dual chamber pacemaker placement. Following that, he had multiple hospitalizations for acute heart failure and shortness of breath. His baseline creatinine was 1.3, now >2.0. He has had multiple thoracenteses with transudative pleural fluid removed.      He was brought to [facility] for further evaluation and was admitted on 3/6/19     An ECHO from admission showed   Normal left ventricle size with mildly reduced systolic function; Estimated  left ventricular ejection fraction 40-45%  Anterolateral wall hypokinesis. Mild left ventricular hypertrophy. Left atrium is moderately dilated. Grade II diastolic dysfunction. Heavily calcified aortic valve with reduced systolic excursion and evidence  of moderate stenosis.  (Peak nataliia 3.62 m/s and mean gradient 29 mmHg)  Moderate posterior pericardial effusion without any echo signs of tamponade. Normal right ventricular size and function. Pacemaker lead seen in right ventricle. Moderate tricuspid regurgitation. Estimated right ventricular systolic pressure is 60 mmHg suggesting moderate  pulmonary HTN.    He underwent an atherectomy/JEAN-CLAUDE of the left main and LAD on 3/6. Because of the high surgical risk and renal function he was taken back to the cath lab on 3/11 for PTCA-JEAN-CLAUDE LAD, PTCRA-JEAN-CLAUDE LM with plan to consider a TAVR if pt remained stable.     A TAVR was performed on 3/15     A carotid study showed less than 50% stenosis bilaterally.     He has had multiple thoracenteses since admission. Pt has been followed by CT surgery, cardiology, pulmonary, nephrology and urology.     On the evening of 3/25 pt developed hypertension and SOB at rest. A stat CXR was done that showed progressed moderate to large bilateral pleural effusions with consolidation.      On 3/26 pt underwent a Lt thoracentesis with 1400 ml clear pleural fluid removed. Pt reports feeling much better after the procedure and is asking to sit in the chair.      I am consulted to determine if Mr Odalis Yin has pneumonia. Pt had continued decline in renal function, HD initiated 3-29  PPV pacemaker placed 4-2, pt with hematoma at site, resolved with pressure. Anticoagulation held 4-3. Hgb to 6.5 received 1u PRBC     CURRENT EXAMINATION: 4/15/2019    Pt seen and examined in room. Per nursing, has been understandably frustrated with everything over the weekend. Has not wanted to talk to others, including family. Today he says he feels ok, with no interval complaints other than right groin swelling (see below). Last HD on 1-13-19 with removal of 2700 cc. No HD planned today. Afebrile  VS stable.      He had upgraded pacemaker placed on 4-2-19  Vancomycin given pre-op  No further antibiotics have been required  Incision site HISTORY  2019    UPGRADE TO BI-V PACEMAKER    OTHER SURGICAL HISTORY  03/15/2019    TAVR PROCEDURE    PACEMAKER PLACEMENT  2019    THORACENTESIS         Medications:      megestrol  200 mg Oral Daily    bumetanide  4 mg Oral BID    simethicone  80 mg Oral Q6H    docusate sodium  100 mg Oral BID    hydrALAZINE  100 mg Oral 3 times per day    lidocaine-EPINEPHrine  20 mL Intradermal Once    lidocaine PF  1 mL Intradermal Once    bacitracin in 0.9 % sodium chloride irrigation  50,000 Units Topical Once    sodium chloride flush  10 mL Intravenous 2 times per day    aspirin  81 mg Oral Daily    spironolactone  25 mg Oral Daily    metoprolol succinate  50 mg Oral Daily    FLUoxetine  10 mg Oral Daily    levothyroxine  125 mcg Oral Daily    ALPRAZolam  0.25 mg Oral Once    QUEtiapine  25 mg Oral Nightly    clopidogrel  75 mg Oral Daily    isosorbide mononitrate  30 mg Oral Daily    [Held by provider] docusate sodium  100 mg Oral Daily    amiodarone  200 mg Oral Daily    atorvastatin  20 mg Oral Daily       Social History:     Social History     Socioeconomic History    Marital status:       Spouse name: Not on file    Number of children: Not on file    Years of education: Not on file    Highest education level: Not on file   Occupational History     Employer: RETIRED   Social Needs    Financial resource strain: Not on file    Food insecurity:     Worry: Not on file     Inability: Not on file    Transportation needs:     Medical: Not on file     Non-medical: Not on file   Tobacco Use    Smoking status: Former Smoker     Last attempt to quit:      Years since quittin.3    Smokeless tobacco: Never Used   Substance and Sexual Activity    Alcohol use: Not Currently    Drug use: Never    Sexual activity: Not on file   Lifestyle    Physical activity:     Days per week: Not on file     Minutes per session: Not on file    Stress: Not on file   Relationships    Social connections:     Talks on phone: Not on file     Gets together: Not on file     Attends Nondenominational service: Not on file     Active member of club or organization: Not on file     Attends meetings of clubs or organizations: Not on file     Relationship status: Not on file    Intimate partner violence:     Fear of current or ex partner: Not on file     Emotionally abused: Not on file     Physically abused: Not on file     Forced sexual activity: Not on file   Other Topics Concern    Not on file   Social History Narrative    Not on file       Family History:   History reviewed. No pertinent family history. Allergies:   Quinolones     Review of Systems:   AA, reports continued tenderness at pacemaker site  No chills or fevers, no SOB  Seen on HD    Physical Examination :     Patient Vitals for the past 8 hrs:   BP Temp Temp src Pulse Resp SpO2   04/15/19 0836 (!) 134/50 97.9 °F (36.6 °C) Oral 64 18 97 %     General Appearance: Seen in HD, easily roused, no distress  Head:  Normocephalic, no trauma  Eyes: Pupils equal, round, reactive to light and accommodation; extraocular movements intact; sclera anicteric. ENT: Oropharynx clear. Mouth/throat: mucosa pink and moist. No lesions. Dentition in good repair. Neck: Supple, without lymphadenopathy. Pulmonary/Chest: Clear to auscultation, without wheezes, rales, or rhonchi. Decreased breath sounds at bilateral bases   Cardiovascular: Regular rate and rhythm without murmurs, rubs, or gallops. Paced  Abdomen: Soft. Bowel sounds normal.  : Johnston in place  All four Extremities: Mild edema. Neurologic: No gross sensory or motor deficits. Skin: Warm and dry with good turgor. No signs of peripheral arterial or venous insufficiency. No ulcerations. No open wounds.  Lt chest pacemaker incision with hematoma, staples intact, no oozing or bleeding noted    Medical Decision Making -Laboratory:   I have independently reviewed/ordered the following labs:    CBC with bilateral pleural effusions. 4-1 CXR    EXAMINATION:   SINGLE XRAY VIEW OF THE CHEST       4/1/2019 6:04 am       COMPARISON:   03/31/2019       HISTORY:   ORDERING SYSTEM PROVIDED HISTORY: SOB   TECHNOLOGIST PROVIDED HISTORY:   SOB       FINDINGS:   There is a left-sided transvenous pacer in place and a right internal jugular   dialysis catheter.  There is cardiomegaly with pulmonary vascular congestion   and edema.  There are bilateral pleural effusions which are increased.  The   pulmonary edema appears worse on the current exam.           Impression   Worsening edema and increased bilateral pleural effusions         4-1 AXR  EXAMINATION:   SINGLE SUPINE XRAY VIEW(S) OF THE ABDOMEN       4/1/2019 2:40 pm       COMPARISON:   None.       HISTORY:   ORDERING SYSTEM PROVIDED HISTORY: r/o ileus   TECHNOLOGIST PROVIDED HISTORY:   r/o ileus       Initial exam       FINDINGS:   Nonspecific bowel gas pattern with air-filled small and large bowel.  No   organomegaly noted.  No pathologic calcifications.  Degenerative changes of   the lower lumbar spine.           Impression   Nonspecific bowel gas pattern without evidence of small bowel obstruction.          3/29 CXR      Narrative   EXAMINATION:   SINGLE XRAY VIEW OF THE CHEST       3/29/2019 8:26 am       COMPARISON:   Chest radiograph performed 03/28/2019.       HISTORY:   ORDERING SYSTEM PROVIDED HISTORY: effusions   TECHNOLOGIST PROVIDED HISTORY:   effusions       FINDINGS:   There are large bilateral effusions with adjacent consolidation.  There is   underlying pulmonary congestion.  There is no pneumothorax.  The heart is   enlarged with stable cardiac leads.  The upper abdomen is unremarkable.  The   extrathoracic soft tissues are unremarkable.           Impression   Cardiomegaly with large bilateral effusions and adjacent consolidation   concerning for pneumonia.               3/27 CXR    Narrative   EXAMINATION:   TWO VIEWS OF THE CHEST       3/28/2019 7:56 am       COMPARISON:   Chest radiograph performed 03/27/2019.       HISTORY:   ORDERING SYSTEM PROVIDED HISTORY: effusions   TECHNOLOGIST PROVIDED HISTORY:   effusions       FINDINGS:   There is a small left and a large right pleural effusion with adjacent   infiltrate.  There is no pneumothorax.  The mediastinal structures are stable   with stable cardiac leads.  The upper abdomen is unremarkable.  The   extrathoracic soft tissues are unremarkable.           Impression   Small left and large right pleural effusion with adjacent infiltrate   representing atelectasis versus pneumonia.  Overall there may be mild   improvement of the right-sided effusion. Narrative   EXAMINATION:   SINGLE XRAY VIEW OF THE CHEST       3/26/2019 2:37 am       COMPARISON:   March 20, 2019.       HISTORY:   ORDERING SYSTEM PROVIDED HISTORY: SOB, PE   TECHNOLOGIST PROVIDED HISTORY:   SOB, PE       FINDINGS:   Frontal portable view of the chest.  Low lung volume.  Progressed moderate to   large bilateral pleural effusions with consolidation.  Indistinct pulmonary   vasculature.  No pneumothorax.  The cardiomediastinal silhouette is not well   evaluated.  Atherosclerotic thoracic aorta.  Left pectoral trans venous   dual-chamber cardiac pacer device.           Impression   Progressed moderate to large bilateral pleural effusions with consolidation. Superimposed infection is not excluded.             Medical Decision Making-Other: Thank you for allowing us to participate in the care of this patient. Please call with questions. Caroline Segal     ATTESTATION:    I have discussed the case, including pertinent history and exam findings with the residents. I have seen and examined the patient and the key elements of the encounter have been performed by me. I have reviewed the laboratory data, other diagnostic studies and discussed them with the residents. I have updated the medical record where necessary.     I agree

## 2019-04-15 NOTE — PROGRESS NOTES
[facility]  Pre Op Progress Note    CC: pseudoaneurysm left groin      Subjective:  Mr. Kwame Brothers seen and examined Plan of care reviewed and questions answered. Physical Exam  Vital Signs: BP (!) 130/46   Pulse 65   Temp 98.4 °F (36.9 °C) (Oral)   Resp 18   Ht 5' 6\" (1.676 m)   Wt 136 lb 7.4 oz (61.9 kg)   SpO2 96%   BMI 22.03 kg/m²  O2 Flow Rate (L/min): 3 L/min       General: alert and oriented to person, place and time. Up in chair, No apparent distress. Heart:Normal S1 and S2.  Regular rhythm. No murmurs, gallops, or rubs.    Lungs: clear to auscultation bilaterally  Abdomen: soft, non tender, non distended, BSx4  Extremities: negative      Scheduled Meds:    megestrol  200 mg Oral Daily    bumetanide  4 mg Oral BID    simethicone  80 mg Oral Q6H    docusate sodium  100 mg Oral BID    hydrALAZINE  100 mg Oral 3 times per day    lidocaine-EPINEPHrine  20 mL Intradermal Once    lidocaine PF  1 mL Intradermal Once    bacitracin in 0.9 % sodium chloride irrigation  50,000 Units Topical Once    sodium chloride flush  10 mL Intravenous 2 times per day    aspirin  81 mg Oral Daily    spironolactone  25 mg Oral Daily    metoprolol succinate  50 mg Oral Daily    FLUoxetine  10 mg Oral Daily    levothyroxine  125 mcg Oral Daily    ALPRAZolam  0.25 mg Oral Once    QUEtiapine  25 mg Oral Nightly    clopidogrel  75 mg Oral Daily    isosorbide mononitrate  30 mg Oral Daily    [Held by provider] docusate sodium  100 mg Oral Daily    amiodarone  200 mg Oral Daily    atorvastatin  20 mg Oral Daily     Continuous Infusions:    dextrose 20 mL/hr at 04/06/19 1858       Data:  CBC:   Recent Labs     04/13/19  0516 04/14/19  0338   WBC 15.8* 13.7*   HGB 7.3* 7.2*   HCT 23.8* 23.8*   MCV 94.1 94.8   * See Reflexed IPF Result     BMP:   Recent Labs     04/13/19  0516 04/14/19 0338   * 133*   K 4.1 3.7   CL 95* 96*   CO2 26 24   BUN 43* 27*   CREATININE 3.22* 2.79*     PT/INR: No results for input(s): PROTIME, INR in the last 72 hours. APTT: No results for input(s): APTT in the last 72 hours. 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 Awaiting Tunnel cath for dialysis on Thursday. Dr. Robert Logan managing dialysis, no plans for a treatment today  2. Plan for repeat scan of left groin per vascular after treatment for pseudoaneurysm. The above recommendations including medications and orders were discussed and agreed upon with Dr. Maricel Patricia, the attending on service for the cardiothoracic surgery group today.      Summer Hopkins, APRN, CNP

## 2019-04-15 NOTE — PROGRESS NOTES
tobacco.  ETOH:   reports that he drank alcohol. ALLERGIES:    Allergies   Allergen Reactions    Quinolones      Pt is allergic to fluoroquinolones       Home Meds:   Prior to Admission medications    Medication Sig Start Date End Date Taking? Authorizing Provider   amiodarone (CORDARONE) 200 MG tablet Take 200 mg by mouth daily   Yes Historical Provider, MD   atorvastatin (LIPITOR) 20 MG tablet Take 20 mg by mouth daily   Yes Historical Provider, MD   isosorbide mononitrate (IMDUR) 60 MG extended release tablet Take 60 mg by mouth daily   Yes Historical Provider, MD   levothyroxine (SYNTHROID) 100 MCG tablet Take 100 mcg by mouth Daily   Yes Historical Provider, MD   lisinopril (PRINIVIL;ZESTRIL) 40 MG tablet Take 40 mg by mouth daily   Yes Historical Provider, MD   apixaban (ELIQUIS) 2.5 MG TABS tablet Take 2.5 mg by mouth daily   Yes Historical Provider, MD   metoprolol tartrate (LOPRESSOR) 50 MG tablet Take 50 mg by mouth 2 times daily   Yes Historical Provider, MD         Intake/Output Summary (Last 24 hours) at 4/15/2019 1825  Last data filed at 4/15/2019 5559  Gross per 24 hour   Intake --   Output 100 ml   Net -100 ml         Diet   DIET CARDIAC; Low Sodium (2 GM);  Daily Fluid Restriction: 1800 ml  Dietary Nutrition Supplements:  Dietary Nutrition Supplements: Frozen Oral Supplement  Dietary Nutrition Supplements: Renal Oral Supplement    Vitals:   BP (!) 130/53   Pulse 71   Temp 97.7 °F (36.5 °C) (Oral)   Resp 18   Ht 5' 6\" (1.676 m)   Wt 136 lb 7.4 oz (61.9 kg)   SpO2 97%   BMI 22.03 kg/m²  on         I/O (24 Hours)    Patient Vitals for the past 8 hrs:   BP Temp Temp src Pulse Resp SpO2   04/15/19 1541 (!) 130/53 97.7 °F (36.5 °C) Oral 71 18 97 %   04/15/19 1155 (!) 109/38 98.1 °F (36.7 °C) Oral 66 20 99 %       Intake/Output Summary (Last 24 hours) at 4/15/2019 1825  Last data filed at 4/15/2019 8517  Gross per 24 hour   Intake --   Output 100 ml   Net -100 ml     I/O last 3 completed shifts: In: 140 [P.O.:140]  Out: 190 [Urine:190]   Date 04/15/19 0000 - 04/15/19 2359   Shift 6212-8561 0618-7429 6919-9613 24 Hour Total   INTAKE   Shift Total(mL/kg)       OUTPUT   Urine(mL/kg/hr) 100(0.2)   100   Shift Total(mL/kg) 100(1.6)   100(1.6)   Weight (kg) 61.9 61.9 61.9 61.9     Patient Vitals for the past 96 hrs (Last 3 readings):   Weight   04/15/19 0005 136 lb 7.4 oz (61.9 kg)   04/14/19 0318 135 lb 9.3 oz (61.5 kg)   04/13/19 1305 134 lb 0.6 oz (60.8 kg)     Exam   Head and neck atraumatic, normocephalic    Lymph nodes-no cervical, supraclavicular lymphadenopathy    Neck-=JVP elevation    Lungs - dec bases   Dull bases     Clear ant   CVS- S1, S2 regular. No S3 no S4, 1/6 murmurs    Abdomen-nontender, nondistended. Bowel sounds are present. No organomegaly    Lower extremity-+edema    Upper extremity-+ edema    Neurological-grossly normal cranial nerves.   No overt motor deficit      Medications:    Scheduled Meds:   megestrol  200 mg Oral Daily    hydrALAZINE  50 mg Oral 3 times per day    iron sucrose  100 mg Intravenous Q48H    [START ON 4/19/2019] darbepoetin marya-polysorbate  100 mcg Intravenous Weekly    bumetanide  4 mg Oral BID    simethicone  80 mg Oral Q6H    docusate sodium  100 mg Oral BID    lidocaine-EPINEPHrine  20 mL Intradermal Once    lidocaine PF  1 mL Intradermal Once    bacitracin in 0.9 % sodium chloride irrigation  50,000 Units Topical Once    sodium chloride flush  10 mL Intravenous 2 times per day    aspirin  81 mg Oral Daily    spironolactone  25 mg Oral Daily    metoprolol succinate  50 mg Oral Daily    FLUoxetine  10 mg Oral Daily    levothyroxine  125 mcg Oral Daily    ALPRAZolam  0.25 mg Oral Once    QUEtiapine  25 mg Oral Nightly    clopidogrel  75 mg Oral Daily    isosorbide mononitrate  30 mg Oral Daily    [Held by provider] docusate sodium  100 mg Oral Daily    amiodarone  200 mg Oral Daily    atorvastatin  20 mg Oral Daily       Continuous Infusions:   dextrose 20 mL/hr at 04/06/19 1858       PRN Meds:      Labs:  CBC:   Recent Labs     04/13/19  0516 04/14/19  0338   WBC 15.8* 13.7*   HGB 7.3* 7.2*   HCT 23.8* 23.8*   MCV 94.1 94.8   * See Reflexed IPF Result     BMP:   Recent Labs     04/13/19  0516 04/14/19  0338   * 133*   K 4.1 3.7   CL 95* 96*   CO2 26 24   BUN 43* 27*   CREATININE 3.22* 2.79*     LIVER PROFILE:   No results for input(s): AST, ALT, LIPASE, BILIDIR, BILITOT, ALKPHOS in the last 72 hours. Invalid input(s): AMYLASE,  ALB  PT/INR:   No results for input(s): PROTIME, INR in the last 72 hours. APTT:   No results for input(s): APTT in the last 72 hours. UA:  No results for input(s): NITRITE, COLORU, PHUR, LABCAST, WBCUA, RBCUA, MUCUS, TRICHOMONAS, YEAST, BACTERIA, CLARITYU, SPECGRAV, LEUKOCYTESUR, UROBILINOGEN, BILIRUBINUR, BLOODU, GLUCOSEU, AMORPHOUS in the last 72 hours. Invalid input(s): KETONESU  No results for input(s): PHART, KZV2QYT, PO2ART in the last 72 hours. 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   Per chart, from imaging studies done in [place]  Anemia of chronic diease     Plan:  Cont is and volume removal     Electronically signed by Little Sanchez MD on 4/15/2019 at 6:25 PM

## 2019-04-15 NOTE — PROGRESS NOTES
Renal Progress Note    Patient :  Anuja Guevara; 80 y.o. MRN# [id number]  Location:  6537/7003-09  Attending:  Erich Weiner MD  Admit Date:  3/6/2019   Hospital Day: 40    Subjective   Admitted with shortness of breath secondary to decompensated congestive heart failure in the face of aortic stenosis. Subsequently underwent PCI and PVR. Postprocedure course has been complicated by worsening of renal function and hypervolemia for which he is getting recurrent thoracocentesis and diuretic adjustment. Hemodialysis also initiated. Schedule is MWF. Last HD Saturday. 2 7. Kg off. No signs of renal recovery. BP low readings noted. Appetite poor. Feels tired.     Objective     VS: BP (!) 134/50   Pulse 64   Temp 97.9 °F (36.6 °C) (Oral)   Resp 18   Ht 5' 6\" (1.676 m)   Wt 136 lb 7.4 oz (61.9 kg)   SpO2 97%   BMI 22.03 kg/m²   MAXIMUM TEMPERATURE OVER 24HRS:  Temp (24hrs), Av °F (36.7 °C), Min:97.5 °F (36.4 °C), Max:98.4 °F (36.9 °C)    24HR BLOOD PRESSURE RANGE:  Systolic (09QVZ), RJQ:855 , Min:90 , PXN:102   ; Diastolic (67NYC), GIF:17, Min:28, Max:50    24HR INTAKE/OUTPUT:      Intake/Output Summary (Last 24 hours) at 4/15/2019 0857  Last data filed at 4/15/2019 0619  Gross per 24 hour   Intake 680 ml   Output 190 ml   Net 490 ml     WEIGHT :  Patient Vitals for the past 96 hrs (Last 3 readings):   Weight   04/15/19 0005 136 lb 7.4 oz (61.9 kg)   19 0318 135 lb 9.3 oz (61.5 kg)   19 1305 134 lb 0.6 oz (60.8 kg)       Current Medications:     Scheduled Meds:    megestrol  200 mg Oral Daily    hydrALAZINE  50 mg Oral 3 times per day    [START ON 2019] darbepoetin marya-polysorbate  40 mcg Subcutaneous Weekly - Monday    iron sucrose  100 mg Intravenous Q48H    bumetanide  4 mg Oral BID    simethicone  80 mg Oral Q6H    docusate sodium  100 mg Oral BID    lidocaine-EPINEPHrine  20 mL Intradermal Once    lidocaine PF  1 mL Intradermal Once    bacitracin in 0.9 % sodium chloride irrigation  50,000 Units Topical Once    sodium chloride flush  10 mL Intravenous 2 times per day    aspirin  81 mg Oral Daily    spironolactone  25 mg Oral Daily    metoprolol succinate  50 mg Oral Daily    FLUoxetine  10 mg Oral Daily    levothyroxine  125 mcg Oral Daily    ALPRAZolam  0.25 mg Oral Once    QUEtiapine  25 mg Oral Nightly    clopidogrel  75 mg Oral Daily    isosorbide mononitrate  30 mg Oral Daily    [Held by provider] docusate sodium  100 mg Oral Daily    amiodarone  200 mg Oral Daily    atorvastatin  20 mg Oral Daily     Continuous Infusions:    dextrose 20 mL/hr at 04/06/19 1858       Physical Examination:     General:  AAO x 3, speaking in full sentences, no accessory muscle use. Chest:   Decreased breath sounds, no rales or wheezes. Cardiac:  S1 S2 irregular,+ murmurs, gallops or rubs, +JVD  Abdomen: Soft, non-tender, non distended, BS audible. SKIN:  Warm and dry  Extremities      ++edema, no clubbing, No cyanosis  Neuro:  AAO x 3, No FND.    Rt IJ temp cath    Labs:       Recent Labs     04/13/19  0516 04/14/19 0338   WBC 15.8* 13.7*   RBC 2.53* 2.51*   HGB 7.3* 7.2*   HCT 23.8* 23.8*   MCV 94.1 94.8   MCH 28.9 28.7   MCHC 30.7 30.3   RDW 16.6* 16.7*   * See Reflexed IPF Result   MPV 12.1 NOT REPORTED      BMP:   Recent Labs     04/13/19  0516 04/14/19 0338   * 133*   K 4.1 3.7   CL 95* 96*   CO2 26 24   BUN 43* 27*   CREATININE 3.22* 2.79*   GLUCOSE 91 93   CALCIUM 8.9 8.4*        Urinalysis/Chemistries:      Lab Results   Component Value Date    NITRU NEGATIVE 03/29/2019    COLORU YELLOW 03/29/2019    PHUR 7.5 03/29/2019    WBCUA 50  03/29/2019    RBCUA 50  03/29/2019    MUCUS NOT REPORTED 03/29/2019    TRICHOMONAS NOT REPORTED 03/29/2019    YEAST NOT REPORTED 03/29/2019    BACTERIA NOT REPORTED 03/29/2019    SPECGRAV 1.011 03/29/2019    LEUKOCYTESUR LARGE 03/29/2019    UROBILINOGEN Normal 03/29/2019    BILIRUBINUR NEGATIVE 03/29/2019    GLUCOSEU NEGATIVE 03/29/2019    [address] NEGATIVE 03/29/2019    AMORPHOUS NOT REPORTED 03/29/2019       Radiology:     CXR:   No recent  Assessment:     1. HUYEN secondary to hypoperfusion related to cardiorenal syndrome. Started on HD this admission since 29th of March  2. CKD IV with baseline creatinine 2, now appears to be ESRD  3. Nephrotic range proteinuria Bx not feasible in view of he being on Asprin and cardiology recommends not to stop anticoagulant. Explained to patient and he does not want to do that. 4. Recurrent pleural effusions s/p thoracentesis  5. 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 S/P PCI and multiple stent placement 3/11/19  7. HTN  8. 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 S/P Upgrade of dual chamber PPM to CRT-P on 4-2-19  10. Hx if urinary retention  11. Anemia    Plan:     1. Add RADHA and iv iron  2. Add Megace   3. Reduce Hydralazine  4.  Perm cath  Placement Thursday  5. DW Dr Bee Zimmer MD, MRCP Dar Martin   4/15/2019 8:57 AM    Nephrology [address] [place] [address]

## 2019-04-16 NOTE — PROCEDURES
[address] [facility]                                 PROCEDURE NOTE    PATIENT NAME: Agus Renteria                   :        12/10/1928  MED REC NO:   [id number]                             ROOM:       [id number]  ACCOUNT NO:   [de-identified]                           ADMIT DATE: 2019  PROVIDER:     Fahad Lopez MD    DATE OF PROCEDURE:  04/15/2019  PREOPERATIVE DIAGNOSIS:  Left femoral artery pseudoaneurysm. POSTOPERATIVE DIAGNOSIS:  Left femoral artery pseudoaneurysm. PROCEDURE PERFORMED:  Ultrasound-guided thrombin injection into the left  femoral artery pseudoaneurysm. SURGEON:  Fahad Lopez MD    ESTIMATED BLOOD LOSS:  0.    INDICATION:  This is a 58-year-old gentleman who had over a 4.5-cm  femoral artery pseudoaneurysm which was injected the day prior. He had  repeat duplex done today, which showed mostly thrombosed pseudoaneurysm,  except for there was a small pocket laterally that has continued to have  color flow. Given these findings, I recommended they undergo a repeat  ultrasound-guided thrombin injection. Risks and benefits were explained  to him as well as his nephew, Dr. Beto Tse. DESCRIPTION OF PROCEDURE:  The patient was placed onto his bed, lying  semi-flat. Ultrasound was performed to evaluate the femoral artery  pseudoaneurysm. There was mostly thrombus in there. There was a small  section near the lateral edge, near the neck, where there was  continued color flow. The area was then cleaned with ChloraPrep. Thrombin had been drawn up and diluted into a syringe. Ultrasound  guidance of femoral artery pseudoaneurysm was accessed with a spinal  needle. Postop bleeding was noted. It was then injected with diluted  thrombin. Color flow did dissipate and thrombus formed in the entire  pseudoaneurysm cavity.   Manual pressure was held for 5 minutes, followed  by placing a 1 L saline bag for light compression for 30 minutes. The  patient had a good femoral pulse as well as Doppler signals in his foot. The patient tolerated the procedure well on its completion.         Ludmila Mondragon MD    D: 04/15/2019 18:25:52       T: 04/15/2019 23:06:39     [email or web address]  Job#: [id number]     Doc#: [id number]    CC:

## 2019-04-16 NOTE — PROGRESS NOTES
mononitrate (IMDUR) 60 MG extended release tablet Take 60 mg by mouth daily   Yes Historical Provider, MD   levothyroxine (SYNTHROID) 100 MCG tablet Take 100 mcg by mouth Daily   Yes Historical Provider, MD   lisinopril (PRINIVIL;ZESTRIL) 40 MG tablet Take 40 mg by mouth daily   Yes Historical Provider, MD   apixaban (ELIQUIS) 2.5 MG TABS tablet Take 2.5 mg by mouth daily   Yes Historical Provider, MD   metoprolol tartrate (LOPRESSOR) 50 MG tablet Take 50 mg by mouth 2 times daily   Yes Historical Provider, MD         Intake/Output Summary (Last 24 hours) at 4/16/2019 1602  Last data filed at 4/16/2019 0857  Gross per 24 hour   Intake 380 ml   Output 100 ml   Net 280 ml         Diet   DIET CARDIAC; Low Sodium (2 GM); Daily Fluid Restriction: 1800 ml  Dietary Nutrition Supplements:  Dietary Nutrition Supplements: Frozen Oral Supplement  Dietary Nutrition Supplements: Renal Oral Supplement    Vitals:   BP (!) 105/54   Pulse 64   Temp 97.8 °F (36.6 °C) (Oral)   Resp 19   Ht 5' 6\" (1.676 m)   Wt 129 lb 6.6 oz (58.7 kg)   SpO2 91%   BMI 20.89 kg/m²  on         I/O (24 Hours)    Patient Vitals for the past 8 hrs:   BP Temp Temp src Pulse Resp SpO2   04/16/19 1140 (!) 105/54 97.8 °F (36.6 °C) Oral -- -- 91 %   04/16/19 0917 -- -- -- -- -- 92 %   04/16/19 0857 (!) 114/26 97.5 °F (36.4 °C) Oral 64 19 93 %       Intake/Output Summary (Last 24 hours) at 4/16/2019 1602  Last data filed at 4/16/2019 0857  Gross per 24 hour   Intake 380 ml   Output 100 ml   Net 280 ml     I/O last 3 completed shifts: In: 380 [P.O.:360; I.V.:20]  Out: 100 [Urine:100]   Date 04/16/19 0000 - 04/16/19 2359   Shift 7951-5110 7570-7982 0052-8564 24 Hour Total   INTAKE   P.O.(mL/kg/hr)  120(0.3)  120   I. V.(mL/kg)  20(0.3)  20(0.3)   Shift Total(mL/kg)  140(2.4)  140(2.4)   OUTPUT   Shift Total(mL/kg)       Weight (kg) 58.7 58.7 58.7 58.7     Patient Vitals for the past 96 hrs (Last 3 readings):   Weight   04/16/19 0355 129 lb 6.6 oz (58.7 kg) 04/15/19 0005 136 lb 7.4 oz (61.9 kg)   04/14/19 0318 135 lb 9.3 oz (61.5 kg)     Exam   Head and neck atraumatic, normocephalic    Lymph nodes-no cervical, supraclavicular lymphadenopathy    Neck-=JVP elevation    Lungs - dec bases   Dull bases     Clear ant   CVS- S1, S2 regular. No S3 no S4, 1/6 murmurs    Abdomen-nontender, nondistended. Bowel sounds are present. No organomegaly    Lower extremity-+edema    Upper extremity-+ edema    Neurological-grossly normal cranial nerves.   No overt motor deficit      Medications:    Scheduled Meds:   megestrol  200 mg Oral Daily    hydrALAZINE  50 mg Oral 3 times per day    iron sucrose  100 mg Intravenous Q48H    [START ON 4/19/2019] darbepoetin marya-polysorbate  100 mcg Intravenous Weekly    bumetanide  4 mg Oral BID    simethicone  80 mg Oral Q6H    docusate sodium  100 mg Oral BID    lidocaine-EPINEPHrine  20 mL Intradermal Once    lidocaine PF  1 mL Intradermal Once    bacitracin in 0.9 % sodium chloride irrigation  50,000 Units Topical Once    sodium chloride flush  10 mL Intravenous 2 times per day    aspirin  81 mg Oral Daily    spironolactone  25 mg Oral Daily    metoprolol succinate  50 mg Oral Daily    FLUoxetine  10 mg Oral Daily    levothyroxine  125 mcg Oral Daily    ALPRAZolam  0.25 mg Oral Once    QUEtiapine  25 mg Oral Nightly    clopidogrel  75 mg Oral Daily    isosorbide mononitrate  30 mg Oral Daily    [Held by provider] docusate sodium  100 mg Oral Daily    amiodarone  200 mg Oral Daily    atorvastatin  20 mg Oral Daily       Continuous Infusions:   dextrose 20 mL/hr at 04/06/19 1858       PRN Meds:      Labs:  CBC:   Recent Labs     04/14/19 0338   WBC 13.7*   HGB 7.2*   HCT 23.8*   MCV 94.8   PLT See Reflexed IPF Result     BMP:   Recent Labs     04/14/19 0338   *   K 3.7   CL 96*   CO2 24   BUN 27*   CREATININE 2.79*     LIVER PROFILE:   No results for input(s): AST, ALT, LIPASE, BILIDIR, BILITOT, ALKPHOS in the last 72 hours. Invalid input(s): AMYLASE,  ALB  PT/INR:   No results for input(s): PROTIME, INR in the last 72 hours. APTT:   No results for input(s): APTT in the last 72 hours. UA:  No results for input(s): NITRITE, COLORU, PHUR, LABCAST, WBCUA, RBCUA, MUCUS, TRICHOMONAS, YEAST, BACTERIA, CLARITYU, SPECGRAV, LEUKOCYTESUR, UROBILINOGEN, BILIRUBINUR, BLOODU, GLUCOSEU, AMORPHOUS in the last 72 hours. Invalid input(s): KETONESU  No results for input(s): PHART, PKS0BRL, PO2ART in the last 72 hours. 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   Per chart, from imaging studies done in [place]  Anemia of chronic diease     Plan:  Cont is and volume removal     Electronically signed by Connee Dandy, MD on 4/16/2019 at 4:02 PM

## 2019-04-16 NOTE — CARE COORDINATION
Spoke with Dr. Rhianna Haji regarding transitional planning. He states he would like me to look into  [facility]  [address] and [facility], they only do OP rehab. Referral sent to [facility], left VM with gautam Duffy. Spoke with Tanja Chester as Dr. Rhianna Haji requests she call hem tomorrow at 4, he is most likely interested in [facility] for OP HD    1600 received call from Dr. Rhianna Haji requesting referral to [facility]. I notified Saige Duffy at [facility] that plans have changed. Spoke with Iliana Mane at central intake, she is reviewing.   Spoke with gautam Bonilla to see if she'd be available to meet with Dr. Rhianna Haji at [facility]

## 2019-04-16 NOTE — PROGRESS NOTES
Physical Therapy  DATE: 2019    NAME: Chelle Alan  MRN: [id number]   : 12/10/1928    Patient not seen this date for Physical Therapy due to:  [] Blood transfusion in progress  [] Hemodialysis  [x]  Patient Declined---Pt adamantly refusing PT due to fatigue, not feeling well. Repeats \"no therapy, no therapy\". RN notified  [] Spine Precautions   [] Strict Bedrest  [] Surgery/ Procedure  [] Testing      [] Other        [] PT being discontinued at this time. Patient independent. No further needs. [] PT being discontinued at this time as the patient has been transferred to palliative care. No further needs.     Chaim Patch, PTA

## 2019-04-16 NOTE — PROGRESS NOTES
Occupational Therapy    Occupational Therapy Not Seen Note    DATE: 2019  Name: Angelito Guillen  : 12/10/1928  MRN: [id number]    Patient not available for Occupational Therapy due to:    Patient Declined    Next Scheduled Treatment: 19    Electronically signed by ZACH Palomares on 2019 at 2:39 PM

## 2019-04-16 NOTE — PROGRESS NOTES
Vascular Surgery Progress Note         PATIENT NAME: Sharita Quinn     TODAY'S DATE: 4/16/2019, 6:03 AM    SUBJECTIVE:    Pt seen and examined. No issues overnight. Consult for left groin pseudoaneurysm confirmed by US 4/14 s/p PCI and TAVR. Pseudoaneurysm persistent as of 4/15. Pt able to ambulate with nurse's assistance. Pt continues to tolerate cardiac diet. Pt reports no changes to b/l LE. No pain at left groin. No pain to palpation on left flank. OBJECTIVE:   Vitals:  BP (!) 141/49   Pulse 64   Temp 98.1 °F (36.7 °C) (Oral)   Resp 16   Ht 5' 6\" (1.676 m)   Wt 129 lb 6.6 oz (58.7 kg)   SpO2 95%   BMI 20.89 kg/m²      INTAKE/OUTPUT:      Intake/Output Summary (Last 24 hours) at 4/16/2019 0603  Last data filed at 4/15/2019 1836  Gross per 24 hour   Intake 460 ml   Output 400 ml   Net 60 ml               GENERAL: AOx3, NAD  HEENT: EOMI bilaterally  CARDIAC: Regular rate and rhythm. ABDOMEN: Extensive bruising on left flank. No pain to palpation. Soft, NT, ND, Active Bowel Sounds  EXTREMITY: wearing compression stockings, moves all extremities, no edema. Pulses 1+ b/ Rad/Fem, +signal to b/l DP/PT  NEURO: no focal deficits, Gross motor intact.     Data:  CBC with Differential:    Lab Results   Component Value Date    WBC 13.7 04/14/2019    RBC 2.51 04/14/2019    HGB 7.2 04/14/2019    HCT 23.8 04/14/2019    PLT See Reflexed IPF Result 04/14/2019    MCV 94.8 04/14/2019    MCH 28.7 04/14/2019    MCHC 30.3 04/14/2019    RDW 16.7 04/14/2019    LYMPHOPCT 5 04/14/2019    MONOPCT 7 04/14/2019    BASOPCT 0 04/14/2019    MONOSABS 0.89 04/14/2019    LYMPHSABS 0.73 04/14/2019    EOSABS 0.14 04/14/2019    BASOSABS 0.03 04/14/2019    DIFFTYPE NOT REPORTED 04/14/2019     BMP:    Lab Results   Component Value Date     04/14/2019    K 3.7 04/14/2019    CL 96 04/14/2019    CO2 24 04/14/2019    BUN 27 04/14/2019    LABALBU 3.6 03/29/2019    CREATININE 2.79 04/14/2019    CALCIUM 8.4 04/14/2019    GFRAA 26 04/14/2019 LABGLOM 21 04/14/2019    GLUCOSE 93 04/14/2019     US Left Groin:  4/14/2019 Results: confirmed 4cm left femoral artery pseudoaneurysm. 4/15/2019 Results: persistent left femoral pseudoaneurysm    ASSESSMENT   1. 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 HUYEN - cardiorenal syndrome - HD since 3/29/2019  3. ESRD  4. Pleural effusions s/p thoracentesis    PLAN  1. Continue cardiac diet  2. Ambulation as tolerated  3. Tunneled dialysis catheter rt side Thursday  4. Repeat arterial duplex in 1 week. 5. Cont recs and management per primary     Electronically signed by Loy Snellen  on 4/16/2019 at 6:03 AM     Patient seen and examined at bedside. Changes made to above note as needed including assessment and plan. Pt denies any f/c, n/v, sob, or chest pain. No pain in L groin site. No complaints at this time. L fem arterial duplex revealed small residual to and fro flow in L groin site. Cont to suzan at this time. Repeat US of L fem artery in 1 week. Cont recs per primary.  Pulse exam stable    Electronically signed by Bahman Jacobs DO on 4/16/2019 at 6:57 AM

## 2019-04-16 NOTE — PROGRESS NOTES
[facility]  Pre Op Progress Note    CC: pseudoaneurysm left groin      Subjective:  Mr. Tiff Gonzalez seen and examined Plan of care reviewed and questions answered. Physical Exam  Vital Signs: BP (!) 157/50   Pulse 66   Temp 97.5 °F (36.4 °C) (Oral)   Resp 16   Ht 5' 6\" (1.676 m)   Wt 129 lb 6.6 oz (58.7 kg)   SpO2 91%   BMI 20.89 kg/m²  O2 Flow Rate (L/min): 3 L/min       General: alert and oriented to person, place and time. Up in chair, No apparent distress.   Heart:Rhythm: paced  Lungs: clear to auscultation bilaterally  Abdomen: soft, non tender, non distended, BSx4  Extremities: negative      Scheduled Meds:    megestrol  200 mg Oral Daily    hydrALAZINE  50 mg Oral 3 times per day    iron sucrose  100 mg Intravenous Q48H    [START ON 4/19/2019] darbepoetin marya-polysorbate  100 mcg Intravenous Weekly    bumetanide  4 mg Oral BID    simethicone  80 mg Oral Q6H    docusate sodium  100 mg Oral BID    lidocaine-EPINEPHrine  20 mL Intradermal Once    lidocaine PF  1 mL Intradermal Once    bacitracin in 0.9 % sodium chloride irrigation  50,000 Units Topical Once    sodium chloride flush  10 mL Intravenous 2 times per day    aspirin  81 mg Oral Daily    spironolactone  25 mg Oral Daily    metoprolol succinate  50 mg Oral Daily    FLUoxetine  10 mg Oral Daily    levothyroxine  125 mcg Oral Daily    ALPRAZolam  0.25 mg Oral Once    QUEtiapine  25 mg Oral Nightly    clopidogrel  75 mg Oral Daily    isosorbide mononitrate  30 mg Oral Daily    [Held by provider] docusate sodium  100 mg Oral Daily    amiodarone  200 mg Oral Daily    atorvastatin  20 mg Oral Daily     Continuous Infusions:    dextrose 20 mL/hr at 04/06/19 1858       Data:  CBC:   Recent Labs     04/14/19 0338   WBC 13.7*   HGB 7.2*   HCT 23.8*   MCV 94.8   PLT See Reflexed IPF Result     BMP:   Recent Labs     04/14/19 0338   *   K 3.7   CL 96*   CO2 24   BUN 27*   CREATININE 2.79* PT/INR: No results for input(s): PROTIME, INR in the last 72 hours. APTT: No results for input(s): APTT in the last 72 hours. 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 Awaiting Tunnel cath for dialysis Thursday with Dr. Dennis Lawrence   Nephrology following - hemodialysis Tomorrow  2. Plan for possible placement in SNF at discharge      The above recommendations including medications and orders were discussed and agreed upon with Dr. Valdemar Price, the attending on service for the cardiothoracic surgery group today.      Laurie Sharpe, APRN, CNP

## 2019-04-16 NOTE — PROGRESS NOTES
Renal Progress Note    Patient :  Demian Ann; 80 y.o. MRN# [id number]  Location:  [id number]  Attending:  Nilo Serrano MD  Admit Date:  3/6/2019   Hospital Day: 39    Subjective   Admitted with shortness of breath secondary to decompensated congestive heart failure in the face of aortic stenosis. Subsequently underwent PCI and PVR. Postprocedure course has been complicated by worsening of renal function and hypervolemia for which he is getting recurrent thoracocentesis and diuretic adjustment. Hemodialysis also initiated. Schedule is MWF. Last HD Saturday. 2 7. Kg off. No signs of renal recovery. BP better. I had reduced Hydralazine dose yesterday. Asymtomatic. No SOB. Appetite improving. Added Megace yesterday.     Objective     VS: BP (!) 157/50   Pulse 66   Temp 97.5 °F (36.4 °C) (Oral)   Resp 16   Ht 5' 6\" (1.676 m)   Wt 129 lb 6.6 oz (58.7 kg)   SpO2 91%   BMI 20.89 kg/m²   MAXIMUM TEMPERATURE OVER 24HRS:  Temp (24hrs), Av.8 °F (36.6 °C), Min:97.5 °F (36.4 °C), Max:98.1 °F (36.7 °C)    24HR BLOOD PRESSURE RANGE:  Systolic (93HFI), RZL:488 , Min:109 , BML:352   ; Diastolic (44BIB), TNL:90, Min:38, Max:53    24HR INTAKE/OUTPUT:      Intake/Output Summary (Last 24 hours) at 2019 0816  Last data filed at 4/15/2019 1836  Gross per 24 hour   Intake 460 ml   Output 300 ml   Net 160 ml     WEIGHT :  Patient Vitals for the past 96 hrs (Last 3 readings):   Weight   19 0355 129 lb 6.6 oz (58.7 kg)   04/15/19 0005 136 lb 7.4 oz (61.9 kg)   19 0318 135 lb 9.3 oz (61.5 kg)       Current Medications:     Scheduled Meds:    megestrol  200 mg Oral Daily    hydrALAZINE  50 mg Oral 3 times per day    iron sucrose  100 mg Intravenous Q48H    [START ON 2019] darbepoetin marya-polysorbate  100 mcg Intravenous Weekly    bumetanide  4 mg Oral BID    simethicone  80 mg Oral Q6H    docusate sodium  100 mg Oral BID    lidocaine-EPINEPHrine  20 mL Intradermal Once    lidocaine PF  1 mL Intradermal Once    bacitracin in 0.9 % sodium chloride irrigation  50,000 Units Topical Once    sodium chloride flush  10 mL Intravenous 2 times per day    aspirin  81 mg Oral Daily    spironolactone  25 mg Oral Daily    metoprolol succinate  50 mg Oral Daily    FLUoxetine  10 mg Oral Daily    levothyroxine  125 mcg Oral Daily    ALPRAZolam  0.25 mg Oral Once    QUEtiapine  25 mg Oral Nightly    clopidogrel  75 mg Oral Daily    isosorbide mononitrate  30 mg Oral Daily    [Held by provider] docusate sodium  100 mg Oral Daily    amiodarone  200 mg Oral Daily    atorvastatin  20 mg Oral Daily     Continuous Infusions:    dextrose 20 mL/hr at 04/06/19 1858       Physical Examination:     General:  AAO x 3, speaking in full sentences, no accessory muscle use. Chest:   Decreased breath sounds, REDUCED AE and wheeze present   Cardiac:  S1 S2 irregular,+ murmurs, gallops or rubs, +JVD  Abdomen: Soft, non-tender, non distended, BS audible. SKIN:  Warm and dry  Extremities      ++edema, no clubbing, No cyanosis  Neuro:  AAO x 3, No FND.    Rt IJ temp cath    Labs:       Recent Labs     04/14/19  0338   WBC 13.7*   RBC 2.51*   HGB 7.2*   HCT 23.8*   MCV 94.8   MCH 28.7   MCHC 30.3   RDW 16.7*   PLT See Reflexed IPF Result   MPV NOT REPORTED      BMP:   Recent Labs     04/14/19  0338   *   K 3.7   CL 96*   CO2 24   BUN 27*   CREATININE 2.79*   GLUCOSE 93   CALCIUM 8.4*        Urinalysis/Chemistries:      Lab Results   Component Value Date    NITRU NEGATIVE 03/29/2019    COLORU YELLOW 03/29/2019    PHUR 7.5 03/29/2019    WBCUA 50  03/29/2019    RBCUA 50  03/29/2019    MUCUS NOT REPORTED 03/29/2019    TRICHOMONAS NOT REPORTED 03/29/2019    YEAST NOT REPORTED 03/29/2019    BACTERIA NOT REPORTED 03/29/2019    SPECGRAV 1.011 03/29/2019    LEUKOCYTESUR LARGE 03/29/2019    UROBILINOGEN Normal 03/29/2019    BILIRUBINUR NEGATIVE 03/29/2019    GLUCOSEU NEGATIVE 03/29/2019    KETUA NEGATIVE 03/29/2019

## 2019-04-16 NOTE — PLAN OF CARE
Problem: Falls - Risk of:  Goal: Will remain free from falls  Description  Will remain free from falls  Outcome: Met This Shift  Note:   Pt. Free from falls at this time. Pt. Up to chair, and brake set. Fall sign posted, and slip-resistant socks on. Needs addressed, and call light within reach. Will continue to monitor. Goal: Absence of physical injury  Description  Absence of physical injury  Outcome: Met This Shift     Problem: Pain:  Description  Pain management should include both nonpharmacologic and pharmacologic interventions. Goal: Pain level will decrease  Description  Pain level will decrease  Outcome: Met This Shift  Note:   Pain scale 0-10, or CPOT if patient is ventilated, is used to assess patient comfort. Pain medications are administered as ordered by physician. RN will continue to monitor pain.  ]  Goal: Control of acute pain  Description  Control of acute pain  Outcome: Met This Shift     Problem: Risk for Impaired Skin Integrity  Goal: Tissue integrity - skin and mucous membranes  Description  Structural intactness and normal physiological function of skin and  mucous membranes. Outcome: Met This Shift  Note:   Skin remains without evidence of further breakdown. Interventions are used to prevent further, or any, skin breakdown as charted in the Skin Integrity flowsheet. RN will continue to monitor.      Problem: Musculor/Skeletal Functional Status  Goal: Highest potential functional level  Outcome: Ongoing

## 2019-04-16 NOTE — PROGRESS NOTES
outpatient    · Right groin pseudoaneurysm s/p thrombin injection      Infection Control Recommendations   · [place] Precautions    Antimicrobial Stewardship Recommendations     · Monitor off antibiotics    Coordination of Outpatient Care:   · Estimated Length of IV antimicrobials: None  · Patient will need Midline Catheter Insertion:   · Patient will need PICC line Insertion:  · Patient will need: Home IV , [place],  SNF,  LTAC TBD  · Patient will need outpatient wound care: No    Chief complaint/reason for consultation:   · Possible pneumonia      History of Present Illness:   Lu Fallon is a 80y.o.-year-old  male who was initially admitted on 3/6/2019. Patient seen at the request of Dr. Kimberley Funk     INITIAL HISTORY:    Pt is a 81 yo gentleman who has recently had multiple hospital admissions in [place] for decompensated heart failure. His symptoms began in December 2018. 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 A cardiac cath at that time showed LM and LAD calcified stenosis 80-90% in multiple areas, with severe disease in D1, D2, Om1 and OM2. Minimal disease in RCA. LV function with EF 25%. Severe aortic stenosis compromising his clinical improvedmnt      He underwent a dual chamber pacemaker placement. Following that, he had multiple hospitalizations for acute heart failure and shortness of breath. His baseline creatinine was 1.3, now >2.0. He has had multiple thoracenteses with transudative pleural fluid removed.      He was brought to [facility] for further evaluation and was admitted on 3/6/19     An ECHO from admission showed   Normal left ventricle size with mildly reduced systolic function; Estimated  left ventricular ejection fraction 40-45%  Anterolateral wall hypokinesis. Mild left ventricular hypertrophy. Left atrium is moderately dilated. Grade II diastolic dysfunction.   Heavily calcified aortic valve with reduced systolic excursion and evidence  of moderate stenosis. (Peak nataliia 3.62 m/s and mean gradient 29 mmHg)  Moderate posterior pericardial effusion without any echo signs of tamponade. Normal right ventricular size and function. Pacemaker lead seen in right ventricle. Moderate tricuspid regurgitation. Estimated right ventricular systolic pressure is 60 mmHg suggesting moderate  pulmonary HTN.    He underwent an atherectomy/JEAN-CLAUDE of the left main and LAD on 3/6. Because of the high surgical risk and renal function he was taken back to the cath lab on 3/11 for PTCA-JEAN-CLAUDE LAD, PTCRA-JEAN-CLAUDE LM with plan to consider a TAVR if pt remained stable.     A TAVR was performed on 3/15     A carotid study showed less than 50% stenosis bilaterally.     He has had multiple thoracenteses since admission. Pt has been followed by CT surgery, cardiology, pulmonary, nephrology and urology.     On the evening of 3/25 pt developed hypertension and SOB at rest. A stat CXR was done that showed progressed moderate to large bilateral pleural effusions with consolidation.      On 3/26 pt underwent a Lt thoracentesis with 1400 ml clear pleural fluid removed. Pt reports feeling much better after the procedure and is asking to sit in the chair.      I am consulted to determine if Mr Bhavna Wall has pneumonia. Pt had continued decline in renal function, HD initiated 3-29  PPV pacemaker placed 4-2, pt with hematoma at site, resolved with pressure. Anticoagulation held 4-3. Hgb to 6.5 received 1u PRBC     CURRENT EXAMINATION: 4/16/2019    Pt seen and examined in room. Says he feels better today. He states he is taking one day at a time. No fevers or chills. Last HD on 1-13-19 with removal of 2700 cc. No HD planned today. Plan is for tunneled catheter placement on Thursday 4-18-19. Afebrile  VS stable. He had upgraded pacemaker placed on 4-2-19  Vancomycin given pre-op  No further antibiotics have been required  Incision site healed.  Mild residual sub cutaneous hematoma.     On exam pt is in no distress  Breath sounds are diminished bilateral bases     WBC count kelli to 13.7 two days ago. CXR shows persistent pulmonary edema and bilateral pleural effusions. Swelling in his left groin was noticed over the weekend, s/p TAVR and PCI with access through the left femoral artery. Ultrasound of abdomen showed mass in the left groin with mixed venous and arterial flow suggesting pseudoaneurysm compressing adjacent vessel. Vascular surgery injected with thrombin. Left lower extremity duplex performed 4-15-19 showed persistent pseudoaneurysm.       Labs reviewed: 4/16/2019    WBC 7.7->11.4->10.5->8.6 -> 9.6 -> 11.8 -> 12.6 -> 10.5 -> 15.8 -> 13.7  Hgb 8.8->6.5->8.3->8.9->7.4 -> 7.2 -> 7.6 -> 7.0 -> 7.2  Plt 57->90->92 -> 73-->105 -> 137 -> 120 -> 104    Cr 2.74->2.61->2.95->3.29 -> 2.57 -> 2.41  BUN 44->38->47->54 -> 24 -> 28    Cultures:  Pleural fluid:  · 3/7 No growth, many neutrophils seen, no bacteria, and no growth    Discussed with Pt, HD RN. I have personally reviewed the past medical history, past surgical history, medications, social history, and family history, and I have updated the database accordingly.   Past Medical History:     Past Medical History:   Diagnosis Date    Aortic stenosis 02/15/2019    Atrial fibrillation (HCC)     CAD (coronary artery disease)     Chest pain 02/15/2019    CHF (congestive heart failure) (HCC)     Chronic anemia     Chronic kidney disease     Hypertension     Pleural effusion on left 02/15/2019    Pleural effusion, right 02/15/2019    Pulmonary emboli ([address] 02/15/2019    Pulmonary hypertension ([address] 03/06/2019    Thyroid disease     hypothyroidism       Past Surgical  History:     Past Surgical History:   Procedure Laterality Date    CORONARY ANGIOPLASTY WITH STENT PLACEMENT  03/11/2019    complex PCI of LT MAIN, LAD    JOINT REPLACEMENT      right ankle    OTHER SURGICAL HISTORY  04/02/2019    UPGRADE TO BI-V PACEMAKER    OTHER SURGICAL HISTORY  03/15/2019    TAVR PROCEDURE    PACEMAKER PLACEMENT  2019    THORACENTESIS         Medications:      megestrol  200 mg Oral Daily    hydrALAZINE  50 mg Oral 3 times per day    iron sucrose  100 mg Intravenous Q48H    [START ON 2019] darbepoetin marya-polysorbate  100 mcg Intravenous Weekly    bumetanide  4 mg Oral BID    simethicone  80 mg Oral Q6H    docusate sodium  100 mg Oral BID    lidocaine-EPINEPHrine  20 mL Intradermal Once    lidocaine PF  1 mL Intradermal Once    bacitracin in 0.9 % sodium chloride irrigation  50,000 Units Topical Once    sodium chloride flush  10 mL Intravenous 2 times per day    aspirin  81 mg Oral Daily    spironolactone  25 mg Oral Daily    metoprolol succinate  50 mg Oral Daily    FLUoxetine  10 mg Oral Daily    levothyroxine  125 mcg Oral Daily    ALPRAZolam  0.25 mg Oral Once    QUEtiapine  25 mg Oral Nightly    clopidogrel  75 mg Oral Daily    isosorbide mononitrate  30 mg Oral Daily    [Held by provider] docusate sodium  100 mg Oral Daily    amiodarone  200 mg Oral Daily    atorvastatin  20 mg Oral Daily       Social History:     Social History     Socioeconomic History    Marital status:       Spouse name: Not on file    Number of children: Not on file    Years of education: Not on file    Highest education level: Not on file   Occupational History     Employer: RETIRED   Social Needs    Financial resource strain: Not on file    Food insecurity:     Worry: Not on file     Inability: Not on file    Transportation needs:     Medical: Not on file     Non-medical: Not on file   Tobacco Use    Smoking status: Former Smoker     Last attempt to quit:      Years since quittin.3    Smokeless tobacco: Never Used   Substance and Sexual Activity    Alcohol use: Not Currently    Drug use: Never    Sexual activity: Not on file   Lifestyle    Physical activity:     Days per week: Not on file Minutes per session: Not on file    Stress: Not on file   Relationships    Social connections:     Talks on phone: Not on file     Gets together: Not on file     Attends Latter-day service: Not on file     Active member of club or organization: Not on file     Attends meetings of clubs or organizations: Not on file     Relationship status: Not on file    Intimate partner violence:     Fear of current or ex partner: Not on file     Emotionally abused: Not on file     Physically abused: Not on file     Forced sexual activity: Not on file   Other Topics Concern    Not on file   Social History Narrative    Not on file       Family History:   History reviewed. No pertinent family history. Allergies:   Quinolones     Review of Systems:   AA, reports continued tenderness at pacemaker site  No chills or fevers, no SOB  Seen on HD    Physical Examination :     Patient Vitals for the past 8 hrs:   BP Temp Temp src Pulse Resp SpO2 Weight   04/16/19 0741 (!) 157/50 -- -- 66 -- -- --   04/16/19 0355 (!) 141/49 98.1 °F (36.7 °C) Oral 64 16 95 % 129 lb 6.6 oz (58.7 kg)     General Appearance: Seen in HD, easily roused, no distress  Head:  Normocephalic, no trauma  Eyes: Pupils equal, round, reactive to light and accommodation; extraocular movements intact; sclera anicteric. ENT: Oropharynx clear. Mouth/throat: mucosa pink and moist. No lesions. Dentition in good repair. Neck: Supple, without lymphadenopathy. Pulmonary/Chest: Clear to auscultation, without wheezes, rales, or rhonchi. Decreased breath sounds at bilateral bases   Cardiovascular: Regular rate and rhythm without murmurs, rubs, or gallops. Paced  Abdomen: Soft. Bowel sounds normal.  : Johnston in place  All four Extremities: Mild edema. Neurologic: No gross sensory or motor deficits. Skin: Warm and dry with good turgor. No signs of peripheral arterial or venous insufficiency. No ulcerations. No open wounds.  Lt chest pacemaker incision with hematoma, staples CXR    Narrative   EXAMINATION:   TWO VIEWS OF THE CHEST       3/28/2019 7:56 am       COMPARISON:   Chest radiograph performed 03/27/2019.       HISTORY:   ORDERING SYSTEM PROVIDED HISTORY: effusions   TECHNOLOGIST PROVIDED HISTORY:   effusions       FINDINGS:   There is a small left and a large right pleural effusion with adjacent   infiltrate.  There is no pneumothorax.  The mediastinal structures are stable   with stable cardiac leads.  The upper abdomen is unremarkable.  The   extrathoracic soft tissues are unremarkable.           Impression   Small left and large right pleural effusion with adjacent infiltrate   representing atelectasis versus pneumonia.  Overall there may be mild   improvement of the right-sided effusion. Narrative   EXAMINATION:   SINGLE XRAY VIEW OF THE CHEST       3/26/2019 2:37 am       COMPARISON:   March 20, 2019.       HISTORY:   ORDERING SYSTEM PROVIDED HISTORY: SOB, PE   TECHNOLOGIST PROVIDED HISTORY:   SOB, PE       FINDINGS:   Frontal portable view of the chest.  Low lung volume.  Progressed moderate to   large bilateral pleural effusions with consolidation.  Indistinct pulmonary   vasculature.  No pneumothorax.  The cardiomediastinal silhouette is not well   evaluated.  Atherosclerotic thoracic aorta.  Left pectoral trans venous   dual-chamber cardiac pacer device.           Impression   Progressed moderate to large bilateral pleural effusions with consolidation. Superimposed infection is not excluded.             Medical Decision Making-Other: Thank you for allowing us to participate in the care of this patient. Please call with questions. Isidra Segal     ATTESTATION:    I have discussed the case, including pertinent history and exam findings with the residents. I have seen and examined the patient and the key elements of the encounter have been performed by me.  I have reviewed the laboratory data, other diagnostic studies and discussed them with the residents. I have updated the medical record where necessary. I agree with the assessment, plan and orders as documented by the resident.     Nathaly Brenner MD.

## 2019-04-17 NOTE — PROGRESS NOTES
for input(s): PROTIME, INR in the last 72 hours. APTT: No results for input(s): APTT in the last 72 hours. 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 Awaiting tunnel cath placement tomorrow  2. Plan for dialysis today with 2 unit PRBC (per Dr Deanna Antoine)    Discharge planning for LONG TERM ACUTE CARE [facility] CARE AT [facility]      The above recommendations including medications and orders were discussed and agreed upon with Dr. Deanna Antoine, the attending on service for the cardiothoracic surgery group today.      Dwayne Berry, APRN, CNP

## 2019-04-17 NOTE — FLOWSHEET NOTE
IR  Patient to IR per wheelchair for temp catheter removal.    MC- RT at bedside  Catheter removed and sent to lab for cultures  Patient tolerated well.   Site covered with 2x2 and tegaderm

## 2019-04-17 NOTE — PROGRESS NOTES
[facility] - Progress Note  Today's Date and Time: 4/17/2019, 7:43 AM    Impression :   1. Recurrent bilateral pleural effusions w/lung compression  2. CHF  3. 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 Multivessel CAD s/p multiple stent placements  5. S/P TAVR  6. HUYEN on HD  7. Urinary retention  8. Pulmonary hypertension  9. S/P Upgrade of dual chamber PPM to CRT-P on 4-2-19  10. Anemia s/p transfusion 4/3  11. Right groin pseudoaneurysm s/p thrombin injection     Recommendations:     · Wound care Lt chest incision  · HD as per nephrology  · Monitor off antibiotics  · Tunneled catheter to be placed Thursday  · Discharge planning  · Repeat arterial duplex in six days, per vascular    Medical Decision Making/Summary/Discussion:   · 81 yo gentleman who developed complete heart block. Found to have multivessel CAD, severe aortic stenosis and pulmonary HTN. · Underwent pacemaker placement with subsequent bouts of acute CHF and multiple hospitalizations in [place]  · Brought to [facility] by family for further care. · Since admission at [facility] he has had cardiac catheterizations x 2 with multiple stent placements and a TAVR on 3-15. Pt remains on a nitroglycerine drip  · Pt continues to require frequent thoracenteses  · He has suffered an HUYEN and is currently on a bumex drip  · CXR 3/26 showed progressed moderate to large bilateral pleural effusions with consolidation. · ID consult placed for treatment of any potential pneumonia  · Pt is afebrile, without cough or sputum production. He remains SOB with exertion and at times, when quiet. · Currently no evidence of active infection. Pt will require aggressive pulmonary toilet and diuresis. Plan to monitor off antibiotics  · Patient to continue HD  · He had upgraded pacemaker placed on 4-2-19. · Developed hematoma at pacer site, resolved with pressure. Elequis held 4-3. Hgb to 6.5 received 1u PRBC. Site ecchymotic but no signs of infection.   · Patient is continuing hemodialysis  · Will likely continue hemodialysis as an outpatient    · Right groin pseudoaneurysm s/p thrombin injection      Infection Control Recommendations   · [place] Precautions    Antimicrobial Stewardship Recommendations     · Monitor off antibiotics    Coordination of Outpatient Care:   · Estimated Length of IV antimicrobials: None  · Patient will need Midline Catheter Insertion:   · Patient will need PICC line Insertion:  · Patient will need: Home IV , [place],  SNF,  LTAC TBD  · Patient will need outpatient wound care: No    Chief complaint/reason for consultation:   · Possible pneumonia      History of Present Illness:   Katerin Arellano is a 80y.o.-year-old  male who was initially admitted on 3/6/2019. Patient seen at the request of Dr. Tera Garzon     INITIAL HISTORY:    Pt is a 81 yo gentleman who has recently had multiple hospital admissions in [place] for decompensated heart failure. His symptoms began in December 2018. 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 A cardiac cath at that time showed LM and LAD calcified stenosis 80-90% in multiple areas, with severe disease in D1, D2, Om1 and OM2. Minimal disease in RCA. LV function with EF 25%. Severe aortic stenosis compromising his clinical improvedmnt      He underwent a dual chamber pacemaker placement. Following that, he had multiple hospitalizations for acute heart failure and shortness of breath. His baseline creatinine was 1.3, now >2.0. He has had multiple thoracenteses with transudative pleural fluid removed.      He was brought to [place] for further evaluation and was admitted on 3/6/19     An ECHO from admission showed   Normal left ventricle size with mildly reduced systolic function; Estimated  left ventricular ejection fraction 40-45%  Anterolateral wall hypokinesis. Mild left ventricular hypertrophy. Left atrium is moderately dilated.   Grade II diastolic dysfunction. Heavily calcified aortic valve with reduced systolic excursion and evidence  of moderate stenosis. (Peak nataliia 3.62 m/s and mean gradient 29 mmHg)  Moderate posterior pericardial effusion without any echo signs of tamponade. Normal right ventricular size and function. Pacemaker lead seen in right ventricle. Moderate tricuspid regurgitation. Estimated right ventricular systolic pressure is 60 mmHg suggesting moderate  pulmonary HTN.    He underwent an atherectomy/JEAN-CLAUDE of the left main and LAD on 3/6. Because of the high surgical risk and renal function he was taken back to the cath lab on 3/11 for PTCA-JEAN-CLAUDE LAD, PTCRA-JEAN-CLAUDE LM with plan to consider a TAVR if pt remained stable.     A TAVR was performed on 3/15     A carotid study showed less than 50% stenosis bilaterally.     He has had multiple thoracenteses since admission. Pt has been followed by CT surgery, cardiology, pulmonary, nephrology and urology.     On the evening of 3/25 pt developed hypertension and SOB at rest. A stat CXR was done that showed progressed moderate to large bilateral pleural effusions with consolidation.      On 3/26 pt underwent a Lt thoracentesis with 1400 ml clear pleural fluid removed. Pt reports feeling much better after the procedure and is asking to sit in the chair.      I am consulted to determine if Mr Yonas Guzman has pneumonia. Pt had continued decline in renal function, HD initiated 3-29  PPV pacemaker placed 4-2, pt with hematoma at site, resolved with pressure. Anticoagulation held 4-3. Hgb to 6.5 received 1u PRBC     CURRENT EXAMINATION: 4/17/2019    Pt seen and examined in room. Says is feeling ok today. Sitting up in chair today. Says his breathing is about the same. No fevers or chills. Hemodialysis yesterday. No HD planned today. Plan is for tunneled catheter placement on Thursday 4-18-19. Afebrile  VS stable.      He had upgraded pacemaker placed on 4-2-19  Vancomycin given pre-op  No further antibiotics have been required  Incision site healed. Mild residual sub cutaneous hematoma.     On exam pt is in no distress  Breath sounds are diminished bilateral bases     CXR shows persistent pulmonary edema and bilateral pleural effusions. Swelling in his left groin was noticed over the weekend, s/p TAVR and PCI with access through the left femoral artery. Ultrasound of abdomen showed mass in the left groin with mixed venous and arterial flow suggesting pseudoaneurysm compressing adjacent vessel. Vascular surgery injected with thrombin. Left lower extremity duplex performed 4-15-19 showed persistent pseudoaneurysm.       Labs reviewed: 4/17/2019    WBC 7.7->11.4->10.5->8.6 -> 9.6 -> 11.8 -> 12.6 -> 10.5 -> 15.8 -> 13.7 -> 16.2  Hgb 8.8->6.5->8.3->8.9->7.4 -> 7.2 -> 7.6 -> 7.0 -> 7.2 -> 8.6  Plt 57->90->92 -> 73-->105 -> 137 -> 120 -> 104 -> 167     Cr 2.74->2.61->2.95->3.29 -> 2.57 -> 2.41 -> 5.42  BUN 44->38->47->54 -> 24 -> 28 -> 74    Cultures:  Pleural fluid:  · 3/7 No growth, many neutrophils seen, no bacteria, and no growth    Discussed with Pt, HD RN. I have personally reviewed the past medical history, past surgical history, medications, social history, and family history, and I have updated the database accordingly.   Past Medical History:     Past Medical History:   Diagnosis Date    Aortic stenosis 02/15/2019    Atrial fibrillation (HCC)     CAD (coronary artery disease)     Chest pain 02/15/2019    CHF (congestive heart failure) (HCC)     Chronic anemia     Chronic kidney disease     Hypertension     Pleural effusion on left 02/15/2019    Pleural effusion, right 02/15/2019    Pulmonary emboli ([address] 02/15/2019    Pulmonary hypertension ([address] 03/06/2019    Thyroid disease     hypothyroidism       Past Surgical  History:     Past Surgical History:   Procedure Laterality Date    CORONARY ANGIOPLASTY WITH STENT PLACEMENT  03/11/2019    complex PCI of LT MAIN, LAD    JOINT REPLACEMENT right ankle    OTHER SURGICAL HISTORY  2019    UPGRADE TO BI-V PACEMAKER    OTHER SURGICAL HISTORY  03/15/2019    TAVR PROCEDURE    PACEMAKER PLACEMENT  2019    THORACENTESIS         Medications:      megestrol  200 mg Oral Daily    hydrALAZINE  50 mg Oral 3 times per day    iron sucrose  100 mg Intravenous Q48H    [START ON 2019] darbepoetin marya-polysorbate  100 mcg Intravenous Weekly    bumetanide  4 mg Oral BID    simethicone  80 mg Oral Q6H    docusate sodium  100 mg Oral BID    lidocaine-EPINEPHrine  20 mL Intradermal Once    lidocaine PF  1 mL Intradermal Once    bacitracin in 0.9 % sodium chloride irrigation  50,000 Units Topical Once    sodium chloride flush  10 mL Intravenous 2 times per day    aspirin  81 mg Oral Daily    spironolactone  25 mg Oral Daily    metoprolol succinate  50 mg Oral Daily    FLUoxetine  10 mg Oral Daily    levothyroxine  125 mcg Oral Daily    ALPRAZolam  0.25 mg Oral Once    QUEtiapine  25 mg Oral Nightly    clopidogrel  75 mg Oral Daily    isosorbide mononitrate  30 mg Oral Daily    [Held by provider] docusate sodium  100 mg Oral Daily    amiodarone  200 mg Oral Daily    atorvastatin  20 mg Oral Daily       Social History:     Social History     Socioeconomic History    Marital status:       Spouse name: Not on file    Number of children: Not on file    Years of education: Not on file    Highest education level: Not on file   Occupational History     Employer: RETIRED   Social Needs    Financial resource strain: Not on file    Food insecurity:     Worry: Not on file     Inability: Not on file    Transportation needs:     Medical: Not on file     Non-medical: Not on file   Tobacco Use    Smoking status: Former Smoker     Last attempt to quit: 1989     Years since quittin.3    Smokeless tobacco: Never Used   Substance and Sexual Activity    Alcohol use: Not Currently    Drug use: Never    Sexual activity: Not on file   Lifestyle    Physical activity:     Days per week: Not on file     Minutes per session: Not on file    Stress: Not on file   Relationships    Social connections:     Talks on phone: Not on file     Gets together: Not on file     Attends Christian service: Not on file     Active member of club or organization: Not on file     Attends meetings of clubs or organizations: Not on file     Relationship status: Not on file    Intimate partner violence:     Fear of current or ex partner: Not on file     Emotionally abused: Not on file     Physically abused: Not on file     Forced sexual activity: Not on file   Other Topics Concern    Not on file   Social History Narrative    Not on file       Family History:   History reviewed. No pertinent family history. Allergies:   Quinolones     Review of Systems:   AA, reports continued tenderness at pacemaker site  No chills or fevers, no SOB  Seen on HD    Physical Examination :     Patient Vitals for the past 8 hrs:   BP Temp Temp src Pulse Resp   04/17/19 0713 (!) 153/45 97.6 °F (36.4 °C) Oral -- --   04/17/19 0430 (!) 150/67 97.8 °F (36.6 °C) Oral 63 16     General Appearance: Seen in HD, easily roused, no distress  Head:  Normocephalic, no trauma  Eyes: Pupils equal, round, reactive to light and accommodation; extraocular movements intact; sclera anicteric. ENT: Oropharynx clear. Mouth/throat: mucosa pink and moist. No lesions. Dentition in good repair. Neck: Supple, without lymphadenopathy. Pulmonary/Chest: Clear to auscultation, without wheezes, rales, or rhonchi. Decreased breath sounds at bilateral bases   Cardiovascular: Regular rate and rhythm without murmurs, rubs, or gallops. Paced  Abdomen: Soft. Bowel sounds normal.  : Johnston in place  All four Extremities: Mild edema. Neurologic: No gross sensory or motor deficits. Skin: Warm and dry with good turgor. No signs of peripheral arterial or venous insufficiency. No ulcerations. No open wounds.  Lt chest pacemaker incision with hematoma, staples intact, no oozing or bleeding noted    Medical Decision Making -Laboratory:   I have independently reviewed/ordered the following labs:    CBC with Differential:   Recent Labs     19  0444   WBC 16.2*   HGB 8.6*   HCT 28.3*      LYMPHOPCT 4*   MONOPCT 6     BMP:   Recent Labs     19  0444   *   K 3.8   CL 96*   CO2 22   BUN 74*   CREATININE 5.42*     Hepatic Function Panel:   No results for input(s): PROT, LABALBU, BILIDIR, IBILI, BILITOT, ALKPHOS, ALT, AST in the last 72 hours. No results for input(s): RPR in the last 72 hours. No results for input(s): HIV in the last 72 hours. No results for input(s): BC in the last 72 hours.   Lab Results   Component Value Date    MUCUS NOT REPORTED 2019    RBC 2.95 2019    TRICHOMONAS NOT REPORTED 2019    WBC 16.2 2019    YEAST NOT REPORTED 2019    TURBIDITY CLEAR 2019     Lab Results   Component Value Date    CREATININE 5.42 2019    GLUCOSE 89 2019       Medical Decision Making-Imagin-5 CXR    EXAMINATION:   SINGLE XRAY VIEW OF THE CHEST       2019 5:39 am       COMPARISON:   2019       HISTORY:   ORDERING SYSTEM PROVIDED HISTORY: pleural effusions   TECHNOLOGIST PROVIDED HISTORY:   pleural effusions       FINDINGS:   AP portable view of the chest time stamped at 519 hours demonstrates   overlying cardiac monitoring electrodes.  Right internal jugular catheter   terminates in the distal superior vena cava.  Multiple polar pacemaker enters   from the left with intact leads in appropriate positions.  Pacemaker battery   box and skin clips overlie the left axilla.  Heart is enlarged and there is   pulmonary vascular congestion with bilateral perihilar opacities consistent   with pulmonary edema.  Moderate bilateral pleural effusions are noted.  No   extrapleural air is seen.  No midline shift is noted.  Osseous structures are   stable.         Impression   Continued cardiomegaly, opacities most compatible with pulmonary edema and   bilateral pleural effusions. 4-1 CXR    EXAMINATION:   SINGLE XRAY VIEW OF THE CHEST       4/1/2019 6:04 am       COMPARISON:   03/31/2019       HISTORY:   ORDERING SYSTEM PROVIDED HISTORY: SOB   TECHNOLOGIST PROVIDED HISTORY:   SOB       FINDINGS:   There is a left-sided transvenous pacer in place and a right internal jugular   dialysis catheter.  There is cardiomegaly with pulmonary vascular congestion   and edema.  There are bilateral pleural effusions which are increased.  The   pulmonary edema appears worse on the current exam.           Impression   Worsening edema and increased bilateral pleural effusions         4-1 AXR  EXAMINATION:   SINGLE SUPINE XRAY VIEW(S) OF THE ABDOMEN       4/1/2019 2:40 pm       COMPARISON:   None.       HISTORY:   ORDERING SYSTEM PROVIDED HISTORY: r/o ileus   TECHNOLOGIST PROVIDED HISTORY:   r/o ileus       Initial exam       FINDINGS:   Nonspecific bowel gas pattern with air-filled small and large bowel.  No   organomegaly noted.  No pathologic calcifications.  Degenerative changes of   the lower lumbar spine.           Impression   Nonspecific bowel gas pattern without evidence of small bowel obstruction. 3/29 CXR      Narrative   EXAMINATION:   SINGLE XRAY VIEW OF THE CHEST       3/29/2019 8:26 am       COMPARISON:   Chest radiograph performed 03/28/2019.       HISTORY:   ORDERING SYSTEM PROVIDED HISTORY: effusions   TECHNOLOGIST PROVIDED HISTORY:   effusions       FINDINGS:   There are large bilateral effusions with adjacent consolidation.  There is   underlying pulmonary congestion.  There is no pneumothorax.  The heart is   enlarged with stable cardiac leads.  The upper abdomen is unremarkable.  The   extrathoracic soft tissues are unremarkable.           Impression   Cardiomegaly with large bilateral effusions and adjacent consolidation   concerning for pneumonia.             3/27 CXR    Narrative   EXAMINATION:   TWO VIEWS OF THE CHEST       3/28/2019 7:56 am       COMPARISON:   Chest radiograph performed 03/27/2019.       HISTORY:   ORDERING SYSTEM PROVIDED HISTORY: effusions   TECHNOLOGIST PROVIDED HISTORY:   effusions       FINDINGS:   There is a small left and a large right pleural effusion with adjacent   infiltrate.  There is no pneumothorax.  The mediastinal structures are stable   with stable cardiac leads.  The upper abdomen is unremarkable.  The   extrathoracic soft tissues are unremarkable.           Impression   Small left and large right pleural effusion with adjacent infiltrate   representing atelectasis versus pneumonia.  Overall there may be mild   improvement of the right-sided effusion. Narrative   EXAMINATION:   SINGLE XRAY VIEW OF THE CHEST       3/26/2019 2:37 am       COMPARISON:   March 20, 2019.       HISTORY:   ORDERING SYSTEM PROVIDED HISTORY: SOB, PE   TECHNOLOGIST PROVIDED HISTORY:   SOB, PE       FINDINGS:   Frontal portable view of the chest.  Low lung volume.  Progressed moderate to   large bilateral pleural effusions with consolidation.  Indistinct pulmonary   vasculature.  No pneumothorax.  The cardiomediastinal silhouette is not well   evaluated.  Atherosclerotic thoracic aorta.  Left pectoral trans venous   dual-chamber cardiac pacer device.           Impression   Progressed moderate to large bilateral pleural effusions with consolidation. Superimposed infection is not excluded.             Medical Decision Making-Other: Thank you for allowing us to participate in the care of this patient. Please call with questions. Isidra Segal     ATTESTATION:    I have discussed the case, including pertinent history and exam findings with the residents. I have seen and examined the patient and the key elements of the encounter have been performed by me.  I have reviewed the laboratory data, other diagnostic studies and discussed them with the residents. I have updated the medical record where necessary. I agree with the assessment, plan and orders as documented by the resident.     Hadley Samuels MD.

## 2019-04-17 NOTE — PROGRESS NOTES
Vascular Surgery Progress Note         PATIENT NAME: Levar Colby     TODAY'S DATE: 4/17/2019, 5:44 AM    SUBJECTIVE:    Pt seen and examined. No acute events overnight. Consulted for left groin pseudoaneurysm confirmed by US 4/14 s/p PCI and TAVR. Pseudoaneurysm persistent as of 4/15. Pt able to ambulate to and from the bathroom with nurse's assistance. Pt continues to tolerate cardiac diet. Pt reports no changes to b/l LE. No pain at left groin. No pain to palpation on left flank. Reports passing gas and having BM. OBJECTIVE:   Vitals:  BP (!) 150/67   Pulse 63   Temp 97.8 °F (36.6 °C) (Oral)   Resp 19   Ht 5' 6\" (1.676 m)   Wt 129 lb 6.6 oz (58.7 kg)   SpO2 91%   BMI 20.89 kg/m²      INTAKE/OUTPUT:      Intake/Output Summary (Last 24 hours) at 4/17/2019 0544  Last data filed at 4/17/2019 0538  Gross per 24 hour   Intake 340 ml   Output 300 ml   Net 40 ml               GENERAL: AOx3, NAD  HEENT: EOMI bilaterally  CARDIAC: Regular rate and rhythm. ABDOMEN: Notable ecchymosis on left flank. No pain to palpation. Soft, NT, ND, Active Bowel Sounds  EXTREMITY: wearing compression stockings, moves all extremities, no edema. Pulses 1+ b/ Rad/Fem, +signal to b/l DP/PT  NEURO: no focal deficits, Gross motor intact.     Data:  CBC with Differential:    Lab Results   Component Value Date    WBC 16.2 04/17/2019    RBC 2.95 04/17/2019    HGB 8.6 04/17/2019    HCT 28.3 04/17/2019     04/17/2019    MCV 95.9 04/17/2019    MCH 29.2 04/17/2019    MCHC 30.4 04/17/2019    RDW 18.7 04/17/2019    LYMPHOPCT PENDING 04/17/2019    MONOPCT PENDING 04/17/2019    BASOPCT PENDING 04/17/2019    MONOSABS PENDING 04/17/2019    LYMPHSABS PENDING 04/17/2019    EOSABS PENDING 04/17/2019    BASOSABS PENDING 04/17/2019    DIFFTYPE NOT REPORTED 04/17/2019     BMP:    Lab Results   Component Value Date     04/17/2019    K 3.8 04/17/2019    CL 96 04/17/2019    CO2 22 04/17/2019    BUN 74 04/17/2019    LABALBU 3.6 03/29/2019

## 2019-04-17 NOTE — PROGRESS NOTES
Dialysis Post Treatment Note  Patient tolerated treatment well. Denies complaints at time of discharge.    Vitals:    04/17/19 1334   BP: (!) 132/57   Pulse: 60   Resp:    Temp:    SpO2:      Pre-Weight = 61.9  Post-weight = Weight: 133 lb 13.1 oz (60.7 kg)  Total Liters Processed = Total Liters Processed (l/min): 58.2 l/min  Rinseback Volume (mL) = Rinseback Volume (ml): 370 ml  Net Removal (mL) = 1580  Length of treatment=180      Pt hypotensive throughout albumin given and fluid throughout end of treatment stable with no issue    Temp 97.4  resp 20

## 2019-04-17 NOTE — PROGRESS NOTES
Nephrology Progress Note        Subjective: The patient is seen and examined on hemodialysis. The patient is seen and evaluated on dialysis. The patient is not stable on dialysis. Access cannulated without problems. No new issues overnight. During treatment his systolic blood pressure dropping to 80 mm Hg early in the dialysis treatment. We are now minimizing the fluid removal and giving 25 grams of SPA. WBC count of 16 today, so we will discontinue the dialysis catheter post dialysis. Last previous dialysis treatment was Saturday, 19. Hemoglobin is reasonable at 8.6 g/dl, improved from previous. Pulmonary exam continues to be consistent with CHF. He will get a chest x-ray post dialysis. He is scheduled for [facility] later this week.           Objective:  CURRENT TEMPERATURE:  Temp: 97.6 °F (36.4 °C)  MAXIMUM TEMPERATURE OVER 24HRS:  Temp (24hrs), Av.7 °F (36.5 °C), Min:97.6 °F (36.4 °C), Max:97.8 °F (36.6 °C)    CURRENT RESPIRATORY RATE:  Resp: 16  CURRENT PULSE:  Pulse: 63  CURRENT BLOOD PRESSURE:  BP: (!) 138/58  24HR BLOOD PRESSURE RANGE:  Systolic (23TYU), BQS:984 , Min:105 , EQB:836   ; Diastolic (98TLF), ZGC:96, Min:45, Max:67    24HR INTAKE/OUTPUT:      Intake/Output Summary (Last 24 hours) at 2019 1051  Last data filed at 2019 0928  Gross per 24 hour   Intake 200 ml   Output 500 ml   Net -300 ml     Patient Vitals for the past 96 hrs (Last 3 readings):   Weight   19 1016 136 lb 7.4 oz (61.9 kg)   19 0355 129 lb 6.6 oz (58.7 kg)   04/15/19 0005 136 lb 7.4 oz (61.9 kg)         Physical Exam:  General appearance:Awake, alert, in no acute distress, appears weak and tired, on 2 liters of oxygen by nasal cannula with O2 saturation of 95%  Skin: warm and dry, no rash or erythema  Eyes: conjunctivae pale and sclera anicteric  ENT:no thrush, moist mucus membranes   Neck:  Positive JVD, some accessory muscle use, midline trachea  Pulmonary: clear to auscultation bilaterally and no wheezing, rales or rhonchi   Cardiovascular: irregularly irregular rhythm without a rub and no asterixis  Abdomen: soft nontender, mildly distended, with active bowel sounds   Extremities: no cyanosis, trace pretibial edema     Access:  Temporary hemodialysis catheter in place in the right neck    Current Medications:      megestrol (MEGACE) 40 MG/ML suspension 200 mg Daily   hydrALAZINE (APRESOLINE) tablet 50 mg 3 times per day   iron sucrose (VENOFER) 100 mg in sodium chloride 0.9 % 100 mL IVPB Q48H   [START ON 4/19/2019] darbepoetin marya-polysorbate (ARANESP) injection 100 mcg Weekly   midodrine (PROAMATINE) tablet 5 mg PRN   0.9 % sodium chloride bolus PRN   0.9 % sodium chloride bolus PRN   bumetanide (BUMEX) tablet 4 mg BID   simethicone (MYLICON) chewable tablet 80 mg Q6H   dextrose 5 % solution Continuous   docusate sodium (COLACE) capsule 100 mg BID   bisacodyl (DULCOLAX) suppository 10 mg Daily PRN   lidocaine-EPINEPHrine 1 percent-[id number] injection 20 mL Once   HYDROcodone-acetaminophen (NORCO) 5-325 MG per tablet 1 tablet Q8H PRN   lidocaine PF 1 % injection 1 mL Once   bacitracin 50,000 Units in sodium chloride 0.9 % 1,000 mL irrigation Once   sodium chloride flush 0.9 % injection 10 mL 2 times per day   sodium chloride flush 0.9 % injection 10 mL PRN   acetaminophen (TYLENOL) tablet 650 mg Q4H PRN   aspirin suppository 600 mg Q6H PRN   0.9 % sodium chloride bolus PRN   0.9 % sodium chloride bolus PRN   heparin (porcine) injection 1,400 Units PRN   heparin (porcine) injection 1,500 Units PRN   aspirin EC tablet 81 mg Daily   spironolactone (ALDACTONE) tablet 25 mg Daily   metoprolol succinate (TOPROL XL) extended release tablet 50 mg Daily   hydrALAZINE (APRESOLINE) injection 10 mg Q4H PRN   FLUoxetine (PROZAC) capsule 10 mg Daily   ondansetron (ZOFRAN) injection 4 mg Q6H PRN   levothyroxine (SYNTHROID) tablet 125 mcg Daily   ALPRAZolam (XANAX) tablet 0.25 mg Once   QUEtiapine (SEROQUEL) tablet 25

## 2019-04-17 NOTE — FLOWSHEET NOTE
DATE: 2019    NAME: Billy Kimbrough  MRN: [id number]   : 12/10/1928    Patient not seen this date for Physical Therapy due to:  [] Blood transfusion in progress  [x] Hemodialysis  []  Patient Declined  [] Spine Precautions   [] Strict Bedrest  [] Surgery/ Procedure  [] Testing      [] Other        [] PT being discontinued at this time. Patient independent. No further needs. [] PT being discontinued at this time as the patient has been transferred to palliative care. No further needs.     Baron Levin, PTA

## 2019-04-17 NOTE — CARE COORDINATION
Spoke with Dr. Maricel Patricia, he would like to reschedule meeting with [facility] for tomorrow at [address], she is available. Notified Aric Hamilton at [facility] that patient had a SNF say at [place] is [facility].   Will speak with Mervat Basilio regarding outpatient HD as Dr. Maricel Patricia would like to proceed with Nicole Bellamy under Dr. Burrell Stall care    1020 received call from [facility], updated to above, she will work on OP HD placement

## 2019-04-17 NOTE — PROGRESS NOTES
[facility] - Progress Note  Today's Date and Time: 4/17/2019, 3:43 PM    Impression :   1. Recurrent bilateral pleural effusions w/lung compression  2. CHF  3. 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 Multivessel CAD s/p multiple stent placements  5. S/P TAVR  6. HUYEN on HD  7. Urinary retention  8. Pulmonary hypertension  9. S/P Upgrade of dual chamber PPM to CRT-P on 4-2-19  10. Anemia s/p transfusion 4/3  11. Right groin pseudoaneurysm s/p thrombin injection     Recommendations:     · Wound care Lt chest incision  · HD as per nephrology  · Monitor off antibiotics  · Tunneled catheter to be placed Thursday 4-18-19    Medical Decision Making/Summary/Discussion:   · 79 yo gentleman who developed complete heart block. Found to have multivessel CAD, severe aortic stenosis and pulmonary HTN. · Underwent pacemaker placement with subsequent bouts of acute CHF and multiple hospitalizations in [facility] 21  · Brought to [place] by family for further care. · Since admission at [facility] he has had cardiac catheterizations x 2 with multiple stent placements and a TAVR on 3-15. Pt remains on a nitroglycerine drip  · Pt continues to require frequent thoracenteses  · He has suffered an HUYEN and is currently on a bumex drip  · CXR 3/26 showed progressed moderate to large bilateral pleural effusions with consolidation. · ID consult placed for treatment of any potential pneumonia  · Pt is afebrile, without cough or sputum production. He remains SOB with exertion and at times, when quiet. · Currently no evidence of active infection. Pt will require aggressive pulmonary toilet and diuresis. Plan to monitor off antibiotics  · Patient to continue HD  · He had upgraded pacemaker placed on 4-2-19. · Developed hematoma at pacer site, resolved with pressure. Elequis held 4-3. Hgb to 6.5 received 1u PRBC. Site ecchymotic but no signs of infection.   · Patient is continuing hemodialysis  · Will likely continue hemodialysis as an outpatient    · Right groin pseudoaneurysm s/p thrombin injection      Infection Control Recommendations   · [place] Precautions    Antimicrobial Stewardship Recommendations     · Monitor off antibiotics    Coordination of Outpatient Care:   · Estimated Length of IV antimicrobials: None  · Patient will need Midline Catheter Insertion:   · Patient will need PICC line Insertion:  · Patient will need: Home IV , [place],  SNF,  LTAC TBD  · Patient will need outpatient wound care: No    Chief complaint/reason for consultation:   · Possible pneumonia      History of Present Illness:   Rodger Moraels is a 80y.o.-year-old  male who was initially admitted on 3/6/2019. Patient seen at the request of Dr. Kimberlyn Sandoval     INITIAL HISTORY:    Pt is a 81 yo gentleman who has recently had multiple hospital admissions in [place] for decompensated heart failure. His symptoms began in December 2018. 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 A cardiac cath at that time showed LM and LAD calcified stenosis 80-90% in multiple areas, with severe disease in D1, D2, Om1 and OM2. Minimal disease in RCA. LV function with EF 25%. Severe aortic stenosis compromising his clinical improvedmnt      He underwent a dual chamber pacemaker placement. Following that, he had multiple hospitalizations for acute heart failure and shortness of breath. His baseline creatinine was 1.3, now >2.0. He has had multiple thoracenteses with transudative pleural fluid removed.      He was brought to [facility] for further evaluation and was admitted on 3/6/19     An ECHO from admission showed   Normal left ventricle size with mildly reduced systolic function; Estimated  left ventricular ejection fraction 40-45%  Anterolateral wall hypokinesis. Mild left ventricular hypertrophy. Left atrium is moderately dilated. Grade II diastolic dysfunction.   Heavily calcified aortic valve with reduced systolic excursion and evidence  of moderate stenosis. (Peak nataliia 3.62 m/s and mean gradient 29 mmHg)  Moderate posterior pericardial effusion without any echo signs of tamponade. Normal right ventricular size and function. Pacemaker lead seen in right ventricle. Moderate tricuspid regurgitation. Estimated right ventricular systolic pressure is 60 mmHg suggesting moderate  pulmonary HTN.    He underwent an atherectomy/JEAN-CLAUDE of the left main and LAD on 3/6. Because of the high surgical risk and renal function he was taken back to the cath lab on 3/11 for PTCA-JEAN-CLAUDE LAD, PTCRA-JEAN-CLAUDE LM with plan to consider a TAVR if pt remained stable.     A TAVR was performed on 3/15     A carotid study showed less than 50% stenosis bilaterally.     He has had multiple thoracenteses since admission. Pt has been followed by CT surgery, cardiology, pulmonary, nephrology and urology.     On the evening of 3/25 pt developed hypertension and SOB at rest. A stat CXR was done that showed progressed moderate to large bilateral pleural effusions with consolidation.      On 3/26 pt underwent a Lt thoracentesis with 1400 ml clear pleural fluid removed. Pt reports feeling much better after the procedure and is asking to sit in the chair.      I am consulted to determine if Mr Karely Garrido has pneumonia. Pt had continued decline in renal function, HD initiated 3-29  PPV pacemaker placed 4-2, pt with hematoma at site, resolved with pressure. Anticoagulation held 4-3. Hgb to 6.5 received 1u PRBC     CURRENT EXAMINATION: 4/17/2019    Pt seen and examined in room. Says he feels better today. He states he is taking one day at a time. No fevers or chills. Last HD 4-17-19 . Problems with hypotension during HD  Plan is for tunneled catheter placement on Thursday 4-18-19. Afebrile  VS stable.      He had upgraded pacemaker placed on 4-2-19  Site continues to improve.     On exam pt is in no distress  Breath sounds are diminished bilateral bases   CXR 4-17-19 shows simethicone  80 mg Oral Q6H    docusate sodium  100 mg Oral BID    lidocaine-EPINEPHrine  20 mL Intradermal Once    lidocaine PF  1 mL Intradermal Once    bacitracin in 0.9 % sodium chloride irrigation  50,000 Units Topical Once    sodium chloride flush  10 mL Intravenous 2 times per day    aspirin  81 mg Oral Daily    spironolactone  25 mg Oral Daily    metoprolol succinate  50 mg Oral Daily    FLUoxetine  10 mg Oral Daily    levothyroxine  125 mcg Oral Daily    ALPRAZolam  0.25 mg Oral Once    QUEtiapine  25 mg Oral Nightly    clopidogrel  75 mg Oral Daily    isosorbide mononitrate  30 mg Oral Daily    [Held by provider] docusate sodium  100 mg Oral Daily    amiodarone  200 mg Oral Daily    atorvastatin  20 mg Oral Daily       Social History:     Social History     Socioeconomic History    Marital status:       Spouse name: Not on file    Number of children: Not on file    Years of education: Not on file    Highest education level: Not on file   Occupational History     Employer: RETIRED   Social Needs    Financial resource strain: Not on file    Food insecurity:     Worry: Not on file     Inability: Not on file    Transportation needs:     Medical: Not on file     Non-medical: Not on file   Tobacco Use    Smoking status: Former Smoker     Last attempt to quit:      Years since quittin.3    Smokeless tobacco: Never Used   Substance and Sexual Activity    Alcohol use: Not Currently    Drug use: Never    Sexual activity: Not on file   Lifestyle    Physical activity:     Days per week: Not on file     Minutes per session: Not on file    Stress: Not on file   Relationships    Social connections:     Talks on phone: Not on file     Gets together: Not on file     Attends Congregation service: Not on file     Active member of club or organization: Not on file     Attends meetings of clubs or organizations: Not on file     Relationship status: Not on file    Intimate partner violence:     Fear of current or ex partner: Not on file     Emotionally abused: Not on file     Physically abused: Not on file     Forced sexual activity: Not on file   Other Topics Concern    Not on file   Social History Narrative    Not on file       Family History:   History reviewed. No pertinent family history. Allergies:   Quinolones     Review of Systems:   AA, reports continued tenderness at pacemaker site  No chills or fevers, no SOB  Seen on HD    Physical Examination :     Patient Vitals for the past 8 hrs:   BP Temp Temp src Pulse Resp SpO2 Weight   04/17/19 1527 98/65 97.1 °F (36.2 °C) Oral -- -- 95 % --   04/17/19 1334 (!) 132/57 -- -- 60 -- -- 133 lb 13.1 oz (60.7 kg)   04/17/19 1317 121/65 -- -- 74 -- -- --   04/17/19 1304 (!) 122/52 -- -- 84 -- -- --   04/17/19 1248 (!) 107/43 -- -- 72 -- -- --   04/17/19 1216 (!) 131/45 -- -- 62 -- -- --   04/17/19 1151 (!) 87/48 -- -- 60 -- -- --   04/17/19 1135 (!) 100/43 -- -- 60 -- -- --   04/17/19 1117 (!) 90/43 -- -- 60 -- -- --   04/17/19 1049 (!) 84/45 -- -- 60 -- -- --   04/17/19 1017 (!) 138/58 97.6 °F (36.4 °C) -- 63 -- -- --   04/17/19 1016 -- 97.6 °F (36.4 °C) Oral 69 16 -- 136 lb 7.4 oz (61.9 kg)   04/17/19 0824 -- -- -- -- -- 94 % --     General Appearance: Seen in HD, easily roused, no distress  Head:  Normocephalic, no trauma  Eyes: Pupils equal, round, reactive to light and accommodation; extraocular movements intact; sclera anicteric. ENT: Oropharynx clear. Mouth/throat: mucosa pink and moist. No lesions. Dentition in good repair. Neck: Supple, without lymphadenopathy. Pulmonary/Chest: Clear to auscultation, without wheezes, rales, or rhonchi. Decreased breath sounds at bilateral bases   Cardiovascular: Regular rate and rhythm without murmurs, rubs, or gallops. Paced  Abdomen: Soft. Bowel sounds normal.  : Johnston in place  All four Extremities: Mild edema. Neurologic: No gross sensory or motor deficits.   Skin: Warm and dry with good turgor. No signs of peripheral arterial or venous insufficiency. No ulcerations. No open wounds. Lt chest pacemaker incision with hematoma, staples intact, no oozing or bleeding noted    Medical Decision Making -Laboratory:   I have independently reviewed/ordered the following labs:    CBC with Differential:   Recent Labs     19  0444   WBC 16.2*   HGB 8.6*   HCT 28.3*      LYMPHOPCT 4*   MONOPCT 6     BMP:   Recent Labs     19  0444   *   K 3.8   CL 96*   CO2 22   BUN 74*   CREATININE 5.42*     Hepatic Function Panel:   No results for input(s): PROT, LABALBU, BILIDIR, IBILI, BILITOT, ALKPHOS, ALT, AST in the last 72 hours. No results for input(s): RPR in the last 72 hours. No results for input(s): HIV in the last 72 hours. No results for input(s): BC in the last 72 hours. Lab Results   Component Value Date    MUCUS NOT REPORTED 2019    RBC 2.95 2019    TRICHOMONAS NOT REPORTED 2019    WBC 16.2 2019    YEAST NOT REPORTED 2019    TURBIDITY CLEAR 2019     Lab Results   Component Value Date    CREATININE 5.42 2019    GLUCOSE 89 2019       Medical Decision Making-Imagin-5 CXR    EXAMINATION:   SINGLE XRAY VIEW OF THE CHEST       2019 5:39 am       COMPARISON:   2019       HISTORY:   ORDERING SYSTEM PROVIDED HISTORY: pleural effusions   TECHNOLOGIST PROVIDED HISTORY:   pleural effusions       FINDINGS:   AP portable view of the chest time stamped at 519 hours demonstrates   overlying cardiac monitoring electrodes.  Right internal jugular catheter   terminates in the distal superior vena cava.  Multiple polar pacemaker enters   from the left with intact leads in appropriate positions.  Pacemaker battery   box and skin clips overlie the left axilla.  Heart is enlarged and there is   pulmonary vascular congestion with bilateral perihilar opacities consistent   with pulmonary edema.  Moderate bilateral pleural effusions are noted.  No   extrapleural air is seen.  No midline shift is noted.  Osseous structures are   stable.           Impression   Continued cardiomegaly, opacities most compatible with pulmonary edema and   bilateral pleural effusions. 4-1 CXR    EXAMINATION:   SINGLE XRAY VIEW OF THE CHEST       4/1/2019 6:04 am       COMPARISON:   03/31/2019       HISTORY:   ORDERING SYSTEM PROVIDED HISTORY: SOB   TECHNOLOGIST PROVIDED HISTORY:   SOB       FINDINGS:   There is a left-sided transvenous pacer in place and a right internal jugular   dialysis catheter.  There is cardiomegaly with pulmonary vascular congestion   and edema.  There are bilateral pleural effusions which are increased.  The   pulmonary edema appears worse on the current exam.           Impression   Worsening edema and increased bilateral pleural effusions         4-1 AXR  EXAMINATION:   SINGLE SUPINE XRAY VIEW(S) OF THE ABDOMEN       4/1/2019 2:40 pm       COMPARISON:   None.       HISTORY:   ORDERING SYSTEM PROVIDED HISTORY: r/o ileus   TECHNOLOGIST PROVIDED HISTORY:   r/o ileus       Initial exam       FINDINGS:   Nonspecific bowel gas pattern with air-filled small and large bowel.  No   organomegaly noted.  No pathologic calcifications.  Degenerative changes of   the lower lumbar spine.           Impression   Nonspecific bowel gas pattern without evidence of small bowel obstruction.          3/29 CXR      Narrative   EXAMINATION:   SINGLE XRAY VIEW OF THE CHEST       3/29/2019 8:26 am       COMPARISON:   Chest radiograph performed 03/28/2019.       HISTORY:   ORDERING SYSTEM PROVIDED HISTORY: effusions   TECHNOLOGIST PROVIDED HISTORY:   effusions       FINDINGS:   There are large bilateral effusions with adjacent consolidation.  There is   underlying pulmonary congestion.  There is no pneumothorax.  The heart is   enlarged with stable cardiac leads.  The upper abdomen is unremarkable.  The   extrathoracic soft tissues are unremarkable.           Impression

## 2019-04-17 NOTE — PROGRESS NOTES
Temporary dialysis catheter removed by IR due to concern for infection from leukocytosis. Left groin soft, non-pulsatile. Plan for tunneled dialysis catheter to be placed tomorrow. Discussed with ID, ok to place catheter. NPO after midnight.     Electronically signed by Maulik Evans MD on 4/17/2019 at 5:14 PM

## 2019-04-17 NOTE — CARE COORDINATION
Advised by Jadyn Rg, that Dr Aisha Cruz prefers pt to have OP dialysis at [facility] under Dr Samaria Park with Dr Kaylen Tavarez - he does not follow at  [facility], but his partners do  [place]  [facility]  [place] Kaylen Tavarez will f/u with Dr Aisha Cruz    Ref made to [facility] Admissions

## 2019-04-18 NOTE — PLAN OF CARE
Problem: Falls - Risk of:  Goal: Will remain free from falls  Description  Will remain free from falls  Outcome: Met This Shift  Goal: Absence of physical injury  Description  Absence of physical injury  Outcome: Met This Shift     Problem: Pain:  Goal: Pain level will decrease  Description  Pain level will decrease  Outcome: Met This Shift     Problem: Risk for Impaired Skin Integrity  Goal: Tissue integrity - skin and mucous membranes  Description  Structural intactness and normal physiological function of skin and  mucous membranes.   Outcome: Met This Shift

## 2019-04-18 NOTE — CARE COORDINATION
Plan for permacath today. ODILIA working on OP HD placement.   Jarrell Spann to do onsite today at [employer]

## 2019-04-18 NOTE — CARE COORDINATION
Met with Dr Maricel Patricia to further discuss OP dialysis arrangements  He confirmed he would like pt to have OP dialysis at [facility] him ref was made to [facility] Admissions yesterday and will f[facility] with [facility] Admissions and spoke with [facility]  She will f/u with [place] and contact writer once chair time is obtained

## 2019-04-18 NOTE — PROGRESS NOTES
04/18/2019    K 3.5 04/18/2019    CL 95 04/18/2019    CO2 24 04/18/2019    BUN 46 04/18/2019    LABALBU 3.6 03/29/2019    CREATININE 3.52 04/18/2019    CALCIUM 8.7 04/18/2019    GFRAA 20 04/18/2019    LABGLOM 16 04/18/2019    GLUCOSE 98 04/18/2019     US Left Groin:  4/14/2019 Results: confirmed 4cm left femoral artery pseudoaneurysm. 4/15/2019 Results: persistent left femoral pseudoaneurysm    ASSESSMENT   1. 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 HUYEN - cardiorenal syndrome - HD since 3/29/2019  3. ESRD  4. Pleural effusions s/p thoracentesis  5. Appearance of left flank ecchymosis unchanged from yesterday. No pain to palpation. PLAN  1. Tunnel dialysis cath placement today, rt side  2. NPO for now  3. Ambulation as tolerated  4. Repeat arterial duplex in 5 days. 5. Cont recs and management per primary     Electronically signed by Ventura Orona  on 4/18/2019 at 6:01 AM     Patient seen and examined at bedside. Changes made to above note as needed including assessment and plan. Pt denies any f/c, n/v, sob, or chest pain.  NPO for dialysis cath placement today     Electronically signed by Chung Rodriguez DO on 4/18/2019 at 8:12 AM

## 2019-04-18 NOTE — PROGRESS NOTES
Transfers  Sit to Stand: Contact guard assistance(Some difficulty but pt able to complete w/o assist, vc's for proper hand placement)  Stand to sit: Contact guard assistance  Stand Pivot Transfers: Contact guard assistance  Ambulation  Ambulation?: Yes  Ambulation 1  Surface: level tile  Device: Rolling Walker  Other Apparatus: O2(2L)  Assistance: Contact guard assistance  Quality of Gait: Decreased renato, short step length, flexed posture, narrow MARK  Distance: 32ft, 10ft x 2 to/from restroom  Comments: SOB after gait completed, gait distance limited this date secondary to fatigue and decreased endurance  Stairs/Curb  Stairs?: No     Balance  Posture: Fair  Sitting - Static: Good  Sitting - Dynamic: Good  Standing - Static: Good;-  Standing - Dynamic: Good;-  Comments: Static standing with BUE support on RW for hygiene care SBA  Other exercises  Seated LE exercise program: Long Arc Quads, hip abduction/adduction, heel/toe raises, and marches. Reps: x 10  Upper extremity exercises: Bicep curl, shoulder flexion/extension, punches, tricep curl, shoulder abduction/adduction. Reps:  x 10 w/in pacemaker precautions       Assessment   Body structures, Functions, Activity limitations: Decreased functional mobility ; Decreased balance;Decreased endurance  Assessment: Pt amb 32ft w/RW CGA on 2L O2, SOB noted after gait completed; Frequent rest breaks required during exercises; pt grossly limited by fatigue and decreased endurance  Prognosis: Good  Patient Education: Proper breathing technique during ambulation; pt w/good return demo. REQUIRES PT FOLLOW UP: Yes  Activity Tolerance  Activity Tolerance: Patient limited by endurance; Patient limited by fatigue           Goals  Short term goals  Time Frame for Short term goals: 25 visits  Short term goal 1: Perform bed mobility and functional transfers independently  Short term goal 2: Ambulate 300ft with least restrictive AD independently  Short term goal 3: Demo Good- dynamic standing balance to decrease risk of falls  Short term goal 4: Participate in 30 minutes of therapy to demo increased endurance    Plan    Plan  Times per week: 6-7x/wk  Current Treatment Recommendations: Strengthening, Balance Training, Functional Mobility Training, Endurance Training, Transfer Training, Patient/Caregiver Education & Training, Safety Education & Training, Home Exercise Program, Gait Training  Safety Devices  Type of devices:  All fall risk precautions in place, Gait belt, Call light within reach, Nurse notified, Left in chair  Restraints  Initially in place: No     Therapy Time   Individual Concurrent Group Co-treatment   Time In 0912         Time Out 0942         Minutes [address] [facility]

## 2019-04-18 NOTE — PROGRESS NOTES
[facility] - Progress Note  Today's Date and Time: 4/18/2019, 7:33 AM    Impression :   1. Recurrent bilateral pleural effusions w/lung compression  2. CHF  3. 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 Multivessel CAD s/p multiple stent placements  5. S/P TAVR  6. HUYEN on HD  7. Urinary retention  8. Pulmonary hypertension  9. S/P Upgrade of dual chamber PPM to CRT-P on 4-2-19  10. Anemia s/p transfusion 4/3  11. Right groin pseudoaneurysm s/p thrombin injection     Recommendations:     · Wound care Lt chest incision  · HD as per nephrology  · Monitor off antibiotics  · Tunneled catheter to be placed today, 4/18/19  Medical Decision Making/Summary/Discussion:   · 81 yo gentleman who developed complete heart block. Found to have multivessel CAD, severe aortic stenosis and pulmonary HTN. · Underwent pacemaker placement with subsequent bouts of acute CHF and multiple hospitalizations in [place]  · Brought to [place] by family for further care. · Since admission at [facility] he has had cardiac catheterizations x 2 with multiple stent placements and a TAVR on 3-15. Pt remains on a nitroglycerine drip  · Pt continues to require frequent thoracenteses  · He has suffered an HUYEN and is currently on a bumex drip  · CXR 3/26 showed progressed moderate to large bilateral pleural effusions with consolidation. · ID consult placed for treatment of any potential pneumonia  · Pt is afebrile, without cough or sputum production. He remains SOB with exertion and at times, when quiet. · Currently no evidence of active infection. Pt will require aggressive pulmonary toilet and diuresis. Plan to monitor off antibiotics  · Patient to continue HD  · He had upgraded pacemaker placed on 4-2-19. · Developed hematoma at pacer site, resolved with pressure. Elequis held 4-3. Hgb to 6.5 received 1u PRBC. Site ecchymotic but no signs of infection.   · Patient is continuing hemodialysis  · Will likely continue hemodialysis as an outpatient    · Right groin pseudoaneurysm s/p thrombin injection      Infection Control Recommendations   · [place] Precautions    Antimicrobial Stewardship Recommendations     · Monitor off antibiotics    Coordination of Outpatient Care:   · Estimated Length of IV antimicrobials: None  · Patient will need Midline Catheter Insertion:   · Patient will need PICC line Insertion:  · Patient will need: Home IV , [place],  SNF,  LTAC TBD  · Patient will need outpatient wound care: No    Chief complaint/reason for consultation:   · Possible pneumonia      History of Present Illness:   Tika Godwin is a 80y.o.-year-old  male who was initially admitted on 3/6/2019. Patient seen at the request of Dr. Beto Tse     INITIAL HISTORY:    Pt is a 79 yo gentleman who has recently had multiple hospital admissions in [place] for decompensated heart failure. His symptoms began in December 2018. 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 A cardiac cath at that time showed LM and LAD calcified stenosis 80-90% in multiple areas, with severe disease in D1, D2, Om1 and OM2. Minimal disease in RCA. LV function with EF 25%. Severe aortic stenosis compromising his clinical improvedmnt      He underwent a dual chamber pacemaker placement. Following that, he had multiple hospitalizations for acute heart failure and shortness of breath. His baseline creatinine was 1.3, now >2.0. He has had multiple thoracenteses with transudative pleural fluid removed.      He was brought to [place] for further evaluation and was admitted on 3/6/19     An ECHO from admission showed   Normal left ventricle size with mildly reduced systolic function; Estimated  left ventricular ejection fraction 40-45%  Anterolateral wall hypokinesis. Mild left ventricular hypertrophy. Left atrium is moderately dilated. Grade II diastolic dysfunction.   Heavily calcified aortic valve with reduced systolic excursion and evidence  of moderate stenosis. (Peak nataliia 3.62 m/s and mean gradient 29 mmHg)  Moderate posterior pericardial effusion without any echo signs of tamponade. Normal right ventricular size and function. Pacemaker lead seen in right ventricle. Moderate tricuspid regurgitation. Estimated right ventricular systolic pressure is 60 mmHg suggesting moderate  pulmonary HTN.    He underwent an atherectomy/JEAN-CLAUDE of the left main and LAD on 3/6. Because of the high surgical risk and renal function he was taken back to the cath lab on 3/11 for PTCA-JEAN-CLAUDE LAD, PTCRA-JEAN-CLAUDE LM with plan to consider a TAVR if pt remained stable.     A TAVR was performed on 3/15     A carotid study showed less than 50% stenosis bilaterally.     He has had multiple thoracenteses since admission. Pt has been followed by CT surgery, cardiology, pulmonary, nephrology and urology.     On the evening of 3/25 pt developed hypertension and SOB at rest. A stat CXR was done that showed progressed moderate to large bilateral pleural effusions with consolidation.      On 3/26 pt underwent a Lt thoracentesis with 1400 ml clear pleural fluid removed. Pt reports feeling much better after the procedure and is asking to sit in the chair.      I am consulted to determine if Mr Pastor Nguyen has pneumonia. Pt had continued decline in renal function, HD initiated 3-29  PPV pacemaker placed 4-2, pt with hematoma at site, resolved with pressure. Anticoagulation held 4-3. Hgb to 6.5 received 1u PRBC     CURRENT EXAMINATION: 4/18/2019    Pt seen and examined in room. Says he is feeling better today compared to yesterday. No fevers, chills, or rigors. Last HD 4-17-19 . Problems with hypotension during HD  Plan is for tunneled catheter placement on Thursday 4-18-19. Afebrile  VS stable.      He had upgraded pacemaker placed on 4-2-19  Site continues to improve.     On exam pt is in no distress  Breath sounds are diminished bilateral bases   CXR 4-17-19 shows decrease in pulmonary edema and bilateral pleural effusions. Repeat blood cultures on 4/17 taken with NGTD. Cultures of catheter tip taken on 4/17. Temporary dialysis catheter removed yesterday. 4/17 CXR showed decreased congestion but increased effusions. Lt groin pseudoaneurysm present, stable. Left lower extremity duplex performed 4-15-19 showed persistent pseudoaneurysm.       Labs reviewed: 4/18/2019    WBC 7.7->11.4->10.5->8.6 -> 9.6 -> 11.8 -> 12.6 -> 10.5 -> 15.8 -> 10.4 -> 16.2 -> 13.6  Hgb 8.8->6.5->8.3->8.9->7.4 -> 7.2 -> 7.6 -> 7.0 -> 7.2-->7.0 -> 8.6 -> 7.9  Plt 57->90->92 -> 73-->105 -> 137 -> 120 -> 137 -> 139    Cr 2.74->2.61->2.95->3.29 -> 2.57 -> 2.41-->3.8 -> 3.52  BUN 44->38->47->54 -> 24 -> 22 -> 46    Na 134 -> 133    Cultures:  Pleural fluid:  · 3/7 No growth, many neutrophils seen, no bacteria, and no growth    Discussed with Pt, HD RN, Vascular. I have personally reviewed the past medical history, past surgical history, medications, social history, and family history, and I have updated the database accordingly.   Past Medical History:     Past Medical History:   Diagnosis Date    Aortic stenosis 02/15/2019    Atrial fibrillation (HCC)     CAD (coronary artery disease)     Chest pain 02/15/2019    CHF (congestive heart failure) (HCC)     Chronic anemia     Chronic kidney disease     Hypertension     Pleural effusion on left 02/15/2019    Pleural effusion, right 02/15/2019    Pulmonary emboli ([address] 02/15/2019    Pulmonary hypertension ([address] 03/06/2019    Thyroid disease     hypothyroidism       Past Surgical  History:     Past Surgical History:   Procedure Laterality Date    CORONARY ANGIOPLASTY WITH STENT PLACEMENT  03/11/2019    complex PCI of LT MAIN, LAD    JOINT REPLACEMENT      right ankle    OTHER SURGICAL HISTORY  04/02/2019    UPGRADE TO BI-V PACEMAKER    OTHER SURGICAL HISTORY  03/15/2019    TAVR PROCEDURE    PACEMAKER PLACEMENT  01/20/2019    THORACENTESIS Medications:      megestrol  200 mg Oral Daily    hydrALAZINE  50 mg Oral 3 times per day    iron sucrose  100 mg Intravenous Q48H    [START ON 2019] darbepoetin marya-polysorbate  100 mcg Intravenous Weekly    bumetanide  4 mg Oral BID    simethicone  80 mg Oral Q6H    docusate sodium  100 mg Oral BID    lidocaine-EPINEPHrine  20 mL Intradermal Once    lidocaine PF  1 mL Intradermal Once    bacitracin in 0.9 % sodium chloride irrigation  50,000 Units Topical Once    sodium chloride flush  10 mL Intravenous 2 times per day    aspirin  81 mg Oral Daily    spironolactone  25 mg Oral Daily    metoprolol succinate  50 mg Oral Daily    FLUoxetine  10 mg Oral Daily    levothyroxine  125 mcg Oral Daily    ALPRAZolam  0.25 mg Oral Once    QUEtiapine  25 mg Oral Nightly    clopidogrel  75 mg Oral Daily    isosorbide mononitrate  30 mg Oral Daily    [Held by provider] docusate sodium  100 mg Oral Daily    amiodarone  200 mg Oral Daily    atorvastatin  20 mg Oral Daily       Social History:     Social History     Socioeconomic History    Marital status:       Spouse name: Not on file    Number of children: Not on file    Years of education: Not on file    Highest education level: Not on file   Occupational History     Employer: RETIRED   Social Needs    Financial resource strain: Not on file    Food insecurity:     Worry: Not on file     Inability: Not on file    Transportation needs:     Medical: Not on file     Non-medical: Not on file   Tobacco Use    Smoking status: Former Smoker     Last attempt to quit:      Years since quittin.3    Smokeless tobacco: Never Used   Substance and Sexual Activity    Alcohol use: Not Currently    Drug use: Never    Sexual activity: Not on file   Lifestyle    Physical activity:     Days per week: Not on file     Minutes per session: Not on file    Stress: Not on file   Relationships    Social connections:     Talks on phone: Not on file     Gets together: Not on file     Attends Sikhism service: Not on file     Active member of club or organization: Not on file     Attends meetings of clubs or organizations: Not on file     Relationship status: Not on file    Intimate partner violence:     Fear of current or ex partner: Not on file     Emotionally abused: Not on file     Physically abused: Not on file     Forced sexual activity: Not on file   Other Topics Concern    Not on file   Social History Narrative    Not on file       Family History:   History reviewed. No pertinent family history. Allergies:   Quinolones     Review of Systems:   AA, reports continued tenderness at pacemaker site  No chills or fevers, no SOB  Seen on HD    Physical Examination :     Patient Vitals for the past 8 hrs:   BP Temp Temp src Pulse SpO2 Weight   04/18/19 0708 (!) 143/66 98 °F (36.7 °C) Oral 65 96 % --   04/18/19 0400 -- -- -- -- -- 135 lb 2.3 oz (61.3 kg)   04/17/19 2344 (!) 136/93 -- -- 62 -- --     General Appearance: Seen in HD, easily roused, no distress  Head:  Normocephalic, no trauma  Eyes: Pupils equal, round, reactive to light and accommodation; extraocular movements intact; sclera anicteric. ENT: Oropharynx clear. Mouth/throat: mucosa pink and moist. No lesions. Dentition in good repair. Neck: Supple, without lymphadenopathy. Pulmonary/Chest: Clear to auscultation, without wheezes, rales, or rhonchi. Decreased breath sounds at bilateral bases   Cardiovascular: Regular rate and rhythm without murmurs, rubs, or gallops. Paced  Abdomen: Soft. Bowel sounds normal.  : Johnston in place  All four Extremities: Mild edema. Neurologic: No gross sensory or motor deficits. Skin: Warm and dry with good turgor. No signs of peripheral arterial or venous insufficiency. No ulcerations. No open wounds.  Lt chest pacemaker incision with hematoma, staples intact, no oozing or bleeding noted    Medical Decision Making -Laboratory:   I have independently reviewed/ordered the following labs:    CBC with Differential:   Recent Labs     19  0444 19  0408   WBC 16.2* 13.6*   HGB 8.6* 7.9*   HCT 28.3* 25.4*    See Reflexed IPF Result   LYMPHOPCT 4* 5*   MONOPCT 6 4     BMP:   Recent Labs     19  0444 19  0408   * 133*   K 3.8 3.5*   CL 96* 95*   CO2 22 24   BUN 74* 46*   CREATININE 5.42* 3.52*     Hepatic Function Panel:   No results for input(s): PROT, LABALBU, BILIDIR, IBILI, BILITOT, ALKPHOS, ALT, AST in the last 72 hours. No results for input(s): RPR in the last 72 hours. No results for input(s): HIV in the last 72 hours. No results for input(s): BC in the last 72 hours.   Lab Results   Component Value Date    MUCUS NOT REPORTED 2019    RBC 2.66 2019    TRICHOMONAS NOT REPORTED 2019    WBC 13.6 2019    YEAST NOT REPORTED 2019    TURBIDITY CLEAR 2019     Lab Results   Component Value Date    CREATININE 3.52 2019    GLUCOSE 98 2019       Medical Decision Making-Imagin-5 CXR    EXAMINATION:   SINGLE XRAY VIEW OF THE CHEST       2019 5:39 am       COMPARISON:   2019       HISTORY:   ORDERING SYSTEM PROVIDED HISTORY: pleural effusions   TECHNOLOGIST PROVIDED HISTORY:   pleural effusions       FINDINGS:   AP portable view of the chest time stamped at 519 hours demonstrates   overlying cardiac monitoring electrodes.  Right internal jugular catheter   terminates in the distal superior vena cava.  Multiple polar pacemaker enters   from the left with intact leads in appropriate positions.  Pacemaker battery   box and skin clips overlie the left axilla.  Heart is enlarged and there is   pulmonary vascular congestion with bilateral perihilar opacities consistent   with pulmonary edema.  Moderate bilateral pleural effusions are noted.  No   extrapleural air is seen.  No midline shift is noted.  Osseous structures are   stable.           Impression   Continued cardiomegaly, opacities most compatible with pulmonary edema and   bilateral pleural effusions. 4-1 CXR    EXAMINATION:   SINGLE XRAY VIEW OF THE CHEST       4/1/2019 6:04 am       COMPARISON:   03/31/2019       HISTORY:   ORDERING SYSTEM PROVIDED HISTORY: SOB   TECHNOLOGIST PROVIDED HISTORY:   SOB       FINDINGS:   There is a left-sided transvenous pacer in place and a right internal jugular   dialysis catheter.  There is cardiomegaly with pulmonary vascular congestion   and edema.  There are bilateral pleural effusions which are increased.  The   pulmonary edema appears worse on the current exam.           Impression   Worsening edema and increased bilateral pleural effusions         4-1 AXR  EXAMINATION:   SINGLE SUPINE XRAY VIEW(S) OF THE ABDOMEN       4/1/2019 2:40 pm       COMPARISON:   None.       HISTORY:   ORDERING SYSTEM PROVIDED HISTORY: r/o ileus   TECHNOLOGIST PROVIDED HISTORY:   r/o ileus       Initial exam       FINDINGS:   Nonspecific bowel gas pattern with air-filled small and large bowel.  No   organomegaly noted.  No pathologic calcifications.  Degenerative changes of   the lower lumbar spine.           Impression   Nonspecific bowel gas pattern without evidence of small bowel obstruction.          3/29 CXR      Narrative   EXAMINATION:   SINGLE XRAY VIEW OF THE CHEST       3/29/2019 8:26 am       COMPARISON:   Chest radiograph performed 03/28/2019.       HISTORY:   ORDERING SYSTEM PROVIDED HISTORY: effusions   TECHNOLOGIST PROVIDED HISTORY:   effusions       FINDINGS:   There are large bilateral effusions with adjacent consolidation.  There is   underlying pulmonary congestion.  There is no pneumothorax.  The heart is   enlarged with stable cardiac leads.  The upper abdomen is unremarkable.  The   extrathoracic soft tissues are unremarkable.           Impression   Cardiomegaly with large bilateral effusions and adjacent consolidation   concerning for pneumonia.               3/27 CXR    Narrative EXAMINATION:   TWO VIEWS OF THE CHEST       3/28/2019 7:56 am       COMPARISON:   Chest radiograph performed 03/27/2019.       HISTORY:   ORDERING SYSTEM PROVIDED HISTORY: effusions   TECHNOLOGIST PROVIDED HISTORY:   effusions       FINDINGS:   There is a small left and a large right pleural effusion with adjacent   infiltrate.  There is no pneumothorax.  The mediastinal structures are stable   with stable cardiac leads.  The upper abdomen is unremarkable.  The   extrathoracic soft tissues are unremarkable.           Impression   Small left and large right pleural effusion with adjacent infiltrate   representing atelectasis versus pneumonia.  Overall there may be mild   improvement of the right-sided effusion. Narrative   EXAMINATION:   SINGLE XRAY VIEW OF THE CHEST       3/26/2019 2:37 am       COMPARISON:   March 20, 2019.       HISTORY:   ORDERING SYSTEM PROVIDED HISTORY: SOB, PE   TECHNOLOGIST PROVIDED HISTORY:   SOB, PE       FINDINGS:   Frontal portable view of the chest.  Low lung volume.  Progressed moderate to   large bilateral pleural effusions with consolidation.  Indistinct pulmonary   vasculature.  No pneumothorax.  The cardiomediastinal silhouette is not well   evaluated.  Atherosclerotic thoracic aorta.  Left pectoral trans venous   dual-chamber cardiac pacer device.           Impression   Progressed moderate to large bilateral pleural effusions with consolidation. Superimposed infection is not excluded.         EXAMINATION:   TWO VIEWS OF THE CHEST       4/17/2019 2:17 pm       COMPARISON:   10 April 2019       HISTORY:   ORDERING SYSTEM PROVIDED HISTORY: CHF, pulmonary edema, ESRD, leukocytosis   TECHNOLOGIST PROVIDED HISTORY:   CHF, pulmonary edema, ESRD, leukocytosis       FINDINGS:   AP portable view of the chest time stamped at 1353 hours demonstrates a   multiple polar pacemaker entering from the left with intact leads in   appropriate positions.  Stable cardiomegaly is noted.  Moderate left effusion   and mild right effusion are noted.  Vascular congestion is noted, decreased   over prior study.  Basilar opacities are noted likely related atelectasis   and/or edema.  No extrapleural air is noted.  Intravascular stent is present   distal thoracic aorta.           Impression   Decreased vascular congestion and basilar opacities compared to prior study. Persistent effusions, mild vascular congestion and bibasilar opacities   favoring atelectasis and mild edema.             Medical Decision Making-Other: Thank you for allowing us to participate in the care of this patient. Please call with questions. Caroline Segal     ATTESTATION:    I have discussed the case, including pertinent history and exam findings with the residents. I have seen and examined the patient and the key elements of the encounter have been performed by me. I have reviewed the laboratory data, other diagnostic studies and discussed them with the residents. I have updated the medical record where necessary. I agree with the assessment, plan and orders as documented by the resident.     Nevin Richter MD.

## 2019-04-18 NOTE — DISCHARGE INSTR - COC
HISTORY  03/15/2019    TAVR PROCEDURE    PACEMAKER PLACEMENT  01/20/2019    THORACENTESIS         Immunization History: There is no immunization history on file for this patient. Active Problems:  Patient Active Problem List   Diagnosis Code    Pulmonary hypertension ([facility]) I27.20    Acute on chronic systolic congestive heart failure ([facility]) I50.23    Pleural effusion due to CHF (congestive heart failure) ([facility]) I50.9    Pulmonary 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 heart disease ([facility]) I27.22    Atelectasis, bilateral J98.11    HUYEN (acute kidney injury) ([facility]) N17.9    Azotemia R79.89    Aortic valve stenosis I35.0    CAD in native artery I25.10    Thrombocytopenia ([facility]) D69.6    Pseudoaneurysm of femoral artery following procedure ([facility] [address] T81.718A, I72.4    ESRD on hemodialysis ([facility]) N18.6, Z99.2    Severe malnutrition ([facility]) E43       Isolation/Infection:   Isolation          No Isolation            Nurse Assessment:  Last Vital Signs: BP (!) 159/87   Pulse 64   Temp 98.9 °F (37.2 °C) (Oral)   Resp 15   Ht 5' 6\" (1.676 m)   Wt 130 lb 11.7 oz (59.3 kg)   SpO2 95%   BMI 21.10 kg/m²     Last documented pain score (0-10 scale): Pain Level: 0  Last Weight:   Wt Readings from Last 1 Encounters:   04/22/19 130 lb 11.7 oz (59.3 kg)     Mental Status:  oriented and alert    IV Access:  - Dialysis Catheter  - site  right and subclavian, insertion date: 4/18/2019    Nursing Mobility/ADLs:  Walking   Assisted  Transfer  Assisted  Bathing  Assisted  Dressing  Assisted  Toileting  Assisted  Feeding  Independent  Med Admin  Assisted  Med Delivery   whole    Wound Care Documentation and Therapy:  Wound 04/11/19 Other (Comment) Lateral;Left;Lower Hematoma  (Active)   Wound Other 4/21/2019  8:45 PM   Dressing Status Other (Comment) 4/22/2019  8:00 PM   Wound Assessment Clean;Dry; Intact 4/22/2019  8:00 PM   Drainage Amount None 4/22/2019  8:00 PM   Odor None 4/22/2019  8:00 PM   Number of days: 11 Elimination:  Continence:   · Bowel: Yes  · Bladder: Yes  Urinary Catheter: None   Colostomy/Ileostomy/Ileal Conduit: No       Date of Last BM: 4/22/2019  No intake or output data in the 24 hours ending 04/23/19 0828  No intake/output data recorded. Safety Concerns: At Risk for Falls    Impairments/Disabilities:      None    Nutrition Therapy:  Current Nutrition Therapy:   - Oral Diet:  General    Routes of Feeding: Oral  Liquids: Thin Liquids  Daily Fluid Restriction: no  Last Modified Barium Swallow with Video (Video Swallowing Test): not done    Treatments at the Time of Hospital Discharge:   Respiratory Treatments: ***  Oxygen Therapy:  {Therapy; copd oxygen:06531}  Ventilator:    - No ventilator support    Rehab Therapies: Physical Therapy and Occupational Therapy  Weight Bearing Status/Restrictions: No weight bearing restirctions  Other Medical Equipment (for information only, NOT a DME order):  walker, bath bench and hospital bed  Other Treatments: patient not to lift affected arm (side of pacemaker) above level of shoulder until cleared by cardiology    Patient's personal belongings (please select all that are sent with patient):  None - sent with family  RN SIGNATURE:  Electronically signed by Joy Boyer RN on 4/23/19 at 8:31 AM    CASE MANAGEMENT/SOCIAL WORK SECTION    Inpatient Status Date: 3/6/19  Readmission Risk Assessment Score:  Readmission Risk              Risk of Unplanned Readmission:        38           Discharging to Facility/ Agency   · Name: [facility]  Address:  [address] [place] [address]        Phone: [phone]       Fax: [phone]        ·   · Phone:  · Fax:    Dialysis Facility (if applicable)          Name: [facility]   · Address:  · Dialysis Schedule:MWF @ 12:30p  · Phone:  · Fax:    / signature: Electronically signed by Quin Elizondo RN on 4/22/19 at 10:33 AM    PHYSICIAN SECTION    Prognosis: Good    Condition at Discharge: Stable    Rehab Potential (if transferring to Rehab): Good    Recommended Labs or Other Treatments After Discharge:     Physician Certification: I certify the above information and transfer of Amanda Kuo  is necessary for the continuing treatment of the diagnosis listed and that he requires [facility] for less 30 days.      Update Admission H&P: No change in H&P    PHYSICIAN SIGNATURE:  Electronically signed by Clyde Diaz MD on 4/23/19 at 8:35 AM

## 2019-04-18 NOTE — PROGRESS NOTES
Renal Progress Note    Patient :  Katerin Arellano; 80 y.o. MRN# [id number]  Location:  [facility]/1247-  Attending:  Dominique Summers MD  Admit Date:  3/6/2019   Hospital Day: 37    Subjective   Admitted with shortness of breath secondary to decompensated congestive heart failure in the face of aortic stenosis. Subsequently underwent PCI and PVR. Postprocedure course has been complicated by worsening of renal function and hypervolemia for which he is getting recurrent thoracocentesis and diuretic adjustment. Hemodialysis also initiated. Schedule is MWF. Had hemodialysis yesterday. Hypotensive episodes noted. Prohibiting extra ultrafiltration. 1.5 KG taken off. Infectious diseases was consulted in view of elevated white cell count. Cleared for getting a tunnel catheter placement to be done today. Continues to be lethargic.   Blood pressure on the low normal sides recorded    Objective     VS: BP (!) 143/66   Pulse 65   Temp 98 °F (36.7 °C) (Oral)   Resp 16   Ht 5' 6\" (1.676 m)   Wt 135 lb 2.3 oz (61.3 kg)   SpO2 96%   BMI 21.81 kg/m²   MAXIMUM TEMPERATURE OVER 24HRS:  Temp (24hrs), Av.7 °F (36.5 °C), Min:97.1 °F (36.2 °C), Max:98 °F (36.7 °C)    24HR BLOOD PRESSURE RANGE:  Systolic (07BSQ), IXK:359 , Min:84 , NPN:852   ; Diastolic (31YMT), OQS:78, Min:29, Max:93    24HR INTAKE/OUTPUT:      Intake/Output Summary (Last 24 hours) at 2019 0920  Last data filed at 2019 1334  Gross per 24 hour   Intake 800 ml   Output 1680 ml   Net -880 ml     WEIGHT :  Patient Vitals for the past 96 hrs (Last 3 readings):   Weight   19 0400 135 lb 2.3 oz (61.3 kg)   19 1334 133 lb 13.1 oz (60.7 kg)   19 1016 136 lb 7.4 oz (61.9 kg)       Current Medications:     Scheduled Meds:    megestrol  200 mg Oral Daily    iron sucrose  100 mg Intravenous Q48H    [START ON 2019] darbepoetin marya-polysorbate  100 mcg Intravenous Weekly    simethicone  80 mg Oral Q6H    docusate sodium  100 mg 03/29/2019    SPECGRAV 1.011 03/29/2019    LEUKOCYTESUR LARGE 03/29/2019    UROBILINOGEN Normal 03/29/2019    BILIRUBINUR NEGATIVE 03/29/2019    GLUCOSEU NEGATIVE 03/29/2019    [address] NEGATIVE 03/29/2019    AMORPHOUS NOT REPORTED 03/29/2019       Radiology:     CXR:   No recent  Assessment:       1. ESRD - new start - MWF   HUYEN secondary to hypoperfusion related to cardiorenal syndrome. Started on HD this admission since 29th of March. Chance of renal recovery negligible  2. CKD IV with baseline creatinine 2, now appears to be ESRD  3. Nephrotic range proteinuria Bx not feasible in view of he being on Asprin and cardiology recommends not to stop anticoagulant. Explained to patient and he does not want to do that. 4. Recurrent pleural effusions s/p thoracentesis  5. 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 S/P PCI and multiple stent placement 3/11/19  7. HTN  8. 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 S/P Upgrade of dual chamber PPM to CRT-P on 4-2-19  10. Hx if urinary retention  11. Anemia    Plan:     1. Discontinuing Bumex and hydralazine. 2.  Tunnel catheter placement today. ID input noted. 3.  Hemodialysis tomorrow  4. Arranging for outpatient  dialysis placement  5.   Hopefully discharge once # 2 AND # 4 achieved      Andressa Pelayo MD, MRCP José Miguel Bajwa, FACP   4/18/2019 9:20 AM    Nephrology [address] [place] [address]

## 2019-04-18 NOTE — PROGRESS NOTES
PULMONARY PROGRESS NOTE      Patient:  Jessica Elam  YOB: 1928    MRN: [id number]     Acct: [de-identified]     Admit date: 3/6/2019    REASON FOR CONSULT:- Pulmonary hypertension, bilateral pleural effusion with atelectasis and aortic stenosis with hypothyroidism    Pt seen and Chart reviewed. Patient was on nasal cannula oxygen of 2 L/m at time of my visits. He is up in a chair. Denied any shortness of breath. Denied orthopnea. No chest pain or pressure. No fevers or chills or night sweats. Subjective:   Review of Systems -   General ROS: Completed and except as mentioned above were negative   Psychological ROS:  Completed and except as mentioned above were negative  Allergy and Immunology ROS:  Completed and except as mentioned above were negative  Hematological and Lymphatic ROS:  Completed and except as mentioned above were negative  Respiratory ROS:  Completed and except as mentioned above were negative  Cardiovascular ROS:  Completed and except as mentioned above were negative  Gastrointestinal ROS: Completed and except as mentioned above were negative  Genito-Urinary ROS:  Completed and except as mentioned above were negative  Musculoskeletal ROS:  Completed and except as mentioned above were negative  Neurological ROS:  Completed and except as mentioned above were negative  Dermatological ROS:  Completed and except as mentioned above were negative      Diet:  DIET CARDIAC; Low Sodium (2 GM);  Daily Fluid Restriction: 1800 ml  Dietary Nutrition Supplements:  Dietary Nutrition Supplements: Frozen Oral Supplement  Dietary Nutrition Supplements: Renal Oral Supplement  Diet NPO, After Midnight      Medications:Current Inpatient    Scheduled Meds:   megestrol  200 mg Oral Daily    hydrALAZINE  50 mg Oral 3 times per day    iron sucrose  100 mg Intravenous Q48H    [START ON 4/19/2019] darbepoetin marya-polysorbate 100 mcg Intravenous Weekly    bumetanide  4 mg Oral BID    simethicone  80 mg Oral Q6H    docusate sodium  100 mg Oral BID    lidocaine-EPINEPHrine  20 mL Intradermal Once    lidocaine PF  1 mL Intradermal Once    bacitracin in 0.9 % sodium chloride irrigation  50,000 Units Topical Once    sodium chloride flush  10 mL Intravenous 2 times per day    aspirin  81 mg Oral Daily    spironolactone  25 mg Oral Daily    metoprolol succinate  50 mg Oral Daily    FLUoxetine  10 mg Oral Daily    levothyroxine  125 mcg Oral Daily    ALPRAZolam  0.25 mg Oral Once    QUEtiapine  25 mg Oral Nightly    clopidogrel  75 mg Oral Daily    isosorbide mononitrate  30 mg Oral Daily    [Held by provider] docusate sodium  100 mg Oral Daily    amiodarone  200 mg Oral Daily    atorvastatin  20 mg Oral Daily     Continuous Infusions:   dextrose 20 mL/hr at 04/06/19 1858     PRN Meds:midodrine, sodium chloride, sodium chloride, bisacodyl, HYDROcodone 5 mg - acetaminophen, sodium chloride flush, acetaminophen, aspirin, sodium chloride, sodium chloride, heparin (porcine), heparin (porcine), hydrALAZINE, ondansetron, magnesium hydroxide    Objective:      Physical Exam:  Vitals: BP (!) 96/29   Pulse 60   Temp 98 °F (36.7 °C) (Oral)   Resp 16   Ht 5' 6\" (1.676 m)   Wt 133 lb 13.1 oz (60.7 kg)   SpO2 95%   BMI 21.60 kg/m²   24 hour intake/output:    Intake/Output Summary (Last 24 hours) at 4/17/2019 2039  Last data filed at 4/17/2019 1334  Gross per 24 hour   Intake 800 ml   Output 1980 ml   Net -1180 ml     Last 3 weights:   Wt Readings from Last 3 Encounters:   04/17/19 133 lb 13.1 oz (60.7 kg)         Physical Examination:   PHYSICAL EXAMINATION:  Vitals:    04/17/19 1317 04/17/19 1334 04/17/19 1527 04/17/19 1900   BP: 121/65 (!) 132/57 98/65 (!) 96/29   Pulse: 74 60     Resp:    16   Temp:   97.1 °F (36.2 °C) 98 °F (36.7 °C)   TempSrc:   Oral Oral   SpO2:   95%    Weight:  133 lb 13.1 oz (60.7 kg)     Height: amiodarone  Patient is on thyroid supplementation. Increase activity as permitted and tolerated. Questions patient had were answered to his satisfaction. Continue supplemental oxygen if needed  Patient was informed of the need for using oxygen. Patient was educated on the importance of compliance and the benefits of oxygen use. Complications of oxygen use, including dryness of the nostrils, epistaxis and also the fire hazard were explained to the patient. Patient willingly accepts to use  the oxygen as recommended. Cxr reviewed. Showed pulmonary vascular congestive changes with bilateral effusions and pulmonary edema with bibasilar atelectasis. Diet reviewed  Thank you for having us involved in the care of your patient. Please call us if you have any questions or concerns.     Osmany Cantu MD      4/17/2019, 8:39 PM    Pulmonary & Critical Care

## 2019-04-19 NOTE — PROGRESS NOTES
Patient in holding sitting on side of bed  RS- US tech at bedside  RACHEL WITT at bedside  Site prepped and draped  Access obtained  Draining murray fluid  Drained 1650ml  Access removed  Site covered with 2x2 and tegaderm  Patient tolerated well  Report called to floor RN  Transported to [facility]

## 2019-04-19 NOTE — PLAN OF CARE
Problem: Falls - Risk of:  Goal: Will remain free from falls  Description  Will remain free from falls  Outcome: Ongoing     Problem: Pain:  Description  Pain management should include both nonpharmacologic and pharmacologic interventions. Goal: Control of acute pain  Description  Control of acute pain  Outcome: Ongoing     Problem: Risk for Impaired Skin Integrity  Goal: Tissue integrity - skin and mucous membranes  Description  Structural intactness and normal physiological function of skin and  mucous membranes. Outcome: Ongoing     Problem: Cardiac Output - Decreased:  Goal: Hemodynamic stability will improve  Description  Hemodynamic stability will improve  Outcome: Ongoing     Problem: Nutrition  Goal: Optimal nutrition therapy  4/19/2019 1118 by Marisela Rm RD, LD  Outcome: Ongoing  Note:   Nutrition Problem: Severe malnutrition  Intervention: Food and/or Nutrient Delivery: Continue current diet, Continue current ONS  Nutritional Goals: Oral intakes to meet at least 50% of estimated nutrition needs.

## 2019-04-19 NOTE — PROGRESS NOTES
04/19/2019    LYMPHSABS 1.03 04/19/2019    EOSABS 0.17 04/19/2019    BASOSABS 0.00 04/19/2019    DIFFTYPE NOT REPORTED 04/19/2019     BMP:    Lab Results   Component Value Date     04/19/2019    K 4.2 04/19/2019    CL 99 04/19/2019    CO2 21 04/19/2019    BUN 61 04/19/2019    LABALBU 3.6 03/29/2019    CREATININE 4.53 04/19/2019    CALCIUM 9.0 04/19/2019    GFRAA 15 04/19/2019    LABGLOM 12 04/19/2019    GLUCOSE 101 04/19/2019     US Left Groin:  4/14/2019 Results: confirmed 4cm left femoral artery pseudoaneurysm. 4/15/2019 Results: persistent left femoral pseudoaneurysm    ASSESSMENT   1. POD#1 Right internal jugular vein access and right side tunneled dialysis catheter placement  2. 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 HUYEN - cardiorenal syndrome - HD since 3/29/2019  4. ESRD  5. Pleural effusions s/p thoracentesis  6. Appearance of left flank ecchymosis slightly improved from yesterday. No pain to palpation. PLAN  1. F/u to check cath and dialysis sites  2. Continue cardiac diet  3. Ambulation as tolerated  4. Repeat arterial duplex in 3 days. 5. Cont recs and management per primary   6. No acute vascular surgery intervention at this time. Contact as needed    Electronically signed by Abigail Belle  on 4/19/2019 at 6:05 AM     Patient seen and examined at bedside. Changes made to above note as needed including assessment and plan. Pt denies any f/c, n/v, sob, or chest pain. Contact vascular surgery as needed. Cath ok to use.     Electronically signed by Aimee Hassan DO on 4/19/2019 at 12:07 PM

## 2019-04-19 NOTE — CARE COORDINATION
Called and left message for Joseph Bedoya at [facility] to check status of ref for [employer]  Await return call    Web[employer] and spoke with [place] to see if they knew the status of the ref  She stated she hasn't had a chance to look at it yet but will review and will call if she needs any additional info

## 2019-04-19 NOTE — PROGRESS NOTES
Wounds  · Current Nutrition Therapies:  · Oral Diet Orders: 2gm Sodium, Cardiac, Fluid Restriction   · Oral Diet intake: 51-75%  · Oral Nutrition Supplement (ONS) Orders: Frozen Oral Supplement, Renal Oral Supplement  · ONS intake: 26-50%  · Anthropometric Measures:  · Ht: 5' 6\" (167.6 cm)   · Current Body Wt: 134 lb (60.8 kg)  · Admission Body Wt: 156 lb (70.8 kg)  · Usual Body Wt: 165 lb (74.8 kg)(per pt)  · % Weight Change:  ,  14.1% wt loss x 44 days  · Ideal Body Wt: 141 lb 15.6 oz (64.4 kg), % Ideal Body 111% (adm/ideal)  · BMI Classification: BMI 25.0 - 29.9 Overweight    Nutrition Interventions:   Continue current diet, Continue current ONS  Continued Inpatient Monitoring, Education Completed    Nutrition Evaluation:   · Evaluation: Goal achieved   · Goals: Oral intakes to meet at least 50% of estimated nutrition needs.     · Monitoring: Nutrition Progression, Meal Intake, Supplement Intake, Diet Tolerance, Skin Integrity, Wound Healing, I&O, Weight, Pertinent Labs, Monitor Hemodynamic Status, Monitor Bowel Function      Electronically signed by Michaela Garcia RD, LD on 4/19/19 at 11:15 AM    Contact Number: [phone]

## 2019-04-19 NOTE — PROGRESS NOTES
Pulmonary Progress Note    CC:  chf   Subjective:  Seen at hd   Mild confusion   D/w Dr Hayder Avila     Immunization     There is no immunization history on file for this patient. Pneumococcal Vaccine     [] Up to date    [x] Indicated   [] Refused  [] Contraindicated       Influenza Vaccine   [] Up to date    [x] Indicated   [] Refused  [] Contraindicated     PAST MEDICAL HISTORY:       Diagnosis Date    Aortic stenosis 02/15/2019    Atrial fibrillation ([address]     CAD (coronary artery disease)     Chest pain 02/15/2019    CHF (congestive heart failure) (HCC)     Chronic anemia     Chronic kidney disease     Hypertension     Pleural effusion on left 02/15/2019    Pleural effusion, right 02/15/2019    Pulmonary emboli ([address] 02/15/2019    Pulmonary hypertension ([address] 03/06/2019    Thyroid disease     hypothyroidism         Family History:   History reviewed. No pertinent family history. SURGICAL HISTORY:   Past Surgical History:   Procedure Laterality Date    CORONARY ANGIOPLASTY WITH STENT PLACEMENT  03/11/2019    complex PCI of LT MAIN, LAD    JOINT REPLACEMENT      right ankle    OTHER SURGICAL HISTORY  04/02/2019    UPGRADE TO BI-V PACEMAKER    OTHER SURGICAL HISTORY  03/15/2019    TAVR PROCEDURE    PACEMAKER PLACEMENT  01/20/2019    THORACENTESIS                TOBACCO:   reports that he quit smoking about 30 years ago. He has never used smokeless tobacco.  ETOH:   reports that he drank alcohol. ALLERGIES:    Allergies   Allergen Reactions    Quinolones      Pt is allergic to fluoroquinolones       Home Meds:   Prior to Admission medications    Medication Sig Start Date End Date Taking?  Authorizing Provider   amiodarone (CORDARONE) 200 MG tablet Take 200 mg by mouth daily   Yes Historical Provider, MD   atorvastatin (LIPITOR) 20 MG tablet Take 20 mg by mouth daily   Yes Historical Provider, MD   isosorbide mononitrate (IMDUR) 60 MG extended release tablet Take 60 mg by mouth daily Yes Historical Provider, MD   levothyroxine (SYNTHROID) 100 MCG tablet Take 100 mcg by mouth Daily   Yes Historical Provider, MD   lisinopril (PRINIVIL;ZESTRIL) 40 MG tablet Take 40 mg by mouth daily   Yes Historical Provider, MD   apixaban (ELIQUIS) 2.5 MG TABS tablet Take 2.5 mg by mouth daily   Yes Historical Provider, MD   metoprolol tartrate (LOPRESSOR) 50 MG tablet Take 50 mg by mouth 2 times daily   Yes Historical Provider, MD         Intake/Output Summary (Last 24 hours) at 4/19/2019 1226  Last data filed at 4/18/2019 1830  Gross per 24 hour   Intake 120 ml   Output 50 ml   Net 70 ml         Diet   Dietary Nutrition Supplements:  Dietary Nutrition Supplements: Frozen Oral Supplement  Dietary Nutrition Supplements: Renal Oral Supplement  DIET CARDIAC; Low Sodium (2 GM);  Daily Fluid Restriction: 1800 ml    Vitals:   BP (!) 130/50   Pulse 59   Temp 97.7 °F (36.5 °C)   Resp 19   Ht 5' 6\" (1.676 m)   Wt 132 lb 15 oz (60.3 kg)   SpO2 96%   BMI 21.46 kg/m²  on         I/O (24 Hours)    Patient Vitals for the past 8 hrs:   BP Temp Temp src Pulse Resp SpO2 Weight   04/19/19 1204 (!) 130/50 -- Axillary 59 -- 96 % --   04/19/19 1202 (!) 132/52 -- -- -- -- -- --   04/19/19 1201 -- -- -- 53 -- 95 % --   04/19/19 1123 (!) 147/66 97.7 °F (36.5 °C) -- 65 -- -- 132 lb 15 oz (60.3 kg)   04/19/19 1114 (!) 132/56 -- -- 61 -- -- --   04/19/19 1045 (!) 134/55 -- -- 60 -- -- --   04/19/19 1015 (!) 111/51 -- -- 60 -- -- --   04/19/19 0945 (!) 114/49 -- -- 60 -- -- --   04/19/19 0915 (!) 75/36 -- -- 60 -- -- --   04/19/19 0845 (!) 78/41 -- -- 60 -- -- --   04/19/19 0815 (!) 141/56 -- -- 60 -- -- --   04/19/19 0808 110/68 97.3 °F (36.3 °C) -- 72 19 -- 134 lb 0.6 oz (60.8 kg)   04/19/19 0750 -- -- -- 60 -- -- --   04/19/19 0700 (!) 156/49 -- -- 66 -- -- --   04/19/19 0658 (!) 156/49 97.9 °F (36.6 °C) Oral 66 22 96 % --   04/19/19 0448 (!) 143/49 -- -- 62 -- -- --       Intake/Output Summary (Last 24 hours) at 4/19/2019 1226  Last data filed at 4/18/2019 1830  Gross per 24 hour   Intake 120 ml   Output 50 ml   Net 70 ml     I/O last 3 completed shifts: In: 130 [P.O.:120; I.V.:10]  Out: 100 [Urine:100]     Patient Vitals for the past 96 hrs (Last 3 readings):   Weight   04/19/19 1123 132 lb 15 oz (60.3 kg)   04/19/19 0808 134 lb 0.6 oz (60.8 kg)   04/18/19 0400 135 lb 2.3 oz (61.3 kg)     Exam   Head and neck atraumatic, normocephalic    Lymph nodes-no cervical, supraclavicular lymphadenopathy    Neck-=JVP elevation    Lungs - dec bases   Dull bases     Clear ant   CVS- S1, S2 regular. No S3 no S4, 1/6 murmurs    Abdomen-nontender, nondistended. Bowel sounds are present. No organomegaly    Lower extremity-+edema better     Upper extremity-no  edema    Neurological-grossly normal cranial nerves.   No overt motor deficit      Medications:    Scheduled Meds:   megestrol  200 mg Oral Daily    iron sucrose  100 mg Intravenous Q48H    darbepoetin marya-polysorbate  100 mcg Intravenous Weekly    simethicone  80 mg Oral Q6H    docusate sodium  100 mg Oral BID    lidocaine-EPINEPHrine  20 mL Intradermal Once    lidocaine PF  1 mL Intradermal Once    bacitracin in 0.9 % sodium chloride irrigation  50,000 Units Topical Once    sodium chloride flush  10 mL Intravenous 2 times per day    aspirin  81 mg Oral Daily    spironolactone  25 mg Oral Daily    metoprolol succinate  50 mg Oral Daily    FLUoxetine  10 mg Oral Daily    levothyroxine  125 mcg Oral Daily    ALPRAZolam  0.25 mg Oral Once    QUEtiapine  25 mg Oral Nightly    clopidogrel  75 mg Oral Daily    isosorbide mononitrate  30 mg Oral Daily    [Held by provider] docusate sodium  100 mg Oral Daily    amiodarone  200 mg Oral Daily    atorvastatin  20 mg Oral Daily       Continuous Infusions:   dextrose 20 mL/hr at 04/06/19 1858       PRN Meds:      Labs:  CBC:   Recent Labs     04/17/19  0444 04/18/19  0408 04/19/19  0413   WBC 16.2* 13.6* 17.2*   HGB 8.6* 7.9* 8.5* HCT 28.3* 25.4* 28.2*   MCV 95.9 95.5 97.2    See Reflexed IPF Result 199     BMP:   Recent Labs     04/17/19  0444 04/18/19  0408 04/19/19  0413   * 133* 137   K 3.8 3.5* 4.2   CL 96* 95* 99   CO2 22 24 21   BUN 74* 46* 61*   CREATININE 5.42* 3.52* 4.53*     LIVER PROFILE:   No results for input(s): AST, ALT, LIPASE, BILIDIR, BILITOT, ALKPHOS in the last 72 hours. Invalid input(s): AMYLASE,  ALB  PT/INR:   Recent Labs     04/19/19  1009   PROTIME 12.0   INR 1.1     APTT:   Recent Labs     04/19/19  1009   APTT 26.6     UA:  No results for input(s): NITRITE, COLORU, PHUR, LABCAST, WBCUA, RBCUA, MUCUS, TRICHOMONAS, YEAST, BACTERIA, CLARITYU, SPECGRAV, LEUKOCYTESUR, UROBILINOGEN, BILIRUBINUR, BLOODU, GLUCOSEU, AMORPHOUS in the last 72 hours. Invalid input(s): KETONESU  No results for input(s): PHART, HDY7WAX, PO2ART in the last 72 hours. 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   Per chart, from imaging studies done in [place]  Anemia of chronic diease     Plan:   hd and then thoracentesis today       Electronically signed by Michael Fairchild MD on 4/19/2019 at 12:26 PM

## 2019-04-19 NOTE — PROGRESS NOTES
[facility] - Progress Note  Today's Date and Time: 4/19/2019, 2:06 PM    Impression :   1. Recurrent bilateral pleural effusions w/lung compression  2. CHF  3. 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 Multivessel CAD s/p multiple stent placements  5. S/P TAVR  6. HUYEN on HD  7. Urinary retention  8. Pulmonary hypertension  9. S/P Upgrade of dual chamber PPM to CRT-P on 4-2-19  10. Anemia s/p transfusion 4/3  11. Right groin pseudoaneurysm s/p thrombin injection   12. S/p thoracentesis 4/19    Recommendations:     · Wound care Lt chest incision  · HD as per nephrology  · Monitor off antibiotics  Medical Decision Making/Summary/Discussion:   · 81 yo gentleman who developed complete heart block. Found to have multivessel CAD, severe aortic stenosis and pulmonary HTN. · Underwent pacemaker placement with subsequent bouts of acute CHF and multiple hospitalizations in [place]  · Brought to [facility] by family for further care. · Since admission at [facility] he has had cardiac catheterizations x 2 with multiple stent placements and a TAVR on 3-15. Pt remains on a nitroglycerine drip  · Pt continues to require frequent thoracenteses  · He has suffered an HUYEN and is currently on a bumex drip  · CXR 3/26 showed progressed moderate to large bilateral pleural effusions with consolidation. · ID consult placed for treatment of any potential pneumonia  · Pt is afebrile, without cough or sputum production. He remains SOB with exertion and at times, when quiet. · Currently no evidence of active infection. Pt will require aggressive pulmonary toilet and diuresis. Plan to monitor off antibiotics  · Patient to continue HD  · He had upgraded pacemaker placed on 4-2-19. · Developed hematoma at pacer site, resolved with pressure. Elequis held 4-3. Hgb to 6.5 received 1u PRBC. Site ecchymotic but no signs of infection.   · Patient is continuing hemodialysis  · Will likely continue hemodialysis as an outpatient    · Right excursion and evidence  of moderate stenosis. (Peak nataliia 3.62 m/s and mean gradient 29 mmHg)  Moderate posterior pericardial effusion without any echo signs of tamponade. Normal right ventricular size and function. Pacemaker lead seen in right ventricle. Moderate tricuspid regurgitation. Estimated right ventricular systolic pressure is 60 mmHg suggesting moderate  pulmonary HTN.    He underwent an atherectomy/JEAN-CLAUDE of the left main and LAD on 3/6. Because of the high surgical risk and renal function he was taken back to the cath lab on 3/11 for PTCA-JEAN-CLAUDE LAD, PTCRA-JEAN-CLAUDE LM with plan to consider a TAVR if pt remained stable.     A TAVR was performed on 3/15     A carotid study showed less than 50% stenosis bilaterally.     He has had multiple thoracenteses since admission. Pt has been followed by CT surgery, cardiology, pulmonary, nephrology and urology.     On the evening of 3/25 pt developed hypertension and SOB at rest. A stat CXR was done that showed progressed moderate to large bilateral pleural effusions with consolidation.      On 3/26 pt underwent a Lt thoracentesis with 1400 ml clear pleural fluid removed. Pt reports feeling much better after the procedure and is asking to sit in the chair.      I am consulted to determine if Mr Eliot Rutledge has pneumonia. Pt had continued decline in renal function, HD initiated 3-29  PPV pacemaker placed 4-2, pt with hematoma at site, resolved with pressure. Anticoagulation held 4-3. Hgb to 6.5 received 1u PRBC     CURRENT EXAMINATION: 4/19/2019    Pt seen and examined in room. Says he is tired from this morning. Had dialysis and then went for thoracentesis after. He says he is otherwise doing ok. No fevers, chills, or rigors. Last HD 4-19-19 . Tunneled catheter placement on Thursday 4-18-19. Afebrile  VS stable. He had upgraded pacemaker placed on 4-2-19  Site continues to improve.     On exam pt is in no distress.   Breath sounds are diminished bilateral bases   CXR 4-17-19 shows decrease in pulmonary edema and bilateral pleural effusions. Had thoracentesis 4/19/2019. 1.65 liters removed from the left pleural cavity. Procedure terminated early due to patient coughing. Repeat blood cultures on 4/17 taken with NGTD. Cultures of catheter tip taken on 4/17 NGTD. Temporary dialysis catheter removed 4/17. Lt groin pseudoaneurysm present, stable. Left lower extremity duplex performed 4-15-19 showed persistent pseudoaneurysm.       Labs reviewed: 4/19/2019    WBC 7.7->11.4->10.5->8.6 -> 9.6 -> 11.8 -> 12.6 -> 10.5 -> 15.8 -> 10.4 -> 16.2 -> 13.6 -> 17.2  Hgb 8.8->6.5->8.3->8.9->7.4 -> 7.2 -> 7.6 -> 7.0 -> 7.2-->7.0 -> 8.6 -> 7.9 -> 8.5  Plt 57->90->92 -> 73-->105 -> 137 -> 120 -> 137 -> 139 -> 199    Cr 2.74->2.61->2.95->3.29 -> 2.57 -> 2.41-->3.8 -> 3.52 -> 4.53  BUN 44->38->47->54 -> 24 -> 22 -> 46 -> 61    Na 134 -> 133 -> 137    Cultures:  Pleural fluid:  · 3/7 No growth, many neutrophils seen, no bacteria, and no growth    Discussed with Pt, HD RN, Vascular. I have personally reviewed the past medical history, past surgical history, medications, social history, and family history, and I have updated the database accordingly.   Past Medical History:     Past Medical History:   Diagnosis Date    Aortic stenosis 02/15/2019    Atrial fibrillation (HCC)     CAD (coronary artery disease)     Chest pain 02/15/2019    CHF (congestive heart failure) (HCC)     Chronic anemia     Chronic kidney disease     Hypertension     Pleural effusion on left 02/15/2019    Pleural effusion, right 02/15/2019    Pulmonary emboli ([address] 02/15/2019    Pulmonary hypertension ([address] 03/06/2019    Thyroid disease     hypothyroidism       Past Surgical  History:     Past Surgical History:   Procedure Laterality Date    CORONARY ANGIOPLASTY WITH STENT PLACEMENT  03/11/2019    complex PCI of LT MAIN, LAD    JOINT REPLACEMENT      right ankle    OTHER SURGICAL HISTORY Relationships    Social connections:     Talks on phone: Not on file     Gets together: Not on file     Attends [employer] service: Not on file     Active member of club or organization: Not on file     Attends meetings of clubs or organizations: Not on file     Relationship status: Not on file    Intimate partner violence:     Fear of current or ex partner: Not on file     Emotionally abused: Not on file     Physically abused: Not on file     Forced sexual activity: Not on file   Other Topics Concern    Not on file   Social History Narrative    Not on file       Family History:   History reviewed. No pertinent family history. Allergies:   Quinolones     Review of Systems:   AA, reports continued tenderness at pacemaker site  No chills or fevers, no SOB  Seen on HD    Physical Examination :     Patient Vitals for the past 8 hrs:   BP Temp Temp src Pulse Resp SpO2 Weight   04/19/19 1204 (!) 130/50 -- Axillary 59 -- 96 % --   04/19/19 1202 (!) 132/52 -- -- -- -- -- --   04/19/19 1201 -- -- -- 53 -- 95 % --   04/19/19 1123 (!) 147/66 97.7 °F (36.5 °C) -- 65 -- -- 132 lb 15 oz (60.3 kg)   04/19/19 1114 (!) 132/56 -- -- 61 -- -- --   04/19/19 1045 (!) 134/55 -- -- 60 -- -- --   04/19/19 1015 (!) 111/51 -- -- 60 -- -- --   04/19/19 0945 (!) 114/49 -- -- 60 -- -- --   04/19/19 0915 (!) 75/36 -- -- 60 -- -- --   04/19/19 0845 (!) 78/41 -- -- 60 -- -- --   04/19/19 0815 (!) 141/56 -- -- 60 -- -- --   04/19/19 0808 110/68 97.3 °F (36.3 °C) -- 72 19 -- 134 lb 0.6 oz (60.8 kg)   04/19/19 0750 -- -- -- 60 -- -- --   04/19/19 0700 (!) 156/49 -- -- 66 -- -- --   04/19/19 0658 (!) 156/49 97.9 °F (36.6 °C) Oral 66 22 96 % --     General Appearance: Seen in HD, easily roused, no distress  Head:  Normocephalic, no trauma  Eyes: Pupils equal, round, reactive to light and accommodation; extraocular movements intact; sclera anicteric. ENT: Oropharynx clear. Mouth/throat: mucosa pink and moist. No lesions.  Dentition in good repair. Neck: Supple, without lymphadenopathy. Pulmonary/Chest: Clear to auscultation, without wheezes, rales, or rhonchi. Decreased breath sounds at bilateral bases   Cardiovascular: Regular rate and rhythm without murmurs, rubs, or gallops. Paced  Abdomen: Soft. Bowel sounds normal.  : Johnston in place  All four Extremities: Mild edema. Neurologic: No gross sensory or motor deficits. Skin: Warm and dry with good turgor. No signs of peripheral arterial or venous insufficiency. No ulcerations. No open wounds. Lt chest pacemaker incision with hematoma, staples intact, no oozing or bleeding noted    Medical Decision Making -Laboratory:   I have independently reviewed/ordered the following labs:    CBC with Differential:   Recent Labs     198 19   WBC 13.6* 17.2*   HGB 7.9* 8.5*   HCT 25.4* 28.2*   PLT See Reflexed IPF Result 199   LYMPHOPCT 5* 6*   MONOPCT 4 5     BMP:   Recent Labs     19   * 137   K 3.5* 4.2   CL 95* 99   CO2 24 21   BUN 46* 61*   CREATININE 3.52* 4.53*     Hepatic Function Panel:   No results for input(s): PROT, LABALBU, BILIDIR, IBILI, BILITOT, ALKPHOS, ALT, AST in the last 72 hours. No results for input(s): RPR in the last 72 hours. No results for input(s): HIV in the last 72 hours. No results for input(s): BC in the last 72 hours.   Lab Results   Component Value Date    MUCUS NOT REPORTED 2019    RBC 2.90 2019    TRICHOMONAS NOT REPORTED 2019    WBC 17.2 2019    YEAST NOT REPORTED 2019    TURBIDITY CLEAR 2019     Lab Results   Component Value Date    CREATININE 4.53 2019    GLUCOSE 101 2019       Medical Decision Making-Imagin-5 CXR    EXAMINATION:   SINGLE XRAY VIEW OF THE CHEST       2019 5:39 am       COMPARISON:   2019       HISTORY:   ORDERING SYSTEM PROVIDED HISTORY: pleural effusions   TECHNOLOGIST PROVIDED HISTORY:   pleural effusions       FINDINGS:   AP portable view of the chest time stamped at 519 hours demonstrates   overlying cardiac monitoring electrodes.  Right internal jugular catheter   terminates in the distal superior vena cava.  Multiple polar pacemaker enters   from the left with intact leads in appropriate positions.  Pacemaker battery   box and skin clips overlie the left axilla.  Heart is enlarged and there is   pulmonary vascular congestion with bilateral perihilar opacities consistent   with pulmonary edema.  Moderate bilateral pleural effusions are noted.  No   extrapleural air is seen.  No midline shift is noted.  Osseous structures are   stable.           Impression   Continued cardiomegaly, opacities most compatible with pulmonary edema and   bilateral pleural effusions. 4-1 CXR    EXAMINATION:   SINGLE XRAY VIEW OF THE CHEST       4/1/2019 6:04 am       COMPARISON:   03/31/2019       HISTORY:   ORDERING SYSTEM PROVIDED HISTORY: SOB   TECHNOLOGIST PROVIDED HISTORY:   SOB       FINDINGS:   There is a left-sided transvenous pacer in place and a right internal jugular   dialysis catheter.  There is cardiomegaly with pulmonary vascular congestion   and edema.  There are bilateral pleural effusions which are increased.  The   pulmonary edema appears worse on the current exam.           Impression   Worsening edema and increased bilateral pleural effusions         4-1 AXR  EXAMINATION:   SINGLE SUPINE XRAY VIEW(S) OF THE ABDOMEN       4/1/2019 2:40 pm       COMPARISON:   None.       HISTORY:   ORDERING SYSTEM PROVIDED HISTORY: r/o ileus   TECHNOLOGIST PROVIDED HISTORY:   r/o ileus       Initial exam       FINDINGS:   Nonspecific bowel gas pattern with air-filled small and large bowel.  No   organomegaly noted.  No pathologic calcifications.  Degenerative changes of   the lower lumbar spine.           Impression   Nonspecific bowel gas pattern without evidence of small bowel obstruction.          3/29 CXR      Narrative   EXAMINATION:   SINGLE XRAY VIEW OF THE CHEST       3/29/2019 8:26 am       COMPARISON:   Chest radiograph performed 03/28/2019.       HISTORY:   ORDERING SYSTEM PROVIDED HISTORY: effusions   TECHNOLOGIST PROVIDED HISTORY:   effusions       FINDINGS:   There are large bilateral effusions with adjacent consolidation.  There is   underlying pulmonary congestion.  There is no pneumothorax.  The heart is   enlarged with stable cardiac leads.  The upper abdomen is unremarkable.  The   extrathoracic soft tissues are unremarkable.           Impression   Cardiomegaly with large bilateral effusions and adjacent consolidation   concerning for pneumonia.               3/27 CXR    Narrative   EXAMINATION:   TWO VIEWS OF THE CHEST       3/28/2019 7:56 am       COMPARISON:   Chest radiograph performed 03/27/2019.       HISTORY:   ORDERING SYSTEM PROVIDED HISTORY: effusions   TECHNOLOGIST PROVIDED HISTORY:   effusions       FINDINGS:   There is a small left and a large right pleural effusion with adjacent   infiltrate.  There is no pneumothorax.  The mediastinal structures are stable   with stable cardiac leads.  The upper abdomen is unremarkable.  The   extrathoracic soft tissues are unremarkable.           Impression   Small left and large right pleural effusion with adjacent infiltrate   representing atelectasis versus pneumonia.  Overall there may be mild   improvement of the right-sided effusion.              Narrative   EXAMINATION:   SINGLE XRAY VIEW OF THE CHEST       3/26/2019 2:37 am       COMPARISON:   March 20, 2019.       HISTORY:   ORDERING SYSTEM PROVIDED HISTORY: SOB, PE   TECHNOLOGIST PROVIDED HISTORY:   SOB, PE       FINDINGS:   Frontal portable view of the chest.  Low lung volume.  Progressed moderate to   large bilateral pleural effusions with consolidation.  Indistinct pulmonary   vasculature.  No pneumothorax.  The cardiomediastinal silhouette is not well   evaluated.  Atherosclerotic thoracic aorta.  Left pectoral trans venous dual-chamber cardiac pacer device.           Impression   Progressed moderate to large bilateral pleural effusions with consolidation. Superimposed infection is not excluded.         EXAMINATION:   TWO VIEWS OF THE CHEST       4/17/2019 2:17 pm       COMPARISON:   10 April 2019       HISTORY:   ORDERING SYSTEM PROVIDED HISTORY: CHF, pulmonary edema, ESRD, leukocytosis   TECHNOLOGIST PROVIDED HISTORY:   CHF, pulmonary edema, ESRD, leukocytosis       FINDINGS:   AP portable view of the chest time stamped at 1353 hours demonstrates a   multiple polar pacemaker entering from the left with intact leads in   appropriate positions.  Stable cardiomegaly is noted.  Moderate left effusion   and mild right effusion are noted.  Vascular congestion is noted, decreased   over prior study.  Basilar opacities are noted likely related atelectasis   and/or edema.  No extrapleural air is noted.  Intravascular stent is present   distal thoracic aorta.           Impression   Decreased vascular congestion and basilar opacities compared to prior study. Persistent effusions, mild vascular congestion and bibasilar opacities   favoring atelectasis and mild edema.             Medical Decision Making-Other: Thank you for allowing us to participate in the care of this patient. Please call with questions. Maral Segal     ATTESTATION:    I have discussed the case, including pertinent history and exam findings with the residents. I have seen and examined the patient and the key elements of the encounter have been performed by me. I have reviewed the laboratory data, other diagnostic studies and discussed them with the residents. I have updated the medical record where necessary. I agree with the assessment, plan and orders as documented by the resident.     Hari Johnson MD.

## 2019-04-19 NOTE — PROGRESS NOTES
Renal Progress Note    Patient :  Kyle Rodriguez; 80 y.o. MRN# [id number]  Location:    Attending:  Venu Oreilly MD  Admit Date:  3/6/2019   Hospital Day: 40    Subjective   Admitted with shortness of breath secondary to decompensated congestive heart failure in the face of aortic stenosis. Subsequently underwent PCI and PVR. Postprocedure course has been complicated by worsening of renal function and hypervolemia for which he is getting recurrent thoracocentesis and diuretic adjustment. Hemodialysis also initiated. Schedule is MWF. Patient was seen and examined on dialysis today. Tolerating the procedure well. Patient is status post tunnel catheter today. Patient being setting up at [facility] for outpatient dialysis chair time yet to be confirmed.  working on it. To get thoracentesis again today. IV to monitor off antibiotics. Continues to be lethargic.     Objective     VS: BP (!) 130/50   Pulse 59   Temp 97.7 °F (36.5 °C)   Resp 19   Ht 5' 6\" (1.676 m)   Wt 132 lb 15 oz (60.3 kg)   SpO2 96%   BMI 21.46 kg/m²   MAXIMUM TEMPERATURE OVER 24HRS:  Temp (24hrs), Av.7 °F (36.5 °C), Min:97.1 °F (36.2 °C), Max:98.2 °F (36.8 °C)    24HR BLOOD PRESSURE RANGE:  Systolic (66NFM), RBL:840 , Min:75 , PWJ:850   ; Diastolic (28DHL), HPE:85, Min:36, Max:68    24HR INTAKE/OUTPUT:      Intake/Output Summary (Last 24 hours) at 2019 1246  Last data filed at 2019 1230  Gross per 24 hour   Intake 120 ml   Output 1650 ml   Net -1530 ml     WEIGHT :  Patient Vitals for the past 96 hrs (Last 3 readings):   Weight   19 1123 132 lb 15 oz (60.3 kg)   19 0808 134 lb 0.6 oz (60.8 kg)   19 0400 135 lb 2.3 oz (61.3 kg)       Current Medications:     Scheduled Meds:    megestrol  200 mg Oral Daily    iron sucrose  100 mg Intravenous Q48H    darbepoetin marya-polysorbate  100 mcg Intravenous Weekly    simethicone  80 mg Oral Q6H    docusate sodium  100 mg Oral BID    lidocaine-EPINEPHrine  20 mL Intradermal Once    lidocaine PF  1 mL Intradermal Once    bacitracin in 0.9 % sodium chloride irrigation  50,000 Units Topical Once    sodium chloride flush  10 mL Intravenous 2 times per day    aspirin  81 mg Oral Daily    spironolactone  25 mg Oral Daily    metoprolol succinate  50 mg Oral Daily    FLUoxetine  10 mg Oral Daily    levothyroxine  125 mcg Oral Daily    ALPRAZolam  0.25 mg Oral Once    QUEtiapine  25 mg Oral Nightly    clopidogrel  75 mg Oral Daily    isosorbide mononitrate  30 mg Oral Daily    [Held by provider] docusate sodium  100 mg Oral Daily    amiodarone  200 mg Oral Daily    atorvastatin  20 mg Oral Daily     Continuous Infusions:    dextrose 20 mL/hr at 04/06/19 1858       Physical Examination:     General:  AAO x 3, speaking in full sentences, no accessory muscle use. Chest:   Decreased breath sounds, REDUCED AE and wheeze present   Cardiac:  S1 S2 irregular,+ murmurs, gallops or rubs  Abdomen: Soft, non-tender, non distended, BS audible. SKIN:  Warm and dry  Extremities      trace edema, no clubbing, No cyanosis  Neuro:  AAO x 3, No FND.      Labs:       Recent Labs     04/17/19 0444 04/18/19 0408 04/19/19 0413   WBC 16.2* 13.6* 17.2*   RBC 2.95* 2.66* 2.90*   HGB 8.6* 7.9* 8.5*   HCT 28.3* 25.4* 28.2*   MCV 95.9 95.5 97.2   MCH 29.2 29.7 29.3   MCHC 30.4 31.1 30.1   RDW 18.7* 19.2* 19.4*    See Reflexed IPF Result 199   MPV 11.4 NOT REPORTED 11.1      BMP:   Recent Labs     04/17/19 0444 04/18/19 0408 04/19/19 0413   * 133* 137   K 3.8 3.5* 4.2   CL 96* 95* 99   CO2 22 24 21   BUN 74* 46* 61*   CREATININE 5.42* 3.52* 4.53*   GLUCOSE 89 98 101*   CALCIUM 8.7 8.7 9.0        Urinalysis/Chemistries:      Lab Results   Component Value Date    NITRU NEGATIVE 03/29/2019    COLORU YELLOW 03/29/2019    PHUR 7.5 03/29/2019    WBCUA 50  03/29/2019    RBCUA 50  03/29/2019    MUCUS NOT REPORTED 03/29/2019 TRICHOMONAS NOT REPORTED 03/29/2019    YEAST NOT REPORTED 03/29/2019    BACTERIA NOT REPORTED 03/29/2019    SPECGRAV 1.011 03/29/2019    LEUKOCYTESUR LARGE 03/29/2019    UROBILINOGEN Normal 03/29/2019    BILIRUBINUR NEGATIVE 03/29/2019    GLUCOSEU NEGATIVE 03/29/2019    KETUA NEGATIVE 03/29/2019    AMORPHOUS NOT REPORTED 03/29/2019       Radiology:     CXR:   No recent  Assessment:       1. ESRD - new start - on MWF currently. HUYEN secondary to hypoperfusion related to cardiorenal syndrome. Started on HD this admission since 29th of March. Chance of renal recovery negligible. 2. CKD IV-V with baseline creatinine 2, now appears to be ESRD  3. Nephrotic range proteinuria Bx not feasible in view of he being on Asprin and cardiology recommends not to stop anticoagulant. Explained to patient and he does not want to do that. 4. Recurrent pleural effusions s/p thoracentesis  5. 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 S/P PCI and multiple stent placement 3/11/19  7. HTN  8. 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 S/P Upgrade of dual chamber PPM to CRT-P on 4-2-19  10. Hx if urinary retention  11. Anemia    Plan:     1. Patient was seen on HD at bedside. Orders were confirmed with the HD nurse. 2.  Status post tunnel catheter   3. Patient being setting up at [facility] for outpatient dialysis chair time yet to be confirmed.  working on it. 4.  Will follow    Shaheed Matthews MD  [facility]

## 2019-04-19 NOTE — PROGRESS NOTES
Physical Therapy  DATE: 2019    NAME: Evonne Leeelor  MRN: [id number]   : 12/10/1928    Patient not seen this date for Physical Therapy due to:  [] Blood transfusion in progress  [] Hemodialysis  []  Patient Declined  [] Spine Precautions   [] Strict Bedrest  [] Surgery/ Procedure  [] Testing      [x] Other  -  Pt is too fatigued to participate in his treatment. Pt keeps falling asleep. RN informed. [] PT being discontinued at this time. Patient independent. No further needs. [] PT being discontinued at this time as the patient has been transferred to palliative care. No further needs.     Aldair Miller, PTA,

## 2019-04-19 NOTE — PROGRESS NOTES
Physical Therapy  DATE: 2019    NAME: Evonne Leeelor  MRN: [id number]   : 12/10/1928    Patient not seen this date for Physical Therapy due to:  [] Blood transfusion in progress  [x] Hemodialysis: Will check back in the PM.  []  Patient Declined  [] Spine Precautions   [] Strict Bedrest  [] Surgery/ Procedure  [] Testing      [] Other        [] PT being discontinued at this time. Patient independent. No further needs. [] PT being discontinued at this time as the patient has been transferred to palliative care. No further needs. Debra Suarez SPTA  Treatment performed by Student PTA under the supervision of co-signing PTA who agrees with all treatment and documentation.    Tadeo Harkins PTA

## 2019-04-19 NOTE — CARE COORDINATION
Left VM with [facility] Catie Bearden, await return call    Catie Bearden from [facility] here for on-site, they are accepting.   Shlomo Gao has secured OP HD spot at [facility] [place], [place] at 200 starting wed 4/24/19

## 2019-04-19 NOTE — CARE COORDINATION
Spoke with Brenda Rivas at [facility] Admissions  Pt has been approved for OP dialysis at [place], [place] @ 12:30p  Dr Diaz Wright to follow at [address] can start on Wed 4/24/19  Confirmation letter placed in pt's room, one given to CM for SNF, and one left at RN station for Dr Kathie Smith

## 2019-04-19 NOTE — PROGRESS NOTES
Pulmonary Progress Note    CC:  chf   Subjective:  No cough , no fever , no hemoptysis , clear sputum . No chest pain , dec  orthopnea , no PND       Immunization     There is no immunization history on file for this patient. Pneumococcal Vaccine     [] Up to date    [x] Indicated   [] Refused  [] Contraindicated       Influenza Vaccine   [] Up to date    [x] Indicated   [] Refused  [] Contraindicated     PAST MEDICAL HISTORY:       Diagnosis Date    Aortic stenosis 02/15/2019    Atrial fibrillation ([address]     CAD (coronary artery disease)     Chest pain 02/15/2019    CHF (congestive heart failure) (HCC)     Chronic anemia     Chronic kidney disease     Hypertension     Pleural effusion on left 02/15/2019    Pleural effusion, right 02/15/2019    Pulmonary emboli ([facility] 02/15/2019    Pulmonary hypertension ([facility] [address] 03/06/2019    Thyroid disease     hypothyroidism         Family History:   History reviewed. No pertinent family history. SURGICAL HISTORY:   Past Surgical History:   Procedure Laterality Date    CORONARY ANGIOPLASTY WITH STENT PLACEMENT  03/11/2019    complex PCI of LT MAIN, LAD    JOINT REPLACEMENT      right ankle    OTHER SURGICAL HISTORY  04/02/2019    UPGRADE TO BI-V PACEMAKER    OTHER SURGICAL HISTORY  03/15/2019    TAVR PROCEDURE    PACEMAKER PLACEMENT  01/20/2019    THORACENTESIS                TOBACCO:   reports that he quit smoking about 30 years ago. He has never used smokeless tobacco.  ETOH:   reports that he drank alcohol. ALLERGIES:    Allergies   Allergen Reactions    Quinolones      Pt is allergic to fluoroquinolones       Home Meds:   Prior to Admission medications    Medication Sig Start Date End Date Taking?  Authorizing Provider   amiodarone (CORDARONE) 200 MG tablet Take 200 mg by mouth daily   Yes Historical Provider, MD   atorvastatin (LIPITOR) 20 MG tablet Take 20 mg by mouth daily   Yes Historical Provider, MD   isosorbide mononitrate (IMDUR) 60 MG extended release tablet Take 60 mg by mouth daily   Yes Historical Provider, MD   levothyroxine (SYNTHROID) 100 MCG tablet Take 100 mcg by mouth Daily   Yes Historical Provider, MD   lisinopril (PRINIVIL;ZESTRIL) 40 MG tablet Take 40 mg by mouth daily   Yes Historical Provider, MD   apixaban (ELIQUIS) 2.5 MG TABS tablet Take 2.5 mg by mouth daily   Yes Historical Provider, MD   metoprolol tartrate (LOPRESSOR) 50 MG tablet Take 50 mg by mouth 2 times daily   Yes Historical Provider, MD         Intake/Output Summary (Last 24 hours) at 4/18/2019 2219  Last data filed at 4/18/2019 1830  Gross per 24 hour   Intake 130 ml   Output 100 ml   Net 30 ml         Diet   Dietary Nutrition Supplements:  Dietary Nutrition Supplements: Frozen Oral Supplement  Dietary Nutrition Supplements: Renal Oral Supplement  DIET CARDIAC; Low Sodium (2 GM); Daily Fluid Restriction: 1800 ml    Vitals:   BP (!) 147/50   Pulse 60   Temp 98.1 °F (36.7 °C) (Oral)   Resp 16   Ht 5' 6\" (1.676 m)   Wt 135 lb 2.3 oz (61.3 kg)   SpO2 96%   BMI 21.81 kg/m²  on         I/O (24 Hours)    Patient Vitals for the past 8 hrs:   BP Temp Temp src Pulse Resp SpO2   04/18/19 1816 (!) 147/50 98.1 °F (36.7 °C) Oral 60 16 96 %   04/18/19 1700 (!) 151/56 97.1 °F (36.2 °C) Axillary 60 16 96 %   04/18/19 1543 -- -- -- -- 20 --       Intake/Output Summary (Last 24 hours) at 4/18/2019 2219  Last data filed at 4/18/2019 1830  Gross per 24 hour   Intake 130 ml   Output 100 ml   Net 30 ml     I/O last 3 completed shifts: In: 10 [I.V.:10]  Out: 100 [Urine:100]   Date 04/18/19 0000 - 04/18/19 2359   Shift 7291-3819 9481-8493 4215-1330 24 Hour Total   INTAKE   P.O.(mL/kg/hr)   120 120   I. V.(mL/kg)  10(0.2)  10(0.2)   Shift Total(mL/kg)  10(0.2) 120(2) 130(2.1)   OUTPUT   Urine(mL/kg/hr)  100(0.2)  100   Shift Total(mL/kg)  100(1.6)  100(1.6)   Weight (kg) 61.3 61.3 61.3 61.3     Patient Vitals for the past 96 hrs (Last 3 readings):   Weight   04/18/19 0400 135 lb 2.3 oz (61.3 kg)   04/17/19 1334 133 lb 13.1 oz (60.7 kg)   04/17/19 1016 136 lb 7.4 oz (61.9 kg)     Exam   Head and neck atraumatic, normocephalic    Lymph nodes-no cervical, supraclavicular lymphadenopathy    Neck-=JVP elevation    Lungs - dec bases   Dull bases     Clear ant   CVS- S1, S2 regular. No S3 no S4, 1/6 murmurs    Abdomen-nontender, nondistended. Bowel sounds are present. No organomegaly    Lower extremity-+edema better     Upper extremity-no  edema    Neurological-grossly normal cranial nerves.   No overt motor deficit      Medications:    Scheduled Meds:   megestrol  200 mg Oral Daily    iron sucrose  100 mg Intravenous Q48H    [START ON 4/19/2019] darbepoetin marya-polysorbate  100 mcg Intravenous Weekly    simethicone  80 mg Oral Q6H    docusate sodium  100 mg Oral BID    lidocaine-EPINEPHrine  20 mL Intradermal Once    lidocaine PF  1 mL Intradermal Once    bacitracin in 0.9 % sodium chloride irrigation  50,000 Units Topical Once    sodium chloride flush  10 mL Intravenous 2 times per day    aspirin  81 mg Oral Daily    spironolactone  25 mg Oral Daily    metoprolol succinate  50 mg Oral Daily    FLUoxetine  10 mg Oral Daily    levothyroxine  125 mcg Oral Daily    ALPRAZolam  0.25 mg Oral Once    QUEtiapine  25 mg Oral Nightly    clopidogrel  75 mg Oral Daily    isosorbide mononitrate  30 mg Oral Daily    [Held by provider] docusate sodium  100 mg Oral Daily    amiodarone  200 mg Oral Daily    atorvastatin  20 mg Oral Daily       Continuous Infusions:   dextrose 20 mL/hr at 04/06/19 1858       PRN Meds:      Labs:  CBC:   Recent Labs     04/17/19  0444 04/18/19  0408   WBC 16.2* 13.6*   HGB 8.6* 7.9*   HCT 28.3* 25.4*   MCV 95.9 95.5    See Reflexed IPF Result     BMP:   Recent Labs     04/17/19  0444 04/18/19  0408   * 133*   K 3.8 3.5*   CL 96* 95*   CO2 22 24   BUN 74* 46*   CREATININE 5.42* 3.52*     LIVER PROFILE:   No results for input(s): AST, ALT, LIPASE, BILIDIR, BILITOT, ALKPHOS in the last 72 hours. Invalid input(s): AMYLASE,  ALB  PT/INR:   No results for input(s): PROTIME, INR in the last 72 hours. APTT:   No results for input(s): APTT in the last 72 hours. UA:  No results for input(s): NITRITE, COLORU, PHUR, LABCAST, WBCUA, RBCUA, MUCUS, TRICHOMONAS, YEAST, BACTERIA, CLARITYU, SPECGRAV, LEUKOCYTESUR, UROBILINOGEN, BILIRUBINUR, BLOODU, GLUCOSEU, AMORPHOUS in the last 72 hours. Invalid input(s): KETONESU  No results for input(s): PHART, FED7UIB, PO2ART in the last 72 hours. 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   Per chart, from imaging studies done in [place]  Anemia of chronic diease     Plan:  Improving pulmonary edema   permcath today     Electronically signed by Keira Monzon MD on 4/18/2019 at 10:19 PM

## 2019-04-20 NOTE — PROGRESS NOTES
PULMONARY PROGRESS NOTE      Patient:  Amadna Kuo  YOB: 1928    MRN: [id number]     Acct: [de-identified]     Admit date: 3/6/2019    REASON FOR CONSULT:- Pulmonary hypertension, bilateral pleural effusion with atelectasis and aortic stenosis with hypothyroidism    Pt seen and Chart reviewed. Patient was on nasal cannula oxygen of 1 L/m at time of my visits. He is up in a chair. Denied any shortness of breath. Denied orthopnea. No chest pain or pressure. No fevers or chills or night sweats. Subjective:   Review of Systems -   General ROS: Completed and except as mentioned above were negative   Psychological ROS:  Completed and except as mentioned above were negative  Allergy and Immunology ROS:  Completed and except as mentioned above were negative  Hematological and Lymphatic ROS:  Completed and except as mentioned above were negative  Respiratory ROS:  Completed and except as mentioned above were negative  Cardiovascular ROS:  Completed and except as mentioned above were negative  Gastrointestinal ROS: Completed and except as mentioned above were negative  Genito-Urinary ROS:  Completed and except as mentioned above were negative  Musculoskeletal ROS:  Completed and except as mentioned above were negative  Neurological ROS:  Completed and except as mentioned above were negative  Dermatological ROS:  Completed and except as mentioned above were negative      Diet:  Dietary Nutrition Supplements:  Dietary Nutrition Supplements: Frozen Oral Supplement  Dietary Nutrition Supplements: Renal Oral Supplement  DIET CARDIAC; Low Sodium (2 GM);  Daily Fluid Restriction: 1800 ml      Medications:Current Inpatient    Scheduled Meds:   [START ON 4/21/2019] metoprolol succinate  25 mg Oral Daily    megestrol  200 mg Oral Daily    iron sucrose  100 mg Intravenous Q48H    darbepoetin marya-polysorbate  100 mcg Intravenous Weekly    simethicone  80 mg Oral Q6H    docusate sodium  100 mg Oral BID    lidocaine-EPINEPHrine  20 mL Intradermal Once    lidocaine PF  1 mL Intradermal Once    bacitracin in 0.9 % sodium chloride irrigation  50,000 Units Topical Once    sodium chloride flush  10 mL Intravenous 2 times per day    aspirin  81 mg Oral Daily    spironolactone  25 mg Oral Daily    FLUoxetine  10 mg Oral Daily    levothyroxine  125 mcg Oral Daily    ALPRAZolam  0.25 mg Oral Once    QUEtiapine  25 mg Oral Nightly    clopidogrel  75 mg Oral Daily    [Held by provider] docusate sodium  100 mg Oral Daily    amiodarone  200 mg Oral Daily    atorvastatin  20 mg Oral Daily     Continuous Infusions:   dextrose 20 mL/hr at 04/06/19 1858     PRN Meds:sodium chloride, sodium chloride, midodrine, sodium chloride, sodium chloride, bisacodyl, HYDROcodone 5 mg - acetaminophen, sodium chloride flush, acetaminophen, aspirin, sodium chloride, sodium chloride, heparin (porcine), heparin (porcine), hydrALAZINE, ondansetron, magnesium hydroxide    Objective:      Physical Exam:  Vitals: BP (!) 109/46   Pulse 61   Temp 98.4 °F (36.9 °C) (Oral)   Resp 14   Ht 5' 6\" (1.676 m)   Wt 128 lb 4.9 oz (58.2 kg)   SpO2 93%   BMI 20.71 kg/m²   24 hour intake/output:    Intake/Output Summary (Last 24 hours) at 4/20/2019 1850  Last data filed at 4/20/2019 1755  Gross per 24 hour   Intake 720 ml   Output --   Net 720 ml     Last 3 weights:   Wt Readings from Last 3 Encounters:   04/20/19 128 lb 4.9 oz (58.2 kg)         Physical Examination:   PHYSICAL EXAMINATION:  Vitals:    04/20/19 1200 04/20/19 1630 04/20/19 1716 04/20/19 1843   BP: (!) 94/42 (!) 78/43 (!) 116/39 (!) 109/46   Pulse: 63 61 63 61   Resp: 12  16 14   Temp: 97.4 °F (36.3 °C) 98.2 °F (36.8 °C) 98.2 °F (36.8 °C) 98.4 °F (36.9 °C)   TempSrc: Oral  Oral Oral   SpO2: 93% 94% 94% 93%   Weight:       Height:         Constitutional: This is a well developed, well nourished, 18.5-24.9 - Normal 80y.o. year old male who is alert, oriented, cooperative and in no apparent distress. Head:normocephalic and atraumatic. EENT:  ILEANA. No conjunctival injections. Septum was midline, mucosa was without erythema, exudates or cobblestoning. No thrush was noted. Mallampati  II (soft palate, uvula, fauces visible)  Neck: Supple without thyromegaly. No elevated JVP. Trachea was midline. Respiratory: Chest was symmetrical without dullness to percussion. Breath sounds bilaterally were clear to auscultation. There were no wheezes, rhonchi or rales. There is no intercostal retraction or use of accessory muscles. No egophony noted. Cardiovascular: Regular without murmur, clicks, gallops or rubs. Abdomen: Slightly rounded and soft without organomegaly. No rebound tenderness, rigidity or guarding was appreciated. Lymphatic: No lymphadenopathy. Musculoskeletal: Normal curvature of the spine. No gross muscle weakness. Extremities:  Has no bilateral lower extremity edema, no ulcerations, tenderness, varicosities or erythema. Muscle size, tone and strength are normal.  No involuntary movements are noted. Skin:  Warm and dry. Good color, turgor and pigmentation. No lesions or scars. No cyanosis or clubbing  Neurological/Psychiatric: The patient's general behavior, level of consciousness, thought content and emotional status is normal.          CBC:   Recent Labs     04/18/19  0408 04/19/19  0413 04/20/19 0447   WBC 13.6* 17.2* 15.0*   HGB 7.9* 8.5* 7.7*   PLT See Reflexed IPF Result 199 166     BMP:    Recent Labs     04/18/19  0408 04/19/19  0413 04/20/19  0447   * 137 133*   K 3.5* 4.2 3.6*   CL 95* 99 96*   CO2 24 21 25   BUN 46* 61* 35*   CREATININE 3.52* 4.53* 3.12*   GLUCOSE 98 101* 101*     Calcium:  Recent Labs     04/20/19 0447   CALCIUM 8.2*     Ionized Calcium:No results for input(s): IONCA in the last 72 hours.   Magnesium:  No results for input(s): MG in the last 72 hours.  Phosphorus:No results for input(s): PHOS in the last 72 hours. BNP:No results for input(s): BNP in the last 72 hours. Glucose:  No results for input(s): POCGLU in the last 72 hours. HgbA1C: No results for input(s): LABA1C in the last 72 hours. INR:   Recent Labs     04/19/19  1009   INR 1.1     Hepatic:   No results for input(s): ALKPHOS, ALT, AST, PROT, BILITOT, BILIDIR, LABALBU in the last 72 hours. Amylase and Lipase:No results for input(s): LACTA, AMYLASE in the last 72 hours. Lactic Acid: No results for input(s): LACTA in the last 72 hours. CARDIAC ENZYMES:  No results for input(s): CKTOTAL, CKMB, CKMBINDEX, TROPONINI in the last 72 hours. BNP: No results for input(s): BNP in the last 72 hours. Lipids: No results for input(s): CHOL, TRIG, HDL, LDLCALC in the last 72 hours. Invalid input(s): LDL  ABGs: No results found for: PH, PCO2, PO2, HCO3, O2SAT  Thyroid:   Lab Results   Component Value Date    TSH 3.04 03/20/2019      Urinalysis: No results for input(s): BACTERIA, BLOODU, CLARITYU, COLORU, PHUR, PROTEINU, RBCUA, SPECGRAV, BILIRUBINUR, NITRU, WBCUA, LEUKOCYTESUR, GLUCOSEU in the last 72 hours. 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 Aortic stenosis. Hypothyroidism. History of cigarette smoking. Worsening renal function. On intermittent hemodialysis  Anemia with slight worsening.   Hyponatremia secondary to renal failure,

## 2019-04-20 NOTE — PROGRESS NOTES
Physical Therapy  Facility/Department: [facility] CAR 1  Daily Treatment Note  NAME: Kyle Rodriguez  : 12/10/1928  MRN: [id number]    Date of Service: 2019    Discharge Recommendations: Further therapy recommended at discharge. PT Equipment Recommendations  Mobility Devices: [place]: Rolling  Other: for increased safety with AMB    Patient Diagnosis(es): The encounter diagnosis was Pulmonary hypertension ([address] has a past medical history of Aortic stenosis, Atrial fibrillation ([address] CAD (coronary artery disease), Chest pain, CHF (congestive heart failure) ([address] Chronic anemia, Chronic kidney disease, Hypertension, Pleural effusion on left, Pleural effusion, right, Pulmonary emboli ([address] Pulmonary hypertension ([address] and Thyroid disease. has a past surgical history that includes pacemaker placement (2019); thoracentesis; joint replacement; Coronary angioplasty with stent (2019); other surgical history (2019); and other surgical history (03/15/2019). Restrictions  Restrictions/Precautions  Restrictions/Precautions: Weight Bearing, Cardiac, General Precautions, ROM Restrictions, Surgical Protocols, Fall Risk  Required Braces or Orthoses?: Yes(Sling for L UE, but not on or in room upon arrival)  Implants present? : Pacemaker  Upper Extremity Weight Bearing Restrictions  Left Upper Extremity Weight Bearing: (partial weight bearing for PPM 4/2)  Required Braces or Orthoses  Left Upper Extremity Brace/Splint: Sling(not on or in room upon arrival)  Position Activity Restriction  Other position/activity restrictions: Up w/assist.  Subjective   General  Chart Reviewed: Yes  Additional Pertinent Hx: TAVR 3/15/19  Response To Previous Treatment: Patient unable to report, no changes reported from family or staff  Family / Caregiver Present: No  Subjective  Subjective: RN and pt agreed to PT, pt sleeping in chair. Pt refusing to participate and keeping eyes closed initially.   General Functional Mobility Training, Endurance Training, Transfer Training, Patient/Caregiver Education & Training, Safety Education & Training, Home Exercise Program, Gait Training  Safety Devices  Type of devices:  All fall risk precautions in place, Gait belt, Call light within reach, Nurse notified, Left in chair  Restraints  Initially in place: No     Therapy Time   Individual Concurrent Group Co-treatment   Time In 1015         Time Out 1045         Minutes [address] [place] [address]

## 2019-04-20 NOTE — PROGRESS NOTES
groin pseudoaneurysm s/p thrombin injection  · 4/18 tunneled catheter placed       Infection Control Recommendations   · Hematite Precautions    Antimicrobial Stewardship Recommendations     · Monitor off antibiotics    Coordination of Outpatient Care:   · Estimated Length of IV antimicrobials: None  · Patient will need Midline Catheter Insertion:   · Patient will need PICC line Insertion:  · Patient will need: Home IV , [place],  SNF,  LTAC TBD  · Patient will need outpatient wound care: No    Chief complaint/reason for consultation:   · Possible pneumonia      History of Present Illness:   Yareli Chávez is a 80y.o.-year-old  male who was initially admitted on 3/6/2019. Patient seen at the request of Dr. Herrera Monday     INITIAL HISTORY:    Pt is a 81 yo gentleman who has recently had multiple hospital admissions in [place] for decompensated heart failure. His symptoms began in December 2018. 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 A cardiac cath at that time showed LM and LAD calcified stenosis 80-90% in multiple areas, with severe disease in D1, D2, Om1 and OM2. Minimal disease in RCA. LV function with EF 25%. Severe aortic stenosis compromising his clinical improvedmnt      He underwent a dual chamber pacemaker placement. Following that, he had multiple hospitalizations for acute heart failure and shortness of breath. His baseline creatinine was 1.3, now >2.0. He has had multiple thoracenteses with transudative pleural fluid removed.      He was brought to [facility] for further evaluation and was admitted on 3/6/19     An ECHO from admission showed   Normal left ventricle size with mildly reduced systolic function; Estimated  left ventricular ejection fraction 40-45%  Anterolateral wall hypokinesis. Mild left ventricular hypertrophy. Left atrium is moderately dilated. Grade II diastolic dysfunction.   Heavily calcified aortic valve with reduced systolic pulmonary edema and bilateral pleural effusions. Had thoracentesis 4/19/2019. 1.65 liters removed from the left pleural cavity. Procedure terminated early due to patient coughing. Repeat blood cultures on 4/17 taken with NGTD. Cultures of catheter tip taken on 4/17 NGTD. Temporary dialysis catheter removed 4/17. Lt groin pseudoaneurysm present, stable. Left lower extremity duplex performed 4-15-19 showed persistent pseudoaneurysm.       Labs reviewed: 4/20/2019    WBC 7.7->11.4->10.5->8.6 -> 9.6 -> 11.8 -> 12.6 -> 10.5 -> 15.8 -> 10.4 -> 16.2 -> 13.6 -> 17.2 -> 15  Hgb 8.8->6.5->8.3->8.9->7.4 -> 7.2 -> 7.6 -> 7.0 -> 7.2-->7.0 -> 8.6 -> 7.9 -> 8.5  Plt 57->90->92 -> 73-->105 -> 137 -> 120 -> 137 -> 139 -> 199    Cr 2.74->2.61->2.95->3.29 -> 2.57 -> 2.41-->3.8 -> 3.52 -> 4.53  BUN 44->38->47->54 -> 24 -> 22 -> 46 -> 61    Na 134 -> 133 -> 137    Cultures:  Pleural fluid:  · 3/7 No growth, many neutrophils seen, no bacteria, and no growth    Discussed with Pt, HD RN, Vascular. I have personally reviewed the past medical history, past surgical history, medications, social history, and family history, and I have updated the database accordingly.   Past Medical History:     Past Medical History:   Diagnosis Date    Aortic stenosis 02/15/2019    Atrial fibrillation (HCC)     CAD (coronary artery disease)     Chest pain 02/15/2019    CHF (congestive heart failure) (HCC)     Chronic anemia     Chronic kidney disease     Hypertension     Pleural effusion on left 02/15/2019    Pleural effusion, right 02/15/2019    Pulmonary emboli ([facility] 02/15/2019    Pulmonary hypertension ([facility] 03/06/2019    Thyroid disease     hypothyroidism       Past Surgical  History:     Past Surgical History:   Procedure Laterality Date    CORONARY ANGIOPLASTY WITH STENT PLACEMENT  03/11/2019    complex PCI of LT MAIN, LAD    JOINT REPLACEMENT      right ankle    OTHER SURGICAL HISTORY  04/02/2019    UPGRADE TO BI-V PACEMAKER    OTHER SURGICAL HISTORY  03/15/2019    TAVR PROCEDURE    PACEMAKER PLACEMENT  2019    THORACENTESIS         Medications:      megestrol  200 mg Oral Daily    iron sucrose  100 mg Intravenous Q48H    darbepoetin marya-polysorbate  100 mcg Intravenous Weekly    simethicone  80 mg Oral Q6H    docusate sodium  100 mg Oral BID    lidocaine-EPINEPHrine  20 mL Intradermal Once    lidocaine PF  1 mL Intradermal Once    bacitracin in 0.9 % sodium chloride irrigation  50,000 Units Topical Once    sodium chloride flush  10 mL Intravenous 2 times per day    aspirin  81 mg Oral Daily    spironolactone  25 mg Oral Daily    metoprolol succinate  50 mg Oral Daily    FLUoxetine  10 mg Oral Daily    levothyroxine  125 mcg Oral Daily    ALPRAZolam  0.25 mg Oral Once    QUEtiapine  25 mg Oral Nightly    clopidogrel  75 mg Oral Daily    isosorbide mononitrate  30 mg Oral Daily    [Held by provider] docusate sodium  100 mg Oral Daily    amiodarone  200 mg Oral Daily    atorvastatin  20 mg Oral Daily       Social History:     Social History     Socioeconomic History    Marital status:       Spouse name: Not on file    Number of children: Not on file    Years of education: Not on file    Highest education level: Not on file   Occupational History     Employer: RETIRED   Social Needs    Financial resource strain: Not on file    Food insecurity:     Worry: Not on file     Inability: Not on file    Transportation needs:     Medical: Not on file     Non-medical: Not on file   Tobacco Use    Smoking status: Former Smoker     Last attempt to quit:      Years since quittin.3    Smokeless tobacco: Never Used   Substance and Sexual Activity    Alcohol use: Not Currently    Drug use: Never    Sexual activity: Not on file   Lifestyle    Physical activity:     Days per week: Not on file     Minutes per session: Not on file    Stress: Not on file   Relationships    Social connections:     Talks on phone: Not on file     Gets together: Not on file     Attends Nondenominational service: Not on file     Active member of club or organization: Not on file     Attends meetings of clubs or organizations: Not on file     Relationship status: Not on file    Intimate partner violence:     Fear of current or ex partner: Not on file     Emotionally abused: Not on file     Physically abused: Not on file     Forced sexual activity: Not on file   Other Topics Concern    Not on file   Social History Narrative    Not on file       Family History:   History reviewed. No pertinent family history. Allergies:   Quinolones     Review of Systems:   AA, reports continued tenderness at pacemaker site  No chills or fevers, no SOB  Seen on HD    Physical Examination :     Patient Vitals for the past 8 hrs:   BP Temp Temp src Pulse Resp SpO2 Weight   04/20/19 0842 (!) 113/43 98.1 °F (36.7 °C) Oral 62 18 93 % --   04/20/19 0313 -- 97.5 °F (36.4 °C) Oral 60 20 94 % 128 lb 4.9 oz (58.2 kg)     General Appearance: Seen in HD, easily roused, no distress  Head:  Normocephalic, no trauma  Eyes: Pupils equal, round, reactive to light and accommodation; extraocular movements intact; sclera anicteric. ENT: Oropharynx clear. Mouth/throat: mucosa pink and moist. No lesions. Dentition in good repair. Neck: Supple, without lymphadenopathy. Pulmonary/Chest: Clear to auscultation, without wheezes, rales, or rhonchi. Decreased breath sounds at bilateral bases   Cardiovascular: Regular rate and rhythm without murmurs, rubs, or gallops. Paced  Abdomen: Soft. Bowel sounds normal.  : Johnston in place  All four Extremities: Mild edema. Neurologic: No gross sensory or motor deficits. Skin: Warm and dry with good turgor. No signs of peripheral arterial or venous insufficiency. No ulcerations. No open wounds.  Lt chest pacemaker incision with hematoma, staples intact, no oozing or bleeding noted    Medical Decision Making -Laboratory:   I have independently reviewed/ordered the following labs:    CBC with Differential:   Recent Labs     19  0413 19  0447   WBC 17.2* 15.0*   HGB 8.5* 7.7*   HCT 28.2* 25.4*    166   LYMPHOPCT 6* 3*   MONOPCT 5 5     BMP:   Recent Labs     19  0413 19  0447    133*   K 4.2 3.6*   CL 99 96*   CO2 21 25   BUN 61* 35*   CREATININE 4.53* 3.12*     Hepatic Function Panel:   No results for input(s): PROT, LABALBU, BILIDIR, IBILI, BILITOT, ALKPHOS, ALT, AST in the last 72 hours. No results for input(s): RPR in the last 72 hours. No results for input(s): HIV in the last 72 hours. No results for input(s): BC in the last 72 hours.   Lab Results   Component Value Date    MUCUS NOT REPORTED 2019    RBC 2.64 2019    TRICHOMONAS NOT REPORTED 2019    WBC 15.0 2019    YEAST NOT REPORTED 2019    TURBIDITY CLEAR 2019     Lab Results   Component Value Date    CREATININE 3.12 2019    GLUCOSE 101 2019       Medical Decision Making-Imagin-5 CXR    EXAMINATION:   SINGLE XRAY VIEW OF THE CHEST       2019 5:39 am       COMPARISON:   2019       HISTORY:   ORDERING SYSTEM PROVIDED HISTORY: pleural effusions   TECHNOLOGIST PROVIDED HISTORY:   pleural effusions       FINDINGS:   AP portable view of the chest time stamped at 519 hours demonstrates   overlying cardiac monitoring electrodes.  Right internal jugular catheter   terminates in the distal superior vena cava.  Multiple polar pacemaker enters   from the left with intact leads in appropriate positions.  Pacemaker battery   box and skin clips overlie the left axilla.  Heart is enlarged and there is   pulmonary vascular congestion with bilateral perihilar opacities consistent   with pulmonary edema.  Moderate bilateral pleural effusions are noted.  No   extrapleural air is seen.  No midline shift is noted.  Osseous structures are   stable.           Impression   Continued CXR    Narrative   EXAMINATION:   TWO VIEWS OF THE CHEST       3/28/2019 7:56 am       COMPARISON:   Chest radiograph performed 03/27/2019.       HISTORY:   ORDERING SYSTEM PROVIDED HISTORY: effusions   TECHNOLOGIST PROVIDED HISTORY:   effusions       FINDINGS:   There is a small left and a large right pleural effusion with adjacent   infiltrate.  There is no pneumothorax.  The mediastinal structures are stable   with stable cardiac leads.  The upper abdomen is unremarkable.  The   extrathoracic soft tissues are unremarkable.           Impression   Small left and large right pleural effusion with adjacent infiltrate   representing atelectasis versus pneumonia.  Overall there may be mild   improvement of the right-sided effusion. Narrative   EXAMINATION:   SINGLE XRAY VIEW OF THE CHEST       3/26/2019 2:37 am       COMPARISON:   March 20, 2019.       HISTORY:   ORDERING SYSTEM PROVIDED HISTORY: SOB, PE   TECHNOLOGIST PROVIDED HISTORY:   SOB, PE       FINDINGS:   Frontal portable view of the chest.  Low lung volume.  Progressed moderate to   large bilateral pleural effusions with consolidation.  Indistinct pulmonary   vasculature.  No pneumothorax.  The cardiomediastinal silhouette is not well   evaluated.  Atherosclerotic thoracic aorta.  Left pectoral trans venous   dual-chamber cardiac pacer device.           Impression   Progressed moderate to large bilateral pleural effusions with consolidation.    Superimposed infection is not excluded.         EXAMINATION:   TWO VIEWS OF THE CHEST       4/17/2019 2:17 pm       COMPARISON:   10 April 2019       HISTORY:   ORDERING SYSTEM PROVIDED HISTORY: CHF, pulmonary edema, ESRD, leukocytosis   TECHNOLOGIST PROVIDED HISTORY:   CHF, pulmonary edema, ESRD, leukocytosis       FINDINGS:   AP portable view of the chest time stamped at 1353 hours demonstrates a   multiple polar pacemaker entering from the left with intact leads in   appropriate positions. [place]

## 2019-04-20 NOTE — PROGRESS NOTES
keeping eyes closed initially. General Comment  Comments: Abdomin distended, nursing notified. Orientation  Orientation  Overall Orientation Status: Within Functional Limits  Cognition   Cognition  Overall Cognitive Status: WFL  Cognition Comment: grossly intact, VCs for safety awareness with use of RW   Objective      Transfers  Sit to Stand: Minimal Assistance  Stand to sit: Min assist  Ambulation  More Ambulation?: Yes  Ambulation 1  Surface: level tile  Device: Rolling Walker  Other Apparatus: O2  Assistance: Minimal assistance  Quality of Gait: Decreased renato, short step length, flexed posture, narrow MARK  Distance: 50 feet x1 in room with RW. Comments: Posterior LOB several times with AMB. Increased alertness in PM.  Pt reports some fatigue. Comment: Pt left with nursing at bathroom sink to brush his teeth. Assessment   Body structures, Functions, Activity limitations: Decreased functional mobility ; Decreased balance;Decreased endurance  Assessment: Increased alertness in PM. Pt consistently more cooperative in PM today. Prognosis: Good  Patient Education: Transfers and purpose of mobility  Activity Tolerance  Activity Tolerance: Patient limited by endurance; Patient limited by fatigue  Activity Tolerance: Rest breaks as needed.            Goals  Short term goals  Time Frame for Short term goals: 25 visits  Short term goal 1: Perform bed mobility and functional transfers independently  Short term goal 2: Ambulate 300ft with least restrictive AD independently  Short term goal 3: Demo Good- dynamic standing balance to decrease risk of falls  Short term goal 4: Participate in 30 minutes of therapy to demo increased endurance    Plan    Plan  Times per week: 6-7x/wk  Current Treatment Recommendations: Strengthening, Balance Training, Functional Mobility Training, Endurance Training, Transfer Training, Patient/Caregiver Education & Training, Safety Education & Training, Home Exercise Program, Gait Training  Safety Devices  Type of devices:  All fall risk precautions in place, Gait belt, Call light within reach, Nurse notified, Left in chair  Restraints  Initially in place: No     Therapy Time   Individual Concurrent Group Co-treatment   Time In 1425         Time Out 1440         Minutes [address], [place]

## 2019-04-20 NOTE — PROGRESS NOTES
Attempting to wean off O2. When placed on room air SaO2 dropped into high 80's. Placed back on 1L NC. Will contineu to monitor.

## 2019-04-20 NOTE — PROGRESS NOTES
Renal Progress Note    Patient :  Sharita Quinn; 80 y.o. MRN# [id number]  Location:    Attending:  Cecile Woo MD  Admit Date:  3/6/2019   Hospital Day: 39    Subjective   Admitted with shortness of breath secondary to decompensated congestive heart failure in the face of aortic stenosis. Subsequently underwent PCI and PVR. Postprocedure course has been complicated by worsening of renal function and hypervolemia for which he is getting recurrent thoracocentesis and diuretic adjustment. Hemodialysis also initiated. Schedule is MWF. Up waling around the room with Pt, able to stand at sink and brush teeth.    Feels hungry today, megace seems to be kicking in.  Objective     VS: BP (!) 94/42   Pulse 63   Temp 97.4 °F (36.3 °C) (Oral)   Resp 12   Ht 5' 6\" (1.676 m)   Wt 128 lb 4.9 oz (58.2 kg)   SpO2 93%   BMI 20.71 kg/m²   MAXIMUM TEMPERATURE OVER 24HRS:  Temp (24hrs), Av.7 °F (36.5 °C), Min:97.4 °F (36.3 °C), Max:98.1 °F (36.7 °C)    24HR BLOOD PRESSURE RANGE:  Systolic (33BQQ), WFE:913 , Min:84 , HGC:700   ; Diastolic (62IFH), EAJ:76, Min:42, Max:52    24HR INTAKE/OUTPUT:      Intake/Output Summary (Last 24 hours) at 2019 1429  Last data filed at 2019 0313  Gross per 24 hour   Intake 120 ml   Output --   Net 120 ml     WEIGHT :  Patient Vitals for the past 96 hrs (Last 3 readings):   Weight   19 0313 128 lb 4.9 oz (58.2 kg)   19 1123 132 lb 15 oz (60.3 kg)   19 0808 134 lb 0.6 oz (60.8 kg)       Current Medications:     Scheduled Meds:    megestrol  200 mg Oral Daily    iron sucrose  100 mg Intravenous Q48H    darbepoetin marya-polysorbate  100 mcg Intravenous Weekly    simethicone  80 mg Oral Q6H    docusate sodium  100 mg Oral BID    lidocaine-EPINEPHrine  20 mL Intradermal Once    lidocaine PF  1 mL Intradermal Once    bacitracin in 0.9 % sodium chloride irrigation  50,000 Units Topical Once    sodium chloride flush  10 mL Intravenous 2 times per day    aspirin  81 mg Oral Daily    spironolactone  25 mg Oral Daily    metoprolol succinate  50 mg Oral Daily    FLUoxetine  10 mg Oral Daily    levothyroxine  125 mcg Oral Daily    ALPRAZolam  0.25 mg Oral Once    QUEtiapine  25 mg Oral Nightly    clopidogrel  75 mg Oral Daily    isosorbide mononitrate  30 mg Oral Daily    [Held by provider] docusate sodium  100 mg Oral Daily    amiodarone  200 mg Oral Daily    atorvastatin  20 mg Oral Daily     Continuous Infusions:    dextrose 20 mL/hr at 04/06/19 1858       Physical Examination:     General:  AAO x 3, speaking in full sentences, no accessory muscle use. Chest:   Decreased breath sounds,poor to fair air exchange   Cardiac:  S1 S2 irregular,+ murmurs, gallops or rubs  Abdomen: Soft, non-tender, non distended, BS+  SKIN:  Warm and dry  Extremities      trace edema, no clubbing, No cyanosis  Neuro:  AAO x 3, No FND.      Labs:       Recent Labs     04/18/19 0408 04/19/19 0413 04/20/19 0447   WBC 13.6* 17.2* 15.0*   RBC 2.66* 2.90* 2.64*   HGB 7.9* 8.5* 7.7*   HCT 25.4* 28.2* 25.4*   MCV 95.5 97.2 96.2   MCH 29.7 29.3 29.2   MCHC 31.1 30.1 30.3   RDW 19.2* 19.4* 18.6*   PLT See Reflexed IPF Result 199 166   MPV NOT REPORTED 11.1 11.2      BMP:   Recent Labs     04/18/19 0408 04/19/19 0413 04/20/19 0447   * 137 133*   K 3.5* 4.2 3.6*   CL 95* 99 96*   CO2 24 21 25   BUN 46* 61* 35*   CREATININE 3.52* 4.53* 3.12*   GLUCOSE 98 101* 101*   CALCIUM 8.7 9.0 8.2*        Urinalysis/Chemistries:      Lab Results   Component Value Date    NITRU NEGATIVE 03/29/2019    COLORU YELLOW 03/29/2019    PHUR 7.5 03/29/2019    WBCUA 50  03/29/2019    RBCUA 50  03/29/2019    MUCUS NOT REPORTED 03/29/2019    TRICHOMONAS NOT REPORTED 03/29/2019    YEAST NOT REPORTED 03/29/2019    BACTERIA NOT REPORTED 03/29/2019    SPECGRAV 1.011 03/29/2019    LEUKOCYTESUR LARGE 03/29/2019    UROBILINOGEN Normal 03/29/2019    BILIRUBINUR NEGATIVE 03/29/2019    GLUCOSEU NEGATIVE 03/29/2019    [address] NEGATIVE 03/29/2019    AMORPHOUS NOT REPORTED 03/29/2019       Radiology:     CXR:   No recent  Assessment:       1. ESRD - new start - on MWF currently. HUYEN secondary to hypoperfusion related to cardiorenal syndrome. Started on HD this admission since 29th of March. Chance of renal recovery unlikely. K 3.6 today. 2. CKD IV-V with baseline creatinine 2, now appears to be ESRD  3. Nephrotic range proteinuria Bx not feasible in view of he being on Asprin and cardiology recommends not to stop anticoagulant. Explained to patient and he does not want to do that. 4. Recurrent pleural effusions s/p thoracentesis  5. 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 S/P PCI and multiple stent placement 3/11/19  7. HTN  8. 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 S/P Upgrade of dual chamber PPM to CRT-P on 4-2-19  10. Hx if urinary retention  11. Anemia    Plan:     1. Next dialysis tx on Monday as per usual schedule, then d/c after dialysis and first outpatient tx Wednesday. 2.  Status post tunnel catheter placement this admission. 3.  Patient being setting up at [facility], first outpatient tx on Wednesday. 4.  Will follow  5. Ct davion DON. Patti Echols MD  [facility]     Attending Physician Statement  I have discussed the care of Caren Foster, including pertinent history and exam findings with the resident/fellow. I have reviewed the key elements of all parts of the encounter with the resident/fellow. I have seen and examined the patient with the resident/fellow. I agree with the assessment and plan and status of the problem list as documented.       .  Electronically signed by Sherif Wilson MD on 4/20/2019 at 3:58 PM

## 2019-04-20 NOTE — PLAN OF CARE
Patient tolerating diet as ordered, stating appetite is increasing. Finishing almost 50% of trays, adequate oral fluids intake. Will continue to monitor.

## 2019-04-21 NOTE — PROGRESS NOTES
[facility] - Progress Note  Today's Date and Time: 4/21/2019, 1:36 PM    Impression :   1. Recurrent bilateral pleural effusions w/lung compression  2. CHF  3. 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 Multivessel CAD s/p multiple stent placements  5. S/P TAVR  6. HUYEN on HD  7. Urinary retention  8. Pulmonary hypertension  9. S/P Upgrade of dual chamber PPM to CRT-P on 4-2-19  10. Anemia s/p transfusion 4/3  11. Right groin pseudoaneurysm s/p thrombin injection   12. S/p thoracentesis 4/19    Recommendations:     · Wound care Lt chest incision  · HD as per nephrology  · Monitor off antibiotics  Medical Decision Making/Summary/Discussion:   · 79 yo gentleman who developed complete heart block. Found to have multivessel CAD, severe aortic stenosis and pulmonary HTN. · Underwent pacemaker placement with subsequent bouts of acute CHF and multiple hospitalizations in [place]  · Brought to [facility] by family for further care. · Since admission at [facility] he has had cardiac catheterizations x 2 with multiple stent placements and a TAVR on 3-15. Pt remains on a nitroglycerine drip  · Pt continues to require frequent thoracenteses  · He has suffered an HUYEN and is currently on a bumex drip  · CXR 3/26 showed progressed moderate to large bilateral pleural effusions with consolidation. · ID consult placed for treatment of any potential pneumonia  · Pt is afebrile, without cough or sputum production. He remains SOB with exertion and at times, when quiet. · Currently no evidence of active infection. Pt will require aggressive pulmonary toilet and diuresis. Plan to monitor off antibiotics  · Patient to continue HD  · He had upgraded pacemaker placed on 4-2-19. · Developed hematoma at pacer site, resolved with pressure. Elequis held 4-3. Hgb to 6.5 received 1u PRBC. Site ecchymotic but no signs of infection.   · Patient is continuing hemodialysis  · Will likely continue hemodialysis as an outpatient    · Right excursion and evidence  of moderate stenosis. (Peak nataliia 3.62 m/s and mean gradient 29 mmHg)  Moderate posterior pericardial effusion without any echo signs of tamponade. Normal right ventricular size and function. Pacemaker lead seen in right ventricle. Moderate tricuspid regurgitation. Estimated right ventricular systolic pressure is 60 mmHg suggesting moderate  pulmonary HTN.    He underwent an atherectomy/JEAN-CLAUDE of the left main and LAD on 3/6. Because of the high surgical risk and renal function he was taken back to the cath lab on 3/11 for PTCA-JEAN-CLAUDE LAD, PTCRA-JEAN-CLAUDE LM with plan to consider a TAVR if pt remained stable.     A TAVR was performed on 3/15     A carotid study showed less than 50% stenosis bilaterally.     He has had multiple thoracenteses since admission. Pt has been followed by CT surgery, cardiology, pulmonary, nephrology and urology.     On the evening of 3/25 pt developed hypertension and SOB at rest. A stat CXR was done that showed progressed moderate to large bilateral pleural effusions with consolidation.      On 3/26 pt underwent a Lt thoracentesis with 1400 ml clear pleural fluid removed. Pt reports feeling much better after the procedure and is asking to sit in the chair.      I am consulted to determine if Mr Ban Perez has pneumonia. Pt had continued decline in renal function, HD initiated 3-29  PPV pacemaker placed 4-2, pt with hematoma at site, resolved with pressure. Anticoagulation held 4-3. Hgb to 6.5 received 1u PRBC     CURRENT EXAMINATION: 4/21/2019    Pt seen and examined in room. Resting comfortably. No acute overnight events. Last HD 4-19-19 . Tunneled catheter placement on Thursday 4-18-19. Afebrile  VS stable. He had upgraded pacemaker placed on 4-2-19  Site continues to improve.     On exam pt is in no distress. Breath sounds are diminished bilateral bases   CXR 4-17-19 shows decrease in pulmonary edema and bilateral pleural effusions.     Had thoracentesis 4/19/2019. 1.65 liters removed from the left pleural cavity. Procedure terminated early due to patient coughing. Repeat blood cultures on 4/17 taken with NGTD. Cultures of catheter tip taken on 4/17 NGTD. Temporary dialysis catheter removed 4/17. Lt groin pseudoaneurysm present, stable. Left lower extremity duplex performed 4-15-19 showed persistent pseudoaneurysm.       Labs reviewed: 4/21/2019    WBC 7.7->12.6 -> 10.5 -> 16.2 -> 13.6 -> 17.2 -> 11.5  Hgb 8.8->6.5->8.3->8.9->7.2-->7.0 -> 8.6 -> 7.9 -> 8.5-->7.3  Plt 57->90->92 -> 73-->105 -> 137 -> 120 -> 137 -> 139 -> 148    Cr 2.74->2.61->2.95->3.29 -> 2.57 -> 2.41-->3.8 -> 3.52 -> 4.60  BUN 44->38->47->54 -> 24 -> 22 -> 46 -> 58    Na 134 -> 133 -> 139    Cultures:  Pleural fluid:  · 3/7 No growth, many neutrophils seen, no bacteria, and no growth    Discussed with Pt, HD RN, Vascular. I have personally reviewed the past medical history, past surgical history, medications, social history, and family history, and I have updated the database accordingly.   Past Medical History:     Past Medical History:   Diagnosis Date    Aortic stenosis 02/15/2019    Atrial fibrillation (HCC)     CAD (coronary artery disease)     Chest pain 02/15/2019    CHF (congestive heart failure) (HCC)     Chronic anemia     Chronic kidney disease     Hypertension     Pleural effusion on left 02/15/2019    Pleural effusion, right 02/15/2019    Pulmonary emboli ([address] 02/15/2019    Pulmonary hypertension ([address] 03/06/2019    Thyroid disease     hypothyroidism       Past Surgical  History:     Past Surgical History:   Procedure Laterality Date    CORONARY ANGIOPLASTY WITH STENT PLACEMENT  03/11/2019    complex PCI of LT MAIN, LAD    JOINT REPLACEMENT      right ankle    OTHER SURGICAL HISTORY  04/02/2019    UPGRADE TO BI-V PACEMAKER    OTHER SURGICAL HISTORY  03/15/2019    TAVR PROCEDURE    PACEMAKER PLACEMENT  01/20/2019    THORACENTESIS Medications:      metoprolol succinate  25 mg Oral Daily    megestrol  200 mg Oral Daily    iron sucrose  100 mg Intravenous Q48H    darbepoetin marya-polysorbate  100 mcg Intravenous Weekly    simethicone  80 mg Oral Q6H    docusate sodium  100 mg Oral BID    lidocaine-EPINEPHrine  20 mL Intradermal Once    lidocaine PF  1 mL Intradermal Once    bacitracin in 0.9 % sodium chloride irrigation  50,000 Units Topical Once    sodium chloride flush  10 mL Intravenous 2 times per day    aspirin  81 mg Oral Daily    spironolactone  25 mg Oral Daily    FLUoxetine  10 mg Oral Daily    levothyroxine  125 mcg Oral Daily    ALPRAZolam  0.25 mg Oral Once    QUEtiapine  25 mg Oral Nightly    clopidogrel  75 mg Oral Daily    [Held by provider] docusate sodium  100 mg Oral Daily    amiodarone  200 mg Oral Daily    atorvastatin  20 mg Oral Daily       Social History:     Social History     Socioeconomic History    Marital status:       Spouse name: Not on file    Number of children: Not on file    Years of education: Not on file    Highest education level: Not on file   Occupational History     Employer: RETIRED   Social Needs    Financial resource strain: Not on file    Food insecurity:     Worry: Not on file     Inability: Not on file    Transportation needs:     Medical: Not on file     Non-medical: Not on file   Tobacco Use    Smoking status: Former Smoker     Last attempt to quit:      Years since quittin.3    Smokeless tobacco: Never Used   Substance and Sexual Activity    Alcohol use: Not Currently    Drug use: Never    Sexual activity: Not on file   Lifestyle    Physical activity:     Days per week: Not on file     Minutes per session: Not on file    Stress: Not on file   Relationships    Social connections:     Talks on phone: Not on file     Gets together: Not on file     Attends Buddhist service: Not on file     Active member of club or organization: Not on file Attends meetings of clubs or organizations: Not on file     Relationship status: Not on file    Intimate partner violence:     Fear of current or ex partner: Not on file     Emotionally abused: Not on file     Physically abused: Not on file     Forced sexual activity: Not on file   Other Topics Concern    Not on file   Social History Narrative    Not on file       Family History:   History reviewed. No pertinent family history. Allergies:   Quinolones     Review of Systems:   AA, reports continued tenderness at pacemaker site  No chills or fevers, no SOB  Seen on HD    Physical Examination :     Patient Vitals for the past 8 hrs:   BP Temp Temp src Pulse SpO2   04/21/19 1211 (!) 104/39 -- -- 63 --   04/21/19 1150 (!) 94/37 97.8 °F (36.6 °C) Oral 62 94 %   04/21/19 0655 (!) 138/50 97.7 °F (36.5 °C) Oral 64 --     General Appearance: Seen in HD, easily roused, no distress  Head:  Normocephalic, no trauma  Eyes: Pupils equal, round, reactive to light and accommodation; extraocular movements intact; sclera anicteric. ENT: Oropharynx clear. Mouth/throat: mucosa pink and moist. No lesions. Dentition in good repair. Neck: Supple, without lymphadenopathy. Pulmonary/Chest: Clear to auscultation, without wheezes, rales, or rhonchi. Decreased breath sounds at bilateral bases   Cardiovascular: Regular rate and rhythm without murmurs, rubs, or gallops. Paced  Abdomen: Soft. Bowel sounds normal.  : Johnston in place  All four Extremities: Mild edema. Neurologic: No gross sensory or motor deficits. Skin: Warm and dry with good turgor. No signs of peripheral arterial or venous insufficiency. No ulcerations. No open wounds.  Lt chest pacemaker incision with hematoma, staples intact, no oozing or bleeding noted    Medical Decision Making -Laboratory:   I have independently reviewed/ordered the following labs:    CBC with Differential:   Recent Labs     04/20/19  0447 04/21/19  0458   WBC 15.0* 11.5*   HGB 7.7* 7.3*   HCT 25.4* 24.9*    148   LYMPHOPCT 3* 7*   MONOPCT 5 5     BMP:   Recent Labs     19  0447 19  0458   * 139   K 3.6* 3.7   CL 96* 100   CO2 25 24   BUN 35* 58*   CREATININE 3.12* 4.60*     Hepatic Function Panel:   No results for input(s): PROT, LABALBU, BILIDIR, IBILI, BILITOT, ALKPHOS, ALT, AST in the last 72 hours. No results for input(s): RPR in the last 72 hours. No results for input(s): HIV in the last 72 hours. No results for input(s): BC in the last 72 hours. Lab Results   Component Value Date    MUCUS NOT REPORTED 2019    RBC 2.54 2019    TRICHOMONAS NOT REPORTED 2019    WBC 11.5 2019    YEAST NOT REPORTED 2019    TURBIDITY CLEAR 2019     Lab Results   Component Value Date    CREATININE 4.60 2019    GLUCOSE 94 2019       Medical Decision Making-Imagin-5 CXR    EXAMINATION:   SINGLE XRAY VIEW OF THE CHEST       2019 5:39 am       COMPARISON:   2019       HISTORY:   ORDERING SYSTEM PROVIDED HISTORY: pleural effusions   TECHNOLOGIST PROVIDED HISTORY:   pleural effusions       FINDINGS:   AP portable view of the chest time stamped at 519 hours demonstrates   overlying cardiac monitoring electrodes.  Right internal jugular catheter   terminates in the distal superior vena cava.  Multiple polar pacemaker enters   from the left with intact leads in appropriate positions.  Pacemaker battery   box and skin clips overlie the left axilla.  Heart is enlarged and there is   pulmonary vascular congestion with bilateral perihilar opacities consistent   with pulmonary edema.  Moderate bilateral pleural effusions are noted.  No   extrapleural air is seen.  No midline shift is noted.  Osseous structures are   stable.           Impression   Continued cardiomegaly, opacities most compatible with pulmonary edema and   bilateral pleural effusions.          4-1 CXR    EXAMINATION:   SINGLE XRAY VIEW OF THE CHEST       2019 6:04 am     COMPARISON:   03/31/2019       HISTORY:   ORDERING SYSTEM PROVIDED HISTORY: SOB   TECHNOLOGIST PROVIDED HISTORY:   SOB       FINDINGS:   There is a left-sided transvenous pacer in place and a right internal jugular   dialysis catheter.  There is cardiomegaly with pulmonary vascular congestion   and edema.  There are bilateral pleural effusions which are increased.  The   pulmonary edema appears worse on the current exam.           Impression   Worsening edema and increased bilateral pleural effusions         4-1 AXR  EXAMINATION:   SINGLE SUPINE XRAY VIEW(S) OF THE ABDOMEN       4/1/2019 2:40 pm       COMPARISON:   None.       HISTORY:   ORDERING SYSTEM PROVIDED HISTORY: r/o ileus   TECHNOLOGIST PROVIDED HISTORY:   r/o ileus       Initial exam       FINDINGS:   Nonspecific bowel gas pattern with air-filled small and large bowel.  No   organomegaly noted.  No pathologic calcifications.  Degenerative changes of   the lower lumbar spine.           Impression   Nonspecific bowel gas pattern without evidence of small bowel obstruction.          3/29 CXR      Narrative   EXAMINATION:   SINGLE XRAY VIEW OF THE CHEST       3/29/2019 8:26 am       COMPARISON:   Chest radiograph performed 03/28/2019.       HISTORY:   ORDERING SYSTEM PROVIDED HISTORY: effusions   TECHNOLOGIST PROVIDED HISTORY:   effusions       FINDINGS:   There are large bilateral effusions with adjacent consolidation.  There is   underlying pulmonary congestion.  There is no pneumothorax.  The heart is   enlarged with stable cardiac leads.  The upper abdomen is unremarkable.  The   extrathoracic soft tissues are unremarkable.           Impression   Cardiomegaly with large bilateral effusions and adjacent consolidation   concerning for pneumonia.               3/27 CXR    Narrative   EXAMINATION:   TWO VIEWS OF THE CHEST       3/28/2019 7:56 am       COMPARISON:   Chest radiograph performed 03/27/2019.       HISTORY:   ORDERING SYSTEM PROVIDED HISTORY: effusions   TECHNOLOGIST PROVIDED HISTORY:   effusions       FINDINGS:   There is a small left and a large right pleural effusion with adjacent   infiltrate.  There is no pneumothorax.  The mediastinal structures are stable   with stable cardiac leads.  The upper abdomen is unremarkable.  The   extrathoracic soft tissues are unremarkable.           Impression   Small left and large right pleural effusion with adjacent infiltrate   representing atelectasis versus pneumonia.  Overall there may be mild   improvement of the right-sided effusion. Narrative   EXAMINATION:   SINGLE XRAY VIEW OF THE CHEST       3/26/2019 2:37 am       COMPARISON:   March 20, 2019.       HISTORY:   ORDERING SYSTEM PROVIDED HISTORY: SOB, PE   TECHNOLOGIST PROVIDED HISTORY:   SOB, PE       FINDINGS:   Frontal portable view of the chest.  Low lung volume.  Progressed moderate to   large bilateral pleural effusions with consolidation.  Indistinct pulmonary   vasculature.  No pneumothorax.  The cardiomediastinal silhouette is not well   evaluated.  Atherosclerotic thoracic aorta.  Left pectoral trans venous   dual-chamber cardiac pacer device.           Impression   Progressed moderate to large bilateral pleural effusions with consolidation.    Superimposed infection is not excluded.         EXAMINATION:   TWO VIEWS OF THE CHEST       4/17/2019 2:17 pm       COMPARISON:   10 April 2019       HISTORY:   ORDERING SYSTEM PROVIDED HISTORY: CHF, pulmonary edema, ESRD, leukocytosis   TECHNOLOGIST PROVIDED HISTORY:   CHF, pulmonary edema, ESRD, leukocytosis       FINDINGS:   AP portable view of the chest time stamped at 1353 hours demonstrates a   multiple polar pacemaker entering from the left with intact leads in   appropriate positions.  Stable cardiomegaly is noted.  Moderate left effusion   and mild right effusion are noted.  Vascular congestion is noted, decreased   over prior study.  Basilar opacities are noted likely related atelectasis   and/or edema.  No extrapleural air is noted.  Intravascular stent is present   distal thoracic aorta.           Impression   Decreased vascular congestion and basilar opacities compared to prior study. Persistent effusions, mild vascular congestion and bibasilar opacities   favoring atelectasis and mild edema.             Medical Decision Making-Other: Thank you for allowing us to participate in the care of this patient. Please call with questions.     Ludmila Rollins MD

## 2019-04-21 NOTE — PROGRESS NOTES
to Stand: Moderate Assistance  Stand to sit: Minimal Assistance  Ambulation 1  Surface: level tile  Device: Rolling Walker  Other Apparatus: (no O2)  Assistance: Minimal assistance  Quality of Gait: Decreased renato, short step length, flexed posture, narrow MARK. Initially pt demonstrated posterior lean. Distance: 65 feet x1 with wheelchair follow  Comments: IV Pole, On room air. SpO2% post AMB 88%        Other exercises  Other exercises 1: Seated LE exercise program: Long Arc Quads, hip abduction/adduction, heel/toe raises, and marches. Reps: x 10  Other exercises 2: Upper extremity exercises: Bicep curl, shoulder flexion/extension, punches, tricep curl, shoulder abduction/adduction. Reps:  x 10 w/in pacemaker precautions         Comment: Returned pt to chair after AMB              Assessment   Body structures, Functions, Activity limitations: Decreased functional mobility ; Decreased balance;Decreased endurance  Assessment: Increased AMB distance to 65 feet with Min assist for balance and safety. reports LE fatigue. SpO2% 88% on room air post AMB. Prognosis: Good  Patient Education: Transfers and purpose of mobility  Activity Tolerance  Activity Tolerance: Patient limited by fatigue  Activity Tolerance: C/O LE fatigue with AMB.  Denies UE fatigue        Goals  Short term goals  Time Frame for Short term goals: 25 visits  Short term goal 1: Perform bed mobility and functional transfers independently  Short term goal 2: Ambulate 300ft with least restrictive AD independently  Short term goal 3: Demo Good- dynamic standing balance to decrease risk of falls  Short term goal 4: Participate in 30 minutes of therapy to demo increased endurance    Plan    Plan  Times per week: 6-7x/wk  Current Treatment Recommendations: Strengthening, Balance Training, Functional Mobility Training, Endurance Training, Transfer Training, Patient/Caregiver Education & Training, Safety Education & Training, Home Exercise Program, Gait Training  Safety Devices  Type of devices:  All fall risk precautions in place, Gait belt, Call light within reach, Nurse notified, Left in chair  Restraints  Initially in place: No     Therapy Time   Individual Concurrent Group Co-treatment   Time In 1104         Time Out 1128         Minutes [address] [place], [place]

## 2019-04-21 NOTE — PROGRESS NOTES
Renal Progress Note    Patient :  Maximino Salazar; 80 y.o. MRN# [id number]  Location:    Attending:  Otis Flood MD  Admit Date:  3/6/2019   Hospital Day: 55    Subjective   Admitted with shortness of breath secondary to decompensated congestive heart failure in the face of aortic stenosis. Subsequently underwent PCI and PVR. Postprocedure course has been complicated by worsening of renal function and hypervolemia for which he is getting recurrent thoracocentesis and diuretic adjustment. Hemodialysis also initiated. Schedule is MWF. Up in chair. Denies chest pain or dyspnea.     Objective     VS: BP (!) 138/50   Pulse 64   Temp 97.7 °F (36.5 °C) (Oral)   Resp 14   Ht 5' 6\" (1.676 m)   Wt 128 lb 4.9 oz (58.2 kg)   SpO2 93%   BMI 20.71 kg/m²   MAXIMUM TEMPERATURE OVER 24HRS:  Temp (24hrs), Av °F (36.7 °C), Min:97.4 °F (36.3 °C), Max:98.4 °F (36.9 °C)    24HR BLOOD PRESSURE RANGE:  Systolic (80XMZ), DLU:981 , Min:78 , LYN:221   ; Diastolic (48DVR), ZAO:95, Min:39, Max:50    24HR INTAKE/OUTPUT:      Intake/Output Summary (Last 24 hours) at 2019 0934  Last data filed at 2019 1755  Gross per 24 hour   Intake 600 ml   Output --   Net 600 ml     WEIGHT :  Patient Vitals for the past 96 hrs (Last 3 readings):   Weight   19 0313 128 lb 4.9 oz (58.2 kg)   19 1123 132 lb 15 oz (60.3 kg)   19 0808 134 lb 0.6 oz (60.8 kg)       Current Medications:     Scheduled Meds:    metoprolol succinate  25 mg Oral Daily    megestrol  200 mg Oral Daily    iron sucrose  100 mg Intravenous Q48H    darbepoetin marya-polysorbate  100 mcg Intravenous Weekly    simethicone  80 mg Oral Q6H    docusate sodium  100 mg Oral BID    lidocaine-EPINEPHrine  20 mL Intradermal Once    lidocaine PF  1 mL Intradermal Once    bacitracin in 0.9 % sodium chloride irrigation  50,000 Units Topical Once    sodium chloride flush  10 mL Intravenous 2 times per day    aspirin  81 mg Oral Daily    spironolactone  25 mg Oral Daily    FLUoxetine  10 mg Oral Daily    levothyroxine  125 mcg Oral Daily    ALPRAZolam  0.25 mg Oral Once    QUEtiapine  25 mg Oral Nightly    clopidogrel  75 mg Oral Daily    [Held by provider] docusate sodium  100 mg Oral Daily    amiodarone  200 mg Oral Daily    atorvastatin  20 mg Oral Daily     Continuous Infusions:    dextrose 20 mL/hr at 04/06/19 1858       Physical Examination:     General:  AAO x 3, speaking in full sentences, no accessory muscle use. Chest:   Decreased breath sounds,poor to fair air exchange   Cardiac:  S1 S2 irregular,+ murmurs, gallops or rubs  Abdomen: Soft, non-tender, non distended, BS+  SKIN:  Warm and dry  Extremities      trace edema, no clubbing, No cyanosis  Neuro:  AAO x 3, No FND. Labs:       Recent Labs     04/19/19 0413 04/20/19 0447 04/21/19  0458   WBC 17.2* 15.0* 11.5*   RBC 2.90* 2.64* 2.54*   HGB 8.5* 7.7* 7.3*   HCT 28.2* 25.4* 24.9*   MCV 97.2 96.2 98.0   MCH 29.3 29.2 28.7   MCHC 30.1 30.3 29.3   RDW 19.4* 18.6* 18.6*    166 148   MPV 11.1 11.2 11.3      BMP:   Recent Labs     04/19/19 0413 04/20/19 0447 04/21/19  0458    133* 139   K 4.2 3.6* 3.7   CL 99 96* 100   CO2 21 25 24   BUN 61* 35* 58*   CREATININE 4.53* 3.12* 4.60*   GLUCOSE 101* 101* 94   CALCIUM 9.0 8.2* 8.4*        Urinalysis/Chemistries:      Lab Results   Component Value Date    NITRU NEGATIVE 03/29/2019    COLORU YELLOW 03/29/2019    PHUR 7.5 03/29/2019    WBCUA 50  03/29/2019    RBCUA 50  03/29/2019    MUCUS NOT REPORTED 03/29/2019    TRICHOMONAS NOT REPORTED 03/29/2019    YEAST NOT REPORTED 03/29/2019    BACTERIA NOT REPORTED 03/29/2019    SPECGRAV 1.011 03/29/2019    LEUKOCYTESUR LARGE 03/29/2019    UROBILINOGEN Normal 03/29/2019    BILIRUBINUR NEGATIVE 03/29/2019    GLUCOSEU NEGATIVE 03/29/2019    [address] NEGATIVE 03/29/2019    AMORPHOUS NOT REPORTED 03/29/2019       Radiology:     CXR:   No recent  Assessment:       1.  ESRD - new start - on MWF currently. HUYEN secondary to hypoperfusion related to cardiorenal syndrome. Started on HD this admission since 29th of March. Chance of renal recovery unlikely. 2. CKD IV-V with baseline creatinine 2, now appears to be ESRD  3. Nephrotic range proteinuria Bx not feasible in view of he being on Asprin and cardiology recommends not to stop anticoagulant. Explained to patient and he does not want to do that. 4. Recurrent pleural effusions s/p thoracentesis  5. 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 S/P PCI and multiple stent placement 3/11/19  7. HTN  8. 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 S/P Upgrade of dual chamber PPM to CRT-P on 4-2-19  10. Hx if urinary retention  11. Anemia    Plan:     1. Next dialysis tx on Monday as per usual schedule, then d/c after dialysis and first outpatient tx Wednesday. 2.  Status post tunnel catheter placement this admission. 3.  Patient being set up at [facility] [place] [facility], first outpatient tx on Wednesday. 4.  Will follow  5. Ct megace    Manhattan Needles CNP    Attending Physician Statement  I have discussed the care of Christie English, including pertinent history and exam findings with the resident/fellow. I have reviewed the key elements of all parts of the encounter with the resident/fellow. I have seen and examined the patient with the resident/fellow. I agree with the assessment and plan and status of the problem list as documented.       .  Electronically signed by Jonathan Randolph MD on 4/21/2019 at 3:21 PM

## 2019-04-22 NOTE — FLOWSHEET NOTE
DATE: 2019    NAME: Angelito Guillen  MRN: [id number]   : 12/10/1928    Patient not seen this date for Physical Therapy due to:  [] Blood transfusion in progress  [] Hemodialysis  [x]  Patient Declined: Pt's too fatigued after hemodialysis. Will check on pt tomorrow.  [] Spine Precautions   [] Strict Bedrest  [] Surgery/ Procedure  [] Testing      [] Other        [] PT being discontinued at this time. Patient independent. No further needs. [] PT being discontinued at this time as the patient has been transferred to palliative care. No further needs.     Wesly Teresa, PTA

## 2019-04-22 NOTE — CARE COORDINATION
Pt's OP dialysis arrangements are in place  Pt will have OP dialysis at [facility] @ 12:30p, beginning 4/24/19  Dr Domingo Weir to follow  Pt given confirmation letter on Fri  CM was also given confirmation letter to provide [facility]

## 2019-04-22 NOTE — FLOWSHEET NOTE
DATE: 2019    NAME: Anuja Guevara  MRN: [id number]   : 12/10/1928    Patient not seen this date for Physical Therapy due to:  [] Blood transfusion in progress  [x] Hemodialysis  []  Patient Declined  [] Spine Precautions   [] Strict Bedrest  [] Surgery/ Procedure  [] Testing      [] Other        [] PT being discontinued at this time. Patient independent. No further needs. [] PT being discontinued at this time as the patient has been transferred to palliative care. No further needs.     Steve Rodriguez, PTA

## 2019-04-22 NOTE — PROGRESS NOTES
[facility]  Pre Op Progress Note    CC: hemodialysis, pleural effusions      Subjective:  Mr. Dangelo Abler seen and examined Plan of care reviewed and questions answered. Physical Exam  Vital Signs: BP (!) 82/52   Pulse 64   Temp 98.1 °F (36.7 °C) (Oral)   Resp 18   Ht 5' 6\" (1.676 m)   Wt 132 lb 7.9 oz (60.1 kg)   SpO2 96%   BMI 21.39 kg/m²  O2 Flow Rate (L/min): 0.5 L/min       General: alert and oriented to person, place and time. Up in chair, No apparent distress. Heart:Normal S1 and S2.  Regular rhythm. No murmurs, gallops, or rubs.    Lungs: clear to auscultation bilaterally  Abdomen: soft, non tender, non distended, BSx4  Extremities: negative      Scheduled Meds:    metoprolol succinate  25 mg Oral Daily    megestrol  200 mg Oral Daily    iron sucrose  100 mg Intravenous Q48H    darbepoetin marya-polysorbate  100 mcg Intravenous Weekly    simethicone  80 mg Oral Q6H    docusate sodium  100 mg Oral BID    lidocaine-EPINEPHrine  20 mL Intradermal Once    lidocaine PF  1 mL Intradermal Once    bacitracin in 0.9 % sodium chloride irrigation  50,000 Units Topical Once    sodium chloride flush  10 mL Intravenous 2 times per day    aspirin  81 mg Oral Daily    spironolactone  25 mg Oral Daily    FLUoxetine  10 mg Oral Daily    levothyroxine  125 mcg Oral Daily    ALPRAZolam  0.25 mg Oral Once    QUEtiapine  25 mg Oral Nightly    clopidogrel  75 mg Oral Daily    [Held by provider] docusate sodium  100 mg Oral Daily    amiodarone  200 mg Oral Daily    atorvastatin  20 mg Oral Daily     Continuous Infusions:    dextrose 20 mL/hr at 04/06/19 1938       Data:  CBC:   Recent Labs     04/20/19 0447 04/21/19 0458   WBC 15.0* 11.5*   HGB 7.7* 7.3*   HCT 25.4* 24.9*   MCV 96.2 98.0    148     BMP:   Recent Labs     04/20/19 0447 04/21/19 0458   * 139   K 3.6* 3.7   CL 96* 100   CO2 25 24   BUN 35* 58*   CREATININE 3.12* 4.60*     PT/INR:   Recent Labs 04/19/19  1009   PROTIME 12.0   INR 1.1     APTT:   Recent Labs     04/19/19  1009   APTT 26.6         Assessment & Plan:   Patient Active Problem List   Diagnosis    Pulmonary hypertension (HCC)    Acute on chronic systolic congestive heart failure (HCC)    Pleural effusion due to CHF (congestive heart failure) (HCC)    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 Awaiting Hemodialysis today, wean oxygen off if possible  2. Plan for discharge to [facility] tomorrow      The above recommendations including medications and orders were discussed and agreed upon with Dr. Maricel Patricia, the attending on service for the cardiothoracic surgery group today.      Summer Hopkins, APRN, CNP

## 2019-04-22 NOTE — PROGRESS NOTES
Dialysis Post Treatment Note  Patient tolerated treatment well. Denies complaints at time of discharge. Vitals:    04/22/19 1130   BP: (!) 99/43   Pulse: 60   Resp:    Temp: 97.9 °F (36.6 °C)   SpO2:      Pre-Weight = 59.5 kg  Post-weight = Weight: 130 lb 11.7 oz (59.3 kg)  Total Liters Processed = Total Liters Processed (l/min): 72 l/min  Rinseback Volume (mL) = Rinseback Volume (ml): 370 ml  Net Removal (mL) = @[id number]  Length of treatment=210  bp is very low today, gave albumin for support of bp, post treatment, pt needing O2, but reports feeling ok.

## 2019-04-22 NOTE — PROGRESS NOTES
[facility] - Progress Note  Today's Date and Time: 4/22/2019, 2:33 PM    Impression :   1. Recurrent bilateral pleural effusions w/lung compression  2. CHF  3. 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 Multivessel CAD s/p multiple stent placements  5. S/P TAVR  6. HUYEN on HD  7. Urinary retention  8. Pulmonary hypertension  9. S/P Upgrade of dual chamber PPM to CRT-P on 4-2-19  10. Anemia s/p transfusion 4/3  11. Right groin pseudoaneurysm s/p thrombin injection   12. S/p thoracentesis 4/19    Recommendations:     · Wound care Lt chest incision  · HD as per nephrology  · Monitor off antibiotics  Medical Decision Making/Summary/Discussion:   · 81 yo gentleman who developed complete heart block. Found to have multivessel CAD, severe aortic stenosis and pulmonary HTN. · Underwent pacemaker placement with subsequent bouts of acute CHF and multiple hospitalizations in [place]  · Brought to [place] by family for further care. · Since admission at [facility] he has had cardiac catheterizations x 2 with multiple stent placements and a TAVR on 3-15. Pt remains on a nitroglycerine drip  · Pt continues to require frequent thoracenteses  · He has suffered an HUYEN and is currently on a bumex drip  · CXR 3/26 showed progressed moderate to large bilateral pleural effusions with consolidation. · ID consult placed for treatment of any potential pneumonia  · Pt is afebrile, without cough or sputum production. He remains SOB with exertion and at times, when quiet. · Currently no evidence of active infection. Pt will require aggressive pulmonary toilet and diuresis. Plan to monitor off antibiotics  · Patient to continue HD  · He had upgraded pacemaker placed on 4-2-19. · Developed hematoma at pacer site, resolved with pressure. Elequis held 4-3. Hgb to 6.5 received 1u PRBC. Site ecchymotic but no signs of infection.   · Patient is continuing hemodialysis  · Will likely continue hemodialysis as an outpatient    · Right excursion and evidence  of moderate stenosis. (Peak nataliia 3.62 m/s and mean gradient 29 mmHg)  Moderate posterior pericardial effusion without any echo signs of tamponade. Normal right ventricular size and function. Pacemaker lead seen in right ventricle. Moderate tricuspid regurgitation. Estimated right ventricular systolic pressure is 60 mmHg suggesting moderate  pulmonary HTN.    He underwent an atherectomy/JEAN-CLAUDE of the left main and LAD on 3/6. Because of the high surgical risk and renal function he was taken back to the cath lab on 3/11 for PTCA-JEAN-CLAUDE LAD, PTCRA-JEAN-CLAUDE LM with plan to consider a TAVR if pt remained stable.     A TAVR was performed on 3/15     A carotid study showed less than 50% stenosis bilaterally.     He has had multiple thoracenteses since admission. Pt has been followed by CT surgery, cardiology, pulmonary, nephrology and urology.     On the evening of 3/25 pt developed hypertension and SOB at rest. A stat CXR was done that showed progressed moderate to large bilateral pleural effusions with consolidation.      On 3/26 pt underwent a Lt thoracentesis with 1400 ml clear pleural fluid removed. Pt reports feeling much better after the procedure and is asking to sit in the chair.      I am consulted to determine if Mr Eliot Rutledge has pneumonia. Pt had continued decline in renal function, HD initiated 3-29  PPV pacemaker placed 4-2, pt with hematoma at site, resolved with pressure. Anticoagulation held 4-3. Hgb to 6.5 received 1u PRBC     CURRENT EXAMINATION: 4/22/2019    Pt seen and examined in room. Resting comfortably. No acute overnight events. Last HD 4-19-19 . Tunneled catheter placement on Thursday 4-18-19. Afebrile  VS stable. He had upgraded pacemaker placed on 4-2-19  Site continues to improve.     On exam pt is in no distress. Breath sounds are diminished bilateral bases   CXR 4-17-19 shows decrease in pulmonary edema and bilateral pleural effusions.     Had thoracentesis 4/19/2019. 1.65 liters removed from the left pleural cavity. Procedure terminated early due to patient coughing. Repeat blood cultures on 4/17 taken with NGTD. Cultures of catheter tip taken on 4/17 NGTD. Temporary dialysis catheter removed 4/17. New cath placed on 4/18    Lt groin pseudoaneurysm present, stable. Left lower extremity duplex performed 4-15-19 showed persistent pseudoaneurysm.       Labs reviewed: 4/22/2019    WBC 7.7->12.6 -> 10.5 -> 16.2 -> 13.6 -> 17.2 -> 11.5 -> 10  Hgb 8.8->6.5->8.3->8.9->7.2-->7.0 -> 8.6 -> 7.9 -> 8.5-->7.3 -> 7.8  Plt 57->90->92 -> 73-->105 -> 137 -> 120 -> 137 -> 139 -> 148 -> 159    Cr 2.74->2.61->2.95->3.29 -> 2.57 -> 2.41-->3.8 -> 3.52 -> 4.60 -> 5.11  BUN 44->38->47->54 -> 24 -> 22 -> 46 -> 58 -> 81    Na 134 -> 133 -> 139 -> 136    Cultures:  Pleural fluid:  · 3/7 No growth, many neutrophils seen, no bacteria, and no growth    Discussed with Pt, HD RN, Vascular. I have personally reviewed the past medical history, past surgical history, medications, social history, and family history, and I have updated the database accordingly.   Past Medical History:     Past Medical History:   Diagnosis Date    Aortic stenosis 02/15/2019    Atrial fibrillation (HCC)     CAD (coronary artery disease)     Chest pain 02/15/2019    CHF (congestive heart failure) (HCC)     Chronic anemia     Chronic kidney disease     Hypertension     Pleural effusion on left 02/15/2019    Pleural effusion, right 02/15/2019    Pulmonary emboli ([address] 02/15/2019    Pulmonary hypertension ([address] 03/06/2019    Thyroid disease     hypothyroidism       Past Surgical  History:     Past Surgical History:   Procedure Laterality Date    CORONARY ANGIOPLASTY WITH STENT PLACEMENT  03/11/2019    complex PCI of LT MAIN, LAD    JOINT REPLACEMENT      right ankle    OTHER SURGICAL HISTORY  04/02/2019    UPGRADE TO BI-V PACEMAKER    OTHER SURGICAL HISTORY  03/15/2019    TAVR PROCEDURE  PACEMAKER PLACEMENT  2019    THORACENTESIS         Medications:      metoprolol succinate  25 mg Oral Daily    megestrol  200 mg Oral Daily    iron sucrose  100 mg Intravenous Q48H    darbepoetin marya-polysorbate  100 mcg Intravenous Weekly    simethicone  80 mg Oral Q6H    docusate sodium  100 mg Oral BID    lidocaine-EPINEPHrine  20 mL Intradermal Once    lidocaine PF  1 mL Intradermal Once    bacitracin in 0.9 % sodium chloride irrigation  50,000 Units Topical Once    sodium chloride flush  10 mL Intravenous 2 times per day    aspirin  81 mg Oral Daily    spironolactone  25 mg Oral Daily    FLUoxetine  10 mg Oral Daily    levothyroxine  125 mcg Oral Daily    ALPRAZolam  0.25 mg Oral Once    QUEtiapine  25 mg Oral Nightly    clopidogrel  75 mg Oral Daily    [Held by provider] docusate sodium  100 mg Oral Daily    amiodarone  200 mg Oral Daily    atorvastatin  20 mg Oral Daily       Social History:     Social History     Socioeconomic History    Marital status:       Spouse name: Not on file    Number of children: Not on file    Years of education: Not on file    Highest education level: Not on file   Occupational History     Employer: RETIRED   Social Needs    Financial resource strain: Not on file    Food insecurity:     Worry: Not on file     Inability: Not on file    Transportation needs:     Medical: Not on file     Non-medical: Not on file   Tobacco Use    Smoking status: Former Smoker     Last attempt to quit:      Years since quittin.3    Smokeless tobacco: Never Used   Substance and Sexual Activity    Alcohol use: Not Currently    Drug use: Never    Sexual activity: Not on file   Lifestyle    Physical activity:     Days per week: Not on file     Minutes per session: Not on file    Stress: Not on file   Relationships    Social connections:     Talks on phone: Not on file     Gets together: Not on file     Attends [employer] service: Not on file gallops. Paced  Abdomen: Soft. Bowel sounds normal.  : Johnston in place  All four Extremities: Mild edema. Neurologic: No gross sensory or motor deficits. Skin: Warm and dry with good turgor. No signs of peripheral arterial or venous insufficiency. No ulcerations. No open wounds. Lt chest pacemaker incision with hematoma, staples intact, no oozing or bleeding noted    Medical Decision Making -Laboratory:   I have independently reviewed/ordered the following labs:    CBC with Differential:   Recent Labs     19  0458 19  0901   WBC 11.5* 10.0   HGB 7.3* 7.8*   HCT 24.9* 25.9*    159   LYMPHOPCT 7* 5*   MONOPCT 5 1*     BMP:   Recent Labs     19  0458 19  0900    136   K 3.7 3.7    97*   CO2 24 22   BUN 58* 81*   CREATININE 4.60* 5.11*     Hepatic Function Panel:   No results for input(s): PROT, LABALBU, BILIDIR, IBILI, BILITOT, ALKPHOS, ALT, AST in the last 72 hours. No results for input(s): RPR in the last 72 hours. No results for input(s): HIV in the last 72 hours. No results for input(s): BC in the last 72 hours.   Lab Results   Component Value Date    MUCUS NOT REPORTED 2019    RBC 2.72 2019    TRICHOMONAS NOT REPORTED 2019    WBC 10.0 2019    YEAST NOT REPORTED 2019    TURBIDITY CLEAR 2019     Lab Results   Component Value Date    CREATININE 5.11 2019    GLUCOSE 94 2019       Medical Decision Making-Imagin-5 CXR    EXAMINATION:   SINGLE XRAY VIEW OF THE CHEST       2019 5:39 am       COMPARISON:   2019       HISTORY:   ORDERING SYSTEM PROVIDED HISTORY: pleural effusions   TECHNOLOGIST PROVIDED HISTORY:   pleural effusions       FINDINGS:   AP portable view of the chest time stamped at 519 hours demonstrates   overlying cardiac monitoring electrodes.  Right internal jugular catheter   terminates in the distal superior vena cava.  Multiple polar pacemaker enters   from the left with intact leads in appropriate positions.  Pacemaker battery   box and skin clips overlie the left axilla.  Heart is enlarged and there is   pulmonary vascular congestion with bilateral perihilar opacities consistent   with pulmonary edema.  Moderate bilateral pleural effusions are noted.  No   extrapleural air is seen.  No midline shift is noted.  Osseous structures are   stable.           Impression   Continued cardiomegaly, opacities most compatible with pulmonary edema and   bilateral pleural effusions. 4-1 CXR    EXAMINATION:   SINGLE XRAY VIEW OF THE CHEST       4/1/2019 6:04 am       COMPARISON:   03/31/2019       HISTORY:   ORDERING SYSTEM PROVIDED HISTORY: SOB   TECHNOLOGIST PROVIDED HISTORY:   SOB       FINDINGS:   There is a left-sided transvenous pacer in place and a right internal jugular   dialysis catheter.  There is cardiomegaly with pulmonary vascular congestion   and edema.  There are bilateral pleural effusions which are increased.  The   pulmonary edema appears worse on the current exam.           Impression   Worsening edema and increased bilateral pleural effusions         4-1 AXR  EXAMINATION:   SINGLE SUPINE XRAY VIEW(S) OF THE ABDOMEN       4/1/2019 2:40 pm       COMPARISON:   None.       HISTORY:   ORDERING SYSTEM PROVIDED HISTORY: r/o ileus   TECHNOLOGIST PROVIDED HISTORY:   r/o ileus       Initial exam       FINDINGS:   Nonspecific bowel gas pattern with air-filled small and large bowel.  No   organomegaly noted.  No pathologic calcifications.  Degenerative changes of   the lower lumbar spine.           Impression   Nonspecific bowel gas pattern without evidence of small bowel obstruction.          3/29 CXR      Narrative   EXAMINATION:   SINGLE XRAY VIEW OF THE CHEST       3/29/2019 8:26 am       COMPARISON:   Chest radiograph performed 03/28/2019.       HISTORY:   ORDERING SYSTEM PROVIDED HISTORY: effusions   TECHNOLOGIST PROVIDED HISTORY:   effusions       FINDINGS:   There are large bilateral effusions with adjacent consolidation.  There is   underlying pulmonary congestion.  There is no pneumothorax.  The heart is   enlarged with stable cardiac leads.  The upper abdomen is unremarkable.  The   extrathoracic soft tissues are unremarkable.           Impression   Cardiomegaly with large bilateral effusions and adjacent consolidation   concerning for pneumonia.               3/27 CXR    Narrative   EXAMINATION:   TWO VIEWS OF THE CHEST       3/28/2019 7:56 am       COMPARISON:   Chest radiograph performed 03/27/2019.       HISTORY:   ORDERING SYSTEM PROVIDED HISTORY: effusions   TECHNOLOGIST PROVIDED HISTORY:   effusions       FINDINGS:   There is a small left and a large right pleural effusion with adjacent   infiltrate.  There is no pneumothorax.  The mediastinal structures are stable   with stable cardiac leads.  The upper abdomen is unremarkable.  The   extrathoracic soft tissues are unremarkable.           Impression   Small left and large right pleural effusion with adjacent infiltrate   representing atelectasis versus pneumonia.  Overall there may be mild   improvement of the right-sided effusion. Narrative   EXAMINATION:   SINGLE XRAY VIEW OF THE CHEST       3/26/2019 2:37 am       COMPARISON:   March 20, 2019.       HISTORY:   ORDERING SYSTEM PROVIDED HISTORY: SOB, PE   TECHNOLOGIST PROVIDED HISTORY:   SOB, PE       FINDINGS:   Frontal portable view of the chest.  Low lung volume.  Progressed moderate to   large bilateral pleural effusions with consolidation.  Indistinct pulmonary   vasculature.  No pneumothorax.  The cardiomediastinal silhouette is not well   evaluated.  Atherosclerotic thoracic aorta.  Left pectoral trans venous   dual-chamber cardiac pacer device.           Impression   Progressed moderate to large bilateral pleural effusions with consolidation.    Superimposed infection is not excluded.         EXAMINATION:   TWO VIEWS OF THE CHEST       4/17/2019 2:17 pm     COMPARISON:   10 April 2019       HISTORY:   ORDERING SYSTEM PROVIDED HISTORY: CHF, pulmonary edema, ESRD, leukocytosis   TECHNOLOGIST PROVIDED HISTORY:   CHF, pulmonary edema, ESRD, leukocytosis       FINDINGS:   AP portable view of the chest time stamped at 1353 hours demonstrates a   multiple polar pacemaker entering from the left with intact leads in   appropriate positions.  Stable cardiomegaly is noted.  Moderate left effusion   and mild right effusion are noted.  Vascular congestion is noted, decreased   over prior study.  Basilar opacities are noted likely related atelectasis   and/or edema.  No extrapleural air is noted.  Intravascular stent is present   distal thoracic aorta.           Impression   Decreased vascular congestion and basilar opacities compared to prior study. Persistent effusions, mild vascular congestion and bibasilar opacities   favoring atelectasis and mild edema.             Medical Decision Making-Other: Thank you for allowing us to participate in the care of this patient. Please call with questions. Marietta Segal     ATTESTATION:    I have discussed the case, including pertinent history and exam findings with the residents. I have seen and examined the patient and the key elements of the encounter have been performed by me. I have reviewed the laboratory data, other diagnostic studies and discussed them with the residents. I have updated the medical record where necessary. I agree with the assessment, plan and orders as documented by the resident.     Desmond Huizar MD.

## 2019-04-22 NOTE — PROGRESS NOTES
Nephrology Progress Note        Subjective: Patient evaluated on dialysis. Patient is stable on dialysis. Access cannulated without problems. No new issues overnite. He did have a lower blood pressure today so salt poor albumin was administered during dialysis. His labs and bath and fluid removal reviewed with the nurses at the bedside in the dialysis unit today.         Objective:  CURRENT TEMPERATURE:  Temp: 97.9 °F (36.6 °C)  MAXIMUM TEMPERATURE OVER 24HRS:  Temp (24hrs), Av.9 °F (36.6 °C), Min:97.7 °F (36.5 °C), Max:98.1 °F (36.7 °C)    CURRENT RESPIRATORY RATE:  Resp: 16  CURRENT PULSE:  Pulse: 60  CURRENT BLOOD PRESSURE:  BP: (!) 92/41  24HR BLOOD PRESSURE RANGE:  Systolic (66YSR), WIT:63 , Min:76 , OLK:937   ; Diastolic (29WRC), ZWF:30, Min:35, Max:70    24HR INTAKE/OUTPUT:      Intake/Output Summary (Last 24 hours) at 2019 1056  Last data filed at 2019 1800  Gross per 24 hour   Intake 460 ml   Output --   Net 460 ml     Patient Vitals for the past 96 hrs (Last 3 readings):   Weight   19 0820 131 lb 2.8 oz (59.5 kg)   19 0324 132 lb 7.9 oz (60.1 kg)   19 0313 128 lb 4.9 oz (58.2 kg)         Physical Exam:  General appearance:  Resting comfortably on dialysis, in no distress  Skin: warm and dry, no rash or erythema  Eyes: conjunctivae pale and sclera anicteric  ENT:no thrush, mildly dry mucous membranes   Neck:  No JVD, midline trachea, no accessory muscle use  Pulmonary: good bilateral air entry and clear to auscultation and no wheezing or rhonchi   Cardiovascular: irregularly irregular rhythm, no rubs  Abdomen: soft nontender, bowel sounds present, no ascites   Extremities: no cyanosis, clubbing or edema     Access:  previous permacath    Current Medications:      heparin (porcine) injection 1,900 Units PRN   heparin (porcine) injection 1,900 Units PRN   metoprolol succinate (TOPROL XL) extended release tablet 25 mg Daily   0.9 % sodium chloride bolus PRN   0.9 % sodium

## 2019-04-22 NOTE — CARE COORDINATION
Transition planning plan christelle grewal tomorrow will arrange [facility]  @ 11:00 called sara with [facility] can accept tomorrow.  Made f/u with t[facility]  14:45 called [facility] spoke to cehma confirmed transportation arrangements for tomorrow  @ 11

## 2019-04-22 NOTE — FLOWSHEET NOTE
DATE: 2019    NAME: Chelle Alan  MRN: [id number]   : 12/10/1928    Patient not seen this date for Physical Therapy due to:  [] Blood transfusion in progress  [] Hemodialysis  [x]  Patient Declined: too fatigued after hemodialysis, will check later in pm if time allows  [] Spine Precautions   [] Strict Bedrest  [] Surgery/ Procedure  [] Testing      [] Other        [] PT being discontinued at this time. Patient independent. No further needs. [] PT being discontinued at this time as the patient has been transferred to palliative care. No further needs.     Nancy Romano, PTA

## 2019-04-23 NOTE — CARE COORDINATION
Spoke with Delfina at [facility], confirmed transportation time for 11 am.  Spoke with Kaylie Dominguez at [facility] regarding  time, reviewed dialysis info with her and faxed confirmation letter to her. Patient and family aware of pickup time.   Aylin Parrish RN given number for report    Discharge [facility] Case Management Department  Written by: Aurea Guillen RN    Patient Name: Angelito Guillen  Attending Provider: Graciela Paz MD  Admit Date: 3/6/2019  8:48 PM  MRN: [id number]  Account: [de-identified]                     : 12/10/1928  Discharge Date:   2019      Disposition: [facility]    Aurea Guillen RN

## 2019-04-23 NOTE — PROGRESS NOTES
[facility] - Progress Note  Today's Date and Time: 4/23/2019, 10:32 AM    Impression :   1. Recurrent bilateral pleural effusions w/lung compression  2. CHF  3. 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 Multivessel CAD s/p multiple stent placements  5. S/P TAVR  6. HUYEN on HD  7. Urinary retention  8. Pulmonary hypertension  9. S/P Upgrade of dual chamber PPM to CRT-P on 4-2-19  10. Anemia s/p transfusion 4/3  11. Right groin pseudoaneurysm s/p thrombin injection   12. S/p thoracentesis 4/19    Recommendations:     · Wound care Lt chest incision  · HD as per nephrology  · Monitor off antibiotics  Medical Decision Making/Summary/Discussion:   · 79 yo gentleman who developed complete heart block. Found to have multivessel CAD, severe aortic stenosis and pulmonary HTN. · Underwent pacemaker placement with subsequent bouts of acute CHF and multiple hospitalizations in [place]  · Brought to [place] by family for further care. · Since admission at [facility] he has had cardiac catheterizations x 2 with multiple stent placements and a TAVR on 3-15. Pt remains on a nitroglycerine drip  · Pt continues to require frequent thoracenteses  · He has suffered an HUYEN and is currently on a bumex drip  · CXR 3/26 showed progressed moderate to large bilateral pleural effusions with consolidation. · ID consult placed for treatment of any potential pneumonia  · Pt is afebrile, without cough or sputum production. He remains SOB with exertion and at times, when quiet. · Currently no evidence of active infection. Pt will require aggressive pulmonary toilet and diuresis. Plan to monitor off antibiotics  · Patient to continue HD  · He had upgraded pacemaker placed on 4-2-19. · Developed hematoma at pacer site, resolved with pressure. Elequis held 4-3. Hgb to 6.5 received 1u PRBC. Site ecchymotic but no signs of infection.   · Patient is continuing hemodialysis  · Will likely continue hemodialysis as an outpatient    · Right groin pseudoaneurysm s/p thrombin injection  · 4/18 tunneled catheter placed     · Patient stable at this point. Will transfer to Rehab facility later today. Infection Control Recommendations   · [place] Precautions    Antimicrobial Stewardship Recommendations     · Monitor off antibiotics    Coordination of Outpatient Care:   · Estimated Length of IV antimicrobials: None  · Patient will need Midline Catheter Insertion:   · Patient will need PICC line Insertion:  · Patient will need: Home IV , [place],  SNF,  LTAC TBD  · Patient will need outpatient wound care: No    Chief complaint/reason for consultation:   · Possible pneumonia      History of Present Illness:   Sal Chin is a 80y.o.-year-old  male who was initially admitted on 3/6/2019. Patient seen at the request of Dr. Daniella Goodwin     INITIAL HISTORY:    Pt is a 81 yo gentleman who has recently had multiple hospital admissions in [place] for decompensated heart failure. His symptoms began in December 2018. 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 A cardiac cath at that time showed LM and LAD calcified stenosis 80-90% in multiple areas, with severe disease in D1, D2, Om1 and OM2. Minimal disease in RCA. LV function with EF 25%. Severe aortic stenosis compromising his clinical improvedmnt      He underwent a dual chamber pacemaker placement. Following that, he had multiple hospitalizations for acute heart failure and shortness of breath. His baseline creatinine was 1.3, now >2.0. He has had multiple thoracenteses with transudative pleural fluid removed.      He was brought to [place] for further evaluation and was admitted on 3/6/19     An ECHO from admission showed   Normal left ventricle size with mildly reduced systolic function; Estimated  left ventricular ejection fraction 40-45%  Anterolateral wall hypokinesis. Mild left ventricular hypertrophy. Left atrium is moderately dilated.   Grade II diastolic dysfunction. Heavily calcified aortic valve with reduced systolic excursion and evidence  of moderate stenosis. (Peak nataliia 3.62 m/s and mean gradient 29 mmHg)  Moderate posterior pericardial effusion without any echo signs of tamponade. Normal right ventricular size and function. Pacemaker lead seen in right ventricle. Moderate tricuspid regurgitation. Estimated right ventricular systolic pressure is 60 mmHg suggesting moderate  pulmonary HTN.    He underwent an atherectomy/JEAN-CLAUDE of the left main and LAD on 3/6. Because of the high surgical risk and renal function he was taken back to the cath lab on 3/11 for PTCA-JEAN-CLAUDE LAD, PTCRA-JEAN-CLAUDE LM with plan to consider a TAVR if pt remained stable.     A TAVR was performed on 3/15     A carotid study showed less than 50% stenosis bilaterally.     He has had multiple thoracenteses since admission. Pt has been followed by CT surgery, cardiology, pulmonary, nephrology and urology.     On the evening of 3/25 pt developed hypertension and SOB at rest. A stat CXR was done that showed progressed moderate to large bilateral pleural effusions with consolidation.      On 3/26 pt underwent a Lt thoracentesis with 1400 ml clear pleural fluid removed. Pt reports feeling much better after the procedure and is asking to sit in the chair.      I am consulted to determine if Mr Odalis Yin has pneumonia. Pt had continued decline in renal function, HD initiated 3-29  PPV pacemaker placed 4-2, pt with hematoma at site, resolved with pressure. Anticoagulation held 4-3. Hgb to 6.5 received 1u PRBC     CURRENT EXAMINATION: 4/23/2019    Pt seen and examined in room. Resting comfortably. No acute overnight events. Dressed ready to transfer to Rehab facility. Tunneled catheter placement on Thursday 4-18-19. He will continue HD as outpatient. Afebrile  VS stable. He had upgraded pacemaker placed on 4-2-19  Site continues to improve.     On exam pt is in no distress.   Breath sounds are diminished bilateral bases     Had thoracentesis 4/19/2019. 1.65 liters removed from the left pleural cavity. Procedure terminated early due to patient coughing. CXR 4-19-19 shows decrease in pulmonary edema and of Lt pleural effusion. Repeat blood cultures on 4/17 taken with NGTD. Cultures of catheter tip taken on 4/17 NGTD. Temporary dialysis catheter removed 4/17. New cath placed on 4/18    Lt groin pseudoaneurysm present, stable. Left lower extremity duplex performed 4-15-19 showed persistent pseudoaneurysm.       Labs reviewed: 4/23/2019    WBC 7.7->12.6 -> 10.5 -> 16.2 -> 13.6 -> 17.2 -> 11.5 -> 10  Hgb 8.8->6.5->8.3->8.9->7.2-->7.0 -> 8.6 -> 7.9 -> 8.5-->7.3 -> 7.8  Plt 57->90->92 -> 73-->105 -> 137 -> 120 -> 137 -> 139 -> 148 -> 159    Cr 2.74->2.61->2.95->3.29 -> 2.57 -> 2.41-->3.8 -> 3.52 -> 4.60 -> 5.11  BUN 44->38->47->54 -> 24 -> 22 -> 46 -> 58 -> 81    Na 134 -> 133 -> 139 -> 136    Cultures:  Pleural fluid:  · 3/7 No growth, many neutrophils seen, no bacteria, and no growth    Discussed with Pt, HD RN, Vascular. I have personally reviewed the past medical history, past surgical history, medications, social history, and family history, and I have updated the database accordingly.   Past Medical History:     Past Medical History:   Diagnosis Date    Aortic stenosis 02/15/2019    Atrial fibrillation (HCC)     CAD (coronary artery disease)     Chest pain 02/15/2019    CHF (congestive heart failure) (HCC)     Chronic anemia     Chronic kidney disease     Hypertension     Pleural effusion on left 02/15/2019    Pleural effusion, right 02/15/2019    Pulmonary emboli ([address] 02/15/2019    Pulmonary hypertension ([address] 03/06/2019    Thyroid disease     hypothyroidism       Past Surgical  History:     Past Surgical History:   Procedure Laterality Date    CORONARY ANGIOPLASTY WITH STENT PLACEMENT  03/11/2019    complex PCI of LT MAIN, LAD    JOINT REPLACEMENT      right ankle    OTHER SURGICAL HISTORY  2019    UPGRADE TO BI-V PACEMAKER    OTHER SURGICAL HISTORY  03/15/2019    TAVR PROCEDURE    PACEMAKER PLACEMENT  2019    THORACENTESIS         Medications:      metoprolol succinate  25 mg Oral Daily    megestrol  200 mg Oral Daily    iron sucrose  100 mg Intravenous Q48H    darbepoetin marya-polysorbate  100 mcg Intravenous Weekly    simethicone  80 mg Oral Q6H    docusate sodium  100 mg Oral BID    lidocaine-EPINEPHrine  20 mL Intradermal Once    lidocaine PF  1 mL Intradermal Once    bacitracin in 0.9 % sodium chloride irrigation  50,000 Units Topical Once    sodium chloride flush  10 mL Intravenous 2 times per day    aspirin  81 mg Oral Daily    spironolactone  25 mg Oral Daily    FLUoxetine  10 mg Oral Daily    levothyroxine  125 mcg Oral Daily    ALPRAZolam  0.25 mg Oral Once    QUEtiapine  25 mg Oral Nightly    clopidogrel  75 mg Oral Daily    [Held by provider] docusate sodium  100 mg Oral Daily    amiodarone  200 mg Oral Daily    atorvastatin  20 mg Oral Daily       Social History:     Social History     Socioeconomic History    Marital status:       Spouse name: Not on file    Number of children: Not on file    Years of education: Not on file    Highest education level: Not on file   Occupational History     Employer: RETIRED   Social Needs    Financial resource strain: Not on file    Food insecurity:     Worry: Not on file     Inability: Not on file    Transportation needs:     Medical: Not on file     Non-medical: Not on file   Tobacco Use    Smoking status: Former Smoker     Last attempt to quit: 1989     Years since quittin.3    Smokeless tobacco: Never Used   Substance and Sexual Activity    Alcohol use: Not Currently    Drug use: Never    Sexual activity: Not on file   Lifestyle    Physical activity:     Days per week: Not on file     Minutes per session: Not on file    Stress: Not on file   Relationships    Social connections:     Talks on phone: Not on file     Gets together: Not on file     Attends Zoroastrianism service: Not on file     Active member of club or organization: Not on file     Attends meetings of clubs or organizations: Not on file     Relationship status: Not on file    Intimate partner violence:     Fear of current or ex partner: Not on file     Emotionally abused: Not on file     Physically abused: Not on file     Forced sexual activity: Not on file   Other Topics Concern    Not on file   Social History Narrative    Not on file       Family History:   History reviewed. No pertinent family history. Allergies:   Quinolones     Review of Systems:   AA, reports continued tenderness at pacemaker site  No chills or fevers, no SOB  Seen on HD    Physical Examination :     Patient Vitals for the past 8 hrs:   BP Temp Temp src Pulse Resp SpO2   04/23/19 1002 -- -- -- -- -- 95 %   04/23/19 0707 (!) 159/87 98.9 °F (37.2 °C) Oral 64 15 95 %     General Appearance: Seen in HD, easily roused, no distress  Head:  Normocephalic, no trauma  Eyes: Pupils equal, round, reactive to light and accommodation; extraocular movements intact; sclera anicteric. ENT: Oropharynx clear. Mouth/throat: mucosa pink and moist. No lesions. Dentition in good repair. Neck: Supple, without lymphadenopathy. Pulmonary/Chest: Clear to auscultation, without wheezes, rales, or rhonchi. Decreased breath sounds at bilateral bases   Cardiovascular: Regular rate and rhythm without murmurs, rubs, or gallops. Paced  Abdomen: Soft. Bowel sounds normal.  : Johnston in place  All four Extremities: Mild edema. Neurologic: No gross sensory or motor deficits. Skin: Warm and dry with good turgor. No signs of peripheral arterial or venous insufficiency. No ulcerations. No open wounds.  Lt chest pacemaker incision with hematoma, staples intact, no oozing or bleeding noted    Medical Decision Making -Laboratory:   I have independently reviewed/ordered the and   bilateral pleural effusions. 4-1 CXR    EXAMINATION:   SINGLE XRAY VIEW OF THE CHEST       4/1/2019 6:04 am       COMPARISON:   03/31/2019       HISTORY:   ORDERING SYSTEM PROVIDED HISTORY: SOB   TECHNOLOGIST PROVIDED HISTORY:   SOB       FINDINGS:   There is a left-sided transvenous pacer in place and a right internal jugular   dialysis catheter.  There is cardiomegaly with pulmonary vascular congestion   and edema.  There are bilateral pleural effusions which are increased.  The   pulmonary edema appears worse on the current exam.           Impression   Worsening edema and increased bilateral pleural effusions         4-1 AXR  EXAMINATION:   SINGLE SUPINE XRAY VIEW(S) OF THE ABDOMEN       4/1/2019 2:40 pm       COMPARISON:   None.       HISTORY:   ORDERING SYSTEM PROVIDED HISTORY: r/o ileus   TECHNOLOGIST PROVIDED HISTORY:   r/o ileus       Initial exam       FINDINGS:   Nonspecific bowel gas pattern with air-filled small and large bowel.  No   organomegaly noted.  No pathologic calcifications.  Degenerative changes of   the lower lumbar spine.           Impression   Nonspecific bowel gas pattern without evidence of small bowel obstruction.          3/29 CXR      Narrative   EXAMINATION:   SINGLE XRAY VIEW OF THE CHEST       3/29/2019 8:26 am       COMPARISON:   Chest radiograph performed 03/28/2019.       HISTORY:   ORDERING SYSTEM PROVIDED HISTORY: effusions   TECHNOLOGIST PROVIDED HISTORY:   effusions       FINDINGS:   There are large bilateral effusions with adjacent consolidation.  There is   underlying pulmonary congestion.  There is no pneumothorax.  The heart is   enlarged with stable cardiac leads.  The upper abdomen is unremarkable.  The   extrathoracic soft tissues are unremarkable.           Impression   Cardiomegaly with large bilateral effusions and adjacent consolidation   concerning for pneumonia.               3/27 CXR    Narrative   EXAMINATION:   TWO VIEWS OF THE CHEST     3/28/2019 7:56 am       COMPARISON:   Chest radiograph performed 03/27/2019.       HISTORY:   ORDERING SYSTEM PROVIDED HISTORY: effusions   TECHNOLOGIST PROVIDED HISTORY:   effusions       FINDINGS:   There is a small left and a large right pleural effusion with adjacent   infiltrate.  There is no pneumothorax.  The mediastinal structures are stable   with stable cardiac leads.  The upper abdomen is unremarkable.  The   extrathoracic soft tissues are unremarkable.           Impression   Small left and large right pleural effusion with adjacent infiltrate   representing atelectasis versus pneumonia.  Overall there may be mild   improvement of the right-sided effusion. Narrative   EXAMINATION:   SINGLE XRAY VIEW OF THE CHEST       3/26/2019 2:37 am       COMPARISON:   March 20, 2019.       HISTORY:   ORDERING SYSTEM PROVIDED HISTORY: SOB, PE   TECHNOLOGIST PROVIDED HISTORY:   SOB, PE       FINDINGS:   Frontal portable view of the chest.  Low lung volume.  Progressed moderate to   large bilateral pleural effusions with consolidation.  Indistinct pulmonary   vasculature.  No pneumothorax.  The cardiomediastinal silhouette is not well   evaluated.  Atherosclerotic thoracic aorta.  Left pectoral trans venous   dual-chamber cardiac pacer device.           Impression   Progressed moderate to large bilateral pleural effusions with consolidation.    Superimposed infection is not excluded.         EXAMINATION:   TWO VIEWS OF THE CHEST       4/17/2019 2:17 pm       COMPARISON:   10 April 2019       HISTORY:   ORDERING SYSTEM PROVIDED HISTORY: CHF, pulmonary edema, ESRD, leukocytosis   TECHNOLOGIST PROVIDED HISTORY:   CHF, pulmonary edema, ESRD, leukocytosis       FINDINGS:   AP portable view of the chest time stamped at 1353 hours demonstrates a   multiple polar pacemaker entering from the left with intact leads in   appropriate positions.  Stable cardiomegaly is noted.  Moderate left effusion   and mild right effusion are noted.  Vascular congestion is noted, decreased   over prior study.  Basilar opacities are noted likely related atelectasis   and/or edema.  No extrapleural air is noted.  Intravascular stent is present   distal thoracic aorta.           Impression   Decreased vascular congestion and basilar opacities compared to prior study. Persistent effusions, mild vascular congestion and bibasilar opacities   favoring atelectasis and mild edema.           EXAMINATION:   SINGLE XRAY VIEW OF THE CHEST       4/19/2019 12:11 pm       COMPARISON:   04/17/2019       HISTORY:   ORDERING SYSTEM PROVIDED HISTORY: post thora   TECHNOLOGIST PROVIDED HISTORY:   post thora       FINDINGS:   No evidence of pneumothorax status post left thoracentesis.  Significant   decrease in left pleural effusion with a small residual effusion. Gradie Bran is a   tunneled right jugular dialysis catheter in place with the catheter tip in   the proximal right atrium.  Patient is status post TAVR.  Left subclavian   pacemaker with skin staples in place.  No significant change in small right   pleural effusion with adjacent atelectasis/airspace disease.  Stable heart   size with calcific plaque of the thoracic aorta.  Mild congestive changes   with perihilar vascular prominence and streaky perihilar opacities (greater   on the right) likely due to atelectasis/edema.           Impression   Decrease in left pleural effusion without evidence of pneumothorax status   post thoracentesis. Medical Decision Making-Other: Thank you for allowing us to participate in the care of this patient. Please call with questions.     Obed Dowling MD

## 2019-04-23 NOTE — DISCHARGE SUMMARY
[address] [facility] Cardiothoracic Surgery  Discharge Summary    Patient's Name/Date of Birth: Nan Hedrick / 12/10/1928 (89 y.o.)    Admission Date: 3/6/2019  8:48 PM  Discharge Date: 4/23/2019 11:00 AM  Discharge Physician: Dr. Cristal Fleming  Discharge Unit: [facility]  Discharge condition: stable  Disposition: [address]      Reason For Admission: No chief complaint on file. HPI: Nan Hedrick is a 80 y.o.  male who  is known to have multivessel coronary artery disease who recently had multiple admissions at outside hospitals in [place] for acute decompensated congestive heart failure. He is status post placement of a dual-chamber pacemaker for complete heart block. Patient's symptomatology started after he was found to be in complete heart block which required required of permanent pacemaker dual chamber. He subsequently had multiple bouts of acute shortness of breath and decompensated heart failure he underwent multiple evaluations and was being treated at of an outside hospital in [place] and then over the phone with no improvement. He is admitted here for full reevaluation. The patient is currently short of breath with exertion only no orthopnea no dyspnea no chest pain. .       Procedures completed in hospital: several thoracentesis, rotational atherectomy PTCA - JEAN-CLAUDE stent; TAVR; Right IJ temp cath: BiV PPM placed; Tunnel cath in right IJ [facility] Connolly)     Brief Review of Hospital Course: Admitted 3/6/19. Had pleural effusion drained in IR. PTCA with JEAN-CLAUDE stent, rotational atherectomy on 3/11/19. Nephrology consulted for HUYEN. Bilateral pleural effusions drained 3/12 and 3/13. TAVR done 3/15/19. Effusions drained again. Temporary dialysis catheter placed 3/29/19. Dialysis started. Pseudoaneurysm repaired. Tunnel cath placed 4/18/19. Discharged to SNF 4/23/19      Review of Systems   Constitutional: Negative for chills, fatigue and fever.    HENT: Negative for congestion, dental Procedure Laterality Date    CORONARY ANGIOPLASTY WITH STENT PLACEMENT  2019    complex PCI of LT MAIN, LAD    JOINT REPLACEMENT      right ankle    OTHER SURGICAL HISTORY  2019    UPGRADE TO BI-V PACEMAKER    OTHER SURGICAL HISTORY  03/15/2019    TAVR PROCEDURE    PACEMAKER PLACEMENT  2019    THORACENTESIS       Allergies   Allergen Reactions    Quinolones      Pt is allergic to fluoroquinolones     History reviewed. No pertinent family history. Social History     Socioeconomic History    Marital status:       Spouse name: Not on file    Number of children: Not on file    Years of education: Not on file    Highest education level: Not on file   Occupational History     Employer: RETIRED   Social Needs    Financial resource strain: Not on file    Food insecurity:     Worry: Not on file     Inability: Not on file    Transportation needs:     Medical: Not on file     Non-medical: Not on file   Tobacco Use    Smoking status: Former Smoker     Last attempt to quit:      Years since quittin.3    Smokeless tobacco: Never Used   Substance and Sexual Activity    Alcohol use: Not Currently    Drug use: Never    Sexual activity: Not on file   Lifestyle    Physical activity:     Days per week: Not on file     Minutes per session: Not on file    Stress: Not on file   Relationships    Social connections:     Talks on phone: Not on file     Gets together: Not on file     Attends Hoahaoism service: Not on file     Active member of club or organization: Not on file     Attends meetings of clubs or organizations: Not on file     Relationship status: Not on file    Intimate partner violence:     Fear of current or ex partner: Not on file     Emotionally abused: Not on file     Physically abused: Not on file     Forced sexual activity: Not on file   Other Topics Concern    Not on file   Social History Narrative    Not on file          Medication List      START taking these medications    aspirin 81 MG EC tablet  Take 1 tablet by mouth daily  Start taking on:  4/24/2019     clopidogrel 75 MG tablet  Commonly known as:  PLAVIX  Take 1 tablet by mouth daily  Start taking on:  4/24/2019     darbepoetin marya-polysorbate 100 MCG/0.5ML Sosy injection  Commonly known as:  ARANESP  Infuse 0.5 mLs intravenously once a week  Start taking on:  4/26/2019     * heparin (porcine) 1000 UNIT/ML injection  1.9 mLs by Intercatheter route as needed (dialysis)     * heparin (porcine) 1000 UNIT/ML injection  1.9 mLs by Intercatheter route as needed (dialysis)     HYDROcodone-acetaminophen 5-325 MG per tablet  Commonly known as:  NORCO  Take 1 tablet by mouth every 8 hours as needed for Pain for up to 7 days. megestrol 40 MG/ML suspension  Commonly known as:  MEGACE  Take 5 mLs by mouth daily     metoprolol succinate 25 MG extended release tablet  Commonly known as:  TOPROL XL  Take 1 tablet by mouth daily  Start taking on:  4/24/2019     midodrine 5 MG tablet  Commonly known as:  PROAMATINE  Take 1 tablet by mouth as needed (Hypotension)     QUEtiapine 25 MG tablet  Commonly known as:  SEROQUEL  Take 1 tablet by mouth nightly     spironolactone 25 MG tablet  Commonly known as:  ALDACTONE  Take 1 tablet by mouth daily  Start taking on:  4/24/2019         * This list has 2 medication(s) that are the same as other medications prescribed for you. Read the directions carefully, and ask your doctor or other care provider to review them with you.             CHANGE how you take these medications    levothyroxine 125 MCG tablet  Commonly known as:  SYNTHROID  Take 1 tablet by mouth Daily  Start taking on:  4/24/2019  What changed:    · medication strength  · how much to take        STOP taking these medications    amiodarone 200 MG tablet  Commonly known as:  CORDARONE     atorvastatin 20 MG tablet  Commonly known as:  LIPITOR     ELIQUIS 2.5 MG Tabs tablet  Generic drug:  apixaban     isosorbide mononitrate 60 MG extended release tablet  Commonly known as:  IMDUR     lisinopril 40 MG tablet  Commonly known as:  PRINIVIL;ZESTRIL     metoprolol tartrate 50 MG tablet  Commonly known as:  LOPRESSOR           Where to Get Your Medications      You can get these medications from any pharmacy    Bring a paper prescription for each of these medications  · HYDROcodone-acetaminophen 5-325 MG per tablet     Information about where to get these medications is not yet available    Ask your nurse or doctor about these medications  · aspirin 81 MG EC tablet  · clopidogrel 75 MG tablet  · darbepoetin marya-polysorbate 100 MCG/0.5ML Sosy injection  · heparin (porcine) 1000 UNIT/ML injection  · heparin (porcine) 1000 UNIT/ML injection  · levothyroxine 125 MCG tablet  · megestrol 40 MG/ML suspension  · metoprolol succinate 25 MG extended release tablet  · midodrine 5 MG tablet  · QUEtiapine 25 MG tablet  · spironolactone 25 MG tablet           Data:    CBC:   Recent Labs     04/21/19  0458 04/22/19  0901   WBC 11.5* 10.0   HGB 7.3* 7.8*   HCT 24.9* 25.9*   MCV 98.0 95.2    159     BMP:   Recent Labs     04/21/19  0458 04/22/19  0900    136   K 3.7 3.7    97*   CO2 24 22   BUN 58* 81*   CREATININE 4.60* 5.11*     Accucheck Glucoses:   No results for input(s): POCGLU in the last 72 hours. Cardiac Enzymes: No results for input(s): CKTOTAL, CKMB, CKMBINDEX, TROPONINI in the last 72 hours. PTT/PT/INR:   No results for input(s): PROTIME, INR in the last 72 hours. No results for input(s): APTT in the last 72 hours.   Liver Profile:  Lab Results   Component Value Date    AST 25 03/29/2019    ALT 18 03/29/2019    BILIDIR 0.15 03/29/2019    BILITOT 0.55 03/29/2019    ALKPHOS 94 03/29/2019   No results found for: CHOL, HDL, TRIG  TSH:   Lab Results   Component Value Date    TSH 3.04 03/20/2019     UA:   Lab Results   Component Value Date    COLORU YELLOW 03/29/2019    PHUR 7.5 03/29/2019    WBCUA 50  03/29/2019    RBCUA 50 TO

## 2019-04-23 NOTE — PROGRESS NOTES
Nutrition Assessment    Type and Reason for Visit: Reassess    Nutrition Recommendations: Continue cardiac, 2 gm Na diet w/ 1800 mL FR. Continue magic cup supplements 3x/d and Nepro supplements 4x/d. Continue appetite stimulant. Nutrition Assessment: Pt not in room during time of visit. Pt declining from a nutritional standpoint aeb ongoing poor po intake of 25-50% of meals/supplements and 16.7% wt loss x 7 wks. Pt remains on HD. Will continue supplements 2/2 severe malnutrition. Goal of wt stabilization as medically able. Malnutrition Assessment:  · Malnutrition Status: Meets the criteria for severe malnutrition  · Context: (Acute on Chronic)  · Findings of the 6 clinical characteristics of malnutrition (Minimum of 2 out of 6 clinical characteristics is required to make the diagnosis of moderate or severe Protein Calorie Malnutrition based on AND/ASPEN Guidelines):  1. Energy Intake-Less than or equal to 50% of estimated energy requirement, Greater than or equal to 7 days    2. Weight Loss-7.5% loss or greater, in 1 month  3. Fat Loss-Mild subcutaneous fat loss, Orbital, Triceps  4. Muscle Loss-Mild muscle mass loss, Temples (temporalis muscle), Clavicles (pectoralis and deltoids)  5. Fluid Accumulation-No significant fluid accumulation, Generalized, Extremities  6.   Strength-Not measured    Nutrition Risk Level: High    Nutrient Needs:  · Estimated Daily Total Kcal: 30-35 kcal/kg ~>4155-7804 kcals/d   · Estimated Daily Protein (g): 1.2-1.5 gm/kg ~>74-93 gms/d     Nutrition Diagnosis:   · Problem: Severe malnutrition  · Etiology: related to Insufficient energy/nutrient consumption, Catabolic illness(decreased appetite)     Signs and symptoms:  as evidenced by Mild loss of subcutaneous fat, Mild muscle loss, Intake 25-50%, Weight loss greater than or equal to 5% in 1 month    Objective Information:  · Nutrition-Focused Physical Findings: active bowel sounds  · Wound Type: Multiple, Open

## 2019-05-03 PROBLEM — I50.9 CONGESTIVE HEART FAILURE (HCC): Status: ACTIVE | Noted: 2019-01-01

## 2019-05-03 NOTE — PROGRESS NOTES
Dialysis Post Treatment Note  Patient tolerated treatment well. Denies complaints at this time. Pt hypotensive in beginning of tx; resolved with ordered medications. No further complications. CVC dressing changed; CD&I.     Vitals:    05/03/19 1709   BP: (!) 163/70   Pulse: 76   Resp:    Temp: 97.6 °F (36.4 °C)   SpO2:      Pre-Weight = 59.7kg  Post-weight = Weight: 130 lb 1.1 oz (59 kg)  Total Liters Processed = Total Liters Processed (l/min): 67.9 l/min  Rinseback Volume (mL) = Rinseback Volume (ml): 350 ml  Net Removal (mL) = 460mL  Length of treatment=3 hours

## 2019-05-03 NOTE — CONSULTS
Nephrology ESRD Consult Note    Reason for Consult:  End stage renal disease, shortness of breath-acute with pulmonary edema and increasing right sided pleural effusion  Requesting Physician:  Aisha Cruz    Chief Complaint:  Shortness of breath beginning overnight into this morning  History Obtained From:  patient, electronic medical record    History of Present Illness: This is a 80 y.o. male with end stage renal disease on hemodialysis [facility] at [facility] who presents with increasing shortness of breath that began about midnight and continue through the early morning hours this morning. The patient's last post dialysis weight was 59.9 kg. His chest x-ray at [facility] shows a righr sided effusion that is large and pulmonary vascular congestion. He initially came to [facility] in early March of 2019 with decompensated CHF, EF of 45% and a moderate pericardial effusion with a chest x-ray showing pulmonary edema and bilateral pleural effusions. He had a PPM placed before coming to [facility] from [place]. His creatinine was 2 mg/dl and he had aortic valve stenosis and moderate to severe mitral valve regurgitation with pulmonary hypertension. He did undergo a TAVR. He also underwent a PCI and stent placement involving the left main coronary artery and LAD. He was diuresed, but he eventually required the initiation of hemodialysis. He had some difficulty tolerating dialysis at first but did eventually improve enough to be discharged about 10 days ago to rehab in [facility]. He did require a therapeutic thoracentesis prior to discharge on 4/19/19. He tolerated dialysis well and was having improving appetite. He was on Megace at discharge, but this was discontinued earlier this week. However, his symptom complex as noted above caused transfer from the rehab to [facility] and then eventually here to [facility]. [facility].     Other person, place and time, well-developed and well-nourished, with some shortness of breath but not in acute distress    Skin: warm and dry, no rash or erythema  Eyes: conjunctivae palel and sclera anicteric  ENT: hearing mildly diminished bilaterally and no thrush no pharyngeal congestion, moist mucus membranes  Neck: Positive JVD, midline trachea, some accessory muscle use   Pulmonary: good bilateral air entry and diminished breath sounds right lower posterior lung field about nursing home up with some rales over the left lower lung field  Cardiovascular: irregularly irregular rhythm, no rubs  Abdomen: soft and not tender and mildly distended with active bowel sounds  Extremities: no pitting peripheral edema  Labs:  PTH:  No results found for: PTH  abs:   CBC: No results for input(s): WBC, RBC, HGB, HCT, MCV, MCH, MCHC, RDW, PLT, MPV in the last 72 hours. BMP: No results for input(s): NA, K, CL, CO2, BUN, CREATININE, GLUCOSE, CALCIUM in the last 72 hours. Phosphorus:  No results for input(s): PHOS in the last 72 hours. Magnesium: No results for input(s): MG in the last 72 hours. Albumin: No results for input(s): LABALBU in the last 72 hours. Assessment:  1. ESRD on HD M, W, F at [facility] hemodialysis unit  2. Normotensive  3. Flash pulmonary edema / CHF exacerbation  4. Right sided pleural effusion  5. Anemia of chronic disease, hemoglobin improving from prior to discharge on [facility] as an outpatient in dialysis  6. Protein-calorie malnutrition  7. Moderate to severe mitral valve regurgitation based on prior echo from March of 2019  8. History of aortic stenosis status post TAVR  During the last hospitalization  9. History of CAD involving the LMCA and LAD with previous PCI and stent      Plan:  1. Dialysis [facility]  2. Antihypertensives will add hydralazine for after load reduction  3. Last post dialysis weight was 59.9 kg, will aim for 59 kg today as tolerated  4.  SPA with dialysis for BP support  5. Agree with thoracentesis for symptomatic relief with pleural effusion, after dialysis today  6. CBC, CMP, magnesium and phosphorus in the a.m. 5/4/19    Thank you for the consultation. Please do not hesitate to call with questions.     Electronically signed by Monik Bran MD on 5/3/2019 at 12:40 PM

## 2019-05-03 NOTE — H&P
[facility]  History andPhysical    Patient's Name/Date of Birth: Barron Mason / 12/10/1928 (89 y.o.)    Date: May 3, 2019     Chief Complaint: No chief complaint on file. HPI: Barron Mason is a 80 y.o.  male who had a previous admission for JEAN-CLAUDE stents, TAVR, BiV pacemaker and dialysis initiation. He was discharged on 4/23/19to [facility] TERM ACUTE C[facility] CARE AT [facility]. ROS:  Review of Systems   Constitutional: Negative for chills, fatigue and fever. HENT: Negative for congestion, dental problem, mouth sores and trouble swallowing. Eyes: Negative for discharge, redness and visual disturbance. Respiratory: Positive for shortness of breath. Negative for cough and chest tightness. Cardiovascular: Positive for leg swelling. Negative for chest pain and palpitations. Gastrointestinal: Negative for abdominal pain, constipation, nausea and vomiting. Endocrine: Negative for cold intolerance and heat intolerance. Genitourinary: Negative for difficulty urinating, flank pain and urgency. Musculoskeletal: Negative for gait problem, joint swelling and neck pain. Skin: Negative for rash and wound. Allergic/Immunologic: Negative for environmental allergies and immunocompromised state. Neurological: Negative for dizziness, syncope, weakness, light-headedness, numbness and headaches. Hematological: Does not bruise/bleed easily. Psychiatric/Behavioral: Negative for confusion, hallucinations and sleep disturbance. The patient is not nervous/anxious.         Past Medical History:   Diagnosis Date    Aortic stenosis 02/15/2019    Atrial fibrillation (HCC)     CAD (coronary artery disease)     Chest pain 02/15/2019    CHF [facility]congestive heart failure) ([facility])     Chronic anemia     Chronic kidney disease     Hypertension     Pleural effusion on left 02/15/2019    Pleural effusion, right 02/15/2019    Pulmonary emboli ([address] 02/15/2019    Pulmonary hypertension ([address] 03/06/2019    Thyroid disease     hypothyroidism     Past Surgical History:   Procedure Laterality Date    CORONARY ANGIOPLASTY WITH STENT PLACEMENT  2019    complex PCI of LT MAIN, LAD    JOINT REPLACEMENT      right ankle    OTHER SURGICAL HISTORY  2019    UPGRADE TO BI-V PACEMAKER    OTHER SURGICAL HISTORY  03/15/2019    TAVR PROCEDURE    PACEMAKER PLACEMENT  2019    THORACENTESIS          Allergies   Allergen Reactions    Quinolones      Pt is allergic to fluoroquinolones     No family history on file. Social History     Socioeconomic History    Marital status:       Spouse name: Not on file    Number of children: Not on file    Years of education: Not on file    Highest education level: Not on file   Occupational History     Employer: RETIRED   Social Needs    Financial resource strain: Not on file    Food insecurity:     Worry: Not on file     Inability: Not on file    Transportation needs:     Medical: Not on file     Non-medical: Not on file   Tobacco Use    Smoking status: Former Smoker     Last attempt to quit:      Years since quittin.3    Smokeless tobacco: Never Used   Substance and Sexual Activity    Alcohol use: Not Currently    Drug use: Never    Sexual activity: Not on file   Lifestyle    Physical activity:     Days per week: Not on file     Minutes per session: Not on file    Stress: Not on file   Relationships    Social connections:     Talks on phone: Not on file     Gets together: Not on file     Attends Sabianist service: Not on file     Active member of club or organization: Not on file     Attends meetings of clubs or organizations: Not on file     Relationship status: Not on file    Intimate partner violence:     Fear of current or ex partner: Not on file     Emotionally abused: Not on file     Physically abused: Not on file     Forced sexual activity: Not on file   Other Topics Concern    Not on file   Social History Narrative    Not on file Current Facility-Administered Medications   Medication Dose Route Frequency Provider Last Rate Last Dose    sodium chloride flush 0.9 % injection 10 mL  10 mL Intravenous 2 times per day Gloria Poplin, APRN - CNP        sodium chloride flush 0.9 % injection 10 mL  10 mL Intravenous PRN Gloria Poplin, APRN - CNP        ondansetron (ZOFRAN) injection 4 mg  4 mg Intravenous Q6H PRN Gloria Poplin, APRN - CNP        senna (SENOKOT) tablet 8.6 mg  1 tablet Oral Daily PRN Gloria Poplin, APRN - CNP        heparin (porcine) injection 5,000 Units  5,000 Units Subcutaneous 3 times per day Gloria Poplin, APRN - CNP        acetaminophen (TYLENOL) tablet 650 mg  650 mg Oral Q4H PRN Gloria Poplin, APRN - CNP        aspirin chewable tablet 81 mg  81 mg Oral Daily Gloria Poplin, APRN - CNP        hydrALAZINE (APRESOLINE) injection 10 mg  10 mg Intravenous Q6H Monik Bran MD        clopidogrel (PLAVIX) tablet 75 mg  75 mg Oral Daily Gloria Poplin, APRN - CNP        darbepoetin marya-polysorbate (ARANESP) injection 100 mcg  100 mcg Intravenous Weekly Gloria Poplin, APRN - CNP        heparin (porcine) injection 1,900 Units  1,900 Units Intercatheter PRN Gloria Poplin, APRN - CNP        heparin (porcine) injection 1,900 Units  1,900 Units Intercatheter PRN Gloria Poplin, APRN - CNP        levothyroxine (SYNTHROID) tablet 125 mcg  125 mcg Oral Daily Gloria Poplin, APRN - CNP        metoprolol succinate (TOPROL XL) extended release tablet 25 mg  25 mg Oral Daily Gloria Poplin, APRN - CNP        midodrine (PROAMATINE) tablet 5 mg  5 mg Oral PRN Gloria Poplin, APRN - CNP        QUEtiapine (SEROQUEL) tablet 25 mg  25 mg Oral Nightly Gloria Poplin, APRN - CNP        spironolactone (ALDACTONE) tablet 25 mg  25 mg Oral Daily Gloria Poplin, APRN - CNP           Physical Exam:  Vitals:    05/03/19 1225   BP:    Pulse:    Resp:    Temp:    SpO2: 94%     Weight: Weight: 134 lb 7.7 oz (61 kg)    Weight: 134 lb 7.7 oz (61 kg)    No intake/output data recorded. Physical Exam   Constitutional: He is oriented to person, place, and time. He appears well-developed and well-nourished. HENT:   Head: Normocephalic and atraumatic. Eyes: Pupils are equal, round, and reactive to light. Conjunctivae are normal. Left eye exhibits no discharge. Neck: Normal range of motion. Neck supple. No JVD present. Cardiovascular: Normal rate, regular rhythm, normal heart sounds and intact distal pulses. PMI is not displaced. Exam reveals no gallop, no distant heart sounds and no friction rub. No murmur heard. Pulmonary/Chest: Effort normal and breath sounds normal.   Abdominal: Soft. Bowel sounds are normal.   Musculoskeletal: Normal range of motion. He exhibits edema (2+ lower leg edema). Neurological: He is alert and oriented to person, place, and time. He has normal reflexes. Skin: Skin is warm and dry. Psychiatric: He has a normal mood and affect. His behavior is normal. Judgment and thought content normal.   Nursing note and vitals reviewed. Data:    CBC: No results for input(s): WBC, HGB, HCT, MCV, PLT in the last 72 hours. BMP: No results for input(s): NA, K, CL, CO2, PHOS, BUN, CREATININE, MG in the last 72 hours. Invalid input(s): CA  Accucheck Glucoses:   No results for input(s): POCGLU in the last 72 hours. Cardiac Enzymes: No results for input(s): CKTOTAL, CKMB, CKMBINDEX, TROPONINI in the last 72 hours. PTT/PT/INR:  No results for input(s): PROTIME, INR in the last 72 hours. No results for input(s): APTT in the last 72 hours.   Liver Profile:  Lab Results   Component Value Date    AST 25 03/29/2019    ALT 18 03/29/2019    BILIDIR 0.15 03/29/2019    BILITOT 0.55 03/29/2019    ALKPHOS 94 03/29/2019   No results found for: CHOL, HDL, TRIG  TSH:   Lab Results   Component Value Date    TSH 3.04 03/20/2019     UA:  Lab Results   Component Value Date    COLORU YELLOW 03/29/2019    PHUR 7.5 03/29/2019    WBCUA 50  03/29/2019 RBCUA 50  03/29/2019    MUCUS NOT REPORTED 03/29/2019    TRICHOMONAS NOT REPORTED 03/29/2019    YEAST NOT REPORTED 03/29/2019    BACTERIA NOT REPORTED 03/29/2019    SPECGRAV 1.011 03/29/2019    LEUKOCYTESUR LARGE 03/29/2019    UROBILINOGEN Normal 03/29/2019    BILIRUBINUR NEGATIVE 03/29/2019    GLUCOSEU NEGATIVE 03/29/2019    AMORPHOUS NOT REPORTED 03/29/2019           Assessment & Plan:  Patient Active Problem List   Diagnosis    Pulmonary hypertension ([address]    Acute on chronic systolic congestive heart failure ([address]    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 He was also hypertensive. Denies fever or chills. Leg swelling is present, but no worse than normal per patient. He has been SOB since last evening, with increased SOB when laying down. Plan for patient to have thoracentesis today or tomorrow for bilateral pleural effusions. Added hydralazine back to control BP. Consult to cardiology and nephrology for medications adjustments. Dialysis today. The above recommendations including medications and orders were discussed and agreed upon with Dr. Jr Sood, the attending on service for thecardiothoracic surgery group today.      FRANCIS Wharton CNP

## 2019-05-03 NOTE — PLAN OF CARE
Problem: Risk for Impaired Skin Integrity  Goal: Tissue integrity - skin and mucous membranes  Description  Structural intactness and normal physiological function of skin and  mucous membranes. Outcome: Ongoing  Note:   Admission skin assessment complete. Patient repositioned with pillow support & checked for incontinence Q2H. Covidien dry pad in place. Ana care PRN with incontinence cleanser. Problem: Falls - Risk of:  Goal: Will remain free from falls  Description  Will remain free from falls  Outcome: Ongoing  Note:   No falls this shift. Precautions include posted falling star sign, nonskid footwear on, bed locked in lowest position, siderails up x2, table and call light within reach. Bed alarm on. Patient calls out appropriately for assistance. Hourly rounding to assess for needs.     Goal: Absence of physical injury  Description  Absence of physical injury  Outcome: Ongoing

## 2019-05-03 NOTE — CARE COORDINATION
Case Management Initial Discharge Plan  Argyle Brunner,             Met with:patient to discuss discharge plans. Information verified: address, contacts, phone number, , insurance Yes  PCP: No primary care provider on file. Date of last visit:     Insurance Provider: [place]    Discharge Planning    Living Arrangements:  Other (Comment)(ecf)   Support Systems:  Family Members    Home has 0 stories  0 stairs to climb to get into front door, 0 stairs to climb to reach second floor  Location of bedroom/bathroom in home main floor    Patient able to perform ADL's:Assisted    Current Services (outpatient & in home) SNF  DME equipment:   DME provider:     Pharmacy:    Potential Assistance Purchasing Medications:  No  Does patient want to participate in local refill/ meds to beds program?  No    Potential Assistance Needed:  Carlos Boles    Patient agreeable to home care: Yes  Freedom of choice provided:  n/a    Prior SN[facility]: Yes  [place] of choice provided: yes   Evaluation: n/a    Expected Discharge date:  19  [facility]  Follow Up Appointment: Best Day/ Time: Monday AM    Transportation provider: [employer] arrangements needed for discharge: No    Readmission Risk              Risk of Unplanned Readmission:        12             Does patient have a readmission risk score greater than 14?: No  If yes, follow-up appointment must be made within 7 days of discharge.      Discharge Plan: Return to [facility]-referral sent, HD today and IR for pleural effusion          Electronically signed by Jennifer Morris RN on 5/3/19 at 12:36 PM

## 2019-05-03 NOTE — PROGRESS NOTES
The patient presented with dyspnea at rest, started while laying in bed last night. Noted to have elevated BPs, which due to his underlying cardiac condition caused acute dyspnea. CXR noted to be worse compared to last admission with increased pulmonary congestion and B/L pleural effusions. He will need medication adjustment for improved BP control. Benefit of thoracentesis is minimal, as he had this done multiple times last admission with rapid re-accumulation. Currently undergoing dialysis at bedside. SBP has now significantly improved, noted to be 100. He feels comfortable at this time, and reports improvement in dyspnea as well. Continue with scheduled medications for now, will avoid adding any additional medications due to SBP being 100. Will add PRN IV hydralazine. Will monitor BP closely and adjust medications as needed. Discussed with Attending Dr Deshawn Canales.     Zahra Jacobs MD  Cardiology Fellow

## 2019-05-04 NOTE — CONSULTS
Attestation signed by      Attending Physician Statement:    I have discussed the care of  Ladan Herrmann , including pertinent history and exam findings, with the Cardiology fellow/resident. I have seen and examined the patient and the key elements of all parts of the encounter have been performed by me. I agree with the assessment, plan and orders as documented by the fellow/resident, after I modified exam findings and plan of treatments, and the final version is my approved version of the assessment. Additional Comments: Acute on chronic HFrEF with renal insuffiencey. Diuresis per nephrology, will add Hydralazine/Imdur. Minerva Craig MD                [facility] Cardiology    Consult / H&P               Today's Date: 5/4/2019  Patient Name: Ladan Herrmann  Date of admission: 5/3/2019  9:02 AM  Patient's age: 80 y. o., 12/10/1928  Admission Dx: Congestive heart failure, unspecified HF chronicity, unspecified heart failure type ([address] [I50.9]    Reason for Consult:  Cardiac evaluation    Requesting Physician: Marilu Figueroa MD    CHIEF COMPLAINT:  Acute shortness of breath    History Obtained From:  patient    HISTORY OF PRESENT ILLNESS:      The patient is a 80 y.o.  male who is admitted to the hospital for worsening shortness of breath.      @ pt with H/O Complete heart block S/P PPM, Multi- vessel CAD S/P high risk PCI, severe AS S/P TAVR, CRT D upgrade, HUYEN sec to CKD resulting in ESRD requiring HD with multiple thoracentesis in past has been having worsening dyspnea and orthopnea along with hypertension    X ray chest showed pulmonary congestion with pleural effusion    @ morning, he was hypoxic and oxygen stats fall down to low 80 and is currently on nasal canula      Past Medical History:   has a past medical history of Aortic stenosis, Atrial fibrillation ([address] CAD (coronary artery disease), Chest pain, CHF (congestive heart failure) ([address] Chronic anemia, Chronic kidney disease, Hypertension, Pleural effusion on left, Pleural effusion, right, Pulmonary emboli ([address] Pulmonary hypertension ([address] and Thyroid disease. Past Surgical History:   has a past surgical history that includes pacemaker placement (01/20/2019); thoracentesis; joint replacement; Coronary angioplasty with stent (03/11/2019); other surgical history (04/02/2019); and other surgical history (03/15/2019). Home Medications:    Prior to Admission medications    Medication Sig Start Date End Date Taking?  Authorizing Provider   aspirin 81 MG EC tablet Take 1 tablet by mouth daily 4/24/19   Lonzell Rubinstein, APRN - CNP   levothyroxine (SYNTHROID) 125 MCG tablet Take 1 tablet by mouth Daily 4/24/19   Lonzell Rubinstein, APRN - CNP   midodrine (PROAMATINE) 5 MG tablet Take 1 tablet by mouth as needed (Hypotension) 4/23/19   Lonzell Rubinstein, APRN - CNP   clopidogrel (PLAVIX) 75 MG tablet Take 1 tablet by mouth daily 4/24/19   Lonzell Rubinstein, APRN - CNP   Heparin Sodium, Porcine, (HEPARIN, PORCINE,) 1000 UNIT/ML injection 1.9 mLs by Intercatheter route as needed (dialysis) 4/23/19   Lonzell Rubinstein, APRN - CNP   Heparin Sodium, Porcine, (HEPARIN, PORCINE,) 1000 UNIT/ML injection 1.9 mLs by Intercatheter route as needed (dialysis) 4/23/19   Lonzell Rubinstein, APRN - CNP   megestrol (MEGACE) 40 MG/ML suspension Take 5 mLs by mouth daily 4/23/19   Lonzell Rubinstein, APRN - CNP   QUEtiapine (SEROQUEL) 25 MG tablet Take 1 tablet by mouth nightly 4/23/19   Lonzell Rubinstein, APRN - CNP   metoprolol succinate (TOPROL XL) 25 MG extended release tablet Take 1 tablet by mouth daily 4/24/19   Lonzell Rubinstein, APRN - CNP   spironolactone (ALDACTONE) 25 MG tablet Take 1 tablet by mouth daily 4/24/19   Lonzell Rubinstein, APRN - CNP   darbepoetin marya-polysorbate (ARANESP) 100 MCG/0.5ML SOSY injection Infuse 0.5 mLs intravenously once a week 4/26/19   Lonzell Rubinstein, APRN - CNP      Current Facility-Administered Medications: carvedilol (COREG) tablet 12.5 mg, 12.5 mg, Oral, BID WC  sodium chloride flush 0.9 % injection 10 mL, 10 mL, Intravenous, 2 times per day  sodium chloride flush 0.9 % injection 10 mL, 10 mL, Intravenous, PRN  ondansetron (ZOFRAN) injection 4 mg, 4 mg, Intravenous, Q6H PRN  senna (SENOKOT) tablet 8.6 mg, 1 tablet, Oral, Daily PRN  heparin (porcine) injection 5,000 Units, 5,000 Units, Subcutaneous, 3 times per day  acetaminophen (TYLENOL) tablet 650 mg, 650 mg, Oral, Q4H PRN  aspirin chewable tablet 81 mg, 81 mg, Oral, Daily  clopidogrel (PLAVIX) tablet 75 mg, 75 mg, Oral, Daily  [START ON 5/6/2019] darbepoetin marya-polysorbate (ARANESP) injection 100 mcg, 100 mcg, Intravenous, Weekly  heparin (porcine) injection 1,900 Units, 1,900 Units, Intercatheter, PRN  heparin (porcine) injection 1,900 Units, 1,900 Units, Intercatheter, PRN  levothyroxine (SYNTHROID) tablet 125 mcg, 125 mcg, Oral, Daily  midodrine (PROAMATINE) tablet 5 mg, 5 mg, Oral, PRN  QUEtiapine (SEROQUEL) tablet 25 mg, 25 mg, Oral, Nightly  spironolactone (ALDACTONE) tablet 25 mg, 25 mg, Oral, Daily  hydrALAZINE (APRESOLINE) injection 10 mg, 10 mg, Intravenous, Q6H PRN  iron sucrose (VENOFER) injection 100 mg, 100 mg, Intravenous, Once    Allergies:  Quinolones    Social History:   reports that he quit smoking about 30 years ago. He has never used smokeless tobacco. He reports that he drank alcohol. He reports that he does not use drugs. Family History: family history is not on file. No h/o sudden cardiac death. No for premature CAD    REVIEW OF SYSTEMS:    · Constitutional: tired and lethargy     · Eyes: No visual changes or diplopia. No scleral icterus. · ENT: No Headaches  · Cardiovascular: mentioned above  · Respiratory: No previous pulmonary problems, No cough  · Gastrointestinal: No abdominal pain. No change in bowel or bladder habits. · Genitourinary: No dysuria, trouble voiding, or hematuria. · Musculoskeletal:  No gait disturbance, No weakness or joint complaints.   · Integumentary: No rash or pruritis. · Psychiatric: No anxiety, or depression. · Endocrine: No temperature intolerance. No excessive thirst, fluid intake, or urination. No tremor. · Hematologic/Lymphatic: No abnormal bruising or bleeding, blood clots or swollen lymph nodes. · Allergic/Immunologic: No nasal congestion or hives. PHYSICAL EXAM:      BP (!) 157/58   Pulse 78   Temp 97.9 °F (36.6 °C) (Oral)   Resp 18   Ht 5' 6\" (1.676 m)   Wt 130 lb 1.1 oz (59 kg)   SpO2 97%   BMI 20.99 kg/m²    Constitutional and General Appearance: alert and oriented in respiratory distress  HEENT: bruise on left periorbital area  Respiratory:  · Respiratory distress  · Resp Auscultation: decreased breath sounds at bases  Cardiovascular:  · The apical impulse is not displaced  · Heart tones are crisp and normal. regular S1 and S2. Abdomen:   · No masses or tenderness  · Bowel sounds present  Extremities:  ·  No Cyanosis or Clubbing  ·  Lower extremity edema: yes  ·  Skin: Warm and dry  Neurological:  · Alert and oriented. HD Catheter in place    DATA:    Diagnostics:    EKG: CRT D in place    ECHO:     Left ventricle is normal in size, global left ventricular systolic function  is moderately reduced, calculated ejection fraction is 42%. Left atrium appears enlarged. Right atrium is enlarged. Bioprosthetic valve is seated in the aortic position. Appears to be  functioning normally, no evidence of stenosis. Mitral valve appears sclerotic with annular calcification. Moderate to severe mitral regurgitation. Mild tricuspid regurgitation      Coronary Angiography:   Findings:  Left main: 80-90% very calcified stenosis. This required rotational atherectomy and PTCA -JEAN-CLAUDE 4/0/15 mm stent, redcuing stenosis to 10% with TIMNI III flow. LAD: Proximal to mid area calcified 80% stenosis.  This required Rotational atherectomy / JEAN-CLAUDE in mid and proximal area, reducing stenosis to 0% with AKHIL III flow  LCX: Diffuse disease )(known from cath in [place])  RCA: Minimal 20-30% as reviewed from [address]:     CBC:   Recent Labs     05/03/19  1413   WBC 10.2   HGB 8.5*   HCT 28.4*        BMP:   Recent Labs     05/03/19  1413      K 5.4*   CO2 24   BUN 47*   CREATININE 3.95*   LABGLOM 14*   GLUCOSE 107*     BNP: No results for input(s): BNP in the last 72 hours. PT/INR:   Recent Labs     05/03/19  1413   PROTIME 11.2   INR 1.1     APTT:  Recent Labs     05/03/19  1413   APTT 63.4*     CARDIAC ENZYMES:No results for input(s): CKTOTAL, CKMB, CKMBINDEX, TROPONINI in the last 72 hours. FASTING LIPID PANEL:No results found for: HDL, LDLDIRECT, LDLCALC, TRIG  LIVER PROFILE:  Recent Labs     05/03/19  1413   AST 23   ALT 19   LABALBU 3.3*       IMPRESSION:    1 Acute on chronic CHF with pleural effusion  2 Complete heart block S/P St chay PPM 1/21/19 with RV paced(95%) S/P CRT on 4/2/19 by Dr Aisha Rodrigues  3   Paroxysmal A fib( [id number]) 4 not on AC  4   Bilateral pleural effusion S/P Multiple thoracentesis  5   Severe AS S/P TAVR  6   CAD S/P PCI with rotational atherectomy of left main and LAD  7   HTN  8   ESRD on HD  9   Anemia  10  H/O P.E in past  11   Moderate to Severe Mitral regurgitation      RECOMMENDATIONS:  1.  will recommend coreg 6.25 mg BID. Will titrate coreg up later since we have added imdur 30 mg and hydralazine 25 mg TID for the CHF. 2.  Will not do ACE I since pt is going in to renal failure, requiring the HD session. Aldactone is C/I in setting of S CR>2.5.   3. Diuretic and volume management per the nephrology  4. Continue with asa and plavix for the patient. Not on [facility] for A fib. Start eliquis 2.5 BID if ok with CT surgery. Discussed with patient and Nurse.     Electronically signed by Jose Jacobson MD on 5/4/2019 at 12:28 PM  Fellow cardiology

## 2019-05-04 NOTE — PROGRESS NOTES
Dialysis Post Treatment Note  Patient tolerated treatment well. Denies complaints at time of discharge.    Vitals:    05/04/19 1718   BP: (!) 113/50   Pulse: 66   Resp:    Temp: 97.6 °F (36.4 °C)   SpO2:      Pre-Weight = 59.7  Post-weight = Weight: 128 lb 4.9 oz (58.2 kg)  Total Liters Processed = Total Liters Processed (l/min): 42.6 l/min  Rinseback Volume (mL) = Rinseback Volume (ml): 370 ml  Net Removal (mL) = @FLOW([id number]  Length of treatment=2 hours

## 2019-05-04 NOTE — PROGRESS NOTES
Renal Progress Note    Patient :  Mora Lama; 80 y.o. MRN# [id number]  Location:    Attending:  Meghan Chan MD  Admit Date:  5/3/2019   Hospital Day: 1    Subjective   Following for ESRD MWF at [facility] admitted with increasing dyspnea that came on suddenly, felt to be secondary to elevated BP, with SBP >170. Now -150. Hydralazine was to be added for afterload reduction, however currently only PRN. He had recent TAVR and coronary stents to LM and LAD in March. His renal function declined resulting in the initiation of dialysis. He had dialysis yesterday 460ml removed. He tolerated well. CXR with Bilateral pleural effusions. Possible plans for thoracentesis if no improvement after dialysis.     Objective     VS: BP (!) 157/58   Pulse 72   Temp 97.9 °F (36.6 °C) (Oral)   Resp 18   Ht 5' 6\" (1.676 m)   Wt 130 lb 1.1 oz (59 kg)   SpO2 97%   BMI 20.99 kg/m²   MAXIMUM TEMPERATURE OVER 24HRS:  Temp (24hrs), Av.9 °F (36.6 °C), Min:97.4 °F (36.3 °C), Max:98.2 °F (36.8 °C)    24HR BLOOD PRESSURE RANGE:  Systolic (60POL), EKY:783 , Min:89 , XWM:461   ; Diastolic (31UIV), MFO:68, Min:47, Max:107    24HR INTAKE/OUTPUT:      Intake/Output Summary (Last 24 hours) at 2019 0907  Last data filed at 2019 0824  Gross per 24 hour   Intake 510 ml   Output 1370 ml   Net -860 ml     WEIGHT :  Patient Vitals for the past 96 hrs (Last 3 readings):   Weight   19 1709 130 lb 1.1 oz (59 kg)   19 1346 131 lb 9.8 oz (59.7 kg)   19 1202 134 lb 7.7 oz (61 kg)       Current Medications:     Scheduled Meds:    carvedilol  12.5 mg Oral BID WC    sodium chloride flush  10 mL Intravenous 2 times per day    heparin (porcine)  5,000 Units Subcutaneous 3 times per day    aspirin  81 mg Oral Daily    clopidogrel  75 mg Oral Daily    [START ON 2019] darbepoetin marya-polysorbate  100 mcg Intravenous Weekly    levothyroxine  125 mcg Oral Daily    QUEtiapine  25 mg Oral Nightly    spironolactone  25 mg Oral Daily    iron sucrose  100 mg Intravenous Once     Continuous Infusions:     Physical Examination:     General:  AAO x 3, speaking in full sentences, no accessory muscle use. Chest:   Diminished with minimal air movement. Cardiac:  S1 S2 RR, no murmurs, gallops or rubs, JVP not raised. Abdomen: Soft, non-tender, non distended, BS audible. SKIN:  No rashes, good skin turgor. Extremities:  No edema, no clubbing, No cyanosis  Neuro:  AAO x 3, No FND. Right chest perm cath    Labs:       Recent Labs     05/03/19  1413   WBC 10.2   RBC 3.03*   HGB 8.5*   HCT 28.4*   MCV 93.7   MCH 28.1   MCHC 29.9   RDW 17.6*      MPV 11.2      BMP:   Recent Labs     05/03/19  1413      K 5.4*   CL 97*   CO2 24   BUN 47*   CREATININE 3.95*   GLUCOSE 107*   CALCIUM 8.5*      Albumin:    Recent Labs     05/03/19  1413   LABALBU 3.3*       Urinalysis/Chemistries:      Lab Results   Component Value Date    NITRU NEGATIVE 03/29/2019    COLORU YELLOW 03/29/2019    PHUR 7.5 03/29/2019    WBCUA 50  03/29/2019    RBCUA 50  03/29/2019    MUCUS NOT REPORTED 03/29/2019    TRICHOMONAS NOT REPORTED 03/29/2019    YEAST NOT REPORTED 03/29/2019    BACTERIA NOT REPORTED 03/29/2019    SPECGRAV 1.011 03/29/2019    LEUKOCYTESUR LARGE 03/29/2019    UROBILINOGEN Normal 03/29/2019    BILIRUBINUR NEGATIVE 03/29/2019    GLUCOSEU NEGATIVE 03/29/2019    [address] NEGATIVE 03/29/2019    AMORPHOUS NOT REPORTED 03/29/2019       Radiology:     CXR:   FINDINGS:   Right IJ PermCath tip unchanged in the right atrium.  PreviousTAVR.    Left-sided subclavian approach pacemaker with biventricular and atrial leads   in unchanged position, and overlying ECG monitor leads and gown snaps.       Cardiomediastinal shadow stable.  Slight interval improvement CHF with   bibasilar opacities, likely combination atelectasis, pleural effusions and   dependent edema and/or even infiltrate.       Bones unchanged.         Impression   Slight interval improvement CHF.  No other change. Assessment:     1. ESRD on HD M, W, F at [facility] hemodialysis unit via right chest perm cath  2. HTN: currently stable  3. Flash pulmonary edema / CHF exacerbation  4. Right sided pleural effusion  5. Anemia of chronic disease, hemoglobin improving from prior to discharge on [facility] as an outpatient in dialysis  6. Protein-calorie malnutrition  7. Moderate to severe mitral valve regurgitation based on prior echo from March of 2019  8. History of aortic stenosis status post TAVR  During the last hospitalization  9. History of CAD involving the LMCA and LAD with previous PCI and stent       Plan:   1. Plan UF 2 hours today  2. Strict Input and Output, Daily weigh and document in the chart. 3. Low Potassium, Low phosphorus and low salt diet. Fluids to be restricted to 1500ml/day. 4. IV Aranesp/Epogen for anemia of chronic disease with HD weekly. 5. IV Zemplar per protocol for secondary hyperparathyroidism with HD thrice a week. 6. ?? Adding oral hydralazine. Will d/w Dr. Marjorie Mak  7. CXR following dialysis. ?? Thoracentesis    Nutrition   Renal Diet/TF      Lawrence Viramontes  [facility]  [facility]. Attending Physician Statement  I have discussed the care of Nan Hedrick, including pertinent history and exam findings with the resident/fellow. I have reviewed the key elements of all parts of the encounter with the resident/fellow. I have seen and examined the patient with the resident/fellow. I agree with the assessment and plan and status of the problem list as documented.   Shama Chang

## 2019-05-04 NOTE — FLOWSHEET NOTE
Reason for Visit:  Routine rounds. Assessment:  Savanna Priest is an alert and oriented 79yo  male. No family was present. Pt expressed no feelings, he basically said everything is 'fine.'     Intervention:   provided a presence, active and empathetic listening; explored spiritual and emotional needs. Provided words of comfort, encouragement and prayer. Outcomes:  Engaged in conversation; noted no issues or struggles. Thanked  for visit and support. Recommendation:  . Chaplains remain available for spiritual or emotional support as needed and may be paged for a specific request visit at any time. 05/04/19 1254   Encounter Summary   Services provided to: Patient   Referral/Consult From: [facility] Family members   Continue Visiting   (5/4/19)   Complexity of Encounter High   Length of Encounter 15 minutes   Spiritual Assessment Completed Yes   Spiritual/Methodist   Type Spiritual support   Assessment Approachable; Anxious; Loneliness   Intervention Active listening;Explored feelings, thoughts, concerns;Prayer;Sustaining presence/ Ministry of presence; Discussed belief system/Taoism practices/samantha;Discussed relationship with God   Outcome Comfort;Expressed gratitude;Engaged in conversation;Expressed feelings/needs/concerns

## 2019-05-05 NOTE — PROGRESS NOTES
Physical Examination:     General:  AAO x 3, speaking in full sentences, no accessory muscle use. Chest:   Diminished with minimal air movement. Cardiac:  S1 S2 RR, no murmurs, gallops or rubs, JVP not raised. Abdomen: Soft, non-tender, non distended, BS audible. SKIN:  No rashes, good skin turgor. Extremities:  No edema, no clubbing, No cyanosis  Neuro:  AAO x 3, No FND. Right chest perm cath    Labs:       Recent Labs     05/03/19  1413   WBC 10.2   RBC 3.03*   HGB 8.5*   HCT 28.4*   MCV 93.7   MCH 28.1   MCHC 29.9   RDW 17.6*      MPV 11.2      BMP:   Recent Labs     05/03/19  1413      K 5.4*   CL 97*   CO2 24   BUN 47*   CREATININE 3.95*   GLUCOSE 107*   CALCIUM 8.5*      Albumin:    Recent Labs     05/03/19  1413   LABALBU 3.3*       Urinalysis/Chemistries:      Lab Results   Component Value Date    NITRU NEGATIVE 03/29/2019    COLORU YELLOW 03/29/2019    PHUR 7.5 03/29/2019    WBCUA 50  03/29/2019    RBCUA 50  03/29/2019    MUCUS NOT REPORTED 03/29/2019    TRICHOMONAS NOT REPORTED 03/29/2019    YEAST NOT REPORTED 03/29/2019    BACTERIA NOT REPORTED 03/29/2019    SPECGRAV 1.011 03/29/2019    LEUKOCYTESUR LARGE 03/29/2019    UROBILINOGEN Normal 03/29/2019    BILIRUBINUR NEGATIVE 03/29/2019    GLUCOSEU NEGATIVE 03/29/2019    [address] NEGATIVE 03/29/2019    AMORPHOUS NOT REPORTED 03/29/2019       Radiology:     CXR:   FINDINGS:   Right IJ PermCath tip unchanged in the right atrium.  PreviousTAVR. Left-sided subclavian approach pacemaker with biventricular and atrial leads   in unchanged position, and overlying ECG monitor leads and gown snaps.       Cardiomediastinal shadow stable.  Slight interval improvement CHF with   bibasilar opacities, likely combination atelectasis, pleural effusions and   dependent edema and/or even infiltrate.       Bones unchanged.           Impression   Slight interval improvement CHF.  No other change. Assessment:     1.  ESRD on HD M, W, F at [facility] hemodialysis unit via right chest perm cath  2. HTN: currently stable  3. Flash pulmonary edema / CHF exacerbation  4. Right sided pleural effusion  5. Anemia of chronic disease, hemoglobin improving from prior to discharge on [facility] as an outpatient in dialysis  6. Protein-calorie malnutrition  7. Moderate to severe mitral valve regurgitation based on prior echo from March of 2019  8. History of aortic stenosis status post TAVR  During the last hospitalization  9. History of CAD involving the LMCA and LAD with previous PCI and stent       Plan:   1. Plan HD in morning  2. Strict Input and Output, Daily weigh and document in the chart. 3. Low Potassium, Low phosphorus and low salt diet. Fluids to be restricted to 1500ml/day. 4. IV Aranesp/Epogen for anemia of chronic disease with HD weekly. 5. IV Zemplar per protocol for secondary hyperparathyroidism with HD thrice a week. 6.  Thoracentesis today    Nutrition   Renal Diet/TF      Daniella Mulligan  [facility]  [facility]. Attending Physician Statement  I have discussed the care of Maria Teresa Cabrera, including pertinent history and exam findings with the resident/fellow. I have reviewed the key elements of all parts of the encounter with the resident/fellow. I have seen and examined the patient with the resident/fellow. I agree with the assessment and plan and status of the problem list as documented. Had right pleural tap and 1100 was removed.   For Left pleural tap in AM.  WIll start Torsemide 40mg QD    Lorena Thomas

## 2019-05-05 NOTE — PROGRESS NOTES
Occupational Therapy Not Seen Note    DATE: 2019  Name: Lennox Lemma  : 12/10/1928  MRN: [id number]    Patient not available for Occupational Therapy due to:    Patient Declined: pt refuse therapy this date, but said to check tomorrow     Next Scheduled Treatment: check back 19    Electronically signed by TAMIKA Lazar on 2019 at 10:06 AM

## 2019-05-05 NOTE — PROGRESS NOTES
Thoracentesis: right side    IR   Dr. Pranav Ely RT    Patient to IR rm #1, identified, consent signed, allergies, and meds confirmed. Monitor on. Sitting up on side of stretcher. /83  Sats 98 4L      Time out performed. Prep per dr. Duyen Laura to  right side of back. 1100L murray colored fluid drawn off. No orders for sample at this time. 2s2 with Tegaderm to right back puncture site. No problems noted. Patient stably, monitors off, laying of stretcher and ready for transport. /69  Sats 98  4L    Report called to felecia floor RN.

## 2019-05-05 NOTE — PROGRESS NOTES
at bedside to assess pt. All questions and concerns answered. Pts VSS. No intervention at this time. Will continue to monitor.

## 2019-05-05 NOTE — BRIEF OP NOTE
Brief Postoperative Note    Argyle Brunner  YOB: 1928  [id number]    Pre-operative Diagnosis: CHF w sx effusions    Post-operative Diagnosis: Same    Procedure: Right thoracentesis    Anesthesia: Local    Surgeons/Assistants: Sera Gusman MD    Estimated Blood Loss: less than 50     Complications: None    Specimens: Was Not Obtained    Findings: 1100 mls murray fluid removed form right pleural space without incident. CXR to follow.     Electronically signed by Sera Gusman MD on 5/5/2019 at 1:44 PM

## 2019-05-05 NOTE — PLAN OF CARE
Problem: Risk for Impaired Skin Integrity  Goal: Tissue integrity - skin and mucous membranes  Description  Structural intactness and normal physiological function of skin and  mucous membranes. Note:   No new skin tears noted, left eye lac cleansed and new bandaid applied. Problem: Falls - Risk of:  Goal: Will remain free from falls  Description  Will remain free from falls  Note:   Patient remains free of falls this shift, ambulates well with walker and standby assist. Calls out appropriately, call light in reach. No acute distress noted.

## 2019-05-05 NOTE — PROGRESS NOTES
[facility]   Progress Note                   Date:   5/5/2019  Patient name: Ladan Herrmann  Date of admission:  5/3/2019  9:02 AM  MRN:   [id number]  YOB: 1928  PCP: No primary care provider on file. Reason for Admission:      Subjective:       Post HD, pt became hypotensive. Received albumin post HD  was seen at bed side. BP was stable. Hemodynamically stable  Currently sitting up in chair  NPO for thoracentesis    Medications:   Scheduled Meds:   isosorbide mononitrate  30 mg Oral Daily    hydrALAZINE  25 mg Oral 3 times per day    carvedilol  6.25 mg Oral BID WC    sodium chloride  250 mL Intravenous Once    sodium chloride flush  10 mL Intravenous 2 times per day    aspirin  81 mg Oral Daily    clopidogrel  75 mg Oral Daily    [START ON 5/6/2019] darbepoetin marya-polysorbate  100 mcg Intravenous Weekly    levothyroxine  125 mcg Oral Daily    QUEtiapine  25 mg Oral Nightly       Continuous Infusions:   DOPamine         CBC:   Recent Labs     05/03/19  1413   WBC 10.2   HGB 8.5*        BMP:    Recent Labs     05/03/19  1413      K 5.4*   CL 97*   CO2 24   BUN 47*   CREATININE 3.95*   GLUCOSE 107*     Hepatic:   Recent Labs     05/03/19  1413   AST 23   ALT 19   BILITOT 0.45   ALKPHOS 87     Troponin: No results for input(s): TROPONINI in the last 72 hours. BNP: No results for input(s): BNP in the last 72 hours. Lipids: No results for input(s): CHOL, HDL in the last 72 hours.     Invalid input(s): LDLCALCU  INR:   Recent Labs     05/03/19  1413   INR 1.1       Objective:   Vitals: BP (!) 162/69   Pulse 87   Temp 98.1 °F (36.7 °C) (Oral)   Resp 18   Ht 5' 6\" (1.676 m)   Wt 131 lb 6.3 oz (59.6 kg)   SpO2 94%   BMI 21.21 kg/m²     General appearance: awake, alert, in mild respiratory distress  HEENT: Head: Normocephalic, no lesions, without obvious abnormality  Neck: no JVD  Lungs: decreased breath sounds at bases on both sides  Heart: regular rate and rhythm, S1, S2 normal, no murmur, click, rub or gallop  Abdomen: soft, non-tender; bowel sounds normal  Extremities: B/L LE edema  Neurologic: Mental status: Alert, oriented. Motor and sensory not done. EKG: CRT D in place     ECHO:      Left ventricle is normal in size, global left ventricular systolic function  is moderately reduced, calculated ejection fraction is 42%. Left atrium appears enlarged. Right atrium is enlarged. Bioprosthetic valve is seated in the aortic position. Appears to be  functioning normally, no evidence of stenosis. Mitral valve appears sclerotic with annular calcification. Moderate to severe mitral regurgitation. Mild tricuspid regurgitation        Coronary Angiography:   Findings:  Left main: 80-90% very calcified stenosis. This required rotational atherectomy and PTCA -JEAN-CLAUDE 4/0/15 mm stent, redcuing stenosis to 10% with TIMNI III flow. LAD: Proximal to mid area calcified 80% stenosis. This required Rotational atherectomy / JEAN-CLAUDE in mid and proximal area, reducing stenosis to 0% with AKHIL III flow  LCX: Diffuse disease )(known from cath in [place])  RCA: Minimal 20-30% as reviewed from Perlita Prince 42           Assessment:   1 Acute on chronic CHF with B/L pleural effusion  2 Complete heart block S/P St chay PPM 1/21/19 with RV paced(95%) S/P CRT on 4/2/19 by Dr Craig Gatica  3   Paroxysmal A fib( [id number]) 4 not on AC  4   Bilateral pleural effusion S/P Multiple thoracentesis  5   Severe AS S/P TAVR  6   CAD S/P PCI with rotational atherectomy of left main and LAD  7   HTN  8   ESRD on HD  9   Anemia  10  H/O P.E in past  11   Moderate to Severe Mitral regurgitation              Treatment Plan:   1. will recommend coreg 6.25 mg BID. Continue with imdur 30 mg and hydralazine 25 mg TID for the CHF. plan for thoracentesis for the patient today  2. Not on ACE I bxz of ESRD. 3. Diuretic and volume management per the nephrology  4. Continue with asa and plavix for the patient.  Not

## 2019-05-06 NOTE — PROGRESS NOTES
[facility]   Progress Note                   Date:   5/6/2019  Patient name: Maximino Salazar  Date of admission:  5/3/2019  9:02 AM  MRN:   [id number]  YOB: 1928  PCP: No primary care provider on file. Reason for Admission:      Subjective:       Patient underwent IR guided right thoracentesis with removal of 1100 ml of murray colored fluid. He was given Torsemide 40 mg PO yesterday and became hypotensive to 70s last night. He was again hypertensive this am.    Medications:   Scheduled Meds:   albuterol  2.5 mg Nebulization 4x daily    torsemide  40 mg Oral Daily    isosorbide mononitrate  30 mg Oral Daily    hydrALAZINE  25 mg Oral 3 times per day    carvedilol  6.25 mg Oral BID WC    sodium chloride  250 mL Intravenous Once    sodium chloride flush  10 mL Intravenous 2 times per day    aspirin  81 mg Oral Daily    clopidogrel  75 mg Oral Daily    darbepoetin marya-polysorbate  100 mcg Intravenous Weekly    levothyroxine  125 mcg Oral Daily    QUEtiapine  25 mg Oral Nightly       Continuous Infusions:      CBC:   Recent Labs     05/03/19  1413   WBC 10.2   HGB 8.5*        BMP:    Recent Labs     05/03/19  1413      K 5.4*   CL 97*   CO2 24   BUN 47*   CREATININE 3.95*   GLUCOSE 107*     Hepatic:   Recent Labs     05/03/19  1413   AST 23   ALT 19   BILITOT 0.45   ALKPHOS 87     Troponin: No results for input(s): TROPONINI in the last 72 hours. BNP: No results for input(s): BNP in the last 72 hours. Lipids: No results for input(s): CHOL, HDL in the last 72 hours.     Invalid input(s): LDLCALCU  INR:   Recent Labs     05/03/19  1413   INR 1.1       Objective:   Vitals: BP (!) 125/105   Pulse 73   Temp 97.9 °F (36.6 °C) (Axillary)   Resp 18   Ht 5' 6\" (1.676 m)   Wt 131 lb 6.3 oz (59.6 kg)   SpO2 95%   BMI 21.21 kg/m²     General appearance: awake, alert, in mild respiratory distress  HEENT: Head: Normocephalic, no lesions, without obvious abnormality  Neck: no JVD  Lungs: decreased breath sounds at bases on both sides  Heart: regular rate and rhythm, S1, S2 normal, no murmur, click, rub or gallop  Abdomen: soft, non-tender; bowel sounds normal  Extremities: B/L LE edema  Neurologic: Mental status: Alert, oriented. Motor and sensory not done. EKG: CRT D in place     ECHO:      Left ventricle is normal in size, global left ventricular systolic function  is moderately reduced, calculated ejection fraction is 42%. Left atrium appears enlarged. Right atrium is enlarged. Bioprosthetic valve is seated in the aortic position. Appears to be  functioning normally, no evidence of stenosis. Mitral valve appears sclerotic with annular calcification. Moderate to severe mitral regurgitation. Mild tricuspid regurgitation        Coronary Angiography:   Findings:  Left main: 80-90% very calcified stenosis. This required rotational atherectomy and PTCA -JEAN-CLAUDE 4/0/15 mm stent, redcuing stenosis to 10% with TIMNI III flow. LAD: Proximal to mid area calcified 80% stenosis. This required Rotational atherectomy / JEAN-CLAUDE in mid and proximal area, reducing stenosis to 0% with AKHIL III flow  LCX: Diffuse disease )(known from cath in [place])  RCA: Minimal 20-30% as reviewed from [facility] [address]           Assessment:   1 Acute on chronic CHF with B/L pleural effusion  2 Complete heart block S/P St chay PPM 1/21/19 with RV paced(95%) S/P CRT on 4/2/19 by Dr Jefferson Izaguirre  3   Paroxysmal A fib( [id number]) 4 not on AC  4   Bilateral pleural effusion S/P Multiple thoracentesis  5   Severe AS S/P TAVR  6   CAD S/P PCI with rotational atherectomy of left main and LAD  7   HTN  8   ESRD on HD  9   Anemia  10  H/O P.E in past  11   Moderate to Severe Mitral regurgitation              Treatment Plan:   1. Continue coreg 6.25 mg BID (Hold for SBP < 120 mm Hg).    2. Continue imdur 30 mg.  3. Torsemide 40 mg daily per Nephro, to be given at am to avoid nocturnal hypotension. 4. Hydralazine to be made prn for night time dose to avoid hypotension at night. Holf for SP < 160 mm Hg  5. S/p right thoracentesis with small apical pneumo post procedure. Left  Thoracentesis held off today due to pneumo  6. Not on ACE I bxz of ESRD. 7. Diuretic and volume management per nephrology  8. Continue with asa and plavix for the patient. [facility]. Start eliquis 2.5 BID if ok with CT surgery.           Discussed with patient and nursing. Damion Amezquita MD  Fellow cardiology    Attending Physician Statement  I have discussed the care of Argyle Brunner, including pertinent history and exam findings,  with the resident. I have seen and examined the patient and the key elements of all parts of the encounter have been performed by me. I agree with the assessment, plan and orders as documented by the resident.

## 2019-05-06 NOTE — PROGRESS NOTES
[facility]  Pre Op Progress Note    CC: HTN, bilateral pleural effusions      Subjective:  Mr. Bhavna Wall seen and examined Plan of care reviewed and questions answered. Physical Exam  Vital Signs: BP (!) 190/76   Pulse 85   Temp 97.9 °F (36.6 °C) (Axillary)   Resp 18   Ht 5' 6\" (1.676 m)   Wt 131 lb 6.3 oz (59.6 kg)   SpO2 95%   BMI 21.21 kg/m²  O2 Flow Rate (L/min): 4 L/min       General: alert and oriented to person, place and time. Up in chair, No apparent distress. Heart:Normal S1 and S2.  Regular rhythm. No murmurs, gallops, or rubs.    Lungs: clear to auscultation bilaterally and diminished breath sounds bibasilar  Abdomen: soft, non tender, non distended, BSx4  Extremities: negative      Scheduled Meds:    albuterol  2.5 mg Nebulization 4x daily    torsemide  40 mg Oral Daily    isosorbide mononitrate  30 mg Oral Daily    hydrALAZINE  25 mg Oral 3 times per day    carvedilol  6.25 mg Oral BID WC    sodium chloride  250 mL Intravenous Once    sodium chloride flush  10 mL Intravenous 2 times per day    aspirin  81 mg Oral Daily    clopidogrel  75 mg Oral Daily    darbepoetin marya-polysorbate  100 mcg Intravenous Weekly    levothyroxine  125 mcg Oral Daily    QUEtiapine  25 mg Oral Nightly     Continuous Infusions:    DOPamine         Data:  CBC:   Recent Labs     05/03/19  1413   WBC 10.2   HGB 8.5*   HCT 28.4*   MCV 93.7        BMP:   Recent Labs     05/03/19  1413      K 5.4*   CL 97*   CO2 24   BUN 47*   CREATININE 3.95*     PT/INR:   Recent Labs     05/03/19  1413   PROTIME 11.2   INR 1.1     APTT:   Recent Labs     05/03/19  1413   APTT 63.4*         Assessment & Plan:   Patient Active Problem List   Diagnosis    Pulmonary hypertension (HCC)    Acute on chronic systolic congestive heart failure (HCC)    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 Awaiting echo, to evaluate valve;  left thoracentesis for left pleural effusion. Thoracentesis on hold due to small right pneumothorax. Cardiology will adjust medications due to hypotension problems in evening. Pulmonary and ID consults due to infiltrates noted on CXR        The above recommendations including medications and orders were discussed and agreed upon with Dr. Beto Tse, the attending on service for the cardiothoracic surgery group today.      FRANCIS Hodges, CNP

## 2019-05-06 NOTE — PROGRESS NOTES
Dialysis Post Treatment Note  Patient tolerated treatment well. Denies complaints at time of discharge. Vitals:    05/06/19 1600   BP: (!) 146/68   Pulse: 81   Resp:    Temp: 98.1 °F (36.7 °C)   SpO2: 94%     Pre-Weight = 60.2kg  Post- Weight: 131 lb 13.4 oz (59.8 kg)   Total Liters Processed (l/min): 73 l/min   Rinseback Volume (ml): 380 ml  Net Removal (mL) =   Length of treatment=3.5 hrs    Pt. tolerated tx. well however hypotensive in the beginning of tx. (See MAR) no complaints of pain/discomfort t/o tx. Vancomycin 500mg given as ordered no s/s of any adverse reactions noted.

## 2019-05-06 NOTE — CONSULTS
[facility] -   Infectious diseases evaluation  admission date 5/3/2019    reason for consultation:   Abnormal CXR - TAVR    Impression :   Current:  · CHF  · pleural effusions, many thoracentesis in past-  · bilat lower lobe consolidations, Possible bilat LL aspiration pneumonia     Other:  · Complete heart block, post PPM  · CAD, multi vessel, post high risk PCI  · Severe AS, post TAVR  · ARF on CRF- needing HD  Discussion / summary of stay / plan of care   ·   Recommendations   · Get swallow study  · Get sputum induction for cx by resp  · Start cefepime 2 g q HD and vanco  500 mg QHD till sp cx results  · Blood cx x2 in hemo today    Infection Control Recommendations   · Creola Precautions  · Contact Isolation       Antimicrobial Stewardship Recommendations   · Simplification of therapy  · Targeted therapy  · Per Kg dosing      Coordination ofOutpatient Care:   · Estimated Length of IV antimicrobials:  · Patient will need Midline / picc Catheter Insertion:   · Patient will need SNF:  · Patient will need outpatient wound care:     History of Present Illness:   Initial history:  Nan Hedrick is a 80y.o.-year-old male admitted w SOB, and HTN, hx of a fall at the NH. No fever and no chills- CXR pleural effusions and pulm congestion on CXR, hypoxia. He had a R thoracentesis on 5/5/19 and fluid was serous. Afterwards, the CXR showed improved R effusion but right lower lobe infiltrate, same left lower love infiltrate - we are called for possible pneumonia in the setting of recent TAVR. No fever or chills -    Story of dysphagia recently and feeling food is getting stuck. Pt has ARF on CRF and has been on HD MWF, lives in NH,, many thoracentesis in past, has a pace maker for complete block in past.  Severe AS and post TAVR,  PTCA for multiple vessel CAD.     Interval changes  5/6/2019   Seen in HD - weak - unable to give hx - most hx from chart  Summary of relevant labs:  Labs:  WBC 10  Micro:    Imaging:  CXR bilat LL infiltrates, w bilat moderate effusions, trace right apical pneumothorax. I have personally reviewed the past medical history, past surgical history, medications, social history, and family history, and I haveupdated the database accordingly. Past Medical History:     Past Medical History:   Diagnosis Date    Aortic stenosis 02/15/2019    Atrial fibrillation (HCC)     CAD (coronary artery disease)     Chest pain 02/15/2019    CHF (congestive heart failure) (HCC)     Chronic anemia     Chronic kidney disease     Hypertension     Pleural effusion on left 02/15/2019    Pleural effusion, right 02/15/2019    Pulmonary emboli ([address] 02/15/2019    Pulmonary hypertension ([address] 03/06/2019    Thyroid disease     hypothyroidism       Past Surgical  History:     Past Surgical History:   Procedure Laterality Date    CORONARY ANGIOPLASTY WITH STENT PLACEMENT  03/11/2019    complex PCI of LT MAIN, LAD    JOINT REPLACEMENT      right ankle    OTHER SURGICAL HISTORY  04/02/2019    UPGRADE TO BI-V PACEMAKER    OTHER SURGICAL HISTORY  03/15/2019    TAVR PROCEDURE    PACEMAKER PLACEMENT  01/20/2019    THORACENTESIS         Medications:      albuterol  2.5 mg Nebulization 4x daily    torsemide  40 mg Oral Daily    isosorbide mononitrate  30 mg Oral Daily    hydrALAZINE  25 mg Oral 3 times per day    carvedilol  6.25 mg Oral BID WC    sodium chloride  250 mL Intravenous Once    sodium chloride flush  10 mL Intravenous 2 times per day    aspirin  81 mg Oral Daily    clopidogrel  75 mg Oral Daily    darbepoetin marya-polysorbate  100 mcg Intravenous Weekly    levothyroxine  125 mcg Oral Daily    QUEtiapine  25 mg Oral Nightly       Social History:     Social History     Socioeconomic History    Marital status:       Spouse name: Not on file    Number of children: Not on file    Years of education: Not on file    Highest education level: Not on file   Occupational History     Employer: RETIRED   Social Needs    Financial resource strain: Not on file    Food insecurity:     Worry: Not on file     Inability: Not on file    Transportation needs:     Medical: Not on file     Non-medical: Not on file   Tobacco Use    Smoking status: Former Smoker     Last attempt to quit:      Years since quittin.3    Smokeless tobacco: Never Used   Substance and Sexual Activity    Alcohol use: Not Currently    Drug use: Never    Sexual activity: Not on file   Lifestyle    Physical activity:     Days per week: Not on file     Minutes per session: Not on file    Stress: Not on file   Relationships    Social connections:     Talks on phone: Not on file     Gets together: Not on file     Attends Orthodoxy service: Not on file     Active member of club or organization: Not on file     Attends meetings of clubs or organizations: Not on file     Relationship status: Not on file    Intimate partner violence:     Fear of current or ex partner: Not on file     Emotionally abused: Not on file     Physically abused: Not on file     Forced sexual activity: Not on file   Other Topics Concern    Not on file   Social History Narrative    Not on file       Family History:   No family history on file. Allergies:   Quinolones     Review of Systems:     Review of Systems   Reason unable to perform ROS: lethargic on hemodilaysis. Constitutional:        Feels hot and no tgood       Physical Examination :     Patient Vitals for the past 8 hrs:   BP Temp Temp src Pulse Resp SpO2   19 0900 (!) 125/105 -- -- 73 -- --   19 0800 (!) 190/76 97.9 °F (36.6 °C) Axillary 85 18 --   19 0731 -- -- -- -- -- 95 %   19 0705 (!) 178/76 -- -- -- -- --   19 0703 (!) 170/81 97.7 °F (36.5 °C) Oral 79 14 90 %       Physical Exam   Constitutional: He appears well-developed. Weak - thin   HENT:   Head: Normocephalic and atraumatic.    Mouth/Throat: No oropharyngeal exudate. Eyes: Conjunctivae are normal. No scleral icterus. Neck: No JVD present. No tracheal deviation present. Cardiovascular: Normal rate and normal heart sounds. No murmur heard. Pulmonary/Chest: No stridor. No respiratory distress. Abdominal: Soft. He exhibits no distension. There is no tenderness. Genitourinary:   Genitourinary Comments: No ramirez   Musculoskeletal: He exhibits no edema or deformity. Neurological: He is alert. No cranial nerve deficit. Skin: Skin is warm and dry. No rash noted. No erythema. Psychiatric: He has a normal mood and affect. His behavior is normal.         Medical Decision Making:   I have independently reviewed/ordered the following labs:    CBC with Differential:   Recent Labs     05/03/19  1413 05/06/19  0935   WBC 10.2 10.8   HGB 8.5* 7.9*   HCT 28.4* 26.7*    164     BMP:  Recent Labs     05/03/19  1413      K 5.4*   CL 97*   CO2 24   BUN 47*   CREATININE 3.95*     Hepatic Function Panel:   Recent Labs     05/03/19  1413   PROT 5.7*   LABALBU 3.3*   BILIDIR 0.14   IBILI 0.31   BILITOT 0.45   ALKPHOS 87   ALT 19   AST 23     No results for input(s): RPR in the last 72 hours. No results for input(s): HIV in the last 72 hours. No results for input(s): BC in the last 72 hours. Lab Results   Component Value Date    CREATININE 3.95 05/03/2019    GLUCOSE 107 05/03/2019       Detailed results: Thank you for allowing us to participate in the care of this patient. Please call with questions. This note is created with the assistance of a speech recognition program.  While intending to generate adocument that actually reflects the content of the visit, the document can still have some errors including those of syntax and sound a like substitutions which may escape proof reading. It such instances, actual meaningcan be extrapolated by contextual diversion.     Sherron Babin MD  Office: [phone]

## 2019-05-06 NOTE — PROGRESS NOTES
Access:  previous permacath    Current Medications:      albuterol (PROVENTIL) nebulizer solution 2.5 mg 4x daily   acetaminophen (TYLENOL) tablet 650 mg Q4H PRN   torsemide (DEMADEX) tablet 40 mg Daily   isosorbide mononitrate (IMDUR) extended release tablet 30 mg Daily   hydrALAZINE (APRESOLINE) tablet 25 mg 3 times per day   carvedilol (COREG) tablet 6.25 mg BID WC   0.9 % sodium chloride bolus Once   sodium chloride flush 0.9 % injection 10 mL 2 times per day   sodium chloride flush 0.9 % injection 10 mL PRN   ondansetron (ZOFRAN) injection 4 mg Q6H PRN   senna (SENOKOT) tablet 8.6 mg Daily PRN   acetaminophen (TYLENOL) tablet 650 mg Q4H PRN   aspirin chewable tablet 81 mg Daily   clopidogrel (PLAVIX) tablet 75 mg Daily   darbepoetin marya-polysorbate (ARANESP) injection 100 mcg Weekly   heparin (porcine) injection 1,900 Units PRN   heparin (porcine) injection 1,900 Units PRN   levothyroxine (SYNTHROID) tablet 125 mcg Daily   midodrine (PROAMATINE) tablet 5 mg PRN   QUEtiapine (SEROQUEL) tablet 25 mg Nightly   hydrALAZINE (APRESOLINE) injection 10 mg Q6H PRN     Labs:   CBC: Recent Labs     05/03/19  1413 05/06/19  0935   WBC 10.2 10.8   RBC 3.03* 2.80*   HGB 8.5* 7.9*   HCT 28.4* 26.7*    164   MPV 11.2 11.9      BMP: Recent Labs     05/03/19  1413      K 5.4*   CL 97*   CO2 24   BUN 47*   CREATININE 3.95*   GLUCOSE 107*   CALCIUM 8.5*        Albumin:   Recent Labs     05/03/19  1413   LABALBU 3.3*       Dialysis bath: Dialysis Bath  K+ (Potassium): 4  Ca+ (Calcium): 2.25  Na+ (Sodium): 138  HCO3 (Bicarb): 35    Assessment:  1. ESRD on HD M, W, F at [facility] hemodialysis unit via right chest perm cath  2.  HTN: currently stable  3. Flash pulmonary edema / CHF exacerbation  4. Right sided pleural effusion  5. Anemia of chronic disease, increase aranesp and give additional venofer   6. Protein-calorie malnutrition  7.  Hx of Moderate to severe mitral valve regurgitation based on prior echo from March of 2019. Repeat echo today   8. History of aortic stenosis status post TAVR  During the last hospitalization  9. History of CAD involving the LMCA and LAD with previous PCI and stent    Plan:  1. Wt removal and dialysis orders reviewed. Review labs . Dry wt 57.5 kg   2. 200 mg venofer and 100 mcg aranesp today   3. Cont pt on aranesp and zemplar per protocol. 4. Follow up labs ordered. Please do not hesitate to call with questions.     Electronically signed by Lilly Aggarwal MD on 5/6/2019 at 10:23 AM

## 2019-05-06 NOTE — CONSULTS
Inpatient consult to Pulmonology  Consult performed by: Alina Fowler MD  Consult ordered by: FRACNIS Hanson - YDLAN          PULMONARY & CRITICAL CARE MEDICINE CONSULT NOTE     Patient:  Jessica Elam  MRN: [id number]  Admit date: 5/3/2019  Primary Care Physician: No primary care provider on file. Consulting Physician: Mary Pollard MD    HISTORY     CHIEF COMPLAINT/REASON FOR CONSULT: Pleural effusions/LLL infiltrate    HISTORY OF PRESENT ILLNESS:  The patient is a 80 y.o. male with a recent prolonged hospitalization including PTCA with JEAN-CLAUDE (3/11), TAVR (3/15), BiV pacemaker, HUYEN requiring initiation of dialysis and recurrent bilateral pleural effusions requiring multiple thoracenteses on both sides. He was discharged on 4/23 but now readmitted with worsening SOB. He was again noted to B/L pleural effusions and underwent thoracentesis on Rt side by IR yesterday. He was planned for Lt sided thoracentsis today but this has been deferred. He was seen during dialysis. He reports SOB and ches tightness. He was seen by ID and started on Vanc and cefepime. His CHF meds are currently being optimized.     PAST MEDICAL HISTORY:        Diagnosis Date    Aortic stenosis 02/15/2019    Atrial fibrillation (HCC)     CAD (coronary artery disease)     Chest pain 02/15/2019    CHF (congestive heart failure) (HCC)     Chronic anemia     Chronic kidney disease     Hypertension     Pleural effusion on left 02/15/2019    Pleural effusion, right 02/15/2019    Pulmonary emboli ([address] 02/15/2019    Pulmonary hypertension ([address] 03/06/2019    Thyroid disease     hypothyroidism     PAST SURGICAL HISTORY:        Procedure Laterality Date    CORONARY ANGIOPLASTY WITH STENT PLACEMENT  03/11/2019    complex PCI of LT MAIN, LAD    JOINT REPLACEMENT      right ankle    OTHER SURGICAL HISTORY  04/02/2019    UPGRADE TO BI-V PACEMAKER    OTHER SURGICAL HISTORY  03/15/2019    TAVR PROCEDURE    PACEMAKER PLACEMENT  01/20/2019    THORACENTESIS       FAMILY HISTORY:   No family history on file. SOCIAL HISTORY:   TOBACCO:   reports that he quit smoking about 30 years ago. He has never used smokeless tobacco.  ETOH:  reports that he drank alcohol. DRUGS: reports that he does not use drugs. ALLERGIES:    Allergies   Allergen Reactions    Quinolones      Pt is allergic to fluoroquinolones       HOME MEDICATIONS:  Prior to Admission medications    Medication Sig Start Date End Date Taking? Authorizing Provider   aspirin 81 MG EC tablet Take 1 tablet by mouth daily 4/24/19   Alona Pritchett APRN - CNP   levothyroxine (SYNTHROID) 125 MCG tablet Take 1 tablet by mouth Daily 4/24/19   Alona Pritchett APRN - CNP   midodrine (PROAMATINE) 5 MG tablet Take 1 tablet by mouth as needed (Hypotension) 4/23/19   Alona Pritchett APRN - CNP   clopidogrel (PLAVIX) 75 MG tablet Take 1 tablet by mouth daily 4/24/19   Alona Pritchett APRN - CNP   Heparin Sodium, Porcine, (HEPARIN, PORCINE,) 1000 UNIT/ML injection 1.9 mLs by Intercatheter route as needed (dialysis) 4/23/19   Alona Pritchett APRN - CNP   Heparin Sodium, Porcine, (HEPARIN, PORCINE,) 1000 UNIT/ML injection 1.9 mLs by Intercatheter route as needed (dialysis) 4/23/19   Alona Pritchett APRN - CNP   megestrol (MEGACE) 40 MG/ML suspension Take 5 mLs by mouth daily 4/23/19   Alona Pritchett APRN - CNP   QUEtiapine (SEROQUEL) 25 MG tablet Take 1 tablet by mouth nightly 4/23/19   Alona Pritchett APRN - CNP   metoprolol succinate (TOPROL XL) 25 MG extended release tablet Take 1 tablet by mouth daily 4/24/19   Alona Pritchett APRN - CNP   spironolactone (ALDACTONE) 25 MG tablet Take 1 tablet by mouth daily 4/24/19   Alona Pritchett APRN - CNP   darbepoetin marya-polysorbate (ARANESP) 100 MCG/0.5ML SOSY injection Infuse 0.5 mLs intravenously once a week 4/26/19   Alona Pritchett APRN - DYLAN     IMMUNIZATIONS:    There is no immunization history on file for this patient.     REVIEW OF SYSTEMS:  General: negative for chills, fatigue or wheeze   Heart - normal rate, regular rhythm, normal S1, S2, no murmurs, rubs, clicks or gallops   Abdomen - soft, nontender, nondistended, no masses or organomegaly   Neurological - motor and sensory grossly normal bilaterally   Musculoskeletal - no joint tenderness, deformity or swelling   Extremities - peripheral pulses normal,1+ pedal edema, no clubbing or cyanosis   Skin - normal coloration and turgor, no rashes, no suspicious skin lesions noted    DATA REVIEW     Medications: Current Inpatient  Scheduled Meds:   albuterol  2.5 mg Nebulization 4x daily    cefepime  2 g Intravenous Q MWF    vancomycin  500 mg Intravenous Q MWF    darbepoetin marya-polysorbate        albumin human  25 g Intravenous Once    torsemide  40 mg Oral Daily    isosorbide mononitrate  30 mg Oral Daily    hydrALAZINE  25 mg Oral 3 times per day    carvedilol  6.25 mg Oral BID WC    sodium chloride  250 mL Intravenous Once    sodium chloride flush  10 mL Intravenous 2 times per day    aspirin  81 mg Oral Daily    clopidogrel  75 mg Oral Daily    darbepoetin marya-polysorbate  100 mcg Intravenous Weekly    levothyroxine  125 mcg Oral Daily    QUEtiapine  25 mg Oral Nightly     Continuous Infusions:  INPUT/OUTPUT:  In: 250 [P.O.:250]  Out: 1190     LABS:-  ABGs:   No results found for: PH, PCO2, PO2, HCO3, O2SAT  No results for input(s): PHART, PO2ART, YCF5UDF, EIF0WKW, BEART, L6HWODMP in the last 72 hours.   Lab Results   Component Value Date    POCPH 7.464 (H) 03/06/2019    POCPCO2 32.0 (L) 03/06/2019    POCPO2 58.6 (L) 03/06/2019    POCHCO3 22.9 03/06/2019    GZPI6DXE 92 (L) 03/06/2019     CBC:   Recent Labs     05/06/19  0935   WBC 10.8   HGB 7.9*   HCT 26.7*   MCV 95.4      RBC 2.80*   MCH 28.2   MCHC 29.6   RDW 18.1*     BMP:   Recent Labs     05/06/19  0935      K 5.0      CO2 25   BUN 72*   CREATININE 3.78*   GLUCOSE 141*     Liver Function Test:   No results for input(s): PROT, LABALBU, ALT, AST, GGT, ALKPHOS, BILITOT in the last 72 hours. Amylase/Lipase:  No results for input(s): AMYLASE, LIPASE in the last 72 hours. Coagulation Profile:   No results for input(s): INR, PROTIME, APTT in the last 72 hours. Cardiac Enzymes:  No results for input(s): CKTOTAL, CKMB, CKMBINDEX, TROPONINI in the last 72 hours. Lactic Acid:  No results found for: LACTA  BNP:   No results found for: BNP  D-Dimer:  No results found for: DDIMER  Others:   Lab Results   Component Value Date    TSH 3.04 03/20/2019    FT3 0.87 (L) 03/20/2019     No results found for: GONSALO, RHEUMFACTOR, SEDRATE, CRP  No results found for: Fredy Kevin  Lab Results   Component Value Date    IRON 31 (L) 04/08/2019    TIBC 169 (L) 04/08/2019    FERRITIN 884 (H) 04/08/2019     No results found for: SPEP, UPEP  No results found for: PSA, CEA, , [id number],   Microbiology:    Pathology:    Radiology:    Chest X-ray:  Impression   Trace right apical pneumothorax.       Otherwise overall stable examination demonstrating cardiomegaly, pulmonary   vascular congestion, bilateral lower lobe airspace disease, and at least   moderate-sized bilateral pleural effusions. CT Scans:    US:  FINDINGS:   A total of 1100 mL was removed.  Fluid was murray, and discarded.           Impression   Successful ultrasound guided therapeutic right thoracentesis. Pulmonary Function test:    Polysomnogram:    Echocardiogram: 5/3/19  Summary  Limited echocardiogram  Normal left ventricle size with mildly reduced systolic function. Calculated ejection fraction via Dean's Method is 45%  Abnormal septal wall motion due to paced rhythm. Normal right ventricular size and function. No significant pericardial effusion is seen. Normally functioning Bio-prosthetic Aortic valve (TAVR) without significant  stenosis or insufficiency. No significant fredrick-valvular.  Mean gradient 4 mm  hg.    ASSESSMENT AND PLAN     Assessment:    // B/L recurrent pleural effusions, s/p Rt thoracentesis  // Small Rt apical pneumothorax, iatrogenic  // B/L pulm infiltrates: pulm edema vs HCAP   // CHF, LV systolic dysfunction  // Recent PTCA and TAVR and BiV pacemaker  // HUYEN on maintenance HD    Plan:    I personally interviewed/examined the patient; reviewed interval history, interpreted all available radiographic and laboratory data at the time of service. Patient remains hemodynamically stable and is currently saturating well on nasal cannula. Continue supplemental oxygen to keep oxygen saturation greater than 92%  S/p thoracentesis on Rt side, also planned for thoracentesis on Lt side  Follow pleural fluid analysis  Will recommend CT chest for further evaluation of pulmonary infiltrates  Continue incentive spirometry, pulmonary toilet, aspiration precautions and bronchodilators  Continue to monitor I/O with a goal of even/negative fluid balance  Antimicrobials reviewed; continue Vanc and cefepime as per ID  DVT and stress ulcer prophylaxis  Physical/occupational therapy; increase activity as tolerated. It was my pleasure to evaluate Elaina Wilder today. We will continue to follow. I would like to thank you for allowing me to participate in the care of this patient. Please feel free to call with any further questions or concerns. Kane Matson M.D. Pulmonary and Critical Care Medicine           5/6/2019, 4:53 PM    Please note that this chart was generated using voice recognition Dragon dictation software. Although every effort was made to ensure the accuracy of this automated transcription, some errors in transcription may have occurred.

## 2019-05-06 NOTE — PROGRESS NOTES
Occupational Therapy Not Seen Note    DATE: 2019  Name: Barron Mason  : 12/10/1928  MRN: [id number]    Patient not available for Occupational Therapy due to:    Patient Declined refusing any OOB/OOC activity after max encouragement. Pt states \"I just don't want to do anything. I am going to dialysis later so I'm just going to sit here. \" RN entered room attempting to encourage pt to participate and pt continues to refuse. Check back .     Electronically signed by Angel RUFF/UNIQUE on 2019 at 10:12 AM

## 2019-05-07 NOTE — PROGRESS NOTES
[facility]   Progress Note                          Date:   5/7/2019  Patient name: Rodger Morales  Date of admission:  5/3/2019  9:02 AM  MRN:   [id number]  YOB: 1928  PCP: No primary care provider on file. Reason for Admission:      Subjective:     Patient tolerated dialysis well yesterday and BP remained stable overnight. Medications:   Scheduled Meds:   guaiFENesin  600 mg Oral BID    albuterol  2.5 mg Nebulization 4x daily    cefepime  2 g Intravenous Q MWF    vancomycin  500 mg Intravenous Q MWF    albumin human  25 g Intravenous Once    torsemide  40 mg Oral Daily    isosorbide mononitrate  30 mg Oral Daily    hydrALAZINE  25 mg Oral 3 times per day    carvedilol  6.25 mg Oral BID WC    sodium chloride  250 mL Intravenous Once    sodium chloride flush  10 mL Intravenous 2 times per day    aspirin  81 mg Oral Daily    clopidogrel  75 mg Oral Daily    darbepoetin marya-polysorbate  100 mcg Intravenous Weekly    levothyroxine  125 mcg Oral Daily    QUEtiapine  25 mg Oral Nightly       Continuous Infusions:      CBC:   Recent Labs     05/06/19  0935   WBC 10.8   HGB 7.9*        BMP:    Recent Labs     05/06/19  0935      K 5.0      CO2 25   BUN 72*   CREATININE 3.78*   GLUCOSE 141*     Hepatic:   No results for input(s): AST, ALT, ALB, BILITOT, ALKPHOS in the last 72 hours. Troponin: No results for input(s): TROPONINI in the last 72 hours. BNP: No results for input(s): BNP in the last 72 hours. Lipids: No results for input(s): CHOL, HDL in the last 72 hours. Invalid input(s): LDLCALCU  INR:   No results for input(s): INR in the last 72 hours.     Objective:   Vitals: /69   Pulse 77   Temp 98.8 °F (37.1 °C) (Oral)   Resp 18   Ht 5' 6\" (1.676 m)   Wt 135 lb 5.8 oz (61.4 kg)   SpO2 100%   BMI 21.85 kg/m²     General appearance: awake, alert, in mild respiratory distress  HEENT: Head: Normocephalic, no lesions, without

## 2019-05-07 NOTE — CARE COORDINATION
Spoke with Dr. Aisha Cruz, patient will not likely be ready for DC 5/9/19.   Spoke with Jeevan Caba at [facility], cancelled transportation, updated Rg Willoughby at [employer]

## 2019-05-07 NOTE — PROGRESS NOTES
Nebulization 4x daily    cefepime  2 g Intravenous Q MWF    vancomycin  500 mg Intravenous Q MWF    albumin human  25 g Intravenous Once    torsemide  40 mg Oral Daily    isosorbide mononitrate  30 mg Oral Daily    hydrALAZINE  25 mg Oral 3 times per day    carvedilol  6.25 mg Oral BID WC    sodium chloride  250 mL Intravenous Once    sodium chloride flush  10 mL Intravenous 2 times per day    aspirin  81 mg Oral Daily    clopidogrel  75 mg Oral Daily    darbepoetin marya-polysorbate  100 mcg Intravenous Weekly    levothyroxine  125 mcg Oral Daily    QUEtiapine  25 mg Oral Nightly     Continuous Infusions:  PRN Meds:sodium chloride, sodium chloride, albumin human, acetaminophen, sodium chloride flush, ondansetron, senna, acetaminophen, heparin (porcine), heparin (porcine), midodrine, hydrALAZINE    Objective:    CBC:   Recent Labs     05/06/19  0935   WBC 10.8   HGB 7.9*        BMP:    Recent Labs     05/06/19  0935      K 5.0      CO2 25   BUN 72*   CREATININE 3.78*   GLUCOSE 141*     Calcium:  Recent Labs     05/06/19  0935   CALCIUM 8.2*     Ionized Calcium:No results for input(s): IONCA in the last 72 hours. Magnesium:No results for input(s): MG in the last 72 hours. Phosphorus:No results for input(s): PHOS in the last 72 hours. BNP:No results for input(s): BNP in the last 72 hours. Glucose:No results for input(s): POCGLU in the last 72 hours. HgbA1C: No results for input(s): LABA1C in the last 72 hours. INR: No results for input(s): INR in the last 72 hours. Hepatic: No results for input(s): ALKPHOS, ALT, AST, PROT, BILITOT, BILIDIR, LABALBU in the last 72 hours. Amylase and Lipase:No results for input(s): LACTA, AMYLASE in the last 72 hours. Lactic Acid: No results for input(s): LACTA in the last 72 hours. CARDIAC ENZYMES:No results for input(s): CKTOTAL, CKMB, CKMBINDEX, TROPONINI in the last 72 hours. BNP: No results for input(s): BNP in the last 72 hours.   Lipids: iatrogenic small apical pneumothorax post R thoracentesis   - Continue incentive spirometry, pulm toilet, aspiration precautions and bronchodilators  - Antibitoics per ID  - PT/OT       Claudene Barrs, MD  PGY-3, Internal Medicine  [facility], [facility]  5/7/2019, 4:46 PM   Attending Physician Statement  I have discussed the care of Billy Kimbrough, including pertinent history and exam findings,  with the resident. I have seen and examined the patient and the key elements of all parts of the encounter have been performed by me. I agree with the assessment, plan and orders as documented by the resident with additions . CC Dyspnea  Xray chest reviewed. Labs reviewed. Rx reviewed. Patient examined. SAme management. Treatment plan Discussed with nursing staff in detail , all questions answered . Electronically signed by Gato Camacho MD on   5/7/19 at 8:13 PM    Please note that this chart was generated using voice recognition Dragon dictation software. Although every effort was made to ensure the accuracy of this automated transcription, some errors in transcription may have occurred.

## 2019-05-07 NOTE — PROGRESS NOTES
Renal Progress Note    Patient :  Enedelia Gonzalez; 80 y.o. MRN# [id number]  Location:    Attending:  Maik Pfeiffer MD  Admit Date:  5/3/2019   Hospital Day: 4    Subjective   Patient seen and examined. Patient was sitting comfortably and eating his lunch. Now patient is coughing and has thick yellow sputum. Patient was seen by infectious diseases and on antibiotic. Chest x-ray still shows bilateral pleural effusions. Drop in hemoglobin noted.  denies any lightheadedness or dizziness.   Denies any nausea or vomiting    Objective     VS: BP (!) 101/49   Pulse 73   Temp 98.3 °F (36.8 °C) (Oral)   Resp 18   Ht 5' 6\" (1.676 m)   Wt 135 lb 5.8 oz (61.4 kg)   SpO2 100%   BMI 21.85 kg/m²   MAXIMUM TEMPERATURE OVER 24HRS:  Temp (24hrs), Av.9 °F (36.6 °C), Min:97.3 °F (36.3 °C), Max:98.8 °F (37.1 °C)    24HR BLOOD PRESSURE RANGE:  Systolic (00VKA), IYX:802 , Min:86 , CSE:466   ; Diastolic (27UKE), MYM:13, Min:35, Max:76    24HR INTAKE/OUTPUT:      Intake/Output Summary (Last 24 hours) at 2019 1254  Last data filed at 2019 0900  Gross per 24 hour   Intake 730 ml   Output 1190 ml   Net -460 ml     WEIGHT :  Patient Vitals for the past 96 hrs (Last 3 readings):   Weight   19 0400 135 lb 5.8 oz (61.4 kg)   19 1434 131 lb 13.4 oz (59.8 kg)   19 1045 132 lb 11.5 oz (60.2 kg)       Current Medications:     Scheduled Meds:    guaiFENesin  600 mg Oral BID    albuterol  2.5 mg Nebulization 4x daily    cefepime  2 g Intravenous Q MWF    vancomycin  500 mg Intravenous Q MWF    albumin human  25 g Intravenous Once    torsemide  40 mg Oral Daily    isosorbide mononitrate  30 mg Oral Daily    hydrALAZINE  25 mg Oral 3 times per day    carvedilol  6.25 mg Oral BID WC    sodium chloride  250 mL Intravenous Once    sodium chloride flush  10 mL Intravenous 2 times per day    aspirin  81 mg Oral Daily    clopidogrel  75 mg Oral Daily    darbepoetin marya-polysorbate  100 mcg Intravenous Weekly    levothyroxine  125 mcg Oral Daily    QUEtiapine  25 mg Oral Nightly     Continuous Infusions:       Physical Examination:     General:  Sitting comfortably alert and awake. Chest:   Patient has diminished air entry bilaterally on lower one third of the chest.  Cardiac:  S1 S2 RR, no murmurs, gallops or rubs, JVP not raised. Abdomen: Soft, non-tender, non distended, BS audible. SKIN:  No rashes, good skin turgor. Extremities:  No edema  Neuro:  AAO x 3, No FND. Right chest perm cath    Labs:       Recent Labs     05/06/19  0935   WBC 10.8   RBC 2.80*   HGB 7.9*   HCT 26.7*   MCV 95.4   MCH 28.2   MCHC 29.6   RDW 18.1*      MPV 11.9      BMP:   Recent Labs     05/06/19  0935      K 5.0      CO2 25   BUN 72*   CREATININE 3.78*   GLUCOSE 141*   CALCIUM 8.2*      Albumin:    No results for input(s): LABALBU in the last 72 hours. Urinalysis/Chemistries:      Lab Results   Component Value Date    NITRU NEGATIVE 03/29/2019    COLORU YELLOW 03/29/2019    PHUR 7.5 03/29/2019    WBCUA 50  03/29/2019    RBCUA 50  03/29/2019    MUCUS NOT REPORTED 03/29/2019    TRICHOMONAS NOT REPORTED 03/29/2019    YEAST NOT REPORTED 03/29/2019    BACTERIA NOT REPORTED 03/29/2019    SPECGRAV 1.011 03/29/2019    LEUKOCYTESUR LARGE 03/29/2019    UROBILINOGEN Normal 03/29/2019    BILIRUBINUR NEGATIVE 03/29/2019    GLUCOSEU NEGATIVE 03/29/2019    [address] NEGATIVE 03/29/2019    AMORPHOUS NOT REPORTED 03/29/2019       Radiology:     CXR:   FINDINGS:   Right IJ PermCath tip unchanged in the right atrium.  PreviousTAVR.    Left-sided subclavian approach pacemaker with biventricular and atrial leads   in unchanged position, and overlying ECG monitor leads and gown snaps.       Cardiomediastinal shadow stable.  Slight interval improvement CHF with   bibasilar opacities, likely combination atelectasis, pleural effusions and   dependent edema and/or even infiltrate.       Bones unchanged.         Impression   Slight interval improvement CHF.  No other change. Assessment:     1. ESRD on HD M, W, F at [facility] hemodialysis unit via right chest perm cath. Tomorrow will be his regular dialysis today  2. HTN: Blood pressure is under good control   3.  bilateral pleural effusion with infiltrate concerning pneumonia patient is on broad-spectrum antibiotic  4.  bilateral pleural effusions  5. Anemia of chronic disease, acute drop in hemoglobin as compared to yesterday. Will follow and we'll transfuse him in a.m.       Plan:   1. Dialysis in a.m. 2.  Possible transfusion of 1 unit of blood in a.m. with dialysis  3. Encourage oral intake    Nutrition   Renal Diet/TF      Geneva Mtz MD  [facility].

## 2019-05-08 NOTE — PROGRESS NOTES
PULMONARY PROGRESS NOTE      Patient:  Barron Mason  YOB: 1928    MRN: [id number]     Acct: [de-identified]     Admit date: 5/3/2019    REASON FOR CONSULT:- pleural effusions/ LLL infiltrate    Pt seen and Chart reviewed. Subjective:   Patient continues to have cough. No noted fevers or chills. Feels fatigued post dialysis. Review of Systems -   CONSTITUTIONAL:  negative for  fevers and chills  RESPIRATORY:  + cough, dyspnea on exertion  CARDIOVASCULAR:  No chest pain  GASTROINTESTINAL:  Negative, no abdominal pain  NEUROLOGICAL:  Negative    Physical Exam:  Vitals: BP (!) 158/65   Pulse 72   Temp 98.3 °F (36.8 °C)   Resp 20   Ht 5' 6\" (1.676 m)   Wt 129 lb 13.6 oz (58.9 kg)   SpO2 93%   BMI 20.96 kg/m²   24 hour intake/output:    Intake/Output Summary (Last 24 hours) at 5/8/2019 1412  Last data filed at 5/8/2019 1211  Gross per 24 hour   Intake 1400 ml   Output 2110 ml   Net -710 ml     Last 3 weights: Wt Readings from Last 3 Encounters:   05/08/19 129 lb 13.6 oz (58.9 kg)   04/22/19 130 lb 11.7 oz (59.3 kg)       General appearance: alert and cooperative with exam  Physical Examination:   General appearance - alert, well appearing, and in no distress  Mental status - normal mood, behavior, speech  Chest - clear to auscultation, no wheezes, symmetric air entry. On nasal cannula 4L.   Heart - normal rate and regular rhythm, S1 and S2 normal  Abdomen - soft, does not appear distended, BS normal  Neurological - alert, normal speech  Extremities - no pedal edema noted  Skin - normal coloration and turgor, no rashes noted  Diet:  Dietary Nutrition Supplements: Renal Oral Supplement  Diet NPO, After Midnight    Medications:Current Inpatient    Scheduled Meds:   sodium chloride  250 mL Intravenous Once    midodrine        sodium chloride  250 mL Intravenous Once    sodium chloride  250 mL Intravenous Once    guaiFENesin  600 mg Oral BID    albuterol  2.5 mg Nebulization 4x daily    cefepime  2 g Intravenous Q MWF    vancomycin  500 mg Intravenous Q MWF    albumin human  25 g Intravenous Once    torsemide  40 mg Oral Daily    isosorbide mononitrate  30 mg Oral Daily    hydrALAZINE  25 mg Oral 3 times per day    carvedilol  6.25 mg Oral BID WC    sodium chloride  250 mL Intravenous Once    sodium chloride flush  10 mL Intravenous 2 times per day    aspirin  81 mg Oral Daily    clopidogrel  75 mg Oral Daily    darbepoetin marya-polysorbate  100 mcg Intravenous Weekly    levothyroxine  125 mcg Oral Daily    QUEtiapine  25 mg Oral Nightly     Continuous Infusions:  PRN Meds:sodium chloride, sodium chloride, sodium chloride, sodium chloride, albumin human, acetaminophen, sodium chloride flush, ondansetron, senna, acetaminophen, heparin (porcine), heparin (porcine), midodrine, hydrALAZINE    Objective:    CBC:   Recent Labs     05/06/19  0935 05/08/19  0847   WBC 10.8 7.4   HGB 7.9* 8.1*    180     BMP:    Recent Labs     05/06/19  0935 05/08/19  0847    137   K 5.0 4.2    101   CO2 25 22   BUN 72* 53*   CREATININE 3.78* 3.77*   GLUCOSE 141* 93     Calcium:  Recent Labs     05/08/19  0847   CALCIUM 8.5*     Ionized Calcium:No results for input(s): IONCA in the last 72 hours. Magnesium:No results for input(s): MG in the last 72 hours. Phosphorus:No results for input(s): PHOS in the last 72 hours. BNP:No results for input(s): BNP in the last 72 hours. Glucose:No results for input(s): POCGLU in the last 72 hours. HgbA1C: No results for input(s): LABA1C in the last 72 hours. INR: No results for input(s): INR in the last 72 hours. Hepatic: No results for input(s): ALKPHOS, ALT, AST, PROT, BILITOT, BILIDIR, LABALBU in the last 72 hours. Amylase and Lipase:No results for input(s): LACTA, AMYLASE in the last 72 hours. Lactic Acid: No results for input(s): LACTA in the last 72 hours.   CARDIAC ENZYMES:No results for input(s): CKTOTAL, CKMB, CKMBINDEX, TROPONINI in the last 72 hours. BNP: No results for input(s): BNP in the last 72 hours. Lipids: No results for input(s): CHOL, TRIG, HDL, LDLCALC in the last 72 hours. Invalid input(s): LDL  ABGs: No results found for: PH, PCO2, PO2, HCO3, O2SAT  Thyroid:   Lab Results   Component Value Date    TSH 3.04 03/20/2019      Urinalysis: No results for input(s): BACTERIA, BLOODU, CLARITYU, COLORU, PHUR, PROTEINU, RBCUA, SPECGRAV, BILIRUBINUR, NITRU, WBCUA, LEUKOCYTESUR, GLUCOSEU in the last 72 hours. CULTURES:    CXR 5/6/19  Trace right apical pneumothorax. Otherwise overall stable examination demonstrating cardiomegaly, pulmonary  vascular congestion, bilateral lower lobe airspace disease, and at least  moderate-sized bilateral pleural effusions. CT chest WO contrast 5/7/19  Large bilateral pleural effusions with adjacent consolidation representing  atelectasis versus pneumonia. Mildly prominent subcarinal lymph node that may be reactive. Echocardiogram: 5/3/19  Summary  Limited echocardiogram  Normal left ventricle size with mildly reduced systolic function. Calculated ejection fraction via Dean's Method is 45%  Abnormal septal wall motion due to paced rhythm. Normal right ventricular size and function. No significant pericardial effusion is seen. Normally functioning Bio-prosthetic Aortic valve (TAVR) without significant  stenosis or insufficiency. No significant fredrick-valvular. Mean gradient 4 mm  hg.    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  *    CAD s/p JEAN-CLAUDE  Hx TAVR  Recurrent bilateral pleural effusions s/p multiple thoracenteses  Bilateral pulm infiltrates, pulm edema vs HCAP  CHF with reduced EF  HUYEN on maintenance HD  Paroxysmal A fib  Anemia of chronic disease    - Continue supplemental oxygen as needed  - On torsemide per nephrology, s/p dialysis today  -  Plan for pleurex catheter today by IR per CTS  - Continue incentive spirometry, pulm toilet, aspiration precautions and bronchodilators  - Antibitoics per ID  - PT/OT   - Will follow with you      Georgette Morales MD  PGY-3, Internal Medicine  [place] [id number], [facility]  5/8/2019, 2:12 PM   Attending Physician Statement  I have discussed the care of Amanda Kuo, including pertinent history and exam findings,  with the resident. I have seen and examined the patient and the key elements of all parts of the encounter have been performed by me. I agree with the assessment, plan and orders as documented by the resident with additions . Treatment plan Discussed with nursing staff in detail , all questions answered . Electronically signed by Rahul Lynch MD on   5/8/19 at 6:21 PM    Please note that this chart was generated using voice recognition Dragon dictation software. Although every effort was made to ensure the accuracy of this automated transcription, some errors in transcription may have occurred.

## 2019-05-08 NOTE — PROGRESS NOTES
Occupational Therapy Not Seen Note    DATE: 2019  Name: Sharita Quinn  : 12/10/1928  MRN: [id number]    Patient not available for Occupational Therapy due to:    Patient Declined: Pt declined 4 times. Pt requested therapy not return stating \"I just want to get out of here. \" Defer OT evaluation per pt request and 4x refusals.     Next Scheduled Treatment: Defer OT evaluation    Electronically signed by TAMIKA Verde on 2019 at 2:12 PM

## 2019-05-08 NOTE — PROGRESS NOTES
[facility]   Progress Note    CC: shortness of breath      Subjective:  Mr. Reynaldo Martin seen and examined Plan of care reviewed and questions answered. Doing well this morning. Physical Exam  Vital Signs: BP (!) 160/97   Pulse 74   Temp 98.4 °F (36.9 °C) (Oral)   Resp 14   Ht 5' 6\" (1.676 m)   Wt 132 lb 9.6 oz (60.1 kg)   SpO2 93%   BMI 21.40 kg/m²  O2 Flow Rate (L/min): 4 L/min       General: alert and oriented to person, place and time. Up in chair, No apparent distress. Heart:Normal S1 and S2.  Regular rhythm. No murmurs, gallops, or rubs. and Rhythm: paced  Lungs: clear to auscultation bilaterally and diminished breath sounds bibasilar  Abdomen: soft, non tender, non distended, BSx4  Extremities: negative      Scheduled Meds:    sodium chloride  250 mL Intravenous Once    guaiFENesin  600 mg Oral BID    albuterol  2.5 mg Nebulization 4x daily    cefepime  2 g Intravenous Q MWF    vancomycin  500 mg Intravenous Q MWF    albumin human  25 g Intravenous Once    torsemide  40 mg Oral Daily    isosorbide mononitrate  30 mg Oral Daily    hydrALAZINE  25 mg Oral 3 times per day    carvedilol  6.25 mg Oral BID WC    sodium chloride  250 mL Intravenous Once    sodium chloride flush  10 mL Intravenous 2 times per day    aspirin  81 mg Oral Daily    clopidogrel  75 mg Oral Daily    darbepoetin marya-polysorbate  100 mcg Intravenous Weekly    levothyroxine  125 mcg Oral Daily    QUEtiapine  25 mg Oral Nightly     Continuous Infusions:     Data:  CBC:   Recent Labs     05/06/19  0935   WBC 10.8   HGB 7.9*   HCT 26.7*   MCV 95.4        BMP:   Recent Labs     05/06/19  0935      K 5.0      CO2 25   BUN 72*   CREATININE 3.78*     PT/INR: No results for input(s): PROTIME, INR in the last 72 hours. APTT: No results for input(s): APTT in the last 72 hours.       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 Productive cough with yellow sputum, Mucinex started   ID on board and managing abx  2. Plan for PleurX per IR today  CRP ? Uremic effusion    The above recommendations including medications and orders were discussed and agreed upon with Dr. Rhianna Haji, the attending on service for the cardiothoracic surgery group today.      Electronically signed by MIRYAM Charles on 5/8/2019 at 7:51 AM

## 2019-05-08 NOTE — PROGRESS NOTES
Nephrology Progress Note        Subjective: patient evaluated on dialysis. Pt is stable on dialysis. Access working without problems. No new issues overnite. He did receive extra iron, extra dose of Aranesp on Monday because of his anemia particularly given the fact that he has very poor exercise tolerance. He is scheduled to receive a unit of blood today on dialysis. Pulmonary note reviewed. Most recent CT scan from yesterday reviewed as well. Patient tells me he still feels a little weak although he did not give any history of orthopnea to me this morning. Objective:  CURRENT TEMPERATURE:  Temp: 97.7 °F (36.5 °C)  MAXIMUM TEMPERATURE OVER 24HRS:  Temp (24hrs), Av.1 °F (36.7 °C), Min:97.7 °F (36.5 °C), Max:98.4 °F (36.9 °C)    CURRENT RESPIRATORY RATE:  Resp: 16  CURRENT PULSE:  Pulse: 66  CURRENT BLOOD PRESSURE:  BP: (!) 117/51  24HR BLOOD PRESSURE RANGE:  Systolic (96PUH), AGX:585 , Min:81 , VWT:237   ; Diastolic (74PJO), ODJ:49, Min:41, Max:97    24HR INTAKE/OUTPUT:  No intake or output data in the 24 hours ending 19 1049  Patient Vitals for the past 96 hrs (Last 3 readings):   Weight   19 0804 130 lb 15.3 oz (59.4 kg)   19 0445 132 lb 9.6 oz (60.1 kg)   19 1959 133 lb 6.4 oz (60.5 kg)         Physical Exam:  General appearance: Awake,   Skin: warm and dry, no rash or erythema  Eyes: conjunctivae normal and sclera anicteric  ENT:no thrush no pharyngeal congestion   Neck:  No JVD, No Thyromegaly  Pulmonary: Diminished breath sounds at both the bases consistent with pleural effusions   Cardiovascular: normal S1 and S2. NO rubs and NO murmur.  No S3 OR S4   Abdomen: soft nontender, bowel sounds present, no organomegaly,  no ascites   Extremities: no cyanosis, significantly improved lower extremity edema     Access:  previous permacath    Current Medications:      0.9 % sodium chloride bolus Once   0.9 % sodium chloride bolus PRN   0.9 % sodium chloride bolus PRN   midodrine (PROAMATINE) 5 MG tablet    0.9 % sodium chloride bolus Once   guaiFENesin (MUCINEX) extended release tablet 600 mg BID   albuterol (PROVENTIL) nebulizer solution 2.5 mg 4x daily   cefepime (MAXIPIME) 2 g IVPB minibag Q MWF   vancomycin (VANCOCIN) 500 mg in dextrose 5 % 100 mL IVPB (mini-bag) Q MWF   0.9 % sodium chloride bolus PRN   0.9 % sodium chloride bolus PRN   albumin human 25 % solution 25 g PRN   albumin human 25 % solution 25 g Once   acetaminophen (TYLENOL) tablet 650 mg Q4H PRN   torsemide (DEMADEX) tablet 40 mg Daily   isosorbide mononitrate (IMDUR) extended release tablet 30 mg Daily   hydrALAZINE (APRESOLINE) tablet 25 mg 3 times per day   carvedilol (COREG) tablet 6.25 mg BID WC   0.9 % sodium chloride bolus Once   sodium chloride flush 0.9 % injection 10 mL 2 times per day   sodium chloride flush 0.9 % injection 10 mL PRN   ondansetron (ZOFRAN) injection 4 mg Q6H PRN   senna (SENOKOT) tablet 8.6 mg Daily PRN   acetaminophen (TYLENOL) tablet 650 mg Q4H PRN   aspirin chewable tablet 81 mg Daily   clopidogrel (PLAVIX) tablet 75 mg Daily   darbepoetin marya-polysorbate (ARANESP) injection 100 mcg Weekly   heparin (porcine) injection 1,900 Units PRN   heparin (porcine) injection 1,900 Units PRN   levothyroxine (SYNTHROID) tablet 125 mcg Daily   midodrine (PROAMATINE) tablet 5 mg PRN   QUEtiapine (SEROQUEL) tablet 25 mg Nightly   hydrALAZINE (APRESOLINE) injection 10 mg Q6H PRN     Labs:   CBC: Recent Labs     05/06/19  0935 05/08/19  0847   WBC 10.8 7.4   RBC 2.80* 2.82*   HGB 7.9* 8.1*   HCT 26.7* 26.7*    180   MPV 11.9 11.8      BMP: Recent Labs     05/06/19  0935 05/08/19  0847    137   K 5.0 4.2    101   CO2 25 22   BUN 72* 53*   CREATININE 3.78* 3.77*   GLUCOSE 141* 93   CALCIUM 8.2* 8.5*        Dialysis bath: Dialysis Bath  K+ (Potassium): 2  Ca+ (Calcium): 2.25  Na+ (Sodium): 140  HCO3 (Bicarb): 30        Assessment:  1.  ESRD on HD M, W, F at [facility] unit via right chest perm cath  2.  HTN: currently stable  3. Flash pulmonary edema / CHF exacerbation  4. Right sided pleural effusion  5. Anemia of chronic disease, increase aranesp and give additional venofer   6. Protein-calorie malnutrition  7. Hx of Moderate to severe mitral valve regurgitation based on prior echo from March of 2019. Repeat echo today   8. History of aortic stenosis status post TAVR  During the last hospitalization  9. History of CAD involving the LMCA and LAD with previous PCI and stent    Plan:  1. Wt removal and dialysis orders reviewed. 2.  Transfuse 1 unit of packed RBC on hemodialysis today. 3. Cont pt on aranesp and zemplar per protocol. 4. Follow up labs ordered. Please do not hesitate to call with questions.     Electronically signed by Shukri Joy MD on 5/8/2019 at 10:49 AM

## 2019-05-08 NOTE — PROGRESS NOTES
Occupational Therapy Not Seen Note    DATE: 2019  Name: Evonne Bachelor  : 12/10/1928  MRN: [id number]    Patient not available for Occupational Therapy due to:    Hemodialysis    Next Scheduled Treatment: Re-check 2019    Electronically signed by TAMIKA Arambula on 2019 at 1:14 PM

## 2019-05-08 NOTE — FLOWSHEET NOTE
This pt. Returned to me at [phone] from dialysis. He is currently sitting up in a chair with his monitor on. I changed the dressing to his right elbow which he was concerned about. He does have an active moist cough remains to have his oxygen on. This pt's bp dipped down currently he is receiving albumin per Dr. Roberts Kind The pt. Ate the fruit off his dinner tray and is now sipping on his Nepro. I just assisted the pt. To the brp for gas and urine he was not dizzy nor light headed. He remains to have a moist cough.

## 2019-05-08 NOTE — PROGRESS NOTES
[facility]   Progress Note                          Date:   5/8/2019  Patient name: Lu Fallon  Date of admission:  5/3/2019  9:02 AM  MRN:   [id number]  YOB: 1928  PCP: No primary care provider on file. Reason for Admission:      Subjective:     Remains stable. No present cardiac issues. BP stable. Plans for PleurX cath today by IR       Medications:   Scheduled Meds:   sodium chloride  250 mL Intravenous Once    midodrine        guaiFENesin  600 mg Oral BID    albuterol  2.5 mg Nebulization 4x daily    cefepime  2 g Intravenous Q MWF    vancomycin  500 mg Intravenous Q MWF    albumin human  25 g Intravenous Once    torsemide  40 mg Oral Daily    isosorbide mononitrate  30 mg Oral Daily    hydrALAZINE  25 mg Oral 3 times per day    carvedilol  6.25 mg Oral BID WC    sodium chloride  250 mL Intravenous Once    sodium chloride flush  10 mL Intravenous 2 times per day    aspirin  81 mg Oral Daily    clopidogrel  75 mg Oral Daily    darbepoetin marya-polysorbate  100 mcg Intravenous Weekly    levothyroxine  125 mcg Oral Daily    QUEtiapine  25 mg Oral Nightly       Continuous Infusions:      CBC:   Recent Labs     05/06/19  0935   WBC 10.8   HGB 7.9*        BMP:    Recent Labs     05/06/19  0935 05/08/19  0847    137   K 5.0 4.2    101   CO2 25 22   BUN 72* 53*   CREATININE 3.78* 3.77*   GLUCOSE 141* 93     Hepatic:   No results for input(s): AST, ALT, ALB, BILITOT, ALKPHOS in the last 72 hours. Troponin: No results for input(s): TROPONINI in the last 72 hours. BNP: No results for input(s): BNP in the last 72 hours. Lipids: No results for input(s): CHOL, HDL in the last 72 hours. Invalid input(s): LDLCALCU  INR:   No results for input(s): INR in the last 72 hours.     Objective:   Vitals: BP (!) 81/41   Pulse 73   Temp 97.7 °F (36.5 °C)   Resp 16   Ht 5' 6\" (1.676 m)   Wt 130 lb 15.3 oz (59.4 kg)   SpO2 93%   BMI 21.14 kg/m² General appearance: awake, alert, in mild respiratory distress  HEENT: Head: Normocephalic, no lesions, without obvious abnormality  Neck: no JVD  Lungs: decreased breath sounds at bases on both sides  Heart: regular rate and rhythm, S1, S2 normal, no murmur, click, rub or gallop  Abdomen: soft, non-tender; bowel sounds normal  Extremities: B/L LE edema  Neurologic: Mental status: Alert, oriented. Motor and sensory not done. EKG: CRT D in place     ECHO:      Left ventricle is normal in size, global left ventricular systolic function  is moderately reduced, calculated ejection fraction is 42%. Left atrium appears enlarged. Right atrium is enlarged. Bioprosthetic valve is seated in the aortic position. Appears to be  functioning normally, no evidence of stenosis. Mitral valve appears sclerotic with annular calcification. Moderate to severe mitral regurgitation. Mild tricuspid regurgitation        Coronary Angiography:   Findings:  Left main: 80-90% very calcified stenosis. This required rotational atherectomy and PTCA -JEAN-CLAUDE 4/0/15 mm stent, redcuing stenosis to 10% with TIMNI III flow. LAD: Proximal to mid area calcified 80% stenosis. This required Rotational atherectomy / JEAN-CLAUDE in mid and proximal area, reducing stenosis to 0% with AKHIL III flow  LCX: Diffuse disease )(known from cath in [place])  RCA: Minimal 20-30% as reviewed from [facility]           Assessment:   1 Acute on chronic CHF with B/L pleural effusion  2 Complete heart block S/P St chay PPM 1/21/19 with RV paced(95%) S/P CRT on 4/2/19 by Dr Aisha Rodrigues  3   Paroxysmal A fib( [id number]) 4 not on AC  4   Bilateral pleural effusion S/P Multiple thoracentesis  5   Severe AS S/P TAVR  6   CAD S/P PCI with rotational atherectomy of left main and LAD  7   HTN  8   ESRD on HD  9   Anemia  10  H/O P.E in past  11  Moderate to Severe Mitral regurgitation  12 Possible bilateral LL aspiration pneumonia              Treatment Plan:   1.  Continue coreg 6.25 mg BID (Hold for SBP < 120 mm Hg), Imdur, & Torsemide (per neprho recommendations). 2. Hydralazine to be made prn for night time dose to avoid hypotension at night. Hold for SP < 160 mm Hg  3. PleurX cath today  4. Not on ACE I bxz of ESRD. 5. Diuretic and volume management per nephrology  6. ID managing antibiotics for possible pneumonia  7. Continue with asa and plavix for the patient. Not on [facility] for A fib.  Start eliquis 2.5 BID if ok with CT surgery after cath placement today.     Tenisha BLANCO/NP-C

## 2019-05-08 NOTE — PROGRESS NOTES
Dialysis Post Treatment Note  Patient tolerated treatment well. Denies complaints at time of discharge.    Vitals:    05/08/19 1211   BP: (!) 158/65   Pulse: 72   Resp:    Temp: 98.3 °F (36.8 °C)   SpO2:      Pre-Weight = 59.4  Post-weight = Weight: 129 lb 13.6 oz (58.9 kg)  Total Liters Processed = Total Liters Processed (l/min): 68.5 l/min  Rinseback Volume (mL) = Rinseback Volume (ml): 350 ml  Net Removal (mL) = @FLOW([id number]  Length of treatment= 210

## 2019-05-08 NOTE — PROGRESS NOTES
[facility] -   Infectious diseases evaluation  admission date 5/3/2019      reason for consultation:   Abnormal CXR - TAVR     Impression :   Current:  · CHF  · pleural effusions, many thoracentesis in past-  · bilat lower lobe consolidations, Possible bilat LL aspiration pneumonia      Other:  · Complete heart block, post PPM  · CAD, multi vessel, post high risk PCI  · Severe AS, post TAVR  · ARF on CRF- needing HD  Discussion / summary of stay / plan of care   ·    Recommendations   ·   · Pending. Sputum induction. Or culture  · Date 2 cefepime 2 g q HD and vanco  500 mg QHD till sp cx results  · Plan antibiotic for 7 days. Until 5/13/19  · Blood cx x2 in hemo today  · Discussed with nurse on the bedside, and Dr. Merry Montana     Infection Control Recommendations   · [place] Precautions  · Contact Isolation         Antimicrobial Stewardship Recommendations   · Simplification of therapy  · Targeted therapy  · Per Kg dosing        Coordination ofOutpatient Care:   · Estimated Length of IV antimicrobials: With hemodialysis until 5/13/19  · Patient will need Midline / picc Catheter no Insertion:   · Patient will need SNF:TBD  · Patient will need outpatient wound care:      History of Present Illness:   Initial history:  Dick Blackburn is a 80y.o.-year-old male admitted w SOB, and HTN, hx of a fall at the [facility]. No fever and no chills- CXR pleural effusions and pulm congestion on CXR, hypoxia. He had a R thoracentesis on 5/5/19 and fluid was serous. Afterwards, the CXR showed improved R effusion but right lower lobe infiltrate, same left lower love infiltrate - we are called for possible pneumonia in the setting of recent TAVR.   No fever or chills -     Story of dysphagia recently and feeling food is getting stuck.     Pt has ARF on CRF and has been on HD MWF, lives in [place],, many thoracentesis in past, has a pace maker for complete block in past.  Severe AS and post TAVR,  PTCA for multiple vessel CAD.     Interval changes  5/7/19  No fever or chills. No abdominal pain, alert, appropriate, having a little bit of a cough, slightly short of breath, in general, improved. No abdominal pain with diarrhea. Summary of relevant labs:  Labs:  WBC 10-10  Micro:   , Blood cultures ×25/7, pending  Sputum culture 5/6 pending  Blood culture 5/6 pending  Imaging:  CXR bilat LL infiltrates, w bilat moderate effusions, trace right apical pneumothorax. I have personally reviewed the past medical history, past surgical history, medications, social history, and family history, and I haveupdated the database accordingly.   Past Medical History:     Past Medical History:   Diagnosis Date    Aortic stenosis 02/15/2019    Atrial fibrillation (HCC)     CAD (coronary artery disease)     Chest pain 02/15/2019    CHF (congestive heart failure) (HCC)     Chronic anemia     Chronic kidney disease     Hypertension     Pleural effusion on left 02/15/2019    Pleural effusion, right 02/15/2019    Pulmonary emboli ([address] 02/15/2019    Pulmonary hypertension ([address] 03/06/2019    Thyroid disease     hypothyroidism       Past Surgical  History:     Past Surgical History:   Procedure Laterality Date    CORONARY ANGIOPLASTY WITH STENT PLACEMENT  03/11/2019    complex PCI of LT MAIN, LAD    JOINT REPLACEMENT      right ankle    OTHER SURGICAL HISTORY  04/02/2019    UPGRADE TO BI-V PACEMAKER    OTHER SURGICAL HISTORY  03/15/2019    TAVR PROCEDURE    PACEMAKER PLACEMENT  01/20/2019    THORACENTESIS         Medications:      guaiFENesin  600 mg Oral BID    albuterol  2.5 mg Nebulization 4x daily    cefepime  2 g Intravenous Q MWF    vancomycin  500 mg Intravenous Q MWF    albumin human  25 g Intravenous Once    torsemide  40 mg Oral Daily    isosorbide mononitrate  30 mg Oral Daily    hydrALAZINE  25 mg Oral 3 times per day    carvedilol  6.25 mg Oral BID WC    sodium chloride  250 mL Intravenous Once    sodium chloride flush  10 mL Intravenous 2 times per day    aspirin  81 mg Oral Daily    clopidogrel  75 mg Oral Daily    darbepoetin marya-polysorbate  100 mcg Intravenous Weekly    levothyroxine  125 mcg Oral Daily    QUEtiapine  25 mg Oral Nightly       Social History:     Social History     Socioeconomic History    Marital status:      Spouse name: Not on file    Number of children: Not on file    Years of education: Not on file    Highest education level: Not on file   Occupational History     Employer: RETIRED   Social Needs    Financial resource strain: Not on file    Food insecurity:     Worry: Not on file     Inability: Not on file    Transportation needs:     Medical: Not on file     Non-medical: Not on file   Tobacco Use    Smoking status: Former Smoker     Last attempt to quit:      Years since quittin.3    Smokeless tobacco: Never Used   Substance and Sexual Activity    Alcohol use: Not Currently    Drug use: Never    Sexual activity: Not on file   Lifestyle    Physical activity:     Days per week: Not on file     Minutes per session: Not on file    Stress: Not on file   Relationships    Social connections:     Talks on phone: Not on file     Gets together: Not on file     Attends Bahai service: Not on file     Active member of club or organization: Not on file     Attends meetings of clubs or organizations: Not on file     Relationship status: Not on file    Intimate partner violence:     Fear of current or ex partner: Not on file     Emotionally abused: Not on file     Physically abused: Not on file     Forced sexual activity: Not on file   Other Topics Concern    Not on file   Social History Narrative    Not on file       Family History:   No family history on file. Allergies:   Quinolones     Review of Systems:     Review of Systems   Constitutional: Positive for activity change. Negative for appetite change.    HENT: Negative for congestion. Eyes: Negative for discharge. Cardiovascular: Negative for chest pain. Gastrointestinal: Negative for abdominal distention. Endocrine: Negative for heat intolerance. Genitourinary: Negative for dysuria. Musculoskeletal: Negative for arthralgias. Skin: Negative for color change. Allergic/Immunologic: Negative for food allergies. Neurological: Negative for dizziness. Hematological: Negative for adenopathy. Psychiatric/Behavioral: Negative for agitation. Physical Examination :     Patient Vitals for the past 8 hrs:   BP Temp Temp src Pulse Resp SpO2 Weight   05/07/19 1959 (!) 123/51 98.2 °F (36.8 °C) Oral 65 15 95 % 133 lb 6.4 oz (60.5 kg)   05/07/19 1638 -- -- -- -- 20 100 % --   05/07/19 1627 (!) 109/51 97.7 °F (36.5 °C) Oral 64 18 100 % --       Physical Exam   Constitutional: He is oriented to person, place, and time. He appears well-nourished. No distress. HENT:   Head: Normocephalic. Eyes: Pupils are equal, round, and reactive to light. EOM are normal. No scleral icterus. Neck: No thyromegaly present. Cardiovascular: Normal rate. Exam reveals no friction rub. No murmur heard. Pulmonary/Chest: No stridor. No respiratory distress. Abdominal: Soft. Bowel sounds are normal. He exhibits no distension. There is no tenderness. Genitourinary:   Genitourinary Comments: Makes little urine   Musculoskeletal: He exhibits no edema, tenderness or deformity. Neurological: He is alert and oriented to person, place, and time. No cranial nerve deficit. Skin: No rash noted. He is not diaphoretic. No erythema. Psychiatric: He has a normal mood and affect.  His behavior is normal.         Medical Decision Making:   I have independently reviewed/ordered the following labs:    CBC with Differential:   Recent Labs     05/06/19  0935   WBC 10.8   HGB 7.9*   HCT 26.7*        BMP:  Recent Labs     05/06/19  0935      K 5.0      CO2 25   BUN 72*   CREATININE 3.78*     Hepatic Function Panel: No results for input(s): PROT, LABALBU, BILIDIR, IBILI, BILITOT, ALKPHOS, ALT, AST in the last 72 hours. No results for input(s): RPR in the last 72 hours. No results for input(s): HIV in the last 72 hours. No results for input(s): BC in the last 72 hours. Lab Results   Component Value Date    CREATININE 3.78 05/06/2019    GLUCOSE 141 05/06/2019       Detailed results: Thank you for allowing us to participate in the care of this patient. Please call with questions. This note is created with the assistance of a speech recognition program.  While intending to generate adocument that actually reflects the content of the visit, the document can still have some errors including those of syntax and sound a like substitutions which may escape proof reading. It such instances, actual meaningcan be extrapolated by contextual diversion.     Mikey Liu MD  Office: [phone]  Perfect serve / office [phone]

## 2019-05-09 NOTE — PROGRESS NOTES
[facility]  Pre Op Progress Note    CC: shortness of breath        Subjective:  Mr. Chelle Bee seen and examined Plan of care reviewed and questions answered. Doing well this morning. Physical Exam  Vital Signs: BP (!) 173/72   Pulse 75   Temp 97.7 °F (36.5 °C) (Axillary)   Resp 14   Ht 5' 6\" (1.676 m)   Wt 129 lb 13.6 oz (58.9 kg)   SpO2 100%   BMI 20.96 kg/m²  O2 Flow Rate (L/min): 4 L/min       General: alert and oriented to person, place and time. Resting in bed, No apparent distress.   Heart:Rhythm: paced  Lungs: clear to auscultation bilaterally and diminished breath sounds bibasilar  Abdomen: soft, non tender, non distended, BSx4  Extremities: negative      Scheduled Meds:    predniSONE  50 mg Oral Daily    sodium chloride  250 mL Intravenous Once    sodium chloride  250 mL Intravenous Once    sodium chloride  250 mL Intravenous Once    albumin human  50 g Intravenous Once    benzonatate  100 mg Oral TID    guaiFENesin  600 mg Oral BID    albuterol  2.5 mg Nebulization 4x daily    cefepime  2 g Intravenous Q MWF    vancomycin  500 mg Intravenous Q MWF    torsemide  40 mg Oral Daily    [Held by provider] isosorbide mononitrate  30 mg Oral Daily    [Held by provider] hydrALAZINE  25 mg Oral 3 times per day    [Held by provider] carvedilol  6.25 mg Oral BID WC    sodium chloride  250 mL Intravenous Once    sodium chloride flush  10 mL Intravenous 2 times per day    aspirin  81 mg Oral Daily    clopidogrel  75 mg Oral Daily    darbepoetin marya-polysorbate  100 mcg Intravenous Weekly    levothyroxine  125 mcg Oral Daily    QUEtiapine  25 mg Oral Nightly     Continuous Infusions:     Data:  CBC:   Recent Labs     05/06/19  0935 05/08/19  0847   WBC 10.8 7.4   HGB 7.9* 8.1*   HCT 26.7* 26.7*   MCV 95.4 94.7    180     BMP:   Recent Labs     05/06/19  0935 05/08/19  0847    137   K 5.0 4.2    101   CO2 25 22   BUN 72* 53*   CREATININE

## 2019-05-09 NOTE — PROGRESS NOTES
benzonatate (TESSALON) capsule 100 mg TID   guaiFENesin (MUCINEX) extended release tablet 600 mg BID   albuterol (PROVENTIL) nebulizer solution 2.5 mg 4x daily   cefepime (MAXIPIME) 2 g IVPB minibag Q MWF   vancomycin (VANCOCIN) 500 mg in dextrose 5 % 100 mL IVPB (mini-bag) Q MWF   0.9 % sodium chloride bolus PRN   0.9 % sodium chloride bolus PRN   albumin human 25 % solution 25 g PRN   torsemide (DEMADEX) tablet 40 mg Daily   [Held by provider] isosorbide mononitrate (IMDUR) extended release tablet 30 mg Daily   [Held by provider] hydrALAZINE (APRESOLINE) tablet 25 mg 3 times per day   [Held by provider] carvedilol (COREG) tablet 6.25 mg BID WC   0.9 % sodium chloride bolus Once   sodium chloride flush 0.9 % injection 10 mL 2 times per day   sodium chloride flush 0.9 % injection 10 mL PRN   ondansetron (ZOFRAN) injection 4 mg Q6H PRN   senna (SENOKOT) tablet 8.6 mg Daily PRN   aspirin chewable tablet 81 mg Daily   clopidogrel (PLAVIX) tablet 75 mg Daily   darbepoetin marya-polysorbate (ARANESP) injection 100 mcg Weekly   heparin (porcine) injection 1,900 Units PRN   heparin (porcine) injection 1,900 Units PRN   levothyroxine (SYNTHROID) tablet 125 mcg Daily   midodrine (PROAMATINE) tablet 5 mg PRN   QUEtiapine (SEROQUEL) tablet 25 mg Nightly   hydrALAZINE (APRESOLINE) injection 10 mg Q6H PRN     Labs:   CBC:   Recent Labs     05/08/19  0847   WBC 7.4   RBC 2.82*   HGB 8.1*   HCT 26.7*      MPV 11.8      BMP:   Recent Labs     05/08/19  0847      K 4.2      CO2 22   BUN 53*   CREATININE 3.77*   GLUCOSE 93   CALCIUM 8.5*        Dialysis bath: Dialysis Bath  K+ (Potassium): 2  Ca+ (Calcium): 2.25  Na+ (Sodium): 140  HCO3 (Bicarb): 30        Assessment:  1. ESRD on HD M, W, F at [facility] hemodialysis unit via right chest perm cath  2.  HTN: currently stable  3. pulmonary edema / CHF exacerbation : clinically better   4. Right sided pleural effusion, s/p pleural drain today   5.  Anemia of

## 2019-05-09 NOTE — PROGRESS NOTES
[address] [id number] Continence Nurse  Consult Note       NAME:  Christie English  MEDICAL RECORD NUMBER:  [id number]  AGE: 80 y.o. GENDER: male  : 12/10/1928  TODAY'S DATE:  2019    Subjective:     Reason for WOCN Evaluation and Assessment: left brown and right elbow traumatic injuries. Christie English is a 80 y.o. male referred by:   [x] Physician  [] Nursing  [] Other:     Wound Identification:  Wound Type: traumatic  Contributing Factors: chronic pressure, decreased mobility, shear force, decreased tissue oxygenation, immunosuppression, anticoagulation therapy and advanced age        PAST MEDICAL HISTORY        Diagnosis Date    Aortic stenosis 02/15/2019    Atrial fibrillation ([address]     CAD (coronary artery disease)     Chest pain 02/15/2019    CHF (congestive heart failure) (HCC)     Chronic anemia     Chronic kidney disease     Hypertension     Pleural effusion on left 02/15/2019    Pleural effusion, right 02/15/2019    Pulmonary emboli ([address] 02/15/2019    Pulmonary hypertension ([facility] 2019    Thyroid disease     hypothyroidism       PAST SURGICAL HISTORY    Past Surgical History:   Procedure Laterality Date    CORONARY ANGIOPLASTY WITH STENT PLACEMENT  2019    complex PCI of LT MAIN, LAD    JOINT REPLACEMENT      right ankle    OTHER SURGICAL HISTORY  2019    UPGRADE TO BI-V PACEMAKER    OTHER SURGICAL HISTORY  03/15/2019    TAVR PROCEDURE    PACEMAKER PLACEMENT  2019    THORACENTESIS         FAMILY HISTORY    No family history on file.     SOCIAL HISTORY    Social History     Tobacco Use    Smoking status: Former Smoker     Last attempt to quit:      Years since quittin.3    Smokeless tobacco: Never Used   Substance Use Topics    Alcohol use: Not Currently    Drug use: Never       ALLERGIES    Allergies   Allergen Reactions    Quinolones      Pt is allergic to fluoroquinolones           Objective:      BP (!) 163/57   Pulse 71   Temp 97.6 °F (36.4 °C) (Axillary)   Resp 20   Ht 5' 6\" (1.676 m)   Wt 129 lb 13.6 oz (58.9 kg)   SpO2 91%   BMI 20.96 kg/m²   Mika Risk Score: Mika Scale Score: 18    LABS    CBC:   Lab Results   Component Value Date    WBC 7.4 05/08/2019    RBC 2.82 05/08/2019    HGB 8.1 05/08/2019     CMP:  Albumin:    Lab Results   Component Value Date    LABALBU 3.3 05/03/2019     PT/INR:    Lab Results   Component Value Date    PROTIME 11.2 05/03/2019    INR 1.1 05/03/2019     HgBA1c:  No results found for: LABA1C  PTT: No components found for: LABPTT      Assessment:     Patient Active Problem List   Diagnosis    Pulmonary hypertension ([address]    Acute on chronic systolic congestive heart failure ([address]    Pulmonary 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 heart disease (HCC)    Atelectasis, bilateral    HUYEN (acute kidney injury) ([address]    Azotemia    Aortic valve stenosis    CAD in native artery    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 ([address]    Severe malnutrition (HCC)    Congestive heart failure (HCC)    Aspiration pneumonia of both lower lobes ([address]    Pleural effusion, bilateral       Measurements:     05/09/19 1125   Wound 05/03/19 Elbow Right   Date First Assessed/Time First Assessed: 05/03/19 1318   Present on Hospital Admission: Yes  Primary Wound Type: (c) Traumatic  Location: (!) Elbow  Wound Location Orientation: Right   Wound Traumatic   Dressing Status Changed   Dressing Changed Changed/New   Dressing/Treatment Foam   Wound Cleansed Rinsed/Irrigated with saline   Dressing Change Due 05/12/19   Wound Assessment [place];Fibrin   Drainage Amount Scant   Odor None   Ana-wound Assessment Dry;Edema; Red   Pink%Wound Bed 50   Yellow%Wound Bed 50   Wound 05/03/19 Eye Left Laceration above L eye   Date First Assessed/Time First Assessed: 05/03/19 1215   Location: Eye  Wound Location Orientation: Left  Wound Description (Comments): Laceration above L eye   Wound Traumatic   Dressing Status Changed   Dressing/Treatment Open to air   Wound Cleansed Rinsed/Irrigated with saline   Wound Length (cm) 0.3 cm   Wound Width (cm) 0.3 cm   Wound Depth (cm) 0.1 cm   Wound Surface Area (cm^2) 0.09 cm^2   Wound Volume (cm^3) 0.01 cm^3   Wound Assessment Clean;Pink   Drainage Amount Scant   Drainage Description Sanguinous   Odor None   Scalp Level%Wound Bed 100   Incision 04/02/19 Chest Left;Upper   Date First Assessed/Time First Assessed: 04/02/19 1900   Primary Wound Type: Surgical Type  Location: Chest  Wound Location Orientation: Left;Upper  Wound Description (Comments): Pacemaker site incision    Wound Assessment Edges well approximated;Dry   Drainage Amount None   Odor None   Dressing/Treatment Open to air   Incision 05/05/19 Back Lateral;Right   Date First Assessed/Time First Assessed: 05/05/19 1420   Present on Hospital Admission: No  Wound Approximate Age at First Assessment (Weeks): (c)   Primary Wound Type: Surgical Type  Location: Back  Wound Location Orientation: Lateral;Right   Dressing/Treatment Dry Dressing   Dressing Status Clean;Dry; Intact       Response to treatment:  Well tolerated by patient. Plan:     Plan of Care:   Left brown open to air. Right elbow Mepilex foam dressing change every 72 hours. Turn every 2 hours  Float heels off of bed with pillows under calves    Use lift sling/ wedges to reposition patient to minimize potential for shear injury. Foam sacrum dressing to sacrococcygeal area. Peel back dressing, inspect skin beneath, re-secure. Change every 72 hours and prn wrinkles, soilage. Discontinue Sacral dressing if repeatedly soiled by incontinence. Routine incontinence care with incontinence barrier cloths and zinc oxide cream. Apply zinc oxide cream BID and prn incontinence.    Moisture wicking under pads vs cloth backed briefs  Specialty Bed Required : Yes   [] Low Air Loss   [x] Pressure Redistribution  [] Fluid Immersion  [] Bariatric  [] Total Pressure Relief  [] Other:     Discharge Plan:  TBD    Patient/Caregiver Teaching:    [] Indicates understanding       [] Needs reinforcement  [] Unsuccessful      [x] Verbal Understanding  [] Demonstrated understanding       [] No evidence of learning  [] Refused teaching         [] N/A       Electronically signed by Solomon Reyes RN, CWON on 5/9/2019 at 11:26 AM

## 2019-05-09 NOTE — CARE COORDINATION
Transition planning;  Plan for pt to return to [facility]. Faxed forms to [facility] for supplies for pleurex drain.

## 2019-05-09 NOTE — PROGRESS NOTES
Pt is s/p right aspira catheter/thoracentesis   1500 ml pleural fluid removed. Dressing: drain sponge, 4x4, and tegaderm. Pt tolerated procedure well. No distress noted. Specimen sent to lab as ordered. Pt stable for return to unit per dr. Cora Huggins. Report called to floor.

## 2019-05-09 NOTE — PROGRESS NOTES
Intravenous Once    benzonatate  100 mg Oral TID    guaiFENesin  600 mg Oral BID    albuterol  2.5 mg Nebulization 4x daily    cefepime  2 g Intravenous Q MWF    vancomycin  500 mg Intravenous Q MWF    torsemide  40 mg Oral Daily    [Held by provider] isosorbide mononitrate  30 mg Oral Daily    [Held by provider] hydrALAZINE  25 mg Oral 3 times per day    [Held by provider] carvedilol  6.25 mg Oral BID WC    sodium chloride  250 mL Intravenous Once    sodium chloride flush  10 mL Intravenous 2 times per day    aspirin  81 mg Oral Daily    clopidogrel  75 mg Oral Daily    darbepoetin marya-polysorbate  100 mcg Intravenous Weekly    levothyroxine  125 mcg Oral Daily    QUEtiapine  25 mg Oral Nightly     Continuous Infusions:  PRN Meds:acetaminophen, sodium chloride, sodium chloride, sodium chloride, sodium chloride, albumin human, sodium chloride flush, ondansetron, senna, heparin (porcine), heparin (porcine), midodrine, hydrALAZINE    Objective:    CBC:   Recent Labs     05/08/19  0847   WBC 7.4   HGB 8.1*        BMP:    Recent Labs     05/08/19  0847      K 4.2      CO2 22   BUN 53*   CREATININE 3.77*   GLUCOSE 93     Calcium:  Recent Labs     05/08/19  0847   CALCIUM 8.5*     Ionized Calcium:No results for input(s): IONCA in the last 72 hours. Magnesium:No results for input(s): MG in the last 72 hours. Phosphorus:No results for input(s): PHOS in the last 72 hours. BNP:No results for input(s): BNP in the last 72 hours. Glucose:No results for input(s): POCGLU in the last 72 hours. HgbA1C: No results for input(s): LABA1C in the last 72 hours. INR: No results for input(s): INR in the last 72 hours. Hepatic: No results for input(s): ALKPHOS, ALT, AST, PROT, BILITOT, BILIDIR, LABALBU in the last 72 hours. Amylase and Lipase:No results for input(s): LACTA, AMYLASE in the last 72 hours. Lactic Acid: No results for input(s): LACTA in the last 72 hours.   CARDIAC ENZYMES:No results for input(s): CKTOTAL, CKMB, CKMBINDEX, TROPONINI in the last 72 hours. BNP: No results for input(s): BNP in the last 72 hours. Lipids: No results for input(s): CHOL, TRIG, HDL, LDLCALC in the last 72 hours. Invalid input(s): LDL  ABGs: No results found for: PH, PCO2, PO2, HCO3, O2SAT  Thyroid:   Lab Results   Component Value Date    TSH 3.04 03/20/2019      Urinalysis: No results for input(s): BACTERIA, BLOODU, CLARITYU, COLORU, PHUR, PROTEINU, RBCUA, SPECGRAV, BILIRUBINUR, NITRU, WBCUA, LEUKOCYTESUR, GLUCOSEU in the last 72 hours. CULTURES:    CXR 5/6/19  Trace right apical pneumothorax. Otherwise overall stable examination demonstrating cardiomegaly, pulmonary  vascular congestion, bilateral lower lobe airspace disease, and at least  moderate-sized bilateral pleural effusions. CT chest WO contrast 5/7/19  Large bilateral pleural effusions with adjacent consolidation representing  atelectasis versus pneumonia. Mildly prominent subcarinal lymph node that may be reactive. Echocardiogram: 5/3/19  Summary  Limited echocardiogram  Normal left ventricle size with mildly reduced systolic function. Calculated ejection fraction via Dean's Method is 45%  Abnormal septal wall motion due to paced rhythm. Normal right ventricular size and function. No significant pericardial effusion is seen. Normally functioning Bio-prosthetic Aortic valve (TAVR) without significant  stenosis or insufficiency. No significant fredrick-valvular. Mean gradient 4 mm  hg.    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  *    CAD s/p JEAN-CLAUDE  Hx TAVR  Recurrent bilateral pleural effusions s/p multiple thoracenteses  Bilateral pulm infiltrates, pulm edema vs HCAP  CHF with reduced EF  HUYEN on maintenance HD  Paroxysmal A fib  Anemia of

## 2019-05-09 NOTE — PROGRESS NOTES
[facility] -   Infectious diseases evaluation  admission date 5/3/2019      reason for consultation:   Abnormal CXR - TAVR     Impression :   Current:  · CHF  · pleural effusions, many thoracentesis in past-  · bilat lower lobe consolidations, Possible bilat LL aspiration pneumonia      Other:  · Complete heart block, post PPM  · CAD, multi vessel, post high risk PCI  · Severe AS, post TAVR  · ARF on CRF- needing HD  Discussion / summary of stay / plan of care   ·    Recommendations   ·   · Pending. Sputum induction. Or culture  · Date 2 cefepime 2 g q HD and vanco  500 mg QHD or 7 days. Until 5/13/19  · disch planning     Infection Control Recommendations   · [place] Precautions  · Contact Isolation         Antimicrobial Stewardship Recommendations   · Simplification of therapy  · Targeted therapy  · Per Kg dosing        Coordination ofOutpatient Care:   · Estimated Length of IV antimicrobials: With hemodialysis until 5/13/19  · Patient will need Midline / picc Catheter no Insertion:   · Patient will need SNF:TBD  · Patient will need outpatient wound care:      History of Present Illness:   Initial history:  Chelle Alan is a 80y.o.-year-old male admitted w SOB, and HTN, hx of a fall at the [facility]. No fever and no chills- CXR pleural effusions and pulm congestion on CXR, hypoxia. He had a R thoracentesis on 5/5/19 and fluid was serous. Afterwards, the CXR showed improved R effusion but right lower lobe infiltrate, same left lower love infiltrate - we are called for possible pneumonia in the setting of recent TAVR.   No fever or chills -     Story of dysphagia recently and feeling food is getting stuck.     Pt has ARF on CRF and has been on HD MWF, lives [place],, many thoracentesis in past, has a pace maker for complete block in past.  Severe AS and post TAVR,  PTCA for multiple vessel CAD.     Interval changes  05/09/19   No fever - tired wants to be left alone provider] isosorbide mononitrate  30 mg Oral Daily    [Held by provider] hydrALAZINE  25 mg Oral 3 times per day    [Held by provider] carvedilol  6.25 mg Oral BID WC    sodium chloride  250 mL Intravenous Once    sodium chloride flush  10 mL Intravenous 2 times per day    aspirin  81 mg Oral Daily    clopidogrel  75 mg Oral Daily    darbepoetin marya-polysorbate  100 mcg Intravenous Weekly    levothyroxine  125 mcg Oral Daily    QUEtiapine  25 mg Oral Nightly       Social History:     Social History     Socioeconomic History    Marital status:       Spouse name: Not on file    Number of children: Not on file    Years of education: Not on file    Highest education level: Not on file   Occupational History     Employer: RETIRED   Social Needs    Financial resource strain: Not on file    Food insecurity:     Worry: Not on file     Inability: Not on file    Transportation needs:     Medical: Not on file     Non-medical: Not on file   Tobacco Use    Smoking status: Former Smoker     Last attempt to quit:      Years since quittin.3    Smokeless tobacco: Never Used   Substance and Sexual Activity    Alcohol use: Not Currently    Drug use: Never    Sexual activity: Not on file   Lifestyle    Physical activity:     Days per week: Not on file     Minutes per session: Not on file    Stress: Not on file   Relationships    Social connections:     Talks on phone: Not on file     Gets together: Not on file     Attends Gnosticism service: Not on file     Active member of club or organization: Not on file     Attends meetings of clubs or organizations: Not on file     Relationship status: Not on file    Intimate partner violence:     Fear of current or ex partner: Not on file     Emotionally abused: Not on file     Physically abused: Not on file     Forced sexual activity: Not on file   Other Topics Concern    Not on file   Social History Narrative    Not on file       Family History:   No family history on file. Allergies:   Quinolones     Review of Systems:     Review of Systems   Constitutional: Positive for activity change. Negative for appetite change. HENT: Negative for congestion and ear discharge. Eyes: Negative for discharge. Respiratory: Negative for choking. Cardiovascular: Negative for chest pain. Gastrointestinal: Negative for abdominal distention. Endocrine: Negative for heat intolerance. Genitourinary: Negative for dysuria and flank pain. Musculoskeletal: Negative for arthralgias. Skin: Negative for color change. Allergic/Immunologic: Negative for food allergies. Neurological: Negative for dizziness. Hematological: Negative for adenopathy. Psychiatric/Behavioral: Negative for agitation. Physical Examination :     Patient Vitals for the past 8 hrs:   BP Temp Temp src Pulse Resp SpO2   05/09/19 0930 (!) 163/57 -- -- 71 20 91 %   05/09/19 0926 (!) 165/69 -- -- 91 27 93 %   05/09/19 0915 (!) 159/59 -- -- 77 24 (!) 89 %   05/09/19 0911 (!) 160/67 -- -- 77 25 90 %   05/09/19 0857 (!) 165/61 -- -- 85 25 91 %   05/09/19 0800 (!) 150/47 97.6 °F (36.4 °C) Axillary 80 20 96 %   05/09/19 0640 -- -- -- -- -- 100 %   05/09/19 0518 (!) 173/72 -- -- -- -- --   05/09/19 0400 (!) 143/73 97.7 °F (36.5 °C) Axillary 75 14 99 %       Physical Exam   Constitutional: He is oriented to person, place, and time. He appears well-developed and well-nourished. No distress. HENT:   Head: Normocephalic and atraumatic. Eyes: Pupils are equal, round, and reactive to light. EOM are normal. No scleral icterus. Neck: Neck supple. No thyromegaly present. Cardiovascular: Normal rate. Exam reveals no gallop and no friction rub. No murmur heard. Pulmonary/Chest: No stridor. No respiratory distress. Abdominal: Soft. Bowel sounds are normal. He exhibits no distension. There is no tenderness. There is no guarding.    Genitourinary:   Genitourinary Comments: Makes little urine Musculoskeletal: He exhibits no edema, tenderness or deformity. Neurological: He is alert and oriented to person, place, and time. No cranial nerve deficit. Skin: No rash noted. He is not diaphoretic. No erythema. Psychiatric: He has a normal mood and affect. His behavior is normal.         Medical Decision Making:   I have independently reviewed/ordered the following labs:    CBC with Differential:   Recent Labs     05/08/19  0847   WBC 7.4   HGB 8.1*   HCT 26.7*        BMP:  Recent Labs     05/08/19  0847      K 4.2      CO2 22   BUN 53*   CREATININE 3.77*     Hepatic Function Panel: No results for input(s): PROT, LABALBU, BILIDIR, IBILI, BILITOT, ALKPHOS, ALT, AST in the last 72 hours. No results for input(s): RPR in the last 72 hours. No results for input(s): HIV in the last 72 hours. No results for input(s): BC in the last 72 hours. Lab Results   Component Value Date    CREATININE 3.77 05/08/2019    GLUCOSE 93 05/08/2019       Detailed results: Thank you for allowing us to participate in the care of this patient. Please call with questions. This note is created with the assistance of a speech recognition program.  While intending to generate adocument that actually reflects the content of the visit, the document can still have some errors including those of syntax and sound a like substitutions which may escape proof reading. It such instances, actual meaningcan be extrapolated by contextual diversion.     Aldo Figueroa MD  Office: [phone]  Perfect serve / office [phone]

## 2019-05-09 NOTE — BRIEF OP NOTE
Brief Postoperative Note    Bhupinder Burnett  YOB: 1928  [id number]    Pre-operative Diagnosis: Aspiration pneumonia and pleural effusions, for hospice    Post-operative Diagnosis: Same    Procedure: Right tunneled pleural drain placement. Anesthesia: Local    Surgeons/Assistants: Dave Camarillo MD    Estimated Blood Loss: less than 50     Complications: None    Specimens: Was Obtained: 1.5 L murray colored fluid provided for further analysis    Findings: Moderate right pleural effusion; 15.5 F Aspira drain inserted right anterior base under US and fluoro guidance, with good position. Secured in place and dressed in the standard fashion. Instructions provided.     Electronically signed by Dave Camarillo MD on 5/9/2019 at 9:34 AM

## 2019-05-10 NOTE — PLAN OF CARE
Patient due for dialysis today. New cath being placed, dialysis to follow immediately after.  Patient reports understanding

## 2019-05-10 NOTE — CARE COORDINATION
Transition planning: Called [facility] about supplies for drainage catheter, they said they received information but that with him going to SNF, they would have to order supplies, there are five drainage and dressing kits to send with pt. Called [facility] with [facility] and told her this, she asked I fax form to facility which I did.

## 2019-05-10 NOTE — PROGRESS NOTES
Patient sleeping in recliner. IR at bedside to assess drain, no signs of distress.  Patient ate 100% of meal. Requesting to be left alone to rest

## 2019-05-10 NOTE — PROGRESS NOTES
Nephrology Progress Note        Subjective: patient evaluated on dialysis. Pt is stable on dialysis. Access cannulated without problems. No new issues overnite. Stable hemodynamics. Right-sided chest tube placed yesterday, feels much better since then. Blood pressure stable. Oral intake good. Antibiotics continue per infectious disease. Tunnel catheter works well. Urine output minimal      Objective:  CURRENT TEMPERATURE:  Temp: 97.8 °F (36.6 °C)  MAXIMUM TEMPERATURE OVER 24HRS:  Temp (24hrs), Av.3 °F (36.8 °C), Min:97.4 °F (36.3 °C), Max:99.3 °F (37.4 °C)    CURRENT RESPIRATORY RATE:  Resp: 16  CURRENT PULSE:  Pulse: 77  CURRENT BLOOD PRESSURE:  BP: (!) 122/57  24HR BLOOD PRESSURE RANGE:  Systolic (78VZS), ZNE:159 , Min:117 , HFC:140   ; Diastolic (20LTE), WYT:50, Min:39, Max:89    24HR INTAKE/OUTPUT:      Intake/Output Summary (Last 24 hours) at 5/10/2019 1047  Last data filed at 5/10/2019 0924  Gross per 24 hour   Intake 540 ml   Output 1050 ml   Net -510 ml     Patient Vitals for the past 96 hrs (Last 3 readings):   Weight   19 1211 129 lb 13.6 oz (58.9 kg)   19 0804 130 lb 15.3 oz (59.4 kg)   19 0445 132 lb 9.6 oz (60.1 kg)         Physical Exam:  General appearance:Awake, alert, in no acute distress  Skin: warm and dry, no rash or erythema  Eyes: conjunctivae normal and sclera anicteric  ENT:no thrush no pharyngeal congestion   Neck:  No JVD, No Thyromegaly  Pulmonary: Decreased air entry bilaterally right more than left, conducted sounds, no crackles or wheezes   Cardiovascular: normal S1 and S2. NO rubs and NO murmur.  No S3 OR S4   Abdomen: soft nontender, bowel sounds present, no organomegaly,  no ascites   Extremities: no cyanosis, clubbing or edema     Access:  previous permacath    Current Medications:      0.9 % sodium chloride infusion Continuous   acetaminophen (TYLENOL) tablet 650 mg Q4H PRN   predniSONE (DELTASONE) tablet 50 mg Daily   acetaminophen (TYLENOL) tablet 650 mg Q4H PRN   0.9 % sodium chloride bolus Once   0.9 % sodium chloride bolus PRN   0.9 % sodium chloride bolus PRN   0.9 % sodium chloride bolus Once   0.9 % sodium chloride bolus Once   albumin human 25 % solution 50 g Once   benzonatate (TESSALON) capsule 100 mg TID   guaiFENesin (MUCINEX) extended release tablet 600 mg BID   albuterol (PROVENTIL) nebulizer solution 2.5 mg 4x daily   cefepime (MAXIPIME) 2 g IVPB minibag Q MWF   vancomycin (VANCOCIN) 500 mg in dextrose 5 % 100 mL IVPB (mini-bag) Q MWF   0.9 % sodium chloride bolus PRN   0.9 % sodium chloride bolus PRN   albumin human 25 % solution 25 g PRN   torsemide (DEMADEX) tablet 40 mg Daily   [Held by provider] isosorbide mononitrate (IMDUR) extended release tablet 30 mg Daily   [Held by provider] hydrALAZINE (APRESOLINE) tablet 25 mg 3 times per day   [Held by provider] carvedilol (COREG) tablet 6.25 mg BID WC   0.9 % sodium chloride bolus Once   sodium chloride flush 0.9 % injection 10 mL 2 times per day   sodium chloride flush 0.9 % injection 10 mL PRN   ondansetron (ZOFRAN) injection 4 mg Q6H PRN   senna (SENOKOT) tablet 8.6 mg Daily PRN   aspirin chewable tablet 81 mg Daily   clopidogrel (PLAVIX) tablet 75 mg Daily   darbepoetin marya-polysorbate (ARANESP) injection 100 mcg Weekly   heparin (porcine) injection 1,900 Units PRN   heparin (porcine) injection 1,900 Units PRN   levothyroxine (SYNTHROID) tablet 125 mcg Daily   midodrine (PROAMATINE) tablet 5 mg PRN   QUEtiapine (SEROQUEL) tablet 25 mg Nightly   hydrALAZINE (APRESOLINE) injection 10 mg Q6H PRN     Labs:   CBC: Recent Labs     05/08/19  0847   WBC 7.4   RBC 2.82*   HGB 8.1*   HCT 26.7*      MPV 11.8      BMP: Recent Labs     05/08/19  0847      K 4.2      CO2 22   BUN 53*   CREATININE 3.77*   GLUCOSE 93   CALCIUM 8.5*        Phosphorus:  No results for input(s): PHOS in the last 72 hours. Magnesium: No results for input(s): MG in the last 72 hours.   Albumin: No results for input(s): LABALBU in the last 72 hours. Dialysis bath: Dialysis Bath  K+ (Potassium): 2  Ca+ (Calcium): 2.25  HCO3 (Bicarb): 30    Radiology:  Reviewed as available. Assessment:  1 ESRD on hemodialysis Monday Wednesday Friday at the [facility] pod Brdy unit under Dr. Hernández Arrow:  2. HTN: Stable  3 recurrent pleural effusion status post chest tube placement  4 EDEMA : Minimal  5. ANEMIA OF CHRONIC DISEASE: Stable  6. SECONDARY HYPERPARATHYROIDISM: Stable  7. Status post TAV R and high risk PCI    Plan:  1. Wt removal and dialysis orders reviewed. 2.  We'll challenge dry weight today  3. Cont pt on aranesp and zemplar per protocol. 4. Follow up labs ordered. 5.  Antibiotics per ID  6. Discharge planning      Please do not hesitate to call with questions.     Electronically signed by Oumar Simons MD on 5/10/2019 at 10:47 AM

## 2019-05-10 NOTE — PROGRESS NOTES
Attending Physician Statement  I have discussed the care of Elaina Wilder, including pertinent history and exam findings with the resident. I have reviewed the key elements of all parts of the encounter with the resident. I have seen and examined the patient with the resident. I agree with the assessment and plan and status of the problem list as documented. I've seen the patient during round with pulmonary resident Dr Saint Motto, please see full note for details. I have reviewed the labs pleural fluid studies, last chest x-ray seen and intervention radiology note from yesterday seen. He denies any new complaint he is on room air he denies shortness of breath at rest and came breathing is better he does not have much cough and denies hemoptysis or chest pain. He had right pleural drain placed yesterday by interventional radiology and had 1.5 L of fluid removed, pleural fluid analysis shows LVH is 198 and protein ratio is less than 0.5, suggestive of transudative effusion, Gram stain is negative glucose is 98 and pleural fluid cytology is negative for malignancy. He had a left pleural drain placed by interventional radiology today and had 1050 and drained today and no pleural fluid analysis was sent. He is getting hemodialysis today. He is on room air and postprocedure did well, chest x-ray ordered by cardiothoracic surgery and it is pending here  Stable pulmonary status, follow-up with infectious disease and as mentioned so far cultures from pleural fluid negative. Laci David MD  5/10/2019 12:24 PM      Please note that this chart was generated using voice recognition Dragon dictation software. Although every effort was made to ensure the accuracy of this automated transcription, some errors in transcription may have occurred.

## 2019-05-10 NOTE — PROGRESS NOTES
PULMONARY PROGRESS NOTE      Patient:  Froy Woodard  YOB: 1928    MRN: [id number]     Acct: [de-identified]     Admit date: 5/3/2019    REASON FOR CONSULT:- pleural effusions/ LLL infiltrate    Pt seen and Chart reviewed. Subjective:   Patient continues to have cough, no worsening of SOB. No noted fevers or chills. Seen in dialysis. L  thoracentesis and catheter placed today. S/p placement of R pleural drain catheter yesterday. Review of Systems -   CONSTITUTIONAL:  negative for  fevers and chills, + fatigue  RESPIRATORY:  + cough, dyspnea on exertion  CARDIOVASCULAR:  No chest pain  GASTROINTESTINAL:  Negative  NEUROLOGICAL:  Negative    Physical Exam:  Vitals: BP (!) 134/56   Pulse 82   Temp 98.2 °F (36.8 °C)   Resp 20   Ht 5' 6\" (1.676 m)   Wt 123 lb 10.9 oz (56.1 kg)   SpO2 97%   BMI 19.96 kg/m²   24 hour intake/output:    Intake/Output Summary (Last 24 hours) at 5/10/2019 1651  Last data filed at 5/10/2019 0924  Gross per 24 hour   Intake 360 ml   Output 1050 ml   Net -690 ml     Last 3 weights: Wt Readings from Last 3 Encounters:   05/10/19 123 lb 10.9 oz (56.1 kg)   04/22/19 130 lb 11.7 oz (59.3 kg)       General appearance: alert and cooperative with exam  Physical Examination:   General appearance - alert, well appearing, and in no distress  Mental status - normal mood, behavior, speech  Chest - clear to auscultation, no wheezes, symmetric air entry.    Heart - normal rate and regular rhythm, S1 and S2 normal  Abdomen - soft, does not appear distended  Neurological - alert, normal speech  Extremities - no pedal edema noted  Skin - normal coloration and turgor, no rashes noted    Diet:  Dietary Nutrition Supplements:  DIET GENERAL; Daily Fluid Restriction: 1500 ml; No Caffeine    Medications:Current Inpatient    Scheduled Meds:   midodrine        predniSONE  50 mg Oral Daily    sodium chloride  250 mL Intravenous Once    sodium chloride  250 mL Intravenous Once    sodium results for input(s): CKTOTAL, CKMB, CKMBINDEX, TROPONINI in the last 72 hours. BNP: No results for input(s): BNP in the last 72 hours. Lipids: No results for input(s): CHOL, TRIG, HDL, LDLCALC in the last 72 hours. Invalid input(s): LDL  ABGs: No results found for: PH, PCO2, PO2, HCO3, O2SAT  Thyroid:   Lab Results   Component Value Date    TSH 3.04 03/20/2019      Urinalysis: No results for input(s): BACTERIA, BLOODU, CLARITYU, COLORU, PHUR, PROTEINU, RBCUA, SPECGRAV, BILIRUBINUR, NITRU, WBCUA, LEUKOCYTESUR, GLUCOSEU in the last 72 hours. CULTURES:    CXR 5/6/19  Trace right apical pneumothorax. Otherwise overall stable examination demonstrating cardiomegaly, pulmonary  vascular congestion, bilateral lower lobe airspace disease, and at least  moderate-sized bilateral pleural effusions. CT chest WO contrast 5/7/19  Large bilateral pleural effusions with adjacent consolidation representing  atelectasis versus pneumonia. Mildly prominent subcarinal lymph node that may be reactive. Echocardiogram: 5/3/19  Summary  Limited echocardiogram  Normal left ventricle size with mildly reduced systolic function. Calculated ejection fraction via Dean's Method is 45%  Abnormal septal wall motion due to paced rhythm. Normal right ventricular size and function. No significant pericardial effusion is seen. Normally functioning Bio-prosthetic Aortic valve (TAVR) without significant  stenosis or insufficiency. No significant fredrick-valvular. Mean gradient 4 mm  hg.    Assessment and Plan: Active Problems:    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 (HCC)    Severe malnutrition (HCC)    Congestive heart failure (HCC)    Aspiration pneumonia of both lower lobes (HCC)    Pleural effusion, bilateral  Resolved Problems:    * No resolved hospital problems.  *    CAD s/p JEAN-CLAUDE  Hx TAVR  Recurrent bilateral pleural effusions s/p multiple thoracenteses showing transudative pleural fluid  Bilateral pulm infiltrates, pulm edema vs HCAP  CHF with reduced EF  HUYEN on maintenance HD  Paroxysmal A fib  Anemia of chronic disease    - Continue supplemental oxygen as needed  - On torsemide per nephrology  - Continue incentive spirometry, pulm toilet, aspiration precautions and bronchodilators  - Antibitoics per ID, continue to follow pleural cx  - PT/OT     Hilaria Rand MD  PGY-3, Internal Medicine  [facility]  5/10/2019, 4:51 PM       Attending Physician Statement  I have discussed the care of Sharita Quinn, including pertinent history and exam findings with the resident. I have reviewed the key elements of all parts of the encounter with the resident. I have seen and examined the patient with the resident. I agree with the assessment and plan and status of the problem list as documented. I have reviewed the labs pleural fluid studies, last chest x-ray seen and intervention radiology note from yesterday seen. He denies any new complaint he is on room air he denies shortness of breath at rest and came breathing is better he does not have much cough and denies hemoptysis or chest pain. He had right pleural drain placed yesterday by interventional radiology and had 1.5 L of fluid removed, pleural fluid analysis shows LVH is 198 and protein ratio is less than 0.5, suggestive of transudative effusion, Gram stain is negative glucose is 98 and pleural fluid cytology is negative for malignancy. He had a left pleural drain placed by interventional radiology today and had 1050 and drained today and no pleural fluid analysis was sent. He is getting hemodialysis today. He is on room air and postprocedure did well, chest x-ray ordered by cardiothoracic surgery and it is pending here  Stable pulmonary status, follow-up with infectious disease and as mentioned so far cultures from pleural fluid negative.     Josi Carpenter MD  5/10/2019 5:22 PM       Please note that this chart was generated using voice recognition Dragon dictation software. Although every effort was made to ensure the accuracy of this automated transcription, some errors in transcription may have occurred.

## 2019-05-10 NOTE — PROGRESS NOTES
input(s): LDLCALCU  INR:   No results for input(s): INR in the last 72 hours. Objective:   Vitals: BP (!) 92/38   Pulse 76   Temp 97.8 °F (36.6 °C)   Resp 18   Ht 5' 6\" (1.676 m)   Wt 125 lb 14.1 oz (57.1 kg)   SpO2 99%   BMI 20.32 kg/m²     General appearance: awake, alert, in mild respiratory distress  HEENT: Head: Normocephalic, no lesions, without obvious abnormality  Neck: no JVD  Lungs: decreased breath sounds at bases on both sides  Heart: regular rate and rhythm, S1, S2 normal, no murmur, click, rub or gallop  Abdomen: soft, non-tender; bowel sounds normal  Extremities: B/L LE edema  Neurologic: Mental status: Alert, oriented. Motor and sensory not done. EKG: CRT D in place     ECHO:      Left ventricle is normal in size, global left ventricular systolic function  is moderately reduced, calculated ejection fraction is 42%. Left atrium appears enlarged. Right atrium is enlarged. Bioprosthetic valve is seated in the aortic position. Appears to be  functioning normally, no evidence of stenosis. Mitral valve appears sclerotic with annular calcification. Moderate to severe mitral regurgitation. Mild tricuspid regurgitation        Coronary Angiography:   Findings:  Left main: 80-90% very calcified stenosis. This required rotational atherectomy and PTCA -JEAN-CLAUDE 4/0/15 mm stent, redcuing stenosis to 10% with TIMNI III flow. LAD: Proximal to mid area calcified 80% stenosis.  This required Rotational atherectomy / JEAN-CLAUDE in mid and proximal area, reducing stenosis to 0% with AKHIL III flow  LCX: Diffuse disease )(known from cath in [place])  RCA: Minimal 20-30% as reviewed from [facility] [address]           Assessment:   1 Acute on chronic CHF with B/L pleural effusion  2 Complete heart block S/P St chay PPM 1/21/19 with RV paced(95%) S/P CRT on 4/2/19 by Dr Honorio Sánchez  3   Paroxysmal A fib( [id number]) 4 not on AC  4   Bilateral pleural effusion S/P Multiple thoracentesis  5   Severe AS S/P TAVR  6   CAD S/P PCI with rotational atherectomy of left main and LAD  7   HTN  8   ESRD on HD  9   Anemia  10  H/O P.E in past  11  Moderate to Severe Mitral regurgitation  12 Possible bilateral LL aspiration pneumonia              Treatment Plan:     1. Hypotension. Hydralazine PRN for HTN. BP medications on hold. If remains hypotensive, may need to adjust.  Continue coreg 6.25 mg BID (Hold for SBP < 120 mm Hg), Imdur, & Torsemide (per neprho recommendations). 2. Not on ACE I d/t ESRD. 3. Diuretic and volume management per nephrology  4. ID managing antibiotics for possible pneumonia  5. Continue with asa and plavix for the patient. Not on [facility] for A fib. Start eliquis 2.5 BID if ok with CT surgery.   Will d/c ASA at that time.       Jeniffer Angulo APRN/NP-C

## 2019-05-10 NOTE — PROGRESS NOTES
Nutrition Assessment    Type and Reason for Visit: Initial(LOS Day 7)    Nutrition Recommendations: Continue general diet w/ 1500 mL FR. Continue renal supplements TID. Nutrition Assessment:  Pt well known to writer from previous admission. Pt currently not in room during time of visit - pt receiving Left tunneled pleural drain placement. Pt nutritionally compromised aeb severe malnutrition. Pt w/ ESRD on HD. Per EMR, pt has been consuming % of his meals and % of his supplements. Pt w/ 19.9% wt loss x 3 mo per EMR. Will continue supplements d/t increased nutrient needs. Staff to encourage adequate po/fluid intake. Will f/u w/ pt at a later time. Malnutrition Assessment:  · Malnutrition Status: Meets the criteria for severe malnutrition  · Context:  chronic illness   · Findings of the 6 clinical characteristics of malnutrition (Minimum of 2 out of 6 clinical characteristics is required to make the diagnosis of moderate or severe Protein Calorie Malnutrition based on AND/ASPEN Guidelines):  1. Energy Intake-Less than or equal to 75% of estimated energy requirement, Greater than or equal to 1 month    2. Weight Loss-10% loss or greater, (in 2 mo)  3. Fat Loss-Unable to assess,    4. Muscle Loss-Unable to assess,    5. Fluid Accumulation-Mild fluid accumulation, Extremities  6.   Strength-Not measured    Nutrition Risk Level: High    Nutrient Needs:  · Estimated Daily Total Kcal: 30-35 kcal/kg ~>1330-1677 kcals/d   · Estimated Daily Protein (g): 1.3-1.5 gm/kg ~>77-89 gms/d     Nutrition Diagnosis:   · Problem: Severe malnutrition, In context of chronic illness  · Etiology: related to Insufficient energy/nutrient consumption, Catabolic illness     Signs and symptoms:  as evidenced by Weight loss greater than or equal to 5% in 1 month, Localized or generalized fluid accumulation(<75% of est'd needs >1 mo)    Objective Information:  · Wound Type: Multiple, Open Wounds(traumatic natacha)  · Current Nutrition Therapies:  · Oral Diet Orders: General, Fluid Restriction   · Oral Diet intake: %  · Oral Nutrition Supplement (ONS) Orders: Renal Oral Supplement  · ONS intake: %, 51-75%  · Anthropometric Measures:  · Ht: 5' 6\" (167.6 cm)   · Current Body Wt: 125 lb (56.7 kg)  · Admission Body Wt: 129 lb (58.5 kg)  · Usual Body Wt: 165 lb (74.8 kg)(per last RD note)  · % Weight Change:  ,  19.9% wt loss x 2 mo per EMR   · Ideal Body Wt: 141 lb (64 kg), % Ideal Body 91% (adm/ideal)  · BMI Classification: BMI 18.5 - 24.9 Normal Weight    Nutrition Interventions:   Continue current diet, Continue current ONS  Continued Inpatient Monitoring, Education not appropriate at this time    Nutrition Evaluation:   · Evaluation: Goals set   · Goals: Pt to consume >75% of meals/supplements     · Monitoring: Meal Intake, Nutrition Progression, Supplement Intake, Diet Tolerance, Skin Integrity, Wound Healing, I&O, Weight, Pertinent Labs      Electronically signed by Shahid Brumfield RD, LD on 5/10/19 at 11:38 AM    Contact Number: [phone]

## 2019-05-10 NOTE — PLAN OF CARE
Pt resting in recliner. Reviewed new treatment plan with new right groin vascath.  Patient verbalized understanding

## 2019-05-10 NOTE — PROGRESS NOTES
Pulmonary on area questioning patient status. Reported patient currently at dialysis and femoral cath placed.

## 2019-05-10 NOTE — PROGRESS NOTES
Dialysis Post Treatment Note  Patient tolerated treatment well. Denies complaints at time of discharge. No complaints, no complaints, no distress noted.   Vitals:    05/10/19 1330   BP: 125/72   Pulse: 80   Resp:    Temp: 98.2 °F (36.8 °C)   SpO2:      Pre-Weight = 57.5 kg  Post-weight = Weight: 123 lb 10.9 oz (56.1 kg)  Total Liters Processed = Total Liters Processed (l/min): 68.5 l/min  Rinseback Volume (mL) = Rinseback Volume (ml): 370 ml  Net Removal (mL) = 1.1   Length of treatment=3.5 hours

## 2019-05-10 NOTE — PROGRESS NOTES
[facility]  Pre Op Progress Note    CC: shortness of breath      Subjective:  Mr. Richie Gilbert seen and examined Plan of care reviewed and questions answered. Much improved today. Physical Exam  Vital Signs: BP (!) 148/51   Pulse 81   Temp 97.8 °F (36.6 °C) (Oral)   Resp 14   Ht 5' 6\" (1.676 m)   Wt 129 lb 13.6 oz (58.9 kg)   SpO2 96%   BMI 20.96 kg/m²  O2 Flow Rate (L/min): 3 L/min       General: alert and oriented to person, place and time. Up in chair, No apparent distress.   Heart:Rhythm: paced  Lungs: clear to auscultation bilaterally and diminished breath sounds bibasilar  Abdomen: soft, non tender, non distended, BSx4  Extremities: negative      Scheduled Meds:    ceFAZolin  1 g Intravenous Once    predniSONE  50 mg Oral Daily    sodium chloride  250 mL Intravenous Once    sodium chloride  250 mL Intravenous Once    sodium chloride  250 mL Intravenous Once    albumin human  50 g Intravenous Once    benzonatate  100 mg Oral TID    guaiFENesin  600 mg Oral BID    albuterol  2.5 mg Nebulization 4x daily    cefepime  2 g Intravenous Q MWF    vancomycin  500 mg Intravenous Q MWF    torsemide  40 mg Oral Daily    [Held by provider] isosorbide mononitrate  30 mg Oral Daily    [Held by provider] hydrALAZINE  25 mg Oral 3 times per day    [Held by provider] carvedilol  6.25 mg Oral BID WC    sodium chloride  250 mL Intravenous Once    sodium chloride flush  10 mL Intravenous 2 times per day    aspirin  81 mg Oral Daily    clopidogrel  75 mg Oral Daily    darbepoetin marya-polysorbate  100 mcg Intravenous Weekly    levothyroxine  125 mcg Oral Daily    QUEtiapine  25 mg Oral Nightly     Continuous Infusions:     Data:  CBC:   Recent Labs     05/08/19  0847   WBC 7.4   HGB 8.1*   HCT 26.7*   MCV 94.7        BMP:   Recent Labs     05/08/19  0847      K 4.2      CO2 22   BUN 53*   CREATININE 3.77*     PT/INR: No results for input(s): PROTIME, INR in the last 72 hours. APTT: No results for input(s): APTT in the last 72 hours. 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 Awaiting left thoracentesis and aspira catheter placement, hemodialysis  2. Plan for discharge early next week      The above recommendations including medications and orders were discussed and agreed upon with Dr. Brandon Perez, the attending on service for the cardiothoracic surgery group today.      Vadna Reinoso, APRN, CNP

## 2019-05-10 NOTE — BRIEF OP NOTE
Brief Postoperative Note    Elaina Wilder  YOB: 1928  [id number]    Pre-operative Diagnosis: Bilateral effusions/pneumonia for hospice s/p rt Aspira tunneled pleural drain. Post-operative Diagnosis: Same    Procedure: Left tunneled pleural drain placement    Anesthesia: Local + Fentanyl 50 ug IV    Surgeons/Assistants: Rachel Perkins MD    Estimated Blood Loss: less than 50     Complications: None    Specimens: Was Not Obtained    Findings: 15.5 F Aspira drain inserted left pleural space under US and fluoro without incident. 1050 mls pleural fluid removed. Tube secured in place. Instructions given. VS stable. Pt tolerated well.     Electronically signed by Rachel Perkins MD on 5/10/2019 at 9:41 AM

## 2019-05-10 NOTE — PROGRESS NOTES
Patient sitting up in chair. Due for dialysis today after receiving new access today. Patient aware and agrees with treatment plan.  No signs of distress

## 2019-05-11 NOTE — PROGRESS NOTES
PULMONARY PROGRESS NOTE      Patient:  Demian Ann  YOB: 1928    MRN: [id number]     Acct: [de-identified]     Admit date: 5/3/2019    REASON FOR CONSULT:- pleural effusions/ LLL infiltrate    Pt seen and Chart reviewed. Subjective:   No acute events overnight. Patient comfortable sitting up in chair. On room air. He denies SOB, chest pain. Review of Systems -   CONSTITUTIONAL:  negative for  fevers and chills  RESPIRATORY:  Denies any worsening of shortness of breath, comfortable on room air  CARDIOVASCULAR:  No chest pain  GASTROINTESTINAL:  No abdominal pain  NEUROLOGICAL:  Negative for any reported changes    Physical Exam:  Vitals: BP (!) 148/72   Pulse 84   Temp 98.7 °F (37.1 °C) (Oral)   Resp 18   Ht 5' 6\" (1.676 m)   Wt 123 lb 10.9 oz (56.1 kg)   SpO2 98%   BMI 19.96 kg/m²   24 hour intake/output:    Intake/Output Summary (Last 24 hours) at 5/11/2019 1044  Last data filed at 5/11/2019 0006  Gross per 24 hour   Intake --   Output 600 ml   Net -600 ml     Last 3 weights: Wt Readings from Last 3 Encounters:   05/10/19 123 lb 10.9 oz (56.1 kg)   04/22/19 130 lb 11.7 oz (59.3 kg)       General appearance: alert and cooperative with exam  Physical Examination:   General appearance - alert, well appearing, and in no distress  Mental status - normal mood, behavior, speech  Chest - clear to auscultation, no wheezes, symmetric air entry.    Heart - normal rate and regular rhythm, S1 and S2 normal  Abdomen - soft, does not appear distended  Neurological - alert, awake, normal speech  Extremities - no pedal edema noted  Skin - normal coloration and turgor, no rashes noted    Diet:  Dietary Nutrition Supplements:  DIET GENERAL; Daily Fluid Restriction: 1500 ml; No Caffeine    Medications:Current Inpatient    Scheduled Meds:   predniSONE  50 mg Oral Daily    sodium chloride  250 mL Intravenous Once    sodium chloride  250 mL Intravenous Once    sodium chloride  250 mL Intravenous Once  albumin human  50 g Intravenous Once    benzonatate  100 mg Oral TID    guaiFENesin  600 mg Oral BID    albuterol  2.5 mg Nebulization 4x daily    cefepime  2 g Intravenous Q MWF    torsemide  40 mg Oral Daily    [Held by provider] isosorbide mononitrate  30 mg Oral Daily    [Held by provider] carvedilol  6.25 mg Oral BID WC    sodium chloride  250 mL Intravenous Once    sodium chloride flush  10 mL Intravenous 2 times per day    aspirin  81 mg Oral Daily    clopidogrel  75 mg Oral Daily    darbepoetin marya-polysorbate  100 mcg Intravenous Weekly    levothyroxine  125 mcg Oral Daily    QUEtiapine  25 mg Oral Nightly     Continuous Infusions:   sodium chloride       PRN Meds:acetaminophen, hydrALAZINE, acetaminophen, sodium chloride, sodium chloride, sodium chloride, sodium chloride, albumin human, sodium chloride flush, ondansetron, senna, heparin (porcine), heparin (porcine), midodrine, hydrALAZINE    Objective:    CBC:   No results for input(s): WBC, HGB, PLT in the last 72 hours. BMP:    No results for input(s): NA, K, CL, CO2, BUN, CREATININE, GLUCOSE in the last 72 hours. Calcium:  No results for input(s): CALCIUM in the last 72 hours. Ionized Calcium:No results for input(s): IONCA in the last 72 hours. Magnesium:No results for input(s): MG in the last 72 hours. Phosphorus:No results for input(s): PHOS in the last 72 hours. BNP:No results for input(s): BNP in the last 72 hours. Glucose:No results for input(s): POCGLU in the last 72 hours. HgbA1C: No results for input(s): LABA1C in the last 72 hours. INR: No results for input(s): INR in the last 72 hours. Hepatic: No results for input(s): ALKPHOS, ALT, AST, PROT, BILITOT, BILIDIR, LABALBU in the last 72 hours. Amylase and Lipase:No results for input(s): LACTA, AMYLASE in the last 72 hours. Lactic Acid: No results for input(s): LACTA in the last 72 hours.   CARDIAC ENZYMES:No results for input(s): CKTOTAL, CKMB, CKMBINDEX, TROPONINI in the last 72 hours. BNP: No results for input(s): BNP in the last 72 hours. Lipids: No results for input(s): CHOL, TRIG, HDL, LDLCALC in the last 72 hours. Invalid input(s): LDL  ABGs: No results found for: PH, PCO2, PO2, HCO3, O2SAT  Thyroid:   Lab Results   Component Value Date    TSH 3.04 03/20/2019      Urinalysis: No results for input(s): BACTERIA, BLOODU, CLARITYU, COLORU, PHUR, PROTEINU, RBCUA, SPECGRAV, BILIRUBINUR, NITRU, WBCUA, LEUKOCYTESUR, GLUCOSEU in the last 72 hours. CULTURES:    CXR 5/6/19  Trace right apical pneumothorax. Otherwise overall stable examination demonstrating cardiomegaly, pulmonary  vascular congestion, bilateral lower lobe airspace disease, and at least  moderate-sized bilateral pleural effusions. CT chest WO contrast 5/7/19  Large bilateral pleural effusions with adjacent consolidation representing  atelectasis versus pneumonia. Mildly prominent subcarinal lymph node that may be reactive. Echocardiogram: 5/3/19  Summary  Limited echocardiogram  Normal left ventricle size with mildly reduced systolic function. Calculated ejection fraction via Dean's Method is 45%  Abnormal septal wall motion due to paced rhythm. Normal right ventricular size and function. No significant pericardial effusion is seen. Normally functioning Bio-prosthetic Aortic valve (TAVR) without significant  stenosis or insufficiency. No significant fredrick-valvular. Mean gradient 4 mm  hg.    Assessment and Plan: Active Problems:    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 (HCC)    Severe malnutrition (HCC)    Congestive heart failure (HCC)    Aspiration pneumonia of both lower lobes (HCC)    Pleural effusion, bilateral  Resolved Problems:    * No resolved hospital problems.  *    CAD s/p JEAN-CLAUDE  Hx TAVR  Recurrent bilateral pleural effusions s/p multiple thoracenteses showing transudative pleural fluid, s/p bilateral pleural drain placement  Bilateral pulm infiltrates, pulm edema vs HCAP  CHF with reduced

## 2019-05-11 NOTE — PROGRESS NOTES
[facility] -   Infectious diseases evaluation  admission date 5/3/2019      reason for consultation:   Abnormal CXR - TAVR     Impression :   Current:  · CHF  · pleural effusions, many thoracentesis in past-  · bilat lower lobe consolidations, Possible bilat LL aspiration pneumonia      Other:  · Complete heart block, post PPM  · CAD, multi vessel, post high risk PCI  · Severe AS, post TAVR  · ARF on CRF- needing HD  Discussion / summary of stay / plan of care   ·    Recommendations   ·   · Since the sputum culture is negative, case discussed with Dr. Amada Hawley, we'll stop the vancomycin and continue the cefepime until 5/13/19  · disch planning     Infection Control Recommendations   · [place] Precautions  · Contact Isolation         Antimicrobial Stewardship Recommendations   · Simplification of therapy  · Targeted therapy  · Per Kg dosing        Coordination ofOutpatient Care:   · Estimated Length of IV antimicrobials: With hemodialysis until 5/13/19  · Patient will need Midline / picc Catheter no Insertion:   · Patient will need SNF:TBD  · Patient will need outpatient wound care:      History of Present Illness:   Initial history:  Anuja Guevara is a 80y.o.-year-old male admitted w SOB, and HTN, hx of a fall at the [facility]. No fever and no chills- CXR pleural effusions and pulm congestion on CXR, hypoxia. He had a R thoracentesis on 5/5/19 and fluid was serous. Afterwards, the CXR showed improved R effusion but right lower lobe infiltrate, same left lower love infiltrate - we are called for possible pneumonia in the setting of recent TAVR.   No fever or chills -     Story of dysphagia recently and feeling food is getting stuck.     Pt has ARF on CRF and has been on HD MWF, lives in [place],, many thoracentesis in past, has a pace maker for complete block in past.  Severe AS and post TAVR,  PTCA for multiple vessel CAD.     Interval changes  05/10/19   No fever - tired wants to be left alone to rest - bundled up but not hot -cough w some rattling and not SOB  - lungs clear - abd soft non tender  No pos cx - sp cx is still pending  Negative blood cultures, white count normal  Post left thoracocentesis today by IR with pleural catheter left in place      Summary of relevant labs:  Labs:  WBC 10-10 -7.4  Micro:  Blood cultures ×25/7, pending  Sputum culture 5/6 pending  Blood culture 5/6 pending  Imaging:  CXR bilat LL infiltrates, w bilat moderate effusions, trace right apical pneumothorax. CXR 5/6/19 stable infiltrates -      I have personally reviewed the past medical history, past surgical history, medications, social history, and family history, and I haveupdated the database accordingly.   Past Medical History:     Past Medical History:   Diagnosis Date    Aortic stenosis 02/15/2019    Atrial fibrillation (HCC)     CAD (coronary artery disease)     Chest pain 02/15/2019    CHF (congestive heart failure) (HCC)     Chronic anemia     Chronic kidney disease     Hypertension     Pleural effusion on left 02/15/2019    Pleural effusion, right 02/15/2019    Pulmonary emboli ([address] 02/15/2019    Pulmonary hypertension ([address] 03/06/2019    Thyroid disease     hypothyroidism       Past Surgical  History:     Past Surgical History:   Procedure Laterality Date    CORONARY ANGIOPLASTY WITH STENT PLACEMENT  03/11/2019    complex PCI of LT MAIN, LAD    JOINT REPLACEMENT      right ankle    OTHER SURGICAL HISTORY  04/02/2019    UPGRADE TO BI-V PACEMAKER    OTHER SURGICAL HISTORY  03/15/2019    TAVR PROCEDURE    PACEMAKER PLACEMENT  01/20/2019    THORACENTESIS         Medications:      midodrine        predniSONE  50 mg Oral Daily    sodium chloride  250 mL Intravenous Once    sodium chloride  250 mL Intravenous Once    sodium chloride  250 mL Intravenous Once    albumin human  50 g Intravenous Once    benzonatate  100 mg Oral TID    guaiFENesin  600 mg Oral BID    History Narrative    Not on file       Family History:   No family history on file. Allergies:   Quinolones     Review of Systems:     Review of Systems   Constitutional: Positive for activity change and fatigue. Negative for appetite change and fever. HENT: Negative for congestion and ear discharge. Eyes: Negative for photophobia and discharge. Respiratory: Negative for choking, chest tightness and shortness of breath. Cardiovascular: Positive for chest pain. Gastrointestinal: Negative for abdominal distention. Endocrine: Negative for heat intolerance. Genitourinary: Negative for dysuria and flank pain. Musculoskeletal: Negative for arthralgias. Skin: Negative for color change. Allergic/Immunologic: Negative for food allergies. Neurological: Negative for dizziness. Hematological: Negative for adenopathy. Psychiatric/Behavioral: Negative for agitation. Physical Examination :     Patient Vitals for the past 8 hrs:   BP Temp Temp src Pulse Resp SpO2   05/10/19 2031 -- -- -- -- 15 98 %   05/10/19 2017 (!) 101/43 98.4 °F (36.9 °C) Oral 71 15 97 %   05/10/19 1619 -- -- -- -- 20 97 %   05/10/19 1445 (!) 134/56 -- -- 82 20 --       Physical Exam   Constitutional: He is oriented to person, place, and time. He appears well-developed and well-nourished. No distress. HENT:   Head: Normocephalic and atraumatic. Mouth/Throat: No oropharyngeal exudate. Eyes: Pupils are equal, round, and reactive to light. EOM are normal. No scleral icterus. Neck: Neck supple. No thyromegaly present. Cardiovascular: Normal rate, regular rhythm and normal heart sounds. Exam reveals no gallop and no friction rub. No murmur heard. Pulmonary/Chest: No stridor. No respiratory distress. He has no wheezes. Abdominal: Soft. Bowel sounds are normal. He exhibits no distension. There is no tenderness. There is no guarding.    Genitourinary:   Genitourinary Comments: Makes little urine   Musculoskeletal: He exhibits no edema, tenderness or deformity. Neurological: He is alert and oriented to person, place, and time. No cranial nerve deficit. Skin: No rash noted. He is not diaphoretic. No erythema. Psychiatric: He has a normal mood and affect. His behavior is normal.         Medical Decision Making:   I have independently reviewed/ordered the following labs:    CBC with Differential:   Recent Labs     05/08/19  0847   WBC 7.4   HGB 8.1*   HCT 26.7*        BMP:  Recent Labs     05/08/19  0847      K 4.2      CO2 22   BUN 53*   CREATININE 3.77*     Hepatic Function Panel: No results for input(s): PROT, LABALBU, BILIDIR, IBILI, BILITOT, ALKPHOS, ALT, AST in the last 72 hours. No results for input(s): RPR in the last 72 hours. No results for input(s): HIV in the last 72 hours. No results for input(s): BC in the last 72 hours. Lab Results   Component Value Date    CREATININE 3.77 05/08/2019    GLUCOSE 93 05/08/2019       Detailed results: Thank you for allowing us to participate in the care of this patient. Please call with questions. This note is created with the assistance of a speech recognition program.  While intending to generate adocument that actually reflects the content of the visit, the document can still have some errors including those of syntax and sound a like substitutions which may escape proof reading. It such instances, actual meaningcan be extrapolated by contextual diversion.     Catie Murray MD  Office: [phone]  Perfect serve / office [phone]

## 2019-05-11 NOTE — PROGRESS NOTES
[facility]   Progress Note                   Date:   5/11/2019  Patient name: Amanda Kuo  Date of admission:  5/3/2019  9:02 AM  MRN:   [id number]  YOB: 1928  PCP: No primary care provider on file. Reason for Admission:      Subjective:       @ pt is seen and examined. Pt had drop in BP after the HD yesterday  He is feeling much better and relieved after thoracentesis  Off the oxygen overnight  Paced rhythm  SBP in 130-140      Medications:   Scheduled Meds:   predniSONE  50 mg Oral Daily    sodium chloride  250 mL Intravenous Once    sodium chloride  250 mL Intravenous Once    sodium chloride  250 mL Intravenous Once    albumin human  50 g Intravenous Once    benzonatate  100 mg Oral TID    guaiFENesin  600 mg Oral BID    albuterol  2.5 mg Nebulization 4x daily    cefepime  2 g Intravenous Q MWF    torsemide  40 mg Oral Daily    [Held by provider] isosorbide mononitrate  30 mg Oral Daily    [Held by provider] carvedilol  6.25 mg Oral BID WC    sodium chloride  250 mL Intravenous Once    sodium chloride flush  10 mL Intravenous 2 times per day    aspirin  81 mg Oral Daily    clopidogrel  75 mg Oral Daily    darbepoetin marya-polysorbate  100 mcg Intravenous Weekly    levothyroxine  125 mcg Oral Daily    QUEtiapine  25 mg Oral Nightly       Continuous Infusions:   sodium chloride         CBC: No results for input(s): WBC, HGB, PLT in the last 72 hours. BMP:  No results for input(s): NA, K, CL, CO2, BUN, CREATININE, GLUCOSE in the last 72 hours. Hepatic: No results for input(s): AST, ALT, ALB, BILITOT, ALKPHOS in the last 72 hours. Troponin: No results for input(s): TROPONINI in the last 72 hours. BNP: No results for input(s): BNP in the last 72 hours. Lipids: No results for input(s): CHOL, HDL in the last 72 hours. Invalid input(s): LDLCALCU  INR: No results for input(s): INR in the last 72 hours.     Objective:   Vitals: BP (!) 135/49   Pulse 87 Temp 98.3 °F (36.8 °C) (Oral)   Resp 17   Ht 5' 6\" (1.676 m)   Wt 123 lb 10.9 oz (56.1 kg)   SpO2 100%   BMI 19.96 kg/m²     General appearance: awake, alert, in no apparent respiratory distress   HEENT: Head: Normocephalic, no lesions, without obvious abnormality  Neck: no JVD  Lungs: rales on auscultation. Heart: regular rate and rhythm, S1, S2 normal, no murmur, click, rub or gallop  Abdomen: soft, non-tender; bowel sounds normal  Extremities: No LE edema  Neurologic: Mental status: Alert, oriented. EKG: CRT D in place       ECHO( 5/6/2019)  Limited echocardiogram  Normal left ventricle size with mildly reduced systolic function. Calculated ejection fraction via Dean's Method is 45%  Abnormal septal wall motion due to paced rhythm. Normal right ventricular size and function. No significant pericardial effusion is seen. Normally functioning Bio-prosthetic Aortic valve (TAVR) without significant  stenosis or insufficiency. No significant fredrick-valvular. Mean gradient 4 mm  hg.          Coronary Angiography:   Findings:  Left main: 80-90% very calcified stenosis. This required rotational atherectomy and PTCA -JEAN-CLAUDE 4/0/15 mm stent, redcuing stenosis to 10% with TIMNI III flow. LAD: Proximal to mid area calcified 80% stenosis.  This required Rotational atherectomy / JEAN-CLAUDE in mid and proximal area, reducing stenosis to 0% with AKHIL III flow  LCX: Diffuse disease )(known from cath in [place])  RCA: Minimal 20-30% as reviewed from [facility] [address]        Assessment:   1 Acute on chronic CHF with pleural effusion resolving  2 Complete heart block S/P St chay PPM 1/21/19 with RV paced(95%) S/P CRT on 4/2/19 by Dr Aquiles Ellis  3   Paroxysmal A fib( [id number]) 4 not on AC  4   Bilateral pleural effusion S/P thoracentesis with catheter in place  5   Severe AS S/P TAVR  6   CAD S/P PCI with rotational atherectomy of left main and LAD  7   HTN  8   ESRD on HD  9   Anemia  10  H/O P.E in past  11   Mild Mitral regurgitation    Treatment Plan:   1. Will recommend starting the BB( coreg 6.25 mg BID). His morning BP was in 150.   2. Will recommend continuing with DAPT with plavix. Not on [employer] for A fib stroke prophylaxis. Will recommend Eliquis 2.5 mg BID. Can stop asa as it has been more than 1 month and continue with plavix and eliquis  3. HD plan per nephrology. 4. ID & pulmonary follow up. Cefepime per ID. High dose prednisone per CT? Pulmonary? Need to taper down other wise lead to fluid retention and worsening CHF, if not needed. Pulmonary to address the prednisone dose. 5. D/C plan per the CT Surgery    Discussed with patient and nursing. Lucy De La Rosa MD  Fellow cardiology     Attending Cardiologist Addendum: I have reviewed and performed the history, physical, subjective, objective, assessment, and plan with the resident/fellow and agree with the note. I performed the history and physical personally. I have made changes to the note above as needed. Thank you for allowing me to participate in the care of this patient, please do not hesitate to call if you have any questions. Ritesh Clark DO, [facility] [place], [address] [facility]  [facility] [place]  [phone]

## 2019-05-11 NOTE — PROGRESS NOTES
Intravenous 2 times per day    aspirin  81 mg Oral Daily    clopidogrel  75 mg Oral Daily    darbepoetin marya-polysorbate  100 mcg Intravenous Weekly    levothyroxine  125 mcg Oral Daily    QUEtiapine  25 mg Oral Nightly     Continuous Infusions:    sodium chloride         Physical Examination:     General:  AAO x 3, speaking in full sentences, no accessory muscle use. Chest:   Diminished breath sounds at bilateral bases. Crackles, rales present. Cardiac:  S1 S2 RR, no murmurs, gallops or rubs. Abdomen: Soft, non-tender, non distended, BS audible. SKIN:  No rashes, good skin turgor. Healed abrasions on legs. Extremities:  No edema, no clubbing, No cyanosis  Neuro:  AAO x 3, No FND. Labs:       Recent Labs     05/08/19  0847   WBC 7.4   RBC 2.82*   HGB 8.1*   HCT 26.7*   MCV 94.7   MCH 28.7   MCHC 30.3   RDW 18.2*      MPV 11.8      BMP:   Recent Labs     05/08/19  0847      K 4.2      CO2 22   BUN 53*   CREATININE 3.77*   GLUCOSE 93   CALCIUM 8.5*      Urinalysis/Chemistries:      Lab Results   Component Value Date    NITRU NEGATIVE 03/29/2019    COLORU YELLOW 03/29/2019    PHUR 7.5 03/29/2019    WBCUA 50  03/29/2019    RBCUA 50  03/29/2019    MUCUS NOT REPORTED 03/29/2019    TRICHOMONAS NOT REPORTED 03/29/2019    YEAST NOT REPORTED 03/29/2019    BACTERIA NOT REPORTED 03/29/2019    SPECGRAV 1.011 03/29/2019    LEUKOCYTESUR LARGE 03/29/2019    UROBILINOGEN Normal 03/29/2019    BILIRUBINUR NEGATIVE 03/29/2019    GLUCOSEU NEGATIVE 03/29/2019    KETUA NEGATIVE 03/29/2019    AMORPHOUS NOT REPORTED 03/29/2019       Radiology:     Reviewed:     Assessment:     1. ESRD on hemodialysis Monday Wednesday Friday at the [facility] unit under Dr. Romayne Dare:  2. HTN: Stable  3. Recurrent pleural effusion status post chest tube placement  4. Possible Pneumonia. ID following. 5. ANEMIA OF CHRONIC DISEASE: Stable  6. SECONDARY HYPERPARATHYROIDISM: Stable  7.  Status post TAVR and high risk PCI  8. Complete heart block s/p PPM 1/21/2019  9. A. Fib, not on AC    Plan:   1. HD as per MWF schedule. 2. Strict Input and Output, Daily weigh and document in the chart. 3. Low Potassium, Low phosphorus and low salt diet. Fluids to be restricted to 1500ml/day. 4. IV Aranesp/Epogen for anemia of chronic disease with HD weekly. 5. IV Zemplar per protocol for secondary hyperparathyroidism with HD thrice a week. 6. Antibiotics per ID.   7. Discharge planning. Plan for SNF. Nutrition   Renal Diet/TF    Attending Physician Statement  I have discussed the care of Lennox Lemma, including pertinent history and exam findings,  with the Resident/CNP. I have reviewed the key elements of all parts of the encounter with the Fellow/Resident/CNP. I agree with the assessment, plan and orders as documented by the Resident/CNP. Shaheed Ko MD   Nephrology [address] [place] [address]

## 2019-05-11 NOTE — PROGRESS NOTES
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 Left thoracentesis and aspira catheter placed yesterday. Pa and lateral CXR tomorrow  2. Plan for discharge early next week      The above recommendations including medications and orders were discussed and agreed upon with , the attending on service for the cardiothoracic surgery group today.      FRANCIS Bradley, CNP

## 2019-05-11 NOTE — PROGRESS NOTES
After ambulating with physician; patient sats 85% on room air. Demonstrates SOB with exertion. After being placed on 2L; patient sats 98%. No SOB at present. Instructed by physician to place patient back on bipap for an hour after resting for an hour. the patient sitting up in recliner.

## 2019-05-12 NOTE — PROGRESS NOTES
[facility] -   Infectious diseases evaluation  admission date 5/3/2019      reason for consultation:   Abnormal CXR - TAVR     Impression :   Current:  · CHF  · pleural effusions, many thoracentesis in past-  · bilat lower lobe consolidations, Possible bilat LL aspiration pneumonia      Other:  · Complete heart block, post PPM  · CAD, multi vessel, post high risk PCI  · Severe AS, post TAVR  · ARF on CRF- needing HD  Discussion / summary of stay / plan of care   ·    Recommendations   ·   · Keep cefepime until 5/13/19- better in general  · disch planning  · Disc w RN     Infection Control Recommendations   · [place] Precautions  · Contact Isolation         Antimicrobial Stewardship Recommendations   · Simplification of therapy  · Targeted therapy  · Per Kg dosing        Coordination ofOutpatient Care:   · Estimated Length of IV antimicrobials: With hemodialysis until 5/13/19  · Patient will need Midline / picc Catheter no Insertion:   · Patient will need SNF:TBD  · Patient will need outpatient wound care:      History of Present Illness:   Initial history:  Dick Blackburn is a 80y.o.-year-old male admitted w SOB, and HTN, hx of a fall at the [facility]. No fever and no chills- CXR pleural effusions and pulm congestion on CXR, hypoxia. He had a R thoracentesis on 5/5/19 and fluid was serous. Afterwards, the CXR showed improved R effusion but right lower lobe infiltrate, same left lower love infiltrate - we are called for possible pneumonia in the setting of recent TAVR.   No fever or chills -     Story of dysphagia recently and feeling food is getting stuck.     Pt has ARF on CRF and has been on HD MWF, lives in [place],, many thoracentesis in past, has a pace maker for complete block in past.  Severe AS and post TAVR,  PTCA for multiple vessel CAD.     Interval changes  05/11/19   No fever  Post pleurex on the left - tolerating - not SOB  - abd soft non tender  Blood cx, sp cx and pleural cx are all neg      Summary of relevant labs:  Labs:  WBC 10-10 -7.4  Micro:  Blood cultures ×25/7, pending  Sputum culture 5/6 pending  Blood culture 5/6 pending  Imaging:  CXR bilat LL infiltrates, w bilat moderate effusions, trace right apical pneumothorax. CXR 5/6/19 stable infiltrates -      I have personally reviewed the past medical history, past surgical history, medications, social history, and family history, and I haveupdated the database accordingly.   Past Medical History:     Past Medical History:   Diagnosis Date    Aortic stenosis 02/15/2019    Atrial fibrillation (HCC)     CAD (coronary artery disease)     Chest pain 02/15/2019    [facility]     Chronic anemia     Chronic kidney disease     Hypertension     Pleural effusion on left 02/15/2019    Pleural effusion, right 02/15/2019    Pulmonary emboli ([address] 02/15/2019    Pulmonary hypertension ([facility] [address] 03/06/2019    Thyroid disease     hypothyroidism       Past Surgical  History:     Past Surgical History:   Procedure Laterality Date    CORONARY ANGIOPLASTY WITH STENT PLACEMENT  03/11/2019    complex PCI of LT MAIN, LAD    JOINT REPLACEMENT      right ankle    OTHER SURGICAL HISTORY  04/02/2019    UPGRADE TO BI-V PACEMAKER    OTHER SURGICAL HISTORY  03/15/2019    TAVR PROCEDURE    PACEMAKER PLACEMENT  01/20/2019    THORACENTESIS         Medications:      predniSONE  50 mg Oral Daily    sodium chloride  250 mL Intravenous Once    sodium chloride  250 mL Intravenous Once    sodium chloride  250 mL Intravenous Once    albumin human  50 g Intravenous Once    benzonatate  100 mg Oral TID    guaiFENesin  600 mg Oral BID    albuterol  2.5 mg Nebulization 4x daily    cefepime  2 g Intravenous Q MWF    torsemide  40 mg Oral Daily    [Held by provider] isosorbide mononitrate  30 mg Oral Daily    [Held by provider] carvedilol  6.25 mg Oral BID WC    sodium chloride  250 mL Intravenous Once    sodium chloride flush  10 mL Intravenous 2 times per day    aspirin  81 mg Oral Daily    clopidogrel  75 mg Oral Daily    darbepoetin marya-polysorbate  100 mcg Intravenous Weekly    levothyroxine  125 mcg Oral Daily    QUEtiapine  25 mg Oral Nightly       Social History:     Social History     Socioeconomic History    Marital status:      Spouse name: Not on file    Number of children: Not on file    Years of education: Not on file    Highest education level: Not on file   Occupational History     Employer: RETIRED   Social Needs    Financial resource strain: Not on file    Food insecurity:     Worry: Not on file     Inability: Not on file    Transportation needs:     Medical: Not on file     Non-medical: Not on file   Tobacco Use    Smoking status: Former Smoker     Last attempt to quit:      Years since quittin.3    Smokeless tobacco: Never Used   Substance and Sexual Activity    Alcohol use: Not Currently    Drug use: Never    Sexual activity: Not on file   Lifestyle    Physical activity:     Days per week: Not on file     Minutes per session: Not on file    Stress: Not on file   Relationships    Social connections:     Talks on phone: Not on file     Gets together: Not on file     Attends Anglican service: Not on file     Active member of club or organization: Not on file     Attends meetings of clubs or organizations: Not on file     Relationship status: Not on file    Intimate partner violence:     Fear of current or ex partner: Not on file     Emotionally abused: Not on file     Physically abused: Not on file     Forced sexual activity: Not on file   Other Topics Concern    Not on file   Social History Narrative    Not on file       Family History:   No family history on file. Allergies:   Quinolones     Review of Systems:     Review of Systems   Constitutional: Positive for activity change. Negative for appetite change, fatigue and fever.    HENT: Negative for congestion and ear discharge. Eyes: Negative for photophobia and discharge. Respiratory: Negative for choking, chest tightness and shortness of breath. Cardiovascular: Negative for chest pain. Gastrointestinal: Negative for abdominal distention. Endocrine: Negative for heat intolerance. Genitourinary: Negative for dysuria and flank pain. Musculoskeletal: Negative for arthralgias. Skin: Negative for color change. Allergic/Immunologic: Negative for food allergies. Neurological: Negative for dizziness and headaches. Hematological: Negative for adenopathy. Psychiatric/Behavioral: Negative for agitation. Physical Examination :     Patient Vitals for the past 8 hrs:   BP Temp Temp src Pulse Resp SpO2   05/11/19 1603 -- -- -- -- -- 98 %   05/11/19 1545 (!) 139/56 98.5 °F (36.9 °C) Oral 75 18 97 %       Physical Exam   Constitutional: He is oriented to person, place, and time. He appears well-developed and well-nourished. No distress. HENT:   Head: Normocephalic and atraumatic. Mouth/Throat: No oropharyngeal exudate. Eyes: Pupils are equal, round, and reactive to light. EOM are normal. No scleral icterus. Neck: Neck supple. No thyromegaly present. Cardiovascular: Normal rate, regular rhythm and normal heart sounds. Exam reveals no gallop and no friction rub. No murmur heard. Pulmonary/Chest: Effort normal. No stridor. No respiratory distress. He has no wheezes. He has no rales. Abdominal: Soft. Bowel sounds are normal. He exhibits no distension. There is no tenderness. There is no guarding. Genitourinary:   Genitourinary Comments: Makes little urine   Musculoskeletal: He exhibits no edema, tenderness or deformity. Neurological: He is alert and oriented to person, place, and time. No cranial nerve deficit. Coordination normal.   Skin: No rash noted. He is not diaphoretic. No erythema. Psychiatric: He has a normal mood and affect.  His behavior is normal.         Medical Decision Making:   I have independently reviewed/ordered the following labs:    CBC with Differential:   No results for input(s): WBC, HGB, HCT, PLT, SEGSPCT, BANDSPCT, LYMPHOPCT, MONOPCT, EOSPCT in the last 72 hours. BMP:  No results for input(s): NA, K, CL, CO2, BUN, CREATININE, MG in the last 72 hours. Invalid input(s): CA  Hepatic Function Panel: No results for input(s): PROT, LABALBU, BILIDIR, IBILI, BILITOT, ALKPHOS, ALT, AST in the last 72 hours. No results for input(s): RPR in the last 72 hours. No results for input(s): HIV in the last 72 hours. No results for input(s): BC in the last 72 hours. Lab Results   Component Value Date    CREATININE 3.77 05/08/2019    GLUCOSE 93 05/08/2019       Detailed results: Thank you for allowing us to participate in the care of this patient. Please call with questions. This note is created with the assistance of a speech recognition program.  While intending to generate adocument that actually reflects the content of the visit, the document can still have some errors including those of syntax and sound a like substitutions which may escape proof reading. It such instances, actual meaningcan be extrapolated by contextual diversion.     Bert Perry MD  Office: [phone]  Perfect serve / office [phone]

## 2019-05-12 NOTE — PROGRESS NOTES
PULMONARY PROGRESS NOTE      Patient:  Adam Dumont  YOB: 1928    MRN: [id number]     Acct: [de-identified]     Admit date: 5/3/2019    REASON FOR CONSULT:- pleural effusions/ LLL infiltrate    Pt seen and Chart reviewed. Subjective:   No acute events overnight. Patient comfortable sitting up in chair. On room air. He denies SOB, chest pain.   He claims to have used the BiPAP machine last night    Review of Systems -   Review of Systems -   General ROS: Completed and except as mentioned above were negative   Psychological ROS:  Completed and except as mentioned above were negative  Ophthalmic ROS:  Completed and except as mentioned above were negative  ENT ROS:  Completed and except as mentioned above were negative  Allergy and Immunology ROS:  Completed and except as mentioned above were negative  Hematological and Lymphatic ROS:  Completed and except as mentioned above were negative  Endocrine ROS: Completed and except as mentioned above were negative  Breast ROS:  Completed and except as mentioned above were negative  Respiratory ROS:  Completed and except as mentioned above were negative  Cardiovascular ROS:  Completed and except as mentioned above were negative  Gastrointestinal ROS: Completed and except as mentioned above were negative  Genito-Urinary ROS:  Completed and except as mentioned above were negative  Musculoskeletal ROS:  Completed and except as mentioned above were negative  Neurological ROS:  Completed and except as mentioned above were negative  Dermatological ROS:  Completed and except as mentioned above were negative      Physical Exam:  Vitals: BP (!) 146/84   Pulse 102   Temp 98 °F (36.7 °C) (Oral)   Resp 17   Ht 5' 6\" (1.676 m)   Wt 131 lb 1.6 oz (59.5 kg)   SpO2 99%   BMI 21.16 kg/m²   24 hour intake/output:    Intake/Output Summary (Last 24 hours) at 5/12/2019 1406  Last data filed at 5/12/2019 1126  Gross per 24 hour   Intake --   Output 600 ml   Net -600 ml Last 3 weights: Wt Readings from Last 3 Encounters:   05/12/19 131 lb 1.6 oz (59.5 kg)   04/22/19 130 lb 11.7 oz (59.3 kg)       General appearance: alert and cooperative with exam  Physical Examination:   General appearance - alert, well appearing, and in no distress  Mental status - normal mood, behavior, speech  Chest - clear to auscultation, no wheezes, symmetric air entry.    Heart - normal rate and regular rhythm, S1 and S2 normal  Abdomen - soft, does not appear distended  Neurological - alert, awake, normal speech  Extremities - no pedal edema noted  Skin - normal coloration and turgor, no rashes noted    Diet:  Dietary Nutrition Supplements:  DIET GENERAL; Daily Fluid Restriction: 1500 ml; No Caffeine    Medications:Current Inpatient    Scheduled Meds:   predniSONE  50 mg Oral Daily    sodium chloride  250 mL Intravenous Once    sodium chloride  250 mL Intravenous Once    sodium chloride  250 mL Intravenous Once    albumin human  50 g Intravenous Once    benzonatate  100 mg Oral TID    guaiFENesin  600 mg Oral BID    albuterol  2.5 mg Nebulization 4x daily    cefepime  2 g Intravenous Q MWF    torsemide  40 mg Oral Daily    [Held by provider] isosorbide mononitrate  30 mg Oral Daily    [Held by provider] carvedilol  6.25 mg Oral BID WC    sodium chloride  250 mL Intravenous Once    sodium chloride flush  10 mL Intravenous 2 times per day    aspirin  81 mg Oral Daily    clopidogrel  75 mg Oral Daily    darbepoetin marya-polysorbate  100 mcg Intravenous Weekly    levothyroxine  125 mcg Oral Daily    QUEtiapine  25 mg Oral Nightly     Continuous Infusions:   sodium chloride       PRN Meds:sodium chloride, acetaminophen, hydrALAZINE, acetaminophen, sodium chloride, sodium chloride, sodium chloride, sodium chloride, albumin human, sodium chloride flush, ondansetron, senna, heparin (porcine), heparin (porcine), midodrine, hydrALAZINE    Objective:    CBC:   No results for input(s): WBC, HGB, PLT in the last 72 hours. BMP:    No results for input(s): NA, K, CL, CO2, BUN, CREATININE, GLUCOSE in the last 72 hours. Calcium:  No results for input(s): CALCIUM in the last 72 hours. Ionized Calcium:No results for input(s): IONCA in the last 72 hours. Magnesium:No results for input(s): MG in the last 72 hours. Phosphorus:No results for input(s): PHOS in the last 72 hours. BNP:No results for input(s): BNP in the last 72 hours. Glucose:No results for input(s): POCGLU in the last 72 hours. HgbA1C: No results for input(s): LABA1C in the last 72 hours. INR: No results for input(s): INR in the last 72 hours. Hepatic: No results for input(s): ALKPHOS, ALT, AST, PROT, BILITOT, BILIDIR, LABALBU in the last 72 hours. Amylase and Lipase:No results for input(s): LACTA, AMYLASE in the last 72 hours. Lactic Acid: No results for input(s): LACTA in the last 72 hours. CARDIAC ENZYMES:No results for input(s): CKTOTAL, CKMB, CKMBINDEX, TROPONINI in the last 72 hours. BNP: No results for input(s): BNP in the last 72 hours. Lipids: No results for input(s): CHOL, TRIG, HDL, LDLCALC in the last 72 hours. Invalid input(s): LDL  ABGs: No results found for: PH, PCO2, PO2, HCO3, O2SAT  Thyroid:   Lab Results   Component Value Date    TSH 3.04 03/20/2019      Urinalysis: No results for input(s): BACTERIA, BLOODU, CLARITYU, COLORU, PHUR, PROTEINU, RBCUA, SPECGRAV, BILIRUBINUR, NITRU, WBCUA, LEUKOCYTESUR, GLUCOSEU in the last 72 hours. CULTURES:    Chest x-ray yesterday with bilateral effusion with atelectasis    CT chest WO contrast 5/7/19  Large bilateral pleural effusions with adjacent consolidation representing  atelectasis versus pneumonia. Mildly prominent subcarinal lymph node that may be reactive. Echocardiogram: 5/3/19  Summary  Limited echocardiogram  Normal left ventricle size with mildly reduced systolic function.   Calculated ejection fraction via Dean's Method is 45%  Abnormal septal wall motion due to paced rhythm. Normal right ventricular size and function. No significant pericardial effusion is seen. Normally functioning Bio-prosthetic Aortic valve (TAVR) without significant  stenosis or insufficiency. No significant fredrick-valvular. Mean gradient 4 mm  hg.    Assessment and Plan: Active Problems:    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 (HCC)    Severe malnutrition (HCC)    Congestive heart failure (HCC)    Aspiration pneumonia of both lower lobes (HCC)    Pleural effusion, bilateral  Resolved Problems:    * No resolved hospital problems. *    CAD s/p JEAN-CLAUDE  Hx TAVR  Recurrent bilateral pleural effusions s/p multiple thoracenteses showing transudative pleural fluid, s/p bilateral pleural drain placement  Bilateral pulm infiltrates, pulm edema vs HCAP  CHF with reduced EF  HUYEN on maintenance HD  Paroxysmal A fib  Anemia of chronic disease. Bilateralatelectasis secondary to pleural effusions    - Continue supplemental oxygen as needed  - On torsemide.  - Continue incentive spirometry, pulm toilet, aspiration precautions and bronchodilators  - Antibitoics per ID, vancomycin stopped as sputum cx negative and continuing cefepime until 5/13/19.  Continue to follow cx  - PT/OT     Lm Felix MD  5/12/2019 2:07 PM

## 2019-05-12 NOTE — PROGRESS NOTES
[facility]  Pre Op Progress Note    CC: shortness of breath      Subjective:  Mr. Khadijah Xiao seen and examined Plan of care reviewed and questions answered. Physical Exam  Vital Signs: BP (!) 146/84   Pulse 102   Temp 98 °F (36.7 °C) (Oral)   Resp 17   Ht 5' 6\" (1.676 m)   Wt 131 lb 1.6 oz (59.5 kg)   SpO2 99%   BMI 21.16 kg/m²  O2 Flow Rate (L/min): 1 L/min       General: alert and oriented to person, place and time. Up in chair, No apparent distress. Heart:Rhythm: paced  Lungs: clear to auscultation bilaterally and diminished breath sounds bibasilar  Abdomen: soft, non tender, non distended, BSx4  Extremities: negative      Scheduled Meds:    predniSONE  50 mg Oral Daily    sodium chloride  250 mL Intravenous Once    sodium chloride  250 mL Intravenous Once    sodium chloride  250 mL Intravenous Once    albumin human  50 g Intravenous Once    benzonatate  100 mg Oral TID    guaiFENesin  600 mg Oral BID    albuterol  2.5 mg Nebulization 4x daily    cefepime  2 g Intravenous Q MWF    torsemide  40 mg Oral Daily    [Held by provider] isosorbide mononitrate  30 mg Oral Daily    [Held by provider] carvedilol  6.25 mg Oral BID WC    sodium chloride  250 mL Intravenous Once    sodium chloride flush  10 mL Intravenous 2 times per day    aspirin  81 mg Oral Daily    clopidogrel  75 mg Oral Daily    darbepoetin marya-polysorbate  100 mcg Intravenous Weekly    levothyroxine  125 mcg Oral Daily    QUEtiapine  25 mg Oral Nightly     Continuous Infusions:    sodium chloride         Data:  CBC: No results for input(s): WBC, HGB, HCT, MCV, PLT in the last 72 hours. BMP: No results for input(s): NA, K, CL, CO2, PHOS, BUN, CREATININE in the last 72 hours. Invalid input(s): CA  PT/INR: No results for input(s): PROTIME, INR in the last 72 hours. APTT: No results for input(s): APTT in the last 72 hours.       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 Awaiting for Dr Nanette Lagunas to evaluate CXR, may need to drain effusion via Aspira catheter. Patient doing well today  2. Plan for discharge to [facility] on Tuesday      The above recommendations including medications and orders were discussed and agreed upon with Dr. Nanette Lagunas, the attending on service for the cardiothoracic surgery group today.      Vicente Bella, APRN, CNP

## 2019-05-12 NOTE — PROGRESS NOTES
Patient reports constipation. Message sent to dietary for prune juice and apple juice for patient.  Dr Cristal Fleming aware of pt status

## 2019-05-12 NOTE — PROGRESS NOTES
Renal Progress Note    Patient :  Froy Woodard; 80 y.o. MRN# [id number]  Location:  [facility]/1900-23  Attending:  Abhijeet Perez MD  Admit Date:  5/3/2019   Hospital Day: 9    Subjective   Patient was seen and examined. Resting comfortably in chair. C/o constipation, no stool x 3 days, laxation measures in progress. No acute events overnight. Denies SOB, was on BiPAP last night. Vital signs stable   Minimal urine output. Good oral intake. S/p b/l chest tube placements Friday. Site clean, tender to touch. HD Friday, tolerated well.   No BMP today    Objective     VS: BP (!) 146/84   Pulse 102   Temp 98 °F (36.7 °C) (Oral)   Resp 17   Ht 5' 6\" (1.676 m)   Wt 131 lb 1.6 oz (59.5 kg)   SpO2 99%   BMI 21.16 kg/m²   MAXIMUM TEMPERATURE OVER 24HRS:  Temp (24hrs), Av.9 °F (36.6 °C), Min:97.5 °F (36.4 °C), Max:98.2 °F (36.8 °C)    24HR BLOOD PRESSURE RANGE:  Systolic (82HXX), IDJ:065 , Min:140 , MCW:774   ; Diastolic (41NVR), HUF:70, Min:54, Max:84    24HR INTAKE/OUTPUT:      Intake/Output Summary (Last 24 hours) at 2019 1557  Last data filed at 2019 1126  Gross per 24 hour   Intake --   Output 600 ml   Net -600 ml     WEIGHT :  Patient Vitals for the past 96 hrs (Last 3 readings):   Weight   19 1347 131 lb 1.6 oz (59.5 kg)   05/10/19 1330 123 lb 10.9 oz (56.1 kg)   05/10/19 1000 125 lb 14.1 oz (57.1 kg)       Current Medications:     Scheduled Meds:    predniSONE  50 mg Oral Daily    sodium chloride  250 mL Intravenous Once    sodium chloride  250 mL Intravenous Once    sodium chloride  250 mL Intravenous Once    albumin human  50 g Intravenous Once    benzonatate  100 mg Oral TID    guaiFENesin  600 mg Oral BID    albuterol  2.5 mg Nebulization 4x daily    cefepime  2 g Intravenous Q MWF    torsemide  40 mg Oral Daily    [Held by provider] isosorbide mononitrate  30 mg Oral Daily    [Held by provider] carvedilol  6.25 mg Oral BID WC    sodium chloride  250 mL Intravenous 1/21/2019  9. A. Fib, not on AC    Plan:   1. HD as per MWF schedule, next tx tomorrow. 2. Strict Input and Output, Daily weigh and document in the chart. 3. Low Potassium, Low phosphorus and low salt diet. Fluids to be restricted to 1500ml/day. 4. IV Aranesp/Epogen for anemia of chronic disease with HD weekly. 5. IV Zemplar per protocol for secondary hyperparathyroidism with HD thrice a week. 6. Antibiotics per ID.   7. Discharge planning. Plan for SNF. Nutrition   Renal Diet/TF    FRANCIS Khan  [facility]    Attending Physician Statement  I have discussed the care of this patient, including pertinent history and exam findings, with the CNP. I have reviewed the key elements of all parts of the encounter with the CNP. I agree with the assessment, plan and orders as documented by the Resident/CNP. Shaheed Majano MD   Nephrology [address] [place] [address]

## 2019-05-13 NOTE — PROGRESS NOTES
[facility]  Pre Op Progress Note    CC: shortness of breath      Subjective:  Mr. Juan Alcantara seen and examined Plan of care reviewed and questions answered. Physical Exam  Vital Signs: BP (!) 164/69   Pulse 83   Temp 98.2 °F (36.8 °C) (Oral)   Resp 16   Ht 5' 6\" (1.676 m)   Wt 131 lb 1.6 oz (59.5 kg)   SpO2 99%   BMI 21.16 kg/m²  O2 Flow Rate (L/min): 1 L/min       General: alert and oriented to person, place and time. Up in chair, No apparent distress. Heart:Rhythm: paced  Lungs: clear to auscultation bilaterally and diminished breath sounds bibasilar  Abdomen: soft, non tender, non distended, BSx4  Extremities: negative      Scheduled Meds:    predniSONE  50 mg Oral Daily    sodium chloride  250 mL Intravenous Once    sodium chloride  250 mL Intravenous Once    sodium chloride  250 mL Intravenous Once    albumin human  50 g Intravenous Once    benzonatate  100 mg Oral TID    guaiFENesin  600 mg Oral BID    torsemide  40 mg Oral Daily    [Held by provider] isosorbide mononitrate  30 mg Oral Daily    [Held by provider] carvedilol  6.25 mg Oral BID WC    sodium chloride  250 mL Intravenous Once    sodium chloride flush  10 mL Intravenous 2 times per day    aspirin  81 mg Oral Daily    clopidogrel  75 mg Oral Daily    darbepoetin marya-polysorbate  100 mcg Intravenous Weekly    levothyroxine  125 mcg Oral Daily    QUEtiapine  25 mg Oral Nightly     Continuous Infusions:    sodium chloride         Data:  CBC: No results for input(s): WBC, HGB, HCT, MCV, PLT in the last 72 hours. BMP: No results for input(s): NA, K, CL, CO2, PHOS, BUN, CREATININE in the last 72 hours. Invalid input(s): CA  PT/INR: No results for input(s): PROTIME, INR in the last 72 hours. APTT: No results for input(s): APTT in the last 72 hours.       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 Aspira catheters utilized this AM, drained 1.1L on right, 700ml on Left. Will get CXR post dialysis today. CRP post dialysis  2. Plan for dialysis today. 1 hour bipap to help open up lung after drainage of effusions. The above recommendations including medications and orders were discussed and agreed upon with Dr. Kimberlyn Sandoval, the attending on service for the cardiothoracic surgery group today.      Liz Joseph, APRN, CNP

## 2019-05-13 NOTE — PROGRESS NOTES
[facility] -   Infectious diseases evaluation  admission date 5/3/2019      reason for consultation:   Abnormal CXR - TAVR     Impression :   Current:  · CHF  · pleural effusions, many thoracentesis in past-  · bilat lower lobe consolidations, Possible bilat LL aspiration pneumonia      Other:  · Complete heart block, post PPM  · CAD, multi vessel, post high risk PCI  · Severe AS, post TAVR  · ARF on CRF- needing HD  Discussion / summary of stay / plan of care   ·    Recommendations   ·   · Keep cefepime until 5/13/19- stop after today, watch for any worsening shortness of breath  · disch planning  · Disc w RN     Infection Control Recommendations   · [place] Precautions  · Contact Isolation         Antimicrobial Stewardship Recommendations   · Simplification of therapy  · Targeted therapy  · Per Kg dosing        Coordination ofOutpatient Care:   · Estimated Length of IV antimicrobials: With hemodialysis until 5/13/19  · Patient will need Midline / picc Catheter no Insertion:   · Patient will need SNF:TBD  · Patient will need outpatient wound care:      History of Present Illness:   Initial history:  Haris No is a 80y.o.-year-old male admitted w SOB, and HTN, hx of a fall at the [facility]. No fever and no chills- CXR pleural effusions and pulm congestion on CXR, hypoxia. He had a R thoracentesis on 5/5/19 and fluid was serous. Afterwards, the CXR showed improved R effusion but right lower lobe infiltrate, same left lower love infiltrate - we are called for possible pneumonia in the setting of recent TAVR. No fever or chills -     Story of dysphagia recently and feeling food is getting stuck.     Pt has ARF on CRF and has been on HD MWF, [place],, many thoracentesis in past, has a pace maker for complete block in past.  Severe AS and post TAVR,  PTCA for multiple vessel CAD.     Interval changes  05/13/19   No fever or chills. Not short of breath.   Both BID WC    sodium chloride  250 mL Intravenous Once    sodium chloride flush  10 mL Intravenous 2 times per day    aspirin  81 mg Oral Daily    clopidogrel  75 mg Oral Daily    darbepoetin marya-polysorbate  100 mcg Intravenous Weekly    levothyroxine  125 mcg Oral Daily    QUEtiapine  25 mg Oral Nightly       Social History:     Social History     Socioeconomic History    Marital status:      Spouse name: Not on file    Number of children: Not on file    Years of education: Not on file    Highest education level: Not on file   Occupational History     Employer: RETIRED   Social Needs    Financial resource strain: Not on file    Food insecurity:     Worry: Not on file     Inability: Not on file    Transportation needs:     Medical: Not on file     Non-medical: Not on file   Tobacco Use    Smoking status: Former Smoker     Last attempt to quit:      Years since quittin.3    Smokeless tobacco: Never Used   Substance and Sexual Activity    Alcohol use: Not Currently    Drug use: Never    Sexual activity: Not on file   Lifestyle    Physical activity:     Days per week: Not on file     Minutes per session: Not on file    Stress: Not on file   Relationships    Social connections:     Talks on phone: Not on file     Gets together: Not on file     Attends Yazidism service: Not on file     Active member of club or organization: Not on file     Attends meetings of clubs or organizations: Not on file     Relationship status: Not on file    Intimate partner violence:     Fear of current or ex partner: Not on file     Emotionally abused: Not on file     Physically abused: Not on file     Forced sexual activity: Not on file   Other Topics Concern    Not on file   Social History Narrative    Not on file       Family History:   No family history on file. Allergies:   Quinolones     Review of Systems:     Review of Systems   Constitutional: Positive for activity change.  Negative for appetite change, fatigue and fever. HENT: Negative for congestion and ear discharge. Eyes: Negative for photophobia, discharge and redness. Respiratory: Negative for apnea, cough, choking, chest tightness and shortness of breath. Cardiovascular: Negative for chest pain. Gastrointestinal: Negative for abdominal distention. Endocrine: Negative for heat intolerance and polyphagia. Genitourinary: Negative for dysuria, flank pain and frequency. Musculoskeletal: Negative for arthralgias. Skin: Negative for color change. Allergic/Immunologic: Negative for food allergies. Neurological: Negative for dizziness, numbness and headaches. Hematological: Negative for adenopathy. Psychiatric/Behavioral: Negative for agitation. Physical Examination :     Patient Vitals for the past 8 hrs:   Resp SpO2   05/12/19 2035 16 99 %       Physical Exam   Constitutional: He is oriented to person, place, and time. He appears well-developed and well-nourished. No distress. HENT:   Head: Normocephalic and atraumatic. Mouth/Throat: No oropharyngeal exudate. Eyes: Pupils are equal, round, and reactive to light. EOM are normal. No scleral icterus. Neck: Neck supple. No thyromegaly present. Cardiovascular: Normal rate, regular rhythm and normal heart sounds. Exam reveals no friction rub. No murmur heard. Pulmonary/Chest: Effort normal. No respiratory distress. He has no wheezes. Abdominal: Soft. Bowel sounds are normal. He exhibits no distension. There is no tenderness. There is no guarding. Genitourinary:   Genitourinary Comments: Makes little urine   Musculoskeletal: He exhibits no edema, tenderness or deformity. Neurological: He is alert and oriented to person, place, and time. No cranial nerve deficit. Coordination normal.   Skin: No rash noted. He is not diaphoretic. No erythema. Psychiatric: He has a normal mood and affect.  His behavior is normal.         Medical Decision Making:   I have independently reviewed/ordered the following labs:    CBC with Differential:   No results for input(s): WBC, HGB, HCT, PLT, SEGSPCT, BANDSPCT, LYMPHOPCT, MONOPCT, EOSPCT in the last 72 hours. BMP:  No results for input(s): NA, K, CL, CO2, BUN, CREATININE, MG in the last 72 hours. Invalid input(s): CA  Hepatic Function Panel: No results for input(s): PROT, LABALBU, BILIDIR, IBILI, BILITOT, ALKPHOS, ALT, AST in the last 72 hours. No results for input(s): RPR in the last 72 hours. No results for input(s): HIV in the last 72 hours. No results for input(s): BC in the last 72 hours. Lab Results   Component Value Date    CREATININE 3.77 05/08/2019    GLUCOSE 93 05/08/2019       Detailed results: Thank you for allowing us to participate in the care of this patient. Please call with questions. This note is created with the assistance of a speech recognition program.  While intending to generate adocument that actually reflects the content of the visit, the document can still have some errors including those of syntax and sound a like substitutions which may escape proof reading. It such instances, actual meaningcan be extrapolated by contextual diversion.     Fidencio Merlos MD  Office: [phone]  Perfect serve / office [phone]

## 2019-05-13 NOTE — PLAN OF CARE
Patient went for dialysis today was not hungry at the time, wanted to take a nap. RN assessed patient after he returned. He did he several candies before taking a nap. When dinner arrived manager and PCT request and encourage that patient eat dinner, patient stated he is tired and not hungry and he will eat it later. MD/nephew came into room upset with RN stating he wants him fed now. PCT is at bedside encouraging patient to eat.stated he rested well.

## 2019-05-13 NOTE — PROGRESS NOTES
[facility] -   Infectious diseases evaluation  admission date 5/3/2019      reason for consultation:   Abnormal CXR - TAVR     Impression :   Current:  · CHF  · pleural effusions, many thoracentesis in past-  · bilat lower lobe consolidations, Possible bilat LL aspiration pneumonia   · 5/13 . Elevated CRP  · 5/13 leukocytosis. 19     Other:  · Complete heart block, post PPM  · CAD, multi vessel, post high risk PCI  · Severe AS, post TAVR  · ARF on CRF- needing HD  Discussion / summary of stay / plan of care   ·    Recommendations   ·   · . Post a course of cefepime and vancomycin,   · . Vancomycin has been stopped few days ago  · Cefepime active until today  · Get another set of blood cultures today and chest x-ray, start the patient on a dose of vancomycin today pending results  · WBC in the morning, CRP in the morning  · Disc w RN and discharge planner     Infection Control Recommendations   · [place] Precautions  · Contact Isolation         Antimicrobial Stewardship Recommendations   · Simplification of therapy  · Targeted therapy  · Per Kg dosing        Coordination ofOutpatient Care:   · Estimated Length of IV antimicrobials: With hemodialysis until 5/13/19  · Patient will need Midline / picc Catheter no Insertion:   · Patient will need SNF:TBD  · Patient will need outpatient wound care:      History of Present Illness:   Initial history:  Haris No is a 80y.o.-year-old male admitted w SOB, and HTN, hx of a fall at the [facility]. No fever and no chills- CXR pleural effusions and pulm congestion on CXR, hypoxia. He had a R thoracentesis on 5/5/19 and fluid was serous. Afterwards, the CXR showed improved R effusion but right lower lobe infiltrate, same left lower love infiltrate - we are called for possible pneumonia in the setting of recent TAVR.   No fever or chills -     Story of dysphagia recently and feeling food is getting stuck.     Pt has ARF on CRF and has been on HD MWF, lives in [address] [facility],, many thoracentesis in past, has a pace maker for complete block in past.  Severe AS and post TAVR,  PTCA for multiple vessel CAD.     Interval changes  05/13/19   . Seen in hemodialysis, tolerating well and the hemodialysis access looks clean. Right tunneled chest port, flushing well. No cough or phlegm, Pleurx catheters in place. Both of them, he denies choking on his meals, repeat chest x-ray does not show pneumonia. History WBC are up to 19 today CRP up to 18, cause. Blood cultures last were negative. Repeat blood cultures today has been taken, and dialysis. Pending Vanco dose was given once today    . He denies diarrhea or rash          Summary of relevant labs:  Labs:  WBC 10-10 -7.4 - 19  Micro:  Blood cultures ×25/7, pending  Sputum culture 5/6 pending  Blood culture 5/6 pending  Imaging:  CXR bilat LL infiltrates, w bilat moderate effusions, trace right apical pneumothorax. CXR 5/6/19 stable infiltrates -      I have personally reviewed the past medical history, past surgical history, medications, social history, and family history, and I haveupdated the database accordingly.   Past Medical History:     Past Medical History:   Diagnosis Date    Aortic stenosis 02/15/2019    Atrial fibrillation (HCC)     CAD (coronary artery disease)     Chest pain 02/15/2019    CHF (congestive heart failure) (HCC)     Chronic anemia     Chronic kidney disease     Hypertension     Pleural effusion on left 02/15/2019    Pleural effusion, right 02/15/2019    Pulmonary emboli ([address] 02/15/2019    Pulmonary hypertension ([address] 03/06/2019    Thyroid disease     hypothyroidism       Past Surgical  History:     Past Surgical History:   Procedure Laterality Date    CORONARY ANGIOPLASTY WITH STENT PLACEMENT  03/11/2019    complex PCI of LT MAIN, LAD    JOINT REPLACEMENT      right ankle    OTHER SURGICAL HISTORY  04/02/2019    UPGRADE TO BI-V PACEMAKER    OTHER SURGICAL HISTORY  03/15/2019    TAVR PROCEDURE    PACEMAKER PLACEMENT  2019    THORACENTESIS         Medications:      docusate sodium  100 mg Oral Daily    predniSONE  50 mg Oral Daily    sodium chloride  250 mL Intravenous Once    sodium chloride  250 mL Intravenous Once    sodium chloride  250 mL Intravenous Once    albumin human  50 g Intravenous Once    benzonatate  100 mg Oral TID    guaiFENesin  600 mg Oral BID    torsemide  40 mg Oral Daily    [Held by provider] isosorbide mononitrate  30 mg Oral Daily    [Held by provider] carvedilol  6.25 mg Oral BID WC    sodium chloride  250 mL Intravenous Once    sodium chloride flush  10 mL Intravenous 2 times per day    aspirin  81 mg Oral Daily    clopidogrel  75 mg Oral Daily    darbepoetin marya-polysorbate  100 mcg Intravenous Weekly    levothyroxine  125 mcg Oral Daily    QUEtiapine  25 mg Oral Nightly       Social History:     Social History     Socioeconomic History    Marital status:       Spouse name: Not on file    Number of children: Not on file    Years of education: Not on file    Highest education level: Not on file   Occupational History     Employer: RETIRED   Social Needs    Financial resource strain: Not on file    Food insecurity:     Worry: Not on file     Inability: Not on file    Transportation needs:     Medical: Not on file     Non-medical: Not on file   Tobacco Use    Smoking status: Former Smoker     Last attempt to quit:      Years since quittin.3    Smokeless tobacco: Never Used   Substance and Sexual Activity    Alcohol use: Not Currently    Drug use: Never    Sexual activity: Not on file   Lifestyle    Physical activity:     Days per week: Not on file     Minutes per session: Not on file    Stress: Not on file   Relationships    Social connections:     Talks on phone: Not on file     Gets together: Not on file     Attends Voodoo service: Not on file     Active member of club or organization: Not on file     Attends meetings of clubs or organizations: Not on file     Relationship status: Not on file    Intimate partner violence:     Fear of current or ex partner: Not on file     Emotionally abused: Not on file     Physically abused: Not on file     Forced sexual activity: Not on file   Other Topics Concern    Not on file   Social History Narrative    Not on file       Family History:   No family history on file. Allergies:   Quinolones     Review of Systems:     Review of Systems   Constitutional: Positive for activity change. Negative for appetite change, fatigue and fever. HENT: Negative for congestion and ear discharge. Eyes: Negative for photophobia, pain, discharge and redness. Respiratory: Negative for apnea, cough, chest tightness, shortness of breath and stridor. Cardiovascular: Negative for chest pain. Gastrointestinal: Negative for abdominal distention. Endocrine: Negative for heat intolerance and polyphagia. Genitourinary: Negative for dysuria, flank pain and frequency. Musculoskeletal: Negative for arthralgias and back pain. Skin: Negative for color change. Allergic/Immunologic: Negative for food allergies. Neurological: Negative for dizziness, numbness and headaches. Hematological: Negative for adenopathy. Psychiatric/Behavioral: Negative for agitation. Physical Examination :     Patient Vitals for the past 8 hrs:   BP Temp Temp src Pulse Resp SpO2   05/13/19 0811 (!) 143/82 98.1 °F (36.7 °C) Oral 92 16 97 %   05/13/19 0400 (!) 164/69 98.2 °F (36.8 °C) Oral 83 16 --       Physical Exam   Constitutional: He is oriented to person, place, and time. He appears well-developed and well-nourished. No distress. HENT:   Head: Normocephalic and atraumatic. Mouth/Throat: No oropharyngeal exudate. Eyes: Pupils are equal, round, and reactive to light. EOM are normal. No scleral icterus. Neck: Neck supple. No thyromegaly present. Cardiovascular: Normal rate, regular rhythm and normal heart sounds. Exam reveals no gallop. No murmur heard. Pulmonary/Chest: Effort normal. No respiratory distress. He has no wheezes. Abdominal: Soft. Bowel sounds are normal. He exhibits no distension. There is no tenderness. There is no guarding. Genitourinary:   Genitourinary Comments: Makes little urine   Musculoskeletal: He exhibits no tenderness or deformity. Neurological: He is alert and oriented to person, place, and time. No cranial nerve deficit. Coordination normal.   Skin: No rash noted. He is not diaphoretic. No erythema. Psychiatric: He has a normal mood and affect. His behavior is normal.         Medical Decision Making:   I have independently reviewed/ordered the following labs:    CBC with Differential:   Recent Labs     05/13/19  0800   WBC 19.2*   HGB 9.4*   HCT 30.7*        BMP:  Recent Labs     05/13/19  0800      K 4.9   CL 99   CO2 20   *   CREATININE 4.24*     Hepatic Function Panel: No results for input(s): PROT, LABALBU, BILIDIR, IBILI, BILITOT, ALKPHOS, ALT, AST in the last 72 hours. No results for input(s): RPR in the last 72 hours. No results for input(s): HIV in the last 72 hours. No results for input(s): BC in the last 72 hours. Lab Results   Component Value Date    CREATININE 4.24 05/13/2019    GLUCOSE 112 05/13/2019       Detailed results: Thank you for allowing us to participate in the care of this patient. Please call with questions. This note is created with the assistance of a speech recognition program.  While intending to generate adocument that actually reflects the content of the visit, the document can still have some errors including those of syntax and sound a like substitutions which may escape proof reading. It such instances, actual meaningcan be extrapolated by contextual diversion.     Sherron Babin MD  Office: [phone]  Perfect serve / office [phone]

## 2019-05-13 NOTE — PROGRESS NOTES
chloride, acetaminophen, hydrALAZINE, acetaminophen, sodium chloride, sodium chloride, sodium chloride, sodium chloride, albumin human, sodium chloride flush, ondansetron, senna, heparin (porcine), heparin (porcine), midodrine, hydrALAZINE  Home Meds:                Medications Prior to Admission: aspirin 81 MG EC tablet, Take 1 tablet by mouth daily  levothyroxine (SYNTHROID) 125 MCG tablet, Take 1 tablet by mouth Daily  midodrine (PROAMATINE) 5 MG tablet, Take 1 tablet by mouth as needed (Hypotension)  clopidogrel (PLAVIX) 75 MG tablet, Take 1 tablet by mouth daily  Heparin Sodium, Porcine, (HEPARIN, PORCINE,) 1000 UNIT/ML injection, 1.9 mLs by Intercatheter route as needed (dialysis)  Heparin Sodium, Porcine, (HEPARIN, PORCINE,) 1000 UNIT/ML injection, 1.9 mLs by Intercatheter route as needed (dialysis)  megestrol (MEGACE) 40 MG/ML suspension, Take 5 mLs by mouth daily  QUEtiapine (SEROQUEL) 25 MG tablet, Take 1 tablet by mouth nightly  metoprolol succinate (TOPROL XL) 25 MG extended release tablet, Take 1 tablet by mouth daily  spironolactone (ALDACTONE) 25 MG tablet, Take 1 tablet by mouth daily  darbepoetin marya-polysorbate (ARANESP) 100 MCG/0.5ML SOSY injection, Infuse 0.5 mLs intravenously once a week    EXAMINATION     Vitals BP (!) 143/82   Pulse 92   Temp 98.1 °F (36.7 °C) (Oral)   Resp 16   Ht 5' 6\" (1.676 m)   Wt 131 lb 1.6 oz (59.5 kg)   SpO2 97%   BMI 21.16 kg/m²   LE edema  Absent, no icterus,clubbing,JVP not elevated  CVS :S1 S2 normal,no gallop or organic murmur  RS:Vesicular breath sounds  CNS:awake,alert  PA: non tender,non distended,Bowel sounds present    ASSESSMENT AND PLAN     1. ESRD  MWF Perm cath . 2. Recurrent pleural effusions s/p thoracentesis - transudative and S/p BL Pleural drain placement  3. Hx of TAVR   4. Ischemic CMP S/p PCI  with EF 40% S/p dual chamber pacemaker  5. Anemia        The patient was seen and examined while on dialysis. Professional oversight of the patients dialysis care,access care and dialysis related co-morbidities were addressed as necessary with the patient and /or staff. Low blood pressure prohibiting ultrafiltration.   We will use metered-dose/lower dialysate temperature  To get him back to his dry weight    This note is created with the assistance of a speech-recognition program. While intending to generate a document that actually reflects the content of the visit, no guarantees can be provided that every mistake has been identified and corrected by editing    Anny Aguirre MD, [facility] Nelia Barr   5/13/2019 9:39 AM    [facility]

## 2019-05-13 NOTE — PROGRESS NOTES
PULMONARY PROGRESS NOTE      Patient:  Elaina Wilder  YOB: 1928    MRN: [id number]     Acct: [de-identified]     Admit date: 5/3/2019    REASON FOR CONSULT:- pleural effusions/ LLL infiltrate    Pt seen and Chart reviewed. Subjective:   No acute events overnight. Patient remains on room air. Denies any chest pain or SOB at this time. Review of Systems -   CONSTITUTIONAL:  negative for fevers and chills  RESPIRATORY:  Denies any worsening of shortness of breath  CARDIOVASCULAR: no chest pain  GASTROINTESTINAL:  No abdominal pain  NEUROLOGICAL:  Negative for any reported changes    Physical Exam:  Vitals: BP (!) 115/55   Pulse 75   Temp 97.8 °F (36.6 °C)   Resp 16   Ht 5' 6\" (1.676 m)   Wt 127 lb 6.8 oz (57.8 kg)   SpO2 97%   BMI 20.57 kg/m²   24 hour intake/output:    Intake/Output Summary (Last 24 hours) at 5/13/2019 1532  Last data filed at 5/13/2019 1250  Gross per 24 hour   Intake 1120 ml   Output 1890 ml   Net -770 ml     Last 3 weights: Wt Readings from Last 3 Encounters:   05/13/19 127 lb 6.8 oz (57.8 kg)   04/22/19 130 lb 11.7 oz (59.3 kg)       General appearance: arousable and cooperative with exam  Physical Examination:   General appearance - in no distress  Chest - clear to auscultation, no wheezes, symmetric air entry.    Heart - normal rate and regular rhythm, S1 and S2 normal  Abdomen - soft, non-tender, does not appear distended  Extremities - no pedal edema noted  Skin - normal coloration and turgor, no rashes noted    Diet:  Dietary Nutrition Supplements:  DIET GENERAL; Daily Fluid Restriction: 1500 ml; No Caffeine    Medications:Current Inpatient    Scheduled Meds:   docusate sodium  100 mg Oral Daily    albumin human  25 g Intravenous Once    midodrine  5 mg Oral Once    predniSONE  50 mg Oral Daily    sodium chloride  250 mL Intravenous Once    sodium chloride  250 mL Intravenous Once    sodium chloride  250 mL Intravenous Once    benzonatate  100 mg Oral TID  guaiFENesin  600 mg Oral BID    torsemide  40 mg Oral Daily    [Held by provider] isosorbide mononitrate  30 mg Oral Daily    [Held by provider] carvedilol  6.25 mg Oral BID WC    sodium chloride  250 mL Intravenous Once    sodium chloride flush  10 mL Intravenous 2 times per day    aspirin  81 mg Oral Daily    clopidogrel  75 mg Oral Daily    darbepoetin marya-polysorbate  100 mcg Intravenous Weekly    levothyroxine  125 mcg Oral Daily    QUEtiapine  25 mg Oral Nightly     Continuous Infusions:   sodium chloride       PRN Meds:sodium chloride, acetaminophen, hydrALAZINE, acetaminophen, sodium chloride, sodium chloride, sodium chloride, sodium chloride, albumin human, sodium chloride flush, ondansetron, senna, heparin (porcine), heparin (porcine), midodrine, hydrALAZINE    Objective:    CBC:   Recent Labs     05/13/19  0800   WBC 19.2*   HGB 9.4*        BMP:    Recent Labs     05/13/19  0800      K 4.9   CL 99   CO2 20   *   CREATININE 4.24*   GLUCOSE 112*     Calcium:  Recent Labs     05/13/19  0800   CALCIUM 8.8     Ionized Calcium:No results for input(s): IONCA in the last 72 hours. Magnesium:No results for input(s): MG in the last 72 hours. Phosphorus:No results for input(s): PHOS in the last 72 hours. BNP:No results for input(s): BNP in the last 72 hours. Glucose:No results for input(s): POCGLU in the last 72 hours. HgbA1C: No results for input(s): LABA1C in the last 72 hours. INR: No results for input(s): INR in the last 72 hours. Hepatic: No results for input(s): ALKPHOS, ALT, AST, PROT, BILITOT, BILIDIR, LABALBU in the last 72 hours. Amylase and Lipase:No results for input(s): LACTA, AMYLASE in the last 72 hours. Lactic Acid: No results for input(s): LACTA in the last 72 hours. CARDIAC ENZYMES:No results for input(s): CKTOTAL, CKMB, CKMBINDEX, TROPONINI in the last 72 hours. BNP: No results for input(s): BNP in the last 72 hours.   Lipids: No results for input(s): CHOL, TRIG, HDL, LDLCALC in the last 72 hours. Invalid input(s): LDL  ABGs: No results found for: PH, PCO2, PO2, HCO3, O2SAT  Thyroid:   Lab Results   Component Value Date    TSH 3.04 03/20/2019      Urinalysis: No results for input(s): BACTERIA, BLOODU, CLARITYU, COLORU, PHUR, PROTEINU, RBCUA, SPECGRAV, BILIRUBINUR, NITRU, WBCUA, LEUKOCYTESUR, GLUCOSEU in the last 72 hours. CULTURES:    CXR 5/13/19  Impression   Very small residual pleural effusions with minimal bibasilar atelectasis.           CT chest WO contrast 5/7/19  Large bilateral pleural effusions with adjacent consolidation representing  atelectasis versus pneumonia. Mildly prominent subcarinal lymph node that may be reactive. Echocardiogram: 5/3/19  Summary  Limited echocardiogram  Normal left ventricle size with mildly reduced systolic function. Calculated ejection fraction via Dean's Method is 45%  Abnormal septal wall motion due to paced rhythm. Normal right ventricular size and function. No significant pericardial effusion is seen. Normally functioning Bio-prosthetic Aortic valve (TAVR) without significant  stenosis or insufficiency. No significant fredrick-valvular. Mean gradient 4 mm  hg.    Assessment and Plan: Active Problems:    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 (HCC)    Severe malnutrition (HCC)    Congestive heart failure (HCC)    Aspiration pneumonia of both lower lobes (HCC)    Pleural effusion, bilateral  Resolved Problems:    * No resolved hospital problems. *    CAD s/p JEAN-CLAUDE  Hx TAVR  Recurrent bilateral pleural effusions s/p multiple thoracenteses showing transudative pleural fluid, s/p bilateral pleural drain placement, improved.   Bilateral pulm infiltrates, pulm edema vs HCAP--improved  CHF with reduced EF  HUYEN on maintenance HD  Paroxysmal A fib  Anemia of chronic disease  Hypotension, intermittent    - Continue supplemental oxygen as needed  - BIPAP PRN  - On torsemide  - Dialysis per nephro  - Continue incentive spirometry, pulm toilet, aspiration precautions and bronchodilators  - Antibitoics per ID, vancomycin stopped as sputum cx negative and continuing cefepime until 5/13/19, plan to stop after today.   - PT/OT     Carolina Webb MD  PGY-3, Internal Medicine  . [place] , [facility]  5/13/2019, 3:32 PM

## 2019-05-13 NOTE — PROGRESS NOTES
Dialysis Post Treatment Note  Patient tolerated treatment well. Denies complaints at time of discharge.  Asymptomatic hypotension start of treatment, relieved after NS bolus, albumin and proamitine given  Vitals:    05/13/19 1250   BP: (!) 115/55   Pulse: 75   Resp:    Temp: 97.8 °F (36.6 °C)   SpO2:      Pre-Weight =57.8  Post-weight = Weight: 127 lb 6.8 oz (57.8 kg)  Total Liters Processed = Total Liters Processed (l/min): 77.6 l/min  Rinseback Volume (mL) = Rinseback Volume (ml): 370 ml  Net Removal (mL) = 0  Length of treatment=3.5 hrs

## 2019-05-14 NOTE — PROGRESS NOTES
Renal Progress Note    Patient :  Amanda Kuo; 80 y.o. MRN# [id number]  Location:  0242/8882-33  Attending:  Clyde Diaz MD  Admit Date:  5/3/2019   Hospital Day: 11    Subjective   Patient was seen and examined. No acute events overnight. Vital signs stable    Tolerated HD well yesterday. Getting per MWF schedule.      Objective     VS: BP (!) 150/65   Pulse 87   Temp 97.7 °F (36.5 °C) (Oral)   Resp 18   Ht 5' 6\" (1.676 m)   Wt 127 lb 6.8 oz (57.8 kg)   SpO2 100%   BMI 20.57 kg/m²   MAXIMUM TEMPERATURE OVER 24HRS:  Temp (24hrs), Av.1 °F (36.7 °C), Min:97.7 °F (36.5 °C), Max:98.7 °F (37.1 °C)    24HR BLOOD PRESSURE RANGE:  Systolic (51ALU), BIR:739 , Min:78 , DIP:965   ; Diastolic (43TCS), UBF:89, Min:47, Max:65    24HR INTAKE/OUTPUT:      Intake/Output Summary (Last 24 hours) at 2019 1005  Last data filed at 2019 0611  Gross per 24 hour   Intake 1360 ml   Output 1890 ml   Net -530 ml     WEIGHT :  Patient Vitals for the past 96 hrs (Last 3 readings):   Weight   19 1250 127 lb 6.8 oz (57.8 kg)   19 0835 129 lb 3 oz (58.6 kg)   19 1347 131 lb 1.6 oz (59.5 kg)       Current Medications:     Scheduled Meds:    docusate sodium  100 mg Oral Daily    albumin human  25 g Intravenous Once    midodrine  5 mg Oral Once    predniSONE  50 mg Oral Daily    sodium chloride  250 mL Intravenous Once    sodium chloride  250 mL Intravenous Once    sodium chloride  250 mL Intravenous Once    benzonatate  100 mg Oral TID    guaiFENesin  600 mg Oral BID    torsemide  40 mg Oral Daily    [Held by provider] isosorbide mononitrate  30 mg Oral Daily    [Held by provider] carvedilol  6.25 mg Oral BID WC    sodium chloride  250 mL Intravenous Once    sodium chloride flush  10 mL Intravenous 2 times per day    aspirin  81 mg Oral Daily    clopidogrel  75 mg Oral Daily    darbepoetin marya-polysorbate  100 mcg Intravenous Weekly    levothyroxine  125 mcg Oral Daily    QUEtiapine  25 mg Oral Nightly     Continuous Infusions:    sodium chloride         Physical Examination:     General:  AAO x 3, speaking in full sentences, no accessory muscle use. Chest:   Diminished bilateral vesicular breath sounds. + minor crackles, rales. No wheezes. B/l chest catheters in place  Cardiac:  S1 S2 RR, no murmurs, gallops or rubs, JVP not raised. Abdomen: Soft, non-tender, non distended, BS audible. SKIN:  No rashes, good skin turgor. Extremities:  No edema, no clubbing, No cyanosis. + leg bruises, not new  Neuro:  AAO x 3, No FND. Labs:       Recent Labs     05/13/19  0800 05/14/19  0415   WBC 19.2* 11.5*   RBC 3.29* 2.91*   HGB 9.4* 8.4*   HCT 30.7* 27.9*   MCV 93.3 95.9   MCH 28.6 28.9   MCHC 30.6 30.1   RDW 18.5* 18.5*    124*   MPV 12.4 12.5      BMP:   Recent Labs     05/13/19  0800      K 4.9   CL 99   CO2 20   *   CREATININE 4.24*   GLUCOSE 112*   CALCIUM 8.8        Urinalysis/Chemistries:      Lab Results   Component Value Date    NITRU NEGATIVE 03/29/2019    COLORU YELLOW 03/29/2019    PHUR 7.5 03/29/2019    WBCUA 50  03/29/2019    RBCUA 50  03/29/2019    MUCUS NOT REPORTED 03/29/2019    TRICHOMONAS NOT REPORTED 03/29/2019    YEAST NOT REPORTED 03/29/2019    BACTERIA NOT REPORTED 03/29/2019    SPECGRAV 1.011 03/29/2019    LEUKOCYTESUR LARGE 03/29/2019    UROBILINOGEN Normal 03/29/2019    BILIRUBINUR NEGATIVE 03/29/2019    GLUCOSEU NEGATIVE 03/29/2019    KETUA NEGATIVE 03/29/2019    AMORPHOUS NOT REPORTED 03/29/2019       Radiology:     Reviewed:     Assessment:     1. ESRD on Hemodialysis. MWF at [facility] under Dr Santi Briones. 2. Anemia of chronic disease  3. Secondary hyperparathyroidism  4. Hypertension  5. Acute on chronic CHF with recurrent b/l pleural effusions. S/p thoracentesis and Aspira catheter placements. Pleural fluid negative for malignancy  6. CAD s/p PCI  7. Severe AS s/p TAVR    Plan:   1. HD as per MWF schedule.  No acute need for HD today. 2. Strict Input and Output, Daily weigh and document in the chart. 3. Low Potassium, Low phosphorus and low salt diet. Fluids to be restricted to 1500ml/day. 4. IV Aranesp/Epogen for anemia of chronic disease with HD weekly. 5. IV Zemplar per protocol for secondary hyperparathyroidism with HD thrice a week. [id number] Alicia Vazquez for discharge from a Nephrological perspective. Nutrition   Renal Diet/TF         Maroin Morrell MD  PGY-1, Internal medicine resident  [place], [place]  5/14/2019 10:10 AM      Attending Physician Statement  I have discussed the care of this patient, including pertinent history and exam findings, with the CNP. I have reviewed the key elements of all parts of the encounter with the CNP. I agree with the assessment, plan and orders as documented by the Resident/CNP. Nishant K. Daryle Linsey, MD   Nephrology [address] [place] [address]

## 2019-05-14 NOTE — CARE COORDINATION
Transition planning dr Diana maldonado anticipate return to [facility] Thursday. Will need to send aspira drain supplies  16:50 called humaira with christelle grewal discussed needed aspira supplies and plan dc Thursday morning.  Met with patient reviewed chf zone paper

## 2019-05-14 NOTE — PROGRESS NOTES
assistance;Setup; Increased time to complete  LE Dressing: Minimal assistance;Setup; Increased time to complete  Toileting: Setup; Increased time to complete;Minimal assistance  Additional Comments: Pt sat/stood sinkside during ADL bathing/dressing activity this date, pt doing well and was very motivated  Tone RUE  RUE Tone: Normotonic  Tone LUE  LUE Tone: Normotonic  Coordination  Movements Are Fluid And Coordinated: No  Coordination and Movement description: Decreased speed     Bed mobility  Supine to Sit: Unable to assess  Sit to Supine: Unable to assess  Scooting: Modified independent  Comment: Pt seated in chair upon arrival/exit with call light within reach     Cognition  Overall Cognitive Status: WFL  Cognition Comment: Pt extremely friendly and cooperative this date        Sensation  Overall Sensation Status: WFL      LUE AROM (degrees)  LUE AROM : WFL  RUE AROM (degrees)  RUE AROM : WFL  LUE Strength  Gross LUE Strength: WFL  L Hand Grasp: 5/5  L Hand Release: 5/5  RUE Strength  Gross RUE Strength: WFL  R Hand Grasp: 5/5  R Hand Release: 5/5      Plan   Plan  Times per week: 4x/wk   Current Treatment Recommendations: Endurance Training, Functional Mobility Training, Balance Training, Patient/Caregiver Education & Training, Self-Care / ADL, Safety Education & Training, Equipment Evaluation, Education, & procurement, Home Management Training    Goals  Short term goals  Time Frame for Short term goals: by discharge, pt will  Short term goal 1: demo ADL UB/LB dressing/bathing activity standing with seated rest breaks PRN and SUP  Short term goal 2: demo good safety awareness during func mob around room with RW and mod I  Short term goal 3: demo EC/WS tech's during func activity with 2 vc's  Short term goal 4: demo activity tolerance for 30 min+  Short term goal 5: demo sit<>stand transfers with RW and SUP       Therapy Time   Individual Concurrent Group Co-treatment   Time In 1200         Time Out 1250

## 2019-05-14 NOTE — PROGRESS NOTES
[facility]   Progress Note                   Date:   5/14/2019  Patient name: Angelito Guillen  Date of admission:  5/3/2019  9:02 AM  MRN:   [id number]  YOB: 1928  PCP: No primary care provider on file. Subjective:        pt is seen and examined sitting quietly in bedside chair. Denies CP. States breathing is improved. Tele reviewed -Paced rhythm        Medications:   Scheduled Meds:   docusate sodium  100 mg Oral Daily    albumin human  25 g Intravenous Once    midodrine  5 mg Oral Once    predniSONE  50 mg Oral Daily    sodium chloride  250 mL Intravenous Once    sodium chloride  250 mL Intravenous Once    sodium chloride  250 mL Intravenous Once    benzonatate  100 mg Oral TID    guaiFENesin  600 mg Oral BID    torsemide  40 mg Oral Daily    [Held by provider] isosorbide mononitrate  30 mg Oral Daily    [Held by provider] carvedilol  6.25 mg Oral BID WC    sodium chloride  250 mL Intravenous Once    sodium chloride flush  10 mL Intravenous 2 times per day    aspirin  81 mg Oral Daily    clopidogrel  75 mg Oral Daily    darbepoetin marya-polysorbate  100 mcg Intravenous Weekly    levothyroxine  125 mcg Oral Daily    QUEtiapine  25 mg Oral Nightly       Continuous Infusions:   sodium chloride         CBC:   Recent Labs     05/13/19  0800 05/14/19  0415   WBC 19.2* 11.5*   HGB 9.4* 8.4*    124*     BMP:    Recent Labs     05/13/19  0800      K 4.9   CL 99   CO2 20   *   CREATININE 4.24*   GLUCOSE 112*     Hepatic: No results for input(s): AST, ALT, ALB, BILITOT, ALKPHOS in the last 72 hours. Troponin: No results for input(s): TROPONINI in the last 72 hours. BNP: No results for input(s): BNP in the last 72 hours. Lipids: No results for input(s): CHOL, HDL in the last 72 hours. Invalid input(s): LDLCALCU  INR: No results for input(s): INR in the last 72 hours.     Objective:   Vitals: BP (!) 125/49   Pulse 70   Temp 97.8 °F (36.6 °C) 1. Will recommend continuing with DAPT with plavix. Not on [facility] for A fib stroke prophylaxis. Will recommend Eliquis 2.5 mg BID. Can stop asa as it has been more than 1 month and continue with plavix and eliquis  2. HD plan per nephrology - tolerating well.    3. ID & pulmonary follow up.   4. D/C plan per the [facility]  APRN/NP-C  5/14/2019 1:05 PM

## 2019-05-14 NOTE — DISCHARGE INSTR - COC
Continuity of Care Form    Patient Name: Maximino Salazar   :  12/10/1928  MRN:  [id number]    Admit date:  5/3/2019  Discharge date: 2019    Code Status Order: Full Code   Advance Directives:   Advance Care Flowsheet Documentation     Date/Time Healthcare Directive Type of Healthcare Directive Copy in [address] Agent's Name Healthcare Agent's Phone Number    19 1214  Yes, patient has an advance directive for healthcare treatment  Durable power of  for health care  Yes, copy in chart -- -- --    19 1206  Yes, patient has an advance directive for healthcare treatment  Durable power of  for health care  No, copy requested from family -- -- --          Admitting Physician:  Otis Flood MD  PCP: No primary care provider on file. Discharging Nurse : Marta Sanchez Unit/Room#: 7510/7114-80  Discharging Unit Phone Number: [phone]      Emergency Contact:   Extended Emergency Contact Information  Primary Emergency Contact: Hallie Rizvi of [address] [place] [address] Phone: [phone]  Mobile Phone: [phone]  Relation: Niece/Nephew    Past Surgical History:  Past Surgical History:   Procedure Laterality Date    CORONARY ANGIOPLASTY WITH STENT PLACEMENT  2019    complex PCI of LT MAIN, LAD    JOINT REPLACEMENT      right ankle    OTHER SURGICAL HISTORY  2019    UPGRADE TO BI-V PACEMAKER    OTHER SURGICAL HISTORY  03/15/2019    TAVR PROCEDURE    PACEMAKER PLACEMENT  2019    THORACENTESIS         Immunization History: There is no immunization history on file for this patient.     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    Atelectasis, bilateral J98.11    HUYEN (acute kidney injury) ([address] N17.9    MANAGEMENT/SOCIAL WORK SECTION    Inpatient Status Date: 5/3/19    Readmission Risk Assessment Score:  Readmission Risk              Risk of Unplanned Readmission:        27           Discharging to Facility/ Agency   · Name: [facility]  Address:  [address] [place] [address].[place] [address]        Phone: [phone]       Fax: [phone]        ·   · Phone:  · Fax:    Dialysis Facility (if applicable)   · Name:  · Address:  · Dialysis Schedule:  · Phone:  · Fax:    / signature: Electronically signed by Margaret Freed RN on 5/16/19 at 8:23 AM    PHYSICIAN SECTION    Prognosis: Good    Condition at Discharge: Stable    Rehab Potential (if transferring to Rehab): Good    Recommended Labs or Other Treatments After Discharge: Drain aspira catheters as directed by Dr. Cristal Fleming    Physician Certification: I certify the above information and transfer of Nan Hedrick  is necessary for the continuing treatment of the diagnosis listed and that he requires [place] for less 30 days.      Update Admission H&P: No change in H&P    PHYSICIAN SIGNATURE:  Electronically signed by Jackson Shine MD on 5/15/19 at 8:41 AM

## 2019-05-14 NOTE — PROGRESS NOTES
PULMONARY PROGRESS NOTE      Patient:  Argyle Brunner  YOB: 1928    MRN: [id number]     Acct: [de-identified]     Admit date: 5/3/2019    REASON FOR CONSULT:- pleural effusions/ LLL infiltrate    Pt seen and Chart reviewed. Subjective:   No acute events overnight. Patient remains on room air. Denies any chest pain or SOB at this time. Review of Systems -   CONSTITUTIONAL:  negative for fevers and chills  RESPIRATORY:  Denies any worsening of shortness of breath, occasional dry cough  CARDIOVASCULAR: no chest pain  GASTROINTESTINAL:  No abdominal pain  NEUROLOGICAL:  Negative for any reported changes    Physical Exam:  Vitals: BP (!) 125/49   Pulse 70   Temp 97.8 °F (36.6 °C) (Oral)   Resp 16   Ht 5' 6\" (1.676 m)   Wt 127 lb 6.8 oz (57.8 kg)   SpO2 99%   BMI 20.57 kg/m²   24 hour intake/output:    Intake/Output Summary (Last 24 hours) at 5/14/2019 1410  Last data filed at 5/14/2019 0611  Gross per 24 hour   Intake 240 ml   Output 750 ml   Net -510 ml     Last 3 weights: Wt Readings from Last 3 Encounters:   05/13/19 127 lb 6.8 oz (57.8 kg)   04/22/19 130 lb 11.7 oz (59.3 kg)       General appearance: arousable and cooperative with exam  Physical Examination:   General appearance - awake, alert, in no distress  Chest - clear to auscultation, no wheezes, symmetric air entry. Pleural drains present bilaterally.    Heart - normal rate and regular rhythm, S1 and S2 normal  Abdomen - soft, non-tender, does not appear distended  Extremities - no pedal edema noted  Skin - normal coloration and turgor, no rashes noted    Diet:  Dietary Nutrition Supplements:  DIET GENERAL; Daily Fluid Restriction: 1500 ml; No Caffeine    Medications:Current Inpatient    Scheduled Meds:   docusate sodium  100 mg Oral Daily    albumin human  25 g Intravenous Once    midodrine  5 mg Oral Once    predniSONE  50 mg Oral Daily    sodium chloride  250 mL Intravenous Once    sodium chloride  250 mL Intravenous Once  sodium chloride  250 mL Intravenous Once    benzonatate  100 mg Oral TID    guaiFENesin  600 mg Oral BID    torsemide  40 mg Oral Daily    [Held by provider] isosorbide mononitrate  30 mg Oral Daily    [Held by provider] carvedilol  6.25 mg Oral BID WC    sodium chloride  250 mL Intravenous Once    sodium chloride flush  10 mL Intravenous 2 times per day    aspirin  81 mg Oral Daily    clopidogrel  75 mg Oral Daily    darbepoetin marya-polysorbate  100 mcg Intravenous Weekly    levothyroxine  125 mcg Oral Daily    QUEtiapine  25 mg Oral Nightly     Continuous Infusions:   sodium chloride       PRN Meds:sodium chloride, acetaminophen, hydrALAZINE, acetaminophen, sodium chloride, sodium chloride, sodium chloride, sodium chloride, albumin human, sodium chloride flush, ondansetron, senna, heparin (porcine), heparin (porcine), midodrine, hydrALAZINE    Objective:    CBC:   Recent Labs     05/13/19  0800 05/14/19  0415   WBC 19.2* 11.5*   HGB 9.4* 8.4*    124*     BMP:    Recent Labs     05/13/19  0800      K 4.9   CL 99   CO2 20   *   CREATININE 4.24*   GLUCOSE 112*     Calcium:  Recent Labs     05/13/19  0800   CALCIUM 8.8     Ionized Calcium:No results for input(s): IONCA in the last 72 hours. Magnesium:No results for input(s): MG in the last 72 hours. Phosphorus:No results for input(s): PHOS in the last 72 hours. BNP:No results for input(s): BNP in the last 72 hours. Glucose:No results for input(s): POCGLU in the last 72 hours. HgbA1C: No results for input(s): LABA1C in the last 72 hours. INR: No results for input(s): INR in the last 72 hours. Hepatic: No results for input(s): ALKPHOS, ALT, AST, PROT, BILITOT, BILIDIR, LABALBU in the last 72 hours. Amylase and Lipase:No results for input(s): LACTA, AMYLASE in the last 72 hours. Lactic Acid: No results for input(s): LACTA in the last 72 hours.   CARDIAC ENZYMES:No results for input(s): CKTOTAL, CKMB, CKMBINDEX, TROPONINI in the last 72 hours. BNP: No results for input(s): BNP in the last 72 hours. Lipids: No results for input(s): CHOL, TRIG, HDL, LDLCALC in the last 72 hours. Invalid input(s): LDL  ABGs: No results found for: PH, PCO2, PO2, HCO3, O2SAT  Thyroid:   Lab Results   Component Value Date    TSH 3.04 03/20/2019      Urinalysis: No results for input(s): BACTERIA, BLOODU, CLARITYU, COLORU, PHUR, PROTEINU, RBCUA, SPECGRAV, BILIRUBINUR, NITRU, WBCUA, LEUKOCYTESUR, GLUCOSEU in the last 72 hours. CULTURES:    CXR 5/13/19  Impression   Very small residual pleural effusions with minimal bibasilar atelectasis.           CT chest WO contrast 5/7/19  Large bilateral pleural effusions with adjacent consolidation representing  atelectasis versus pneumonia. Mildly prominent subcarinal lymph node that may be reactive. Echocardiogram: 5/3/19  Summary  Limited echocardiogram  Normal left ventricle size with mildly reduced systolic function. Calculated ejection fraction via Dean's Method is 45%  Abnormal septal wall motion due to paced rhythm. Normal right ventricular size and function. No significant pericardial effusion is seen. Normally functioning Bio-prosthetic Aortic valve (TAVR) without significant  stenosis or insufficiency. No significant fredrick-valvular. Mean gradient 4 mm  hg.    Assessment and Plan: Active Problems:    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 (HCC)    Severe malnutrition (HCC)    Congestive heart failure (HCC)    Aspiration pneumonia of both lower lobes (HCC)    Pleural effusion, bilateral    Bandemia    CRP elevated  Resolved Problems:    * No resolved hospital problems. *    CAD s/p JEAN-CLAUDE  Hx TAVR  Recurrent bilateral pleural effusions s/p multiple thoracenteses showing transudative pleural fluid, s/p bilateral pleural drain placement, improved.   Bilateral pulm infiltrates, pulm edema vs HCAP--improved  CHF with reduced EF  HUYEN on maintenance HD  Paroxysmal A fib  Anemia of chronic disease  Hypotension,

## 2019-05-14 NOTE — PLAN OF CARE
Pt received in bed, AAOx3 with respirations even and unlabored. Pt denied pain or shortness of breath. Out of bed with assistance per OT and staff, bath given. No acute distress noted. Continue POC.

## 2019-05-14 NOTE — PROGRESS NOTES
[facility] -   Infectious diseases evaluation  admission date 5/3/2019      reason for consultation:   Abnormal CXR - TAVR     Impression :   Current:  · CHF  · pleural effusions, many thoracentesis in past-  · bilat lower lobe consolidations, Possible bilat LL aspiration pneumonia      Other:  · Complete heart block, post PPM  · CAD, multi vessel, post high risk PCI  · Severe AS, post TAVR  · ARF on CRF- needing HD  Discussion / summary of stay / plan of care   ·    Recommendations   ·   · Finished cefepime until 5/13/19-  · Given 1 dose of vancomycin 750 and dialysis on 5/13, given for leukocytosis of unclear etiology,  · Blood cultures continue to be negative. White count corrected down to 11  · Plan to hold off further antibiotics. The patient was never febrile and white count corrected. Biceps. · Discharge planning     Infection Control Recommendations   · [place] Precautions  · Contact Isolation         Antimicrobial Stewardship Recommendations   · Simplification of therapy  · Targeted therapy  · Per Kg dosing        Coordination ofOutpatient Care:   · Estimated Length of IV antimicrobials: With hemodialysis until 5/13/19  · Patient will need Midline / picc Catheter no Insertion:   · Patient will need SNF:TBD  · Patient will need outpatient wound care:      History of Present Illness:   Initial history:  Rodger Morales is a 80y.o.-year-old male admitted w SOB, and HTN, hx of a fall at the [facility]. No fever and no chills- CXR pleural effusions and pulm congestion on CXR, hypoxia. He had a R thoracentesis on 5/5/19 and fluid was serous. Afterwards, the CXR showed improved R effusion but right lower lobe infiltrate, same left lower love infiltrate - we are called for possible pneumonia in the setting of recent TAVR.   No fever or chills -     Story of dysphagia recently and feeling food is getting stuck.     Pt has ARF on CRF and has been on HD MWF, lives in [place],, many thoracentesis in past, has a pace maker for complete block in past.  Severe AS and post TAVR,  PTCA for multiple vessel CAD.     Interval changes  05/14/19   No fever, chills, he feels good, appetite is borderline, no choking on his secretions and does not have any aspiration. 2.  IV site, redness,  White count was elevated yesterday is down to 11 today, CRP that is even better, most likely occurring after the vancomycin. Blood cx, sp cx and pleural cx are all neg      Summary of relevant labs:  Labs:  WBC 10-10 -7.4  Micro:  Blood cultures ×25/7, pending  Sputum culture 5/6 pending  Blood culture 5/6 pending  Imaging:  CXR bilat LL infiltrates, w bilat moderate effusions, trace right apical pneumothorax. CXR 5/6/19 stable infiltrates -      I have personally reviewed the past medical history, past surgical history, medications, social history, and family history, and I haveupdated the database accordingly.   Past Medical History:     Past Medical History:   Diagnosis Date    Aortic stenosis 02/15/2019    Atrial fibrillation (HCC)     CAD (coronary artery disease)     Chest pain 02/15/2019    [facility]     Chronic anemia     Chronic kidney disease     Hypertension     Pleural effusion on left 02/15/2019    Pleural effusion, right 02/15/2019    Pulmonary emboli ([address] 02/15/2019    Pulmonary hypertension ([address] 03/06/2019    Thyroid disease     hypothyroidism       Past Surgical  History:     Past Surgical History:   Procedure Laterality Date    CORONARY ANGIOPLASTY WITH STENT PLACEMENT  03/11/2019    complex PCI of LT MAIN, LAD    JOINT REPLACEMENT      right ankle    OTHER SURGICAL HISTORY  04/02/2019    UPGRADE TO BI-V PACEMAKER    OTHER SURGICAL HISTORY  03/15/2019    TAVR PROCEDURE    PACEMAKER PLACEMENT  01/20/2019    THORACENTESIS         Medications:      docusate sodium  100 mg Oral Daily    albumin human  25 g Intravenous Once    midodrine  5 mg file     Emotionally abused: Not on file     Physically abused: Not on file     Forced sexual activity: Not on file   Other Topics Concern    Not on file   Social History Narrative    Not on file       Family History:   No family history on file. Allergies:   Quinolones     Review of Systems:     Review of Systems   Constitutional: Positive for activity change. Negative for appetite change, chills, fatigue and fever. HENT: Negative for congestion and ear discharge. Eyes: Negative for photophobia, discharge and redness. Respiratory: Negative for apnea, cough, choking, chest tightness, shortness of breath and stridor. Cardiovascular: Negative for chest pain. Gastrointestinal: Negative for abdominal distention. Endocrine: Negative for heat intolerance and polyphagia. Genitourinary: Negative for dysuria, flank pain, frequency and hematuria. Musculoskeletal: Negative for arthralgias. Skin: Negative for color change. Allergic/Immunologic: Negative for food allergies. Neurological: Negative for dizziness, numbness and headaches. Hematological: Negative for adenopathy. Psychiatric/Behavioral: Negative for agitation. Physical Examination :     Patient Vitals for the past 8 hrs:   BP Temp Temp src Pulse Resp SpO2   05/14/19 1709 (!) 148/56 -- -- 75 15 99 %   05/14/19 1230 (!) 125/49 97.8 °F (36.6 °C) Oral 70 16 99 %       Physical Exam   Constitutional: He is oriented to person, place, and time. He appears well-developed and well-nourished. No distress. HENT:   Head: Normocephalic and atraumatic. Mouth/Throat: No oropharyngeal exudate. Eyes: Pupils are equal, round, and reactive to light. EOM are normal. No scleral icterus. Neck: Neck supple. No thyromegaly present. Cardiovascular: Normal rate, regular rhythm and normal heart sounds. Exam reveals no gallop. No murmur heard. Pulmonary/Chest: Effort normal. He has no wheezes. He has no rales. He exhibits no tenderness. Abdominal: Soft. Bowel sounds are normal. He exhibits no distension. There is no tenderness. There is no guarding. Genitourinary:   Genitourinary Comments: Makes little urine   Musculoskeletal: He exhibits no edema, tenderness or deformity. Neurological: He is alert and oriented to person, place, and time. No cranial nerve deficit. Coordination normal.   Skin: No rash noted. He is not diaphoretic. No erythema. Psychiatric: He has a normal mood and affect. His behavior is normal.         Medical Decision Making:   I have independently reviewed/ordered the following labs:    CBC with Differential:   Recent Labs     05/13/19  0800 05/14/19  0415   WBC 19.2* 11.5*   HGB 9.4* 8.4*   HCT 30.7* 27.9*    124*   LYMPHOPCT  --  9*   MONOPCT  --  7     BMP:  Recent Labs     05/13/19  0800      K 4.9   CL 99   CO2 20   *   CREATININE 4.24*     Hepatic Function Panel: No results for input(s): PROT, LABALBU, BILIDIR, IBILI, BILITOT, ALKPHOS, ALT, AST in the last 72 hours. No results for input(s): RPR in the last 72 hours. No results for input(s): HIV in the last 72 hours. No results for input(s): BC in the last 72 hours. Lab Results   Component Value Date    CREATININE 4.24 05/13/2019    GLUCOSE 112 05/13/2019       Detailed results: Thank you for allowing us to participate in the care of this patient. Please call with questions. This note is created with the assistance of a speech recognition program.  While intending to generate adocument that actually reflects the content of the visit, the document can still have some errors including those of syntax and sound a like substitutions which may escape proof reading. It such instances, actual meaningcan be extrapolated by contextual diversion.     Nino Lentz MD  Office: [phone]  Perfect serve / office [phone]

## 2019-05-14 NOTE — PROGRESS NOTES
Nutrition Assessment    Type and Reason for Visit: Reassess    Nutrition Recommendations: Continue general diet w/ 1500 mL FR. Continue Nepro supplements TID. Nutrition Assessment: Pt improving from a nutritional standpoint. Pt stated that his appetite was poor during the start of his admission but is getting much better. Pt states that he eats 2 meals/d at home and is doing that during his hospital stay. Pt states that he consumes avg 50% of his meals, but also snacks throughout the day and drinks 1-2 Nepro supplements. Pt remains on HD. Staff to continue to encourage adequate po/fluid intake. Will continue supplements. Malnutrition Assessment:  · Malnutrition Status: Meets the criteria for severe malnutrition  · Context: Chronic illness  · Findings of the 6 clinical characteristics of malnutrition (Minimum of 2 out of 6 clinical characteristics is required to make the diagnosis of moderate or severe Protein Calorie Malnutrition based on AND/ASPEN Guidelines):  1. Energy Intake-Less than or equal to 75% of estimated energy requirement, Greater than or equal to 1 month    2. Weight Loss-10% loss or greater, (in 2 mo)  3. Fat Loss-Moderate subcutaneous fat loss, Orbital, Triceps  4. Muscle Loss-Moderate muscle mass loss, Clavicles (pectoralis and deltoids), Temples (temporalis muscle)  5. Fluid Accumulation-Mild fluid accumulation, Extremities  6.   Strength-Not measured    Nutrition Risk Level: High    Nutrient Needs:  · Estimated Daily Total Kcal: 30-35 kcal/kg ~>2855-9117 kcals/d   · Estimated Daily Protein (g): 1.3-1.5 gm/kg ~>77-89 gms/d     Nutrition Diagnosis:   · Problem: Severe malnutrition, In context of chronic illness  · Etiology: related to Insufficient energy/nutrient consumption, Catabolic illness     Signs and symptoms:  as evidenced by Moderate loss of subcutaneous fat, Moderate muscle loss, Localized or generalized fluid accumulation, Weight loss greater than or equal to 5% in 1 month, Diet history of poor intake    Objective Information:  · Wound Type: Multiple, Open Wounds(traumatic wouds)  · Current Nutrition Therapies:  · Oral Diet Orders: General, Fluid Restriction   · Oral Diet intake: 26-50%, 51-75%, %  · Oral Nutrition Supplement (ONS) Orders: Renal Oral Supplement  · ONS intake: %  · Anthropometric Measures:  · Ht: 5' 6\" (167.6 cm)   · Current Body Wt: 127 lb (57.6 kg)  · Admission Body Wt: 129 lb (58.5 kg)  · Usual Body Wt: 165 lb (74.8 kg)(per last RD note)  · % Weight Change:  ,  19.9% wt loss x 2 mo per EMR   · Ideal Body Wt: 141 lb (64 kg), % Ideal Body 91% (adm/ideal)  · BMI Classification: BMI 18.5 - 24.9 Normal Weight    Nutrition Interventions:   Continue current diet, Continue current ONS  Continued Inpatient Monitoring, Education not appropriate at this time    Nutrition Evaluation:   · Evaluation: Progressing toward goals   · Goals: Pt to consume >75% of meals/supplements     · Monitoring: Meal Intake, Nutrition Progression, Supplement Intake, Diet Tolerance, Skin Integrity, Wound Healing, I&O, Weight, Pertinent Labs      Electronically signed by Sherman Kim RD, LD on 5/14/19 at 12:04 PM    Contact Number: [phone]

## 2019-05-15 NOTE — PROGRESS NOTES
PULMONARY PROGRESS NOTE      Patient:  Tika Godwin  YOB: 1928  MRN: [id number]     Acct: [de-identified]   Admit date: 5/3/2019    REASON FOR CONSULT:- pleural effusions/ LLL infiltrate    Pt seen and Chart reviewed. Subjective:   No acute events overnight. Patient remains on room air. Seen during hemodialysis  Denies any chest pain or SOB at this time. Review of Systems -   CONSTITUTIONAL:  negative for fevers and chills  RESPIRATORY:  Denies any worsening of shortness of breath, occasional dry cough  CARDIOVASCULAR: no chest pain  GASTROINTESTINAL:  No abdominal pain  NEUROLOGICAL:  Negative for any reported changes    Physical Exam:  Vitals: BP (!) 143/50   Pulse 80   Temp 97.6 °F (36.4 °C)   Resp 16   Ht 5' 6\" (1.676 m)   Wt 128 lb 1.4 oz (58.1 kg)   SpO2 99%   BMI 20.67 kg/m²   24 hour intake/output:    Intake/Output Summary (Last 24 hours) at 5/15/2019 1431  Last data filed at 5/15/2019 1220  Gross per 24 hour   Intake 10 ml   Output 2020 ml   Net -2010 ml     Last 3 weights: Wt Readings from Last 3 Encounters:   05/15/19 128 lb 1.4 oz (58.1 kg)   04/22/19 130 lb 11.7 oz (59.3 kg)     General appearance: arousable and cooperative with exam  Physical Examination:   General appearance - awake, alert, in no distress  Chest - clear to auscultation, no wheezes, symmetric air entry. Pleural drains present bilaterally.    Heart - normal rate and regular rhythm, S1 and S2 normal  Abdomen - soft, non-tender, does not appear distended  Extremities - no pedal edema noted  Skin - normal coloration and turgor, no rashes noted    Diet:  Dietary Nutrition Supplements:  DIET GENERAL; Daily Fluid Restriction: 1500 ml; No Caffeine    Medications:Current Inpatient    Scheduled Meds:   albumin human  25 g Intravenous Once    midodrine  5 mg Oral Once in dialysis    docusate sodium  100 mg Oral Daily    albumin human  25 g Intravenous Once    midodrine  5 mg Oral Once    predniSONE  50 mg Oral Daily    sodium chloride  250 mL Intravenous Once    sodium chloride  250 mL Intravenous Once    sodium chloride  250 mL Intravenous Once    benzonatate  100 mg Oral TID    guaiFENesin  600 mg Oral BID    torsemide  40 mg Oral Daily    [Held by provider] isosorbide mononitrate  30 mg Oral Daily    [Held by provider] carvedilol  6.25 mg Oral BID WC    sodium chloride  250 mL Intravenous Once    sodium chloride flush  10 mL Intravenous 2 times per day    aspirin  81 mg Oral Daily    clopidogrel  75 mg Oral Daily    darbepoetin marya-polysorbate  100 mcg Intravenous Weekly    levothyroxine  125 mcg Oral Daily    QUEtiapine  25 mg Oral Nightly     Continuous Infusions:   sodium chloride       PRN Meds:sodium chloride, acetaminophen, hydrALAZINE, acetaminophen, sodium chloride, sodium chloride, sodium chloride, sodium chloride, albumin human, sodium chloride flush, ondansetron, senna, heparin (porcine), heparin (porcine), midodrine, hydrALAZINE    Objective:    CBC:   Recent Labs     05/13/19  0800 05/14/19  0415 05/15/19  0854   WBC 19.2* 11.5* 13.7*   HGB 9.4* 8.4* 8.0*    124* 145     BMP:    Recent Labs     05/13/19  0800 05/15/19  0854    137   K 4.9 3.5*   CL 99 100   CO2 20 23   * 75*   CREATININE 4.24* 2.89*   GLUCOSE 112* 161*     Calcium:  Recent Labs     05/15/19  0854   CALCIUM 8.0*     Ionized Calcium:No results for input(s): IONCA in the last 72 hours. Magnesium:No results for input(s): MG in the last 72 hours. Phosphorus:No results for input(s): PHOS in the last 72 hours. BNP:No results for input(s): BNP in the last 72 hours. Glucose:No results for input(s): POCGLU in the last 72 hours. HgbA1C: No results for input(s): LABA1C in the last 72 hours. INR: No results for input(s): INR in the last 72 hours. Hepatic: No results for input(s): ALKPHOS, ALT, AST, PROT, BILITOT, BILIDIR, LABALBU in the last 72 hours.   Amylase and Lipase:No results for input(s): LACTA, pulm edema vs HCAP--improved  CHF with reduced EF  HUYEN on maintenance HD  Paroxysmal A fib  Anemia of chronic disease  Hypotension, intermittent    - Continue supplemental oxygen as needed  - BIPAP PRN  - On torsemide  - on prednisone per CTS  - Dialysis per nephro  - Continue incentive spirometry, pulm toilet, aspiration precautions and bronchodilators  - Antibiotics per ID  - PT/OT     Christine Sofia MD  Pulmonary and Critical care medicine  5/15/2019, 2:31 PM

## 2019-05-15 NOTE — PROGRESS NOTES
Renal Progress Note    Patient :  Maximino Salazar; 80 y.o. MRN# [id number]  Location:  1181/4054-51  Attending:  Otis Flood MD  Admit Date:  5/3/2019   Hospital Day: 12    Subjective   Patient was seen and examined on HD. Tolerating the procedure okay. She did have hypotension with dialysis and is been requiring midodrine and albumin. No acute events overnight. Vital signs stable    Getting per [facility] schedule.      Objective     VS: BP (!) 88/40   Pulse 82   Temp 96 °F (35.6 °C)   Resp 16   Ht 5' 6\" (1.676 m)   Wt 131 lb 2.8 oz (59.5 kg)   SpO2 99%   BMI 21.17 kg/m²   MAXIMUM TEMPERATURE OVER 24HRS:  Temp (24hrs), Av.5 °F (35.8 °C), Min:96 °F (35.6 °C), Max:97.8 °F (36.6 °C)    24HR BLOOD PRESSURE RANGE:  Systolic (65CSM), SBR:609 , Min:75 , FUF:348   ; Diastolic (96QKF), ZMC:19, Min:40, Max:84    24HR INTAKE/OUTPUT:      Intake/Output Summary (Last 24 hours) at 5/15/2019 1129  Last data filed at 5/15/2019 7779  Gross per 24 hour   Intake 10 ml   Output 500 ml   Net -490 ml     WEIGHT :  Patient Vitals for the past 96 hrs (Last 3 readings):   Weight   05/15/19 0825 131 lb 2.8 oz (59.5 kg)   19 1250 127 lb 6.8 oz (57.8 kg)   19 0835 129 lb 3 oz (58.6 kg)       Current Medications:     Scheduled Meds:    albumin human  25 g Intravenous Once    midodrine  5 mg Oral Once in dialysis    docusate sodium  100 mg Oral Daily    albumin human  25 g Intravenous Once    midodrine  5 mg Oral Once    predniSONE  50 mg Oral Daily    sodium chloride  250 mL Intravenous Once    sodium chloride  250 mL Intravenous Once    sodium chloride  250 mL Intravenous Once    benzonatate  100 mg Oral TID    guaiFENesin  600 mg Oral BID    torsemide  40 mg Oral Daily    [Held by provider] isosorbide mononitrate  30 mg Oral Daily    [Held by provider] carvedilol  6.25 mg Oral BID WC    sodium chloride  250 mL Intravenous Once    sodium chloride flush  10 mL Intravenous 2 times per day    aspirin  81 mg Oral Daily    clopidogrel  75 mg Oral Daily    darbepoetin marya-polysorbate  100 mcg Intravenous Weekly    levothyroxine  125 mcg Oral Daily    QUEtiapine  25 mg Oral Nightly     Continuous Infusions:    sodium chloride         Physical Examination:     General:  AAO x 3, speaking in full sentences, no accessory muscle use. Chest:   Diminished bilateral vesicular breath sounds. + minor crackles, rales. No wheezes. Cardiac:  S1 S2 RR, no murmurs, gallops or rubs. Abdomen: Soft, non-tender, non distended, BS audible. SKIN:  No rashes, good skin turgor. Extremities:  No edema, no clubbing, No cyanosis. + leg bruises, not new  Neuro:  AAO x 3, No FND. Labs:       Recent Labs     05/13/19  0800 05/14/19  0415 05/15/19  0854   WBC 19.2* 11.5* 13.7*   RBC 3.29* 2.91* 2.79*   HGB 9.4* 8.4* 8.0*   HCT 30.7* 27.9* 26.3*   MCV 93.3 95.9 94.3   MCH 28.6 28.9 28.7   MCHC 30.6 30.1 30.4   RDW 18.5* 18.5* 18.6*    124* 145   MPV 12.4 12.5 12.1      BMP:   Recent Labs     05/13/19  0800 05/15/19  0854    137   K 4.9 3.5*   CL 99 100   CO2 20 23   * 75*   CREATININE 4.24* 2.89*   GLUCOSE 112* 161*   CALCIUM 8.8 8.0*        Urinalysis/Chemistries:      Lab Results   Component Value Date    NITRU NEGATIVE 03/29/2019    COLORU YELLOW 03/29/2019    PHUR 7.5 03/29/2019    WBCUA 50  03/29/2019    RBCUA 50  03/29/2019    MUCUS NOT REPORTED 03/29/2019    TRICHOMONAS NOT REPORTED 03/29/2019    YEAST NOT REPORTED 03/29/2019    BACTERIA NOT REPORTED 03/29/2019    SPECGRAV 1.011 03/29/2019    LEUKOCYTESUR LARGE 03/29/2019    UROBILINOGEN Normal 03/29/2019    BILIRUBINUR NEGATIVE 03/29/2019    GLUCOSEU NEGATIVE 03/29/2019    KETUA NEGATIVE 03/29/2019    AMORPHOUS NOT REPORTED 03/29/2019       Radiology:     Reviewed:     Assessment:     1. ESRD on Hemodialysis. MWF at [facility] under Dr Alejandra Ahuja. 2. Anemia of chronic disease  3. Secondary hyperparathyroidism  4. Hypertension  5.  Acute on chronic CHF with recurrent b/l pleural effusions. S/p thoracentesis and Aspira catheter placements. Pleural fluid negative for malignancy  6. CAD s/p PCI  7. Severe AS s/p TAVR    Plan:   1. Patient was seen on HD at bedside. Orders were confirmed with the HD nurse. 2. Strict Input and Output, Daily weigh and document in the chart. 3. Low Potassium, Low phosphorus and low salt diet. Fluids to be restricted to 1500ml/day. 4. IV Aranesp/Epogen for anemia of chronic disease with HD weekly. 5. IV Zemplar per protocol for secondary hyperparathyroidism with HD thrice a week. 10. [address] Alicia Vazquez for discharge from a Nephrological perspective. Nutrition   Renal Diet/TF      Shaheed Stapleton MD   Nephrology [address] [place] [address] Drive    This note is created with the assistance of a speech-recognition program. While intending to generate a document that actually reflects the content of the visit, no guarantees can be provided that every mistake has been identified and corrected by editing.

## 2019-05-15 NOTE — PLAN OF CARE
Pt received in bed, AAOx3 with respirations even and unlabored. Pt denied pain or shortness of breath. Out of bed with assistance per OT and staff, bath given. Hemodialysis done today. No acute distress noted. Continue POC.

## 2019-05-15 NOTE — PROGRESS NOTES
[facility] -   Infectious diseases evaluation  admission date 5/3/2019      reason for consultation:   Abnormal CXR - TAVR     Impression :   Current:  · CHF  · pleural effusions, many thoracentesis in past-  · bilat lower lobe consolidations, Possible bilat LL aspiration pneumonia  · Leukocytosis       Other:  · Complete heart block, post PPM  · CAD, multi vessel, post high risk PCI  · Severe AS, post TAVR  · ARF on CRF- needing HD  Discussion / summary of stay / plan of care   ·    Recommendations   ·   · Keep off antibiotics  - no signs of active infection  - discharge planning -  · Disc w Dr Norma Peraza  ·      Infection Control Recommendations   · [place] Precautions  · Contact Isolation         Antimicrobial Stewardship Recommendations   · Simplification of therapy  · Targeted therapy  · Per Kg dosing        Coordination ofOutpatient Care:   · Estimated Length of IV antimicrobials: With hemodialysis until 5/13/19  · Patient will need Midline / picc Catheter no Insertion:   · Patient will need SNF:TBD  · Patient will need outpatient wound care:      History of Present Illness:   Initial history:  Shanel Ro is a 80y.o.-year-old male admitted w SOB, and HTN, hx of a fall at the [facility]. No fever and no chills- CXR pleural effusions and pulm congestion on CXR, hypoxia. He had a R thoracentesis on 5/5/19 and fluid was serous. Afterwards, the CXR showed improved R effusion but right lower lobe infiltrate, same left lower love infiltrate - we are called for possible pneumonia in the setting of recent TAVR.   No fever or chills -     Story of dysphagia recently and feeling food is getting stuck.     Pt has ARF on CRF and has been on HD MWF, lives in [place],, many thoracentesis in past, has a pace maker for complete block in past.  Severe AS and post TAVR,  PTCA for multiple vessel CAD.     Interval changes  05/15/19   No fever - in HD tolerating - no SOB and feels great - no iv site infection - on prednisone 50 mg daily since 5/9, explaining the leukocytosis but not the fluctuation - currently the WBC is 13 - blood cx neg   Last CXR shows no pneumonia but mild atelectasis   Off antibiotics       Summary of relevant labs:  Labs:  WBC 10-10 -7.4 -19 -11 -13 - 14  Micro:  Blood cultures ×25/7, pending  Sputum culture 5/6 pending  Blood culture 5/6 pending  Imaging:  CXR bilat LL infiltrates, w bilat moderate effusions, trace right apical pneumothorax. CXR 5/6/19 stable infiltrates -      I have personally reviewed the past medical history, past surgical history, medications, social history, and family history, and I haveupdated the database accordingly.   Past Medical History:     Past Medical History:   Diagnosis Date    Aortic stenosis 02/15/2019    Atrial fibrillation (HCC)     CAD (coronary artery disease)     Chest pain 02/15/2019    [facility]) [facility]     Chronic anemia     Chronic kidney disease     Hypertension     Pleural effusion on left 02/15/2019    Pleural effusion, right 02/15/2019    Pulmonary emboli ([address] 02/15/2019    Pulmonary hypertension ([address] 03/06/2019    Thyroid disease     hypothyroidism       Past Surgical  History:     Past Surgical History:   Procedure Laterality Date    CORONARY ANGIOPLASTY WITH STENT PLACEMENT  03/11/2019    complex PCI of LT MAIN, LAD    JOINT REPLACEMENT      right ankle    OTHER SURGICAL HISTORY  04/02/2019    UPGRADE TO BI-V PACEMAKER    OTHER SURGICAL HISTORY  03/15/2019    TAVR PROCEDURE    PACEMAKER PLACEMENT  01/20/2019    THORACENTESIS         Medications:      albumin human  25 g Intravenous Once    midodrine  5 mg Oral Once in dialysis    docusate sodium  100 mg Oral Daily    albumin human  25 g Intravenous Once    midodrine  5 mg Oral Once    predniSONE  50 mg Oral Daily    sodium chloride  250 mL Intravenous Once    sodium chloride  250 mL Intravenous Once    sodium chloride  250 mL Intravenous Once    benzonatate  100 mg Oral TID    guaiFENesin  600 mg Oral BID    torsemide  40 mg Oral Daily    [Held by provider] isosorbide mononitrate  30 mg Oral Daily    [Held by provider] carvedilol  6.25 mg Oral BID WC    sodium chloride  250 mL Intravenous Once    sodium chloride flush  10 mL Intravenous 2 times per day    aspirin  81 mg Oral Daily    clopidogrel  75 mg Oral Daily    darbepoetin marya-polysorbate  100 mcg Intravenous Weekly    levothyroxine  125 mcg Oral Daily    QUEtiapine  25 mg Oral Nightly       Social History:     Social History     Socioeconomic History    Marital status:       Spouse name: Not on file    Number of children: Not on file    Years of education: Not on file    Highest education level: Not on file   Occupational History     Employer: RETIRED   Social Needs    Financial resource strain: Not on file    Food insecurity:     Worry: Not on file     Inability: Not on file    Transportation needs:     Medical: Not on file     Non-medical: Not on file   Tobacco Use    Smoking status: Former Smoker     Last attempt to quit:      Years since quittin.3    Smokeless tobacco: Never Used   Substance and Sexual Activity    Alcohol use: Not Currently    Drug use: Never    Sexual activity: Not on file   Lifestyle    Physical activity:     Days per week: Not on file     Minutes per session: Not on file    Stress: Not on file   Relationships    Social connections:     Talks on phone: Not on file     Gets together: Not on file     Attends [employer] service: Not on file     Active member of club or organization: Not on file     Attends meetings of clubs or organizations: Not on file     Relationship status: Not on file    Intimate partner violence:     Fear of current or ex partner: Not on file     Emotionally abused: Not on file     Physically abused: Not on file     Forced sexual activity: Not on file   Other Topics Concern    Not on file Social History Narrative    Not on file       Family History:   No family history on file. Allergies:   Quinolones     Review of Systems:     Review of Systems   Constitutional: Positive for activity change. Negative for appetite change, chills, fatigue and fever. HENT: Positive for hearing loss. Negative for congestion, ear discharge and mouth sores. Eyes: Negative for photophobia, discharge and redness. Respiratory: Negative for apnea, cough, choking, chest tightness, shortness of breath and stridor. Cardiovascular: Negative for chest pain. Gastrointestinal: Negative for abdominal distention. Endocrine: Negative for heat intolerance and polyphagia. Genitourinary: Negative for dysuria, flank pain, frequency, hematuria and scrotal swelling. Musculoskeletal: Negative for arthralgias. Skin: Negative for color change. Allergic/Immunologic: Negative for food allergies. Neurological: Negative for dizziness, numbness and headaches. Hematological: Negative for adenopathy. Psychiatric/Behavioral: Negative for agitation. Physical Examination :     Patient Vitals for the past 8 hrs:   BP Temp Temp src Pulse Resp Weight   05/15/19 1032 (!) 92/44 96 °F (35.6 °C) -- 81 -- --   05/15/19 1030 (!) 79/41 96 °F (35.6 °C) -- 85 -- --   05/15/19 1000 (!) 89/44 96 °F (35.6 °C) -- 70 -- --   05/15/19 0930 (!) 80/42 96 °F (35.6 °C) -- 89 -- --   05/15/19 0900 (!) 75/41 96 °F (35.6 °C) -- 91 -- --   05/15/19 0830 (!) 142/57 96 °F (35.6 °C) -- 80 -- --   05/15/19 0825 (!) 145/61 96 °F (35.6 °C) -- 78 16 131 lb 2.8 oz (59.5 kg)   05/15/19 0615 (!) 154/84 97.3 °F (36.3 °C) Oral 77 14 --   05/15/19 0400 -- 97 °F (36.1 °C) Oral 69 20 --       Physical Exam   Constitutional: He is oriented to person, place, and time. He appears well-developed and well-nourished. No distress. HENT:   Head: Normocephalic and atraumatic. Mouth/Throat: No oropharyngeal exudate.    Eyes: Pupils are equal, round, and reactive to light. EOM are normal. Right eye exhibits no discharge. Left eye exhibits no discharge. No scleral icterus. Neck: Neck supple. No thyromegaly present. Cardiovascular: Normal rate, regular rhythm and normal heart sounds. Exam reveals no gallop and no friction rub. No murmur heard. Pulmonary/Chest: Effort normal. No stridor. He has no wheezes. He has no rales. He exhibits no tenderness. Abdominal: Soft. Bowel sounds are normal. He exhibits no distension. There is no tenderness. There is no guarding. Genitourinary:   Genitourinary Comments: Makes little urine   Musculoskeletal: He exhibits no edema, tenderness or deformity. Neurological: He is alert and oriented to person, place, and time. No cranial nerve deficit. Coordination normal.   Skin: No rash noted. He is not diaphoretic. No erythema. Psychiatric: He has a normal mood and affect. His behavior is normal.         Medical Decision Making:   I have independently reviewed/ordered the following labs:    CBC with Differential:   Recent Labs     05/14/19  0415 05/15/19  0854   WBC 11.5* 13.7*   HGB 8.4* 8.0*   HCT 27.9* 26.3*   * 145   LYMPHOPCT 9*  --    MONOPCT 7  --      BMP:  Recent Labs     05/13/19  0800 05/15/19  0854    137   K 4.9 3.5*   CL 99 100   CO2 20 23   * 75*   CREATININE 4.24* 2.89*     Hepatic Function Panel: No results for input(s): PROT, LABALBU, BILIDIR, IBILI, BILITOT, ALKPHOS, ALT, AST in the last 72 hours. No results for input(s): RPR in the last 72 hours. No results for input(s): HIV in the last 72 hours. No results for input(s): BC in the last 72 hours. Lab Results   Component Value Date    CREATININE 2.89 05/15/2019    GLUCOSE 161 05/15/2019       Detailed results: Thank you for allowing us to participate in the care of this patient. Please call with questions.     This note is created with the assistance of a speech recognition program.  While intending to generate adocument that actually reflects the content of the visit, the document can still have some errors including those of syntax and sound a like substitutions which may escape proof reading. It such instances, actual meaningcan be extrapolated by contextual diversion.     Dearl MD Edgardo  Office: [phone]  Perfect serve / office [phone]

## 2019-05-15 NOTE — PROGRESS NOTES
[facility]  Pre Op Progress Note    CC: shortness of breath      Subjective:  Mr. Chelle Bee seen and examined Plan of care reviewed and questions answered. Physical Exam  Vital Signs: BP (!) 142/57   Pulse 80   Temp 96 °F (35.6 °C)   Resp 16   Ht 5' 6\" (1.676 m)   Wt 131 lb 2.8 oz (59.5 kg)   SpO2 99%   BMI 21.17 kg/m²  O2 Flow Rate (L/min): 1 L/min       General: alert and oriented to person, place and time. Up in chair, No apparent distress. Heart:Normal S1 and S2.  Regular rhythm. No murmurs, gallops, or rubs.    Lungs: clear to auscultation bilaterally and diminished breath sounds bibasilar  Abdomen: soft, non tender, non distended, BSx4  Extremities: negative      Scheduled Meds:    docusate sodium  100 mg Oral Daily    albumin human  25 g Intravenous Once    midodrine  5 mg Oral Once    predniSONE  50 mg Oral Daily    sodium chloride  250 mL Intravenous Once    sodium chloride  250 mL Intravenous Once    sodium chloride  250 mL Intravenous Once    benzonatate  100 mg Oral TID    guaiFENesin  600 mg Oral BID    torsemide  40 mg Oral Daily    [Held by provider] isosorbide mononitrate  30 mg Oral Daily    [Held by provider] carvedilol  6.25 mg Oral BID WC    sodium chloride  250 mL Intravenous Once    sodium chloride flush  10 mL Intravenous 2 times per day    aspirin  81 mg Oral Daily    clopidogrel  75 mg Oral Daily    darbepoetin marya-polysorbate  100 mcg Intravenous Weekly    levothyroxine  125 mcg Oral Daily    QUEtiapine  25 mg Oral Nightly     Continuous Infusions:    sodium chloride         Data:  CBC:   Recent Labs     05/13/19  0800 05/14/19  0415 05/15/19  0854   WBC 19.2* 11.5* 13.7*   HGB 9.4* 8.4* 8.0*   HCT 30.7* 27.9* 26.3*   MCV 93.3 95.9 94.3    124* 145     BMP:   Recent Labs     05/13/19  0800      K 4.9   CL 99   CO2 20   *   CREATININE 4.24*     PT/INR: No results for input(s): PROTIME, INR in the last 72 hours.  APTT: No results for input(s): APTT in the last 72 hours. 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 Awaiting dialysis today. 2. Plan for discharge back to [facility] TER[facility] tomorrow      The above recommendations including medications and orders were discussed and agreed upon with Dr. Cristal Fleming, the attending on service for the cardiothoracic surgery group today.      Roseanna Gardiner, APRN, CNP

## 2019-05-15 NOTE — PROGRESS NOTES
Pt AAOx3 with respirations even and unlabored. Pt denies pain or shortness of breath. Assisted out of bed to chair. Assisted to bathroom without difficulty. No acute distress noted.

## 2019-05-15 NOTE — PLAN OF CARE
Pt AAOx3 with respirations even and unlabored. Pt denies pain. No acute distress noted. Continue POC.

## 2019-05-16 NOTE — PROGRESS NOTES
[facility] Wound Ostomy Continence Nurse  Follow up      NAME:  Tika Godwin  MEDICAL RECORD NUMBER:  [id number]  AGE: 80 y.o. GENDER: male  : 12/10/1928  TODAY'S DATE:  2019    Subjective:     Reason for WOCN Evaluation and Assessment: right elbow and left brow traumatic injuries      Tika Godwin is a 80 y.o. male referred by:   [x] Physician  [] Nursing  [] Other:             PAST MEDICAL HISTORY        Diagnosis Date    Aortic stenosis 02/15/2019    Atrial fibrillation ([address]     CAD (coronary artery disease)     Chest pain 02/15/2019    CHF (congestive heart failure) (HCC)     Chronic anemia     Chronic kidney disease     Hypertension     Pleural effusion on left 02/15/2019    Pleural effusion, right 02/15/2019    Pulmonary emboli ([address] 02/15/2019    Pulmonary hypertension ([address] 2019    Thyroid disease     hypothyroidism       PAST SURGICAL HISTORY    Past Surgical History:   Procedure Laterality Date    CORONARY ANGIOPLASTY WITH STENT PLACEMENT  2019    complex PCI of LT MAIN, LAD    JOINT REPLACEMENT      right ankle    OTHER SURGICAL HISTORY  2019    UPGRADE TO BI-V PACEMAKER    OTHER SURGICAL HISTORY  03/15/2019    TAVR PROCEDURE    PACEMAKER PLACEMENT  2019    THORACENTESIS         FAMILY HISTORY    No family history on file.     SOCIAL HISTORY    Social History     Tobacco Use    Smoking status: Former Smoker     Last attempt to quit: 1989     Years since quittin.3    Smokeless tobacco: Never Used   Substance Use Topics    Alcohol use: Not Currently    Drug use: Never       ALLERGIES    Allergies   Allergen Reactions    Quinolones      Pt is allergic to fluoroquinolones           Objective:      BP (!) 147/69   Pulse 93   Temp 98 °F (36.7 °C) (Oral)   Resp 18   Ht 5' 6\" (1.676 m)   Wt 128 lb 1.4 oz (58.1 kg)   SpO2 99%   BMI 20.67 kg/m²   Mika Risk Score: Mika Scale Score: 15    LABS    CBC:   Lab Results   Component Value Date calves    Foam dressing to the right elbow. Change every 72 hours until wounds are healed. Left brow GHANSHYAM    Limit chair time to 2 hour intervals. Assist him to reposition while in the chair every hour.      Specialty Bed Required : Yes   [] Low Air Loss   [x] Pressure Redistribution  [] Fluid Immersion  [] Bariatric  [] Total Pressure Relief  [] Other:         Patient/Caregiver Teaching:    [] Indicates understanding       [] Needs reinforcement  [] Unsuccessful      [x] Verbal Understanding  [] Demonstrated understanding       [] No evidence of learning  [] Refused teaching         [] N/A       Electronically signed by Mel Osgood, RN, CWON on 5/16/2019 at 11:14 AM

## 2019-05-16 NOTE — PROGRESS NOTES
Renal Progress Note    Patient :  Yareli Chávez; 80 y.o. MRN# [id number]  Location:  [facility]/8654-88  Attending:  Mauricio Bernard MD  Admit Date:  5/3/2019   Hospital Day: 15    Subjective   Patient was seen sitting up in chair today. No complaints. Optimal oral intake. No acute events overnight. Vital signs stable  Patient was dialyzed yesterday and tolerated fairly well.     Objective     VS: BP (!) 147/69   Pulse 93   Temp 98 °F (36.7 °C) (Oral)   Resp 18   Ht 5' 6\" (1.676 m)   Wt 128 lb 1.4 oz (58.1 kg)   SpO2 99%   BMI 20.67 kg/m²   MAXIMUM TEMPERATURE OVER 24HRS:  Temp (24hrs), Av.9 °F (36.1 °C), Min:96 °F (35.6 °C), Max:98.7 °F (37.1 °C)    24HR BLOOD PRESSURE RANGE:  Systolic (34CDG), ZXW:646 , Min:79 , XGL:051   ; Diastolic (14UXB), GNK:15, Min:40, Max:69    24HR INTAKE/OUTPUT:      Intake/Output Summary (Last 24 hours) at 2019 1000  Last data filed at 2019 9789  Gross per 24 hour   Intake 10 ml   Output 1920 ml   Net -1910 ml     WEIGHT :  Patient Vitals for the past 96 hrs (Last 3 readings):   Weight   05/15/19 1220 128 lb 1.4 oz (58.1 kg)   05/15/19 0825 131 lb 2.8 oz (59.5 kg)   19 1250 127 lb 6.8 oz (57.8 kg)       Current Medications:     Scheduled Meds:    albumin human  25 g Intravenous Once    docusate sodium  100 mg Oral Daily    albumin human  25 g Intravenous Once    midodrine  5 mg Oral Once    predniSONE  50 mg Oral Daily    sodium chloride  250 mL Intravenous Once    sodium chloride  250 mL Intravenous Once    sodium chloride  250 mL Intravenous Once    benzonatate  100 mg Oral TID    guaiFENesin  600 mg Oral BID    torsemide  40 mg Oral Daily    [Held by provider] isosorbide mononitrate  30 mg Oral Daily    [Held by provider] carvedilol  6.25 mg Oral BID WC    sodium chloride  250 mL Intravenous Once    sodium chloride flush  10 mL Intravenous 2 times per day    aspirin  81 mg Oral Daily    clopidogrel  75 mg Oral Daily    darbepoetin marya-polysorbate  100 mcg Intravenous Weekly    levothyroxine  125 mcg Oral Daily    QUEtiapine  25 mg Oral Nightly     Continuous Infusions:    sodium chloride         Physical Examination:     General:  AAO x 3, speaking in full sentences, no accessory muscle use. Chest:   Diminished bilateral vesicular breath sounds. No wheezes. Cardiac:  S1 S2 RR, no murmurs, gallops or rubs. Abdomen: Soft, non-tender, non distended, BS audible. SKIN:  No rashes, good skin turgor. Extremities:  No edema, no clubbing, No cyanosis. + leg bruises, not new  Neuro:  AAO x 3, No FND. Labs:       Recent Labs     05/14/19  0415 05/15/19  0854 05/16/19  0836   WBC 11.5* 13.7* 14.1*   RBC 2.91* 2.79* 3.02*   HGB 8.4* 8.0* 8.7*   HCT 27.9* 26.3* 27.7*   MCV 95.9 94.3 91.7   MCH 28.9 28.7 28.8   MCHC 30.1 30.4 31.4   RDW 18.5* 18.6* 18.8*   * 145 82*   MPV 12.5 12.1 12.3      BMP:   Recent Labs     05/15/19  0854 05/16/19  0836    136   K 3.5* 4.6    99   CO2 23 23   BUN 75* 49*   CREATININE 2.89* 2.42*   GLUCOSE 161* 99   CALCIUM 8.0* 7.9*        Urinalysis/Chemistries:      Lab Results   Component Value Date    NITRU NEGATIVE 03/29/2019    COLORU YELLOW 03/29/2019    PHUR 7.5 03/29/2019    WBCUA 50  03/29/2019    RBCUA 50  03/29/2019    MUCUS NOT REPORTED 03/29/2019    TRICHOMONAS NOT REPORTED 03/29/2019    YEAST NOT REPORTED 03/29/2019    BACTERIA NOT REPORTED 03/29/2019    SPECGRAV 1.011 03/29/2019    LEUKOCYTESUR LARGE 03/29/2019    UROBILINOGEN Normal 03/29/2019    BILIRUBINUR NEGATIVE 03/29/2019    GLUCOSEU NEGATIVE 03/29/2019    KETUA NEGATIVE 03/29/2019    AMORPHOUS NOT REPORTED 03/29/2019       Radiology:     Reviewed:     Assessment:     1. ESRD on Hemodialysis. MWF at [facility] under Dr Karol Winter. 2. Anemia of chronic disease  3. Secondary hyperparathyroidism  4. Hypertension  5. Acute on chronic CHF with recurrent b/l pleural effusions.  S/p thoracentesis and Aspira catheter placements. Pleural fluid negative for malignancy  6. CAD s/p PCI  7. Severe AS s/p TAVR    Plan:   1. Patient was seen on HD at bedside. Orders were confirmed with the HD nurse. 2. Strict Input and Output, Daily weigh and document in the chart. 3. Low Potassium, Low phosphorus and low salt diet. Fluids to be restricted to 1500ml/day. 4. IV Aranesp/Epogen for anemia of chronic disease with HD weekly. 5. IV Zemplar per protocol for secondary hyperparathyroidism with HD thrice a week. 10. [id number] Alicia Vazquez for discharge from a Nephrological perspective. [facility]. Nutrition   Renal Diet/TF       Marion Morrell MD  PGY-1, Internal medicine resident  [address] [place] [address], [place], [place]  5/16/2019 10:01 AM   Attending Physician Statement  I have discussed the care of Amanda Kuo, including pertinent history and exam findings with the resident/fellow. I have reviewed the key elements of all parts of the encounter with the resident/fellow. I have seen and examined the patient with the resident/fellow. I agree with the assessment and plan and status of the problem list as documented. Patient will be receiving dialysis under the care of Dr. Shruti Flores at [facility] [place].   Rodney Acharya

## 2019-05-16 NOTE — DISCHARGE INSTR - OTHER ORDERS
Charge nurse, [address] RN, offered to change dressing to pleural drain on left flank area. Pt refused dressing change.

## 2019-05-16 NOTE — PROGRESS NOTES
[facility]  Pre Op Progress Note    CC: shortness of breath      Subjective:  Mr. Shirley Saleh seen and examined Plan of care reviewed and questions answered. Татьяна Smith for discharge today      Physical Exam  Vital Signs: BP (!) 147/69   Pulse 93   Temp 98 °F (36.7 °C) (Oral)   Resp 18   Ht 5' 6\" (1.676 m)   Wt 128 lb 1.4 oz (58.1 kg)   SpO2 99%   BMI 20.67 kg/m²  O2 Flow Rate (L/min): 1 L/min       General: alert and oriented to person, place and time. Up in chair, No apparent distress.   Heart:Rhythm: paced  Lungs: clear to auscultation bilaterally  Abdomen: soft, non tender, non distended, BSx4  Extremities: negative      Scheduled Meds:    albumin human  25 g Intravenous Once    docusate sodium  100 mg Oral Daily    albumin human  25 g Intravenous Once    midodrine  5 mg Oral Once    predniSONE  50 mg Oral Daily    sodium chloride  250 mL Intravenous Once    sodium chloride  250 mL Intravenous Once    sodium chloride  250 mL Intravenous Once    benzonatate  100 mg Oral TID    guaiFENesin  600 mg Oral BID    torsemide  40 mg Oral Daily    [Held by provider] isosorbide mononitrate  30 mg Oral Daily    [Held by provider] carvedilol  6.25 mg Oral BID WC    sodium chloride  250 mL Intravenous Once    sodium chloride flush  10 mL Intravenous 2 times per day    aspirin  81 mg Oral Daily    clopidogrel  75 mg Oral Daily    darbepoetin marya-polysorbate  100 mcg Intravenous Weekly    levothyroxine  125 mcg Oral Daily    QUEtiapine  25 mg Oral Nightly     Continuous Infusions:    sodium chloride         Data:  CBC:   Recent Labs     05/14/19  0415 05/15/19  0854 05/16/19  0836   WBC 11.5* 13.7* 14.1*   HGB 8.4* 8.0* 8.7*   HCT 27.9* 26.3* 27.7*   MCV 95.9 94.3 91.7   * 145 82*     BMP:   Recent Labs     05/15/19  0854 05/16/19  0836    136   K 3.5* 4.6    99   CO2 23 23   BUN 75* 49*   CREATININE 2.89* 2.42*     PT/INR: No results for input(s): PROTIME, INR in the last 72 hours. APTT: No results for input(s): APTT in the last 72 hours. 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 Awaiting discharge to LONG TERM ACUTE [facility] CARE AT [facility] today  2. Plan for chest xray and labs prior to discharge      The above recommendations including medications and orders were discussed and agreed upon with Dr. Rhianna Haji, the attending on service for the cardiothoracic surgery group today.      Donita Fuentes, APRN, CNP

## 2019-05-16 NOTE — PROGRESS NOTES
[facility]   Progress Note                   Date:   5/16/2019  Patient name: Haris No  Date of admission:  5/3/2019  9:02 AM  MRN:   [id number]  YOB: 1928  PCP: No primary care provider on file. Subjective:        Pt seen and examined in the room. He is sitting in chair. He denies any CP or sob. Pt states that he is going to rehab today. No acute cardiac events overnight. Medications:   Scheduled Meds:   albumin human  25 g Intravenous Once    docusate sodium  100 mg Oral Daily    albumin human  25 g Intravenous Once    midodrine  5 mg Oral Once    predniSONE  50 mg Oral Daily    sodium chloride  250 mL Intravenous Once    sodium chloride  250 mL Intravenous Once    sodium chloride  250 mL Intravenous Once    benzonatate  100 mg Oral TID    guaiFENesin  600 mg Oral BID    torsemide  40 mg Oral Daily    [Held by provider] isosorbide mononitrate  30 mg Oral Daily    [Held by provider] carvedilol  6.25 mg Oral BID WC    sodium chloride  250 mL Intravenous Once    sodium chloride flush  10 mL Intravenous 2 times per day    aspirin  81 mg Oral Daily    clopidogrel  75 mg Oral Daily    darbepoetin marya-polysorbate  100 mcg Intravenous Weekly    levothyroxine  125 mcg Oral Daily    QUEtiapine  25 mg Oral Nightly       Continuous Infusions:   sodium chloride         CBC:   Recent Labs     05/14/19  0415 05/15/19  0854 05/16/19  0836   WBC 11.5* 13.7* 14.1*   HGB 8.4* 8.0* 8.7*   * 145 82*     BMP:    Recent Labs     05/15/19  0854 05/16/19  0836    136   K 3.5* 4.6    99   CO2 23 23   BUN 75* 49*   CREATININE 2.89* 2.42*   GLUCOSE 161* 99     Hepatic: No results for input(s): AST, ALT, ALB, BILITOT, ALKPHOS in the last 72 hours. Troponin: No results for input(s): TROPONINI in the last 72 hours. BNP: No results for input(s): BNP in the last 72 hours.   Lipids: No results for input(s): CHOL, HDL in the last 72 hours.    Invalid input(s): LDLCALCU  INR: No results for input(s): INR in the last 72 hours. Objective:   Vitals: BP (!) 147/69   Pulse 93   Temp 98 °F (36.7 °C) (Oral)   Resp 18   Ht 5' 6\" (1.676 m)   Wt 128 lb 1.4 oz (58.1 kg)   SpO2 99%   BMI 20.67 kg/m²     General appearance: awake, alert, in no apparent respiratory distress   HEENT: Head: Normocephalic, no lesions, without obvious abnormality  Neck: no JVD  Lungs: clear on auscultation. Heart: regular rate and rhythm, S1, S2 normal, no murmur, click, rub or gallop  Abdomen: soft, non-tender; bowel sounds normal  Extremities: No LE edema  Neurologic: Mental status: Alert, oriented. EKG: CRT D in place       ECHO( 5/6/2019)  Limited echocardiogram  Normal left ventricle size with mildly reduced systolic function. Calculated ejection fraction via Dean's Method is 45%  Abnormal septal wall motion due to paced rhythm. Normal right ventricular size and function. No significant pericardial effusion is seen. Normally functioning Bio-prosthetic Aortic valve (TAVR) without significant  stenosis or insufficiency. No significant fredrick-valvular. Mean gradient 4 mm  hg.          Coronary Angiography:   Findings:  Left main: 80-90% very calcified stenosis. This required rotational atherectomy and PTCA -JEAN-CLAUDE 4/0/15 mm stent, redcuing stenosis to 10% with TIMNI III flow. LAD: Proximal to mid area calcified 80% stenosis.  This required Rotational atherectomy / JEAN-CLAUDE in mid and proximal area, reducing stenosis to 0% with AKHIL III flow  LCX: Diffuse disease )(known from cath in [place])  RCA: Minimal 20-30% as reviewed from [address] [address]        Assessment:   1 Acute on chronic CHF with pleural effusion resolving  2 Complete heart block S/P St chay PPM 1/21/19 with RV paced(95%) S/P CRT on 4/2/19 by Dr Wesly Knutson  3   Paroxysmal A fib( [id number]) 4 not on AC  4   Bilateral pleural effusion S/P thoracentesis with catheter in place  5   Severe AS S/P TAVR  6   CAD S/P PCI with rotational atherectomy of left main and LAD  7   HTN  8   ESRD on HD  9   Anemia  10  H/O P.E in past  11   Mild Mitral regurgitation    Treatment Plan:   1. Will recommend continuing with DAPT with plavix. Not on [facility] [place] by [facility] for A fib stroke prophylaxis. Will recommend Eliquis 2.5 mg BID. Can stop asa as it has been more than 1 month and continue with plavix and eliquis  2. Plan for d/c today to rehab. Will follow up as outpt.       Tom BLANCO/NP-C  5/16/2019 10:22 AM

## 2019-05-16 NOTE — PROGRESS NOTES
PULMONARY PROGRESS NOTE      Patient:  Sharita Quinn  YOB: 1928  MRN: [id number]     Acct: [de-identified]   Admit date: 5/3/2019    REASON FOR CONSULT:- B/L pleural effusions     Pt seen and Chart reviewed. Subjective:   No acute events overnight. Patient remains on room air. Denies any chest pain or SOB at this time. Review of Systems -   CONSTITUTIONAL:  negative for fevers and chills  RESPIRATORY:  Denies any worsening of shortness of breath, occasional dry cough  CARDIOVASCULAR: no chest pain  GASTROINTESTINAL:  No abdominal pain  NEUROLOGICAL:  Negative for any reported changes    Physical Exam:  Vitals: BP (!) 139/40   Pulse 93   Temp 98.2 °F (36.8 °C) (Oral)   Resp 18   Ht 5' 6\" (1.676 m)   Wt 128 lb 1.4 oz (58.1 kg)   SpO2 96%   BMI 20.67 kg/m²   24 hour intake/output:    Intake/Output Summary (Last 24 hours) at 5/16/2019 1324  Last data filed at 5/16/2019 0624  Gross per 24 hour   Intake 10 ml   Output 400 ml   Net -390 ml     Last 3 weights: Wt Readings from Last 3 Encounters:   05/15/19 128 lb 1.4 oz (58.1 kg)   04/22/19 130 lb 11.7 oz (59.3 kg)     General appearance: arousable and cooperative with exam  Physical Examination:   General appearance - awake, alert, in no distress  Chest - clear to auscultation, no wheezes, symmetric air entry. Pleural drains present bilaterally.    Heart - normal rate and regular rhythm, S1 and S2 normal  Abdomen - soft, non-tender, does not appear distended  Extremities - no pedal edema noted  Skin - normal coloration and turgor, no rashes noted    Diet:  Dietary Nutrition Supplements:  DIET GENERAL; Daily Fluid Restriction: 1500 ml; No Caffeine    Medications:Current Inpatient    Scheduled Meds:   albumin human  25 g Intravenous Once    docusate sodium  100 mg Oral Daily    albumin human  25 g Intravenous Once    midodrine  5 mg Oral Once    predniSONE  50 mg Oral Daily    sodium chloride  250 mL Intravenous Once    sodium chloride 250 mL Intravenous Once    sodium chloride  250 mL Intravenous Once    benzonatate  100 mg Oral TID    guaiFENesin  600 mg Oral BID    torsemide  40 mg Oral Daily    [Held by provider] isosorbide mononitrate  30 mg Oral Daily    [Held by provider] carvedilol  6.25 mg Oral BID WC    sodium chloride  250 mL Intravenous Once    sodium chloride flush  10 mL Intravenous 2 times per day    aspirin  81 mg Oral Daily    clopidogrel  75 mg Oral Daily    darbepoetin marya-polysorbate  100 mcg Intravenous Weekly    levothyroxine  125 mcg Oral Daily    QUEtiapine  25 mg Oral Nightly     Continuous Infusions:   sodium chloride       PRN Meds:sodium chloride, acetaminophen, hydrALAZINE, acetaminophen, sodium chloride, sodium chloride, sodium chloride, sodium chloride, albumin human, sodium chloride flush, ondansetron, senna, heparin (porcine), heparin (porcine), midodrine, hydrALAZINE    Objective:    CBC:   Recent Labs     05/14/19  0415 05/15/19  0854 05/16/19  0836   WBC 11.5* 13.7* 14.1*   HGB 8.4* 8.0* 8.7*   * 145 82*     BMP:    Recent Labs     05/15/19  0854 05/16/19  0836    136   K 3.5* 4.6    99   CO2 23 23   BUN 75* 49*   CREATININE 2.89* 2.42*   GLUCOSE 161* 99     Calcium:  Recent Labs     05/16/19  0836   CALCIUM 7.9*     Ionized Calcium:No results for input(s): IONCA in the last 72 hours. Magnesium:No results for input(s): MG in the last 72 hours. Phosphorus:No results for input(s): PHOS in the last 72 hours. BNP:No results for input(s): BNP in the last 72 hours. Glucose:No results for input(s): POCGLU in the last 72 hours. HgbA1C: No results for input(s): LABA1C in the last 72 hours. INR: No results for input(s): INR in the last 72 hours. Hepatic: No results for input(s): ALKPHOS, ALT, AST, PROT, BILITOT, BILIDIR, LABALBU in the last 72 hours. Amylase and Lipase:No results for input(s): LACTA, AMYLASE in the last 72 hours.   Lactic Acid: No results for input(s): LACTA in the last 72 hours. CARDIAC ENZYMES:No results for input(s): CKTOTAL, CKMB, CKMBINDEX, TROPONINI in the last 72 hours. BNP: No results for input(s): BNP in the last 72 hours. Lipids: No results for input(s): CHOL, TRIG, HDL, LDLCALC in the last 72 hours. Invalid input(s): LDL  ABGs: No results found for: PH, PCO2, PO2, HCO3, O2SAT  Thyroid:   Lab Results   Component Value Date    TSH 3.04 03/20/2019      Urinalysis: No results for input(s): BACTERIA, BLOODU, CLARITYU, COLORU, PHUR, PROTEINU, RBCUA, SPECGRAV, BILIRUBINUR, NITRU, WBCUA, LEUKOCYTESUR, GLUCOSEU in the last 72 hours. CULTURES:    CXR 5/16/19  Impression   Chronic pulmonary change with bibasilar effusions and bibasilar infiltrates   representing atelectasis versus pneumonia. CT chest WO contrast 5/7/19  Large bilateral pleural effusions with adjacent consolidation representing  atelectasis versus pneumonia. Mildly prominent subcarinal lymph node that may be reactive. Echocardiogram: 5/3/19  Summary  Limited echocardiogram  Normal left ventricle size with mildly reduced systolic function. Calculated ejection fraction via Dean's Method is 45%  Abnormal septal wall motion due to paced rhythm. Normal right ventricular size and function. No significant pericardial effusion is seen. Normally functioning Bio-prosthetic Aortic valve (TAVR) without significant  stenosis or insufficiency. No significant fredrick-valvular. Mean gradient 4 mm  hg.    Assessment and Plan:     Active Problems:    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 (HCC)    Severe malnutrition (HCC)    Congestive heart failure (HCC)    Aspiration pneumonia of both lower lobes (HCC)    Pleural effusion, bilateral    CRP elevated  Resolved Problems:    Bandemia    CAD s/p JEAN-CLAUDE  Recent TAVR  Recurrent bilateral pleural effusions s/p multiple thoracenteses, s/p bilateral pleural drain placement  Bilateral pulm infiltrates, pulm edema vs HCAP--improved  CHF with reduced EF  HUYEN on maintenance HD  Paroxysmal A fib  Anemia of chronic disease  Hypotension, intermittent    - Continue supplemental oxygen as needed  - BIPAP PRN  - On torsemide  - on prednisone per CTS  - Dialysis per nephro  - Continue incentive spirometry, pulm toilet, aspiration precautions and bronchodilators  - Antibiotics per ID  - PT/OT   OK to d/c from pulmonary standpoint    Laureen Brittle, MD  Pulmonary and Critical care medicine  5/16/2019, 1:24 PM

## 2019-05-17 NOTE — DISCHARGE SUMMARY
[facility] Cardiothoracic Surgery  Discharge Summary    Patient's Name/Date of Birth: Jacy Cline / 12/10/1928 (53 y.o.)    Admission Date: 5/3/2019  9:02 AM  Discharge Date: 5/16/2019  2:21 PM  Discharge Physician: Dr. Jr Sood  Discharge Unit: [address] [facility]  Discharge condition: stable  Disposition: [facility]      Reason For Admission: No chief complaint on file. HPI: Jacy Cline is a 80 y.o.  male who who had a previous admission for JEAN-CLAUDE stents, TAVR, BiV pacemaker and dialysis initiation. He was discharged on 4/23/19to [facility] TERM ACUTE [facility] CARE AT [facility]. Procedures completed in hospital: bilateral placement of aspira catheter    Brief Review of Hospital Course:   Admitted 5/3/19 for shortness of breath and hypotension. During the course of his stay he had bilateral aspira catheters placed, hemodialysis, treated for pneumonia, cardiac medications adjusted. He was stable and ready for discharge to [facility] on 5/16/19. Review of Systems   Constitutional: Negative for chills, fatigue and fever. HENT: Negative for congestion, dental problem, mouth sores and trouble swallowing. Eyes: Negative for discharge, redness and visual disturbance. Respiratory: Negative for cough, chest tightness and shortness of breath. Cardiovascular: Negative for chest pain, palpitations and leg swelling. Gastrointestinal: Negative for abdominal pain, constipation, nausea and vomiting. Endocrine: Negative for cold intolerance and heat intolerance. Genitourinary: Negative for difficulty urinating, flank pain and urgency. Musculoskeletal: Negative for gait problem, joint swelling and neck pain. Skin: Negative for rash and wound. Allergic/Immunologic: Negative for environmental allergies and immunocompromised state. Neurological: Negative for dizziness, syncope, weakness, light-headedness, numbness and headaches. Hematological: Does not bruise/bleed easily. Psychiatric/Behavioral: Negative for confusion, hallucinations and sleep disturbance. The patient is not nervous/anxious. Physical Exam:  Vitals:    05/16/19 1144   BP: (!) 139/40   Pulse:    Resp:    Temp: 98.2 °F (36.8 °C)   SpO2: 96%     Weight: Weight: 131 lb 14.4 oz (59.8 kg)    Weight: 134 lb 7.7 oz (61 kg)    I/O last 3 completed shifts: In: 20 [I.V.:20]  Out: 2420 [Urine:900]    General: alert and oriented to person, place and time. Up in chair, No apparent distress. Heart:Rhythm: paced  Lungs: clear to auscultation bilaterally  Abdomen: soft, non tender, non distended, BSx4  Extremities: negative  Wounds: right and left aspira catheter with dressing intact. Right subclavian tunneled dialysis catheter     Past Medical History:   Diagnosis Date    Aortic stenosis 02/15/2019    Atrial fibrillation (HCC)     CAD (coronary artery disease)     Chest pain 02/15/2019    CH[facility]congestive heart failure) ([facility]     Chronic anemia     Chronic kidney disease     Hypertension     Pleural effusion on left 02/15/2019    Pleural effusion, right 02/15/2019    Pulmonary emboli ([address] 02/15/2019    Pulmonary hypertension ([address] 03/06/2019    Thyroid disease     hypothyroidism     Past Surgical History:   Procedure Laterality Date    CORONARY ANGIOPLASTY WITH STENT PLACEMENT  03/11/2019    complex PCI of LT MAIN, LAD    JOINT REPLACEMENT      right ankle    OTHER SURGICAL HISTORY  04/02/2019    UPGRADE TO BI-V PACEMAKER    OTHER SURGICAL HISTORY  03/15/2019    TAVR PROCEDURE    PACEMAKER PLACEMENT  01/20/2019    THORACENTESIS       Allergies   Allergen Reactions    Quinolones      Pt is allergic to fluoroquinolones     No family history on file. Social History     Socioeconomic History    Marital status:       Spouse name: Not on file    Number of children: Not on file    Years of education: Not on file    Highest education level: Not on file   Occupational History     Employer: RETIRED

## 2019-05-29 NOTE — PROGRESS NOTES
[facility]  Office Visit      Subjective:  Mr. Alma Baires is a 80 y.o. male s/p pleural effusions and bilateral Aspira catheter placed in May, 2019 with Dr. Liss Alan at [facility]. he feels well today. Breathing has improved. Dialysis twice a week. Henry Elsi a couple weeks ago, injured right elbow. Xray today is negative for fracture, dislocation, osteomyleitis. They have been packing wound at [facility]. No fever, chills, no purulent drainage. Serous drainage from site. Chest xray reviewed. Mr. Alma Baires is recovering at rehab - [facility] [employer] AT [facility]. Physical Exam  Vitals:  Vitals:    05/29/19 1209   BP: 132/72   Pulse: 68   Temp: 98 °F (36.7 °C)   SpO2: 98%       General: Alert and Oriented x3. Ambulatory. No apparent distress. Chest:  No abnormality. Equal and symmetric expansion with respiration. Lungs:  Clear to auscultation. Cardiac:  Regular rate and rhythm without murmurs, rubs or gallops. Abdomen:  Soft, non-tender, normoactive bowel sounds. Extremities:  No edema. Intact pulses in all four extremities. Psychiatric: Mood and affect are appropriate. Lines/Drains/Airway: bilateral aspira catheters with dressing intact. Right subclavian Pop catheter in place with dressing intact. Wounds: Right elbow reddened around open wound. Serous drainage. No odor or warmth.  Tunnels around the clock about 1-2mm    Current Medications:    Current Outpatient Medications:     hydrALAZINE (APRESOLINE) 25 MG tablet, Take 1 tablet by mouth 3 times daily as needed (HTN SBP >160), Disp: 90 tablet, Rfl: 3    guaiFENesin (MUCINEX) 600 MG extended release tablet, Take 1 tablet by mouth 2 times daily as needed for Congestion, Disp: , Rfl:     sodium chloride (OCEAN) 0.65 % nasal spray, 1 spray by Nasal route as needed for Congestion, Disp: 1 Bottle, Rfl: 3    torsemide (DEMADEX) 20 MG tablet, Take 2 tablets by mouth daily, Disp: 30 tablet, Rfl: 3    docusate (COLACE, DULCOLAX) 100 MG FRANCIS Randall CNP

## 2019-06-06 NOTE — PROGRESS NOTES
[facility]ANS  [facility] [address]  [address] [facility] [address]  Dept: [phone]  Dept Fax: [phone]    NEW PATIENT NAIL PAIN PROGRESS NOTE  Date of patient's visit: 6/6/2019  Patient's Name:  Chelle Alan YOB: 1928            Patient Care Team:  Cara Barnes MD as PCP - General (Thoracic Surgery)      Chief Complaint   Patient presents with    New Patient    Nail Problem    Foot Pain       Subjective: This Chelle Alan comes to clinic for foot and nail care. Pt currently has complaint of thickened, painful, elongated nails that he/she cannot manage by themselves. Pt. Relates pain to nails with shoe gear.   Pt's primary care physician is Cara Barnes MD last seen 5/29/2019   Past Medical History:   Diagnosis Date    Aortic stenosis 02/15/2019    Atrial fibrillation (HCC)     CAD (coronary artery disease)     Chest pain 02/15/2019    CHF (congestive heart failure) (HCC)     Chronic anemia     Chronic kidney disease     Hypertension     Pleural effusion on left 02/15/2019    Pleural effusion, right 02/15/2019    Pulmonary emboli ([address] 02/15/2019    Pulmonary hypertension ([address] 03/06/2019    Thyroid disease     hypothyroidism       Allergies   Allergen Reactions    Quinolones      Pt is allergic to fluoroquinolones     Current Outpatient Medications on File Prior to Visit   Medication Sig Dispense Refill    hydrALAZINE (APRESOLINE) 25 MG tablet Take 1 tablet by mouth 3 times daily as needed (HTN SBP >160) 90 tablet 3    guaiFENesin (MUCINEX) 600 MG extended release tablet Take 1 tablet by mouth 2 times daily as needed for Congestion      sodium chloride (OCEAN) 0.65 % nasal spray 1 spray by Nasal route as needed for Congestion 1 Bottle 3    torsemide (DEMADEX) 20 MG tablet Take 2 tablets by mouth daily 30 tablet 3    docusate (COLACE, DULCOLAX) 100 MG CAPS Take 100 mg by mouth daily      senna (SENOKOT) 8.6 MG tablet Take NAILS, 6 OR MORE           Plan:   Pt was evaluated and examined. Patient was given personalized discharge instructions. Nails 1-10 were debrided in length and thickness sharply with a nail nipper and  without incident. Pt will follow up in 9 weeks or sooner if any problems arise. Diagnosis was discussed with the pt and all of their questions were answered in detail. Proper foot hygiene and care was discussed with the pt. Patient to check feet daily and contact the office with any questions/problems/concerns. Other comorbidity noted and will be managed by PCP. Pain waiver discussed with patient and confirmed.    6/6/2019    Electronically signed by Kellie Mortensen DPM on 6/6/2019 at 3:42 PM  6/6/2019

## 2019-06-19 NOTE — H&P
[facility]oli [facility] 02/15/2019    Pulmonary hypertension ([facility] 2019    Thyroid disease     hypothyroidism     Past Surgical History:   Procedure Laterality Date    CORONARY ANGIOPLASTY WITH STENT PLACEMENT  2019    complex PCI of LT MAIN, LAD    JOINT REPLACEMENT      right ankle    OTHER SURGICAL HISTORY  2019    UPGRADE TO BI-V PACEMAKER    OTHER SURGICAL HISTORY  03/15/2019    TAVR PROCEDURE    PACEMAKER PLACEMENT  2019    THORACENTESIS          Allergies   Allergen Reactions    Quinolones      Pt is allergic to fluoroquinolones     No family history on file. Social History     Socioeconomic History    Marital status:       Spouse name: Not on file    Number of children: Not on file    Years of education: Not on file    Highest education level: Not on file   Occupational History     Employer: RETIRED   Social Needs    Financial resource strain: Not on file    Food insecurity:     Worry: Not on file     Inability: Not on file    Transportation needs:     Medical: Not on file     Non-medical: Not on file   Tobacco Use    Smoking status: Former Smoker     Last attempt to quit:      Years since quittin.4    Smokeless tobacco: Never Used   Substance and Sexual Activity    Alcohol use: Not Currently    Drug use: Never    Sexual activity: Not on file   Lifestyle    Physical activity:     Days per week: Not on file     Minutes per session: Not on file    Stress: Not on file   Relationships    Social connections:     Talks on phone: Not on file     Gets together: Not on file     Attends [employer] service: Not on file     Active member of club or organization: Not on file     Attends meetings of clubs or organizations: Not on file     Relationship status: Not on file    Intimate partner violence:     Fear of current or ex partner: Not on file     Emotionally abused: Not on file     Physically abused: Not on file     Forced sexual activity: Not on file   Other Topics Concern    Not on file   Social History Narrative    Not on file       No current facility-administered medications for this encounter. Current Outpatient Medications   Medication Sig Dispense Refill    hydrALAZINE (APRESOLINE) 25 MG tablet Take 1 tablet by mouth 3 times daily as needed (HTN SBP >160) 90 tablet 3    guaiFENesin (MUCINEX) 600 MG extended release tablet Take 1 tablet by mouth 2 times daily as needed for Congestion      sodium chloride (OCEAN) 0.65 % nasal spray 1 spray by Nasal route as needed for Congestion 1 Bottle 3    torsemide (DEMADEX) 20 MG tablet Take 2 tablets by mouth daily 30 tablet 3    docusate (COLACE, DULCOLAX) 100 MG CAPS Take 100 mg by mouth daily      midodrine (PROAMATINE) 5 MG tablet Take 1 tablet by mouth as needed (Hypotension) 90 tablet 3    aspirin 81 MG EC tablet Take 1 tablet by mouth daily 30 tablet 3    levothyroxine (SYNTHROID) 125 MCG tablet Take 1 tablet by mouth Daily 30 tablet 3    clopidogrel (PLAVIX) 75 MG tablet Take 1 tablet by mouth daily 30 tablet 3    Heparin Sodium, Porcine, (HEPARIN, PORCINE,) 1000 UNIT/ML injection 1.9 mLs by Intercatheter route as needed (dialysis)      Heparin Sodium, Porcine, (HEPARIN, PORCINE,) 1000 UNIT/ML injection 1.9 mLs by Intercatheter route as needed (dialysis)      darbepoetin marya-polysorbate (ARANESP) 100 MCG/0.5ML SOSY injection Infuse 0.5 mLs intravenously once a week 4.2 mL        Physical Exam:  There were no vitals filed for this visit. Weight:           No intake/output data recorded. Physical Exam   Constitutional: He is oriented to person, place, and time. He appears well-developed and well-nourished. HENT:   Head: Normocephalic and atraumatic. Eyes: Pupils are equal, round, and reactive to light. Conjunctivae are normal. Left eye exhibits no discharge. Neck: Normal range of motion. Neck supple. No JVD present.    Cardiovascular: Normal rate, regular rhythm, normal heart sounds and intact distal pulses. PMI is not displaced. Exam reveals no gallop, no distant heart sounds and no friction rub. No murmur heard. Pulmonary/Chest: Effort normal and breath sounds normal.   Abdominal: Soft. Bowel sounds are normal.   Musculoskeletal: Normal range of motion. Neurological: He is alert and oriented to person, place, and time. He has normal reflexes. Skin: Skin is warm and dry. Right subclavian tunnel catheter, bilateral aspira catheters   Psychiatric: He has a normal mood and affect. His behavior is normal. Judgment and thought content normal.   Nursing note and vitals reviewed. Data:    CBC: No results for input(s): WBC, HGB, HCT, MCV, PLT in the last 72 hours. BMP: No results for input(s): NA, K, CL, CO2, PHOS, BUN, CREATININE, MG in the last 72 hours. Invalid input(s): CA  Accucheck Glucoses:   No results for input(s): POCGLU in the last 72 hours. Cardiac Enzymes: No results for input(s): CKTOTAL, CKMB, CKMBINDEX, TROPONINI in the last 72 hours. PTT/PT/INR:  No results for input(s): PROTIME, INR in the last 72 hours. No results for input(s): APTT in the last 72 hours.   Liver Profile:  Lab Results   Component Value Date    AST 22 05/29/2019    ALT 17 05/29/2019    BILIDIR 0.14 05/03/2019    BILITOT 0.29 05/29/2019    ALKPHOS 74 05/29/2019   No results found for: CHOL, HDL, TRIG  TSH:   Lab Results   Component Value Date    TSH 3.04 03/20/2019     UA:  Lab Results   Component Value Date    COLORU YELLOW 03/29/2019    PHUR 7.5 03/29/2019    WBCUA 50  03/29/2019    RBCUA 50  03/29/2019    MUCUS NOT REPORTED 03/29/2019    TRICHOMONAS NOT REPORTED 03/29/2019    YEAST NOT REPORTED 03/29/2019    BACTERIA NOT REPORTED 03/29/2019    SPECGRAV 1.011 03/29/2019    LEUKOCYTESUR LARGE 03/29/2019    UROBILINOGEN Normal 03/29/2019    BILIRUBINUR NEGATIVE 03/29/2019    GLUCOSEU NEGATIVE 03/29/2019    AMORPHOUS NOT REPORTED 03/29/2019         Assessment & Plan:  Patient Active Problem List Diagnosis    Pulmonary hypertension (HCC)    Acute on chronic systolic congestive heart failure (HCC)    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 The above recommendations including medications and orders were discussed and agreed upon with Dr. Kimberlyn Sandoval, the attending on service for thecardiothoracic surgery group today.      FRANCIS Bach CNP

## 2019-06-20 PROBLEM — J90 PLEURAL EFFUSION: Status: ACTIVE | Noted: 2019-01-01

## 2019-06-20 NOTE — ANESTHESIA PRE PROCEDURE
current outpatient medications on file. No current facility-administered medications for this visit. Allergies:     Allergies   Allergen Reactions    Quinolones      Pt is allergic to fluoroquinolones       Problem List:    Patient Active Problem List   Diagnosis Code    Pulmonary hypertension ([facility] I27.20    Acute on chronic systolic congestive heart failure (HCC) I50.23    Pulmonary 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 heart disease ([facility]) I27.22    Atelectasis, bilateral J98.11    HUYEN (acute kidney injury) ([facility] N17.9    Azotemia R79.89    Aortic valve stenosis I35.0    CAD in native artery I25.10    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 ([facility] Z99.2    Severe malnutrition ([facility] E43    Congestive heart failure ([facility]) I50.9    Aspiration pneumonia of both lower lobes ([facility]) J69.0    Pleural effusion, bilateral J90    CRP elevated R79.82       Past Medical History:        Diagnosis Date    Aortic stenosis 02/15/2019    Atrial fibrillation ([facility])     CAD (coronary artery disease)     Chest pain 02/15/2019    CHF (congestive heart failure) ([facility])     Chronic anemia     Chronic kidney disease     Hypertension     Pleural effusion on left 02/15/2019    Pleural effusion, right 02/15/2019    Pulmonary emboli ([address] 02/15/2019    Pulmonary hypertension ([address] 2019    Thyroid disease     hypothyroidism       Past Surgical History:        Procedure Laterality Date    CORONARY ANGIOPLASTY WITH STENT PLACEMENT  2019    complex PCI of LT MAIN, LAD    JOINT REPLACEMENT      right ankle    OTHER SURGICAL HISTORY  2019    UPGRADE TO BI-V PACEMAKER    OTHER SURGICAL HISTORY  03/15/2019    TAVR PROCEDURE    PACEMAKER PLACEMENT  2019    THORACENTESIS         Social History:    Social History     Tobacco Use    Smoking status: Former Smoker     Last attempt to quit: 1989     Years since quittin.4    Smokeless tobacco: Never Used   Substance Use Topics    Alcohol use: Not Currently Counseling given: Not Answered      Vital Signs (Current): There were no vitals filed for this visit. BP Readings from Last 3 Encounters:   05/29/19 132/72   05/16/19 (!) 139/40   04/23/19 (!) 159/87       NPO Status:                                                                                 BMI:   Wt Readings from Last 3 Encounters:   06/06/19 140 lb (63.5 kg)   05/16/19 131 lb 14.4 oz (59.8 kg)   04/22/19 130 lb 11.7 oz (59.3 kg)     There is no height or weight on file to calculate BMI.    CBC:   Lab Results   Component Value Date    WBC 11.8 05/29/2019    RBC 3.18 05/29/2019    HGB 9.4 05/29/2019    HCT 31.6 05/29/2019    MCV 99.4 05/29/2019    RDW 19.2 05/29/2019    PLT 98 05/29/2019       CMP:   Lab Results   Component Value Date     05/29/2019    K 3.9 05/29/2019    CL 96 05/29/2019    CO2 24 05/29/2019    BUN 62 05/29/2019    CREATININE 3.21 05/29/2019    GFRAA 22 05/29/2019    LABGLOM 18 05/29/2019    GLUCOSE 85 05/29/2019    PROT 4.7 05/29/2019    CALCIUM 7.1 05/29/2019    BILITOT 0.29 05/29/2019    ALKPHOS 74 05/29/2019    AST 22 05/29/2019    ALT 17 05/29/2019       POC Tests: No results for input(s): POCGLU, POCNA, POCK, POCCL, POCBUN, POCHEMO, POCHCT in the last 72 hours. Coags:   Lab Results   Component Value Date    PROTIME 11.2 05/03/2019    INR 1.1 05/03/2019    APTT 63.4 05/03/2019       HCG (If Applicable): No results found for: PREGTESTUR, PREGSERUM, HCG, HCGQUANT     ABGs: No results found for: PHART, PO2ART, YCI8YHW, NEV9GUK, BEART, J8OOQFBD     Type & Screen (If Applicable):  No results found for: LABABO, [address]    Anesthesia Evaluation  Patient summary reviewed no history of anesthetic complications:   Airway: Mallampati: II  TM distance: >3 FB   Neck ROM: full  Mouth opening: > = 3 FB Dental: normal exam         Pulmonary:   (+) decreased breath sounds,                            ROS comment: Pulmonary HTN. Cardiovascular:    (+) hypertension: no interval change, valvular problems/murmurs:, pacemaker: no interval change, AICD and pacemaker, CAD: no interval change, CABG/stent: no interval change, dysrhythmias: atrial fibrillation, CHF:, pulmonary hypertension: moderate and no interval change, hyperlipidemia      ECG reviewed  Rhythm: regular  Rate: normal  Echocardiogram reviewed         Beta Blocker:  Dose within 24 Hrs      ROS comment: S/p TAVR     Neuro/Psych:   Negative Neuro/Psych ROS              GI/Hepatic/Renal: Neg GI/Hepatic/Renal ROS            Endo/Other:    (+) hypothyroidism::., .          Pt had no PAT visit       Abdominal:           Vascular: negative vascular ROS. Anesthesia Plan      MAC     ASA 4       Induction: intravenous. MIPS: Postoperative opioids intended, Postoperative trial extubation and Postoperative ventilation. Anesthetic plan and risks discussed with patient. Plan discussed with CRNA. Arely Matos MD   6/20/2019    echo  Normal left ventricle size with mildly reduced systolic function. Calculated ejection fraction via Dean's Method is 45%  Abnormal septal wall motion due to paced rhythm. Normal right ventricular size and function. No significant pericardial effusion is seen. Normally functioning Bio-prosthetic Aortic valve (TAVR) without significant  stenosis or insufficiency. No significant fredrick-valvular.  Mean gradient 4 mm  hg.

## 2019-06-20 NOTE — ANESTHESIA POSTPROCEDURE EVALUATION
Department of Anesthesiology  Postprocedure Note    Patient: Dick Blackburn  MRN: [id number]  [place]: 12/10/1928  Date of evaluation: 6/20/2019  Time:  3:16 PM     Procedure Summary     Date:  06/20/19 Room / Location:  [address] [place]    Anesthesia Start:  1129 Anesthesia Stop:  4443    Procedure:  REMOVAL PLEURAL CATHETER (Left ) Diagnosis:  (RESOLUTION OF PLEURAL EFFUSION)    Surgeon:  Landon Harman MD Responsible Provider:  Erwin Peters MD    Anesthesia Type:  MAC ASA Status:  4          Anesthesia Type: MAC    Rupinder Phase I:      Rupinder Phase II:      Last vitals: Reviewed and per EMR flowsheets.        Anesthesia Post Evaluation    Patient location during evaluation: PACU  Patient participation: complete - patient participated  Level of consciousness: awake  Pain score: 1  Airway patency: patent  Nausea & Vomiting: no nausea and no vomiting  Complications: no  Cardiovascular status: blood pressure returned to baseline and hemodynamically stable  Respiratory status: acceptable  Hydration status: euvolemic

## 2019-06-20 NOTE — CARE COORDINATION
Transition planning plan is to return to [facility] later today spoke to [facility] with [facility]

## 2019-06-20 NOTE — DISCHARGE INSTR - COC
Continuity of Care Form    Patient Name: Enedelia Gonzalez   :  12/10/1928  MRN:  [id number]    Admit date:  2019  Discharge date:  ***    Code Status Order: Full Code   Advance Directives:     Admitting Physician:  Maik Pfeiffer MD  PCP: Maik Pfeiffer MD    Discharging Nurse: [facility] Unit/Room#: 1011/1011-01  Discharging Unit Phone Number: ***    Emergency Contact:   Extended Emergency Contact Information  Primary Emergency Contact: [address] Phone: [phone]  Mobile Phone: [phone]  Relation: Niece/Nephew    Past Surgical History:  Past Surgical History:   Procedure Laterality Date    CORONARY ANGIOPLASTY WITH STENT PLACEMENT  2019    complex PCI of LT MAIN, LAD    JOINT REPLACEMENT      right ankle    OTHER SURGICAL HISTORY  2019    UPGRADE TO BI-V PACEMAKER    OTHER SURGICAL HISTORY  03/15/2019    TAVR PROCEDURE    PACEMAKER PLACEMENT  2019    THORACENTESIS         Immunization History: There is no immunization history on file for this patient.     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 ([facility] Z99.2    Severe malnutrition (HCC) E43    Congestive heart failure (HCC) I50.9    Aspiration pneumonia of both lower lobes (HCC) J69.0    Pleural effusion, bilateral J90    CRP elevated R79.82    Pleural effusion J90       Isolation/Infection:   Isolation          No Isolation            Nurse Assessment:  Last Vital Signs: BP (!) 140/61   Pulse 68   Temp 97.7 °F (36.5 °C)   Resp 23   Ht 5' 5\" (1.651 m)   Wt 125 lb 14.1 oz (57.1 Therapy:  {Therapy; copd oxygen:82332}  Ventilator:    { CC Vent UCQQ:586044832}    Rehab Therapies: {THERAPEUTIC INTERVENTION:5592366789}  Weight Bearing Status/Restrictions: { CC Weight Bearin}  Other Medical Equipment (for information only, NOT a DME order):  {EQUIPMENT:570273141}  Other Treatments: ***    Patient's personal belongings (please select all that are sent with patient):  {[facility] DME Belongings:019840981}    RN SIGNATURE:  {Esignature:495344819}    CASE MANAGEMENT/SOCIAL WORK SECTION    Inpatient Status Date: ***    Readmission Risk Assessment Score:  Readmission Risk              Risk of Unplanned Readmission:        22           Discharging to Facility/ Agency   · Name:   · Address:  · Phone:  · Fax:    Dialysis Facility (if applicable)   · Name:  · Address:  · Dialysis Schedule:  · Phone:  · Fax:    / signature: {Esignature:242332700}    PHYSICIAN SECTION    Prognosis: {Prognosis:6983482275}    Condition at Discharge: [address] [place] [address] Patient Condition:327131543}    Rehab Potential (if transferring to Rehab): {Prognosis:4993180089}    Recommended Labs or Other Treatments After Discharge: ***    Physician Certification: I certify the above information and transfer of Christie English  is necessary for the continuing treatment of the diagnosis listed and that he requires {Admit to Appropriate Level of Care:45078} for {GREATER/LESS:349588388} 30 days.      Update Admission H&P: {CHP DME Changes in XTAUB:072504417}    PHYSICIAN SIGNATURE:  {Esignature:037182661}

## 2019-06-20 NOTE — OP NOTE
Pre Op Dx:CHF                     Pleural Effusion                     ESRD Dialysis dependent  Post Op Dx:Same  Anesthesia:MAC  Surgeon:yohan  Assist:none  Procedure:Removal of Lt. pleurex catheter                     EBL:0  Drains:none  Comp:non3  Findings:see op report for details  Pt. Tolerated procedure well, left the OR in stable condition.

## 2019-06-20 NOTE — PROGRESS NOTES
Dialysis Post Treatment Note  Patient tolerated treatment well. Denies complaints at time of discharge.    Vitals:    06/20/19 1634   BP: (!) 136/55   Pulse: 62   Resp:    Temp: 97.7 °F (36.5 °C)   SpO2:      Pre-Weight = 59.1 kg  Post-weight = Weight: 125 lb 14.1 oz (57.1 kg)  Total Liters Processed = Total Liters Processed (l/min): 42.6 l/min  Rinseback Volume (mL) = Rinseback Volume (ml): 350 ml  Net Removal (mL) = @FLOW([id number]  Length of treatment= 120

## 2019-07-08 NOTE — PROGRESS NOTES
Can you guys add Kait Valera to my clinic on Friday for bilateral lower extremity swelling.  Will need bilateral lower extremity venous reflux study day of or prior.  Thank you. Order placed this encounter.

## 2019-12-18 NOTE — CONSULTS
Attestation signed by      Attending Physician Statement:    I have discussed the care of  Christie English , including pertinent history and exam findings, with the Cardiology fellow/resident. I have seen and examined the patient and the key elements of all parts of the encounter have been performed by me. I agree with the assessment, plan and orders as documented by the fellow/resident, after I modified exam findings and plan of treatments, and the final version is my approved version of the assessment. Additional Comments:   80 yr old male with recent admission in [place] for complete heart block requiring dual chamber PPM, overall preserved LVEF, cardiac cath [facility] showed multivessel disease involving distal left main and mid LAD with calcified lesions, deeemed not a candidate for revascularization, admitted now with several weeks of progressively worsening dyspnea and decline in his functional capacity. He denies CP. Does have bilateral LE edema. Was diuresed at hospital in [place] before transferred here at request of Dr Valdemar Price. CXR shows Bilateral pleural effusion and plan is for thoracentesis per CTS. Bedside echo shows LVEF ~ 45%, moderate pericardial effusion without any echo signs of tamponade, heavily calcified aortic valve with peak nataliia ~ 3.6 m/sec and mean gradient of 29 mm hg. Cr is 2.00. Pro-BNP ~ 17,000. Working diagnosis is acute CHF. Etiology could be multivessel CAD vs Aortic stenosis vs Pacemaker syndrome. Recommend Diuresis with IV lasix. Thoracentesis for pleural effusion. Will obtain cath report from [place] regarding trans-aortic gradient. Cath films will be reviewed with Dr Linda Jean Baptiste in afternoon to see if candidate for any revascularization. Interrogate PPM. Nephrology has been consulted. Discussed in detail with Dr Valdemar Price and patient.         [facility]    Consult               Today's Date: 3/7/2019  Patient Name: Christie English  Date of admission: 3/6/2019  8:48 PM  Patient's age: 80 y. o., 12/10/1928  Admission Dx: Pulmonary hypertension ([facility] [I27.20]    Reason for Consult:  Cardiac evaluation    Requesting Physician: Venu Oreilly MD    CHIEF COMPLAINT:  Exertional Dyspnea    History Obtained From:  patient, electronic medical record    HISTORY OF PRESENT ILLNESS:      The patient is a 80 y.o. male who is admitted to the hospital for exertional dyspnea. He is a resident of [place] and Dr Loulou batista. He was bring treated in [place] with no improvement in dyspnea. He underwent a dual chamber pacer placement in January for complete heart block. He was also diagnosed with a small RT sided PE in February for which he is on Eliquis once daily (nose bleeds occurred when on twice daily). He continues to express exertional dyspnea that is unchanged. He denies any chest pain, palpitations, lightheadedness or dizziness. Past Medical History:   has a past medical history of Aortic stenosis, Atrial fibrillation ([address] CAD (coronary artery disease), Chest pain, CHF (congestive heart failure) ([address] Chronic anemia, Chronic kidney disease, Hypertension, Pleural effusion on left, Pleural effusion, right, Pulmonary emboli ([address] Pulmonary hypertension ([address] and Thyroid disease. Past Surgical History:   has a past surgical history that includes pacemaker placement (01/20/2019); thoracentesis; and joint replacement. Home Medications:    Prior to Admission medications    Medication Sig Start Date End Date Taking?  Authorizing Provider   amiodarone (CORDARONE) 200 MG tablet Take 200 mg by mouth daily   Yes Historical Provider, MD   atorvastatin (LIPITOR) 20 MG tablet Take 20 mg by mouth daily   Yes Historical Provider, MD   isosorbide mononitrate (IMDUR) 60 MG extended release tablet Take 60 mg by mouth daily   Yes Historical Provider, MD   levothyroxine (SYNTHROID) 100 MCG tablet Take 100 mcg by mouth Daily   Yes Historical Provider, MD lisinopril (PRINIVIL;ZESTRIL) 40 MG tablet Take 40 mg by mouth daily   Yes Historical Provider, MD   apixaban (ELIQUIS) 2.5 MG TABS tablet Take 2.5 mg by mouth daily   Yes Historical Provider, MD   metoprolol tartrate (LOPRESSOR) 50 MG tablet Take 50 mg by mouth 2 times daily   Yes Historical Provider, MD      Current Facility-Administered Medications: carvedilol (COREG) tablet 25 mg, 25 mg, Oral, BID WC  dextrose 5 % and 0.45 % sodium chloride infusion, , Intravenous, Continuous  amiodarone (CORDARONE) tablet 200 mg, 200 mg, Oral, Daily  apixaban (ELIQUIS) tablet 2.5 mg, 2.5 mg, Oral, Daily  atorvastatin (LIPITOR) tablet 20 mg, 20 mg, Oral, Daily  isosorbide mononitrate (IMDUR) extended release tablet 60 mg, 60 mg, Oral, Daily  levothyroxine (SYNTHROID) tablet 100 mcg, 100 mcg, Oral, Daily    Allergies:  Patient has no known allergies. Social History:   reports that he quit smoking about 30 years ago. He has never used smokeless tobacco. He reports that he drank alcohol. He reports that he does not use drugs. Family History: family history is not on file. No h/o sudden cardiac death. No for premature CAD    REVIEW OF SYSTEMS:    · Constitutional: there has been no unanticipated weight loss. There's been No change in energy level, No change in activity level. · Eyes: No visual changes or diplopia. No scleral icterus. · ENT: No Headaches  · Cardiovascular: exertional dyspnea  · Respiratory: No cough, exertional dyspnea  · Gastrointestinal: No abdominal pain. No change in bowel or bladder habits. · Genitourinary: No dysuria, trouble voiding, or hematuria. · Musculoskeletal:  No gait disturbance, No weakness or joint complaints. · Integumentary: No rash or pruritis. · Neurological: No headache, diplopia, change in muscle strength, numbness or tingling. No change in gait, balance, coordination, mood, affect, memory, mentation, behavior. · Psychiatric: No anxiety, or depression.   · Endocrine: No temperature intolerance. No excessive thirst, fluid intake, or urination. No tremor. · Hematologic/Lymphatic: No abnormal bruising or bleeding, blood clots or swollen lymph nodes. · Allergic/Immunologic: No nasal congestion or hives. PHYSICAL EXAM:      BP (!) 166/76   Pulse 64   Temp 98 °F (36.7 °C) (Oral)   Resp 18   Ht 5' 6\" (1.676 m)   Wt 156 lb 4.8 oz (70.9 kg)   SpO2 98%   BMI 25.23 kg/m²    Constitutional and General Appearance: alert, cooperative, no distress and appears stated age  HEENT: PERRL, no cervical lymphadenopathy. No masses palpable. Normal oral mucosa  Respiratory:  · Normal excursion and expansion without use of accessory muscles  · Resp Auscultation: Good respiratory effort. No for increased work of breathing. On auscultation: clear to auscultation bilaterally  Cardiovascular:  · Heart tones are crisp and normal. regular S1 and S2. Systolic murmur appreciated  · Jugular venous pulsation Normal  · The carotid upstroke is normal in amplitude and contour without delay or bruit  · Peripheral pulses are symmetrical and full   Abdomen:   · No masses or tenderness  · Bowel sounds present  Extremities:  ·  No Cyanosis or Clubbing  ·  Lower extremity edema: No  ·  Skin: Warm and dry  Neurological:  · Alert and oriented. · Moves all extremities well  · No abnormalities of mood, affect, memory, mentation, or behavior are noted    DATA:    Diagnostics:      EKG:   V paced with PVCs. CTA Chest in [place] (2/15/19):  Small PE within branch of RT middle lobe artery. Large RT pleural effusion. Moderate LT pleural effusion. ECHO in [place] (2/17/19:   LVH with LVEF 50%. LT atrial enlargement. Moderate AS with Mild AI. Mitral annular calcification with mild MR. Mild to Moderate Pulm HTN. Cardiac Angiography in [place] (1/20/19): Multivessel disease including LM.     Labs:     CBC:   Recent Labs     03/06/19  2246   WBC 12.7*   HGB 9.2*   HCT 30.3*        BMP:   Recent Labs 03/06/19  2246      K 4.7   CO2 23   *   CREATININE 2.00*   LABGLOM 32*   GLUCOSE 124*     BNP: No results for input(s): BNP in the last 72 hours. PT/INR:   Recent Labs     03/06/19 2246   PROTIME 11.5   INR 1.1     APTT:  Recent Labs     03/06/19 2246   APTT 26.1     CARDIAC ENZYMES:No results for input(s): CKTOTAL, CKMB, CKMBINDEX, TROPONINI in the last 72 hours. FASTING LIPID PANEL:No results found for: HDL, LDLDIRECT, LDLCALC, TRIG  LIVER PROFILE:No results for input(s): AST, ALT, LABALBU in the last 72 hours. IMPRESSION:    1. Dual chamber pacer for complete heart block, placed in January 2019 in [place]  2. Known MV CAD per cardiac cath in February in [place]  3. Suspected Pulm HTN per [place] records  4. Paroxysmal Afib and small RT PE, on Eliquis 2.5 mg QD  5. HUYEN (baseline according to nephew is 1 - 1.2)  6. Preserved LVEF per TTE from [place]  7. Primary HTN  8. Dyslipidemia    RECOMMENDATIONS:  1. Due to HUYEN, will stop Lisinopril  2. Will stop Metoprolol and initiate Aspirin and Coreg to assist with better BP control  3. Will obtain TTE here  4. Will update studies (cardiac cath + TTE) from [place] to our system  5. BP is elevated here, SBP in 160s. Per Dr Kavin Charles not optimally controlled, runs in 160s at home. 6. Continue Eliquis (it is scheduled once daily, so decreased efficacy), Atorvastatin, PO Amiodarone and Imdur. Active Meds on Arrival from [place]: Amiodarone (200mg QD), Lisinopril (40mg QD), Metoprolol (50 mg BID), Lasix (40mg QAM, and every Sun / Tues / Thurs / Sat at night), Imdur (60mg QD)    Will discuss with rounding attending Dr. Kindra Curran for final recommendations.     Monik Ngo MD  Cardiology Fellow Principal Discharge DX:	Viral upper respiratory tract infection Principal Discharge DX:	Viral upper respiratory tract infection  Secondary Diagnosis:	Fever due to infection

## 2021-04-10 NOTE — BRIEF OP NOTE
Brief Postoperative Note    Claude Sahara  YOB: 1928  [id number]    Pre-operative Diagnosis: CHF and pulmonary hypertension with RF    Post-operative Diagnosis: Same    Procedure: Rt IJ tempcath    Anesthesia: Local + Fentanyl 25 mg for pain    Surgeons/Assistants: Erica Mcfarland MD    Estimated Blood Loss: less than 50     Complications: None    Specimens: Was Not Obtained    Findings: 13 F x 20 cm Rt IJ tempcath inserted under fluoro with tip in RA; good blood withdrawals demonstrated. Tempcath ready for use at this time.     Electronically signed by Erica Mcfarland MD on 3/29/2019 at 3:03 PM
Brief Postoperative Note  ______________________________________________________________    Patient: Amanda Kuo  YOB: 1928  MRN: [id number]  Date of Procedure:     Pre-Op Diagnosis: severe AS    Post-Op Diagnosis: Same           Anesthesia: MAC  Procedure: TAVR  Surgeon: Ying Bejarano MD  Cardiology: Juliette Arcos MD    Estimated Blood Loss (mL): 50    Complications: None        Implants:  AS serial# [id number]      Drains:   [REMOVED] Urethral Catheter Coude 18 fr (Removed)   $ Urethral catheter insertion $ Not inserted for procedure 3/12/2019 12:30 AM   Catheter Indications Perioperative use in selected surgeries including but not limited to urologic, pelvic or need for intraoperative monitoring of urinary output due to prolonged surgery, large volume infusion or need for diuretic therapy in surgery 3/12/2019  4:00 PM   Site Assessment Bleeding 3/12/2019  4:00 PM   Urine Color Yellow 3/12/2019  4:00 PM   Urine Appearance Clear 3/12/2019  4:00 PM   Output (mL) 60 mL 3/12/2019  7:00 PM       Findings: no fredrick- valvar leak low gradient    Sadiq Murphy MD  Date: 3/18/2019  Time: 1:26 PM
Brief Postoperative Note  ______________________________________________________________    Patient: Dick Blackburn  YOB: 1928  MRN: [id number]  Date of Procedure: 4/18/19    Pre-Op Diagnosis: Acute renal failure on hemodialysis    Post-Op Diagnosis: Same       Procedure:  1) US guided vascular access of right internal jugular vein  2) Placement of tunneled dialysis catheter    Anesthesia: IV sedation and local    Surgeon: Dr. Karely Kaur    Estimated Blood Loss (mL): 15 ml    Complications: None    Implants:  Tunneled dialysis catheter 23 cm long      Findings: Tunneled dialysis catheter inserted with distal tip at atrial caval junction. Plan: Ok to use catheter for hemodialysis.     Rina Perez MD  Date: 4/18/2019  Time: 4:44 PM
Brief Postoperative Note for Thoracentesis    Abhay Bible  YOB: 1928  [id number]    Pre-operative Diagnosis: left Pleural effusion      Post-operative Diagnosis: Same    Procedure: Ultrasound guided Thoracentesis    Anesthesia: 1% Lidocaine     Surgeons/Assistants: Erin Orourke PA-C    Complications: None    Specimens: were not obtained    Findings: Ultrasound guided left thoracentesis performed. 1.65 L of murray fluid obtained. Moderate amount of fluid remaining, but procedure terminated due to patient coughing. Dressing applied. Chest x-ray ordered.         Electronically signed by Erin Orourke PA-C on 4/19/2019 at 12:19 PM
Brief Postoperative Note for Thoracentesis    Billy Kimbrough  YOB: 1928  [id number]    Pre-operative Diagnosis:Bilateral Pleural effusion      Post-operative Diagnosis: Same    Procedure: Ultrasound guided Thoracentesis    Anesthesia: 1% Lidocaine     Surgeons/Assistants: Dr. Kenton Hart    Complications: None    Specimens: were obtained    Findings: Ultrasound guided right thoracentesis performed. 1.7 L of murray fluid obtained. Dressing applied. Chest x-ray ordered.         Electronically signed by Clarita Mitchell PA-C on 3/7/2019 at 3:20 PM
Brief Postoperative Note for Thoracentesis    Demian Ann  YOB: 1928  [id number]    Pre-operative Diagnosis: Bilateral Pleural effusion      Post-operative Diagnosis: Same    Procedure: Ultrasound guided Thoracentesis    Anesthesia: 1% Lidocaine     Surgeons/Assistants: Dr. Marilou Min    Complications: None    Specimens: were not obtained    Findings: Ultrasound guided left thoracentesis performed. 1.6 L of dark murray fluid obtained. Dressing applied. Patient tolerated the procedure well. Chest x-ray ordered.       Electronically signed by MIRYAM Nichols PA-C on 3/12/2019 at 3:52 PM
Brief Postoperative Note for Thoracentesis    Lu Fallon  YOB: 1928  [id number]    Pre-operative Diagnosis: right Pleural effusion      Post-operative Diagnosis: Same    Procedure: Ultrasound guided Thoracentesis    Anesthesia: 1% Lidocaine     Surgeons/Assistants: Dr. Indra Boles    Complications: None    Specimens: were not obtained    Findings: Ultrasound guided right thoracentesis performed. 1.5 mL of dark murray fluid obtained. Dressing applied. Chest x-ray ordered.         Electronically signed by Otilia Lira PA-C on 3/13/2019 at 10:01 AM
Brief Postoperative Note for Thoracentesis    Yareli Cáhvez  YOB: 1928  [id number]    Pre-operative Diagnosis: Bilateral Pleural effusions      Post-operative Diagnosis: Same    Procedure: Ultrasound guided Thoracentesis    Anesthesia: 1% Lidocaine     Surgeons/Assistants: Kamille Ferreira PA-C    Complications: None    Specimens: were not obtained    Findings: Ultrasound guided right thoracentesis performed. 1.65 mL of murray fluid obtained. Dressing applied. Chest x-ray ordered. Patient tolerated the procedure well and left the department in stable condition.       Electronically signed by Kamille Ferreira PA-C on 3/20/2019 at 5:21 PM
No

## 2022-08-10 NOTE — PROGRESS NOTES
[facility] CARDIOLOGY OFFICE FOLLOW UP NOTE       Date of Encounter: 8/10/2022   YOB: 1957   MRN: [id number]   Primary Care Provider: Kaila Hernandez MD      HISTORY OF Satish Guerrero was seen in follow up at [address] [place] [address] Cardiology on 8/10/2022. He is a very pleasant 72year old male with history of cardiomyopathy    Was last seen in the Cardiology Clinic on May 13, 2022 for consultation. According to note patient had an EKG 4 months prior showing sinus rhythm with frequent ventricular ectopy. There is a leftward axis. Findings also suggest a septal infarct. There are borderline voltage criteria for LVH. Â   His echocardiogram was reviewed - frequent ventricular ectopics are noted during the study. There is global hypokinesis. The EF is moderately reduced. The aortic valve appears sclerotic without any significant stenosis. The right ventricular size and function are normal.    He reports a family history of cardiac disease in several maternal uncles and also in grandparents, but not in his parents or siblings    Reports a history of sleep apnea, he says worse if he lies on his back. Therapy was recommended but he was unable to tolerate CPAP. He underwent cardiac catheterization on 6/8/2022 which showed mid RCA to Dist RCA lesion with 20% stenosis. Cardiomyopathy, nonischemic, likely arrhythmia mediated. Patient seen in follow up. Overall doing ok. I have reviewed the patient's past medical history, surgical history, family history, social history, allergies, and current medications in the electronic medical record. I verify that they are complete and accurate.      SOCIAL HISTORY     Social History     Tobacco Use   Smoking Status Never Smoker   Smokeless Tobacco Never Used      ALLERGIES     ALLERGIES:   Allergen Reactions   â¢ Amitriptyline Other (See Comments)     rage   â¢ Doxycycline Dermatitis, HIVES, Other (See Comments) and RASH   â¢ Duloxetine Hcl [facility]   Progress Note    CC: shortness of breath      Subjective:  Mr. Lala Cohen seen and examined Plan of care reviewed and questions answered. Doing well this morning. Physical Exam  Vital Signs: /69   Pulse 77   Temp 98.8 °F (37.1 °C) (Oral)   Resp 17   Ht 5' 6\" (1.676 m)   Wt 135 lb 5.8 oz (61.4 kg)   SpO2 99%   BMI 21.85 kg/m²  O2 Flow Rate (L/min): 4 L/min       General: alert and oriented to person, place and time. Up in chair, No apparent distress. Heart:Normal S1 and S2.  Regular rhythm. No murmurs, gallops, or rubs. and Rhythm: paced  Lungs: clear to auscultation bilaterally and diminished breath sounds bibasilar  Abdomen: soft, non tender, non distended, BSx4  Extremities: negative      Scheduled Meds:    guaiFENesin  600 mg Oral BID    albuterol  2.5 mg Nebulization 4x daily    cefepime  2 g Intravenous Q MWF    vancomycin  500 mg Intravenous Q MWF    albumin human  25 g Intravenous Once    torsemide  40 mg Oral Daily    isosorbide mononitrate  30 mg Oral Daily    hydrALAZINE  25 mg Oral 3 times per day    carvedilol  6.25 mg Oral BID WC    sodium chloride  250 mL Intravenous Once    sodium chloride flush  10 mL Intravenous 2 times per day    aspirin  81 mg Oral Daily    clopidogrel  75 mg Oral Daily    darbepoetin marya-polysorbate  100 mcg Intravenous Weekly    levothyroxine  125 mcg Oral Daily    QUEtiapine  25 mg Oral Nightly     Continuous Infusions:     Data:  CBC:   Recent Labs     05/06/19  0935   WBC 10.8   HGB 7.9*   HCT 26.7*   MCV 95.4        BMP:   Recent Labs     05/06/19  0935      K 5.0      CO2 25   BUN 72*   CREATININE 3.78*     PT/INR: No results for input(s): PROTIME, INR in the last 72 hours. APTT: No results for input(s): APTT in the last 72 hours.       Assessment & Plan:   Patient Active Problem List   Diagnosis    Pulmonary hypertension ([facility] [address]    Acute on chronic systolic congestive heart Other (See Comments)   â¢ Hydrocodone RASH   â¢ Oxycodone RASH      CURRENT MEDICATIONS     Current Outpatient Medications   Medication Sig Dispense Refill   â¢ fexofenadine (ALLEGRA) 180 MG tablet Take 180 mg by mouth daily as needed (allergies). â¢ Ascorbic Acid (vitamin C) 500 MG tablet Take 500 mg by mouth daily. â¢ MAGNESIUM PO Take 1 tablet by mouth daily. OTC - unknown strength     â¢ fluticasone (FLONASE) 50 MCG/ACT nasal spray Spray 2 sprays in each nostril daily as needed (allergies). â¢ metoPROLOL succinate (TOPROL-XL) 50 MG 24 hr tablet Take 25 mg (1/2 tablet) in AM and 50 mg (1 tablet) in PM (Patient taking differently: Taking 1 tablet in the morning and 2 tablets in the evening) 135 tablet 3   â¢ LORazepam (ATIVAN) 0.5 MG tablet Take 1 tablet by mouth as needed (As instructed). Take 1st dose 10 minutes before the MRI. If needed, take the 2nd dose 50 minutes after the 1st dose. Heart rate, blood pressure, oxygen saturation needs to be monitored throughout the procedure. 2 tablet 0   â¢ levothyroxine 88 MCG tablet Take 1 tablet by mouth daily. 90 tablet 3   â¢ lisinopril (ZESTRIL) 10 MG tablet Take 1 tablet by mouth daily. 90 tablet 3   â¢ metFORMIN (GLUCOPHAGE) 500 MG tablet Take 2 tablets by mouth 2 times daily (with meals). 360 tablet 3   â¢ atorvastatin (LIPITOR) 10 MG tablet Take 1 tablet by mouth at bedtime. 90 tablet 3   â¢ tiZANidine (ZANAFLEX) 4 MG tablet Take 1 tablet by mouth 3 times daily as needed (muscle spasm). 90 tablet 1   â¢ B Complex-C (SUPER B COMPLEX PO) Take 1 tablet by mouth daily. â¢ cholecalciferol (VITAMIN D) 25 mcg (1,000 units) tablet Take 50 mcg by mouth daily. â¢ acetaminophen (TYLENOL) 500 MG tablet Take 1,000 mg by mouth every 4 hours as needed for Pain or Fever. â¢ [place] Plus Cold 325-2-7.8 MG Effer Tab Take 2 tablets by mouth 2 times daily as needed.      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 Productive cough with yellow sputum, Mucinex started   ID on board and managing abx  2. Plan for Chest CT without contrast today per pulmonary        The above recommendations including medications and orders were discussed and agreed upon with Dr. Herrera Monday, the attending on service for the cardiothoracic surgery group today.      Morgan Yun, APRN, CNP Shortness of Breath or Wheezing. No current facility-administered medications for this visit. REVIEW OF SYSTEMS     REVIEW OF CARDIOVASCULAR SYSTEMS:  Cardiovascular problem(s): Palpitations, Irregular Heartbeat and Shortness of Breath on Exertion  Â   Patient does not smoke. Â   Patient does not take aspirin. Reason patient does not take aspirin: not ordered    Physical Exam     Visit Vitals  BP (!) 140/78   Pulse 67   Ht 6' (1.829 m)   Wt (!) 137 kg (302 lb)   SpO2 97%   BMI 40.96 kg/mÂ²     Wt Readings from Last 4 Encounters:   08/10/22 (!) 137 kg (302 lb)   07/21/22 135.4 kg (298 lb 6.4 oz)   06/08/22 (!) 136.6 kg (301 lb 2.4 oz)   05/13/22 134.9 kg (297 lb 6.4 oz)     Constitutional:  White  male in no acute distress. Skin: Warm and dry. No rash. HEENT:  Normocephalic. Atraumatic. Neck: Supple. No JVD. Lungs:   Clear on auscultation. No wheezes, rhonchi, or rales. Heart: : Regular rate and rhythm. Normal S1, S2.   Abdomen:   Soft, non-tender. Extremities:  No clubbing, cyanosis or edema. Neurologic:  Alert & oriented x 3. Normal affect and mood. No gross motor deficits. LABS      Lab Results   Component Value Date    POTASSIUM 4.3 06/08/2022    SODIUM 136 06/08/2022    CO2 24 06/08/2022    CHLORIDE 104 06/08/2022    BUN 37 (H) 06/08/2022    CREATININE 0.87 06/08/2022    HGBA1C 6.4 (H) 07/06/2022    GLUCOSE 116 (H) 06/08/2022    WBC 7.6 06/08/2022    RBC 4.70 06/08/2022    HCT 38.4 (L) 06/08/2022    HGB 13.3 06/08/2022     06/08/2022    TSH 2.060 04/01/2022    CHOLESTEROL 124 04/05/2022    HDL 38 (L) 04/05/2022    CHOHDL 3.3 04/05/2022    TRIGLYCERIDE 148 04/05/2022    CALCLDL 56 04/05/2022     IMAGING     ECHOCARDIOGRAM 4/1/2022  Rhythm: Sinus with PVCs  Mildly increased left ventricular cavity size. Normal left ventricular wall thickness. Left ventricular ejection fraction, 35-40%. Grade II/IV diastolic dysfunction, elevated filling pressures.   Moderate global left "ventricular hypokinesis. Normal right ventricular size and systolic function. No significant valve abnormalities. CATHETERIZATION 6/8/2022  Â· Mid RCA to Dist RCA lesion with 20% stenosis. 42-year-old male with cardiomyopathy and frequent ventricular ectopy. Â   Minimal nonobstructive coronary artery disease noted, nonischemic cardiomyopathy, suspect arrhythmia mediated. Left Main   The vessel is free of significant disease. Left Anterior Descending   The vessel is free of significant disease. type III vessel   First Septal Branch   Second Diagonal Branch   large D2 - equal in caliber to the LAD   Left Circumflex   The vessel is free of significant disease. First Obtuse Marginal Branch   Second Obtuse Marginal Branch   Right Coronary Artery   very large dominant vessel   Mid RCA to Dist RCA lesion with 20% stenosis. ECG INTERPRETATION   Results for orders placed or performed in visit on 07/21/22   ELECTROCARDIOGRAM 12-LEAD    Impression    SR with frequent PVC's. Right superior axis deviation. Nonspecific intraventricular conduction delay. Rate 82. Rhythm Strip: 4 PVC morphologies      ASSESSMENT AND PLAN     IMPRESSIONS  Nonischemic cardiomyopathy, 35-40%  Frequent ectopy  Hypertension  Sleep apnea not treated    PLAN:  Most of the visit was spent on counseling and education. Reviewed signs and symptoms to be wary of and what to report to our office. Patient verbalized understanding. Medication Changes:  1. Discontinue lisinopril   2. Start Entresto 24/26 mg twice daily, start on 8/11 in the PM  3. After two weeks if tolerating the Entresto will start Jardiance 10 mg daily  4. Discontinue natalia seltzer  5. Ok to use Allegra, no ""D\""  6. Ok to use flonase nasal spray   Â   Instructions:  Labs in two weeks to assess kidney functions and electrolytes. Walk daily for exercise  Will schedule an echocardiogram 90 days after being stable on above medications.   Â   Return to clinic:   1 month    Rooney Lesch " Iftikhar Quarles

## 2024-03-05 NOTE — PROGRESS NOTES
PULMONARY PROGRESS NOTE      Patient:  Sharita Quinn  YOB: 1928    MRN: [id number]     Acct: [de-identified]     Admit date: 3/6/2019    REASON FOR CONSULT:- Pulmonary hypertension, bilateral pleural effusion with atelectasis and aortic stenosis with hypothyroidism    Pt seen and Chart reviewed. Patient was on nasal cannula oxygen of 0.5 L/m   He is up in a chair. Denied any shortness of breath. Denied orthopnea. No chest pain or pressure. No fevers or chills or night sweats. Subjective:   Review of Systems -   General ROS: Completed and except as mentioned above were negative   Psychological ROS:  Completed and except as mentioned above were negative  Allergy and Immunology ROS:  Completed and except as mentioned above were negative  Hematological and Lymphatic ROS:  Completed and except as mentioned above were negative  Respiratory ROS:  Completed and except as mentioned above were negative  Cardiovascular ROS:  Completed and except as mentioned above were negative  Gastrointestinal ROS: Completed and except as mentioned above were negative  Genito-Urinary ROS:  Completed and except as mentioned above were negative  Musculoskeletal ROS:  Completed and except as mentioned above were negative  Neurological ROS:  Completed and except as mentioned above were negative  Dermatological ROS:  Completed and except as mentioned above were negative      Diet:  Dietary Nutrition Supplements:  Dietary Nutrition Supplements: Frozen Oral Supplement  Dietary Nutrition Supplements: Renal Oral Supplement  DIET CARDIAC; Low Sodium (2 GM);  Daily Fluid Restriction: 1800 ml      Medications:Current Inpatient    Scheduled Meds:   metoprolol succinate  25 mg Oral Daily    megestrol  200 mg Oral Daily    iron sucrose  100 mg Intravenous Q48H    darbepoetin marya-polysorbate  100 mcg Intravenous Weekly    simethicone  80 mg Oral Q6H    docusate sodium  100 mg Oral BID    lidocaine-EPINEPHrine  20 mL Intradermal Once    lidocaine PF  1 mL Intradermal Once    bacitracin in 0.9 % sodium chloride irrigation  50,000 Units Topical Once    sodium chloride flush  10 mL Intravenous 2 times per day    aspirin  81 mg Oral Daily    spironolactone  25 mg Oral Daily    FLUoxetine  10 mg Oral Daily    levothyroxine  125 mcg Oral Daily    ALPRAZolam  0.25 mg Oral Once    QUEtiapine  25 mg Oral Nightly    clopidogrel  75 mg Oral Daily    [Held by provider] docusate sodium  100 mg Oral Daily    amiodarone  200 mg Oral Daily    atorvastatin  20 mg Oral Daily     Continuous Infusions:   dextrose 20 mL/hr at 04/06/19 1858     PRN Meds:sodium chloride, sodium chloride, midodrine, sodium chloride, sodium chloride, bisacodyl, HYDROcodone 5 mg - acetaminophen, sodium chloride flush, acetaminophen, aspirin, sodium chloride, sodium chloride, heparin (porcine), heparin (porcine), hydrALAZINE, ondansetron, magnesium hydroxide    Objective:      Physical Exam:  Vitals: BP (!) 82/52   Pulse 64   Temp 98.1 °F (36.7 °C) (Oral)   Resp 18   Ht 5' 6\" (1.676 m)   Wt 128 lb 4.9 oz (58.2 kg)   SpO2 96%   BMI 20.71 kg/m²   24 hour intake/output:    Intake/Output Summary (Last 24 hours) at 4/21/2019 1950  Last data filed at 4/21/2019 1800  Gross per 24 hour   Intake 580 ml   Output --   Net 580 ml     Last 3 weights:   Wt Readings from Last 3 Encounters:   04/20/19 128 lb 4.9 oz (58.2 kg)         Physical Examination:   PHYSICAL EXAMINATION:  Vitals:    04/21/19 1150 04/21/19 1211 04/21/19 1630 04/21/19 1858   BP: (!) 94/37 (!) 104/39 99/70 (!) 82/52   Pulse: 62 63 64 64   Resp:    18   Temp: 97.8 °F (36.6 °C)  98 °F (36.7 °C) 98.1 °F (36.7 °C)   TempSrc: Oral  Oral Oral   SpO2: 94%   96%   Weight:       Height:         Constitutional: This is a well developed, well nourished, 18.5-24.9 - Normal 80y.o. year old male who is alert, oriented, cooperative and in no apparent distress. Head:normocephalic and atraumatic. EENT:  ILEANA. No conjunctival injections. Septum was midline, mucosa was without erythema, exudates or cobblestoning. No thrush was noted. Mallampati  II (soft palate, uvula, fauces visible)  Neck: Supple without thyromegaly. No elevated JVP. Trachea was midline. Respiratory: Chest was symmetrical without dullness to percussion. Breath sounds bilaterally were clear to auscultation. There were no wheezes, rhonchi or rales. There is no intercostal retraction or use of accessory muscles. No egophony noted. Cardiovascular: Regular without murmur, clicks, gallops or rubs. Abdomen: Slightly rounded and soft without organomegaly. No rebound tenderness, rigidity or guarding was appreciated. Lymphatic: No lymphadenopathy. Musculoskeletal: Normal curvature of the spine. No gross muscle weakness. Extremities:  Has no bilateral lower extremity edema, no ulcerations, tenderness, varicosities or erythema. Muscle size, tone and strength are normal.  No involuntary movements are noted. Skin:  Warm and dry. Good color, turgor and pigmentation. No lesions or scars. No cyanosis or clubbing  Neurological/Psychiatric: The patient's general behavior, level of consciousness, thought content and emotional status is normal.          CBC:   Recent Labs     04/19/19 0413 04/20/19 0447 04/21/19 0458   WBC 17.2* 15.0* 11.5*   HGB 8.5* 7.7* 7.3*    166 148     BMP:    Recent Labs     04/19/19 0413 04/20/19 0447 04/21/19 0458    133* 139   K 4.2 3.6* 3.7   CL 99 96* 100   CO2 21 25 24   BUN 61* 35* 58*   CREATININE 4.53* 3.12* 4.60*   GLUCOSE 101* 101* 94     Calcium:  Recent Labs     04/21/19 0458   CALCIUM 8.4*     Ionized Calcium:No results for input(s): IONCA in the last 72 hours. Magnesium:  No results for input(s): MG in the last 72 hours. Phosphorus:No results for input(s): PHOS in the last 72 hours.   BNP:No results for input(s): BNP in the last 72 hours. Glucose:  No results for input(s): POCGLU in the last 72 hours. HgbA1C: No results for input(s): LABA1C in the last 72 hours. INR:   Recent Labs     04/19/19  1009   INR 1.1     Hepatic:   No results for input(s): ALKPHOS, ALT, AST, PROT, BILITOT, BILIDIR, LABALBU in the last 72 hours. Amylase and Lipase:No results for input(s): LACTA, AMYLASE in the last 72 hours. Lactic Acid: No results for input(s): LACTA in the last 72 hours. CARDIAC ENZYMES:  No results for input(s): CKTOTAL, CKMB, CKMBINDEX, TROPONINI in the last 72 hours. BNP: No results for input(s): BNP in the last 72 hours. Lipids: No results for input(s): CHOL, TRIG, HDL, LDLCALC in the last 72 hours. Invalid input(s): LDL  ABGs: No results found for: PH, PCO2, PO2, HCO3, O2SAT  Thyroid:   Lab Results   Component Value Date    TSH 3.04 03/20/2019      Urinalysis: No results for input(s): BACTERIA, BLOODU, CLARITYU, COLORU, PHUR, PROTEINU, RBCUA, SPECGRAV, BILIRUBINUR, NITRU, WBCUA, LEUKOCYTESUR, GLUCOSEU in the last 72 hours. 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 Aortic stenosis. Hypothyroidism. History of cigarette smoking. Worsening renal function. On intermittent hemodialysis  Anemia, stable. Hyponatremia secondary to renal failure, better    Plan:  Meds reviewed- changes made as appropriate.   On Lasix  Patient is on amiodarone  Patient is on thyroid supplementation. Increase activity as permitted and tolerated. Questions patient had were answered to his satisfaction. Continue supplemental oxygen if needed  Patient was informed of the need for using oxygen. Patient was educated on the importance of compliance and the benefits of oxygen use. Complications of oxygen use, including dryness of the nostrils, epistaxis and also the fire hazard were explained to the patient. Patient willingly accepts to use  the oxygen as recommended. Cxr reviewed. None today  Diet reviewed. Oral diet  Thank you for having us involved in the care of your patient. Please call us if you have any questions or concerns.     Radha Gonsalez MD      4/21/2019, 7:50 PM    Pulmonary & Critical Care Right arm;

## 2024-07-26 NOTE — PROGRESS NOTES
Occupational Therapy Not Seen Note    DATE: 2019  Name: Chlele Alan  : 12/10/1928  MRN: [id number]    Patient not available for Occupational Therapy due to:    Patient Declined: Pt states \"I'm not getting up\". Pt educated in purpose of evaluation, importance of activity and need for evaluation to assist with DC planning. Pt continues to decline activity out of chair. Pt reports \"I get up and walk, you come too early\", pt offered later AM time for evaluation, pt continued to decline.      Next Scheduled Treatment: Re-check 2019    Electronically signed by TAMIKA Montes on 2019 at 8:30 AM [employer]

## (undated) DEVICE — Device

## (undated) DEVICE — GLOVE SURG SZ 8 L12IN FNGR THK87MIL WHT LTX FREE

## (undated) DEVICE — GOWN,SIRUS,POLYRNF,XLN/3XL,18/CS: Brand: MEDLINE

## (undated) DEVICE — GOWN,AURORA,NONREINFORCED,LARGE: Brand: MEDLINE

## (undated) DEVICE — GOWN,SIRUS,POLYRNF,BRTHSLV,2XL,18/CS: Brand: MEDLINE

## (undated) DEVICE — SUTURE VCRL + SZ 3-0 L27IN ABSRB UD L26MM SH 1/2 CIR VCP416H

## (undated) DEVICE — SWAB CULT CLR BLU PLAS RAYON LIQ AMIES AERB ANAERB FRIC CAP

## (undated) DEVICE — GLOVE SURG SZ 7 L12IN FNGR THK87MIL WHT LTX FREE

## (undated) DEVICE — COVER,LIGHT HANDLE,FLX,2/PK: Brand: MEDLINE INDUSTRIES, INC.

## (undated) DEVICE — DRESSING TRNSPAR W5XL4.5IN FLM SHT SEMIPERMEABLE WIND

## (undated) DEVICE — STRIP WND PK W0.25INXL5YD IODO GZ TIGHTLY WVN CURAD

## (undated) DEVICE — GLOVE SURG SZ 7 L12IN FNGR THK79MIL GRN LTX FREE

## (undated) DEVICE — POSITIONER,HEAD,MULTIRING,36CS: Brand: MEDLINE